# Patient Record
Sex: FEMALE | Race: WHITE | NOT HISPANIC OR LATINO | Employment: OTHER | URBAN - METROPOLITAN AREA
[De-identification: names, ages, dates, MRNs, and addresses within clinical notes are randomized per-mention and may not be internally consistent; named-entity substitution may affect disease eponyms.]

---

## 2017-01-27 ENCOUNTER — TRANSFERRED RECORDS (OUTPATIENT)
Dept: HEALTH INFORMATION MANAGEMENT | Facility: CLINIC | Age: 82
End: 2017-01-27

## 2017-01-31 DIAGNOSIS — I10 ESSENTIAL HYPERTENSION WITH GOAL BLOOD PRESSURE LESS THAN 140/90: Primary | ICD-10-CM

## 2017-01-31 DIAGNOSIS — E78.5 HYPERLIPIDEMIA LDL GOAL <100: ICD-10-CM

## 2017-01-31 DIAGNOSIS — M79.2 NEURALGIA: ICD-10-CM

## 2017-01-31 RX ORDER — ATENOLOL 25 MG/1
25 TABLET ORAL DAILY
Qty: 90 TABLET | Refills: 1 | Status: SHIPPED | OUTPATIENT
Start: 2017-01-31 | End: 2017-08-14

## 2017-01-31 RX ORDER — LISINOPRIL 10 MG/1
10 TABLET ORAL DAILY
Qty: 90 TABLET | Refills: 3 | Status: SHIPPED | OUTPATIENT
Start: 2017-01-31 | End: 2017-04-10

## 2017-01-31 RX ORDER — ATORVASTATIN CALCIUM 10 MG/1
10 TABLET, FILM COATED ORAL DAILY
Qty: 90 TABLET | Refills: 3 | Status: SHIPPED | OUTPATIENT
Start: 2017-01-31 | End: 2017-12-12

## 2017-01-31 RX ORDER — GABAPENTIN 300 MG/1
CAPSULE ORAL
Qty: 180 CAPSULE | Refills: 3 | Status: SHIPPED | OUTPATIENT
Start: 2017-01-31 | End: 2017-12-12

## 2017-01-31 NOTE — TELEPHONE ENCOUNTER
Recent Labs   Lab Test  11/10/16   0935  08/08/16   0933   05/15/15   0941  11/03/14   1203   CHOL  182  169   < >  170  172   HDL  62  62   < >  57  62   LDL  82  67   < >  74  69   TRIG  188*  201*   < >  195*  204*   CHOLHDLRATIO   --    --    --   3.0  2.8    < > = values in this interval not displayed.

## 2017-01-31 NOTE — TELEPHONE ENCOUNTER
Prescriptions were last filled with mail order pharmacy, pt now wants them filled at [facility], we are not able to transfer from the mail order      Neurontin 300mg      Last Written Prescription Date: 11/10/16  Last Fill Quantity: 180,  # refills: 1   Last Office Visit with [facility], [facility] or  Health prescribing provider: 11/10/16                                         Next 5 appointments (look out 90 days)     Apr 10, 2017  2:00 PM   Return Visit with Marty Mann MD   [facility] PHYSICIAN HEART AT [facility] ([facility])    [address] [place] [address]   [phone]                      Lisinopril 10mg    Last Written Prescription Date: 11/10/16  Last Fill Quantity: 90, # refills: 3  Last Office Visit with [facility], [facility] or  Health prescribing provider: 11/10/16   Next 5 appointments (look out 90 days)     Apr 10, 2017  2:00 PM   Return Visit with Marty Mann MD   [facility] PHYSICIAN HEART AT [facility] ([facility])    [address] [place] [address]   [phone]                   POTASSIUM   Date Value Ref Range Status   11/10/2016 4.2 3.4 - 5.3 mmol/L Final     CREATININE   Date Value Ref Range Status   11/10/2016 0.79 0.52 - 1.04 mg/dL Final     BP Readings from Last 3 Encounters:   11/10/16 120/82   08/12/16 116/71   06/20/16 119/68       Atenolol 25mg      Last Written Prescription Date: 11/10/16  Last Fill Quantity: 90, # refills: 1    Last Office Visit with [facility], [facility] or  Health prescribing provider:  11/10/16   Future Office Visit:    Next 5 appointments (look out 90 days)     Apr 10, 2017  2:00 PM   Return Visit with Marty Mann MD   [facility] PHYSICIAN HEART [facility] ([facility])    [address] [place] [address]   [phone]                    BP Readings from Last 3 Encounters:   11/10/16 120/82   08/12/16 116/71   06/20/16 119/68               Lipitor 10mg      Last Written Prescription Date: 11/10/16  Last Fill Quantity: 90, # refills: 3  Last Office Visit with [facility] or [facility] prescribing provider: 11/10/16   Next 5 appointments (look out 90 days)     Apr 10, 2017  2:00 PM   Return Visit with Marty Mann MD   [facility] PHYSICIAN HEART AT [facility] ([facility])    [address] [facility] [address]   [phone]                   CHOL      182   11/10/2016  HDL       62   11/10/2016  LDL       82   11/10/2016  TRIG      188   11/10/2016  CHOLHDLRATIO      3.0   5/15/2015            Thanks,  Ju Busch, Technician (float)  [facility]

## 2017-03-17 ENCOUNTER — OFFICE VISIT (OUTPATIENT)
Dept: FAMILY MEDICINE | Facility: CLINIC | Age: 82
End: 2017-03-17
Payer: COMMERCIAL

## 2017-03-17 VITALS
WEIGHT: 233.6 LBS | HEART RATE: 65 BPM | OXYGEN SATURATION: 99 % | RESPIRATION RATE: 18 BRPM | BODY MASS INDEX: 40.1 KG/M2 | TEMPERATURE: 97.4 F | SYSTOLIC BLOOD PRESSURE: 122 MMHG | DIASTOLIC BLOOD PRESSURE: 64 MMHG

## 2017-03-17 DIAGNOSIS — F41.9 ANXIETY: ICD-10-CM

## 2017-03-17 DIAGNOSIS — E11.9 TYPE 2 DIABETES MELLITUS WITHOUT COMPLICATION, WITHOUT LONG-TERM CURRENT USE OF INSULIN (H): Primary | ICD-10-CM

## 2017-03-17 DIAGNOSIS — I10 ESSENTIAL HYPERTENSION, BENIGN: ICD-10-CM

## 2017-03-17 PROCEDURE — 99215 OFFICE O/P EST HI 40 MIN: CPT | Performed by: NURSE PRACTITIONER

## 2017-03-17 RX ORDER — LISINOPRIL 5 MG/1
5 TABLET ORAL DAILY
Qty: 90 TABLET | Refills: 3 | Status: SHIPPED | OUTPATIENT
Start: 2017-03-17 | End: 2017-04-10

## 2017-03-17 RX ORDER — SERTRALINE HYDROCHLORIDE 25 MG/1
TABLET, FILM COATED ORAL
Qty: 30 TABLET | Refills: 3 | Status: SHIPPED | OUTPATIENT
Start: 2017-03-17 | End: 2017-04-10 | Stop reason: SINTOL

## 2017-03-17 NOTE — NURSING NOTE
"Chief Complaint   Patient presents with     Fatigue     Neck Pain     Generalized Weakness       Initial /64 (BP Location: Left arm, Patient Position: Chair, Cuff Size: Adult Large)  Pulse 65  Temp 97.4  F (36.3  C) (Tympanic)  Resp 18  Wt 233 lb 9.6 oz (106 kg)  SpO2 99%  Breastfeeding? No  BMI 40.1 kg/m2 Estimated body mass index is 40.1 kg/(m^2) as calculated from the following:    Height as of 6/20/16: 5' 4\" (1.626 m).    Weight as of this encounter: 233 lb 9.6 oz (106 kg).  Medication Reconciliation: complete  "

## 2017-03-17 NOTE — PROGRESS NOTES
SUBJECTIVE:                                                    Theodora Meng is a 81 year old female who presents to clinic today for the following health issues:      Fatigue/weakness and neck pain      Duration: 1 week    Description (location/character/radiation): bilateral and back of neck pain, fatigue, generalized weakness    Intensity:  moderate    Accompanying signs and symptoms: neck pain and weakness    History (similar episodes/previous evaluation): prior to symptoms had URI symptoms for 2 weeks took Chloricidin for uri, not taking now    Precipitating or alleviating factors: None    Therapies tried and outcome: None     Feels lightheaded. Dizzy with postural changes- has happened before in March. Due to lightheaded doesn't want to go out walking.  No chest pain or pressure.  No vision change.    Headache intermittent, doesn't take Tylenol.   More shortness of breath, gets winded more easily.    Above symptoms are similar to those in past when had ECHO, stress test and then saw cardiology- Dr. Mann 6 mos ago. At that time had shortness of breath then but no need to do anything or make changes. Due to see Dr. Mann in April.    Pain in neck. Stretching exercises for neck being done but not other exercises. Also has tens which is helpful.      Anxiety  Increased anxiety. Episodes happening more frequently.  Different events trigger anxiety.   On Tuesday, had flushing sensation from head to feet. Never happened before.  Felt like somehting bad would happen. Lasted only a minute. Took an ativan at that time which helped. Has not used Ativan for months.  Her daughter stayed with her a few nights.        Hypertension   Wonders if her ACEI dose could be reduced.   Not having SE with medication, but wants to be on lowest dose necessary.  BP Readings from Last 6 Encounters:   03/17/17 122/64   11/10/16 120/82   08/12/16 116/71   06/20/16 119/68   06/09/16 104/64   05/11/16 129/68       DM type 2  Needs  Rx for strips.  Lab Results   Component Value Date    A1C 7.1 11/10/2016    A1C 7.4 06/09/2016    A1C 7.0 01/29/2016    A1C 7.6 05/15/2015    A1C 7.2 11/03/2014     Problem list and histories reviewed & adjusted, as indicated.  Additional history: as documented    Reviewed and updated as needed this visit by clinical staff       Reviewed and updated as needed this visit by Provider         ROS:  Constitutional, HEENT, cardiovascular, pulmonary, GI, , musculoskeletal, neuro, skin, endocrine and psych systems are negative, except as otherwise noted.    OBJECTIVE:                                                    /64 (BP Location: Left arm, Patient Position: Chair, Cuff Size: Adult Large)  Pulse 65  Temp 97.4  F (36.3  C) (Tympanic)  Resp 18  Wt 233 lb 9.6 oz (106 kg)  SpO2 99%  Breastfeeding? No  BMI 40.1 kg/m2  Body mass index is 40.1 kg/(m^2).  GENERAL: healthy, alert, no distress and elderly  EYES: Eyes grossly normal to inspection and conjunctivae and sclerae normal  NECK: no adenopathy, no asymmetry, masses, or scars and thyroid normal to palpation  RESP: lungs clear to auscultation - no rales, rhonchi or wheezes  CV: regular rate and rhythm, normal S1 S2, no S3 or S4, no murmur, click or rub, no peripheral edema and peripheral pulses strong  MS: no gross musculoskeletal defects noted, no edema  PSYCH: mentation appears normal, anxious and fatigued    Diagnostic Test Results:       ASSESSMENT/PLAN:                                                      1. Type 2 diabetes mellitus without complication, without long-term current use of insulin (H)  - Hemoglobin A1c; Future  - blood glucose monitoring (NO BRAND SPECIFIED) test strip; Use to test blood sugars twice times daily or as directed  Dispense: 1 Box; Refill: 3    2. Essential hypertension, benign  BP in desired range. Will do a trial reduction of Lisinopril, monitor closely.  - lisinopril (PRINIVIL/ZESTRIL) 5 MG tablet; Take 1 tablet (5 mg) by  mouth daily  Dispense: 90 tablet; Refill: 3    3. Anxiety  Discussed adding med in hopes of better overall management. Patient agrees.  - sertraline (ZOLOFT) 25 MG tablet; Take 25 mg daily for a week then increase to 50 mg daily  Dispense: 30 tablet; Refill: 3    Patient Instructions   Reduce the Lisinopril to 5 mg daily. An order is in at the pharmacy when you need a refill.    Check your blood pressure twice a week for the next 2 weeks either here in the clinic or at the pharmacy. We'll call you if there is a need to change the medications.    Start the Zoloft at 25 mg daily for a week, then increase to 50 mg daily.    Return in 6 weeks to see Dr. Viera or myself      TT spent: 45 minutes of which 45 minutes were spent in direct face to face contact with patient/family. Patient teaching done regarding: anxiety treatment. Greater than 50% of time spent counseling and/or coordinating care.       Estefania Koehler, STEPHANE, APRN [facility]

## 2017-03-17 NOTE — PATIENT INSTRUCTIONS
Reduce the Lisinopril to 5 mg daily. An order is in at the pharmacy when you need a refill.    Check your blood pressure twice a week for the next 2 weeks either here in the clinic or at the pharmacy. We'll call you if there is a need to change the medications.    Start the Zoloft at 25 mg daily for a week, then increase to 50 mg daily.    Return in 6 weeks to see Dr. Viera or myself

## 2017-03-17 NOTE — MR AVS SNAPSHOT
After Visit Summary   3/17/2017    Theodora Meng    MRN: [id number]           Patient Information     Date Of Birth          1935        Visit Information        Provider Department      3/17/2017 1:20 PM Estefania Koehler APRN CNP [facility]        Today's Diagnoses     Type 2 diabetes mellitus without complication, without long-term current use of insulin (H)    -  1    Essential hypertension, benign        Anxiety          Care Instructions    Reduce the Lisinopril to 5 mg daily. An order is in at the pharmacy when you need a refill.    Check your blood pressure twice a week for the next 2 weeks either here in the clinic or at the pharmacy. We'll call you if there is a need to change the medications.    Start the Zoloft at 25 mg daily for a week, then increase to 50 mg daily.    Return in 6 weeks to see Dr. Viera or myself            Follow-ups after your visit        Your next 10 appointments already scheduled     Apr 04, 2017  9:45 AM CDT   Ech Complete with [address] ([facility])    [address] [facility] [address] [phone]           1.  Please bring or wear a comfortable two-piece outfit. 2.  You may eat, drink and take your normal medicines. 3.  For any questions that cannot be answered, please contact the ordering physician            Apr 06, 2017  9:00 AM CDT   LAB with  LAB   [facility] ([facility])    [address] [facility] [id number] [phone]           Patient must bring picture ID.  Patient should be prepared to give a urine specimen  Please do not eat 10-12 hours before your appointment if you are coming in fasting for labs on lipids, cholesterol, or glucose (sugar).  Pregnant women should follow their Care Team instructions. Water with medications is okay. Do not drink coffee or other fluids.   If you have concerns about taking  your medications,  "please ask at office or if scheduling via Enersave, send a message by clicking on Secure Messaging, Message Your Care Team.            Apr 10, 2017  2:00 PM CDT   Return Visit with Marty Mann MD   [facility] PHYSICIAN HEART AT [facility] ([facility])    [address] [facility] [address]   [phone]              Future tests that were ordered for you today     Open Future Orders        Priority Expected Expires Ordered    Hemoglobin A1c Routine  3/17/2018 3/17/2017            Who to contact     If you have questions or need follow up information about today's clinic visit or your schedule please contact [facility] directly at [phone].  Normal or non-critical lab and imaging results will be communicated to you by Integromicshart, letter or phone within 4 business days after the clinic has received the results. If you do not hear from us within 7 days, please contact the clinic through Integromicshart or phone. If you have a critical or abnormal lab result, we will notify you by phone as soon as possible.  Submit refill requests through Enersave or call your pharmacy and they will forward the refill request to us. Please allow 3 business days for your refill to be completed.          Additional Information About Your Visit        Enersave Information     Enersave lets you send messages to your doctor, view your test results, renew your prescriptions, schedule appointments and more. To sign up, go to [email or web address] . Click on \"Log in\" on the left side of the screen, which will take you to the Welcome page. Then click on \"Sign up Now\" on the right side of the page.     You will be asked to enter the access code listed below, as well as some personal information. Please follow the directions to create your username and password.     Your access code is: [email or web address]  Expires: 6/15/2017  2:59 PM     Your access code will  in 90 days. If you need help or a " new code, please call your [facility] or [phone].        Care EveryWhere ID     This is your Care EveryWhere ID. This could be used by other organizations to access your [facility] medical records  [id number]        Your Vitals Were     Pulse Temperature Respirations Pulse Oximetry Breastfeeding? BMI (Body Mass Index)    65 97.4  F (36.3  C) (Tympanic) 18 99% No 40.1 kg/m2       Blood Pressure from Last 3 Encounters:   03/17/17 122/64   11/10/16 120/82   08/12/16 116/71    Weight from Last 3 Encounters:   03/17/17 233 lb 9.6 oz (106 kg)   11/10/16 236 lb (107 kg)   08/12/16 238 lb 3.2 oz (108 kg)                 Today's Medication Changes          These changes are accurate as of: 3/17/17  2:59 PM.  If you have any questions, ask your nurse or doctor.               Start taking these medicines.        Dose/Directions    sertraline 25 MG tablet   Commonly known as:  ZOLOFT   Used for:  Anxiety   Started by:  Estefania Koehler APRN CNP        Take 25 mg daily for a week then increase to 50 mg daily   Quantity:  30 tablet   Refills:  3         These medicines have changed or have updated prescriptions.        Dose/Directions    * blood glucose monitoring test strip   Commonly known as:  ACCU-CHEK YULISSA   This may have changed:  Another medication with the same name was added. Make sure you understand how and when to take each.   Used for:  Type 2 diabetes mellitus without complication (H)   Changed by:  Yecenia Viera MD        Use to test blood sugars 1-2 times daily or as directed.   Quantity:  100 each   Refills:  prn       * blood glucose monitoring test strip   Commonly known as:  no brand specified   This may have changed:  You were already taking a medication with the same name, and this prescription was added. Make sure you understand how and when to take each.   Used for:  Type 2 diabetes mellitus without complication, without long-term current use of insulin (H)   Changed by:  Estefania Koehler  ALEX Hernandez CNP        Use to test blood sugars twice times daily or as directed   Quantity:  1 Box   Refills:  3       * lisinopril 10 MG tablet   Commonly known as:  PRINIVIL/ZESTRIL   This may have changed:  Another medication with the same name was added. Make sure you understand how and when to take each.   Used for:  Essential hypertension with goal blood pressure less than 140/90   Changed by:  Yecenia Viera MD        Dose:  10 mg   Take 1 tablet (10 mg) by mouth daily   Quantity:  90 tablet   Refills:  3       * lisinopril 5 MG tablet   Commonly known as:  PRINIVIL/ZESTRIL   This may have changed:  You were already taking a medication with the same name, and this prescription was added. Make sure you understand how and when to take each.   Used for:  Essential hypertension, benign   Changed by:  Estefania Koehler APRN CNP        Dose:  5 mg   Take 1 tablet (5 mg) by mouth daily   Quantity:  90 tablet   Refills:  3       * Notice:  This list has 4 medication(s) that are the same as other medications prescribed for you. Read the directions carefully, and ask your doctor or other care provider to review them with you.         Where to get your medicines      These medications were sent to [address] [address]     Phone:  [phone]     blood glucose monitoring test strip    lisinopril 5 MG tablet    sertraline 25 MG tablet                Primary Care Provider Office Phone # Fax #    Yecenia Viera [phone]       [facility] [address] [place] [address]        Thank you!     Thank you for choosing [facility]  for your care. Our goal is always to provide you with excellent care. Hearing back from our patients is one way we can continue to improve our services. Please take a few minutes to complete the written survey that you may receive in the mail after your visit  with us. Thank you!             Your Updated Medication List - Protect others around you: Learn how to safely use, store and throw away your medicines at www.disposemymeds.org.          This list is accurate as of: 3/17/17  2:59 PM.  Always use your most recent med list.                   Brand Name Dispense Instructions for use    acetaminophen 325 MG tablet    TYLENOL     Take 2 tablets (650 mg) by mouth every 4 hours as needed for other (mild pain)       atenolol 25 MG tablet    TENORMIN    90 tablet    Take 1 tablet (25 mg) by mouth daily       atorvastatin 10 MG tablet    LIPITOR    90 tablet    Take 1 tablet (10 mg) by mouth daily       blood glucose monitoring lancets     100 each    1 each 2 times daily.       * blood glucose monitoring test strip    ACCU-CHEK YULISSA    100 each    Use to test blood sugars 1-2 times daily or as directed.       * blood glucose monitoring test strip    no brand specified    1 Box    Use to test blood sugars twice times daily or as directed       esomeprazole 40 MG CR capsule    nexIUM    180 capsule    Take 1 capsule (40 mg) by mouth 2 times daily       gabapentin 300 MG capsule    NEURONTIN    180 capsule    Take 2 tabs in the evening.       * lisinopril 10 MG tablet    PRINIVIL/ZESTRIL    90 tablet    Take 1 tablet (10 mg) by mouth daily       * lisinopril 5 MG tablet    PRINIVIL/ZESTRIL    90 tablet    Take 1 tablet (5 mg) by mouth daily       LORazepam 0.5 MG tablet    ATIVAN    10 tablet    Take 1 tablet (0.5 mg) by mouth every 8 hours as needed for anxiety, use sparingly.       sertraline 25 MG tablet    ZOLOFT    30 tablet    Take 25 mg daily for a week then increase to 50 mg daily       * Notice:  This list has 4 medication(s) that are the same as other medications prescribed for you. Read the directions carefully, and ask your doctor or other care provider to review them with you.

## 2017-04-04 ENCOUNTER — HOSPITAL ENCOUNTER (OUTPATIENT)
Dept: CARDIOLOGY | Facility: CLINIC | Age: 82
Discharge: HOME | End: 2017-04-04
Attending: INTERNAL MEDICINE | Admitting: INTERNAL MEDICINE
Payer: COMMERCIAL

## 2017-04-04 DIAGNOSIS — E11.65 TYPE 2 DIABETES MELLITUS WITH HYPERGLYCEMIA (H): ICD-10-CM

## 2017-04-04 DIAGNOSIS — E78.5 HYPERLIPIDEMIA LDL GOAL <70: ICD-10-CM

## 2017-04-04 DIAGNOSIS — I25.10 CORONARY ARTERY DISEASE INVOLVING NATIVE CORONARY ARTERY OF NATIVE HEART WITHOUT ANGINA PECTORIS: ICD-10-CM

## 2017-04-04 DIAGNOSIS — R07.89 ATYPICAL CHEST PAIN: ICD-10-CM

## 2017-04-04 DIAGNOSIS — K21.9 GASTROESOPHAGEAL REFLUX DISEASE, ESOPHAGITIS PRESENCE NOT SPECIFIED: ICD-10-CM

## 2017-04-04 DIAGNOSIS — I10 BENIGN ESSENTIAL HYPERTENSION: ICD-10-CM

## 2017-04-04 PROCEDURE — 93306 TTE W/DOPPLER COMPLETE: CPT | Mod: 26 | Performed by: INTERNAL MEDICINE

## 2017-04-04 PROCEDURE — 93306 TTE W/DOPPLER COMPLETE: CPT

## 2017-04-06 ENCOUNTER — ALLIED HEALTH/NURSE VISIT (OUTPATIENT)
Dept: FAMILY MEDICINE | Facility: CLINIC | Age: 82
End: 2017-04-06
Payer: COMMERCIAL

## 2017-04-06 VITALS — DIASTOLIC BLOOD PRESSURE: 72 MMHG | SYSTOLIC BLOOD PRESSURE: 122 MMHG

## 2017-04-06 DIAGNOSIS — E11.65 TYPE 2 DIABETES MELLITUS WITH HYPERGLYCEMIA (H): ICD-10-CM

## 2017-04-06 DIAGNOSIS — E11.9 TYPE 2 DIABETES MELLITUS WITHOUT COMPLICATION, WITHOUT LONG-TERM CURRENT USE OF INSULIN (H): ICD-10-CM

## 2017-04-06 DIAGNOSIS — I10 ESSENTIAL HYPERTENSION: Primary | ICD-10-CM

## 2017-04-06 DIAGNOSIS — E78.5 HYPERLIPIDEMIA LDL GOAL <70: ICD-10-CM

## 2017-04-06 DIAGNOSIS — I10 BENIGN ESSENTIAL HYPERTENSION: ICD-10-CM

## 2017-04-06 DIAGNOSIS — R07.89 ATYPICAL CHEST PAIN: ICD-10-CM

## 2017-04-06 DIAGNOSIS — K21.9 GASTROESOPHAGEAL REFLUX DISEASE, ESOPHAGITIS PRESENCE NOT SPECIFIED: ICD-10-CM

## 2017-04-06 DIAGNOSIS — I25.10 CORONARY ARTERY DISEASE INVOLVING NATIVE CORONARY ARTERY OF NATIVE HEART WITHOUT ANGINA PECTORIS: ICD-10-CM

## 2017-04-06 LAB
ALT SERPL W P-5'-P-CCNC: 18 U/L (ref 0–50)
ANION GAP SERPL CALCULATED.3IONS-SCNC: 8 MMOL/L (ref 3–14)
BUN SERPL-MCNC: 17 MG/DL (ref 7–30)
CALCIUM SERPL-MCNC: 8.6 MG/DL (ref 8.5–10.1)
CHLORIDE SERPL-SCNC: 107 MMOL/L (ref 94–109)
CHOLEST SERPL-MCNC: 163 MG/DL
CO2 SERPL-SCNC: 26 MMOL/L (ref 20–32)
CREAT SERPL-MCNC: 0.7 MG/DL (ref 0.52–1.04)
GFR SERPL CREATININE-BSD FRML MDRD: 81 ML/MIN/1.7M2
GLUCOSE SERPL-MCNC: 139 MG/DL (ref 70–99)
HBA1C MFR BLD: 7.2 % (ref 4.3–6)
HDLC SERPL-MCNC: 59 MG/DL
LDLC SERPL CALC-MCNC: 71 MG/DL
NONHDLC SERPL-MCNC: 104 MG/DL
POTASSIUM SERPL-SCNC: 3.9 MMOL/L (ref 3.4–5.3)
SODIUM SERPL-SCNC: 141 MMOL/L (ref 133–144)
TRIGL SERPL-MCNC: 166 MG/DL

## 2017-04-06 PROCEDURE — 80061 LIPID PANEL: CPT | Performed by: NURSE PRACTITIONER

## 2017-04-06 PROCEDURE — 80048 BASIC METABOLIC PNL TOTAL CA: CPT | Performed by: NURSE PRACTITIONER

## 2017-04-06 PROCEDURE — 36415 COLL VENOUS BLD VENIPUNCTURE: CPT | Performed by: NURSE PRACTITIONER

## 2017-04-06 PROCEDURE — 83036 HEMOGLOBIN GLYCOSYLATED A1C: CPT | Performed by: NURSE PRACTITIONER

## 2017-04-06 PROCEDURE — 99207 ZZC NO CHARGE NURSE ONLY: CPT

## 2017-04-06 PROCEDURE — 84460 ALANINE AMINO (ALT) (SGPT): CPT | Performed by: NURSE PRACTITIONER

## 2017-04-10 ENCOUNTER — OFFICE VISIT (OUTPATIENT)
Dept: CARDIOLOGY | Facility: CLINIC | Age: 82
End: 2017-04-10
Attending: INTERNAL MEDICINE
Payer: COMMERCIAL

## 2017-04-10 VITALS
DIASTOLIC BLOOD PRESSURE: 80 MMHG | WEIGHT: 238.7 LBS | OXYGEN SATURATION: 99 % | SYSTOLIC BLOOD PRESSURE: 138 MMHG | HEART RATE: 67 BPM | BODY MASS INDEX: 40.97 KG/M2

## 2017-04-10 DIAGNOSIS — I10 BENIGN ESSENTIAL HYPERTENSION: ICD-10-CM

## 2017-04-10 DIAGNOSIS — I25.10 CORONARY ARTERY DISEASE INVOLVING NATIVE CORONARY ARTERY OF NATIVE HEART WITHOUT ANGINA PECTORIS: ICD-10-CM

## 2017-04-10 DIAGNOSIS — E78.5 HYPERLIPIDEMIA LDL GOAL <70: ICD-10-CM

## 2017-04-10 DIAGNOSIS — K21.9 GASTROESOPHAGEAL REFLUX DISEASE, ESOPHAGITIS PRESENCE NOT SPECIFIED: ICD-10-CM

## 2017-04-10 DIAGNOSIS — R07.89 ATYPICAL CHEST PAIN: ICD-10-CM

## 2017-04-10 DIAGNOSIS — E11.65 TYPE 2 DIABETES MELLITUS WITH HYPERGLYCEMIA, WITHOUT LONG-TERM CURRENT USE OF INSULIN (H): ICD-10-CM

## 2017-04-10 DIAGNOSIS — I10 ESSENTIAL HYPERTENSION WITH GOAL BLOOD PRESSURE LESS THAN 140/90: ICD-10-CM

## 2017-04-10 PROCEDURE — 99214 OFFICE O/P EST MOD 30 MIN: CPT | Performed by: INTERNAL MEDICINE

## 2017-04-10 RX ORDER — LISINOPRIL 5 MG/1
7.5 TABLET ORAL DAILY
Qty: 135 TABLET | Refills: 3 | Status: SHIPPED | OUTPATIENT
Start: 2017-04-10 | End: 2017-12-12

## 2017-04-10 NOTE — MR AVS SNAPSHOT
After Visit Summary   4/10/2017    Theodora Meng    MRN: [id number]           Patient Information     Date Of Birth          1935        Visit Information        Provider Department      4/10/2017 2:00 PM Marty Mann MD [facility] PHYSICIAN HEART AT [facility]        Today's Diagnoses     Atypical chest pain        Benign essential hypertension        Hyperlipidemia LDL goal <70        Type 2 diabetes mellitus with hyperglycemia, without long-term current use of insulin (H)        Coronary artery disease involving native coronary artery of native heart without angina pectoris        Gastroesophageal reflux disease, esophagitis presence not specified        Essential hypertension with goal blood pressure less than 140/90          Care Instructions    Thank you for your  Heart Care visit today. Your provider has recommended the following:  Medication Changes:  1. INCREASE your Lisinopril to 7.5 mg daily (Take 1 and 1/2 tablets of your 5 mg tablets daily to equal 7.5 mg)  Recommendations:  As discussed at your visit today with Dr. Mann.  Follow-up:  1. See Alexandra Soler PA-C for cardiology follow up in one month with a non-fasting labs to be done 1-2 days prior to this revisit.  2. See Dr. Mann for cardiology follow up in 4 months with fasting labs to be done 1-2 days prior to this revisit.  We kindly ask that you call cardiology scheduling at [phone] three months prior to requested revisit date to schedule future cardiology appointments.  Reminder:  1. Please bring in your current medication list or your medication, over the counter supplements and vitamin bottles as we will review these at each office visit.               [facility] HEART CARE  [facility]~[address]~[place], [place]~[address]  Questions about your visit today?  Call your Cardiology Clinic RN's-Payal Tavarez and/or Uzma Patterson at  "[phone].              Follow-ups after your visit        Additional Services     Follow-Up with Cardiac Advanced Practice Provider           Follow-Up with Cardiologist                 Future tests that were ordered for you today     Open Future Orders        Priority Expected Expires Ordered    Basic metabolic panel Routine 8/8/2017 4/10/2018 4/10/2017    Lipid Profile Routine 8/8/2017 4/10/2018 4/10/2017    ALT Routine 8/8/2017 4/10/2018 4/10/2017    Follow-Up with Cardiologist Routine 8/8/2017 4/10/2018 4/10/2017    Basic metabolic panel Routine 5/10/2017 4/10/2018 4/10/2017    Follow-Up with Cardiac Advanced Practice Provider Routine 5/10/2017 4/10/2018 4/10/2017            Who to contact     If you have questions or need follow up information about today's clinic visit or your schedule please contact [facility] PHYSICIAN HEART AT [facility] directly at [phone].  Normal or non-critical lab and imaging results will be communicated to you by [employer], letter or phone within 4 business days after the clinic has received the results. If you do not hear from us within 7 days, please contact the clinic through [employer] or phone. If you have a critical or abnormal lab result, we will notify you by phone as soon as possible.  Submit refill requests through [employer] or call your pharmacy and they will forward the refill request to us. Please allow 3 business days for your refill to be completed.          Additional Information About Your Visit        [employer] Information     [employer] lets you send messages to your doctor, view your test results, renew your prescriptions, schedule appointments and more. To sign up, go to [email or web address] . Click on \"Log in\" on the left side of the screen, which will take you to the Welcome page. Then click on \"Sign up Now\" on the right side of the page.     You will be asked to enter the access code listed below, as well as some personal information. Please " follow the directions to create your username and password.     Your access code is: [email or web address]  Expires: 6/15/2017  2:59 PM     Your access code will  in 90 days. If you need help or a new code, please call your [facility] clinic or [phone].        Care EveryWhere ID     This is your Care EveryWhere ID. This could be used by other organizations to access your [facility] medical records  [id number]        Your Vitals Were     Pulse Pulse Oximetry BMI (Body Mass Index)             67 99% 40.97 kg/m2          Blood Pressure from Last 3 Encounters:   04/10/17 138/80   17 122/72   17 122/64    Weight from Last 3 Encounters:   04/10/17 108.3 kg (238 lb 11.2 oz)   17 106 kg (233 lb 9.6 oz)   11/10/16 107 kg (236 lb)              We Performed the Following     Follow-Up with Cardiologist          Today's Medication Changes          These changes are accurate as of: 4/10/17  2:43 PM.  If you have any questions, ask your nurse or doctor.               These medicines have changed or have updated prescriptions.        Dose/Directions    * lisinopril 5 MG tablet   Commonly known as:  PRINIVIL/ZESTRIL   This may have changed:  Another medication with the same name was changed. Make sure you understand how and when to take each.   Used for:  Essential hypertension, benign   Changed by:  Estefania Koehler APRN CNP        Dose:  5 mg   Take 1 tablet (5 mg) by mouth daily   Quantity:  90 tablet   Refills:  3       * lisinopril 5 MG tablet   Commonly known as:  PRINIVIL/ZESTRIL   This may have changed:    - medication strength  - how much to take   Used for:  Essential hypertension with goal blood pressure less than 140/90   Changed by:  Marty Mann MD        Dose:  7.5 mg   Take 1.5 tablets (7.5 mg) by mouth daily   Quantity:  135 tablet   Refills:  3       * Notice:  This list has 2 medication(s) that are the same as other medications prescribed for you. Read the directions carefully, and  ask your doctor or other care provider to review them with you.      Stop taking these medicines if you haven't already. Please contact your care team if you have questions.     sertraline 25 MG tablet   Commonly known as:  ZOLOFT   Stopped by:  Marty Mann MD                Where to get your medicines      These medications were sent to [facility] [place], [place] - [address]  [address] , [facility] [address]     Phone:  [phone]     lisinopril 5 MG tablet                Primary Care Provider Office Phone # Fax #    PhoebePastora Viera [phone]       [facility] [address]  [facility] [address]        Thank you!     Thank you for choosing [facility] PHYSICIAN HEART AT [facility]  for your care. Our goal is always to provide you with excellent care. Hearing back from our patients is one way we can continue to improve our services. Please take a few minutes to complete the written survey that you may receive in the mail after your visit with us. Thank you!             Your Updated Medication List - Protect others around you: Learn how to safely use, store and throw away your medicines at www.disposemymeds.org.          This list is accurate as of: 4/10/17  2:43 PM.  Always use your most recent med list.                   Brand Name Dispense Instructions for use    acetaminophen 325 MG tablet    TYLENOL     Take 2 tablets (650 mg) by mouth every 4 hours as needed for other (mild pain)       atenolol 25 MG tablet    TENORMIN    90 tablet    Take 1 tablet (25 mg) by mouth daily       atorvastatin 10 MG tablet    LIPITOR    90 tablet    Take 1 tablet (10 mg) by mouth daily       blood glucose monitoring lancets     100 each    1 each 2 times daily.       * blood glucose monitoring test strip    ACCU-CHEK YULISSA    100 each    Use to test blood sugars 1-2 times daily or as directed.       * blood glucose monitoring test strip    no  brand specified    1 Box    Use to test blood sugars twice times daily or as directed       esomeprazole 40 MG CR capsule    nexIUM    180 capsule    Take 1 capsule (40 mg) by mouth 2 times daily       gabapentin 300 MG capsule    NEURONTIN    180 capsule    Take 2 tabs in the evening.       * lisinopril 5 MG tablet    PRINIVIL/ZESTRIL    90 tablet    Take 1 tablet (5 mg) by mouth daily       * lisinopril 5 MG tablet    PRINIVIL/ZESTRIL    135 tablet    Take 1.5 tablets (7.5 mg) by mouth daily       LORazepam 0.5 MG tablet    ATIVAN    10 tablet    Take 1 tablet (0.5 mg) by mouth every 8 hours as needed for anxiety, use sparingly.       * Notice:  This list has 4 medication(s) that are the same as other medications prescribed for you. Read the directions carefully, and ask your doctor or other care provider to review them with you.

## 2017-04-10 NOTE — PATIENT INSTRUCTIONS
Thank you for your [facility] visit today. Your provider has recommended the following:  Medication Changes:  1. INCREASE your Lisinopril to 7.5 mg daily (Take 1 and 1/2 tablets of your 5 mg tablets daily to equal 7.5 mg)  Recommendations:  As discussed at your visit today with Dr. Mann.  Follow-up:  1. See Alexandra Soler PA-C for cardiology follow up in one month with a non-fasting labs to be done 1-2 days prior to this revisit.  2. See Dr. Mann for cardiology follow up in 4 months with fasting labs to be done 1-2 days prior to this revisit.  We kindly ask that you call cardiology scheduling at [phone] three months prior to requested revisit date to schedule future cardiology appointments.  Reminder:  1. Please bring in your current medication list or your medication, over the counter supplements and vitamin bottles as we will review these at each office visit.               [facility] HEART CARE  [facility]~[address]~[place], [place]~[address]  Questions about your visit today?  Call your Cardiology Clinic RN's-Payal Tavarez and/or Uzma Patterson at [phone].

## 2017-04-10 NOTE — LETTER
"4/10/2017    Yecenia Viera MD  [facility]   [address] [facility] [address]    RE: Theodora Meng       Dear Colleague,    I had the pleasure of seeing Theodora Meng in the [facility].    The patient is an 82-year-old overweight white female who presents to Cardiology Clinic for evaluation of chest discomfort known as \"heartburn\" and a fluttering sensation thought to be palpitations.  She had been evaluated in 2008 for similar chest discomfort, and a Cardiolite stress test had revealed a fixed anterior defect thought to be breast attenuation artifact, although nontransmural infarct could not be excluded.  Left ventricular function was thought to be intact.  She had a CT scan of the abdomen showing calcification of the coronary arteries.  She apparently has done well with isolated episodes of gastroesophageal reflux discomfort over the years as well as isolated palpitations.  She did have 1 more persistent chest discomfort prior to office visit in the spring of 2016 that lasted 12 hours without response to antacids.  She also had palpitations that appeared to reflect the discomfort.  The discomfort did not radiate to throat, jaw, shoulders, arms or back, was not associated with shortness of breath, nausea, diaphoresis or syncope.  The patient's history for coronary disease risk factors is negative for cigarette smoking, positive for hypertension in the past 15 years, positive for hyperlipidemia in the past 2 years, diabetes type 2 that is noninsulin dependent, and positive for family history of premature coronary artery disease.  She does not take in much caffeine or alcohol.  She is postmenopausal.  She has not had significant symptoms of chest discomfort, shortness of breath, dizziness, nausea, vomiting, diaphoresis or syncope.  She has occasional palpitations at night and takes her beta blocker therapy at that time.  She denies PND, orthopnea, fevers, " chills or sweats.  She has slight easy fatigability, general malaise and mild dyspnea on exertion.  She had echocardiography performed before this visit that showed intact left ventricular function, with ejection fraction 55%-60% with trace to mild mitral insufficiency, normal right ventricular systolic function and evidence of diastolic dysfunction of the left ventricle, mild dilatation of left atrium, mild mitral annular calcification.  No significant change from the previous echocardiogram noted.  The patient's Holter monitor from last year demonstrated a heart rate varying from 56 to 105, average rate of 72, with isolated to occasional supraventricular and ventricular beats with one 27-beat run of PAT.        MEDICATIONS:   1.  Lisinopril 5 mg a day.   2.  Atenolol 25 mg a day taken at night.   3.  Atorvastatin 10 mg a day.   4.  Gabapentin 600 mg in the evening.   5.  Lorazepam 0.5 mg every 8 hours as needed.   6.  Acetaminophen 325-650 mg every 4 hours as needed.   7.  Esomeprazole 40 mg twice a day (that is Nexium 40 mg twice a day).      LABORATORY DATA:  Demonstrated cholesterol 163, HDL 59, LDL 71, triglycerides 166, sodium 141, potassium 3.9, BUN 17, creatinine 0.7.  Hemoglobin A1c is 7.2.  ALT is 18.      The patient presents to Cardiology Clinic for followup of ischemic heart disease, hypertension and hyperlipidemia.  The patient did notice some lightheadedness at a previous clinic visit with her primary care physician, who reduced her lisinopril from 10 to 5 mg a day.  This has not improved her symptoms of lightheadedness or lack of balance.      PHYSICAL EXAMINATION:   VITAL SIGNS:  Blood pressure 138/80 with a heart rate of 60-70 and regular with occasional premature beat.  Weight was 238 pounds, which was stable.   NECK:  Without jugular venous distention, carotid bruit or palpable thyroid.   CHEST:  Essentially clear to percussion and auscultation with slight decreased breath sounds at the bases.  "  CARDIAC:  Regular rhythm, soft S4 gallop, 1 to 2/6 systolic murmur at the left sternal border with slight radiation to the apex.  No diastolic murmur, rub or S3.   EXTREMITIES:  Without significant cyanosis.  There was trace to 1+ edema present.      CLINICAL IMPRESSION:   1.  Stable cardiac condition.   2.  Isolated episodes of palpitations but no severe chest discomfort.   3.  Isolated abnormalities of Cardiolite stress testing with an anterior defect thought to be most likely related to soft-tissue attenuation.   4.  Hypertension -- slight increase in systolic blood pressure.   5.  Hyperlipidemia -- at goal with slight elevation of triglycerides.   6.  Positive family history of premature coronary artery disease.   7.  Diabetes mellitus -- type 2 noninsulin dependent.   8.  Gastroesophageal reflux disease with question of dysphagia.   9.  Osteoarthritis, status post knee replacement.      DISCUSSION:  The patient has done well since her last clinic visit.  She has had no prolonged chest discomfort, shortness of breath, dizziness or syncope.  She does have isolated palpitations.  She has been noted to have coronary artery calcifications on CT scan, therefore, not a very good candidate for CT coronary angiography.  She will be continued on her medications including atenolol and lisinopril, and the lisinopril will be gently increased to 7.5 mg a day for better blood pressure control.  It is likely that her \"lightheadedness\" is more likely a lack of balance or unsteadiness and she should use a cane or walking stick.  She denies any symptoms of congestive heart failure.  She may be a candidate for followup Holter monitor later this year and perhaps a stress test if she has recurrent symptoms of chest discomfort using a Lexiscan Cardiolite.      RECOMMENDATIONS:   1.  Continue present medications except to increase lisinopril to 7.5 mg a day.   2.  Aggressive diabetic management.   3.  Close followup of serum lipids, " basic metabolic panel and blood pressure with followup with Alexandra Soler in 1 month.   4.  Consider Holter monitor later in 2017, early 2018 to evaluate rhythm.   5.  Also may consider Lexiscan Cardiolite stress test late in 2017 or early 2018 for ischemic evaluation or if recurrent symptoms of chest discomfort.   6.  Routine medical followup.   7.  Cardiology followup in 4 months.      Again, thank you for allowing me to participate in the care of your patient.      Sincerely,    Marty Mann MD     [facility]

## 2017-04-11 NOTE — PROGRESS NOTES
"HISTORY OF PRESENT ILLNESS:  The patient is an 82-year-old overweight white female who presents to Cardiology Clinic for evaluation of chest discomfort known as \"heartburn\" and a fluttering sensation thought to be palpitations.  She had been evaluated in 2008 for similar chest discomfort, and a Cardiolite stress test had revealed a fixed anterior defect thought to be breast attenuation artifact, although nontransmural infarct could not be excluded.  Left ventricular function was thought to be intact.  She had a CT scan of the abdomen showing calcification of the coronary arteries.  She apparently has done well with isolated episodes of gastroesophageal reflux discomfort over the years as well as isolated palpitations.  She did have 1 more persistent chest discomfort prior to office visit in the spring of 2016 that lasted 12 hours without response to antacids.  She also had palpitations that appeared to reflect the discomfort.  The discomfort did not radiate to throat, jaw, shoulders, arms or back, was not associated with shortness of breath, nausea, diaphoresis or syncope.  The patient's history for coronary disease risk factors is negative for cigarette smoking, positive for hypertension in the past 15 years, positive for hyperlipidemia in the past 2 years, diabetes type 2 that is noninsulin dependent, and positive for family history of premature coronary artery disease.  She does not take in much caffeine or alcohol.  She is postmenopausal.  She has not had significant symptoms of chest discomfort, shortness of breath, dizziness, nausea, vomiting, diaphoresis or syncope.  She has occasional palpitations at night and takes her beta blocker therapy at that time.  She denies PND, orthopnea, fevers, chills or sweats.  She has slight easy fatigability, general malaise and mild dyspnea on exertion.  She had echocardiography performed before this visit that showed intact left ventricular function, with ejection fraction " 55%-60% with trace to mild mitral insufficiency, normal right ventricular systolic function and evidence of diastolic dysfunction of the left ventricle, mild dilatation of left atrium, mild mitral annular calcification.  No significant change from the previous echocardiogram noted.  The patient's Holter monitor from last year demonstrated a heart rate varying from 56 to 105, average rate of 72, with isolated to occasional supraventricular and ventricular beats with one 27-beat run of PAT.      MEDICATIONS:   1.  Lisinopril 5 mg a day.   2.  Atenolol 25 mg a day taken at night.   3.  Atorvastatin 10 mg a day.   4.  Gabapentin 600 mg in the evening.   5.  Lorazepam 0.5 mg every 8 hours as needed.   6.  Acetaminophen 325-650 mg every 4 hours as needed.   7.  Esomeprazole 40 mg twice a day (that is Nexium 40 mg twice a day).      LABORATORY DATA:  Demonstrated cholesterol 163, HDL 59, LDL 71, triglycerides 166, sodium 141, potassium 3.9, BUN 17, creatinine 0.7.  Hemoglobin A1c is 7.2.  ALT is 18.      The patient presents to Cardiology Clinic for followup of ischemic heart disease, hypertension and hyperlipidemia.  The patient did notice some lightheadedness at a previous clinic visit with her primary care physician, who reduced her lisinopril from 10 to 5 mg a day.  This has not improved her symptoms of lightheadedness or lack of balance.      PHYSICAL EXAMINATION:   VITAL SIGNS:  Blood pressure 138/80 with a heart rate of 60-70 and regular with occasional premature beat.  Weight was 238 pounds, which was stable.   NECK:  Without jugular venous distention, carotid bruit or palpable thyroid.   CHEST:  Essentially clear to percussion and auscultation with slight decreased breath sounds at the bases.   CARDIAC:  Regular rhythm, soft S4 gallop, 1 to 2/6 systolic murmur at the left sternal border with slight radiation to the apex.  No diastolic murmur, rub or S3.   EXTREMITIES:  Without significant cyanosis.  There was  "trace to 1+ edema present.      CLINICAL IMPRESSION:   1.  Stable cardiac condition.   2.  Isolated episodes of palpitations but no severe chest discomfort.   3.  Isolated abnormalities of Cardiolite stress testing with an anterior defect thought to be most likely related to soft-tissue attenuation.   4.  Hypertension -- slight increase in systolic blood pressure.   5.  Hyperlipidemia -- at goal with slight elevation of triglycerides.   6.  Positive family history of premature coronary artery disease.   7.  Diabetes mellitus -- type 2 noninsulin dependent.   8.  Gastroesophageal reflux disease with question of dysphagia.   9.  Osteoarthritis, status post knee replacement.      DISCUSSION:  The patient has done well since her last clinic visit.  She has had no prolonged chest discomfort, shortness of breath, dizziness or syncope.  She does have isolated palpitations.  She has been noted to have coronary artery calcifications on CT scan, therefore, not a very good candidate for CT coronary angiography.  She will be continued on her medications including atenolol and lisinopril, and the lisinopril will be gently increased to 7.5 mg a day for better blood pressure control.  It is likely that her \"lightheadedness\" is more likely a lack of balance or unsteadiness and she should use a cane or walking stick.  She denies any symptoms of congestive heart failure.  She may be a candidate for followup Holter monitor later this year and perhaps a stress test if she has recurrent symptoms of chest discomfort using a Lexiscan Cardiolite.      RECOMMENDATIONS:   1.  Continue present medications except to increase lisinopril to 7.5 mg a day.   2.  Aggressive diabetic management.   3.  Close followup of serum lipids, basic metabolic panel and blood pressure with followup with Alexandra Soler in 1 month.   4.  Consider Holter monitor later in 2017, early 2018 to evaluate rhythm.   5.  Also may consider Lexiscan Cardiolite stress " test late in  or early  for ischemic evaluation or if recurrent symptoms of chest discomfort.   6.  Routine medical followup.   7.  Cardiology followup in 4 months.      cc:   Yecenia Viera MD    [address]  [address]         JEANE JENSEN MD, [facility]             D: 04/10/2017 14:48   T: 04/10/2017 23:31   MT: CORINNE      Name:     THOR DOMÍNGUEZ   MRN:      [id number]        Account:      [id number]   :      1935           Service Date: 04/10/2017      Document: [id number]

## 2017-04-24 ENCOUNTER — ALLIED HEALTH/NURSE VISIT (OUTPATIENT)
Dept: FAMILY MEDICINE | Facility: CLINIC | Age: 82
End: 2017-04-24
Payer: COMMERCIAL

## 2017-04-24 VITALS — DIASTOLIC BLOOD PRESSURE: 68 MMHG | SYSTOLIC BLOOD PRESSURE: 104 MMHG | HEART RATE: 68 BPM

## 2017-04-24 DIAGNOSIS — I10 ESSENTIAL HYPERTENSION: Primary | ICD-10-CM

## 2017-04-24 PROCEDURE — 99207 ZZC NO CHARGE NURSE ONLY: CPT

## 2017-04-24 NOTE — MR AVS SNAPSHOT
After Visit Summary   4/24/2017    Theodora Meng    MRN: [id number]           Patient Information     Date Of Birth          1935        Visit Information        Provider Department      4/24/2017 3:00 PM FL AWILDA RN [facility]        Today's Diagnoses     Essential hypertension    -  1       Follow-ups after your visit        Your next 10 appointments already scheduled     May 11, 2017 10:00 AM CDT   LAB with [facility]   [facility] ([facility])    [address] [facility] [id number] [phone]           Patient must bring picture ID.  Patient should be prepared to give a urine specimen  Please do not eat 10-12 hours before your appointment if you are coming in fasting for labs on lipids, cholesterol, or glucose (sugar).  Pregnant women should follow their Care Team instructions. Water with medications is okay. Do not drink coffee or other fluids.   If you have concerns about taking  your medications, please ask at office or if scheduling via [employer], send a message by clicking on Secure Messaging, Message Your Care Team.            May 15, 2017  1:40 PM CDT   Return Visit with Alexandra Soler PA-C   [facility] PHYS[facility] AT [facility] ([facility])    [address] [address] [phone]              Who to contact     If you have questions or need follow up information about today's clinic visit or your schedule please contact [facility] directly at [phone].  Normal or non-critical lab and imaging results will be communicated to you by MyChart, letter or phone within 4 business days after the clinic has received the results. If you do not hear from us within 7 days, please contact the clinic through MyChart or phone. If you have a critical or abnormal lab result, we will notify you by phone as soon as possible.  Submit refill requests through [employer] or call your  "pharmacy and they will forward the refill request to us. Please allow 3 business days for your refill to be completed.          Additional Information About Your Visit        MyChart Information     [employer] lets you send messages to your doctor, view your test results, renew your prescriptions, schedule appointments and more. To sign up, go to [email or web address] . Click on \"Log in\" on the left side of the screen, which will take you to the Welcome page. Then click on \"Sign up Now\" on the right side of the page.     You will be asked to enter the access code listed below, as well as some personal information. Please follow the directions to create your username and password.     Your access code is: [email or web address]  Expires: 6/15/2017  2:59 PM     Your access code will  in 90 days. If you need help or a new code, please call your [place] clinic or [phone].        Care EveryWhere ID     This is your [facility] EveryWhere ID. This could be used by other organizations to access your [place] medical records  [id number]        Your Vitals Were     Pulse                   68            Blood Pressure from Last 3 Encounters:   17 104/68   04/10/17 138/80   17 122/72    Weight from Last 3 Encounters:   04/10/17 238 lb 11.2 oz (108.3 kg)   17 233 lb 9.6 oz (106 kg)   11/10/16 236 lb (107 kg)              Today, you had the following     No orders found for display       Primary Care Provider Office Phone # Fax #    Yecenia Viera [phone]       [facility] [address] [place] [address]        Thank you!     Thank you for choosing [facility]  for your care. Our goal is always to provide you with excellent care. Hearing back from our patients is one way we can continue to improve our services. Please take a few minutes to complete the written survey that you may receive in the mail after your visit with us. Thank you!             Your Updated " Medication List - Protect others around you: Learn how to safely use, store and throw away your medicines at www.disposemymeds.org.          This list is accurate as of: 4/24/17  3:14 PM.  Always use your most recent med list.                   Brand Name Dispense Instructions for use    acetaminophen 325 MG tablet    TYLENOL     Take 2 tablets (650 mg) by mouth every 4 hours as needed for other (mild pain)       atenolol 25 MG tablet    TENORMIN    90 tablet    Take 1 tablet (25 mg) by mouth daily       atorvastatin 10 MG tablet    LIPITOR    90 tablet    Take 1 tablet (10 mg) by mouth daily       blood glucose monitoring lancets     100 each    1 each 2 times daily.       * blood glucose monitoring test strip    ACCU-CHEK YULISSA    100 each    Use to test blood sugars 1-2 times daily or as directed.       * blood glucose monitoring test strip    no brand specified    1 Box    Use to test blood sugars twice times daily or as directed       esomeprazole 40 MG CR capsule    nexIUM    180 capsule    Take 1 capsule (40 mg) by mouth 2 times daily       gabapentin 300 MG capsule    NEURONTIN    180 capsule    Take 2 tabs in the evening.       lisinopril 5 MG tablet    PRINIVIL/ZESTRIL    135 tablet    Take 1.5 tablets (7.5 mg) by mouth daily       LORazepam 0.5 MG tablet    ATIVAN    10 tablet    Take 1 tablet (0.5 mg) by mouth every 8 hours as needed for anxiety, use sparingly.       * Notice:  This list has 2 medication(s) that are the same as other medications prescribed for you. Read the directions carefully, and ask your doctor or other care provider to review them with you.

## 2017-04-24 NOTE — NURSING NOTE
Pt here for BP check. BP wnl limits pt states she is feeling well. No change to medications. Amaris Walker RN

## 2017-05-01 ENCOUNTER — OFFICE VISIT (OUTPATIENT)
Dept: FAMILY MEDICINE | Facility: CLINIC | Age: 82
End: 2017-05-01
Payer: COMMERCIAL

## 2017-05-01 VITALS
HEART RATE: 62 BPM | BODY MASS INDEX: 41.64 KG/M2 | WEIGHT: 235 LBS | SYSTOLIC BLOOD PRESSURE: 124 MMHG | HEIGHT: 63 IN | OXYGEN SATURATION: 98 % | DIASTOLIC BLOOD PRESSURE: 80 MMHG | TEMPERATURE: 97.6 F

## 2017-05-01 DIAGNOSIS — E04.1 THYROID NODULE: ICD-10-CM

## 2017-05-01 DIAGNOSIS — M54.2 CERVICALGIA: ICD-10-CM

## 2017-05-01 DIAGNOSIS — F41.9 ANXIETY: ICD-10-CM

## 2017-05-01 DIAGNOSIS — E78.5 HYPERLIPIDEMIA LDL GOAL <100: ICD-10-CM

## 2017-05-01 DIAGNOSIS — I10 ESSENTIAL HYPERTENSION WITH GOAL BLOOD PRESSURE LESS THAN 140/90: Primary | ICD-10-CM

## 2017-05-01 DIAGNOSIS — E11.9 TYPE 2 DIABETES MELLITUS WITHOUT COMPLICATION, WITHOUT LONG-TERM CURRENT USE OF INSULIN (H): ICD-10-CM

## 2017-05-01 LAB
T4 FREE SERPL-MCNC: 1.04 NG/DL (ref 0.76–1.46)
TSH SERPL DL<=0.005 MIU/L-ACNC: 0.33 MU/L (ref 0.4–4)

## 2017-05-01 PROCEDURE — 99214 OFFICE O/P EST MOD 30 MIN: CPT | Performed by: FAMILY MEDICINE

## 2017-05-01 PROCEDURE — 36415 COLL VENOUS BLD VENIPUNCTURE: CPT | Performed by: FAMILY MEDICINE

## 2017-05-01 PROCEDURE — 84443 ASSAY THYROID STIM HORMONE: CPT | Performed by: FAMILY MEDICINE

## 2017-05-01 PROCEDURE — 84439 ASSAY OF FREE THYROXINE: CPT | Performed by: FAMILY MEDICINE

## 2017-05-01 RX ORDER — LORAZEPAM 0.5 MG/1
0.5 TABLET ORAL EVERY 8 HOURS PRN
Qty: 20 TABLET | Refills: 1 | Status: ON HOLD | OUTPATIENT
Start: 2017-05-01 | End: 2019-04-06

## 2017-05-01 NOTE — NURSING NOTE
"Chief Complaint   Patient presents with     Hypertension     recheck       Initial /80 (BP Location: Left arm, Patient Position: Chair, Cuff Size: Adult Regular)  Pulse 62  Temp 97.6  F (36.4  C) (Tympanic)  Ht 5' 3\" (1.6 m)  Wt 235 lb (106.6 kg)  SpO2 98%  BMI 41.63 kg/m2 Estimated body mass index is 41.63 kg/(m^2) as calculated from the following:    Height as of this encounter: 5' 3\" (1.6 m).    Weight as of this encounter: 235 lb (106.6 kg).  Medication Reconciliation: complete    Health Maintenance that is potentially due pending provider review:  NONE    n/a    "

## 2017-05-01 NOTE — PATIENT INSTRUCTIONS
Decrease lisinopril back down to 5 mg. Monitor BPs at home.    Schedule physical therapy for neck pain- done in [place] or [place]    Schedule thyroid ultrasound     Check with me in a month or two

## 2017-05-01 NOTE — MR AVS SNAPSHOT
After Visit Summary   5/1/2017    Theodora Meng    MRN: [id number]           Patient Information     Date Of Birth          1935        Visit Information        Provider Department      5/1/2017 9:40 AM Yecenia Viera MD [facility]        Today's Diagnoses     Essential hypertension with goal blood pressure less than 140/90    -  1    Anxiety        Type 2 diabetes mellitus without complication, without long-term current use of insulin (H)        Hyperlipidemia LDL goal <100        Cervicalgia        Thyroid nodule          Care Instructions    Decrease lisinopril back down to 5 mg. Monitor BPs at home.    Schedule physical therapy for neck pain- done in [place] or [place]    Schedule thyroid ultrasound     Check with me in a month or two        Follow-ups after your visit        Additional Services     PHYSICAL THERAPY REFERRAL       *This therapy referral will be filtered to a centralized scheduling office at [facility] and the patient will receive a call to schedule an appointment at a [place] location most convenient for them. *     [facility] provides Physical Therapy evaluation and treatment and many specialty services across the [place] system.  If requesting a specialty program, please choose from the list below.    If you have not heard from the scheduling office within 2 business days, please call [phone] for all locations, with the exception of [place], please call [phone].  Treatment: Evaluation & Treatment  Special Instructions/Modalities:   Special Programs: None    Please be aware that coverage of these services is subject to the terms and limitations of your health insurance plan.  Call member services at your health plan with any benefit or coverage questions.      **Note to Provider:  If you are referring outside of [place] for the therapy appointment, please list the name of the location in  "the  special instructions  above, print the referral and give to the patient to schedule the appointment.                  Future tests that were ordered for you today     Open Future Orders        Priority Expected Expires Ordered    US Thyroid Routine  2018            Who to contact     If you have questions or need follow up information about today's clinic visit or your schedule please contact [facility] directly at [phone].  Normal or non-critical lab and imaging results will be communicated to you by MyChart, letter or phone within 4 business days after the clinic has received the results. If you do not hear from us within 7 days, please contact the clinic through [employer] or phone. If you have a critical or abnormal lab result, we will notify you by phone as soon as possible.  Submit refill requests through [employer] or call your pharmacy and they will forward the refill request to us. Please allow 3 business days for your refill to be completed.          Additional Information About Your Visit        [employer] Information     [employer] lets you send messages to your doctor, view your test results, renew your prescriptions, schedule appointments and more. To sign up, go to [email or web address] . Click on \"Log in\" on the left side of the screen, which will take you to the Welcome page. Then click on \"Sign up Now\" on the right side of the page.     You will be asked to enter the access code listed below, as well as some personal information. Please follow the directions to create your username and password.     Your access code is: [email or web address]  Expires: 6/15/2017  2:59 PM     Your access code will  in 90 days. If you need help or a new code, please call your [place] clinic or [phone].        Care EveryWhere ID     This is your Care EveryWhere ID. This could be used by other organizations to access your [place] medical records  [id number]        Your Vitals Were     " "Pulse Temperature Height Pulse Oximetry BMI (Body Mass Index)       62 97.6  F (36.4  C) (Tympanic) 5' 3\" (1.6 m) 98% 41.63 kg/m2        Blood Pressure from Last 3 Encounters:   05/01/17 124/80   04/24/17 104/68   04/10/17 138/80    Weight from Last 3 Encounters:   05/01/17 235 lb (106.6 kg)   04/10/17 238 lb 11.2 oz (108.3 kg)   03/17/17 233 lb 9.6 oz (106 kg)              We Performed the Following     PHYSICAL THERAPY REFERRAL     TSH with free T4 reflex          Where to get your medicines      Some of these will need a paper prescription and others can be bought over the counter.  Ask your nurse if you have questions.     Bring a paper prescription for each of these medications     LORazepam 0.5 MG tablet          Primary Care Provider Office Phone # Fax #    Yecenia Viera [phone]       [address] [address]        Thank you!     Thank you for choosing [facility]  for your care. Our goal is always to provide you with excellent care. Hearing back from our patients is one way we can continue to improve our services. Please take a few minutes to complete the written survey that you may receive in the mail after your visit with us. Thank you!             Your Updated Medication List - Protect others around you: Learn how to safely use, store and throw away your medicines at www.disposemymeds.org.          This list is accurate as of: 5/1/17 10:09 AM.  Always use your most recent med list.                   Brand Name Dispense Instructions for use    acetaminophen 325 MG tablet    TYLENOL     Take 2 tablets (650 mg) by mouth every 4 hours as needed for other (mild pain)       atenolol 25 MG tablet    TENORMIN    90 tablet    Take 1 tablet (25 mg) by mouth daily       atorvastatin 10 MG tablet    LIPITOR    90 tablet    Take 1 tablet (10 mg) by mouth daily       blood glucose monitoring lancets     100 each    1 each 2 times daily.       * " blood glucose monitoring test strip    ACCU-CHEK YULISSA    100 each    Use to test blood sugars 1-2 times daily or as directed.       * blood glucose monitoring test strip    no brand specified    1 Box    Use to test blood sugars twice times daily or as directed       esomeprazole 40 MG CR capsule    nexIUM    180 capsule    Take 1 capsule (40 mg) by mouth 2 times daily       gabapentin 300 MG capsule    NEURONTIN    180 capsule    Take 2 tabs in the evening.       lisinopril 5 MG tablet    PRINIVIL/ZESTRIL    135 tablet    Take 1.5 tablets (7.5 mg) by mouth daily       LORazepam 0.5 MG tablet    ATIVAN    20 tablet    Take 1 tablet (0.5 mg) by mouth every 8 hours as needed for anxiety, use sparingly.       * Notice:  This list has 2 medication(s) that are the same as other medications prescribed for you. Read the directions carefully, and ask your doctor or other care provider to review them with you.

## 2017-05-01 NOTE — PROGRESS NOTES
SUBJECTIVE:                                                    Theodora Meng is a 82 year old female who presents to clinic today for the following health issues:      Hypertension Follow-up      Outpatient blood pressures are not being checked.    Low Salt Diet: no   increased lisinopril to 7.5 mg, atenolol 25 mg  BP Readings from Last 6 Encounters:   17 124/80   17 104/68   04/10/17 138/80   17 122/72   17 122/64   11/10/16 120/82   She was experiencing lightheadedness and came in to see Columba Koehler, orthostatics showed low blood pressure when she stood, and she decreased her lisinopril. She then saw Dr. Mann and he increased the lisinopril back to 7.5 mg. She reports that she was feeling better when she was on the lower dose of lisinopril. The lightheadedness is not daily, but it comes and goes. When she experiences it, it is usually when she changes head positions.Sometimes it is when she stands up, but once she gets moving she is all right. She denies any room spinning or other vertigo symptoms.     Orthostatics from  3/17/17  Supine: 120/71  60 No Dizziness  Sittin/64  64 No Dizziness  Standin/61  72  Dizziness- feels weak and lightheaded    Neck pain-  She was having similar pain 1 year ago and had several tests, but nothing was found. She would consider PT to see if this offered any relief. She sleeps sitting up at night with a rather flat pillow. She is still driving at this time.         Amount of exercise or physical activity: 4-5 days/week for an average of 30-45 minutes    Problems taking medications regularly: No    Medication side effects: none    Diet: regular (no restrictions)    Hyperlipidemia-atorvastatin  Recent Labs   Lab Test  17   0859  11/10/16   0935   05/15/15   0941  14   1203   CHOL  163  182   < >  170  172   HDL  59  62   < >  57  62   LDL  71  82   < >  74  69   TRIG  166*  188*   < >  195*  204*   CHOLHDLRATIO   --    --    --    3.0  2.8    < > = values in this interval not displayed.       DM-diet controlled  Lab Results   Component Value Date    A1C 7.2 04/06/2017    A1C 7.1 11/10/2016    A1C 7.4 06/09/2016    A1C 7.0 01/29/2016    A1C 7.6 05/15/2015     Thyroid nodules-  She had an ultrasound a year ago and she had 2 nodules. She was to follow up in 1 year, which is now. She would like to schedule her follow-up ultrasound.    Anxiety-she did not tolerate the sertraline that Columba Koehler started. Had upset stomach and diarrhea. She would like to just stick to rare use of ativan, maybe uses one to two a month.    Problem list and histories reviewed & adjusted, as indicated.  Additional history: as documented    BP Readings from Last 3 Encounters:   05/01/17 124/80   04/24/17 104/68   04/10/17 138/80    Wt Readings from Last 3 Encounters:   05/01/17 235 lb (106.6 kg)   04/10/17 238 lb 11.2 oz (108.3 kg)   03/17/17 233 lb 9.6 oz (106 kg)         TSH   Date Value Ref Range Status   03/11/2016 0.61 0.40 - 4.00 mU/L Final   01/29/2016  0.40 - 4.00 mU/L Final    Canceled, Test credited   Test canceled - Lab  error     01/29/2016 0.24 (L) 0.40 - 4.00 mU/L Final   05/15/2015 0.44 0.40 - 4.00 mU/L Final   09/30/2013 0.53 0.4 - 5.0 mU/L Final     Reviewed and updated as needed this visit by clinical staff  Tobacco  Allergies  Problems  Med Hx  Surg Hx  Fam Hx  Soc Hx      Reviewed and updated as needed this visit by Provider    ROS:  CONSTITUTIONAL:NEGATIVE for fever, chills, change in weight  CV: NEGATIVE for chest pain, palpitations or peripheral edema  GI: NEGATIVE for nausea, abdominal pain, heartburn, or change in bowel habits  : negative for, dysuria, frequency, hematuria, hesitancy and incontinence  MUSCULOSKELETAL: POSITIVE  for neck pain    OBJECTIVE:                                                    /80 (BP Location: Left arm, Patient Position: Chair, Cuff Size: Adult Regular)  Pulse 62  Temp 97.6  F (36.4  C)  "(Tympanic)  Ht 5' 3\" (1.6 m)  Wt 235 lb (106.6 kg)  SpO2 98%  BMI 41.63 kg/m2  Body mass index is 41.63 kg/(m^2).  GENERAL: Overweight, alert and no distress  NECK: no adenopathy, no asymmetry, masses, or scars and thyroid normal to palpation- no nodules felt  RESP: lungs clear to auscultation - no rales, rhonchi or wheezes  CV: regular rate and rhythm, normal S1 S2, no S3 or S4, no murmur, click or rub  Abd: soft, nontender,  no mass or distention   MS: Limited range of motion in neck, very tight muscles in neck. Good strength and sensation       ASSESSMENT/PLAN:                                                    Theodora was seen today for hypertension.    Diagnoses and all orders for this visit:    Essential hypertension with goal blood pressure less than 140/90  Go back down to the 5 mg of lisinopril    Anxiety  -     LORazepam (ATIVAN) 0.5 MG tablet; Take 1 tablet (0.5 mg) by mouth every 8 hours as needed for anxiety, use sparingly.    Type 2 diabetes mellitus without complication, without long-term current use of insulin (H)  Continue to monitor    Hyperlipidemia LDL goal <100  Continue atorvastatin    Cervicalgia  -     PHYSICAL THERAPY REFERRAL    Thyroid nodule  -     US Thyroid; Future  -     TSH with free T4 reflex      Patient Instructions   Decrease lisinopril back down to 5 mg. Monitor BPs at home.    Schedule physical therapy for neck pain- done in [place] or [place]    Schedule thyroid ultrasound     Check with me in a month or two    This document serves as a record of the services and decisions personally performed and made by Yecenia Viera MD. It was created on her behalf by Renetta Blanton, a trained medical scribe. The creation of this document is based the provider's statements to the medical scribe.  Renetta Blanton 10:10 AM 5/1/2017    Provider:   The information in this document, created by the medical scribe for me, accurately reflects the services I personally performed and the " decisions made by me. I have reviewed and approved this document for accuracy prior to leaving the patient care area.  Yecenia Viera MD 10:10 AM 5/1/2017    Yecenia Viera MD  [facility]

## 2017-05-04 ENCOUNTER — HOSPITAL ENCOUNTER (OUTPATIENT)
Dept: ULTRASOUND IMAGING | Facility: CLINIC | Age: 82
Discharge: HOME | End: 2017-05-04
Attending: FAMILY MEDICINE | Admitting: FAMILY MEDICINE
Payer: COMMERCIAL

## 2017-05-04 DIAGNOSIS — E04.1 THYROID NODULE: ICD-10-CM

## 2017-05-04 PROCEDURE — 76536 US EXAM OF HEAD AND NECK: CPT

## 2017-05-08 ENCOUNTER — HOSPITAL ENCOUNTER (OUTPATIENT)
Dept: PHYSICAL THERAPY | Facility: CLINIC | Age: 82
Setting detail: THERAPIES SERIES
End: 2017-05-08
Attending: FAMILY MEDICINE
Payer: COMMERCIAL

## 2017-05-08 PROCEDURE — 97110 THERAPEUTIC EXERCISES: CPT | Mod: GP | Performed by: PHYSICAL THERAPIST

## 2017-05-08 PROCEDURE — 40000718 ZZHC STATISTIC PT DEPARTMENT ORTHO VISIT: Performed by: PHYSICAL THERAPIST

## 2017-05-08 PROCEDURE — 97140 MANUAL THERAPY 1/> REGIONS: CPT | Mod: GP | Performed by: PHYSICAL THERAPIST

## 2017-05-08 PROCEDURE — 97161 PT EVAL LOW COMPLEX 20 MIN: CPT | Mod: GP | Performed by: PHYSICAL THERAPIST

## 2017-05-08 PROCEDURE — G8981 BODY POS CURRENT STATUS: HCPCS | Mod: GP,CI | Performed by: PHYSICAL THERAPIST

## 2017-05-08 PROCEDURE — G8982 BODY POS GOAL STATUS: HCPCS | Mod: GP,CI | Performed by: PHYSICAL THERAPIST

## 2017-05-12 NOTE — PROGRESS NOTES
05/08/17 1400   General Information   Type of Visit Initial OP Ortho PT Evaluation   Start of Care Date 05/08/17   Referring Physician Yecenia Viera MD    Patient/Family Goals Statement move her neck    Orders Evaluate and Treat   Date of Order 05/01/17   Insurance Type Medicare   Medical Diagnosis Cervicalgia M54.2   Surgical/Medical history reviewed Yes   Precautions/Limitations no known precautions/limitations   Body Part(s)   Body Part(s) Cervical Spine   Presentation and Etiology   Pertinent history of current problem (include personal factors and/or comorbidities that impact the POC) Pt reports insidoes onset neck pain for years.  Pt relates stiffness in top of shoulders. Pt denies radicualr symptoms. Pt relates she does a lot of reading that will give her a HA's. PMH: diabetes, high BP, arthritis, kneee replancement, D&c, ovaray removal. _ pt also relates she has terrible balance. Pt relates she looses balance w head truns and going from sit to stand  Pt is currently doing UT shrugs ands head turns at home.  pt re quest BP to be takesn    Impairments A. Pain;D. Decreased ROM;E. Decreased flexibility;G. Impaired balance   Functional Limitations perform activities of daily living   Symptom Location Neck and shoulders   How/Where did it occur From insidious onset   Onset date of current episode/exacerbation 03/08/17   Chronicity Chronic   Pain rating (0-10 point scale) Best (/10);Worst (/10)   Best (/10) 1   Worst (/10) 5   Pain quality B. Dull;C. Aching   Frequency of pain/symptoms B. Intermittent   Pain/symptoms exacerbated by B. Walking;G. Certain positions   Pain/symptoms eased by F. Certain positions;G. Heat   Progression of symptoms since onset: Improved   Current / Previous Interventions   Diagnostic Tests: MRI   MRI Results Results   MRI results 1. Diffuse degenerative changes of the cervical spine as described   Current Level of Function   Current Community Support Family/friend caregiver    Patient role/employment history F. Retired   Living environment [place]   Fall Risk Screen   Fall screen completed by PT   Per patient - Fall 2 or more times in past year? No   Per patient - Fall with injury in past year? No   Is patient a fall risk? No   Functional Scales   Functional Scales Other   Other Scales  NDI:20%    Vital Signs   Vital Signs BP;Pulse   Pulse 67   /76   Cervical Spine   Observation Cranial Nerve2 - 12 : all WNL    Posture min fwd head posture   Cervical Flexion ROM 45 w chin to chest   Cervical Extension ROM 50 w pulling on sides of the neck    Cervical Right Rotation ROM 65   Cervical Left Rotation ROM 49   Shoulder Shrug (C2-C4) Strength 5/5   Shoulder Abd (C5) Strength 5/5   Shoulder Add (C7) Strength 5/5   Shoulder ER (C5, C6) Strength 5/5   Shoulder IR (C5, C6) Strength 5/5   Elbow Flexion (C5, C6) Strength 5/5   Elbow Extension (C7) Strength 5/5   Wrist Extension (C6) Strength 5/5   Wrist Flexion (C7) Strength 5/5   Vertebral Artery Test neg   Alar Ligament Test neg   Transverse Ligament Test neg   Spurling Test neg   Palpation mod tightness thorughout UT and cervical paraspinals    Planned Therapy Interventions   Planned Therapy Interventions joint mobilization;manual therapy;neuromuscular re-education;ROM;strengthening;stretching   Planned Modality Interventions   Planned Modality Interventions Cryotherapy;Electrical stimulation;Hot packs;TENS;Ultrasound;Traction   Clinical Impression   Criteria for Skilled Therapeutic Interventions Met yes, treatment indicated   PT Diagnosis neck pain associated w degenerative changes   Influenced by the following impairments weakness, pain, limited ROM   Functional limitations due to impairments driving, lifting,    Clinical Presentation Stable/Uncomplicated   Clinical Presentation Rationale +motivation, - age, chronic neck pain   Clinical Decision Making (Complexity) Low complexity   Therapy Frequency 1 time/week   Predicted  Duration of Therapy Intervention (days/wks) 6 weeks   Risk & Benefits of therapy have been explained Yes   Education Assessment   Preferred Learning Style Listening;Reading;Demonstration;Pictures/video   Barriers to Learning No barriers   ORTHO GOALS   PT Ortho Eval Goals 1;2;3;4   Ortho Goal 1   Goal Identifier posture   Goal Description Pt will demonstrate good sitting posture throughout session to demonstrate increased postural awareness and increased postural strength to minimize pain    Target Date 06/02/17   Ortho Goal 2   Goal Identifier ROM   Goal Description Pt will demonstrate 75 deg bi of cervical rot in order to be able to safely turn head to drive car safely.    Target Date 06/23/17   Ortho Goal 3   Goal Identifier NDI   Goal Description Pt will report <10% disability on NDI to demonstrate improved ability to complete daily activities and demonstrate significant clinical improvement.    Target Date 06/23/17   Total Evaluation Time   Total Evaluation Time 50 (25 eval, 1 TE, 1 MT)    Therapy Certification   Certification date from 05/08/17   Certification date to 06/23/17   Medical Diagnosis Cervicalgia M54.2     Sania Hilton  [place] [address]  [email or web address]   [email or web address]   Office: [phone] Fax: [phone]

## 2017-05-12 NOTE — PROGRESS NOTES
[facility]          OUTPATIENT PHYSICAL THERAPY ORTHOPEDIC EVALUATION  PLAN OF TREATMENT FOR OUTPATIENT REHABILITATION  (COMPLETE FOR INITIAL CLAIMS ONLY)  Patient's Last Name, First Name, M.I.  YOB: 1935  Theodora Meng    Provider s Name:  [facility]   Medical Record No.  [id number]   Start of Care Date:  05/08/17   Onset Date:  03/08/17   Type:     _X__PT   ___OT   ___SLP Medical Diagnosis:  Cervicalgia M54.2     PT Diagnosis:  neck pain associated w degenerative changes   Visits from SOC:  1      _________________________________________________________________________________  Plan of Treatment/Functional Goals:  joint mobilization, manual therapy, neuromuscular re-education, ROM, strengthening, stretching     Cryotherapy, Electrical stimulation, Hot packs, TENS, Ultrasound, Traction     Goals  Goal Identifier: posture  Goal Description: Pt will demonstrate good sitting posture throughout session to demonstrate increased postural awareness and increased postural strength to minimize pain   Target Date: 06/02/17    Goal Identifier: ROM  Goal Description: Pt will demonstrate 75 deg bi of cervical rot in order to be able to safely turn head to drive car safely.   Target Date: 06/23/17    Goal Identifier: NDI  Goal Description: Pt will report <10% disability on NDI to demonstrate improved ability to complete daily activities and demonstrate significant clinical improvement.   Target Date: 06/23/17                                                           Therapy Frequency:  1 time/week  Predicted Duration of Therapy Intervention:  6 weeks    Sania Hilton, PT                 I CERTIFY THE NEED FOR THESE SERVICES FURNISHED UNDER        THIS PLAN OF TREATMENT AND WHILE UNDER MY CARE     (Physician co-signature of this document indicates review and certification of the therapy plan).                       Certification Date From:  05/08/17    Certification Date To:  06/23/17    Referring Provider:  Yecenia Viera MD     Initial Assessment        See Epic Evaluation Start of Care Date: 05/08/17

## 2017-05-22 ENCOUNTER — HOSPITAL ENCOUNTER (OUTPATIENT)
Dept: PHYSICAL THERAPY | Facility: CLINIC | Age: 82
Setting detail: THERAPIES SERIES
End: 2017-05-22
Attending: FAMILY MEDICINE
Payer: COMMERCIAL

## 2017-05-22 PROCEDURE — 40000718 ZZHC STATISTIC PT DEPARTMENT ORTHO VISIT: Performed by: PHYSICAL THERAPIST

## 2017-05-22 PROCEDURE — 97140 MANUAL THERAPY 1/> REGIONS: CPT | Mod: GP | Performed by: PHYSICAL THERAPIST

## 2017-05-31 ENCOUNTER — HOSPITAL ENCOUNTER (OUTPATIENT)
Dept: PHYSICAL THERAPY | Facility: CLINIC | Age: 82
Setting detail: THERAPIES SERIES
End: 2017-05-31
Attending: FAMILY MEDICINE
Payer: COMMERCIAL

## 2017-05-31 PROCEDURE — 40000718 ZZHC STATISTIC PT DEPARTMENT ORTHO VISIT: Performed by: PHYSICAL THERAPIST

## 2017-05-31 PROCEDURE — 97140 MANUAL THERAPY 1/> REGIONS: CPT | Mod: GP | Performed by: PHYSICAL THERAPIST

## 2017-06-07 ENCOUNTER — HOSPITAL ENCOUNTER (OUTPATIENT)
Dept: PHYSICAL THERAPY | Facility: CLINIC | Age: 82
Setting detail: THERAPIES SERIES
End: 2017-06-07
Attending: FAMILY MEDICINE
Payer: COMMERCIAL

## 2017-06-07 PROCEDURE — 97140 MANUAL THERAPY 1/> REGIONS: CPT | Mod: GP | Performed by: PHYSICAL THERAPIST

## 2017-06-07 PROCEDURE — 97110 THERAPEUTIC EXERCISES: CPT | Mod: GP | Performed by: PHYSICAL THERAPIST

## 2017-06-07 PROCEDURE — 40000718 ZZHC STATISTIC PT DEPARTMENT ORTHO VISIT: Performed by: PHYSICAL THERAPIST

## 2017-06-15 ENCOUNTER — HOSPITAL ENCOUNTER (OUTPATIENT)
Dept: PHYSICAL THERAPY | Facility: CLINIC | Age: 82
Setting detail: THERAPIES SERIES
End: 2017-06-15
Attending: FAMILY MEDICINE
Payer: COMMERCIAL

## 2017-06-15 PROCEDURE — 97140 MANUAL THERAPY 1/> REGIONS: CPT | Mod: GP | Performed by: PHYSICAL THERAPIST

## 2017-06-15 PROCEDURE — 40000718 ZZHC STATISTIC PT DEPARTMENT ORTHO VISIT: Performed by: PHYSICAL THERAPIST

## 2017-06-21 ENCOUNTER — HOSPITAL ENCOUNTER (OUTPATIENT)
Dept: PHYSICAL THERAPY | Facility: CLINIC | Age: 82
Setting detail: THERAPIES SERIES
End: 2017-06-21
Attending: FAMILY MEDICINE
Payer: COMMERCIAL

## 2017-06-21 PROCEDURE — G8982 BODY POS GOAL STATUS: HCPCS | Mod: GP,CI | Performed by: PHYSICAL THERAPIST

## 2017-06-21 PROCEDURE — 97140 MANUAL THERAPY 1/> REGIONS: CPT | Mod: GP | Performed by: PHYSICAL THERAPIST

## 2017-06-21 PROCEDURE — 97535 SELF CARE MNGMENT TRAINING: CPT | Mod: GP | Performed by: PHYSICAL THERAPIST

## 2017-06-21 PROCEDURE — G8983 BODY POS D/C STATUS: HCPCS | Mod: GP,CI | Performed by: PHYSICAL THERAPIST

## 2017-06-21 PROCEDURE — 40000718 ZZHC STATISTIC PT DEPARTMENT ORTHO VISIT: Performed by: PHYSICAL THERAPIST

## 2017-06-22 NOTE — PROGRESS NOTES
Outpatient Physical Therapy Discharge Note     Patient: Theodora Meng  : 1935    Beginning/End Dates of Reporting Period:  17 to 2017    Referring Provider: Dr. Viera    Therapy Diagnosis:   neck pain associated w degenerative changes             Client Self Report: Overall pt is doing pretty well, but today she is sore and she is not sure why.  Pt also relates balacne is really bad. SHe thinks it is related to her neck.     Objective Measurements:  Objective Measure: ROM  Details: R rot: 60 L rot:70        Objective Measure: BP  Details: 132/81 mmHg       Outcome Measures (most recent score):  NDI: 16% impaired, eval: 20% impaired      Goals:  Goal Identifier posture   Goal Description Pt will demonstrate good sitting posture throughout session to demonstrate increased postural awareness and increased postural strength to minimize pain    Target Date 17   Date Met  17   Progress:goal met - more aware and conscious of posture     Goal Identifier ROM   Goal Description Pt will demonstrate 75 deg bi of cervical rot in order to be able to safely turn head to drive car safely.    Target Date 17   Date Met      Progress:improved ROM however not able to achieve 75 deg     Goal Identifier NDI   Goal Description Pt will report <10% disability on NDI to demonstrate improved ability to complete daily activities and demonstrate significant clinical improvement.    Target Date 17   Date Met      Progress:min improvement noted       Progress Toward Goals:   Progress this reporting period: Pt has been seen for 6 visits over this POC. In that time, she has became more aware of posture and cervical ROM has improved. Despite improvements, pt does continue to have and min change was seen in her NDI score. Pt also relates concern re balance due to neck pain however it is more likely that pt feels off balance due to B/P or vestibular system. Pt will f/u with [facility], she was given  the option to see vestibular therapist in clinic at this time however she declines. Pt is appropriate to d/c to HEP as she is I with it and pt may not make many more gains due to degenerative changes.           Plan:  Discharge from therapy.    Discharge:    Reason for Discharge: No further expectation of progress.  Patient chooses to discontinue therapy.    Equipment Issued: NA    Discharge Plan: Patient to continue home program.    Sania Hilton  Physical Therapist  [facility]  [address] [place] [id number]  [email or web address]   [email or web address]   Office: [phone] Fax: [phone]

## 2017-08-14 DIAGNOSIS — I10 ESSENTIAL HYPERTENSION WITH GOAL BLOOD PRESSURE LESS THAN 140/90: ICD-10-CM

## 2017-08-14 NOTE — TELEPHONE ENCOUNTER
atenolol (TENORMIN) 25 MG tablet      Last Written Prescription Date: 01/31/2017  Last Fill Quantity: 90 tablet, # refills: 1    Last Office Visit with [facility], [facility] or [facility] prescribing provider:  05/01/2017   Future Office Visit:        BP Readings from Last 3 Encounters:   05/01/17 124/80   04/24/17 104/68   04/10/17 138/80

## 2017-08-15 RX ORDER — ATENOLOL 25 MG/1
TABLET ORAL
Qty: 90 TABLET | Refills: 1 | Status: SHIPPED | OUTPATIENT
Start: 2017-08-15 | End: 2017-12-12

## 2017-08-21 DIAGNOSIS — K21.9 GASTROESOPHAGEAL REFLUX DISEASE WITHOUT ESOPHAGITIS: ICD-10-CM

## 2017-08-21 NOTE — TELEPHONE ENCOUNTER
Nexium       Last Written Prescription Date: 6/13/16  Last Fill Quantity: 180,  # refills: 3   Last Office Visit with [facility], [facility] or [facility] prescribing provider: 5/1/17 JUNAID Haley [facility]                                           Next 5 appointments (look out 90 days)     Sep 05, 2017  1:40 PM CDT   Office Visit with Yecenia Viera MD   [facility] ([facility])    [address] [place] [id number] [phone]

## 2017-08-22 RX ORDER — ESOMEPRAZOLE MAGNESIUM 40 MG/1
40 CAPSULE, DELAYED RELEASE ORAL 2 TIMES DAILY
Qty: 180 CAPSULE | Refills: 0 | Status: SHIPPED | OUTPATIENT
Start: 2017-08-22 | End: 2017-12-12

## 2017-08-25 ENCOUNTER — TELEPHONE (OUTPATIENT)
Dept: FAMILY MEDICINE | Facility: CLINIC | Age: 82
End: 2017-08-25

## 2017-08-25 NOTE — TELEPHONE ENCOUNTER
Prior Auth form faxed 8/25/17- [employer] - Nexium  Waiting for response.  Sudha [facility]

## 2017-12-12 ENCOUNTER — OFFICE VISIT (OUTPATIENT)
Dept: FAMILY MEDICINE | Facility: CLINIC | Age: 82
End: 2017-12-12
Payer: COMMERCIAL

## 2017-12-12 VITALS
WEIGHT: 240 LBS | RESPIRATION RATE: 16 BRPM | HEART RATE: 68 BPM | BODY MASS INDEX: 42.51 KG/M2 | TEMPERATURE: 97.8 F | DIASTOLIC BLOOD PRESSURE: 78 MMHG | OXYGEN SATURATION: 98 % | SYSTOLIC BLOOD PRESSURE: 120 MMHG

## 2017-12-12 DIAGNOSIS — I10 ESSENTIAL HYPERTENSION WITH GOAL BLOOD PRESSURE LESS THAN 140/90: ICD-10-CM

## 2017-12-12 DIAGNOSIS — E78.5 HYPERLIPIDEMIA LDL GOAL <100: ICD-10-CM

## 2017-12-12 DIAGNOSIS — M79.2 NEURALGIA: ICD-10-CM

## 2017-12-12 DIAGNOSIS — J20.9 ACUTE BRONCHITIS WITH SYMPTOMS > 10 DAYS: ICD-10-CM

## 2017-12-12 DIAGNOSIS — K21.9 GASTROESOPHAGEAL REFLUX DISEASE WITHOUT ESOPHAGITIS: ICD-10-CM

## 2017-12-12 DIAGNOSIS — R79.89 DECREASED THYROID STIMULATING HORMONE (TSH) LEVEL: ICD-10-CM

## 2017-12-12 DIAGNOSIS — I10 ESSENTIAL HYPERTENSION: ICD-10-CM

## 2017-12-12 DIAGNOSIS — E11.9 TYPE 2 DIABETES MELLITUS WITHOUT COMPLICATION, WITHOUT LONG-TERM CURRENT USE OF INSULIN (H): Primary | ICD-10-CM

## 2017-12-12 LAB
ALBUMIN SERPL-MCNC: 3.3 G/DL (ref 3.4–5)
ALP SERPL-CCNC: 87 U/L (ref 40–150)
ALT SERPL W P-5'-P-CCNC: 17 U/L (ref 0–50)
ANION GAP SERPL CALCULATED.3IONS-SCNC: 9 MMOL/L (ref 3–14)
AST SERPL W P-5'-P-CCNC: 15 U/L (ref 0–45)
BILIRUB SERPL-MCNC: 0.4 MG/DL (ref 0.2–1.3)
BUN SERPL-MCNC: 20 MG/DL (ref 7–30)
CALCIUM SERPL-MCNC: 8.3 MG/DL (ref 8.5–10.1)
CHLORIDE SERPL-SCNC: 106 MMOL/L (ref 94–109)
CHOLEST SERPL-MCNC: 169 MG/DL
CO2 SERPL-SCNC: 24 MMOL/L (ref 20–32)
CREAT SERPL-MCNC: 0.7 MG/DL (ref 0.52–1.04)
ERYTHROCYTE [DISTWIDTH] IN BLOOD BY AUTOMATED COUNT: 13.2 % (ref 10–15)
GFR SERPL CREATININE-BSD FRML MDRD: 81 ML/MIN/1.7M2
GLUCOSE SERPL-MCNC: 138 MG/DL (ref 70–99)
HBA1C MFR BLD: 7.3 % (ref 4.3–6)
HCT VFR BLD AUTO: 38.4 % (ref 35–47)
HDLC SERPL-MCNC: 66 MG/DL
HGB BLD-MCNC: 12.6 G/DL (ref 11.7–15.7)
LDLC SERPL CALC-MCNC: 65 MG/DL
MCH RBC QN AUTO: 30.6 PG (ref 26.5–33)
MCHC RBC AUTO-ENTMCNC: 32.8 G/DL (ref 31.5–36.5)
MCV RBC AUTO: 93 FL (ref 78–100)
NONHDLC SERPL-MCNC: 103 MG/DL
PLATELET # BLD AUTO: 275 10E9/L (ref 150–450)
POTASSIUM SERPL-SCNC: 3.8 MMOL/L (ref 3.4–5.3)
PROT SERPL-MCNC: 7.1 G/DL (ref 6.8–8.8)
RBC # BLD AUTO: 4.12 10E12/L (ref 3.8–5.2)
SODIUM SERPL-SCNC: 139 MMOL/L (ref 133–144)
T4 FREE SERPL-MCNC: 1.1 NG/DL (ref 0.76–1.46)
TRIGL SERPL-MCNC: 189 MG/DL
TSH SERPL DL<=0.005 MIU/L-ACNC: 0.22 MU/L (ref 0.4–4)
WBC # BLD AUTO: 4.7 10E9/L (ref 4–11)

## 2017-12-12 PROCEDURE — 85027 COMPLETE CBC AUTOMATED: CPT | Performed by: FAMILY MEDICINE

## 2017-12-12 PROCEDURE — 36415 COLL VENOUS BLD VENIPUNCTURE: CPT | Performed by: FAMILY MEDICINE

## 2017-12-12 PROCEDURE — 80061 LIPID PANEL: CPT | Performed by: FAMILY MEDICINE

## 2017-12-12 PROCEDURE — 83036 HEMOGLOBIN GLYCOSYLATED A1C: CPT | Performed by: FAMILY MEDICINE

## 2017-12-12 PROCEDURE — 99214 OFFICE O/P EST MOD 30 MIN: CPT | Performed by: FAMILY MEDICINE

## 2017-12-12 PROCEDURE — 80053 COMPREHEN METABOLIC PANEL: CPT | Performed by: FAMILY MEDICINE

## 2017-12-12 PROCEDURE — 84439 ASSAY OF FREE THYROXINE: CPT | Performed by: FAMILY MEDICINE

## 2017-12-12 PROCEDURE — 84443 ASSAY THYROID STIM HORMONE: CPT | Performed by: FAMILY MEDICINE

## 2017-12-12 RX ORDER — GABAPENTIN 300 MG/1
CAPSULE ORAL
Qty: 180 CAPSULE | Refills: 3 | Status: SHIPPED | OUTPATIENT
Start: 2017-12-12 | End: 2018-11-06

## 2017-12-12 RX ORDER — ATORVASTATIN CALCIUM 10 MG/1
10 TABLET, FILM COATED ORAL DAILY
Qty: 90 TABLET | Refills: 3 | Status: SHIPPED | OUTPATIENT
Start: 2017-12-12 | End: 2018-11-06

## 2017-12-12 RX ORDER — ESOMEPRAZOLE MAGNESIUM 40 MG/1
40 CAPSULE, DELAYED RELEASE ORAL
Qty: 90 CAPSULE | Refills: 4 | Status: SHIPPED | OUTPATIENT
Start: 2017-12-12 | End: 2018-11-06

## 2017-12-12 RX ORDER — ATENOLOL 25 MG/1
25 TABLET ORAL DAILY
Qty: 90 TABLET | Refills: 4 | Status: SHIPPED | OUTPATIENT
Start: 2017-12-12 | End: 2017-12-12

## 2017-12-12 RX ORDER — ATENOLOL 25 MG/1
25 TABLET ORAL DAILY
Qty: 90 TABLET | Refills: 4 | Status: SHIPPED | OUTPATIENT
Start: 2017-12-12 | End: 2018-11-06

## 2017-12-12 RX ORDER — AZITHROMYCIN 250 MG/1
TABLET, FILM COATED ORAL
Qty: 6 TABLET | Refills: 0 | Status: SHIPPED | OUTPATIENT
Start: 2017-12-12 | End: 2018-11-06

## 2017-12-12 RX ORDER — LISINOPRIL 5 MG/1
5 TABLET ORAL DAILY
Qty: 90 TABLET | Refills: 3 | Status: SHIPPED | OUTPATIENT
Start: 2017-12-12 | End: 2018-11-06

## 2017-12-12 NOTE — LETTER
December 22, 2017      Bereketmegan Meng  [address] [facility] [address]      Dear ,      We are writing to inform you of your test results.    Your glucose, or blood sugar, a test for diabetes, was 138.  Hemoglobin A1C measures the average of the blood sugar over several months. If you have diabetes, the goal is under 8%. Yours is 7.4%.   Hemoglobin measures the amount of red blood cells carrying oxygen to the body's tissues. Yours was normal.  Electrolytes, including sodium, potassium, chloride, bicarbonate and calcium are all normal salts in the bloodstream. Yours are all normal.  Urea nitrogen and creatinine are kidney function tests. Yours are normal.  ALT and AST are liver function tests. Yours are normal.  The TSH looks at thyroid function.  One of your was again a little off, so we'll keep watching.    Resulted Orders   Lipid panel reflex to direct LDL Fasting   Result Value Ref Range    Cholesterol 169 <200 mg/dL    Triglycerides 189 (H) <150 mg/dL      Comment:      Borderline high:  150-199 mg/dl  High:             200-499 mg/dl  Very high:       >499 mg/dl  Fasting specimen      HDL Cholesterol 66 >49 mg/dL    LDL Cholesterol Calculated 65 <100 mg/dL      Comment:      Desirable:       <100 mg/dl    Non HDL Cholesterol 103 <130 mg/dL   Hemoglobin A1c   Result Value Ref Range    Hemoglobin A1C 7.3 (H) 4.3 - 6.0 %   Comprehensive metabolic panel   Result Value Ref Range    Sodium 139 133 - 144 mmol/L    Potassium 3.8 3.4 - 5.3 mmol/L    Chloride 106 94 - 109 mmol/L    Carbon Dioxide 24 20 - 32 mmol/L    Anion Gap 9 3 - 14 mmol/L    Glucose 138 (H) 70 - 99 mg/dL      Comment:      Fasting specimen    Urea Nitrogen 20 7 - 30 mg/dL    Creatinine 0.70 0.52 - 1.04 mg/dL    GFR Estimate 81 >60 mL/min/1.7m2      Comment:      Non  GFR Calc    GFR Estimate If Black >90 >60 mL/min/1.7m2      Comment:       GFR Calc    Calcium 8.3 (L) 8.5 - 10.1 mg/dL    Bilirubin  Total 0.4 0.2 - 1.3 mg/dL    Albumin 3.3 (L) 3.4 - 5.0 g/dL    Protein Total 7.1 6.8 - 8.8 g/dL    Alkaline Phosphatase 87 40 - 150 U/L    ALT 17 0 - 50 U/L    AST 15 0 - 45 U/L   CBC with platelets   Result Value Ref Range    WBC 4.7 4.0 - 11.0 10e9/L    RBC Count 4.12 3.8 - 5.2 10e12/L    Hemoglobin 12.6 11.7 - 15.7 g/dL    Hematocrit 38.4 35.0 - 47.0 %    MCV 93 78 - 100 fl    MCH 30.6 26.5 - 33.0 pg    MCHC 32.8 31.5 - 36.5 g/dL    RDW 13.2 10.0 - 15.0 %    Platelet Count 275 150 - 450 10e9/L   TSH with free T4 reflex   Result Value Ref Range    TSH 0.22 (L) 0.40 - 4.00 mU/L   T4 free   Result Value Ref Range    T4 Free 1.10 0.76 - 1.46 ng/dL       If you have any questions or concerns, please call the clinic at the number listed above.       Sincerely,        Yecenia Viera MD

## 2017-12-12 NOTE — NURSING NOTE
"Chief Complaint   Patient presents with     URI     Diabetes     recheck       Initial /78 (BP Location: Right arm)  Pulse 68  Temp 97.8  F (36.6  C) (Tympanic)  Resp 16  Wt 240 lb (108.9 kg)  SpO2 98%  BMI 42.51 kg/m2 Estimated body mass index is 42.51 kg/(m^2) as calculated from the following:    Height as of 5/1/17: 5' 3\" (1.6 m).    Weight as of this encounter: 240 lb (108.9 kg).  Medication Reconciliation: complete    Health Maintenance that is potentially due pending provider review:  NONE    n/a    Is there anyone who you would like to be able to receive your results? No  If yes have patient fill out YOLI    "

## 2017-12-12 NOTE — PROGRESS NOTES
SUBJECTIVE:   Theodora Meng is a 82 year old female who presents to clinic today for the following health issues:      Diabetes Follow-up      Patient is checking blood sugars: not at all    Diabetic concerns: None     Symptoms of hypoglycemia (low blood sugar): none     Paresthesias (numbness or burning in feet) or sores: Nerve     Date of last diabetic eye exam: 2017    Hyperlipidemia Follow-Up      Rate your low fat/cholesterol diet?: fair    Taking statin?  Yes, no muscle aches from statin    Other lipid medications/supplements?:  none    Hypertension Follow-up      Outpatient blood pressures are being checked at home.  Results are good.    Low Salt Diet: not monitoring    BP Readings from Last 2 Encounters:   12/12/17 120/78   05/01/17 124/80     Hemoglobin A1C (%)   Date Value   04/06/2017 7.2 (H)   11/10/2016 7.1 (H)     LDL Cholesterol Calculated (mg/dL)   Date Value   04/06/2017 71   11/10/2016 82         Amount of exercise or physical activity: 2-3 days/week for an average of 30-45 minutes when it is nice    Problems taking medications regularly: No    Medication side effects: none    Diet: diabetic    RESPIRATORY SYMPTOMS      Duration: 2 weeks    Description  nasal congestion, rhinorrhea, cough, wheezing, headache and fatigue/malaise    Severity: moderate    Accompanying signs and symptoms: None    History (predisposing factors):  none    Precipitating or alleviating factors: None    Therapies tried and outcome:  none    Initially started with sore throat, headache, fatigue, cough, productive of white phlehm, no fever. Feels exhausted. Is sleeping a lot, with tylenol PM. Has shortness of breath.     Problem list and histories reviewed & adjusted, as indicated.  Additional history: as documented    Recent Labs   Lab Test  05/01/17   1013  04/06/17   0859  11/10/16   0935  08/08/16   0933  06/09/16   1345   03/11/16   0918   A1C   --   7.2*  7.1*   --   7.4*   --    --    LDL   --   71  82  67   --     --   69   HDL   --   59  62  62   --    --   63   TRIG   --   166*  188*  201*   --    --   175*   ALT   --   18  19  23   --    --   17   CR   --   0.70  0.79  0.74  0.67   --   0.75   GFRESTIMATED   --   81  70  76  84   < >  74   GFRESTBLACK   --   >90   GFR Calc    84  >90   GFR Calc    >90   GFR Calc     < >  90   POTASSIUM   --   3.9  4.2  3.8  4.1   --   4.0   TSH  0.33*   --    --    --    --    --   0.61    < > = values in this interval not displayed.      BP Readings from Last 3 Encounters:   12/12/17 120/78   05/01/17 124/80   04/24/17 104/68    Wt Readings from Last 3 Encounters:   12/12/17 240 lb (108.9 kg)   05/01/17 235 lb (106.6 kg)   04/10/17 238 lb 11.2 oz (108.3 kg)                        Reviewed and updated as needed this visit by clinical staffTobacco  Allergies       Reviewed and updated as needed this visit by Provider         ROS:  C: NEGATIVE for fever, chills, change in weight  E/M positive for scratchy throat  R: see above  CV: NEGATIVE for chest pain, palpitations or peripheral edema  GI: NEGATIVE for nausea, abdominal pain, heartburn, or change in bowel habits  : No dysuria, urinary frequency or urgency.     OBJECTIVE:                                                    /78 (BP Location: Right arm)  Pulse 68  Temp 97.8  F (36.6  C) (Tympanic)  Resp 16  Wt 240 lb (108.9 kg)  SpO2 98%  BMI 42.51 kg/m2  Body mass index is 42.51 kg/(m^2).  GENERAL: healthy, alert, well nourished, well hydrated, no distress  HENT: ear canals- normal; TMs- normal; Nose- normal; Mouth- no ulcers, no lesions  NECK: no tenderness, no adenopathy, no asymmetry, no masses, no stiffness; thyroid- normal to palpation  RESP: lungs clear to auscultation - no rales, no rhonchi, no wheezes  CV: regular rates and rhythm, normal S1 S2, no S3 or S4 and no murmur, no click or rub -  ABDOMEN: soft, no tenderness, no  hepatosplenomegaly, no masses, normal bowel  sounds  MS: extremities- no gross deformities noted, no edema       ASSESSMENT/PLAN:                                                      ASSESSMENT:  1. Type 2 diabetes mellitus without complication, without long-term current use of insulin (H)    2. Essential hypertension    3. Hyperlipidemia LDL goal <100    4. Gastroesophageal reflux disease without esophagitis    5. Decreased thyroid stimulating hormone (TSH) level    6. Acute bronchitis with symptoms > 10 days    7. Essential hypertension with goal blood pressure less than 140/90    8. Neuralgia        PLAN:  Orders Placed This Encounter     Lipid panel reflex to direct LDL Fasting     Hemoglobin A1c     Comprehensive metabolic panel     CBC with platelets     TSH with free T4 reflex     T4 free     blood glucose monitoring (ACCU-CHEK COMPACT CARE KIT) meter device kit     esomeprazole (NEXIUM) 40 MG CR capsule     DISCONTD: atenolol (TENORMIN) 25 MG tablet     atenolol (TENORMIN) 25 MG tablet     atorvastatin (LIPITOR) 10 MG tablet     gabapentin (NEURONTIN) 300 MG capsule     lisinopril (PRINIVIL/ZESTRIL) 5 MG tablet     azithromycin (ZITHROMAX) 250 MG tablet     At this point, due to length of illness and age, will treat with antibiotic.   Return to clinic if not better, would do chest x-ray then    Patient Instructions   Antibiotic   Return to clinic if symptoms persist or worsen    See you back in 6 months     Yecenia Viera MD  [facility]

## 2017-12-12 NOTE — MR AVS SNAPSHOT
"              After Visit Summary   12/12/2017    Theodora Meng    MRN: [id number]           Patient Information     Date Of Birth          1935        Visit Information        Provider Department      12/12/2017 8:20 AM Yecenia Viera MD [facility]        Today's Diagnoses     Type 2 diabetes mellitus without complication, without long-term current use of insulin (H)    -  1    Essential hypertension        Hyperlipidemia LDL goal <100        Gastroesophageal reflux disease without esophagitis        Decreased thyroid stimulating hormone (TSH) level        Acute bronchitis with symptoms > 10 days        Essential hypertension with goal blood pressure less than 140/90        Neuralgia          Care Instructions    Antibiotic   Return to clinic if symptoms persist or worsen    See you back in 6 months          Follow-ups after your visit        Who to contact     If you have questions or need follow up information about today's clinic visit or your schedule please contact [facility] [place] directly at [phone].  Normal or non-critical lab and imaging results will be communicated to you by MyChart, letter or phone within 4 business days after the clinic has received the results. If you do not hear from us within 7 days, please contact the clinic through MyChart or phone. If you have a critical or abnormal lab result, we will notify you by phone as soon as possible.  Submit refill requests through [employer]le or call your pharmacy and they will forward the refill request to us. Please allow 3 business days for your refill to be completed.          Additional Information About Your Visit        MyChart Information     [employer]le lets you send messages to your doctor, view your test results, renew your prescriptions, schedule appointments and more. To sign up, go to [email or web address] . Click on \"Log in\" on the left side of the screen, which will take you to the Welcome page. " "Then click on \"Sign up Now\" on the right side of the page.     You will be asked to enter the access code listed below, as well as some personal information. Please follow the directions to create your username and password.     Your access code is: [email or web address]  Expires: 3/12/2018  9:08 AM     Your access code will  in 90 days. If you need help or a new code, please call your [facility] clinic or [phone].        Care EveryWhere ID     This is your Care EveryWhere ID. This could be used by other organizations to access your [facility] medical records  [id number]        Your Vitals Were     Pulse Temperature Respirations Pulse Oximetry BMI (Body Mass Index)       68 97.8  F (36.6  C) (Tympanic) 16 98% 42.51 kg/m2        Blood Pressure from Last 3 Encounters:   17 120/78   17 124/80   17 104/68    Weight from Last 3 Encounters:   17 240 lb (108.9 kg)   17 235 lb (106.6 kg)   04/10/17 238 lb 11.2 oz (108.3 kg)              We Performed the Following     CBC with platelets     Comprehensive metabolic panel     Hemoglobin A1c     Lipid panel reflex to direct LDL Fasting     TSH with free T4 reflex          Today's Medication Changes          These changes are accurate as of: 17  9:10 AM.  If you have any questions, ask your nurse or doctor.               Start taking these medicines.        Dose/Directions    atenolol 25 MG tablet   Commonly known as:  TENORMIN   Used for:  Essential hypertension with goal blood pressure less than 140/90   Started by:  Yecenia Viera MD        Dose:  25 mg   Take 1 tablet (25 mg) by mouth daily   Quantity:  90 tablet   Refills:  4       blood glucose monitoring meter device kit   Used for:  Type 2 diabetes mellitus without complication, without long-term current use of insulin (H)   Started by:  Yecenia Viera MD        Use to test blood sugars 1 times daily or as directed. May sub formulary meter   Quantity:  1 kit   Refills:  0    "      These medicines have changed or have updated prescriptions.        Dose/Directions    esomeprazole 40 MG CR capsule   Commonly known as:  nexIUM   This may have changed:  when to take this   Used for:  Gastroesophageal reflux disease without esophagitis   Changed by:  Yecenia Viera MD        Dose:  40 mg   Take 1 capsule (40 mg) by mouth every morning (before breakfast)   Quantity:  90 capsule   Refills:  4       lisinopril 5 MG tablet   Commonly known as:  PRINIVIL/ZESTRIL   This may have changed:  how much to take   Used for:  Essential hypertension with goal blood pressure less than 140/90   Changed by:  Yecenia Viera MD        Dose:  5 mg   Take 1 tablet (5 mg) by mouth daily   Quantity:  90 tablet   Refills:  3            Where to get your medicines      These medications were sent to [facility] [address] AT [address] [place] [address] [facility] [address]     Phone:  [phone]     esomeprazole 40 MG CR capsule         These medications were sent to [address] [address]     Phone:  [phone]     atenolol 25 MG tablet    atorvastatin 10 MG tablet    blood glucose monitoring meter device kit    gabapentin 300 MG capsule    lisinopril 5 MG tablet                Primary Care Provider Office Phone # Fax #    Yecenia Viera [phone]       [address] [place] [address]        Equal Access to Services     [facility] : Hadii cain ku hadasho Sosuyapaali, waaxda luqadaha, qaybta kaalmada adeegyada, clau redding. So [facility] [phone].    ATENCIÓN: Si habla español, tiene a wallis disposición servicios gratuitos de asistencia lingüística. Llame al [phone].    We comply with applicable federal civil rights laws and [place] laws. We do not discriminate on the basis of race, color, national origin,  age, disability, sex, sexual orientation, or gender identity.            Thank you!     Thank you for choosing [facility]  for your care. Our goal is always to provide you with excellent care. Hearing back from our patients is one way we can continue to improve our services. Please take a few minutes to complete the written survey that you may receive in the mail after your visit with us. Thank you!             Your Updated Medication List - Protect others around you: Learn how to safely use, store and throw away your medicines at www.disposemymeds.org.          This list is accurate as of: 12/12/17  9:10 AM.  Always use your most recent med list.                   Brand Name Dispense Instructions for use Diagnosis    acetaminophen 325 MG tablet    TYLENOL     Take 2 tablets (650 mg) by mouth every 4 hours as needed for other (mild pain)    Endometrial polyp, Submucous uterine fibroid       atenolol 25 MG tablet    TENORMIN    90 tablet    Take 1 tablet (25 mg) by mouth daily    Essential hypertension with goal blood pressure less than 140/90       atorvastatin 10 MG tablet    LIPITOR    90 tablet    Take 1 tablet (10 mg) by mouth daily    Hyperlipidemia LDL goal <100       blood glucose monitoring lancets     100 each    1 each 2 times daily.    Type II or unspecified type diabetes mellitus without mention of complication, uncontrolled       blood glucose monitoring meter device kit     1 kit    Use to test blood sugars 1 times daily or as directed. May sub formulary meter    Type 2 diabetes mellitus without complication, without long-term current use of insulin (H)       * blood glucose monitoring test strip    ACCU-CHEK YULISSA    100 each    Use to test blood sugars 1-2 times daily or as directed.    Type 2 diabetes mellitus without complication (H)       * blood glucose monitoring test strip    no brand specified    1 Box    Use to test blood sugars twice times daily or as directed    Type 2  diabetes mellitus without complication, without long-term current use of insulin (H)       esomeprazole 40 MG CR capsule    nexIUM    90 capsule    Take 1 capsule (40 mg) by mouth every morning (before breakfast)    Gastroesophageal reflux disease without esophagitis       gabapentin 300 MG capsule    NEURONTIN    180 capsule    Take 2 tabs in the evening.    Neuralgia       lisinopril 5 MG tablet    PRINIVIL/ZESTRIL    90 tablet    Take 1 tablet (5 mg) by mouth daily    Essential hypertension with goal blood pressure less than 140/90       LORazepam 0.5 MG tablet    ATIVAN    20 tablet    Take 1 tablet (0.5 mg) by mouth every 8 hours as needed for anxiety, use sparingly.    Anxiety       * Notice:  This list has 2 medication(s) that are the same as other medications prescribed for you. Read the directions carefully, and ask your doctor or other care provider to review them with you.

## 2018-03-08 ENCOUNTER — TRANSFERRED RECORDS (OUTPATIENT)
Dept: HEALTH INFORMATION MANAGEMENT | Facility: CLINIC | Age: 83
End: 2018-03-08

## 2018-04-24 ENCOUNTER — TRANSFERRED RECORDS (OUTPATIENT)
Dept: HEALTH INFORMATION MANAGEMENT | Facility: CLINIC | Age: 83
End: 2018-04-24

## 2018-07-30 ENCOUNTER — TRANSFERRED RECORDS (OUTPATIENT)
Dept: HEALTH INFORMATION MANAGEMENT | Facility: CLINIC | Age: 83
End: 2018-07-30

## 2018-10-15 ENCOUNTER — APPOINTMENT (OUTPATIENT)
Dept: FAMILY MEDICINE | Facility: CLINIC | Age: 83
End: 2018-10-15
Payer: COMMERCIAL

## 2018-11-06 ENCOUNTER — MEDICAL CORRESPONDENCE (OUTPATIENT)
Dept: HEALTH INFORMATION MANAGEMENT | Facility: CLINIC | Age: 83
End: 2018-11-06

## 2018-11-06 ENCOUNTER — OFFICE VISIT (OUTPATIENT)
Dept: FAMILY MEDICINE | Facility: CLINIC | Age: 83
End: 2018-11-06
Payer: COMMERCIAL

## 2018-11-06 VITALS
RESPIRATION RATE: 16 BRPM | BODY MASS INDEX: 39.95 KG/M2 | SYSTOLIC BLOOD PRESSURE: 120 MMHG | WEIGHT: 234 LBS | HEIGHT: 64 IN | DIASTOLIC BLOOD PRESSURE: 80 MMHG | TEMPERATURE: 97.7 F | HEART RATE: 65 BPM | OXYGEN SATURATION: 100 %

## 2018-11-06 DIAGNOSIS — E78.5 HYPERLIPIDEMIA LDL GOAL <100: ICD-10-CM

## 2018-11-06 DIAGNOSIS — N39.46 MIXED INCONTINENCE URGE AND STRESS (MALE)(FEMALE): ICD-10-CM

## 2018-11-06 DIAGNOSIS — E04.1 THYROID NODULE: ICD-10-CM

## 2018-11-06 DIAGNOSIS — E11.9 TYPE 2 DIABETES MELLITUS WITHOUT COMPLICATION, WITHOUT LONG-TERM CURRENT USE OF INSULIN (H): Primary | ICD-10-CM

## 2018-11-06 DIAGNOSIS — I10 ESSENTIAL HYPERTENSION WITH GOAL BLOOD PRESSURE LESS THAN 140/90: ICD-10-CM

## 2018-11-06 DIAGNOSIS — K21.9 GASTROESOPHAGEAL REFLUX DISEASE WITHOUT ESOPHAGITIS: ICD-10-CM

## 2018-11-06 DIAGNOSIS — E05.90 SUBCLINICAL HYPERTHYROIDISM: ICD-10-CM

## 2018-11-06 DIAGNOSIS — M79.2 NEURALGIA: ICD-10-CM

## 2018-11-06 LAB
ALBUMIN SERPL-MCNC: 3.1 G/DL (ref 3.4–5)
ALP SERPL-CCNC: 82 U/L (ref 40–150)
ALT SERPL W P-5'-P-CCNC: 23 U/L (ref 0–50)
ANION GAP SERPL CALCULATED.3IONS-SCNC: 10 MMOL/L (ref 3–14)
AST SERPL W P-5'-P-CCNC: 22 U/L (ref 0–45)
BILIRUB SERPL-MCNC: 0.6 MG/DL (ref 0.2–1.3)
BUN SERPL-MCNC: 18 MG/DL (ref 7–30)
CALCIUM SERPL-MCNC: 8.8 MG/DL (ref 8.5–10.1)
CHLORIDE SERPL-SCNC: 106 MMOL/L (ref 94–109)
CHOLEST SERPL-MCNC: 169 MG/DL
CO2 SERPL-SCNC: 20 MMOL/L (ref 20–32)
CREAT SERPL-MCNC: 0.74 MG/DL (ref 0.52–1.04)
GFR SERPL CREATININE-BSD FRML MDRD: 75 ML/MIN/1.7M2
GLUCOSE SERPL-MCNC: 119 MG/DL (ref 70–99)
HBA1C MFR BLD: 7.1 % (ref 0–5.6)
HDLC SERPL-MCNC: 60 MG/DL
LDLC SERPL CALC-MCNC: 79 MG/DL
NONHDLC SERPL-MCNC: 109 MG/DL
POTASSIUM SERPL-SCNC: 4.1 MMOL/L (ref 3.4–5.3)
PROT SERPL-MCNC: 6.9 G/DL (ref 6.8–8.8)
SODIUM SERPL-SCNC: 136 MMOL/L (ref 133–144)
T4 FREE SERPL-MCNC: 1.11 NG/DL (ref 0.76–1.46)
TRIGL SERPL-MCNC: 149 MG/DL
TSH SERPL DL<=0.005 MIU/L-ACNC: 0.16 MU/L (ref 0.4–4)

## 2018-11-06 PROCEDURE — 99214 OFFICE O/P EST MOD 30 MIN: CPT | Performed by: FAMILY MEDICINE

## 2018-11-06 PROCEDURE — 80061 LIPID PANEL: CPT | Performed by: FAMILY MEDICINE

## 2018-11-06 PROCEDURE — 84443 ASSAY THYROID STIM HORMONE: CPT | Performed by: FAMILY MEDICINE

## 2018-11-06 PROCEDURE — 36415 COLL VENOUS BLD VENIPUNCTURE: CPT | Performed by: FAMILY MEDICINE

## 2018-11-06 PROCEDURE — 80053 COMPREHEN METABOLIC PANEL: CPT | Performed by: FAMILY MEDICINE

## 2018-11-06 PROCEDURE — 83036 HEMOGLOBIN GLYCOSYLATED A1C: CPT | Performed by: FAMILY MEDICINE

## 2018-11-06 PROCEDURE — 84439 ASSAY OF FREE THYROXINE: CPT | Performed by: FAMILY MEDICINE

## 2018-11-06 RX ORDER — ATORVASTATIN CALCIUM 10 MG/1
10 TABLET, FILM COATED ORAL DAILY
Qty: 90 TABLET | Refills: 3 | Status: SHIPPED | OUTPATIENT
Start: 2018-11-06 | End: 2019-11-14

## 2018-11-06 RX ORDER — LISINOPRIL 5 MG/1
5 TABLET ORAL DAILY
Qty: 90 TABLET | Refills: 3 | Status: SHIPPED | OUTPATIENT
Start: 2018-11-06 | End: 2019-11-18

## 2018-11-06 RX ORDER — ESOMEPRAZOLE MAGNESIUM 40 MG/1
40 CAPSULE, DELAYED RELEASE ORAL
Qty: 90 CAPSULE | Refills: 4 | Status: SHIPPED | OUTPATIENT
Start: 2018-11-06 | End: 2018-11-06

## 2018-11-06 RX ORDER — ESOMEPRAZOLE MAGNESIUM 40 MG/1
40 CAPSULE, DELAYED RELEASE ORAL
Qty: 90 CAPSULE | Refills: 4 | Status: SHIPPED | OUTPATIENT
Start: 2018-11-06 | End: 2019-11-12

## 2018-11-06 RX ORDER — GABAPENTIN 300 MG/1
CAPSULE ORAL
Qty: 180 CAPSULE | Refills: 3 | Status: SHIPPED | OUTPATIENT
Start: 2018-11-06 | End: 2019-02-25

## 2018-11-06 RX ORDER — ATENOLOL 25 MG/1
25 TABLET ORAL DAILY
Qty: 90 TABLET | Refills: 4 | Status: SHIPPED | OUTPATIENT
Start: 2018-11-06 | End: 2019-11-14

## 2018-11-06 NOTE — PROGRESS NOTES
SUBJECTIVE:   Theodora Meng is a 83 year old female who presents to clinic today for the following health issues:      Diabetes Follow-up      Patient is checking blood sugars: not at all    Diabetic concerns: None     Symptoms of hypoglycemia (low blood sugar): none     Paresthesias (numbness or burning in feet) or sores: No     Date of last diabetic eye exam: 2018  Lab Results   Component Value Date    A1C 7.1 11/06/2018    A1C 7.3 12/12/2017    A1C 7.2 04/06/2017    A1C 7.1 11/10/2016    A1C 7.4 06/09/2016        Diabetes Management Resources    Hyperlipidemia Follow-Up      Rate your low fat/cholesterol diet?: good    Taking statin?  Yes, no muscle aches from statin    Other lipid medications/supplements?:  none  Recent Labs   Lab Test  12/12/17   0912  04/06/17   0859   05/15/15   0941  11/03/14   1203   CHOL  169  163   < >  170  172   HDL  66  59   < >  57  62   LDL  65  71   < >  74  69   TRIG  189*  166*   < >  195*  204*   CHOLHDLRATIO   --    --    --   3.0  2.8    < > = values in this interval not displayed.        Hypertension Follow-up      Outpatient blood pressures are not being checked.    Low Salt Diet: not monitoring salt    BP Readings from Last 6 Encounters:   11/06/18 120/80   12/12/17 120/78   05/01/17 124/80   04/24/17 104/68   04/10/17 138/80   04/06/17 122/72      4 months of urinary incontinence, has to wear a pad. Fist thing in am is worse. Is urge and also stress, can lose with coughing.     Has thyroid nodules, ultrasound two years ago    Results for orders placed or performed during the hospital encounter of 05/04/17   US Thyroid    Narrative    US THYROID 5/4/2017 2:53 PM     HISTORY: Follow-up of thyroid nodule.    COMPARISON: 3/15/2016.    FINDINGS: The right lobe of the thyroid gland measures 6.0 x 3.0 x 2.8  cm. The left lobe of the thyroid gland measures 3.7 x 1.7 x 1.2 cm.  The isthmus measures 0.5 cm.    There are several focal findings in the right lobe. There is a  solid  hypoechoic nodule in the upper pole that measures 0.6 x 0.5 x 0.6 cm.  There is a large predominantly solid heterogeneous nodule in the mid  to lower pole that measures 3.9 x 2.7 x 3.2 cm. On correlation with  the prior study it is suspected that this was not completely imaged in  retrospect on the prior study. An MRI of the neck is found and dated  8/24/2009. In retrospect this nodule is seen on the edge is some of  these MR angiography images and does not show any interval significant  change. There is a cyst in the mid to upper pole that measures 1.1 x  0.8 x 0.9 cm. There is a hypoechoic solid nodule in the midpole region  that measures 0.5 x 0.7 x 0.5 cm. This was not discretely seen before.    There is a single small predominantly solid nodule in the left lobe of  the measures 0.5 x 0.3 x 0.5 cm. This was not discretely seen before.      Impression    IMPRESSION: There are two small subcentimeter thyroid nodules which  were not discretely seen before, possibly due to their small size and  the patient's challenging neck habitus. An additional follow-up  ultrasound in 6-12 months is suggested.    CHRIS BIGGS MD         BP Readings from Last 2 Encounters:   11/06/18 120/80   12/12/17 120/78     Hemoglobin A1C (%)   Date Value   12/12/2017 7.3 (H)   04/06/2017 7.2 (H)     LDL Cholesterol Calculated (mg/dL)   Date Value   12/12/2017 65   04/06/2017 71       Amount of exercise or physical activity: None    Problems taking medications regularly: No    Medication side effects: none    Diet: diabetic    Problem list and histories reviewed & adjusted, as indicated.  Additional history: as documented    BP Readings from Last 3 Encounters:   11/06/18 120/80   12/12/17 120/78   05/01/17 124/80    Wt Readings from Last 3 Encounters:   11/06/18 234 lb (106.1 kg)   12/12/17 240 lb (108.9 kg)   05/01/17 235 lb (106.6 kg)            Reviewed and updated as needed this visit by clinical staff  Tobacco  Allergies  Meds  "      Reviewed and updated as needed this visit by Provider         ROS:  CONSTITUTIONAL: NEGATIVE for fever, chills, change in weight  ENT/MOUTH: NEGATIVE for ear, mouth and throat problems  RESP: NEGATIVE for significant cough or SOB  CV: NEGATIVE for chest pain, palpitations or peripheral edema  GI: NEGATIVE for nausea, abdominal pain, heartburn, or change in bowel habits  : No dysuria, urinary frequency or urgency.     OBJECTIVE:                                                    /80 (BP Location: Right arm)  Pulse 65  Temp 97.7  F (36.5  C) (Tympanic)  Resp 16  Ht 5' 4\" (1.626 m)  Wt 234 lb (106.1 kg)  SpO2 100%  BMI 40.17 kg/m2  Body mass index is 40.17 kg/(m^2).  GENERAL: healthy, alert, well nourished, well hydrated, no distress  HENT: ear canals- normal; TMs- normal; Nose- normal; Mouth- no ulcers, no lesions  NECK: no tenderness, no adenopathy, no asymmetry, no masses, no stiffness; thyroid- normal to palpation  RESP: lungs clear to auscultation - no rales, no rhonchi, no wheezes  CV: regular rates and rhythm, normal S1 S2, no S3 or S4 and no murmur, no click or rub -  ABDOMEN: soft, no tenderness, no  hepatosplenomegaly, no masses, normal bowel sounds  MS: extremities- no gross deformities noted, no edema  Diabetic foot exam: normal DP and PT pulses, no trophic changes or ulcerative lesions, normal sensory exam and normal monofilament exam    Diagnostic test results:  Results for orders placed or performed in visit on 11/06/18   Hemoglobin A1c   Result Value Ref [place]    Hemoglobin A1C 7.1 (H) 0 - 5.6 %         ASSESSMENT/PLAN:                                                          PLAN:  Orders Placed This Encounter     (E11.9) Type 2 diabetes mellitus without complication, without long-term current use of insulin (H)  (primary encounter diagnosis)  Comment: good control  Plan: Comprehensive metabolic panel (BMP + Alb, Alk         Phos, ALT, AST, Total. Bili, TP), TSH with free        " T4 reflex, Albumin Random Urine Quantitative         with Creat Ratio, T4 free, T4 free            (I10) Essential hypertension with goal blood pressure less than 140/90  Comment: well controlled  Plan: lisinopril (PRINIVIL/ZESTRIL) 5 MG tablet,         atenolol (TENORMIN) 25 MG tablet, Comprehensive        metabolic panel (BMP + Alb, Alk Phos, ALT, AST,        Total. Bili, TP)            (M79.2) Neuralgia  Comment: controlled  Plan: gabapentin (NEURONTIN) 300 MG capsule            (K21.9) Gastroesophageal reflux disease without esophagitis  Comment: well controlled  Plan: esomeprazole (NEXIUM) 40 MG CR capsule,         DISCONTINUED: esomeprazole (NEXIUM) 40 MG CR         capsule            (E78.5) Hyperlipidemia LDL goal <100  Comment: controlled  Plan: atorvastatin (LIPITOR) 10 MG tablet, Lipid         panel reflex to direct LDL Fasting            (E04.1) Thyroid nodule  Comment: multiple  Plan: TSH with free T4 reflex, US Thyroid           (N39.46) Mixed incontinence urge and stress (male)(female)  Comment:   Plan: PHYSICAL THERAPY REFERRAL, *UA reflex to         Microscopic and Culture ([place] and [place]         [facility]inics (except [place] and [place]),         CANCELED: *UA reflex to Microscopic and Culture        ([place] and [place] [facility] (except [place]        and [place])            Patient Instructions   Get the thyroid ultrasound done   [phone]    Physical therapy for the bladder, we'll check urine first     Yecenia Viera MD  [facility]

## 2018-11-06 NOTE — PATIENT INSTRUCTIONS
Get the thyroid ultrasound done   [phone]    Physical therapy for the bladder, we'll check urine first

## 2018-11-06 NOTE — MR AVS SNAPSHOT
After Visit Summary   11/6/2018    Theodora Meng    MRN: [id number]           Patient Information     Date Of Birth          1935        Visit Information        Provider Department      11/6/2018 8:40 AM Yecenia Viera MD [facility]        Today's Diagnoses     Type 2 diabetes mellitus without complication, without long-term current use of insulin (H)    -  1    Essential hypertension with goal blood pressure less than 140/90        Neuralgia        Gastroesophageal reflux disease without esophagitis        Hyperlipidemia LDL goal <100        Thyroid nodule        Mixed incontinence urge and stress (male)(female)          Care Instructions    Get the thyroid ultrasound done   [phone]    Physical therapy for the bladder, we'll check urine first          Follow-ups after your visit        Additional Services     PHYSICAL THERAPY REFERRAL       If you have not heard from the scheduling office within 2 business days, please call [phone] for all locations, with the exception of [place], please call [phone] and [place], please call [phone].    Please be aware that coverage of these services is subject to the terms and limitations of your health insurance plan.  Call member services at your health plan with any benefit or coverage questions.                  Follow-up notes from your care team     Return in about 6 months (around 5/6/2019) for diabetes.      Future tests that were ordered for you today     Open Future Orders        Priority Expected Expires Ordered    US Thyroid Routine  11/6/2019 11/6/2018    PHYSICAL THERAPY REFERRAL Routine  11/6/2019 11/6/2018            Who to contact     If you have questions or need follow up information about today's clinic visit or your schedule please contact [facility] directly at [phone].  Normal or non-critical lab and imaging results will be communicated to you by Hong, letter  "or phone within 4 business days after the clinic has received the results. If you do not hear from us within 7 days, please contact the clinic through [employer] or phone. If you have a critical or abnormal lab result, we will notify you by phone as soon as possible.  Submit refill requests through Circa or call your pharmacy and they will forward the refill request to us. Please allow 3 business days for your refill to be completed.          Additional Information About Your Visit        Care EveryWhere ID     This is your Care EveryWhere ID. This could be used by other organizations to access your [place] medical records  [id number]        Your Vitals Were     Pulse Temperature Respirations Height Pulse Oximetry BMI (Body Mass Index)    65 97.7  F (36.5  C) (Tympanic) 16 5' 4\" (1.626 m) 100% 40.17 kg/m2       Blood Pressure from Last 3 Encounters:   11/06/18 120/80   12/12/17 120/78   05/01/17 124/80    Weight from Last 3 Encounters:   11/06/18 234 lb (106.1 kg)   12/12/17 240 lb (108.9 kg)   05/01/17 235 lb (106.6 kg)              We Performed the Following     *UA reflex to Microscopic and Culture ([place] [facility] (except [place] and [place])     Comprehensive metabolic panel (BMP + Alb, Alk Phos, ALT, AST, Total. Bili, TP)     Hemoglobin A1c     Lipid panel reflex to direct LDL Fasting     TSH with free T4 reflex          Today's Medication Changes          These changes are accurate as of 11/6/18  9:28 AM.  If you have any questions, ask your nurse or doctor.               These medicines have changed or have updated prescriptions.        Dose/Directions    blood glucose monitoring test strip   Commonly known as:  no brand specified   This may have changed:  Another medication with the same name was removed. Continue taking this medication, and follow the directions you see here.   Used for:  Type 2 diabetes mellitus without complication, without long-term current use of insulin (H)   Changed " by:  Yecenia Viera MD        Use to test blood sugars twice times daily or as directed   Quantity:  1 Box   Refills:  3         Stop taking these medicines if you haven't already. Please contact your care team if you have questions.     azithromycin 250 MG tablet   Commonly known as:  ZITHROMAX   Stopped by:  Yecenia Viera MD                Where to get your medicines      These medications were sent to [facility] [place] - [address] [address]     Phone:  [phone]     atenolol 25 MG tablet    atorvastatin 10 MG tablet    esomeprazole 40 MG CR capsule    gabapentin 300 MG capsule    lisinopril 5 MG tablet                Primary Care Provider Office Phone # Fax #    Yecenia Viera [phone]       [address] [place] [address]        Equal Access to Services     [facility] HospitalELIZAEBTH : Hadii cain wright hadasho Soomaali, waaxda luqadaha, qaybta kaalmada adeegyada, clau palomares . So [facility] [phone].    ATENCIÓN: Si habla español, tiene a wallis disposición servicios gratuitos de asistencia lingüística. [facility] [phone].    We comply with applicable federal civil rights laws and [place] laws. We do not discriminate on the basis of race, color, national origin, age, disability, sex, sexual orientation, or gender identity.            Thank you!     Thank you for choosing [facility]  for your care. Our goal is always to provide you with excellent care. Hearing back from our patients is one way we can continue to improve our services. Please take a few minutes to complete the written survey that you may receive in the mail after your visit with us. Thank you!             Your Updated Medication List - Protect others around you: Learn how to safely use, store and throw away your medicines at www.disposemymeds.org.          This list is accurate as of 11/6/18  9:28 AM.  Always use your most  recent med list.                   Brand Name Dispense Instructions for use Diagnosis    acetaminophen 325 MG tablet    TYLENOL     Take 2 tablets (650 mg) by mouth every 4 hours as needed for other (mild pain)    Endometrial polyp, Submucous uterine fibroid       atenolol 25 MG tablet    TENORMIN    90 tablet    Take 1 tablet (25 mg) by mouth daily    Essential hypertension with goal blood pressure less than 140/90       atorvastatin 10 MG tablet    LIPITOR    90 tablet    Take 1 tablet (10 mg) by mouth daily    Hyperlipidemia LDL goal <100       blood glucose monitoring lancets     100 each    1 each 2 times daily.    Type II or unspecified type diabetes mellitus without mention of complication, uncontrolled       blood glucose monitoring meter device kit     1 kit    Use to test blood sugars 1 times daily or as directed. May sub formulary meter    Type 2 diabetes mellitus without complication, without long-term current use of insulin (H)       blood glucose monitoring test strip    no brand specified    1 Box    Use to test blood sugars twice times daily or as directed    Type 2 diabetes mellitus without complication, without long-term current use of insulin (H)       esomeprazole 40 MG CR capsule    nexIUM    90 capsule    Take 1 capsule (40 mg) by mouth every morning (before breakfast)    Gastroesophageal reflux disease without esophagitis       gabapentin 300 MG capsule    NEURONTIN    180 capsule    Take 2 tabs in the evening.    Neuralgia       lisinopril 5 MG tablet    PRINIVIL/ZESTRIL    90 tablet    Take 1 tablet (5 mg) by mouth daily    Essential hypertension with goal blood pressure less than 140/90       LORazepam 0.5 MG tablet    ATIVAN    20 tablet    Take 1 tablet (0.5 mg) by mouth every 8 hours as needed for anxiety, use sparingly.    Anxiety

## 2018-11-06 NOTE — LETTER
November 12, 2018      Bereketmegan Meng  [address] [facility] [address]      Dear ,    We are writing to inform you of your test results.    Your glucose, or blood sugar, a test for diabetes, was 119. Normal is 60-99.  Hemoglobin A1C measures the average of the blood sugar over several months. If you have diabetes, the goal is under 8%. Yours is 7.1 as we discussed, so good!  Electrolytes, including sodium, potassium, chloride, bicarbonate and calcium are all normal salts in the bloodstream. Yours are all normal.  Urea nitrogen and creatinine are kidney function tests. Yours are normal.  ALT and AST are liver function tests. Yours are normal.  The TSH looks at thyroid function.  One of the numbers was up, the other was normal, this is called subclinical hyperthyroidism. We can continue to monitor.      Resulted Orders   Hemoglobin A1c   Result Value Ref Range    Hemoglobin A1C 7.1 (H) 0 - 5.6 %      Comment:      Normal <5.7% Prediabetes 5.7-6.4%  Diabetes 6.5% or higher - adopted from ADA   consensus guidelines.     Lipid panel reflex to direct LDL Fasting   Result Value Ref Range    Cholesterol 169 <200 mg/dL    Triglycerides 149 <150 mg/dL    HDL Cholesterol 60 >49 mg/dL    LDL Cholesterol Calculated 79 <100 mg/dL      Comment:      Desirable:       <100 mg/dl    Non HDL Cholesterol 109 <130 mg/dL   Comprehensive metabolic panel (BMP + Alb, Alk Phos, ALT, AST, Total. Bili, TP)   Result Value Ref Range    Sodium 136 133 - 144 mmol/L    Potassium 4.1 3.4 - 5.3 mmol/L      Comment:      Specimen slightly hemolyzed, potassium may be falsely elevated    Chloride 106 94 - 109 mmol/L    Carbon Dioxide 20 20 - 32 mmol/L    Anion Gap 10 3 - 14 mmol/L    Glucose 119 (H) 70 - 99 mg/dL    Urea Nitrogen 18 7 - 30 mg/dL    Creatinine 0.74 0.52 - 1.04 mg/dL    GFR Estimate 75 >60 mL/min/1.7m2      Comment:      Non  GFR Calc    GFR Estimate If Black >90 >60 mL/min/1.7m2      Comment:        GFR Calc    Calcium 8.8 8.5 - 10.1 mg/dL    Bilirubin Total 0.6 0.2 - 1.3 mg/dL    Albumin 3.1 (L) 3.4 - 5.0 g/dL    Protein Total 6.9 6.8 - 8.8 g/dL    Alkaline Phosphatase 82 40 - 150 U/L    ALT 23 0 - 50 U/L    AST 22 0 - 45 U/L      Comment:      Specimen is hemolyzed which can falsely elevate AST. Analysis of a   non-hemolyzed specimen may result in a lower value.     TSH with free T4 reflex   Result Value Ref Range    TSH 0.16 (L) 0.40 - 4.00 mU/L   T4 free   Result Value Ref Range    T4 Free 1.11 0.76 - 1.46 ng/dL       If you have any questions or concerns, please call the clinic at the number listed above.       Sincerely,        Yecenia Viera MD

## 2018-11-06 NOTE — NURSING NOTE
"Chief Complaint   Patient presents with     Diabetes     recheck       Initial /80 (BP Location: Right arm)  Pulse 65  Temp 97.7  F (36.5  C) (Tympanic)  Resp 16  Ht 5' 4\" (1.626 m)  Wt 234 lb (106.1 kg)  SpO2 100%  BMI 40.17 kg/m2 Estimated body mass index is 40.17 kg/(m^2) as calculated from the following:    Height as of this encounter: 5' 4\" (1.626 m).    Weight as of this encounter: 234 lb (106.1 kg).    Patient presents to the clinic using No DME    Health Maintenance that is potentially due pending provider review:  NONE    n/a    Is there anyone who you would like to be able to receive your results? No  If yes have patient fill out YOLI    "

## 2018-11-09 ENCOUNTER — TELEPHONE (OUTPATIENT)
Dept: FAMILY MEDICINE | Facility: CLINIC | Age: 83
End: 2018-11-09

## 2018-11-09 NOTE — TELEPHONE ENCOUNTER
[employer] Form completed for Prior Auth - Nexium    Faxed Back & Sent to be scanned to this encounter  Sudha Orn Station Sec

## 2018-11-12 ENCOUNTER — TELEPHONE (OUTPATIENT)
Dept: FAMILY MEDICINE | Facility: CLINIC | Age: 83
End: 2018-11-12

## 2018-11-12 ENCOUNTER — ALLIED HEALTH/NURSE VISIT (OUTPATIENT)
Dept: FAMILY MEDICINE | Facility: CLINIC | Age: 83
End: 2018-11-12
Payer: COMMERCIAL

## 2018-11-12 DIAGNOSIS — R32 URINARY INCONTINENCE: Primary | ICD-10-CM

## 2018-11-12 PROBLEM — E05.90 SUBCLINICAL HYPERTHYROIDISM: Status: ACTIVE | Noted: 2018-11-12

## 2018-11-12 LAB
BILIRUB UR QL: NORMAL
CLARITY: CLEAR
COLOR UR: YELLOW
GLUCOSE URINE: NORMAL MG/DL
HGB UR QL: NORMAL
KETONES UR QL: NORMAL MG/DL
NITRITE UR QL STRIP: NORMAL
PH UR STRIP: 7 PH (ref 5–7)
PROT UR QL: NORMAL MG/DL
SP GR UR STRIP: 1.01 (ref 1–1.03)
SPECIMEN VOL UR: NORMAL ML
UROBILINOGEN UR QL STRIP: 0.2 EU/DL (ref 0.2–1)
WBC #/AREA URNS HPF: NORMAL /[HPF]

## 2018-11-12 PROCEDURE — 87086 URINE CULTURE/COLONY COUNT: CPT | Performed by: FAMILY MEDICINE

## 2018-11-12 PROCEDURE — 81003 URINALYSIS AUTO W/O SCOPE: CPT

## 2018-11-12 PROCEDURE — 99207 ZZC NO CHARGE NURSE ONLY: CPT

## 2018-11-12 NOTE — PROGRESS NOTES
Patient was informed by Dr. Cardona that she could come in today for a ua test - Dip was performed and culture was batched to send out - Dip results were reported. Yuli Pineda MA

## 2018-11-12 NOTE — MR AVS SNAPSHOT
After Visit Summary   11/12/2018    Theodora Meng    MRN: [id number]           Patient Information     Date Of Birth          1935        Visit Information        Provider Department      11/12/2018 2:30 PM FL AWILDA GARCIA/LPN [facility]        Today's Diagnoses     Urinary incontinence    -  1       Follow-ups after your visit        Who to contact     If you have questions or need follow up information about today's clinic visit or your schedule please contact [facility] directly at [phone].  Normal or non-critical lab and imaging results will be communicated to you by MyChart, letter or phone within 4 business days after the clinic has received the results. If you do not hear from us within 7 days, please contact the clinic through MyChart or phone. If you have a critical or abnormal lab result, we will notify you by phone as soon as possible.  Submit refill requests through rubberit or call your pharmacy and they will forward the refill request to us. Please allow 3 business days for your refill to be completed.          Additional Information About Your Visit        Care EveryWhere ID     This is your Care EveryWhere ID. This could be used by other organizations to access your [place] medical records  [id number]         Blood Pressure from Last 3 Encounters:   11/06/18 120/80   12/12/17 120/78   05/01/17 124/80    Weight from Last 3 Encounters:   11/06/18 106.1 kg (234 lb)   12/12/17 108.9 kg (240 lb)   05/01/17 106.6 kg (235 lb)              We Performed the Following     UA without Microscopic     Urine Culture Aerobic Bacterial        Primary Care Provider Office Phone # Fax #    Yecenia Viera [phone]       [address] [place] [address]        Equal Access to Services     PANDA HDEZ : bill Barreto, clau love. So [facility]  [phone].    ATENCIÓN: Si opal arriola, tiene a walils disposición servicios gratuitos de asistencia lingüística. Jose Juan flores [phone].    We comply with applicable federal civil rights laws and [place] laws. We do not discriminate on the basis of race, color, national origin, age, disability, sex, sexual orientation, or gender identity.            Thank you!     Thank you for choosing [facility]  for your care. Our goal is always to provide you with excellent care. Hearing back from our patients is one way we can continue to improve our services. Please take a few minutes to complete the written survey that you may receive in the mail after your visit with us. Thank you!             Your Updated Medication List - Protect others around you: Learn how to safely use, store and throw away your medicines at www.disposemymeds.org.          This list is accurate as of 11/12/18  3:00 PM.  Always use your most recent med list.                   Brand Name Dispense Instructions for use Diagnosis    acetaminophen 325 MG tablet    TYLENOL     Take 2 tablets (650 mg) by mouth every 4 hours as needed for other (mild pain)    Endometrial polyp, Submucous uterine fibroid       atenolol 25 MG tablet    TENORMIN    90 tablet    Take 1 tablet (25 mg) by mouth daily    Essential hypertension with goal blood pressure less than 140/90       atorvastatin 10 MG tablet    LIPITOR    90 tablet    Take 1 tablet (10 mg) by mouth daily    Hyperlipidemia LDL goal <100       blood glucose monitoring lancets     100 each    1 each 2 times daily.    Type II or unspecified type diabetes mellitus without mention of complication, uncontrolled       blood glucose monitoring meter device kit     1 kit    Use to test blood sugars 1 times daily or as directed. May sub formulary meter    Type 2 diabetes mellitus without complication, without long-term current use of insulin (H)       blood glucose monitoring test strip    no brand  specified    1 Box    Use to test blood sugars twice times daily or as directed    Type 2 diabetes mellitus without complication, without long-term current use of insulin (H)       esomeprazole 40 MG CR capsule    nexIUM    90 capsule    Take 1 capsule (40 mg) by mouth every morning (before breakfast)    Gastroesophageal reflux disease without esophagitis       gabapentin 300 MG capsule    NEURONTIN    180 capsule    Take 2 tabs in the evening.    Neuralgia       lisinopril 5 MG tablet    PRINIVIL/ZESTRIL    90 tablet    Take 1 tablet (5 mg) by mouth daily    Essential hypertension with goal blood pressure less than 140/90       LORazepam 0.5 MG tablet    ATIVAN    20 tablet    Take 1 tablet (0.5 mg) by mouth every 8 hours as needed for anxiety, use sparingly.    Anxiety

## 2018-11-12 NOTE — TELEPHONE ENCOUNTER
"PRIOR AUTH REQUIRED ON Esomeprazole  INSURANCE [employer]/ [employer]  INSURANCE PHONE # [phone]  PATIENT ID# [id number]    Please apply for a Plan Limitation Prior Auth. \"Maximum 365 days Qty of #90\"    PLEASE LET US KNOW WHEN PA IS GRANTED/DENIED THANK YOU  CHELA GARCIA [facility]    "

## 2018-11-14 LAB
BACTERIA SPEC CULT: NO GROWTH
Lab: NORMAL
SPECIMEN SOURCE: NORMAL

## 2018-11-14 NOTE — TELEPHONE ENCOUNTER
This was approved 10/13/18 - 11/12/19  [facility] notified  Approval sent to be scanned.  Sudha Orn Station Sec

## 2018-12-03 ENCOUNTER — HOSPITAL ENCOUNTER (OUTPATIENT)
Dept: ULTRASOUND IMAGING | Facility: CLINIC | Age: 83
Discharge: HOME | End: 2018-12-03
Attending: FAMILY MEDICINE | Admitting: FAMILY MEDICINE
Payer: COMMERCIAL

## 2018-12-03 DIAGNOSIS — E04.1 THYROID NODULE: ICD-10-CM

## 2018-12-03 PROCEDURE — 76536 US EXAM OF HEAD AND NECK: CPT

## 2019-01-21 ENCOUNTER — TRANSFERRED RECORDS (OUTPATIENT)
Dept: HEALTH INFORMATION MANAGEMENT | Facility: CLINIC | Age: 84
End: 2019-01-21

## 2019-02-05 ENCOUNTER — OFFICE VISIT (OUTPATIENT)
Dept: FAMILY MEDICINE | Facility: CLINIC | Age: 84
End: 2019-02-05
Payer: COMMERCIAL

## 2019-02-05 VITALS
SYSTOLIC BLOOD PRESSURE: 132 MMHG | WEIGHT: 235 LBS | HEART RATE: 70 BPM | TEMPERATURE: 96.5 F | RESPIRATION RATE: 14 BRPM | HEIGHT: 64 IN | DIASTOLIC BLOOD PRESSURE: 62 MMHG | BODY MASS INDEX: 40.12 KG/M2 | OXYGEN SATURATION: 98 %

## 2019-02-05 DIAGNOSIS — B02.9 HERPES ZOSTER WITHOUT COMPLICATION: Primary | ICD-10-CM

## 2019-02-05 PROCEDURE — 99214 OFFICE O/P EST MOD 30 MIN: CPT | Performed by: PHYSICIAN ASSISTANT

## 2019-02-05 RX ORDER — GABAPENTIN 300 MG/1
CAPSULE ORAL
Qty: 40 CAPSULE | Refills: 1 | Status: SHIPPED | OUTPATIENT
Start: 2019-02-05 | End: 2019-03-20

## 2019-02-05 RX ORDER — ACYCLOVIR 800 MG/1
800 TABLET ORAL
Qty: 35 TABLET | Refills: 0 | Status: SHIPPED | OUTPATIENT
Start: 2019-02-05 | End: 2019-02-13

## 2019-02-05 ASSESSMENT — ENCOUNTER SYMPTOMS
CHILLS: 0
NAUSEA: 0
ABDOMINAL PAIN: 0
SHORTNESS OF BREATH: 0
BLURRED VISION: 0
FEVER: 0
DIARRHEA: 0
SORE THROAT: 0
WHEEZING: 0
HEADACHES: 0
VOMITING: 0
EYE REDNESS: 0
EYE DISCHARGE: 0
COUGH: 0
PALPITATIONS: 0
MYALGIAS: 0

## 2019-02-05 ASSESSMENT — MIFFLIN-ST. JEOR: SCORE: 1510.95

## 2019-02-05 NOTE — NURSING NOTE
"No chief complaint on file.      Initial /62   Pulse 70   Temp 96.5  F (35.8  C) (Tympanic)   Resp 14   Ht 1.626 m (5' 4\")   Wt 106.6 kg (235 lb)   SpO2 98%   BMI 40.34 kg/m   Estimated body mass index is 40.34 kg/m  as calculated from the following:    Height as of this encounter: 1.626 m (5' 4\").    Weight as of this encounter: 106.6 kg (235 lb).    Patient presents to the clinic using No DME    Health Maintenance that is potentially due pending provider review:  Eye exam and fall risk assessment     Patient states she goes to a retina clinic and gets injections. She gets an eye exam every time she goes there. Completed fall risk assessment today.     Is there anyone who you would like to be able to receive your results? No  If yes have patient fill out YOLI        "

## 2019-02-05 NOTE — PROGRESS NOTES
SUBJECTIVE:   Theodora Meng is a 83 year old female who presents to clinic today for the following health issues:      Facial pain       Duration: last Thursday     Description (location/character/radiation): Facial pain     Intensity:  moderate    Accompanying signs and symptoms: cheekbone and side of face that radiates above the eye; this is all on the left side. Sometimes has some tingling. It is mostly a dull ache but occasionally gets twinges. Has some congestion- states she has this all the time and that is nothing new. States she does not have a cold or anything.     History (similar episodes/previous evaluation): None    Precipitating or alleviating factors: None    Therapies tried and outcome: Heat, ibuprofen, gabapentin- has been doubling gabapentin      Patient reports she takes gabapentin 2 capsules at bedtime for ongoing nerve pain in her neck. She tried taking 2 capsules in the morning since the facial pain started and it was helpful.     Problem list and histories reviewed & adjusted, as indicated.  Additional history: as documented    Patient Active Problem List   Diagnosis     Esophageal reflux     Essential hypertension     Benign neoplasm of stomach     Vitamin D deficiency     Neuralgia     Type 2 diabetes, HbA1C goal < 8% (H)     Advanced directives, counseling/discussion     Anxiety     Knee pain     Insomnia     Hyperlipidemia LDL goal <100     OA (osteoarthritis) of knee     S/P total knee arthroplasty     Right knee DJD     Edema     Nausea     Fever     Status post total knee replacement     Type 2 diabetes mellitus without complication (H)     Morbid obesity (H)     DDD (degenerative disc disease), cervical     Thyroid nodule     Thickened endometrium     Endometrial polyp     Type 2 diabetes mellitus without complication, without long-term current use of insulin (H)     Essential hypertension with goal blood pressure less than 140/90     Gastroesophageal reflux disease without  esophagitis     Subclinical hyperthyroidism     Past Surgical History:   Procedure Laterality Date     APPENDECTOMY       ARTHROPLASTY KNEE  10/30/2013    Procedure: ARTHROPLASTY KNEE;  Right Total Knee Arthroplasty;  Surgeon: Ousmane Mcgowan MD;  Location: [facility]     CHOLECYSTECTOMY, LAPOROSCOPIC      Cholecystectomy, Laparoscopic     DILATION AND CURETTAGE, OPERATIVE HYSTEROSCOPY WITH MORCELLATOR, COMBINED N/A 6/20/2016    Procedure: COMBINED DILATION AND CURETTAGE, OPERATIVE HYSTEROSCOPY WITH MORCELLATOR;  Surgeon: Bony Arredondo MD;  Location: [facility] OR     ESOPHAGOSCOPY, GASTROSCOPY, DUODENOSCOPY (EGD), COMBINED N/A 2/17/2016    Procedure: COMBINED ESOPHAGOSCOPY, GASTROSCOPY, DUODENOSCOPY (EGD), BIOPSY SINGLE OR MULTIPLE;  Surgeon: Mil Guevara MD;  Location: [facility]     SALPINGO OOPHORECTOMY,R/L/OZZY      Salpingo Oophorectomy, RT/LT/OZZY     TONSILLECTOMY         Social History     Tobacco Use     Smoking status: Never Smoker     Smokeless tobacco: Never Used   Substance Use Topics     Alcohol use: No     Family History   Problem Relation Age of Onset     Heart Disease Mother      Heart Disease Father      Heart Disease Brother      Heart Disease Brother      Heart Disease Brother          Current Outpatient Medications   Medication Sig Dispense Refill     ACCU-CHEK MULTICLIX LANCETS MISC 1 each 2 times daily. 100 each 3     acetaminophen (TYLENOL) 325 MG tablet Take 2 tablets (650 mg) by mouth every 4 hours as needed for other (mild pain)  0     acyclovir (ZOVIRAX) 800 MG tablet Take 1 tablet (800 mg) by mouth 5 times daily for 7 days 35 tablet 0     atenolol (TENORMIN) 25 MG tablet Take 1 tablet (25 mg) by mouth daily 90 tablet 4     atorvastatin (LIPITOR) 10 MG tablet Take 1 tablet (10 mg) by mouth daily 90 tablet 3     blood glucose monitoring (ACCU-CHEK COMPACT CARE KIT) meter device kit Use to test blood sugars 1 times daily or as directed. May sub formulary meter 1 kit 0     blood glucose  "monitoring (NO BRAND SPECIFIED) test strip Use to test blood sugars twice times daily or as directed 1 Box 3     esomeprazole (NEXIUM) 40 MG CR capsule Take 1 capsule (40 mg) by mouth every morning (before breakfast) 90 capsule 4     gabapentin (NEURONTIN) 300 MG capsule Take 2 capsules in the morning and 2 capsules at bedtime 40 capsule 1     gabapentin (NEURONTIN) 300 MG capsule Take 2 tabs in the evening. 180 capsule 3     lisinopril (PRINIVIL/ZESTRIL) 5 MG tablet Take 1 tablet (5 mg) by mouth daily 90 tablet 3     LORazepam (ATIVAN) 0.5 MG tablet Take 1 tablet (0.5 mg) by mouth every 8 hours as needed for anxiety, use sparingly. 20 tablet 1     Allergies   Allergen Reactions     Zoloft [Sertraline] Palpitations, Diarrhea and Cramps     Labs reviewed in EPIC    Reviewed and updated as needed this visit by clinical staff  Tobacco  Allergies  Meds  Problems  Med Hx  Surg Hx  Fam Hx  Soc Hx        Reviewed and updated as needed this visit by Provider  Tobacco  Allergies  Meds  Problems  Med Hx  Surg Hx  Fam Hx         ROS:  Review of Systems   Constitutional: Negative for chills, fever and malaise/fatigue.   HENT: Negative for congestion, ear pain and sore throat.         Left facial pain   Eyes: Negative for blurred vision, discharge and redness.   Respiratory: Negative for cough, shortness of breath and wheezing.    Cardiovascular: Negative for chest pain and palpitations.   Gastrointestinal: Negative for abdominal pain, diarrhea, nausea and vomiting.   Musculoskeletal: Negative for joint pain and myalgias.   Skin: Negative for rash.   Neurological: Negative for headaches.         OBJECTIVE:     /62   Pulse 70   Temp 96.5  F (35.8  C) (Tympanic)   Resp 14   Ht 1.626 m (5' 4\")   Wt 106.6 kg (235 lb)   SpO2 98%   BMI 40.34 kg/m    Body mass index is 40.34 kg/m .    Physical Exam   Constitutional: She appears well-developed and well-nourished.   HENT:   Head: Normocephalic.       Right Ear: " Tympanic membrane and ear canal normal.   Left Ear: Tympanic membrane and ear canal normal.   Mouth/Throat: Oropharynx is clear and moist.   Left side of face has 3 small patches of redness and the area closest to the eye has 2 small papules.   Eyes: Conjunctivae are normal. Pupils are equal, round, and reactive to light.   Cardiovascular: Normal rate and normal heart sounds.   Pulmonary/Chest: Effort normal and breath sounds normal.   Skin: Skin is warm and dry. No rash noted.   Psychiatric: She has a normal mood and affect. Her behavior is normal.       Diagnostic Test Results:  none     ASSESSMENT/PLAN:       1. Herpes zoster without complication  Likely shingles. Will start acyclovir x 7 days. Patient also requesting a short term prescription to increase gabapentin since it has been helpful. Prescribed 2 capsules two times daily x 10 days. Avoid scratching and watch for signs of infection. consider follow up with eye doctor given location. Return to clinic if symptoms worsen or do not improve; otherwise follow up as needed      - acyclovir (ZOVIRAX) 800 MG tablet; Take 1 tablet (800 mg) by mouth 5 times daily for 7 days  Dispense: 35 tablet; Refill: 0  - gabapentin (NEURONTIN) 300 MG capsule; Take 2 capsules in the morning and 2 capsules at bedtime  Dispense: 40 capsule; Refill: 1     Kaylee Whittington PA-C  [facility]

## 2019-02-13 ENCOUNTER — OFFICE VISIT (OUTPATIENT)
Dept: CARDIOLOGY | Facility: CLINIC | Age: 84
End: 2019-02-13
Payer: COMMERCIAL

## 2019-02-13 VITALS
SYSTOLIC BLOOD PRESSURE: 135 MMHG | WEIGHT: 237 LBS | OXYGEN SATURATION: 99 % | BODY MASS INDEX: 40.68 KG/M2 | HEART RATE: 61 BPM | DIASTOLIC BLOOD PRESSURE: 72 MMHG

## 2019-02-13 DIAGNOSIS — R94.39 ABNORMAL CARDIOVASCULAR STRESS TEST: Primary | ICD-10-CM

## 2019-02-13 PROCEDURE — 99213 OFFICE O/P EST LOW 20 MIN: CPT | Performed by: INTERNAL MEDICINE

## 2019-02-13 NOTE — LETTER
"2/13/2019    Yecenia Viera MD  [address] [facility] [address]    RE: Theodora Flynnberg       Dear Colleague,    I had the pleasure of seeing Theodora Meng in the [facility].    HPI and Plan:     Ms. Kamini Meng is 83 years old and is seen in follow-up at [facility] in [place].  She had been followed by Dr. Mann but is new to myself.    She had been evaluated in 2008 for chest discomfort, and a nuclear stress test  revealed a fixed anterior defect thought to be breast attenuation artifact, although nontransmural infarct could not be excluded.  Left ventricular function was normal.  A CT scan of the abdomen indicated calcification of the coronary arteries.  She has done well with isolated episodes of gastroesophageal reflux discomfort over the years with only isolated palpitations.  A stress nuclear study in 2016 demonstrated a medium-sized perfusion defect of moderate severity involving the entire anterior wall from apex to base which was partially reversible. It was noted \"although breast attenuation probably contributes to this perfusion abnormality, mild ischemia in the distribution of left anterior descending artery cannot be ruled out\".    Dr. Mann described persistent chest discomfort prior to office visit in the spring of 2016 of 12 hours without response to antacids.  She also had palpitations that appeared to reflect the discomfort.   The patient's history for coronary disease risk factors is negative for cigarette smoking, positive for hypertension in the past 15 years, positive for hyperlipidemia in the past 2 years, diabetes type 2  and positive for family history of premature coronary artery disease. In the absence of recurrent symptoms, Dr. Mann recommended continued medical therapy.    She now denies chest discomfort, shortness of breath, dizziness,  or syncope.  She has occasional palpitations at night and takes her beta blocker at " that time.  She denies heart failure symptoms.      Echocardiography in 2017 showed normal left ventricular function with trace to mild mitral insufficiency, normal right ventricular systolic function and evidence of diastolic dysfunction of the left ventricle, mild dilatation of left atrium, mild mitral annular calcification without change from the previous echocardiogram.  The patient's Holter monitor from 2016 demonstrated a heart rate varying from 56 to 105, average rate of 72, with isolated to occasional supraventricular and ventricular beats with one 27-beat run of PAT.      Exam:  Blood pressure 135/72, pulse 61, weight 107.5 kg (237 lb), SpO2 99 %, not currently breastfeeding.    Chest is clear to auscultation.  Cardiac with regular rhythm, + S4, 1- 2/6 systolic murmur at the left sternal border.   There was trace to 1+ ankle edema.     Assessment/Plan:  Ms. Meng has had no chest discomfort, shortness of breath, dizziness or syncope.  She only has unchanged isolated palpitations without associated other symptoms.  She has been noted to have coronary artery calcifications on CT scan (and has a stress study which suggested possible mild anterior ischemia in 2016) so she should be continued on her medications including atorvastatin, aspirin, atenolol and lisinopril. Her risk factors are well-controlled. Lipids in November of 2018 indicated an LDL of 69, HDL 60 mg/dl.  Her blood pressure is also controlled.    In the absence of symptoms, cardiology follow-up at 6-12 months unless she has earlier symptoms. A stress nuclear study should be repeated for comparison before next year's visit.    Medications Discontinued During This Encounter   Medication Reason     acyclovir (ZOVIRAX) 800 MG tablet          No diagnosis found.    CURRENT MEDICATIONS:  Current Outpatient Medications   Medication Sig Dispense Refill     ACCU-CHEK MULTICLIX LANCETS MISC 1 each 2 times daily. 100 each 3     acetaminophen (TYLENOL)  325 MG tablet Take 2 tablets (650 mg) by mouth every 4 hours as needed for other (mild pain)  0     atenolol (TENORMIN) 25 MG tablet Take 1 tablet (25 mg) by mouth daily 90 tablet 4     atorvastatin (LIPITOR) 10 MG tablet Take 1 tablet (10 mg) by mouth daily 90 tablet 3     blood glucose monitoring (ACCU-CHEK COMPACT CARE KIT) meter device kit Use to test blood sugars 1 times daily or as directed. May sub formulary meter 1 kit 0     blood glucose monitoring (NO BRAND SPECIFIED) test strip Use to test blood sugars twice times daily or as directed 1 Box 3     esomeprazole (NEXIUM) 40 MG CR capsule Take 1 capsule (40 mg) by mouth every morning (before breakfast) 90 capsule 4     gabapentin (NEURONTIN) 300 MG capsule Take 2 capsules in the morning and 2 capsules at bedtime 40 capsule 1     gabapentin (NEURONTIN) 300 MG capsule Take 2 tabs in the evening. 180 capsule 3     lisinopril (PRINIVIL/ZESTRIL) 5 MG tablet Take 1 tablet (5 mg) by mouth daily 90 tablet 3     LORazepam (ATIVAN) 0.5 MG tablet Take 1 tablet (0.5 mg) by mouth every 8 hours as needed for anxiety, use sparingly. 20 tablet 1       ALLERGIES     Allergies   Allergen Reactions     Zoloft [Sertraline] Palpitations, Diarrhea and Cramps       PAST MEDICAL HISTORY:  Past Medical History:   Diagnosis Date     Diabetes (H)      Esophageal reflux      Other abnormal glucose      Other premature beats      Unspecified essential hypertension        PAST SURGICAL HISTORY:  Past Surgical History:   Procedure Laterality Date     APPENDECTOMY       ARTHROPLASTY KNEE  10/30/2013    Procedure: ARTHROPLASTY KNEE;  Right Total Knee Arthroplasty;  Surgeon: Ousmane Mcgowan MD;  Location: [facility]     CHOLECYSTECTOMY, LAPOROSCOPIC      Cholecystectomy, Laparoscopic     DILATION AND CURETTAGE, OPERATIVE HYSTEROSCOPY WITH MORCELLATOR, COMBINED N/A 6/20/2016    Procedure: COMBINED DILATION AND CURETTAGE, OPERATIVE HYSTEROSCOPY WITH MORCELLATOR;  Surgeon: Bony Arredondo,  MD;  Location: [facility]     ESOPHAGOSCOPY, GASTROSCOPY, DUODENOSCOPY (EGD), COMBINED N/A 2/17/2016    Procedure: COMBINED ESOPHAGOSCOPY, GASTROSCOPY, DUODENOSCOPY (EGD), BIOPSY SINGLE OR MULTIPLE;  Surgeon: Mil Guevara MD;  Location: [facility]     SALPINGO OOPHORECTOMY,R/L/OZZY      Salpingo Oophorectomy, RT/LT/OZZY     TONSILLECTOMY         FAMILY HISTORY:  Family History   Problem Relation Age of Onset     Heart Disease Mother      Heart Disease Father      Heart Disease Brother      Heart Disease Brother      Heart Disease Brother        SOCIAL HISTORY:  Social History     Socioeconomic History     Marital status:      Spouse name: None     Number of children: None     Years of education: None     Highest education level: None   Social Needs     Financial resource strain: None     Food insecurity - worry: None     Food insecurity - inability: None     Transportation needs - medical: None     Transportation needs - non-medical: None   Occupational History     None   Tobacco Use     Smoking status: Never Smoker     Smokeless tobacco: Never Used   Substance and Sexual Activity     Alcohol use: No     Drug use: No     Sexual activity: No   Other Topics Concern     Parent/sibling w/ CABG, MI or angioplasty before 65F 55M? Not Asked   Social History Narrative     None       Review of Systems:  Skin:  Positive for bruising     Eyes:  Positive for glasses    ENT:  Negative      Respiratory:  Negative       Cardiovascular:    Positive for;fatigue;palpitations;lightheadedness    Gastroenterology: Negative      Genitourinary:  Negative      Musculoskeletal:  Negative      Neurologic:  Positive for numbness or tingling of feet    Psychiatric:  Positive for anxiety    Heme/Lymph/Imm:  Negative      Endocrine:  Positive for diabetes      Physical Exam:  Vitals: /72   Pulse 61   Wt 107.5 kg (237 lb)   SpO2 99%   BMI 40.68 kg/m       Constitutional:  cooperative, alert and oriented, well developed, well  nourished, in no acute distress        Skin:  warm and dry to the touch          Head:  normocephalic        Eyes:  sclera white        Lymph:      ENT:           Neck:           Respiratory:  normal breath sounds, clear to auscultation, normal A-P diameter, normal symmetry, normal respiratory excursion, no use of accessory muscles         Cardiac: regular rhythm, normal S1/S2, no S3 or S4, apical impulse not displaced, no murmurs, gallops or rubs                                                         GI:           Extremities and Muscular Skeletal:  no deformities, clubbing, cyanosis, erythema observed;no edema              Neurological:  no gross motor deficits;affect appropriate        Psych:  Alert and Oriented x 3;affect appropriate, oriented to time, person and place          CC  No referring provider defined for this encounter.                    Thank you for allowing me to participate in the care of your patient.      Sincerely,     Gay Person MD     [facility]    cc:   No referring provider defined for this encounter.

## 2019-02-13 NOTE — PATIENT INSTRUCTIONS
"[facility]  [facility]~[address]~[place], [place]~[address]  Thank you for your [facility] visit today. If you have questions regarding your visit, please contact your cardiology RN's, Payal Tavarez or Uzma Patterson, at [phone]. Your provider has recommended the following:  Medication Changes:  None.  Recommendations:  Return at one year.  Call us if you need \"cardiac clearance\" for knee surgery.  Follow-up:  See Raza for cardiology follow up in one year.    To schedule a future appointment, we kindly ask that you call cardiology scheduling at [phone] three months prior to requested revisit date.  [facility] cardiology clinic is staffed with \"Advance Practice Providers\". These are our cardiology Physician Assistants and Nurse Practitioners. Please call cardiology scheduling if you feel you need clinical evaluation with them at any time for any cardiac reason.                 Reminder:    For your safety, we ask that you bring in your current medication(s) or an updated list of your medications with you to EACH office visit. Include the medication name, dose of pill on bottle and how you are taking it. Include over-the-counter medications or supplements. Your provider will review this at each visit and plan your care based on your current information.     "

## 2019-02-13 NOTE — PROGRESS NOTES
"HPI and Plan:     Ms. Kamini Meng is 83 years old and is seen in follow-up at [facility] in [place].  She had been followed by Dr. Mann but is new to myself.    She had been evaluated in 2008 for chest discomfort, and a nuclear stress test  revealed a fixed anterior defect thought to be breast attenuation artifact, although nontransmural infarct could not be excluded.  Left ventricular function was normal.  A CT scan of the abdomen indicated calcification of the coronary arteries.  She has done well with isolated episodes of gastroesophageal reflux discomfort over the years with only isolated palpitations.  A stress nuclear study in 2016 demonstrated a medium-sized perfusion defect of moderate severity involving the entire anterior wall from apex to base which was partially reversible. It was noted \"although breast attenuation probably contributes to this perfusion abnormality, mild ischemia in the distribution of left anterior descending artery cannot be ruled out\".    Dr. Mann described persistent chest discomfort prior to office visit in the spring of 2016 of 12 hours without response to antacids.  She also had palpitations that appeared to reflect the discomfort.   The patient's history for coronary disease risk factors is negative for cigarette smoking, positive for hypertension in the past 15 years, positive for hyperlipidemia in the past 2 years, diabetes type 2  and positive for family history of premature coronary artery disease. In the absence of recurrent symptoms, Dr. Mann recommended continued medical therapy.    She now denies chest discomfort, shortness of breath, dizziness,  or syncope.  She has occasional palpitations at night and takes her beta blocker at that time.  She denies heart failure symptoms.      Echocardiography in 2017 showed normal left ventricular function with trace to mild mitral insufficiency, normal right ventricular systolic function and evidence of diastolic " dysfunction of the left ventricle, mild dilatation of left atrium, mild mitral annular calcification without change from the previous echocardiogram.  The patient's Holter monitor from 2016 demonstrated a heart rate varying from 56 to 105, average rate of 72, with isolated to occasional supraventricular and ventricular beats with one 27-beat run of PAT.      Exam:  Blood pressure 135/72, pulse 61, weight 107.5 kg (237 lb), SpO2 99 %, not currently breastfeeding.    Chest is clear to auscultation.  Cardiac with regular rhythm, + S4, 1- 2/6 systolic murmur at the left sternal border.   There was trace to 1+ ankle edema.     Assessment/Plan:  Ms. Meng has had no chest discomfort, shortness of breath, dizziness or syncope.  She only has unchanged isolated palpitations without associated other symptoms.  She has been noted to have coronary artery calcifications on CT scan (and has a stress study which suggested possible mild anterior ischemia in 2016) so she should be continued on her medications including atorvastatin, aspirin, atenolol and lisinopril. Her risk factors are well-controlled. Lipids in November of 2018 indicated an LDL of 69, HDL 60 mg/dl.  Her blood pressure is also controlled.    In the absence of symptoms, cardiology follow-up at 6-12 months unless she has earlier symptoms. A stress nuclear study should be repeated for comparison before next year's visit.    Medications Discontinued During This Encounter   Medication Reason     acyclovir (ZOVIRAX) 800 MG tablet          No diagnosis found.    CURRENT MEDICATIONS:  Current Outpatient Medications   Medication Sig Dispense Refill     ACCU-CHEK MULTICLIX LANCETS MISC 1 each 2 times daily. 100 each 3     acetaminophen (TYLENOL) 325 MG tablet Take 2 tablets (650 mg) by mouth every 4 hours as needed for other (mild pain)  0     atenolol (TENORMIN) 25 MG tablet Take 1 tablet (25 mg) by mouth daily 90 tablet 4     atorvastatin (LIPITOR) 10 MG tablet Take 1  tablet (10 mg) by mouth daily 90 tablet 3     blood glucose monitoring (ACCU-CHEK COMPACT CARE KIT) meter device kit Use to test blood sugars 1 times daily or as directed. May sub formulary meter 1 kit 0     blood glucose monitoring (NO BRAND SPECIFIED) test strip Use to test blood sugars twice times daily or as directed 1 Box 3     esomeprazole (NEXIUM) 40 MG CR capsule Take 1 capsule (40 mg) by mouth every morning (before breakfast) 90 capsule 4     gabapentin (NEURONTIN) 300 MG capsule Take 2 capsules in the morning and 2 capsules at bedtime 40 capsule 1     gabapentin (NEURONTIN) 300 MG capsule Take 2 tabs in the evening. 180 capsule 3     lisinopril (PRINIVIL/ZESTRIL) 5 MG tablet Take 1 tablet (5 mg) by mouth daily 90 tablet 3     LORazepam (ATIVAN) 0.5 MG tablet Take 1 tablet (0.5 mg) by mouth every 8 hours as needed for anxiety, use sparingly. 20 tablet 1       ALLERGIES     Allergies   Allergen Reactions     Zoloft [Sertraline] Palpitations, Diarrhea and Cramps       PAST MEDICAL HISTORY:  Past Medical History:   Diagnosis Date     Diabetes (H)      Esophageal reflux      Other abnormal glucose      Other premature beats      Unspecified essential hypertension        PAST SURGICAL HISTORY:  Past Surgical History:   Procedure Laterality Date     APPENDECTOMY       ARTHROPLASTY KNEE  10/30/2013    Procedure: ARTHROPLASTY KNEE;  Right Total Knee Arthroplasty;  Surgeon: Ousmane Mcgowan MD;  Location: [facility]     CHOLECYSTECTOMY, LAPOROSCOPIC      Cholecystectomy, Laparoscopic     DILATION AND CURETTAGE, OPERATIVE HYSTEROSCOPY WITH MORCELLATOR, COMBINED N/A 6/20/2016    Procedure: COMBINED DILATION AND CURETTAGE, OPERATIVE HYSTEROSCOPY WITH MORCELLATOR;  Surgeon: Bony Arredondo MD;  Location: [facility]     ESOPHAGOSCOPY, GASTROSCOPY, DUODENOSCOPY (EGD), COMBINED N/A 2/17/2016    Procedure: COMBINED ESOPHAGOSCOPY, GASTROSCOPY, DUODENOSCOPY (EGD), BIOPSY SINGLE OR MULTIPLE;  Surgeon: Mil Guevara MD;   Location: [facility]     SALPINGO OOPHORECTOMY,R/L/OZZY      Salpingo Oophorectomy, RT/LT/OZZY     TONSILLECTOMY         FAMILY HISTORY:  Family History   Problem Relation Age of Onset     Heart Disease Mother      Heart Disease Father      Heart Disease Brother      Heart Disease Brother      Heart Disease Brother        SOCIAL HISTORY:  Social History     Socioeconomic History     Marital status:      Spouse name: None     Number of children: None     Years of education: None     Highest education level: None   Social Needs     Financial resource strain: None     Food insecurity - worry: None     Food insecurity - inability: None     Transportation needs - medical: None     Transportation needs - non-medical: None   Occupational History     None   Tobacco Use     Smoking status: Never Smoker     Smokeless tobacco: Never Used   Substance and Sexual Activity     Alcohol use: No     Drug use: No     Sexual activity: No   Other Topics Concern     Parent/sibling w/ CABG, MI or angioplasty before 65F 55M? Not Asked   Social History Narrative     None       Review of Systems:  Skin:  Positive for bruising     Eyes:  Positive for glasses    ENT:  Negative      Respiratory:  Negative       Cardiovascular:    Positive for;fatigue;palpitations;lightheadedness    Gastroenterology: Negative      Genitourinary:  Negative      Musculoskeletal:  Negative      Neurologic:  Positive for numbness or tingling of feet    Psychiatric:  Positive for anxiety    Heme/Lymph/Imm:  Negative      Endocrine:  Positive for diabetes      Physical Exam:  Vitals: /72   Pulse 61   Wt 107.5 kg (237 lb)   SpO2 99%   BMI 40.68 kg/m      Constitutional:  cooperative, alert and oriented, well developed, well nourished, in no acute distress        Skin:  warm and dry to the touch          Head:  normocephalic        Eyes:  sclera white        Lymph:      ENT:           Neck:           Respiratory:  normal breath sounds, clear to auscultation,  normal A-P diameter, normal symmetry, normal respiratory excursion, no use of accessory muscles         Cardiac: regular rhythm, normal S1/S2, no S3 or S4, apical impulse not displaced, no murmurs, gallops or rubs                                                         GI:           Extremities and Muscular Skeletal:  no deformities, clubbing, cyanosis, erythema observed;no edema              Neurological:  no gross motor deficits;affect appropriate        Psych:  Alert and Oriented x 3;affect appropriate, oriented to time, person and place          CC  No referring provider defined for this encounter.

## 2019-02-13 NOTE — LETTER
"2/13/2019    Yecenia Viera MD  [address] [facility] [address]    RE: Theodora Flynnberg       Dear Colleague,    I had the pleasure of seeing Theodora Meng in the [facility].    HPI and Plan:     Ms. Kamini Meng is 83 years old and is seen in follow-up at [facility] in [place].  She had been followed by Dr. Mann but is new to myself.    She had been evaluated in 2008 for chest discomfort, and a nuclear stress test  revealed a fixed anterior defect thought to be breast attenuation artifact, although nontransmural infarct could not be excluded.  Left ventricular function was normal.  A CT scan of the abdomen indicated calcification of the coronary arteries.  She has done well with isolated episodes of gastroesophageal reflux discomfort over the years with only isolated palpitations.  A stress nuclear study in 2016 demonstrated a medium-sized perfusion defect of moderate severity involving the entire anterior wall from apex to base which was partially reversible. It was noted \"although breast attenuation probably contributes to this perfusion abnormality, mild ischemia in the distribution of left anterior descending artery cannot be ruled out\".    Dr. Mann described persistent chest discomfort prior to office visit in the spring of 2016 of 12 hours without response to antacids.  She also had palpitations that appeared to reflect the discomfort.   The patient's history for coronary disease risk factors is negative for cigarette smoking, positive for hypertension in the past 15 years, positive for hyperlipidemia in the past 2 years, diabetes type 2  and positive for family history of premature coronary artery disease. In the absence of recurrent symptoms, Dr. Mann recommended continued medical therapy.    She now denies chest discomfort, shortness of breath, dizziness,  or syncope.  She has occasional palpitations at night and takes her beta blocker at " that time.  She denies heart failure symptoms.      Echocardiography in 2017 showed normal left ventricular function with trace to mild mitral insufficiency, normal right ventricular systolic function and evidence of diastolic dysfunction of the left ventricle, mild dilatation of left atrium, mild mitral annular calcification without change from the previous echocardiogram.  The patient's Holter monitor from 2016 demonstrated a heart rate varying from 56 to 105, average rate of 72, with isolated to occasional supraventricular and ventricular beats with one 27-beat run of PAT.      Exam:  Blood pressure 135/72, pulse 61, weight 107.5 kg (237 lb), SpO2 99 %, not currently breastfeeding.    Chest is clear to auscultation.  Cardiac with regular rhythm, + S4, 1- 2/6 systolic murmur at the left sternal border.   There was trace to 1+ ankle edema.     Assessment/Plan:  Ms. Meng has had no chest discomfort, shortness of breath, dizziness or syncope.  She only has unchanged isolated palpitations without associated other symptoms.  She has been noted to have coronary artery calcifications on CT scan (and has a stress study which suggested possible mild anterior ischemia in 2016) so she should be continued on her medications including atorvastatin, aspirin, atenolol and lisinopril. Her risk factors are well-controlled. Lipids in November of 2018 indicated an LDL of 69, HDL 60 mg/dl.  Her blood pressure is also controlled.    In the absence of symptoms, cardiology follow-up at 6-12 months unless she has earlier symptoms. A stress nuclear study should be repeated for comparison before next year's visit.    Medications Discontinued During This Encounter   Medication Reason     acyclovir (ZOVIRAX) 800 MG tablet          No diagnosis found.    CURRENT MEDICATIONS:  Current Outpatient Medications   Medication Sig Dispense Refill     ACCU-CHEK MULTICLIX LANCETS MISC 1 each 2 times daily. 100 each 3     acetaminophen (TYLENOL)  325 MG tablet Take 2 tablets (650 mg) by mouth every 4 hours as needed for other (mild pain)  0     atenolol (TENORMIN) 25 MG tablet Take 1 tablet (25 mg) by mouth daily 90 tablet 4     atorvastatin (LIPITOR) 10 MG tablet Take 1 tablet (10 mg) by mouth daily 90 tablet 3     blood glucose monitoring (ACCU-CHEK COMPACT CARE KIT) meter device kit Use to test blood sugars 1 times daily or as directed. May sub formulary meter 1 kit 0     blood glucose monitoring (NO BRAND SPECIFIED) test strip Use to test blood sugars twice times daily or as directed 1 Box 3     esomeprazole (NEXIUM) 40 MG CR capsule Take 1 capsule (40 mg) by mouth every morning (before breakfast) 90 capsule 4     gabapentin (NEURONTIN) 300 MG capsule Take 2 capsules in the morning and 2 capsules at bedtime 40 capsule 1     gabapentin (NEURONTIN) 300 MG capsule Take 2 tabs in the evening. 180 capsule 3     lisinopril (PRINIVIL/ZESTRIL) 5 MG tablet Take 1 tablet (5 mg) by mouth daily 90 tablet 3     LORazepam (ATIVAN) 0.5 MG tablet Take 1 tablet (0.5 mg) by mouth every 8 hours as needed for anxiety, use sparingly. 20 tablet 1       ALLERGIES     Allergies   Allergen Reactions     Zoloft [Sertraline] Palpitations, Diarrhea and Cramps       PAST MEDICAL HISTORY:  Past Medical History:   Diagnosis Date     Diabetes (H)      Esophageal reflux      Other abnormal glucose      Other premature beats      Unspecified essential hypertension        PAST SURGICAL HISTORY:  Past Surgical History:   Procedure Laterality Date     APPENDECTOMY       ARTHROPLASTY KNEE  10/30/2013    Procedure: ARTHROPLASTY KNEE;  Right Total Knee Arthroplasty;  Surgeon: Ousmane Mcgowan MD;  Location: [facility]     CHOLECYSTECTOMY, LAPOROSCOPIC      Cholecystectomy, Laparoscopic     DILATION AND CURETTAGE, OPERATIVE HYSTEROSCOPY WITH MORCELLATOR, COMBINED N/A 6/20/2016    Procedure: COMBINED DILATION AND CURETTAGE, OPERATIVE HYSTEROSCOPY WITH MORCELLATOR;  Surgeon: Bony Arredondo,  MD;  Location: [facility] OR     ESOPHAGOSCOPY, GASTROSCOPY, DUODENOSCOPY (EGD), COMBINED N/A 2/17/2016    Procedure: COMBINED ESOPHAGOSCOPY, GASTROSCOPY, DUODENOSCOPY (EGD), BIOPSY SINGLE OR MULTIPLE;  Surgeon: Mil Guevara MD;  Location: [facility]     SALPINGO OOPHORECTOMY,R/L/OZZY      Salpingo Oophorectomy, RT/LT/OZZY     TONSILLECTOMY         FAMILY HISTORY:  Family History   Problem Relation Age of Onset     Heart Disease Mother      Heart Disease Father      Heart Disease Brother      Heart Disease Brother      Heart Disease Brother        SOCIAL HISTORY:  Social History     Socioeconomic History     Marital status:      Spouse name: None     Number of children: None     Years of education: None     Highest education level: None   Social Needs     Financial resource strain: None     Food insecurity - worry: None     Food insecurity - inability: None     Transportation needs - medical: None     Transportation needs - non-medical: None   Occupational History     None   Tobacco Use     Smoking status: Never Smoker     Smokeless tobacco: Never Used   Substance and Sexual Activity     Alcohol use: No     Drug use: No     Sexual activity: No   Other Topics Concern     Parent/sibling w/ CABG, MI or angioplasty before 65F 55M? Not Asked   Social History Narrative     None       Review of Systems:  Skin:  Positive for bruising     Eyes:  Positive for glasses    ENT:  Negative      Respiratory:  Negative       Cardiovascular:    Positive for;fatigue;palpitations;lightheadedness    Gastroenterology: Negative      Genitourinary:  Negative      Musculoskeletal:  Negative      Neurologic:  Positive for numbness or tingling of feet    Psychiatric:  Positive for anxiety    Heme/Lymph/Imm:  Negative      Endocrine:  Positive for diabetes      Physical Exam:  Vitals: /72   Pulse 61   Wt 107.5 kg (237 lb)   SpO2 99%   BMI 40.68 kg/m       Constitutional:  cooperative, alert and oriented, well developed, well  nourished, in no acute distress        Skin:  warm and dry to the touch          Head:  normocephalic        Eyes:  sclera white        Lymph:      ENT:           Neck:           Respiratory:  normal breath sounds, clear to auscultation, normal A-P diameter, normal symmetry, normal respiratory excursion, no use of accessory muscles         Cardiac: regular rhythm, normal S1/S2, no S3 or S4, apical impulse not displaced, no murmurs, gallops or rubs                                                         GI:           Extremities and Muscular Skeletal:  no deformities, clubbing, cyanosis, erythema observed;no edema              Neurological:  no gross motor deficits;affect appropriate        Psych:  Alert and Oriented x 3;affect appropriate, oriented to time, person and place        Thank you for allowing me to participate in the care of your patient.    Sincerely,     Gay Person MD     [facility]

## 2019-02-25 DIAGNOSIS — M79.2 NEURALGIA: ICD-10-CM

## 2019-02-25 RX ORDER — GABAPENTIN 300 MG/1
CAPSULE ORAL
Qty: 180 CAPSULE | Refills: 3 | Status: SHIPPED | OUTPATIENT
Start: 2019-02-25 | End: 2019-11-18

## 2019-03-06 ENCOUNTER — TRANSFERRED RECORDS (OUTPATIENT)
Dept: HEALTH INFORMATION MANAGEMENT | Facility: CLINIC | Age: 84
End: 2019-03-06

## 2019-03-20 ENCOUNTER — OFFICE VISIT (OUTPATIENT)
Dept: FAMILY MEDICINE | Facility: CLINIC | Age: 84
End: 2019-03-20
Payer: COMMERCIAL

## 2019-03-20 VITALS
BODY MASS INDEX: 38.76 KG/M2 | WEIGHT: 227 LBS | DIASTOLIC BLOOD PRESSURE: 72 MMHG | HEIGHT: 64 IN | OXYGEN SATURATION: 99 % | RESPIRATION RATE: 14 BRPM | TEMPERATURE: 98 F | HEART RATE: 60 BPM | SYSTOLIC BLOOD PRESSURE: 130 MMHG

## 2019-03-20 DIAGNOSIS — Z13.5 SCREENING FOR DIABETIC RETINOPATHY: ICD-10-CM

## 2019-03-20 DIAGNOSIS — M17.12 PRIMARY OSTEOARTHRITIS OF LEFT KNEE: ICD-10-CM

## 2019-03-20 DIAGNOSIS — E11.39 TYPE 2 DIABETES MELLITUS WITH OTHER OPHTHALMIC COMPLICATION, WITHOUT LONG-TERM CURRENT USE OF INSULIN (H): ICD-10-CM

## 2019-03-20 DIAGNOSIS — Z78.0 ASYMPTOMATIC POSTMENOPAUSAL STATUS: ICD-10-CM

## 2019-03-20 DIAGNOSIS — Z01.818 PREOP GENERAL PHYSICAL EXAM: Primary | ICD-10-CM

## 2019-03-20 LAB
ANION GAP SERPL CALCULATED.3IONS-SCNC: 5 MMOL/L (ref 3–14)
BASOPHILS # BLD AUTO: 0 10E9/L (ref 0–0.2)
BASOPHILS NFR BLD AUTO: 0.7 %
BUN SERPL-MCNC: 20 MG/DL (ref 7–30)
CALCIUM SERPL-MCNC: 9 MG/DL (ref 8.5–10.1)
CHLORIDE SERPL-SCNC: 103 MMOL/L (ref 94–109)
CO2 SERPL-SCNC: 29 MMOL/L (ref 20–32)
CREAT SERPL-MCNC: 0.79 MG/DL (ref 0.52–1.04)
DIFFERENTIAL METHOD BLD: NORMAL
EOSINOPHIL # BLD AUTO: 0.1 10E9/L (ref 0–0.7)
EOSINOPHIL NFR BLD AUTO: 3.2 %
ERYTHROCYTE [DISTWIDTH] IN BLOOD BY AUTOMATED COUNT: 13.4 % (ref 10–15)
GFR SERPL CREATININE-BSD FRML MDRD: 69 ML/MIN/{1.73_M2}
GLUCOSE SERPL-MCNC: 121 MG/DL (ref 70–99)
HBA1C MFR BLD: 7.1 % (ref 0–5.6)
HCT VFR BLD AUTO: 38.3 % (ref 35–47)
HGB BLD-MCNC: 12.7 G/DL (ref 11.7–15.7)
LYMPHOCYTES # BLD AUTO: 1.5 10E9/L (ref 0.8–5.3)
LYMPHOCYTES NFR BLD AUTO: 36 %
MCH RBC QN AUTO: 30.2 PG (ref 26.5–33)
MCHC RBC AUTO-ENTMCNC: 33.2 G/DL (ref 31.5–36.5)
MCV RBC AUTO: 91 FL (ref 78–100)
MONOCYTES # BLD AUTO: 0.2 10E9/L (ref 0–1.3)
MONOCYTES NFR BLD AUTO: 5.2 %
NEUTROPHILS # BLD AUTO: 2.2 10E9/L (ref 1.6–8.3)
NEUTROPHILS NFR BLD AUTO: 54.9 %
PLATELET # BLD AUTO: 279 10E9/L (ref 150–450)
POTASSIUM SERPL-SCNC: 4 MMOL/L (ref 3.4–5.3)
RBC # BLD AUTO: 4.21 10E12/L (ref 3.8–5.2)
SODIUM SERPL-SCNC: 137 MMOL/L (ref 133–144)
WBC # BLD AUTO: 4 10E9/L (ref 4–11)

## 2019-03-20 PROCEDURE — 99214 OFFICE O/P EST MOD 30 MIN: CPT | Performed by: NURSE PRACTITIONER

## 2019-03-20 PROCEDURE — 93000 ELECTROCARDIOGRAM COMPLETE: CPT | Performed by: NURSE PRACTITIONER

## 2019-03-20 PROCEDURE — 36415 COLL VENOUS BLD VENIPUNCTURE: CPT | Performed by: NURSE PRACTITIONER

## 2019-03-20 PROCEDURE — 83036 HEMOGLOBIN GLYCOSYLATED A1C: CPT | Performed by: NURSE PRACTITIONER

## 2019-03-20 PROCEDURE — 85025 COMPLETE CBC W/AUTO DIFF WBC: CPT | Performed by: NURSE PRACTITIONER

## 2019-03-20 PROCEDURE — 80048 BASIC METABOLIC PNL TOTAL CA: CPT | Performed by: NURSE PRACTITIONER

## 2019-03-20 ASSESSMENT — PAIN SCALES - GENERAL: PAINLEVEL: NO PAIN (0)

## 2019-03-20 ASSESSMENT — MIFFLIN-ST. JEOR: SCORE: 1474.67

## 2019-03-20 NOTE — LETTER
March 21, 2019      Theodora Meng  [address] [facility] [address]        Dear ,    We are writing to inform you of your test results.    Normal electrolytes.   Non fasting blood sugar normal.   Normal kidney function  Ok for surgery.     Resulted Orders   Hemoglobin A1c   Result Value Ref Range    Hemoglobin A1C 7.1 (H) 0 - 5.6 %      Comment:      Normal <5.7% Prediabetes 5.7-6.4%  Diabetes 6.5% or higher - adopted from ADA   consensus guidelines.     CBC with platelets and differential   Result Value Ref Range    WBC 4.0 4.0 - 11.0 10e9/L    RBC Count 4.21 3.8 - 5.2 10e12/L    Hemoglobin 12.7 11.7 - 15.7 g/dL    Hematocrit 38.3 35.0 - 47.0 %    MCV 91 78 - 100 fl    MCH 30.2 26.5 - 33.0 pg    MCHC 33.2 31.5 - 36.5 g/dL    RDW 13.4 10.0 - 15.0 %    Platelet Count 279 150 - 450 10e9/L    % Neutrophils 54.9 %    % Lymphocytes 36.0 %    % Monocytes 5.2 %    % Eosinophils 3.2 %    % Basophils 0.7 %    Absolute Neutrophil 2.2 1.6 - 8.3 10e9/L    Absolute Lymphocytes 1.5 0.8 - 5.3 10e9/L    Absolute Monocytes 0.2 0.0 - 1.3 10e9/L    Absolute Eosinophils 0.1 0.0 - 0.7 10e9/L    Absolute Basophils 0.0 0.0 - 0.2 10e9/L    Diff Method Automated Method    Basic metabolic panel  (Ca, Cl, CO2, Creat, Gluc, K, Na, BUN)   Result Value Ref Range    Sodium 137 133 - 144 mmol/L    Potassium 4.0 3.4 - 5.3 mmol/L    Chloride 103 94 - 109 mmol/L    Carbon Dioxide 29 20 - 32 mmol/L    Anion Gap 5 3 - 14 mmol/L    Glucose 121 (H) 70 - 99 mg/dL    Urea Nitrogen 20 7 - 30 mg/dL    Creatinine 0.79 0.52 - 1.04 mg/dL    GFR Estimate 69 >60 mL/min/[1.73_m2]      Comment:      Non  GFR Calc  Starting 12/18/2018, serum creatinine based estimated GFR (eGFR) will be   calculated using the Chronic Kidney Disease Epidemiology Collaboration   (CKD-EPI) equation.      GFR Estimate If Black 80 >60 mL/min/[1.73_m2]      Comment:       GFR Calc  Starting 12/18/2018, serum creatinine based estimated  GFR (eGFR) will be   calculated using the Chronic Kidney Disease Epidemiology Collaboration   (CKD-EPI) equation.      Calcium 9.0 8.5 - 10.1 mg/dL       If you have any questions or concerns, please call the clinic at the number listed above.       Sincerely,        ALEX Buenrostro CNP/dw

## 2019-03-20 NOTE — PROGRESS NOTES
[facility]  [address] [place] [id number]  [phone]  Dept: [phone]    PRE-OP EVALUATION:  Today's date: 3/20/2019    Theodora Meng (: 1935) presents for pre-operative evaluation assessment as requested by Dr. Diez.  She requires evaluation and anesthesia risk assessment prior to undergoing surgery/procedure for treatment of KNee replacement  .    Fax number for surgical facility:   Primary Physician: Yecenia Viera  Type of Anesthesia Anticipated: General    Patient has a Health Care Directive or Living Will:  NO    Preop Questions 3/20/2019   Who is doing your surgery? rene velazquez   What are you having done? lt tka   Date of Surgery/Procedure: [id number]   Facility or Hospital where procedure/surgery will be performed: [facility]   1.  Do you have a history of Heart attack, stroke, stent, coronary bypass surgery, or other heart surgery? No   2.  Do you ever have any pain or discomfort in your chest? No    3.  Do you have a history of  Heart Failure? No   4.   Are you troubled by shortness of breath when:  walking on a level surface, or up a slight hill, or at night? YES - shortness of breath with exertion for years.    5.  Do you currently have a cold, bronchitis or other respiratory infection? No   6.  Do you have a cough, shortness of breath, or wheezing? No   7.  Do you sometimes get pains in the calves of your legs when you walk? No   8. Do you or anyone in your family have previous history of blood clots? No   9.  Do you or does anyone in your family have a serious bleeding problem such as prolonged bleeding following surgeries or cuts? No   10. Have you ever had problems with anemia or been told to take iron pills? No   11. Have you had any abnormal blood loss such as black, tarry or bloody stools, or abnormal vaginal bleeding? No   12. Have you ever had a blood transfusion? No   13. Have you or any of your relatives ever had problems  with anesthesia? No   14. Do you have sleep apnea, excessive snoring or daytime drowsiness? No   15. Do you have any prosthetic heart valves? No   16. Do you have prosthetic joints? YES - Right KNEE   17. Is there any chance that you may be pregnant? No         HPI:     HPI related to upcoming procedure: left knee pain worse in the past year. Using a cane for mobility x 6 months. No injury to the knee in the past.   TKA right 4 yrs ago.       Eye exam macular degeneration doing injections every 2 months.   Blood sugars A1c < 7 %  No glucose monitoring at home.   No foot pain.     Shingles on her face 6 weeks ago. Took valtrex and resolved.   Pain has resolved now.  Lesions have resolved      HYPERTENSION - Patient has longstanding history of HTN , currently denies any symptoms referable to elevated blood pressure. Specifically denies chest pain, palpitations, dyspnea, orthopnea, PND or peripheral edema. Blood pressure readings have been in normal range. Current medication regimen is as listed below. Patient denies any side effects of medication.                                                                                                                                                                                          .    MEDICAL HISTORY:     Patient Active Problem List    Diagnosis Date Noted     Subclinical hyperthyroidism 11/12/2018     Priority: Medium     Type 2 diabetes mellitus without complication, without long-term current use of insulin (H) 11/10/2016     Priority: Medium     Essential hypertension with goal blood pressure less than 140/90 11/10/2016     Priority: Medium     Gastroesophageal reflux disease without esophagitis 11/10/2016     Priority: Medium     Endometrial polyp 06/18/2016     Priority: Medium     Thickened endometrium 04/21/2016     Priority: Medium     Thyroid nodule 03/24/2016     Priority: Medium     DDD (degenerative disc disease), cervical 02/13/2016     Priority: Medium      Type 2 diabetes mellitus without complication (H) 10/21/2015     Priority: Medium     Morbid obesity (H) 10/21/2015     Priority: Medium     Status post total knee replacement 11/29/2013     Priority: Medium     Fever 11/11/2013     Priority: Medium     Edema 11/07/2013     Priority: Medium     Nausea 11/07/2013     Priority: Medium     S/P total knee arthroplasty 10/30/2013     Priority: Medium     Right knee DJD 10/30/2013     Priority: Medium     OA (osteoarthritis) of knee 12/11/2012     Priority: Medium     Hyperlipidemia LDL goal <100 12/04/2012     Priority: Medium     Knee pain 11/07/2012     Priority: Medium     Insomnia 11/07/2012     Priority: Medium     Anxiety 11/15/2011     Priority: Medium     Type 2 diabetes, HbA1C goal < 8% (H) 12/21/2010     Priority: Medium     Neuralgia 06/05/2009     Priority: Medium     Vitamin D deficiency 03/30/2009     Priority: Medium     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 Provider to review       Advanced directives, counseling/discussion 11/15/2011     Priority: Low     Advance Directive Problem List Overview:   Name Relationship Phone    Primary Health Care Agent            Alternative Health Care Agent          Discussed advance care planning with patient; information given to patient to review. 11/15/2011           Past Medical History:   Diagnosis Date     Diabetes (H)      Esophageal reflux      Other abnormal glucose      Other premature beats      Unspecified essential hypertension      Past Surgical History:   Procedure Laterality Date     APPENDECTOMY       ARTHROPLASTY KNEE  10/30/2013    Procedure: ARTHROPLASTY KNEE;  Right Total Knee Arthroplasty;  Surgeon: Ousmane Mcgowan MD;  Location: [facility]     CHOLECYSTECTOMY, LAPOROSCOPIC      Cholecystectomy, Laparoscopic      DILATION AND CURETTAGE, OPERATIVE HYSTEROSCOPY WITH MORCELLATOR, COMBINED N/A 6/20/2016    Procedure: COMBINED DILATION AND CURETTAGE, OPERATIVE HYSTEROSCOPY WITH MORCELLATOR;  Surgeon: Bony Arredondo MD;  Location: [facility]     ESOPHAGOSCOPY, GASTROSCOPY, DUODENOSCOPY (EGD), COMBINED N/A 2/17/2016    Procedure: COMBINED ESOPHAGOSCOPY, GASTROSCOPY, DUODENOSCOPY (EGD), BIOPSY SINGLE OR MULTIPLE;  Surgeon: Mil Guevara MD;  Location: [facility]     SALPINGO OOPHORECTOMY,R/L/OZZY      Salpingo Oophorectomy, RT/LT/OZZY     TONSILLECTOMY       Current Outpatient Medications   Medication Sig Dispense Refill     acetaminophen (TYLENOL) 325 MG tablet Take 2 tablets (650 mg) by mouth every 4 hours as needed for other (mild pain)  0     atenolol (TENORMIN) 25 MG tablet Take 1 tablet (25 mg) by mouth daily 90 tablet 4     atorvastatin (LIPITOR) 10 MG tablet Take 1 tablet (10 mg) by mouth daily 90 tablet 3     esomeprazole (NEXIUM) 40 MG CR capsule Take 1 capsule (40 mg) by mouth every morning (before breakfast) 90 capsule 4     gabapentin (NEURONTIN) 300 MG capsule Take 2 tabs in the evening. 180 capsule 3     lisinopril (PRINIVIL/ZESTRIL) 5 MG tablet Take 1 tablet (5 mg) by mouth daily 90 tablet 3     LORazepam (ATIVAN) 0.5 MG tablet Take 1 tablet (0.5 mg) by mouth every 8 hours as needed for anxiety, use sparingly. 20 tablet 1     blood glucose monitoring (ACCU-CHEK COMPACT CARE KIT) meter device kit Use to test blood sugars 1 times daily or as directed. May sub formulary meter 1 kit 0     blood glucose monitoring (NO BRAND SPECIFIED) test strip Use to test blood sugars twice times daily or as directed 1 Box 3     OTC products: Tylenol     Allergies   Allergen Reactions     Zoloft [Sertraline] Palpitations, Diarrhea and Cramps      Latex Allergy: NO    Social History     Tobacco Use     Smoking status: Never Smoker     Smokeless tobacco: Never Used   Substance Use Topics     Alcohol use: No     History   Drug Use No  "      REVIEW OF SYSTEMS:    ROS: 10 point ROS neg other than the symptoms noted above in the HPI.      EXAM:   /72   Pulse 60   Temp 98  F (36.7  C) (Tympanic)   Resp 14   Ht 1.626 m (5' 4\")   Wt 103 kg (227 lb)   SpO2 99%   BMI 38.96 kg/m      GENERAL APPEARANCE: healthy, alert and no distress     EYES: EOMI, PERRL     HENT: ear canals and TM's normal and nose and mouth without ulcers or lesions     NECK: no adenopathy, no asymmetry, masses, or scars and thyroid normal to palpation     RESP: lungs clear to auscultation - no rales, rhonchi or wheezes     CV: regular rates and rhythm, normal S1 S2, no S3 or S4 and no murmur, click or rub     ABDOMEN:  soft, nontender, no HSM or masses and bowel sounds normal     MS: peripheral pulses normal and decreased range of motion left knee     SKIN: no suspicious lesions or rashes     NEURO: Normal strength and tone, sensory exam grossly normal, mentation intact and speech normal     PSYCH: mentation appears normal. and affect normal/bright     LYMPHATICS: No cervical adenopathy    DIAGNOSTICS:     EKG: sinus bradycardia, normal axis, normal intervals, no acute ST/T changes c/w ischemia, no LVH by voltage criteria, unchanged from previous tracings  Labs Drawn and in Process:   Results for orders placed or performed in visit on 03/20/19   Hemoglobin A1c   Result Value Ref Range    Hemoglobin A1C 7.1 (H) 0 - 5.6 %   CBC with platelets and differential   Result Value Ref Range    WBC 4.0 4.0 - 11.0 10e9/L    RBC Count 4.21 3.8 - 5.2 10e12/L    Hemoglobin 12.7 11.7 - 15.7 g/dL    Hematocrit 38.3 35.0 - 47.0 %    MCV 91 78 - 100 fl    MCH 30.2 26.5 - 33.0 pg    MCHC 33.2 31.5 - 36.5 g/dL    RDW 13.4 10.0 - 15.0 %    Platelet Count 279 150 - 450 10e9/L    % Neutrophils 54.9 %    % Lymphocytes 36.0 %    % Monocytes 5.2 %    % Eosinophils 3.2 %    % Basophils 0.7 %    Absolute Neutrophil 2.2 1.6 - 8.3 10e9/L    Absolute Lymphocytes 1.5 0.8 - 5.3 10e9/L    Absolute " Monocytes 0.2 0.0 - 1.3 10e9/L    Absolute Eosinophils 0.1 0.0 - 0.7 10e9/L    Absolute Basophils 0.0 0.0 - 0.2 10e9/L    Diff Method Automated Method    Basic metabolic panel  (Ca, Cl, CO2, Creat, Gluc, K, Na, BUN)   Result Value Ref Range    Sodium 137 133 - 144 mmol/L    Potassium 4.0 3.4 - 5.3 mmol/L    Chloride 103 94 - 109 mmol/L    Carbon Dioxide 29 20 - 32 mmol/L    Anion Gap 5 3 - 14 mmol/L    Glucose 121 (H) 70 - 99 mg/dL    Urea Nitrogen 20 7 - 30 mg/dL    Creatinine 0.79 0.52 - 1.04 mg/dL    GFR Estimate 69 >60 mL/min/[1.73_m2]    GFR Estimate If Black 80 >60 mL/min/[1.73_m2]    Calcium 9.0 8.5 - 10.1 mg/dL     Patient unable to leave a urine today for microalbumin and urinalysis    Recent Labs   Lab Test 11/06/18  0900 12/12/17  0912  06/09/16  1345   HGB  --  12.6  --  12.5   PLT  --  275  --  304    139   < > 137   POTASSIUM 4.1 3.8   < > 4.1   CR 0.74 0.70   < > 0.67   A1C 7.1* 7.3*   < > 7.4*    < > = values in this interval not displayed.        IMPRESSION:   Reason for surgery/procedure: Left knee pain  Diagnosis/reason for consult: Preop evaluation    The proposed surgical procedure is considered INTERMEDIATE risk.    REVISED CARDIAC RISK INDEX  The patient has the following serious cardiovascular risks for perioperative complications such as (MI, PE, VFib and 3  AV Block):  No serious cardiac risks      The patient has the following additional risks for perioperative complications:  Morbid obesity      ICD-10-CM    1. Preop general physical exam Z01.818 EKG 12-lead complete w/read - Clinics   2. Asymptomatic postmenopausal status Z78.0 DEXA HIP/PELVIS/SPINE - Future   3. Screening for diabetic retinopathy Z13.5 EYE EXAM (SIMPLE-NONBILLABLE)         RECOMMENDATIONS:     --Consult hospital rounder / IM to assist post-op medical management    --Patient is to take all scheduled medications on the day of surgery EXCEPT for modifications listed below.    ACE Inhibitor or Angiotensin Receptor  Blocker (ARB) Use  Ace inhibitor or Angiotensin Receptor Blocker (ARB) and should HOLD this medication for the 24 hours prior to surgery.      APPROVAL GIVEN to proceed with proposed procedure, without further diagnostic evaluation       Signed Electronically by: ALEX Buenrostro CNP    Copy of this evaluation report is provided to requesting physician.    [facility] Preop Guidelines    Revised Cardiac Risk Index

## 2019-03-25 ENCOUNTER — TRANSFERRED RECORDS (OUTPATIENT)
Dept: HEALTH INFORMATION MANAGEMENT | Facility: CLINIC | Age: 84
End: 2019-03-25

## 2019-03-25 ENCOUNTER — TELEPHONE (OUTPATIENT)
Dept: FAMILY MEDICINE | Facility: CLINIC | Age: 84
End: 2019-03-25

## 2019-03-27 DIAGNOSIS — Z13.5 SCREENING FOR DIABETIC RETINOPATHY: ICD-10-CM

## 2019-03-27 DIAGNOSIS — Z78.0 ASYMPTOMATIC POSTMENOPAUSAL STATUS: ICD-10-CM

## 2019-03-27 DIAGNOSIS — E11.39 TYPE 2 DIABETES MELLITUS WITH OTHER OPHTHALMIC COMPLICATION, WITHOUT LONG-TERM CURRENT USE OF INSULIN (H): ICD-10-CM

## 2019-03-27 DIAGNOSIS — Z01.818 PREOP GENERAL PHYSICAL EXAM: ICD-10-CM

## 2019-03-27 LAB
ALBUMIN UR-MCNC: NEGATIVE MG/DL
APPEARANCE UR: CLEAR
BACTERIA #/AREA URNS HPF: ABNORMAL /HPF
BILIRUB UR QL STRIP: NEGATIVE
COLOR UR AUTO: YELLOW
GLUCOSE UR STRIP-MCNC: NEGATIVE MG/DL
HGB UR QL STRIP: NEGATIVE
KETONES UR STRIP-MCNC: NEGATIVE MG/DL
LEUKOCYTE ESTERASE UR QL STRIP: ABNORMAL
MUCOUS THREADS #/AREA URNS LPF: PRESENT /LPF
NITRATE UR QL: NEGATIVE
NON-SQ EPI CELLS #/AREA URNS LPF: ABNORMAL /LPF
PH UR STRIP: 6 PH (ref 5–7)
RBC #/AREA URNS AUTO: ABNORMAL /HPF
SOURCE: ABNORMAL
SP GR UR STRIP: 1.02 (ref 1–1.03)
UROBILINOGEN UR STRIP-ACNC: 0.2 EU/DL (ref 0.2–1)
WBC #/AREA URNS AUTO: ABNORMAL /HPF

## 2019-03-27 PROCEDURE — 81001 URINALYSIS AUTO W/SCOPE: CPT | Performed by: NURSE PRACTITIONER

## 2019-03-27 PROCEDURE — 82043 UR ALBUMIN QUANTITATIVE: CPT | Performed by: NURSE PRACTITIONER

## 2019-03-28 LAB
CREAT UR-MCNC: 151 MG/DL
MICROALBUMIN UR-MCNC: 14 MG/L
MICROALBUMIN/CREAT UR: 9.14 MG/G CR (ref 0–25)

## 2019-04-01 ENCOUNTER — ANESTHESIA EVENT (OUTPATIENT)
Dept: SURGERY | Facility: CLINIC | Age: 84
DRG: 470 | End: 2019-04-01
Payer: MEDICARE

## 2019-04-03 ENCOUNTER — ANESTHESIA (OUTPATIENT)
Dept: SURGERY | Facility: CLINIC | Age: 84
DRG: 470 | End: 2019-04-03
Payer: MEDICARE

## 2019-04-03 ENCOUNTER — HOSPITAL ENCOUNTER (INPATIENT)
Facility: CLINIC | Age: 84
LOS: 3 days | Discharge: NURSING, REHAB OR LONG-TERM CARE | DRG: 470 | End: 2019-04-06
Attending: ORTHOPAEDIC SURGERY | Admitting: ORTHOPAEDIC SURGERY
Payer: MEDICARE

## 2019-04-03 ENCOUNTER — APPOINTMENT (OUTPATIENT)
Dept: GENERAL RADIOLOGY | Facility: CLINIC | Age: 84
DRG: 470 | End: 2019-04-03
Attending: ORTHOPAEDIC SURGERY
Payer: MEDICARE

## 2019-04-03 DIAGNOSIS — F41.9 ANXIETY: Primary | ICD-10-CM

## 2019-04-03 DIAGNOSIS — M17.12 PRIMARY OSTEOARTHRITIS OF LEFT KNEE: ICD-10-CM

## 2019-04-03 LAB — GLUCOSE BLDC GLUCOMTR-MCNC: 142 MG/DL (ref 70–99)

## 2019-04-03 PROCEDURE — 37000008 ZZH ANESTHESIA TECHNICAL FEE, 1ST 30 MIN: Performed by: ORTHOPAEDIC SURGERY

## 2019-04-03 PROCEDURE — 25800030 ZZH RX IP 258 OP 636: Performed by: NURSE ANESTHETIST, CERTIFIED REGISTERED

## 2019-04-03 PROCEDURE — 25000125 ZZHC RX 250: Performed by: NURSE ANESTHETIST, CERTIFIED REGISTERED

## 2019-04-03 PROCEDURE — 25000128 H RX IP 250 OP 636: Performed by: PHYSICIAN ASSISTANT

## 2019-04-03 PROCEDURE — 36000063 ZZH SURGERY LEVEL 4 EA 15 ADDTL MIN: Performed by: ORTHOPAEDIC SURGERY

## 2019-04-03 PROCEDURE — 71000012 ZZH RECOVERY PHASE 1 LEVEL 1 FIRST HR: Performed by: ORTHOPAEDIC SURGERY

## 2019-04-03 PROCEDURE — 36000093 ZZH SURGERY LEVEL 4 1ST 30 MIN: Performed by: ORTHOPAEDIC SURGERY

## 2019-04-03 PROCEDURE — 25800030 ZZH RX IP 258 OP 636: Performed by: ORTHOPAEDIC SURGERY

## 2019-04-03 PROCEDURE — 25000128 H RX IP 250 OP 636: Performed by: ORTHOPAEDIC SURGERY

## 2019-04-03 PROCEDURE — 27110028 ZZH OR GENERAL SUPPLY NON-STERILE: Performed by: ORTHOPAEDIC SURGERY

## 2019-04-03 PROCEDURE — 25000128 H RX IP 250 OP 636: Performed by: NURSE ANESTHETIST, CERTIFIED REGISTERED

## 2019-04-03 PROCEDURE — 27810169 ZZH OR IMPLANT GENERAL: Performed by: ORTHOPAEDIC SURGERY

## 2019-04-03 PROCEDURE — 40000986 XR KNEE PORT LT 1/2 VW: Mod: LT

## 2019-04-03 PROCEDURE — 25000132 ZZH RX MED GY IP 250 OP 250 PS 637: Mod: GY | Performed by: ORTHOPAEDIC SURGERY

## 2019-04-03 PROCEDURE — 12000000 ZZH R&B MED SURG/OB

## 2019-04-03 PROCEDURE — A9270 NON-COVERED ITEM OR SERVICE: HCPCS | Mod: GY | Performed by: ORTHOPAEDIC SURGERY

## 2019-04-03 PROCEDURE — 40000305 ZZH STATISTIC PRE PROC ASSESS I: Performed by: ORTHOPAEDIC SURGERY

## 2019-04-03 PROCEDURE — 71000013 ZZH RECOVERY PHASE 1 LEVEL 1 EA ADDTL HR: Performed by: ORTHOPAEDIC SURGERY

## 2019-04-03 PROCEDURE — A9270 NON-COVERED ITEM OR SERVICE: HCPCS | Mod: GY | Performed by: PHYSICIAN ASSISTANT

## 2019-04-03 PROCEDURE — 27210794 ZZH OR GENERAL SUPPLY STERILE: Performed by: ORTHOPAEDIC SURGERY

## 2019-04-03 PROCEDURE — C1776 JOINT DEVICE (IMPLANTABLE): HCPCS | Performed by: ORTHOPAEDIC SURGERY

## 2019-04-03 PROCEDURE — 37000009 ZZH ANESTHESIA TECHNICAL FEE, EACH ADDTL 15 MIN: Performed by: ORTHOPAEDIC SURGERY

## 2019-04-03 PROCEDURE — 0SRD0J9 REPLACEMENT OF LEFT KNEE JOINT WITH SYNTHETIC SUBSTITUTE, CEMENTED, OPEN APPROACH: ICD-10-PCS | Performed by: ORTHOPAEDIC SURGERY

## 2019-04-03 PROCEDURE — 25000125 ZZHC RX 250: Performed by: PHYSICIAN ASSISTANT

## 2019-04-03 PROCEDURE — 00000146 ZZHCL STATISTIC GLUCOSE BY METER IP

## 2019-04-03 PROCEDURE — 25000132 ZZH RX MED GY IP 250 OP 250 PS 637: Mod: GY | Performed by: PHYSICIAN ASSISTANT

## 2019-04-03 DEVICE — IMP INSERT TIBIAL HOWM TRI 3X11MM 5530-G-311: Type: IMPLANTABLE DEVICE | Site: KNEE | Status: FUNCTIONAL

## 2019-04-03 DEVICE — IMP COMP PATELLA HOWM TRI 35 10MM 5551-L-350: Type: IMPLANTABLE DEVICE | Site: KNEE | Status: FUNCTIONAL

## 2019-04-03 DEVICE — IMP BASEPLATE TIBIAL HOWM TRI 3 5520-B-300: Type: IMPLANTABLE DEVICE | Site: KNEE | Status: FUNCTIONAL

## 2019-04-03 DEVICE — IMP COMP FEM STRK TRIATHLN CR LT 4 5510-F-401: Type: IMPLANTABLE DEVICE | Site: KNEE | Status: FUNCTIONAL

## 2019-04-03 DEVICE — BONE CEMENT PALACOS 00-1112-140-01: Type: IMPLANTABLE DEVICE | Site: KNEE | Status: FUNCTIONAL

## 2019-04-03 RX ORDER — EPINEPHRINE 1 MG/ML
INJECTION, SOLUTION, CONCENTRATE INTRAVENOUS PRN
Status: DISCONTINUED | OUTPATIENT
Start: 2019-04-03 | End: 2019-04-03

## 2019-04-03 RX ORDER — LIDOCAINE 40 MG/G
CREAM TOPICAL
Status: DISCONTINUED | OUTPATIENT
Start: 2019-04-03 | End: 2019-04-06 | Stop reason: HOSPADM

## 2019-04-03 RX ORDER — NALOXONE HYDROCHLORIDE 0.4 MG/ML
.1-.4 INJECTION, SOLUTION INTRAMUSCULAR; INTRAVENOUS; SUBCUTANEOUS
Status: ACTIVE | OUTPATIENT
Start: 2019-04-03 | End: 2019-04-04

## 2019-04-03 RX ORDER — BUPIVACAINE HYDROCHLORIDE 7.5 MG/ML
INJECTION, SOLUTION INTRASPINAL PRN
Status: DISCONTINUED | OUTPATIENT
Start: 2019-04-03 | End: 2019-04-03

## 2019-04-03 RX ORDER — ACETAMINOPHEN 325 MG/1
975 TABLET ORAL EVERY 8 HOURS
Status: COMPLETED | OUTPATIENT
Start: 2019-04-03 | End: 2019-04-06

## 2019-04-03 RX ORDER — FENTANYL CITRATE 50 UG/ML
25-50 INJECTION, SOLUTION INTRAMUSCULAR; INTRAVENOUS
Status: DISCONTINUED | OUTPATIENT
Start: 2019-04-03 | End: 2019-04-03 | Stop reason: HOSPADM

## 2019-04-03 RX ORDER — PANTOPRAZOLE SODIUM 40 MG/1
40 TABLET, DELAYED RELEASE ORAL
Status: DISCONTINUED | OUTPATIENT
Start: 2019-04-04 | End: 2019-04-06 | Stop reason: HOSPADM

## 2019-04-03 RX ORDER — NALOXONE HYDROCHLORIDE 0.4 MG/ML
.1-.4 INJECTION, SOLUTION INTRAMUSCULAR; INTRAVENOUS; SUBCUTANEOUS
Status: DISCONTINUED | OUTPATIENT
Start: 2019-04-03 | End: 2019-04-03

## 2019-04-03 RX ORDER — PROPOFOL 10 MG/ML
INJECTION, EMULSION INTRAVENOUS PRN
Status: DISCONTINUED | OUTPATIENT
Start: 2019-04-03 | End: 2019-04-03

## 2019-04-03 RX ORDER — ONDANSETRON 2 MG/ML
INJECTION INTRAMUSCULAR; INTRAVENOUS PRN
Status: DISCONTINUED | OUTPATIENT
Start: 2019-04-03 | End: 2019-04-03

## 2019-04-03 RX ORDER — ALBUTEROL SULFATE 0.83 MG/ML
2.5 SOLUTION RESPIRATORY (INHALATION) EVERY 4 HOURS PRN
Status: DISCONTINUED | OUTPATIENT
Start: 2019-04-03 | End: 2019-04-03 | Stop reason: HOSPADM

## 2019-04-03 RX ORDER — DIPHENHYDRAMINE HYDROCHLORIDE 50 MG/ML
12.5 INJECTION INTRAMUSCULAR; INTRAVENOUS EVERY 6 HOURS PRN
Status: DISCONTINUED | OUTPATIENT
Start: 2019-04-03 | End: 2019-04-06 | Stop reason: HOSPADM

## 2019-04-03 RX ORDER — HYDROMORPHONE HYDROCHLORIDE 1 MG/ML
.3-.5 INJECTION, SOLUTION INTRAMUSCULAR; INTRAVENOUS; SUBCUTANEOUS EVERY 5 MIN PRN
Status: DISCONTINUED | OUTPATIENT
Start: 2019-04-03 | End: 2019-04-03 | Stop reason: HOSPADM

## 2019-04-03 RX ORDER — ONDANSETRON 2 MG/ML
4 INJECTION INTRAMUSCULAR; INTRAVENOUS EVERY 30 MIN PRN
Status: DISCONTINUED | OUTPATIENT
Start: 2019-04-03 | End: 2019-04-03 | Stop reason: HOSPADM

## 2019-04-03 RX ORDER — GABAPENTIN 300 MG/1
300 CAPSULE ORAL 2 TIMES DAILY
Status: DISCONTINUED | OUTPATIENT
Start: 2019-04-03 | End: 2019-04-06 | Stop reason: HOSPADM

## 2019-04-03 RX ORDER — TRAMADOL HYDROCHLORIDE 50 MG/1
50 TABLET ORAL EVERY 6 HOURS PRN
Status: DISCONTINUED | OUTPATIENT
Start: 2019-04-03 | End: 2019-04-04

## 2019-04-03 RX ORDER — MEPERIDINE HYDROCHLORIDE 25 MG/ML
12.5 INJECTION INTRAMUSCULAR; INTRAVENOUS; SUBCUTANEOUS EVERY 5 MIN PRN
Status: DISCONTINUED | OUTPATIENT
Start: 2019-04-03 | End: 2019-04-03 | Stop reason: HOSPADM

## 2019-04-03 RX ORDER — CEFAZOLIN SODIUM 2 G/100ML
2 INJECTION, SOLUTION INTRAVENOUS
Status: COMPLETED | OUTPATIENT
Start: 2019-04-03 | End: 2019-04-03

## 2019-04-03 RX ORDER — DEXAMETHASONE SODIUM PHOSPHATE 4 MG/ML
4 INJECTION, SOLUTION INTRA-ARTICULAR; INTRALESIONAL; INTRAMUSCULAR; INTRAVENOUS; SOFT TISSUE
Status: DISCONTINUED | OUTPATIENT
Start: 2019-04-03 | End: 2019-04-03 | Stop reason: HOSPADM

## 2019-04-03 RX ORDER — ZOLPIDEM TARTRATE 5 MG/1
5 TABLET ORAL
Status: DISCONTINUED | OUTPATIENT
Start: 2019-04-04 | End: 2019-04-06 | Stop reason: HOSPADM

## 2019-04-03 RX ORDER — METOCLOPRAMIDE 5 MG/1
5 TABLET ORAL EVERY 6 HOURS PRN
Status: DISCONTINUED | OUTPATIENT
Start: 2019-04-03 | End: 2019-04-06 | Stop reason: HOSPADM

## 2019-04-03 RX ORDER — CEFAZOLIN SODIUM 2 G/100ML
2 INJECTION, SOLUTION INTRAVENOUS EVERY 8 HOURS
Status: COMPLETED | OUTPATIENT
Start: 2019-04-03 | End: 2019-04-04

## 2019-04-03 RX ORDER — FENTANYL CITRATE 50 UG/ML
INJECTION, SOLUTION INTRAMUSCULAR; INTRAVENOUS PRN
Status: DISCONTINUED | OUTPATIENT
Start: 2019-04-03 | End: 2019-04-03

## 2019-04-03 RX ORDER — AMOXICILLIN 250 MG
2 CAPSULE ORAL 2 TIMES DAILY
Status: DISCONTINUED | OUTPATIENT
Start: 2019-04-03 | End: 2019-04-06 | Stop reason: HOSPADM

## 2019-04-03 RX ORDER — SODIUM CHLORIDE 9 MG/ML
INJECTION, SOLUTION INTRAVENOUS CONTINUOUS
Status: DISCONTINUED | OUTPATIENT
Start: 2019-04-03 | End: 2019-04-05

## 2019-04-03 RX ORDER — NICOTINE POLACRILEX 4 MG
15-30 LOZENGE BUCCAL
Status: DISCONTINUED | OUTPATIENT
Start: 2019-04-03 | End: 2019-04-06 | Stop reason: HOSPADM

## 2019-04-03 RX ORDER — DEXTROSE MONOHYDRATE 25 G/50ML
25-50 INJECTION, SOLUTION INTRAVENOUS
Status: DISCONTINUED | OUTPATIENT
Start: 2019-04-03 | End: 2019-04-06 | Stop reason: HOSPADM

## 2019-04-03 RX ORDER — GABAPENTIN 100 MG/1
100 CAPSULE ORAL
Status: COMPLETED | OUTPATIENT
Start: 2019-04-03 | End: 2019-04-03

## 2019-04-03 RX ORDER — ESOMEPRAZOLE MAGNESIUM 40 MG/1
40 CAPSULE, DELAYED RELEASE ORAL
Status: DISCONTINUED | OUTPATIENT
Start: 2019-04-04 | End: 2019-04-03 | Stop reason: CLARIF

## 2019-04-03 RX ORDER — HYDROXYZINE HYDROCHLORIDE 25 MG/1
25 TABLET, FILM COATED ORAL EVERY 6 HOURS PRN
Status: DISCONTINUED | OUTPATIENT
Start: 2019-04-03 | End: 2019-04-03 | Stop reason: HOSPADM

## 2019-04-03 RX ORDER — DIMENHYDRINATE 50 MG/ML
25 INJECTION, SOLUTION INTRAMUSCULAR; INTRAVENOUS
Status: DISCONTINUED | OUTPATIENT
Start: 2019-04-03 | End: 2019-04-03 | Stop reason: HOSPADM

## 2019-04-03 RX ORDER — HYDROXYZINE HYDROCHLORIDE 50 MG/1
50 TABLET, FILM COATED ORAL EVERY 6 HOURS PRN
Status: DISCONTINUED | OUTPATIENT
Start: 2019-04-03 | End: 2019-04-03 | Stop reason: HOSPADM

## 2019-04-03 RX ORDER — DEXAMETHASONE SODIUM PHOSPHATE 4 MG/ML
INJECTION, SOLUTION INTRA-ARTICULAR; INTRALESIONAL; INTRAMUSCULAR; INTRAVENOUS; SOFT TISSUE PRN
Status: DISCONTINUED | OUTPATIENT
Start: 2019-04-03 | End: 2019-04-03

## 2019-04-03 RX ORDER — LORAZEPAM 0.5 MG/1
0.5 TABLET ORAL EVERY 8 HOURS PRN
Status: DISCONTINUED | OUTPATIENT
Start: 2019-04-03 | End: 2019-04-06 | Stop reason: HOSPADM

## 2019-04-03 RX ORDER — DIPHENHYDRAMINE HCL 12.5MG/5ML
12.5 LIQUID (ML) ORAL EVERY 6 HOURS PRN
Status: DISCONTINUED | OUTPATIENT
Start: 2019-04-03 | End: 2019-04-06 | Stop reason: HOSPADM

## 2019-04-03 RX ORDER — METHOCARBAMOL 500 MG/1
500 TABLET, FILM COATED ORAL 4 TIMES DAILY PRN
Status: DISCONTINUED | OUTPATIENT
Start: 2019-04-03 | End: 2019-04-06 | Stop reason: HOSPADM

## 2019-04-03 RX ORDER — LIDOCAINE 40 MG/G
CREAM TOPICAL
Status: DISCONTINUED | OUTPATIENT
Start: 2019-04-03 | End: 2019-04-03 | Stop reason: HOSPADM

## 2019-04-03 RX ORDER — SODIUM CHLORIDE, SODIUM LACTATE, POTASSIUM CHLORIDE, CALCIUM CHLORIDE 600; 310; 30; 20 MG/100ML; MG/100ML; MG/100ML; MG/100ML
INJECTION, SOLUTION INTRAVENOUS CONTINUOUS
Status: DISCONTINUED | OUTPATIENT
Start: 2019-04-03 | End: 2019-04-03 | Stop reason: HOSPADM

## 2019-04-03 RX ORDER — ONDANSETRON 2 MG/ML
4 INJECTION INTRAMUSCULAR; INTRAVENOUS EVERY 6 HOURS PRN
Status: DISCONTINUED | OUTPATIENT
Start: 2019-04-03 | End: 2019-04-06 | Stop reason: HOSPADM

## 2019-04-03 RX ORDER — ATORVASTATIN CALCIUM 10 MG/1
10 TABLET, FILM COATED ORAL DAILY
Status: DISCONTINUED | OUTPATIENT
Start: 2019-04-03 | End: 2019-04-06 | Stop reason: HOSPADM

## 2019-04-03 RX ORDER — PROPOFOL 10 MG/ML
INJECTION, EMULSION INTRAVENOUS CONTINUOUS PRN
Status: DISCONTINUED | OUTPATIENT
Start: 2019-04-03 | End: 2019-04-03

## 2019-04-03 RX ORDER — ONDANSETRON 4 MG/1
4 TABLET, ORALLY DISINTEGRATING ORAL EVERY 6 HOURS PRN
Status: DISCONTINUED | OUTPATIENT
Start: 2019-04-03 | End: 2019-04-06 | Stop reason: HOSPADM

## 2019-04-03 RX ORDER — AMOXICILLIN 250 MG
1 CAPSULE ORAL 2 TIMES DAILY
Status: DISCONTINUED | OUTPATIENT
Start: 2019-04-03 | End: 2019-04-06 | Stop reason: HOSPADM

## 2019-04-03 RX ORDER — METOCLOPRAMIDE HYDROCHLORIDE 5 MG/ML
5 INJECTION INTRAMUSCULAR; INTRAVENOUS EVERY 6 HOURS PRN
Status: DISCONTINUED | OUTPATIENT
Start: 2019-04-03 | End: 2019-04-06 | Stop reason: HOSPADM

## 2019-04-03 RX ORDER — DIPHENHYDRAMINE HYDROCHLORIDE 50 MG/ML
INJECTION INTRAMUSCULAR; INTRAVENOUS PRN
Status: DISCONTINUED | OUTPATIENT
Start: 2019-04-03 | End: 2019-04-03

## 2019-04-03 RX ORDER — ACETAMINOPHEN 325 MG/1
650 TABLET ORAL EVERY 4 HOURS PRN
Status: DISCONTINUED | OUTPATIENT
Start: 2019-04-06 | End: 2019-04-06 | Stop reason: HOSPADM

## 2019-04-03 RX ORDER — GLYCOPYRROLATE 0.2 MG/ML
INJECTION, SOLUTION INTRAMUSCULAR; INTRAVENOUS PRN
Status: DISCONTINUED | OUTPATIENT
Start: 2019-04-03 | End: 2019-04-03

## 2019-04-03 RX ORDER — ONDANSETRON 4 MG/1
4 TABLET, ORALLY DISINTEGRATING ORAL EVERY 30 MIN PRN
Status: DISCONTINUED | OUTPATIENT
Start: 2019-04-03 | End: 2019-04-03 | Stop reason: HOSPADM

## 2019-04-03 RX ORDER — ACETAMINOPHEN 500 MG
1000 TABLET ORAL ONCE
Status: COMPLETED | OUTPATIENT
Start: 2019-04-03 | End: 2019-04-03

## 2019-04-03 RX ORDER — LISINOPRIL 5 MG/1
5 TABLET ORAL DAILY
Status: DISCONTINUED | OUTPATIENT
Start: 2019-04-04 | End: 2019-04-06 | Stop reason: HOSPADM

## 2019-04-03 RX ORDER — CEFAZOLIN SODIUM 1 G/50ML
1 INJECTION, SOLUTION INTRAVENOUS SEE ADMIN INSTRUCTIONS
Status: DISCONTINUED | OUTPATIENT
Start: 2019-04-03 | End: 2019-04-03 | Stop reason: HOSPADM

## 2019-04-03 RX ORDER — ATENOLOL 25 MG/1
25 TABLET ORAL DAILY
Status: DISCONTINUED | OUTPATIENT
Start: 2019-04-03 | End: 2019-04-06 | Stop reason: HOSPADM

## 2019-04-03 RX ORDER — LIDOCAINE HYDROCHLORIDE 10 MG/ML
INJECTION, SOLUTION INFILTRATION; PERINEURAL PRN
Status: DISCONTINUED | OUTPATIENT
Start: 2019-04-03 | End: 2019-04-03

## 2019-04-03 RX ADMIN — DIPHENHYDRAMINE HYDROCHLORIDE 25 MG: 50 INJECTION, SOLUTION INTRAMUSCULAR; INTRAVENOUS at 12:37

## 2019-04-03 RX ADMIN — LIDOCAINE HYDROCHLORIDE 20 MG: 10 INJECTION, SOLUTION INFILTRATION; PERINEURAL at 12:22

## 2019-04-03 RX ADMIN — Medication 1 G: at 12:29

## 2019-04-03 RX ADMIN — BUPIVACAINE HYDROCHLORIDE IN DEXTROSE 1.6 ML: 7.5 INJECTION, SOLUTION SUBARACHNOID at 12:24

## 2019-04-03 RX ADMIN — SENNOSIDES AND DOCUSATE SODIUM 1 TABLET: 8.6; 5 TABLET ORAL at 17:00

## 2019-04-03 RX ADMIN — MIDAZOLAM 1 MG: 1 INJECTION INTRAMUSCULAR; INTRAVENOUS at 12:12

## 2019-04-03 RX ADMIN — SODIUM CHLORIDE, POTASSIUM CHLORIDE, SODIUM LACTATE AND CALCIUM CHLORIDE: 600; 310; 30; 20 INJECTION, SOLUTION INTRAVENOUS at 11:29

## 2019-04-03 RX ADMIN — PROPOFOL 30 MG: 10 INJECTION, EMULSION INTRAVENOUS at 12:43

## 2019-04-03 RX ADMIN — LIDOCAINE HYDROCHLORIDE 10 MG: 10 INJECTION, SOLUTION INFILTRATION; PERINEURAL at 12:21

## 2019-04-03 RX ADMIN — ACETAMINOPHEN 1000 MG: 500 TABLET, FILM COATED ORAL at 11:22

## 2019-04-03 RX ADMIN — GLYCOPYRROLATE 0.1 MG: 0.2 INJECTION, SOLUTION INTRAMUSCULAR; INTRAVENOUS at 12:15

## 2019-04-03 RX ADMIN — GABAPENTIN 100 MG: 100 CAPSULE ORAL at 11:22

## 2019-04-03 RX ADMIN — ONDANSETRON 4 MG: 2 INJECTION INTRAMUSCULAR; INTRAVENOUS at 12:48

## 2019-04-03 RX ADMIN — SODIUM CHLORIDE, POTASSIUM CHLORIDE, SODIUM LACTATE AND CALCIUM CHLORIDE: 600; 310; 30; 20 INJECTION, SOLUTION INTRAVENOUS at 12:47

## 2019-04-03 RX ADMIN — LIDOCAINE HYDROCHLORIDE 100 MG: 10 INJECTION, SOLUTION INFILTRATION; PERINEURAL at 12:27

## 2019-04-03 RX ADMIN — HYDROMORPHONE HYDROCHLORIDE 0.5 MG: 1 INJECTION, SOLUTION INTRAMUSCULAR; INTRAVENOUS; SUBCUTANEOUS at 15:07

## 2019-04-03 RX ADMIN — RANITIDINE 150 MG: 150 TABLET ORAL at 17:01

## 2019-04-03 RX ADMIN — FENTANYL CITRATE 50 MCG: 50 INJECTION, SOLUTION INTRAMUSCULAR; INTRAVENOUS at 12:15

## 2019-04-03 RX ADMIN — MIDAZOLAM 1 MG: 1 INJECTION INTRAMUSCULAR; INTRAVENOUS at 12:15

## 2019-04-03 RX ADMIN — HYDROMORPHONE HYDROCHLORIDE 0.5 MG: 1 INJECTION, SOLUTION INTRAMUSCULAR; INTRAVENOUS; SUBCUTANEOUS at 14:51

## 2019-04-03 RX ADMIN — METHOCARBAMOL 500 MG: 500 TABLET ORAL at 18:10

## 2019-04-03 RX ADMIN — FENTANYL CITRATE 50 MCG: 50 INJECTION, SOLUTION INTRAMUSCULAR; INTRAVENOUS at 12:17

## 2019-04-03 RX ADMIN — ATENOLOL 25 MG: 25 TABLET ORAL at 22:11

## 2019-04-03 RX ADMIN — ACETAMINOPHEN 975 MG: 325 TABLET, FILM COATED ORAL at 16:59

## 2019-04-03 RX ADMIN — HYDROMORPHONE HYDROCHLORIDE 0.5 MG: 1 INJECTION, SOLUTION INTRAMUSCULAR; INTRAVENOUS; SUBCUTANEOUS at 14:38

## 2019-04-03 RX ADMIN — TRAMADOL HYDROCHLORIDE 50 MG: 50 TABLET, FILM COATED ORAL at 18:10

## 2019-04-03 RX ADMIN — SODIUM CHLORIDE: 9 INJECTION, SOLUTION INTRAVENOUS at 16:46

## 2019-04-03 RX ADMIN — PROPOFOL 50 MCG/KG/MIN: 10 INJECTION, EMULSION INTRAVENOUS at 12:27

## 2019-04-03 RX ADMIN — CEFAZOLIN SODIUM 2 G: 2 INJECTION, SOLUTION INTRAVENOUS at 12:12

## 2019-04-03 RX ADMIN — LIDOCAINE HYDROCHLORIDE 20 MG: 10 INJECTION, SOLUTION INFILTRATION; PERINEURAL at 12:19

## 2019-04-03 RX ADMIN — EPINEPHRINE 0.2 MG: 1 INJECTION, SOLUTION INTRAMUSCULAR; SUBCUTANEOUS at 12:24

## 2019-04-03 RX ADMIN — GABAPENTIN 300 MG: 300 CAPSULE ORAL at 22:11

## 2019-04-03 RX ADMIN — ATORVASTATIN CALCIUM 10 MG: 10 TABLET, FILM COATED ORAL at 22:11

## 2019-04-03 RX ADMIN — GLYCOPYRROLATE 0.1 MG: 0.2 INJECTION, SOLUTION INTRAMUSCULAR; INTRAVENOUS at 12:12

## 2019-04-03 RX ADMIN — DEXAMETHASONE SODIUM PHOSPHATE 4 MG: 4 INJECTION, SOLUTION INTRA-ARTICULAR; INTRALESIONAL; INTRAMUSCULAR; INTRAVENOUS; SOFT TISSUE at 12:25

## 2019-04-03 RX ADMIN — CEFAZOLIN SODIUM 2 G: 2 INJECTION, SOLUTION INTRAVENOUS at 20:16

## 2019-04-03 RX ADMIN — LIDOCAINE HYDROCHLORIDE 1 ML: 10 INJECTION, SOLUTION EPIDURAL; INFILTRATION; INTRACAUDAL; PERINEURAL at 11:29

## 2019-04-03 RX ADMIN — LORAZEPAM 0.5 MG: 0.5 TABLET ORAL at 22:15

## 2019-04-03 ASSESSMENT — MIFFLIN-ST. JEOR: SCORE: 1469.92

## 2019-04-03 ASSESSMENT — ACTIVITIES OF DAILY LIVING (ADL): ADLS_ACUITY_SCORE: 13

## 2019-04-03 NOTE — ANESTHESIA PREPROCEDURE EVALUATION
Anesthesia Pre-Procedure Evaluation    Patient: Theodora Meng   MRN: [id number] : 1935          Preoperative Diagnosis: left knee degenerative joint disease    Procedure(s):  Left Total Knee Arthroplasty    Past Medical History:   Diagnosis Date     Diabetes (H)      Esophageal reflux      Other abnormal glucose      Other premature beats      Unspecified essential hypertension      Past Surgical History:   Procedure Laterality Date     APPENDECTOMY       ARTHROPLASTY KNEE  10/30/2013    Procedure: ARTHROPLASTY KNEE;  Right Total Knee Arthroplasty;  Surgeon: Ousmane Mcgowan MD;  Location: [facility]     CHOLECYSTECTOMY, LAPOROSCOPIC      Cholecystectomy, Laparoscopic     DILATION AND CURETTAGE, OPERATIVE HYSTEROSCOPY WITH MORCELLATOR, COMBINED N/A 2016    Procedure: COMBINED DILATION AND CURETTAGE, OPERATIVE HYSTEROSCOPY WITH MORCELLATOR;  Surgeon: Bony Arredondo MD;  Location: [facility]     ESOPHAGOSCOPY, GASTROSCOPY, DUODENOSCOPY (EGD), COMBINED N/A 2016    Procedure: COMBINED ESOPHAGOSCOPY, GASTROSCOPY, DUODENOSCOPY (EGD), BIOPSY SINGLE OR MULTIPLE;  Surgeon: Mil Guevara MD;  Location: [facility]     SALPINGO OOPHORECTOMY,R/L/OZZY      Salpingo Oophorectomy, RT/LT/OZZY     TONSILLECTOMY         Anesthesia Evaluation     . Pt has had prior anesthetic. Type: General, Regional and MAC    No history of anesthetic complications          ROS/MED HX    ENT/Pulmonary:     (+)RADHA risk factors hypertension, obese, , . .    Neurologic:     (+)other neuro neuralgia    Cardiovascular: Comment: edema    (+) hypertension----. : . . MOE, . :. Irregular Heartbeat/Palpitations, . Previous cardiac testing Echodate:results:Interpretation Summary     Left ventricular systolic function is normal.The visual ejection fraction is  estimated at 55-60%.  The right ventricular systolic function is normal.  There is trace to mild mitral regurgitation.  Right ventricular systolic pressure could not be  approximated due to  inadequate tricuspid regurgitation.date: results:ECG reviewed date:3-2019 results:Sinus Bradycardia   Low voltage in precordial leads.    -Poor R-wave progression -may be secondary to pulmonary disease consider old anterior infarct.     ABNORMAL    date: results:          METS/Exercise Tolerance:  4 - Raking leaves, gardening   Hematologic:  - neg hematologic  ROS       Musculoskeletal:   (+) arthritis, , , other musculoskeletal- cervical DDD      GI/Hepatic:     (+) GERD Asymptomatic on medication,       Renal/Genitourinary:  - ROS Renal section negative       Endo:     (+) type II DM Last HgA1c: 7.1 date: 3-2019 Not using insulin - not using insulin pump thyroid problem  Thyroid disease - Other nodule, Obesity (nodule), Other Endocrine Disorder morbid obestiy.      Psychiatric:     (+) psychiatric history anxiety      Infectious Disease:  - neg infectious disease ROS       Malignancy:      - no malignancy   Other:    - neg other ROS                      Physical Exam  Normal systems: cardiovascular and pulmonary    Airway   Mallampati: II  TM distance: >3 FB  Neck ROM: limited    Dental   (+) lower dentures and upper dentures    Cardiovascular       Pulmonary             Lab Results   Component Value Date    WBC 4.0 03/20/2019    HGB 12.7 03/20/2019    HCT 38.3 03/20/2019     03/20/2019    CRP 86.5 (H) 11/13/2013     03/20/2019    POTASSIUM 4.0 03/20/2019    CHLORIDE 103 03/20/2019    CO2 29 03/20/2019    BUN 20 03/20/2019    CR 0.79 03/20/2019     (H) 03/20/2019    FEDERICO 9.0 03/20/2019    MAG 1.9 11/06/2012    ALBUMIN 3.1 (L) 11/06/2018    PROTTOTAL 6.9 11/06/2018    ALT 23 11/06/2018    AST 22 11/06/2018    ALKPHOS 82 11/06/2018    BILITOTAL 0.6 11/06/2018    LIPASE 82 03/04/2008    TSH 0.16 (L) 11/06/2018    T4 1.11 11/06/2018       Preop Vitals  BP Readings from Last 3 Encounters:   03/20/19 130/72   02/13/19 135/72   02/05/19 132/62    Pulse Readings from Last 3  "Encounters:   03/20/19 60   02/13/19 61   02/05/19 70      Resp Readings from Last 3 Encounters:   03/20/19 14   02/05/19 14   11/06/18 16    SpO2 Readings from Last 3 Encounters:   03/20/19 99%   02/13/19 99%   02/05/19 98%      Temp Readings from Last 1 Encounters:   03/20/19 36.7  C (98  F) (Tympanic)    Ht Readings from Last 1 Encounters:   03/20/19 1.626 m (5' 4\")      Wt Readings from Last 1 Encounters:   03/20/19 103 kg (227 lb)    Estimated body mass index is 38.96 kg/m  as calculated from the following:    Height as of 3/20/19: 1.626 m (5' 4\").    Weight as of 3/20/19: 103 kg (227 lb).       Anesthesia Plan      History & Physical Review  History and physical reviewed and following examination; no interval change.    ASA Status:  3 .    NPO Status:  > 8 hours    Plan for Spinal, General and ETT Maintenance will be Balanced.    PONV prophylaxis:  Ondansetron (or other 5HT-3) and Dexamethasone or Solumedrol       Postoperative Care  Postoperative pain management:  IV analgesics and Oral pain medications.      Consents  Anesthetic plan, risks, benefits and alternatives discussed with:  Patient and Spouse..                 ALEX Tse CRNA  "

## 2019-04-03 NOTE — OP NOTE
Total Knee Arthroplasty Operative Note        PLAN:  Weight bearing status: Weight bearing as tolerated   Activity: Activity as tolerated  Patient may move about with assist as indicated or with supervision   Anticoagulation plan:                 Lovenox inpatient and then  mg daily at discharge  for 42 days  Follow up plan                           Follow up in 2 week(s)        Name: Theodora Meng    PCP: Yecenia Viera    Procedure Date: 4/3/2019    Pre-operative diagnosis: left knee degenerative joint disease   Post-operative diagnosis: Same   Procedure: Total knee arthoplasty (Left)   Surgeon: Ousmane Mcgowan MD     Assistant(s): Sergey Escalante PA-C   Anesthesia: Spinal Anesthesia   Estimated blood loss: Less than 50 ml   Drains: Hemovac   Specimens: None       Findings: See full dictated operative note for details   Complications: None       Comments: See dictated operative report for full details       Indications:    The patient has experienced progressive left knee pain despite use of  Analgesics, NSAID's, Injection therapy and physical therapy. Because of the failure of these therapies to control symptoms and limited walking, night pain and altered activities the patient has decided to move ahead with joint replacement surgery.    Procedure and Findings:    After being informed of risks, benefits, alternatives to the procedure, patient desired to proceed, brought to the operating suite where they were placed under spinal anesthetic. Patient received 2 grams of intravenous Ancef.  Sergey Escalante PA-C was present for the entire length of the case for the purposes of proper patient positioning, surgical exposure, and patient safety. A time-out verification step was completed.    Examination of the joint surfaces demonstrated full thickness cartilage loss involving themedial compartments of the left knee.    Attention was directed towards the patella. A midline incision, vastus splitting  minimally invasive approach was utilized. The patella was everted. It was measured at 35 mm. It was resected, remeasured and a 35 mm asymmetric patella was positioned. A protector plate was placed on the patella. Attention was directed toward the distal femur. IM guider merna was placed. An 8 mm 5 degree distal femoral cut was completed. The IM guide merna was then placed in the tibia. External guide merna and tower were utilized and 9 mm button stylus was referenced off the lateral tibial plateau and cuts were completed. The tibia was sized to a 3. Attention was directed towards the distal femur. It was sized to a 4. The 4-in-1 cutting block was placed along the epicondylar axis. It was secured with 2 pins, anterior, posterior and chamfer cuts were completed. Soft tissue balancing was then carried out. Medial release was completed. Ultimately a 11 mm insert gave excellent range of motion and stability throughout the range of motion. Patella was noted to track symmetrically throughout the range to motion. The tibial tray rotation was marked, size was verified, it was secured with 2 pins and punched. Trial components were then removed. The cancellous surfaces were pulsatile lavaged. One batch of Palacos cement was mixed under vacuum. The tibia, femur and patella were sequentially cemented in place. The knee was held in extension with axial compression until the cement had hardened. The 11 mm insert was ultimately selected and secured Care was taken to remove any excess cement. Joint capsular injection with anesthetic solution was performed. Excellent range of motion and stability was demonstrated. A Hemovac drain was placed. The joint was copiously irrigated. Joint capsules were closed with interrupted number 1 Stratafix. Subcutaneous tissues were closed with 2-0 Vicryl and skin was closed with Erick for wound closure and Aquacell. A sterile dressing was applied. Patient tolerated the procedure well without complication  and returned to the PAR in stable condition.      Ousmane JASWINDERYunier Mcgowan    Date: 4/3/2019 Time: 2:22 PM  ?    CONFIDENTIALITY NOTICE This message and any included attachments are from [facility] and are intended only for the addressee. The information contained in this message is confidential and may constitute inside or non-public information under international, federal, or state securities laws. Unauthorized forwarding, printing, copying, distribution, or use of such information is strictly prohibited and may be unlawful. If you are not the addressee, please promptly delete this message and notify the sender of the delivery error by e-mail.

## 2019-04-03 NOTE — ANESTHESIA POSTPROCEDURE EVALUATION
Patient: Theodora Meng    Procedure(s):  Left Total Knee Arthroplasty    Diagnosis:left knee degenerative joint disease  Diagnosis Additional Information: No value filed.    Anesthesia Type:  Spinal, General, ETT    Note:  Anesthesia Post Evaluation    Patient participation: Able to fully participate in evaluation  Level of consciousness: awake  Pain management: adequate  Airway patency: patent  Cardiovascular status: acceptable  Respiratory status: acceptable  Hydration status: stable  PONV: none     Anesthetic complications: None          Last vitals:  Vitals:    04/03/19 1525 04/03/19 1619 04/03/19 1714   BP: 145/73 159/74 160/78   Pulse:  95 85   Resp: 16 18 18   Temp: 36.3  C (97.4  F) 36.4  C (97.6  F)    SpO2: 97% 95% 97%         Electronically Signed By: ALEX Ashford CRNA  April 3, 2019  6:02 PM

## 2019-04-03 NOTE — ANESTHESIA CARE TRANSFER NOTE
Patient: Theodora Meng    Procedure(s):  Left Total Knee Arthroplasty    Diagnosis: left knee degenerative joint disease  Diagnosis Additional Information: No value filed.    Anesthesia Type:   Spinal, General, ETT     Note:  Airway :Face Mask  Patient transferred to:PACU  Handoff Report: Identifed the Patient, Identified the Reponsible Provider, Reviewed the pertinent medical history, Discussed the surgical course, Reviewed Intra-OP anesthesia mangement and issues during anesthesia, Set expectations for post-procedure period and Allowed opportunity for questions and acknowledgement of understanding      Vitals: (Last set prior to Anesthesia Care Transfer)    CRNA VITALS  4/3/2019 1350 - 4/3/2019 1421      4/3/2019             NIBP:  170/91  (Abnormal)     Pulse:  114    NIBP Mean:  120    SpO2:  98 %                Electronically Signed By: ALEX Gonzalez CRNA  April 3, 2019  2:21 PM

## 2019-04-03 NOTE — ANESTHESIA PROCEDURE NOTES
Peripheral nerve/Neuraxial procedure note : intrathecal  Pre-Procedure  Performed by  Maddie Clark APRN CRNA   Location: OR    Procedure Times:4/3/2019 12:17 PM and 4/3/2019 12:25 PM  Pre-Anesthestic Checklist: patient identified, IV checked, site marked, risks and benefits discussed, informed consent, monitors and equipment checked, pre-op evaluation and at physician/surgeon's request    Timeout  Correct Patient: Yes   Correct Procedure: Yes   Correct Site: Yes   Correct Laterality: N/A   Correct Position: Yes   Site Marked: N/A   .   Procedure Documentation  ASA 3  Diagnosis:L knee DJD.    Procedure:    Intrathecal.  Insertion Site:L3-4  (midline approach)      Patient Prep;mask, sterile gloves, povidone-iodine 7.5% surgical scrub, patient draped.  .  Needle: Vincent tip Spinal Needle (gauge): 25  Spinal/LP Needle Length (inches): 3.5 # of attempts: 3 and  # of redirects:  3 Introducer used .       Assessment/Narrative  Paresthesias: No.  .  .  clear CSF fluid removed while sitting   . Time Injected: 12:24

## 2019-04-04 ENCOUNTER — APPOINTMENT (OUTPATIENT)
Dept: PHYSICAL THERAPY | Facility: CLINIC | Age: 84
DRG: 470 | End: 2019-04-04
Attending: ORTHOPAEDIC SURGERY
Payer: MEDICARE

## 2019-04-04 ENCOUNTER — APPOINTMENT (OUTPATIENT)
Dept: OCCUPATIONAL THERAPY | Facility: CLINIC | Age: 84
DRG: 470 | End: 2019-04-04
Attending: ORTHOPAEDIC SURGERY
Payer: MEDICARE

## 2019-04-04 LAB
CREAT SERPL-MCNC: 0.75 MG/DL (ref 0.52–1.04)
GFR SERPL CREATININE-BSD FRML MDRD: 74 ML/MIN/{1.73_M2}
GLUCOSE BLDC GLUCOMTR-MCNC: 163 MG/DL (ref 70–99)
HGB BLD-MCNC: 9.7 G/DL (ref 11.7–15.7)
PLATELET # BLD AUTO: 221 10E9/L (ref 150–450)

## 2019-04-04 PROCEDURE — 36415 COLL VENOUS BLD VENIPUNCTURE: CPT | Performed by: ORTHOPAEDIC SURGERY

## 2019-04-04 PROCEDURE — 25000132 ZZH RX MED GY IP 250 OP 250 PS 637: Mod: GY | Performed by: PHYSICIAN ASSISTANT

## 2019-04-04 PROCEDURE — 85049 AUTOMATED PLATELET COUNT: CPT | Performed by: ORTHOPAEDIC SURGERY

## 2019-04-04 PROCEDURE — 25000128 H RX IP 250 OP 636: Performed by: ORTHOPAEDIC SURGERY

## 2019-04-04 PROCEDURE — 25800030 ZZH RX IP 258 OP 636: Performed by: ORTHOPAEDIC SURGERY

## 2019-04-04 PROCEDURE — A9270 NON-COVERED ITEM OR SERVICE: HCPCS | Mod: GY | Performed by: PHYSICIAN ASSISTANT

## 2019-04-04 PROCEDURE — 99232 SBSQ HOSP IP/OBS MODERATE 35: CPT | Performed by: PHYSICIAN ASSISTANT

## 2019-04-04 PROCEDURE — 97116 GAIT TRAINING THERAPY: CPT | Mod: GP | Performed by: PHYSICAL THERAPIST

## 2019-04-04 PROCEDURE — 97165 OT EVAL LOW COMPLEX 30 MIN: CPT | Mod: GO

## 2019-04-04 PROCEDURE — 40000193 ZZH STATISTIC PT WARD VISIT: Performed by: PHYSICAL THERAPIST

## 2019-04-04 PROCEDURE — 82565 ASSAY OF CREATININE: CPT | Performed by: ORTHOPAEDIC SURGERY

## 2019-04-04 PROCEDURE — 25000132 ZZH RX MED GY IP 250 OP 250 PS 637: Mod: GY | Performed by: ORTHOPAEDIC SURGERY

## 2019-04-04 PROCEDURE — 85018 HEMOGLOBIN: CPT | Performed by: ORTHOPAEDIC SURGERY

## 2019-04-04 PROCEDURE — 00000146 ZZHCL STATISTIC GLUCOSE BY METER IP

## 2019-04-04 PROCEDURE — 97530 THERAPEUTIC ACTIVITIES: CPT | Mod: GP | Performed by: PHYSICAL THERAPIST

## 2019-04-04 PROCEDURE — 97110 THERAPEUTIC EXERCISES: CPT | Mod: GP | Performed by: PHYSICAL THERAPIST

## 2019-04-04 PROCEDURE — 97161 PT EVAL LOW COMPLEX 20 MIN: CPT | Mod: GP | Performed by: PHYSICAL THERAPIST

## 2019-04-04 PROCEDURE — 12000000 ZZH R&B MED SURG/OB

## 2019-04-04 PROCEDURE — A9270 NON-COVERED ITEM OR SERVICE: HCPCS | Mod: GY | Performed by: ORTHOPAEDIC SURGERY

## 2019-04-04 PROCEDURE — 97535 SELF CARE MNGMENT TRAINING: CPT | Mod: GO

## 2019-04-04 RX ORDER — TRAMADOL HYDROCHLORIDE 50 MG/1
50 TABLET ORAL EVERY 4 HOURS PRN
Status: DISCONTINUED | OUTPATIENT
Start: 2019-04-04 | End: 2019-04-06 | Stop reason: HOSPADM

## 2019-04-04 RX ADMIN — ATENOLOL 25 MG: 25 TABLET ORAL at 21:27

## 2019-04-04 RX ADMIN — ACETAMINOPHEN 975 MG: 325 TABLET, FILM COATED ORAL at 08:07

## 2019-04-04 RX ADMIN — TRAMADOL HYDROCHLORIDE 50 MG: 50 TABLET, FILM COATED ORAL at 08:07

## 2019-04-04 RX ADMIN — GABAPENTIN 300 MG: 300 CAPSULE ORAL at 08:07

## 2019-04-04 RX ADMIN — ACETAMINOPHEN 975 MG: 325 TABLET, FILM COATED ORAL at 00:26

## 2019-04-04 RX ADMIN — CEFAZOLIN SODIUM 2 G: 2 INJECTION, SOLUTION INTRAVENOUS at 03:50

## 2019-04-04 RX ADMIN — GABAPENTIN 300 MG: 300 CAPSULE ORAL at 21:27

## 2019-04-04 RX ADMIN — TRAMADOL HYDROCHLORIDE 50 MG: 50 TABLET, FILM COATED ORAL at 02:27

## 2019-04-04 RX ADMIN — RANITIDINE 150 MG: 150 TABLET ORAL at 15:58

## 2019-04-04 RX ADMIN — SENNOSIDES AND DOCUSATE SODIUM 1 TABLET: 8.6; 5 TABLET ORAL at 21:27

## 2019-04-04 RX ADMIN — TRAMADOL HYDROCHLORIDE 50 MG: 50 TABLET, FILM COATED ORAL at 21:27

## 2019-04-04 RX ADMIN — ATORVASTATIN CALCIUM 10 MG: 10 TABLET, FILM COATED ORAL at 21:27

## 2019-04-04 RX ADMIN — SENNOSIDES AND DOCUSATE SODIUM 2 TABLET: 8.6; 5 TABLET ORAL at 08:06

## 2019-04-04 RX ADMIN — RANITIDINE 150 MG: 150 TABLET ORAL at 03:53

## 2019-04-04 RX ADMIN — LISINOPRIL 5 MG: 5 TABLET ORAL at 08:07

## 2019-04-04 RX ADMIN — TRAMADOL HYDROCHLORIDE 50 MG: 50 TABLET, FILM COATED ORAL at 17:17

## 2019-04-04 RX ADMIN — METHOCARBAMOL 500 MG: 500 TABLET ORAL at 02:27

## 2019-04-04 RX ADMIN — PANTOPRAZOLE SODIUM 40 MG: 40 TABLET, DELAYED RELEASE ORAL at 06:16

## 2019-04-04 RX ADMIN — ENOXAPARIN SODIUM 40 MG: 40 INJECTION SUBCUTANEOUS at 13:13

## 2019-04-04 RX ADMIN — ACETAMINOPHEN 975 MG: 325 TABLET, FILM COATED ORAL at 16:53

## 2019-04-04 RX ADMIN — SODIUM CHLORIDE: 9 INJECTION, SOLUTION INTRAVENOUS at 02:26

## 2019-04-04 RX ADMIN — TRAMADOL HYDROCHLORIDE 50 MG: 50 TABLET, FILM COATED ORAL at 13:13

## 2019-04-04 ASSESSMENT — ACTIVITIES OF DAILY LIVING (ADL)
ADLS_ACUITY_SCORE: 13
PREVIOUS_RESPONSIBILITIES: MEAL PREP;HOUSEKEEPING;LAUNDRY;SHOPPING
ADLS_ACUITY_SCORE: 15
ADLS_ACUITY_SCORE: 13
ADLS_ACUITY_SCORE: 15
ADLS_ACUITY_SCORE: 13
ADLS_ACUITY_SCORE: 15

## 2019-04-04 NOTE — PROGRESS NOTES
Care Transitions Progress note    Reason for Follow up: This writer updated [facility] (Phone: [phone] Admissions: [phone] Fax: [phone]) with patients DC plans for saturday 4/6/2019. TCU will be able to admit her at any time that day.    Anticipated DC Needs: Daughter Emmy to Provide transportation    Next Steps: PCP    Stacie Griffin RN Care Coordinator  [facility] [phone]  [facility] [phone]

## 2019-04-04 NOTE — CONSULTS
Care Transition Initial Assessment - RN      Met with: Patient.    DATA   Active Problems:    Esophageal reflux    Essential hypertension    Neuralgia    Type 2 diabetes, HbA1C goal < 8% (H)    Anxiety    Hyperlipidemia LDL goal <100    Left knee DJD       Primary Care Clinic Name: [facility]  Primary Care MD Name: Yecenia Viera MD  Contact information and PCP information verified: Yes    ASSESSMENT  Cognitive Status: awake, alert and oriented.       Resources List: Skilled Nursing Facility, Transitional Care              Description of Support System: Supportive, Involved   Who is your support system?: Children       Insurance Concerns: No Insurance issues identified    This writer met with pt, introduced self and role. Care Transitions is consulted for discharge planning. This writer discussed discharge planning and Medicare guidelines in regards to TCU. Pt lives alone in the community. Pt has no services at home and she does have family support, patient in agreement with this writer discussing patients discharge options with karuna/ Emmy ([phone]) .  Patient is in agreement and has a goal of going to a TCU  upon discharge.  Patient was provided with Medicare certified nursing home list. Pts choices are as follows [facility] (Phone: [phone] Admissions: [phone] Fax: [phone]).   TCU referrals pending.  Patient accepted for 4/6/2019 at [facility] (Phone: [phone] Admissions: [phone] Fax: [phone]).  Planning ahead resources provided.   This writer discussed with the patient the private pay costs for transportation, patient aware. Transportation provided by her daughter Emmy.      PLAN  [facility] (Phone: [phone] Admissions: [phone] Fax: [phone]) TCU for saturday 4/6/2019      Stacie Griffin RN Care Coordinator  [facility] [phone]  [facility] [phone]

## 2019-04-04 NOTE — PROGRESS NOTES
PAS-RR    Per DHS regulation, CTS team completed and submitted PAS-RR to [place] [employer] Direct Connect via the [employer]. CTS team advised SNF and they are aware a PAS-RR has been submitted.     CTS team reviewed with pt or health care agent that they may be contacted for a follow up appointment within 10 days of hospital discharge if SNF stay is <30 days. Contact information for [employer] was also provided.     Pt or health care agent verbalized understanding.     PAS-RR # Your information has been submitted on April 04th, 2019 at 12:59:43 PM CDT. The confirmation number is [id number]    Stacie Griffin RN Care Coordinator  [facility] [phone]  [facility] [phone]

## 2019-04-04 NOTE — PROGRESS NOTES
[facility] Medicine Progress Note  Date of Service: 04/04/2019    Assessment & Plan   Theodora Meng is a 84 year old female who presented on 4/3/2019 for scheduled Procedure(s):  Left Total Knee Arthroplasty by Ousmane Mcgowan MD and is being followed by the hospital medicine service for co-management of acute and/or chronic perioperative medical problems.    Left knee DJD  S/p Procedure(s):  Left Total Knee Arthroplasty on 03-Apr-19  1 Day Post-Op    - pain control, wound cares, physical therapy, occupational therapy and DVT prophylaxis per orthopedic surgery service    Essential hypertension  Pressures reviewed, stable. Managed prior to admission with atenolol 25 mg daily, and lisinopril 5 mg daily.   - Continue prior to admission atenolol and lisinopril with holding parameters    Hyperlipidemia LDL goal <100  Managed prior to admission with Lipitor 10 mg daily, continue.    Type 2 diabetes, HbA1C goal < 8%  Patient does not routinely check her glucose at home, is refusing most glucose checks in the hospital. One check was slightly high (142). Not taking any diabetes meds prior to admission.  - Has medium sliding scale insulin available, but is refusing  - Glucose checks per protocol as patient allows - discussed with patient, is willing to do checks this afternoon  - Hyper/hypoglycemia protocol    Neuralgia  Chronic and stable. Managed prior to admission with gabapentin 300 mg bid, continue.    Esophageal reflux  Managed prior to admission with Nexium 40 mg daily.  - Changed to Protonix 40 mg daily per formulary    Anxiety  Chronic and stable. Managed prior to admission with prn Ativan, continue.      DVT Prophylaxis: as per orthopedic surgery service - PCD, Enoxaparin (Lovenox) subcutaneous with transition to aspirin upon discharge     Code Status: Full Code    Lines: Peripheral   Cid catheter: Still in after surgery with plans to remove later this  morning    Discussion: Medically, the patient appears stable and well.    Disposition: Anticipate discharge likely tomorrow. No barriers to discharge from internal medicine standpoint.    Attestation:  I have reviewed today's vital signs, notes, medications, labs and imaging.    Juana Gillis PA-C  [facility]  Pager: [phone]       Interval History   Patient feeling well after surgery. Pain rated 7/10. Denies any new numbness or tingling, but does have some baseline intermittent tingling in her foot that is unchanged.    Tolerating oral intake, denies abdominal pain, nausea, vomiting. No bowel movement since surgery, not passing much flatus yet. Still has Cid in since surgery, no complications.    Denies chest pain, palpitations, cough, wheeze, shortness of breath, headache, vision changes, dizziness, or other complaints.    Physical Exam   Temp:  [96.8  F (36  C)-97.7  F (36.5  C)] 97.4  F (36.3  C)  Pulse:  [58-95] 58  Heart Rate:  [] 85  Resp:  [16-18] 16  BP: (125-174)/(56-80) 138/62  SpO2:  [92 %-100 %] 92 %    Weights:   Vitals:    04/03/19 1044   Weight: 103 kg (227 lb 0 oz)    Body mass index is 38.95 kg/m .    General appearance: Awake, alert, and in no apparent distress. Pleasant and conversational, speaking in full sentences.  CV: Regular rhythm & rate, no murmurs. Bilateral non-pitting edema. Peripheral pulses intact.  Respiratory: Moving air well bilaterally, no wheezing or rhonchi. Faint crackles at lung bases bilaterally.  GI: Non-distended, soft, nontender to palpation. No rebound or guarding. Normoactive bowel sounds.  Skin: Warm, dry, no rashes or ecchymoses. No mottling of skin. Left lower extremity in ACE bandage, clean and dry.  Musculoskeletal / extremities: Moves all extremities equally, no obvious abnormalities. Distal CMS intact.  Neurologic: No focal deficits.      Data   Recent Labs   Lab 04/04/19  0533   HGB 9.7*      CR 0.75       Recent  Labs   Lab 04/03/19  1501   *        Unresulted Labs Ordered in the Past 30 Days of this Admission     No orders found for last 61 day(s).           Imaging  Recent Results (from the past 24 hour(s))   XR Knee Port Left 1/2 Views    Narrative    XR KNEE PORT LT 1/2 VW 4/3/2019 2:45 PM    HISTORY: Component position.    COMPARISON: 9/24/2007    FINDINGS: Arthroplasty hardware components appear well-seated without  evidence of complication.      Impression    IMPRESSION: Left knee arthroplasty.    DELICIA FERRIS MD        I reviewed all new labs and imaging results over the last 24 hours. I personally reviewed no images or EKG's today.    Medications     sodium chloride 100 mL/hr at 04/04/19 0600       acetaminophen  975 mg Oral Q8H     atenolol  25 mg Oral Daily     atorvastatin  10 mg Oral Daily     enoxaparin  40 mg Subcutaneous Q24H     ranitidine  150 mg Oral Q12H    Or     famotidine  20 mg Intravenous Q12H     gabapentin  300 mg Oral BID     insulin aspart  1-7 Units Subcutaneous TID AC     insulin aspart  1-5 Units Subcutaneous At Bedtime     lisinopril  5 mg Oral Daily     pantoprazole  40 mg Oral QAM AC     senna-docusate  1 tablet Oral BID    Or     senna-docusate  2 tablet Oral BID     sodium chloride (PF)  3 mL Intracatheter Q8H       Juana Gillis PA-C  [facility] Hospitalist Service  Pager: [phone]

## 2019-04-04 NOTE — PROGRESS NOTES
Physical Therapy Evaluation      04/04/19 0800   Quick Adds   Type of Visit Initial PT Evaluation       Present no   Living Environment   Lives With alone   Living Arrangements house   Home Accessibility stairs to enter home  (and no stairs on the inside of the house)   Number of Stairs, Main Entrance 5   Stair Railings, Main Entrance railing on left side (ascending)   Transportation Anticipated car, drives self   Living Environment Comment pt reports being fully independent at baseline, only occasionally using SEC and doesn't receive any kind of outside help/services; does have a large family (4 kids, grandkids and great-grandkids) that are mostly near pt   Self-Care   Usual Activity Tolerance moderate   Current Activity Tolerance fair   Regular Exercise No   Equipment Currently Used at Home cane, straight  (also owns a FWW at home that pt had from R-TKA 5 yr ago)   Functional Level Prior   Ambulation 1-->assistive equipment  (occasional use of SEC only)   Transferring 0-->independent   Toileting 0-->independent   Bathing 0-->independent   Communication 0-->understands/communicates without difficulty   Swallowing 0-->swallows foods/liquids without difficulty   Cognition 0 - no cognition issues reported   Fall history within last six months no   Which of the above functional risks had a recent onset or change? ambulation;transferring   Prior Functional Level Comment pt reports being fully independent at baseline, only occasionally using SEC; does own a FWW that she got 5 years ago from her R-TKA   General Information   Onset of Illness/Injury or Date of Surgery - Date 04/03/19   Referring Physician Dr. Roslyn MD   Patient/Family Goals Statement to go to rehab   Pertinent History of Current Problem (include personal factors and/or comorbidities that impact the POC) 84 year old s/p L-TKA on 4/3/19; history of R-TKA 5 years ago   Precautions/Limitations no known precautions/limitations    Weight-Bearing Status - LLE weight-bearing as tolerated   General Observations Pt planning to go to rehab which she did after R-TKA also 5 years ago. Pt pleasant and appears to be slightly hesitant initially with getting OOB and moving.   Cognitive Status Examination   Orientation orientation to person, place and time   Level of Consciousness alert   Follows Commands and Answers Questions 100% of the time   Personal Safety and Judgment intact   Memory intact   Pain Assessment   Patient Currently in Pain Yes, see Vital Sign flowsheet  (L-knee 5/10)   Posture    Posture Forward head position   Range of Motion (ROM)   ROM Comment LLE currently with ACE on from ankle to upper thigh so difficult to accurately assess L-knee ROM, will formally measure once ACE removed; currently LLE functional throughout   Strength   Strength Comments L-hip flex 3/5, knee flex/ext 3-/5 and L-ankle DF/PF 4/5   Bed Mobility   Bed Mobility Comments with HOB fully elevated and use of bed rail, pt able to perform supine to sit at SBA   Transfer Skills   Transfer Comments STS up to PUW at SBA needing cues for proper hand placement   Gait   Gait Comments pt ambulated 5 feet in room using PUW at CGA, slow pace, decreased step length and slight decreased stance time on LLE vs RLE and increased reliance onto FWW with BUEs    Balance   Balance Comments good static standing balance with BUEs onto PUW at SBA for safety; able to perform R/L weight-shifts without difficulty; denies dizziness when up   Sensory Examination   Sensory Perception no deficits were identified   Sensory Perception Comments denies numbness/tingling in LLE   Coordination   Coordination no deficits were identified   Muscle Tone   Muscle Tone no deficits were identified   General Therapy Interventions   Planned Therapy Interventions bed mobility training;gait training;ROM;strengthening;transfer training;progressive activity/exercise   Clinical Impression   Criteria for Skilled  "Therapeutic Intervention yes, treatment indicated   PT Diagnosis decreased independence with all mobility s/p L-TKA   Influenced by the following impairments pain, decreased L-knee ROM and decreased stength   Functional limitations due to impairments bed mobility, transfers, ambulation and stairs   Clinical Presentation Stable/Uncomplicated   Clinical Presentation Rationale clinical judgement; doing fairly well POD #1   Clinical Decision Making (Complexity) Low complexity   Therapy Frequency` 2 times/day   Predicted Duration of Therapy Intervention (days/wks) 3 days   Anticipated Equipment Needs at Discharge (TBD at next level of care (TCU))   Anticipated Discharge Disposition Transitional Care Facility   Risk & Benefits of therapy have been explained Yes   Patient, Family & other staff in agreement with plan of care Yes   [facility] VOSS-PAC TM \"6 Clicks\"   2016, Trustees of [facility], under license to [employer].  All rights reserved.   6 Clicks Short Forms Basic Mobility Inpatient Short Form   [facility] AM-PAC  \"6 Clicks\" V.2 Basic Mobility Inpatient Short Form   1. Turning from your back to your side while in a flat bed without using bedrails? 4 - None   2. Moving from lying on your back to sitting on the side of a flat bed without using bedrails? 3 - A Little   3. Moving to and from a bed to a chair (including a wheelchair)? 3 - A Little   4. Standing up from a chair using your arms (e.g., wheelchair, or bedside chair)? 3 - A Little   5. To walk in hospital room? 3 - A Little   6. Climbing 3-5 steps with a railing? 3 - A Little   Basic Mobility Raw Score (Score out of 24.Lower scores equate to lower levels of function) 19   Total Evaluation Time   Total Evaluation Time (Minutes) 10     "

## 2019-04-04 NOTE — PLAN OF CARE
Discharge Planner PT   Patient plan for discharge: TCU  Current functional status: SBA for bed mobility and transfers using PUW; ambulated 25 feet at SBA using PUW, slow but steady pace; initiated L-TKA exercises this AM as well   Barriers to return to prior living situation: lives alone, pain, stairs  Recommendations for discharge: TCU  Rationale for recommendations: pt well below functional mobility baseline and lives alone in a home with stairs; pt will benefit from skilled PT at rehab to increase L-knee ROM and strength and to continue to address gait and transfers to maximize safety and functional independence prior to returning home       Entered by: Sania Camp 04/04/2019 9:44 AM

## 2019-04-04 NOTE — PROGRESS NOTES
"[facility] Orthopaedics Progress Note      Post-operative Day: 1 Day Post-Op    Procedure(s):  Left Total Knee Arthroplasty      Subjective:    Pain: moderate  aching to L knee. Denies shooting pain, parasthesias, numbness and weakness.   denies Chest pain, SOB, O2 required: None  denies nausea/ emesis  denies lightheadedness, dizziness and weakness  BM -, passing gas +. Urinating well, Cid in place: no.       Objective:  Blood pressure 118/61, pulse 64, temperature 98.2  F (36.8  C), temperature source Oral, resp. rate 16, height 1.626 m (5' 4.02\"), weight 103 kg (227 lb 0 oz), SpO2 94 %, not currently breastfeeding.    Patient Vitals for the past 24 hrs:   BP Temp Temp src Pulse Heart Rate Resp SpO2   04/04/19 0747 118/61 98.2  F (36.8  C) Oral 64 -- 16 94 %   04/04/19 0409 138/62 97.4  F (36.3  C) Oral 58 -- 16 92 %   04/03/19 2243 147/68 96.8  F (36  C) Oral 75 -- 16 94 %   04/03/19 2100 137/66 -- -- 59 -- -- 97 %   04/03/19 2045 125/63 -- -- 76 -- -- 95 %   04/03/19 2032 128/56 -- -- 61 -- -- 96 %   04/03/19 2025 133/63 -- -- 63 -- -- 92 %   04/03/19 2007 133/65 -- -- 66 -- -- 95 %   04/03/19 1950 133/66 -- -- 71 -- 16 94 %   04/03/19 1856 174/70 97.7  F (36.5  C) Oral 65 -- 18 95 %   04/03/19 1714 160/78 -- -- 85 -- 18 97 %   04/03/19 1619 159/74 97.6  F (36.4  C) Oral 95 -- 18 95 %   04/03/19 1525 145/73 97.4  F (36.3  C) Oral -- 85 16 97 %   04/03/19 1515 153/80 -- -- -- 93 16 96 %   04/03/19 1500 157/77 -- -- -- 94 16 97 %   04/03/19 1453 -- -- -- -- -- -- 98 %   04/03/19 1445 153/77 -- -- -- 97 16 100 %   04/03/19 1443 -- -- -- -- -- -- 100 %   04/03/19 1430 141/74 -- -- -- 105 16 99 %   04/03/19 1415 155/72 97.7  F (36.5  C) Oral -- 107 16 --       Wt Readings from Last 4 Encounters:   04/03/19 103 kg (227 lb 0 oz)   03/20/19 103 kg (227 lb)   02/13/19 107.5 kg (237 lb)   02/05/19 106.6 kg (235 lb)     General: Alert and orientated. No apparent distress. Non-labored breathing. Appropriate affect. "   MSK: L Knee: dressing Clean, dry, and intact. Skin intact, no ecchymosis, no erythema. nontender to palpation to incision site. ROM appropriate for post op. Distal neurovascularly intact. Compartments soft and non-tender. No calf pain. Homans negative. PF/DF and EHL intact.       Pertinent Labs   Lab Results: personally reviewed.     Recent Labs   Lab Test 04/04/19  0533 03/20/19  1135 11/06/18  0900 12/12/17  0912  06/09/16  1345  11/13/13  0615 11/12/13  1145   HGB 9.7* 12.7  --  12.6  --  12.5   < > 9.9* 10.5*   HCT  --  38.3  --  38.4  --  38.2   < > 31.2* 33.3*   MCV  --  91  --  93  --  90   < > 92 91    279  --  275  --  304   < > 411 440   NA  --  137 136 139   < > 137   < > 134 133   CRP  --   --   --   --   --   --   --  86.5* 168.6*    < > = values in this interval not displayed.         Procedure(s):  Left Total Knee Arthroplasty  Plan: Anticoagulation protocol: Lovenox inpatient and then  mg daily at discharge  x 42  days            Pain medications:  tramadol and tylenol            Weight bearing status:  WBAT            Dressing Change:  Aquacel dressing to be left intact until follow up.            Disposition:  TCU in 1-2 days             Follow up: 2 week with TANIA            Continue cares and rehabilitation     Report completed by:  Trina Oscar PA-C  Date: 4/4/2019  Time: 12:38 PM

## 2019-04-04 NOTE — PLAN OF CARE
+ CMS Left LE  Patient instructed on IS with proper return demo  Encouraged patient to do 10x/hr  Ice to left knee area  Cid patent,decreased U/O, encouraged oral fluids  IV patent @ 100/hr  Hemovac drainage bright red, bloody  Patient refused Blood sugar checks & insulin sliding scale    Patient up to side of bed & dangled  Patient stood with walker & STAND BY ASSIST   Patient took a few steps forward & backwards  Presently pain/discomfort controlled with Ultram

## 2019-04-04 NOTE — PLAN OF CARE
Discharge Planner OT   Patient plan for discharge: TCU    Current status: Eval complete and treatment initiated. Pt up with SBA and FWW. Able to complete toilet transfer using grab bars and lower body dressing with SBA. Educated pt on safe car transfer techniques, verbalizes understanding.     Barriers to return to prior living situation: Lives alone, would have difficulty with IADLs at this time.     Recommendations for discharge: TCU     Rationale for recommendations: To increase independence/safety with ADLs/IADLs.          Entered by: Nilda Hilton 04/04/2019 12:03 PM

## 2019-04-04 NOTE — PLAN OF CARE
[place] [place]    Patient admitted to room 2303 at approximately 1620 via cart from surgery. Patient was accompanied by transport tech and daughter.     Verbal SBAR report received from Greg LAROSE prior to patient arrival.     Patient trasferred to bed via air audra. Patient alert and oriented X 3. The patient is not having any pain.  . Admission vital signs: Blood pressure 147/68, pulse 75, temperature 96.8  F (36  C), temperature source Oral, resp. rate 16, height 1.626 m (5' 4.02\"), weight 103 kg (227 lb 0 oz), SpO2 94 %, not currently breastfeeding. Patient was oriented to plan of care, call light, bed controls, tv, telephone, bathroom and visiting hours.     Risk Assessment    The following safety risks were identified during admission: fall. Yellow risk band applied: YES.     Skin Initial Assessment    This writer admitted this patient and completed a full skin assessment and Christian score in the Adult PCS flowsheet. Appropriate interventions initiated as needed.     Secondary skin check not completed; patient refused.    Skin unremarkable, no open areas.     [facility]    "

## 2019-04-04 NOTE — PROGRESS NOTES
" 04/04/19 1000   Quick Adds   Type of Visit Initial Occupational Therapy Evaluation   Living Environment   Lives With alone   Living Arrangements house   Home Accessibility stairs to enter home   Number of Stairs, Main Entrance 5   Living Environment Comment Pt has tub/shower combo with grab bars and extended tub bench. Has RTS and grab bars   Self-Care   Usual Activity Tolerance moderate   Current Activity Tolerance fair   Regular Exercise No   Equipment Currently Used at Home cane, straight;raised toilet;tub bench;grab bar, toilet;grab bar, tub/shower   Functional Level   Ambulation 1-->assistive equipment  (occasionally uses a SEC)   Transferring 0-->independent   Toileting 1-->assistive equipment   Bathing 1-->assistive equipment   Dressing 0-->independent   Eating 0-->independent   Communication 0-->understands/communicates without difficulty   Swallowing 0-->swallows foods/liquids without difficulty   Cognition 0 - no cognition issues reported   Fall history within last six months no   Which of the above functional risks had a recent onset or change? ambulation;transferring;bathing;dressing   General Information   Onset of Illness/Injury or Date of Surgery - Date 04/03/19   Referring Physician Ousmane Mcgowan MD   Patient/Family Goals Statement To go to TCU prior to return home.    Additional Occupational Profile Info/Pertinent History of Current Problem s/p L TKA   Weight-Bearing Status - LLE weight-bearing as tolerated   Cognitive Status Examination   Orientation orientation to person, place and time   Level of Consciousness alert   Pain Assessment   Patient Currently in Pain Yes, see Vital Sign flowsheet  (\"4/10\")   Transfer Skill: Sit to Stand   Level of [place]: Sit/Stand contact guard   Physical Assist/Nonphysical Assist: Sit/Stand 1 person assist   Transfer Skill: Sit to Stand weight-bearing as tolerated   Assistive Device for Transfer: Sit/Stand standard walker   Transfer Skill: Toilet Transfer " "  Level of [place]: Toilet stand-by assist   Physical Assist/Nonphysical Assist: Toilet 1 person assist   Weight-Bearing Restrictions: Toilet weight-bearing as tolerated   Assistive Device rolling walker;grab bars   Upper Body Dressing   Level of [place]: Dress Upper Body independent   Lower Body Dressing   Level of [place]: Dress Lower Body stand-by assist   Physical Assist/Nonphysical Assist: Dress Lower Body 1 person assist   Instrumental Activities of Daily Living (IADL)   Previous Responsibilities meal prep;housekeeping;laundry;shopping   IADL Comments States will have some help upon return home from daughters. daughters will assist with laundry as wash/drying is in the basement.    Activities of Daily Living Analysis   Impairments Contributing to Impaired Activities of Daily Living post surgical precautions   General Therapy Interventions   Planned Therapy Interventions ADL retraining   Clinical Impression   Criteria for Skilled Therapeutic Interventions Met yes, treatment indicated   OT Diagnosis decreased independence with ADLs   Influenced by the following impairments decreased ROM in knee, pain   Assessment of Occupational Performance 3-5 Performance Deficits   Identified Performance Deficits dressing, bathing, home management tasks, functional mobility    Clinical Decision Making (Complexity) Low complexity   Therapy Frequency daily   Predicted Duration of Therapy Intervention (days/wks) 2-3 days   Anticipated Discharge Disposition Transitional Care Facility   Risks and Benefits of Treatment have been explained. Yes   Patient, Family & other staff in agreement with plan of care Yes   [facility] AM-PAC  \"6 Clicks\" Daily Activity Inpatient Short Form   1. Putting on and taking off regular lower body clothing? 3 - A Little   2. Bathing (including washing, rinsing, drying)? 3 - A Little   3. Toileting, which includes using toilet, bedpan or urinal? 3 - A Little   4. Putting on and taking " off regular upper body clothing? 4 - None   5. Taking care of personal grooming such as brushing teeth? 4 - None   6. Eating meals? 4 - None   Daily Activity Raw Score (Score out of 24.Lower scores equate to lower levels of function) 21   Total Evaluation Time   Total Evaluation Time (Minutes) 6

## 2019-04-05 ENCOUNTER — APPOINTMENT (OUTPATIENT)
Dept: PHYSICAL THERAPY | Facility: CLINIC | Age: 84
DRG: 470 | End: 2019-04-05
Attending: ORTHOPAEDIC SURGERY
Payer: MEDICARE

## 2019-04-05 ENCOUNTER — APPOINTMENT (OUTPATIENT)
Dept: OCCUPATIONAL THERAPY | Facility: CLINIC | Age: 84
DRG: 470 | End: 2019-04-05
Attending: ORTHOPAEDIC SURGERY
Payer: MEDICARE

## 2019-04-05 LAB
GLUCOSE BLDC GLUCOMTR-MCNC: 134 MG/DL (ref 70–99)
HGB BLD-MCNC: 9.7 G/DL (ref 11.7–15.7)

## 2019-04-05 PROCEDURE — 25000128 H RX IP 250 OP 636: Performed by: ORTHOPAEDIC SURGERY

## 2019-04-05 PROCEDURE — 12000000 ZZH R&B MED SURG/OB

## 2019-04-05 PROCEDURE — 25000132 ZZH RX MED GY IP 250 OP 250 PS 637: Mod: GY | Performed by: PHYSICIAN ASSISTANT

## 2019-04-05 PROCEDURE — 00000146 ZZHCL STATISTIC GLUCOSE BY METER IP

## 2019-04-05 PROCEDURE — 36415 COLL VENOUS BLD VENIPUNCTURE: CPT | Performed by: ORTHOPAEDIC SURGERY

## 2019-04-05 PROCEDURE — 97110 THERAPEUTIC EXERCISES: CPT | Mod: GP

## 2019-04-05 PROCEDURE — 97535 SELF CARE MNGMENT TRAINING: CPT | Mod: GO

## 2019-04-05 PROCEDURE — 25000131 ZZH RX MED GY IP 250 OP 636 PS 637: Mod: GY | Performed by: ORTHOPAEDIC SURGERY

## 2019-04-05 PROCEDURE — 25000132 ZZH RX MED GY IP 250 OP 250 PS 637: Mod: GY | Performed by: ORTHOPAEDIC SURGERY

## 2019-04-05 PROCEDURE — A9270 NON-COVERED ITEM OR SERVICE: HCPCS | Mod: GY | Performed by: PHYSICIAN ASSISTANT

## 2019-04-05 PROCEDURE — 99232 SBSQ HOSP IP/OBS MODERATE 35: CPT | Performed by: PHYSICIAN ASSISTANT

## 2019-04-05 PROCEDURE — A9270 NON-COVERED ITEM OR SERVICE: HCPCS | Mod: GY | Performed by: ORTHOPAEDIC SURGERY

## 2019-04-05 PROCEDURE — 97116 GAIT TRAINING THERAPY: CPT | Mod: GP

## 2019-04-05 PROCEDURE — 85018 HEMOGLOBIN: CPT | Performed by: ORTHOPAEDIC SURGERY

## 2019-04-05 RX ADMIN — ACETAMINOPHEN 975 MG: 325 TABLET, FILM COATED ORAL at 16:31

## 2019-04-05 RX ADMIN — ATORVASTATIN CALCIUM 10 MG: 10 TABLET, FILM COATED ORAL at 20:16

## 2019-04-05 RX ADMIN — PANTOPRAZOLE SODIUM 40 MG: 40 TABLET, DELAYED RELEASE ORAL at 06:31

## 2019-04-05 RX ADMIN — ENOXAPARIN SODIUM 40 MG: 40 INJECTION SUBCUTANEOUS at 11:57

## 2019-04-05 RX ADMIN — SENNOSIDES AND DOCUSATE SODIUM 2 TABLET: 8.6; 5 TABLET ORAL at 20:16

## 2019-04-05 RX ADMIN — RANITIDINE 150 MG: 150 TABLET ORAL at 05:17

## 2019-04-05 RX ADMIN — TRAMADOL HYDROCHLORIDE 50 MG: 50 TABLET, FILM COATED ORAL at 05:17

## 2019-04-05 RX ADMIN — ACETAMINOPHEN 975 MG: 325 TABLET, FILM COATED ORAL at 23:59

## 2019-04-05 RX ADMIN — TRAMADOL HYDROCHLORIDE 50 MG: 50 TABLET, FILM COATED ORAL at 15:40

## 2019-04-05 RX ADMIN — TRAMADOL HYDROCHLORIDE 50 MG: 50 TABLET, FILM COATED ORAL at 10:22

## 2019-04-05 RX ADMIN — RANITIDINE 150 MG: 150 TABLET ORAL at 16:31

## 2019-04-05 RX ADMIN — ATENOLOL 25 MG: 25 TABLET ORAL at 20:16

## 2019-04-05 RX ADMIN — TRAMADOL HYDROCHLORIDE 50 MG: 50 TABLET, FILM COATED ORAL at 01:24

## 2019-04-05 RX ADMIN — ONDANSETRON 4 MG: 4 TABLET, ORALLY DISINTEGRATING ORAL at 21:43

## 2019-04-05 RX ADMIN — ACETAMINOPHEN 975 MG: 325 TABLET, FILM COATED ORAL at 08:41

## 2019-04-05 RX ADMIN — SENNOSIDES AND DOCUSATE SODIUM 1 TABLET: 8.6; 5 TABLET ORAL at 08:41

## 2019-04-05 RX ADMIN — GABAPENTIN 300 MG: 300 CAPSULE ORAL at 20:16

## 2019-04-05 RX ADMIN — GABAPENTIN 300 MG: 300 CAPSULE ORAL at 08:41

## 2019-04-05 RX ADMIN — LISINOPRIL 5 MG: 5 TABLET ORAL at 08:41

## 2019-04-05 RX ADMIN — ONDANSETRON 4 MG: 4 TABLET, ORALLY DISINTEGRATING ORAL at 11:57

## 2019-04-05 RX ADMIN — TRAMADOL HYDROCHLORIDE 50 MG: 50 TABLET, FILM COATED ORAL at 22:43

## 2019-04-05 RX ADMIN — ACETAMINOPHEN 975 MG: 325 TABLET, FILM COATED ORAL at 00:19

## 2019-04-05 ASSESSMENT — ACTIVITIES OF DAILY LIVING (ADL)
ADLS_ACUITY_SCORE: 15

## 2019-04-05 NOTE — PLAN OF CARE
Patient A & O. Up with assist of one and walker. Aquacel dressing C/D/I. Tramadol for pain Q4. Ice pack applied. CMS intact.

## 2019-04-05 NOTE — PLAN OF CARE
"Pt alert, oriented, SBA with walker. Dressing to L knee is CDI. Ice to site. Prn tramadol given for pain. CMS +. LS clear. IV saline locked. On RA. /68 (BP Location: Left arm)   Pulse 70   Temp 98.4  F (36.9  C) (Oral)   Resp 16   Ht 1.626 m (5' 4.02\")   Wt 103 kg (227 lb 0 oz)   SpO2 96%   BMI 38.95 kg/m      "

## 2019-04-05 NOTE — PLAN OF CARE
"Discharge Planner OT   Patient plan for discharge: TCU    Current status: SBA for supine to sit and to ambulate to/from bathroom using FWW. Stands at sink x3 min to wash hands- pt declines further standing at this time d/t pain and fatigue. Increased pain to \"8/10\" during activity.     Barriers to return to prior living situation: assist level, lives alone    Recommendations for discharge: TCU    Rationale for recommendations: to increase independence and maximize safety with ADLs/IADLs.     Occupational Therapy Discharge Summary    Reason for therapy discharge:    All goals and outcomes met, no further needs identified.    Progress towards therapy goal(s). See goals on Care Plan in [facility] electronic health record for goal details.  Goals met. ADL goals met. Appropriate to initiate/progress independence with shower and IADLs within TCU setting.     Therapy recommendation(s):    TCU to increase independence/safety with ADLs/IADLs and related mobility             Entered by: Nilda Hilton 04/05/2019 11:58 AM       "

## 2019-04-05 NOTE — PROGRESS NOTES
[facility] Medicine Progress Note  Date of Service: 04/05/2019    Assessment & Plan   Theodora Meng is a 84 year old female who presented on 4/3/2019 for scheduled Procedure(s):  Left Total Knee Arthroplasty by Ousmane Mcgowan MD and is being followed by the hospital medicine service for co-management of acute and/or chronic perioperative medical problems.    Left knee DJD  S/p Procedure(s):  Left Total Knee Arthroplasty on 03-Apr-19  2 Days Post-Op    - pain control, wound cares, physical therapy, occupational therapy and DVT prophylaxis per orthopedic surgery service    Post-op nausea  New nausea reported today after taking pain meds. No vomiting. Likely related to medications.  - Prn antiemetics available    Essential hypertension  Pressures reviewed, stable. Managed prior to admission with atenolol 25 mg daily, and lisinopril 5 mg daily.   - Continue prior to admission atenolol and lisinopril with holding parameters    Hyperlipidemia LDL goal <100  Managed prior to admission with Lipitor 10 mg daily, continue.    Type 2 diabetes, HbA1C goal < 8%  Patient does not routinely check her glucose at home, is refusing most glucose checks in the hospital. Few checks patient allows have been between 140-163. Not taking any diabetes meds prior to admission.  - Has medium sliding scale insulin available, but patient is refusing  - Glucose checks per protocol as patient allows  - Hyper/hypoglycemia protocol    Neuralgia  Chronic and stable. Managed prior to admission with gabapentin 300 mg bid, continue.    Esophageal reflux  Managed prior to admission with Nexium 40 mg daily.  - Changed to Protonix 40 mg daily per formulary    Anxiety  Chronic and stable. Managed prior to admission with prn Ativan, continue.      DVT Prophylaxis: as per orthopedic surgery service - PCD, Enoxaparin (Lovenox) subcutaneous with transition to aspirin upon discharge     Code Status: Full  Code    Lines: Peripheral   Cid catheter: Still in after surgery with plans to remove later this morning    Discussion: Medically, the patient appears well other than nausea.    Disposition: Anticipate discharge tomorrow to TCU. No barrier to discharge from internal medicine standpoint.    Attestation:  I have reviewed today's vital signs, notes, medications, labs and imaging.    Juana Gillis PA-C  [place] [facility]  Pager: [phone]       Interval History   Patient feeling worse today compared to yesterday. Pain rated 8/10 after therapies, but tolerable when sitting. Denies numbness or tingling.     Has new nausea today after taking pain meds. Requests crackers and 7-Up. However, is still tolerating oral intake, denies abdominal pain, vomiting. No bowel movement since surgery, but passing flatus. Urinating without problem.    Denies chest pain, palpitations, cough, wheeze, shortness of breath, headache, vision changes, dizziness, or other complaints.    Physical Exam   Temp:  [97.3  F (36.3  C)-98.4  F (36.9  C)] 97.3  F (36.3  C)  Pulse:  [70-75] 71  Heart Rate:  [74] 74  Resp:  [16] 16  BP: (129-139)/(56-68) 136/61  SpO2:  [94 %-96 %] 95 %    Weights:   Vitals:    04/03/19 1044   Weight: 103 kg (227 lb 0 oz)    Body mass index is 38.95 kg/m .    General appearance: Awake, alert, and in no apparent distress. Pleasant and conversational, speaking in full sentences.  CV: Regular rhythm & rate, no murmurs. Bilateral non-pitting edema. Peripheral pulses intact.  Respiratory: Moving air well bilaterally, no wheezing, crackles, or rhonchi.  GI: Non-distended, soft, nontender to palpation. No rebound or guarding. Normoactive bowel sounds.  Skin: Warm, dry, no rashes or ecchymoses. No mottling of skin. Left knee with dressing intact, clean and dry.  Musculoskeletal / extremities: Moves all extremities equally, no obvious abnormalities.  Distal CMS intact  Neurologic: No focal deficits.    Data    Recent Labs   Lab 04/05/19  0533 04/04/19  0533   HGB 9.7* 9.7*   PLT  --  221   CR  --  0.75       Recent Labs   Lab 04/04/19  1710 04/03/19  1501   * 142*        Unresulted Labs Ordered in the Past 30 Days of this Admission     No orders found from 2/2/2019 to 4/4/2019.           Imaging  No results found for this or any previous visit (from the past 24 hour(s)).     I reviewed all new labs and imaging results over the last 24 hours. I personally reviewed no images or EKG's today.    Medications     sodium chloride 100 mL/hr at 04/04/19 0600       acetaminophen  975 mg Oral Q8H     atenolol  25 mg Oral Daily     atorvastatin  10 mg Oral Daily     enoxaparin  40 mg Subcutaneous Q24H     gabapentin  300 mg Oral BID     insulin aspart  1-7 Units Subcutaneous TID AC     insulin aspart  1-5 Units Subcutaneous At Bedtime     lisinopril  5 mg Oral Daily     pantoprazole  40 mg Oral QAM AC     ranitidine  150 mg Oral Q12H     senna-docusate  1 tablet Oral BID    Or     senna-docusate  2 tablet Oral BID     sodium chloride (PF)  3 mL Intracatheter Q8H       Juana Gillis PA-C  [facility] Hospitalist Service  Pager: [phone]

## 2019-04-05 NOTE — PROGRESS NOTES
"[facility] Orthopaedics Progress Note      Post-operative Day: 2 Days Post-Op    Procedure(s):  Left Total Knee Arthroplasty      Subjective:    Pain: moderate  Aching to L knee. Denies shooting pain, parasthesias, numbness and weakness.   denies Chest pain, SOB, O2 required: None  denies nausea/ emesis  denies lightheadedness, dizziness and weakness  BM -, passing gas +. Urinating well, Cid in place: no.   Patient reporting nausea today. Will medicate with Zofran and if able, reduce tramadol to q5h PRN      Objective:  Blood pressure 137/63, pulse 75, temperature 98.1  F (36.7  C), temperature source Oral, resp. rate 16, height 1.626 m (5' 4.02\"), weight 103 kg (227 lb 0 oz), SpO2 94 %, not currently breastfeeding.    Patient Vitals for the past 24 hrs:   BP Temp Temp src Pulse Heart Rate Resp SpO2   04/05/19 0722 137/63 98.1  F (36.7  C) Oral 75 -- 16 94 %   04/05/19 0025 139/68 98.4  F (36.9  C) Oral -- 74 16 96 %   04/04/19 1630 129/56 97.5  F (36.4  C) Oral 70 -- 16 96 %       Wt Readings from Last 4 Encounters:   04/03/19 103 kg (227 lb 0 oz)   03/20/19 103 kg (227 lb)   02/13/19 107.5 kg (237 lb)   02/05/19 106.6 kg (235 lb)     General: Alert and orientated. No apparent distress. Non-labored breathing. Appropriate affect.   MSK: L knee: dressing Clean, dry, and intact. Skin intact, no ecchymosis, no erythema. nontender to palpation to incision site. ROM appropriate for post op. Distal neurovascularly intact. Compartments soft and non-tender. No calf pain. Homans negative. PF/DF and EHL intact.       Pertinent Labs   Lab Results: personally reviewed.     Recent Labs   Lab Test 04/05/19  0533 04/04/19  0533 03/20/19  1135 11/06/18  0900 12/12/17  0912  06/09/16  1345  11/13/13  0615 11/12/13  1145   HGB 9.7* 9.7* 12.7  --  12.6  --  12.5   < > 9.9* 10.5*   HCT  --   --  38.3  --  38.4  --  38.2   < > 31.2* 33.3*   MCV  --   --  91  --  93  --  90   < > 92 91   PLT  --  221 279  --  275  --  304   < > 411 " 440   NA  --   --  137 136 139   < > 137   < > 134 133   CRP  --   --   --   --   --   --   --   --  86.5* 168.6*    < > = values in this interval not displayed.         Procedure(s):  Left Total Knee Arthroplasty  Plan: Anticoagulation protocol: Lovenox inpatient and then  mg daily at discharge  x 42  days            Pain medications:  tramadol and tylenol            Weight bearing status:  WBAT            Dressing Change:  Aquacel dressing to be left intact until follow up.            Disposition:  TCU tomorrow             Follow up: 2 week with TANIA            Continue cares and rehabilitation     Report completed by:  Trina Oscar PA-C  Date: 4/5/2019  Time: 12:00 PM

## 2019-04-05 NOTE — PROGRESS NOTES
Care Transitions Discharge:    Name: Theodora Meng    MRN: [id number]    Reason for Hospitalization: left knee degenerative joint disease  Left knee DJD    Cognitive/Behavioral Status: awake, alert and oriented    Follow-up Appointments:   Future Appointments   Date Time Provider Department Center   4/5/2019  1:00 PM Ju Pineda PT [facility]       Discharge Date:  4/6/19    Patient/Care Partner in agreement and understands the discharge plan:  Yes    Discharge Disposition:  [facility] TCU (Phone: [phone] Admissions: [phone] Fax: [phone]). Family will provide transportation 4/6/19 at 1400.    Discharge Planner   Discharge Plans in progress: TCU  Barriers to discharge plan: None  Follow up plan: Follow up with orthopedics.       Entered by: Stacey Robison 04/05/2019 11:36 AM         Stacey Robison RN Care Coordinator  [phone]

## 2019-04-06 ENCOUNTER — APPOINTMENT (OUTPATIENT)
Dept: PHYSICAL THERAPY | Facility: CLINIC | Age: 84
DRG: 470 | End: 2019-04-06
Attending: ORTHOPAEDIC SURGERY
Payer: MEDICARE

## 2019-04-06 VITALS
DIASTOLIC BLOOD PRESSURE: 75 MMHG | OXYGEN SATURATION: 93 % | BODY MASS INDEX: 38.76 KG/M2 | HEART RATE: 72 BPM | TEMPERATURE: 98.4 F | WEIGHT: 227 LBS | SYSTOLIC BLOOD PRESSURE: 140 MMHG | HEIGHT: 64 IN | RESPIRATION RATE: 18 BRPM

## 2019-04-06 PROCEDURE — 25000132 ZZH RX MED GY IP 250 OP 250 PS 637: Mod: GY | Performed by: ORTHOPAEDIC SURGERY

## 2019-04-06 PROCEDURE — A9270 NON-COVERED ITEM OR SERVICE: HCPCS | Mod: GY | Performed by: ORTHOPAEDIC SURGERY

## 2019-04-06 PROCEDURE — A9270 NON-COVERED ITEM OR SERVICE: HCPCS | Mod: GY | Performed by: PHYSICIAN ASSISTANT

## 2019-04-06 PROCEDURE — 25000128 H RX IP 250 OP 636: Performed by: ORTHOPAEDIC SURGERY

## 2019-04-06 PROCEDURE — 97116 GAIT TRAINING THERAPY: CPT | Mod: GP

## 2019-04-06 PROCEDURE — 25000132 ZZH RX MED GY IP 250 OP 250 PS 637: Mod: GY | Performed by: PHYSICIAN ASSISTANT

## 2019-04-06 PROCEDURE — 25000131 ZZH RX MED GY IP 250 OP 636 PS 637: Mod: GY | Performed by: ORTHOPAEDIC SURGERY

## 2019-04-06 PROCEDURE — 97110 THERAPEUTIC EXERCISES: CPT | Mod: GP

## 2019-04-06 RX ORDER — ONDANSETRON 4 MG/1
4 TABLET, FILM COATED ORAL EVERY 8 HOURS PRN
Qty: 20 TABLET | Refills: 0
Start: 2019-04-06 | End: 2019-06-20

## 2019-04-06 RX ORDER — SENNOSIDES 8.6 MG
1-2 TABLET ORAL 2 TIMES DAILY PRN
Qty: 30 TABLET | Refills: 1
Start: 2019-04-06 | End: 2019-06-20

## 2019-04-06 RX ORDER — LORAZEPAM 0.5 MG/1
0.5 TABLET ORAL EVERY 8 HOURS PRN
Qty: 10 TABLET | Refills: 0 | Status: SHIPPED | OUTPATIENT
Start: 2019-04-06 | End: 2019-04-08

## 2019-04-06 RX ORDER — TRAMADOL HYDROCHLORIDE 50 MG/1
25-50 TABLET ORAL EVERY 4 HOURS PRN
Qty: 30 TABLET | Refills: 0 | Status: SHIPPED | OUTPATIENT
Start: 2019-04-06 | End: 2019-04-15

## 2019-04-06 RX ORDER — UREA 10 %
500 LOTION (ML) TOPICAL DAILY
Status: DISCONTINUED | OUTPATIENT
Start: 2019-04-06 | End: 2019-04-06 | Stop reason: HOSPADM

## 2019-04-06 RX ORDER — LORAZEPAM 0.5 MG/1
0.5 TABLET ORAL EVERY 8 HOURS PRN
Status: SHIPPED | DISCHARGE
Start: 2019-04-06 | End: 2019-04-06

## 2019-04-06 RX ORDER — ASPIRIN 325 MG
325 TABLET ORAL DAILY
Qty: 42 TABLET | Refills: 0
Start: 2019-04-06 | End: 2019-06-20

## 2019-04-06 RX ADMIN — PANTOPRAZOLE SODIUM 40 MG: 40 TABLET, DELAYED RELEASE ORAL at 05:45

## 2019-04-06 RX ADMIN — LISINOPRIL 5 MG: 5 TABLET ORAL at 09:08

## 2019-04-06 RX ADMIN — GABAPENTIN 300 MG: 300 CAPSULE ORAL at 09:08

## 2019-04-06 RX ADMIN — ONDANSETRON 4 MG: 4 TABLET, ORALLY DISINTEGRATING ORAL at 08:41

## 2019-04-06 RX ADMIN — ENOXAPARIN SODIUM 40 MG: 40 INJECTION SUBCUTANEOUS at 11:30

## 2019-04-06 RX ADMIN — ACETAMINOPHEN 975 MG: 325 TABLET, FILM COATED ORAL at 09:08

## 2019-04-06 RX ADMIN — TRAMADOL HYDROCHLORIDE 50 MG: 50 TABLET, FILM COATED ORAL at 11:27

## 2019-04-06 RX ADMIN — RANITIDINE 150 MG: 150 TABLET ORAL at 05:45

## 2019-04-06 RX ADMIN — SENNOSIDES AND DOCUSATE SODIUM 2 TABLET: 8.6; 5 TABLET ORAL at 09:08

## 2019-04-06 RX ADMIN — TRAMADOL HYDROCHLORIDE 50 MG: 50 TABLET, FILM COATED ORAL at 05:43

## 2019-04-06 ASSESSMENT — ACTIVITIES OF DAILY LIVING (ADL)
ADLS_ACUITY_SCORE: 16
ADLS_ACUITY_SCORE: 15

## 2019-04-06 NOTE — PLAN OF CARE
[facility] DISCHARGE NOTE    Patient discharged to transitional care unit at 1:50 PM via wheel chair. Accompanied by daughter and staff. Discharge instructions reviewed with patient, opportunity offered to ask questions. Prescriptions sent to patients preferred pharmacy. All belongings sent with patient.    [facility].  No answer and unable to leave voice mail.    Valeria Sharma

## 2019-04-06 NOTE — PLAN OF CARE
Physical Therapy Discharge Summary    Reason for therapy discharge:    Discharged to transitional care facility.    Progress towards therapy goal(s). See goals on Care Plan in [facility] electronic health record for goal details.  Goals partially met.  Barriers to achieving goals:   limited tolerance for therapy.    Therapy recommendation(s):    Continued therapy is recommended.  Rationale/Recommendations:  L knee ROM and strengthening, transfer and gait training.    Ju Pineda PT

## 2019-04-06 NOTE — DISCHARGE SUMMARY
[facility] Orthopedics Discharge Summary                                  [facility]     THOR DOMÍNGUEZ [id number]   Age: 84 year old  PCP: Yecenia Viera, [phone] 1935     Date of Admission:  4/3/2019  Date of Discharge::  4/6/2019  Discharge Physician:  Toro Boothe    Code status:  Full Code    Admission Information:  Admission Diagnosis:  left knee degenerative joint disease  Left knee DJD    Post-Operative Day: 3 Days Post-Op     Reason for admission:  The patient was admitted for the following:Procedure(s) (LRB):  Left Total Knee Arthroplasty (Left)    Active Problems:    Esophageal reflux    Essential hypertension    Neuralgia    Type 2 diabetes, HbA1C goal < 8% (H)    Anxiety    Hyperlipidemia LDL goal <100    Left knee DJD      Allergies:  Zoloft [sertraline]    Following the procedure noted above the patient was transferred to the post-op floor and started on:    Therapy:  physical therapy and occupational therapy  Anticoagulation Plan: Lovenox inpatient and then  mg daily at discharge  for 42 days  Pain Management: tramadol and tylenol  Weight bearing status: Weight bearing as tolerated     The patient was followed and co-managed by the hospitalist service during the inpatient treatment course  Complications:  None  Consultations:  Hospitalist service for medical co-management     Pertinent Labs   Lab Results: personally reviewed.     Recent Labs   Lab Test 04/05/19  0533 04/04/19  0533 03/20/19  1135 11/06/18  0900 12/12/17  0912  06/09/16  1345  11/13/13  0615 11/12/13  1145   HGB 9.7* 9.7* 12.7  --  12.6  --  12.5   < > 9.9* 10.5*   HCT  --   --  38.3  --  38.4  --  38.2   < > 31.2* 33.3*   MCV  --   --  91  --  93  --  90   < > 92 91   PLT  --  221 279  --  275  --  304   < > 411 440   NA  --   --  137 136 139   < > 137   < > 134 133   CRP  --   --   --   --   --   --   --   --  86.5* 168.6*    < > = values in this interval not displayed.          Discharge  Information:  Condition at discharge: Stable  Discharge destination:  Discharged to TCU at [facility]     Medications at discharge:  Current Discharge Medication List      START taking these medications    Details   aspirin (ASA) 325 MG tablet Take 1 tablet (325 mg) by mouth daily  Qty: 42 tablet, Refills: 0    Associated Diagnoses: Primary osteoarthritis of left knee      ondansetron (ZOFRAN) 4 MG tablet Take 1 tablet (4 mg) by mouth every 8 hours as needed for nausea or vomiting  Qty: 20 tablet, Refills: 0    Associated Diagnoses: Primary osteoarthritis of left knee      sennosides (SENOKOT) 8.6 MG tablet Take 1-2 tablets by mouth 2 times daily as needed for constipation  Qty: 30 tablet, Refills: 1    Associated Diagnoses: Primary osteoarthritis of left knee      traMADol (ULTRAM) 50 MG tablet Take 0.5-1 tablets (25-50 mg) by mouth every 4 hours as needed for severe pain  Qty: 30 tablet, Refills: 0    Associated Diagnoses: Primary osteoarthritis of left knee         CONTINUE these medications which have NOT CHANGED    Details   acetaminophen (TYLENOL) 325 MG tablet Take 2 tablets (650 mg) by mouth every 4 hours as needed for other (mild pain)  Refills: 0    Associated Diagnoses: Endometrial polyp; Submucous uterine fibroid      atenolol (TENORMIN) 25 MG tablet Take 1 tablet (25 mg) by mouth daily  Qty: 90 tablet, Refills: 4    Associated Diagnoses: Essential hypertension with goal blood pressure less than 140/90      atorvastatin (LIPITOR) 10 MG tablet Take 1 tablet (10 mg) by mouth daily  Qty: 90 tablet, Refills: 3    Associated Diagnoses: Hyperlipidemia LDL goal <100      esomeprazole (NEXIUM) 40 MG CR capsule Take 1 capsule (40 mg) by mouth every morning (before breakfast)  Qty: 90 capsule, Refills: 4    Associated Diagnoses: Gastroesophageal reflux disease without esophagitis      gabapentin (NEURONTIN) 300 MG capsule Take 2 tabs in the evening.  Qty: 180 capsule, Refills: 3    Associated Diagnoses:  Neuralgia      lisinopril (PRINIVIL/ZESTRIL) 5 MG tablet Take 1 tablet (5 mg) by mouth daily  Qty: 90 tablet, Refills: 3    Associated Diagnoses: Essential hypertension with goal blood pressure less than 140/90      LORazepam (ATIVAN) 0.5 MG tablet Take 1 tablet (0.5 mg) by mouth every 8 hours as needed for anxiety, use sparingly.  Qty: 20 tablet, Refills: 1    Associated Diagnoses: Anxiety      blood glucose monitoring (ACCU-CHEK COMPACT CARE KIT) meter device kit Use to test blood sugars 1 times daily or as directed. May sub formulary meter  Qty: 1 kit, Refills: 0    Associated Diagnoses: Type 2 diabetes mellitus without complication, without long-term current use of insulin (H)      blood glucose monitoring (NO BRAND SPECIFIED) test strip Use to test blood sugars twice times daily or as directed  Qty: 1 Box, Refills: 3    Associated Diagnoses: Type 2 diabetes mellitus without complication, without long-term current use of insulin (H)                        Follow-Up Care:  Patient should be seen in the office in 10-14 days by the Orthopedic Surgeon/Physician Assistant.  Call [phone] for appointment or questions.    Toro Boothe

## 2019-04-06 NOTE — PROGRESS NOTES
"[facility] Orthopaedics Progress Note      Post-operative Day: 3 Days Post-Op    Procedure(s):  Left Total Knee Arthroplasty      Subjective:    Pain: Minimal at rest. Controlled with Tramadol  Chest pain, SOB:  No    Tolerating food/fluids. Had nausea yesterday which was treated well with Zofran. Passing flatus, no BM.    Objective:  Blood pressure 140/75, pulse 72, temperature 98.4  F (36.9  C), temperature source Oral, resp. rate 18, height 1.626 m (5' 4.02\"), weight 103 kg (227 lb 0 oz), SpO2 93 %, not currently breastfeeding.    Patient Vitals for the past 24 hrs:   BP Temp Temp src Pulse Resp SpO2   04/06/19 0800 140/75 98.4  F (36.9  C) Oral 72 18 93 %   04/06/19 0003 129/61 99.1  F (37.3  C) Oral 69 18 92 %   04/05/19 2012 125/59 99.2  F (37.3  C) Oral 72 18 93 %   04/05/19 1625 -- -- -- -- 16 --   04/05/19 1530 136/61 97.3  F (36.3  C) Oral 71 16 95 %       Wt Readings from Last 4 Encounters:   04/03/19 103 kg (227 lb 0 oz)   03/20/19 103 kg (227 lb)   02/13/19 107.5 kg (237 lb)   02/05/19 106.6 kg (235 lb)         Motor function, sensation, and circulation intact   Yes  Wound status: Aqcuacel C/D/I  Calf tenderness: Bilateral  No    Pertinent Labs   Lab Results: personally reviewed.     Recent Labs   Lab Test 04/05/19  0533 04/04/19  0533 03/20/19  1135 11/06/18  0900 12/12/17  0912  06/09/16  1345  11/13/13  0615 11/12/13  1145   HGB 9.7* 9.7* 12.7  --  12.6  --  12.5   < > 9.9* 10.5*   HCT  --   --  38.3  --  38.4  --  38.2   < > 31.2* 33.3*   MCV  --   --  91  --  93  --  90   < > 92 91   PLT  --  221 279  --  275  --  304   < > 411 440   NA  --   --  137 136 139   < > 137   < > 134 133   CRP  --   --   --   --   --   --   --   --  86.5* 168.6*    < > = values in this interval not displayed.       Plan: Anticoagulation protocol: Lovenox inpatient and then  mg daily at discharge  x 42  days            Pain medications:  tramadol and tylenol            Weight bearing status:  WBAT            " Disposition:  [facility]             Continue cares and rehabilitation     Report completed by:  Toro Boothe MD  Date: 4/6/2019  Time: 8:55 AM

## 2019-04-06 NOTE — PLAN OF CARE
Taking ultram for pain with good relief during night.  Up to bathroom with 1 assist and walker.  Aquacel without significant drainage, ice to knee during shift.  One bout of nausea overnight, relieved with oral zofran.  Denies nausea at this time.  Patient anticipating discharge to TCU today.

## 2019-04-06 NOTE — PROGRESS NOTES
Verbal permission from patient to give information to daughter, Alexandra, in regards to her stay here at the hospital.  Misti Fisher RN BSN

## 2019-04-08 ENCOUNTER — NURSING HOME VISIT (OUTPATIENT)
Dept: GERIATRICS | Facility: CLINIC | Age: 84
End: 2019-04-08
Payer: COMMERCIAL

## 2019-04-08 VITALS
WEIGHT: 233 LBS | DIASTOLIC BLOOD PRESSURE: 76 MMHG | HEART RATE: 72 BPM | TEMPERATURE: 98.3 F | OXYGEN SATURATION: 88 % | BODY MASS INDEX: 39.97 KG/M2 | SYSTOLIC BLOOD PRESSURE: 146 MMHG | RESPIRATION RATE: 16 BRPM

## 2019-04-08 VITALS
DIASTOLIC BLOOD PRESSURE: 63 MMHG | WEIGHT: 230.6 LBS | SYSTOLIC BLOOD PRESSURE: 143 MMHG | HEART RATE: 80 BPM | RESPIRATION RATE: 12 BRPM | TEMPERATURE: 99.4 F | BODY MASS INDEX: 39.56 KG/M2 | OXYGEN SATURATION: 92 %

## 2019-04-08 DIAGNOSIS — M17.12 PRIMARY OSTEOARTHRITIS OF LEFT KNEE: Primary | ICD-10-CM

## 2019-04-08 DIAGNOSIS — M79.2 NEURALGIA: ICD-10-CM

## 2019-04-08 DIAGNOSIS — I10 ESSENTIAL HYPERTENSION WITH GOAL BLOOD PRESSURE LESS THAN 140/90: ICD-10-CM

## 2019-04-08 DIAGNOSIS — Z96.652 STATUS POST TOTAL LEFT KNEE REPLACEMENT: ICD-10-CM

## 2019-04-08 DIAGNOSIS — F41.9 ANXIETY: ICD-10-CM

## 2019-04-08 DIAGNOSIS — E11.9 TYPE 2 DIABETES MELLITUS WITHOUT COMPLICATION, WITHOUT LONG-TERM CURRENT USE OF INSULIN (H): ICD-10-CM

## 2019-04-08 DIAGNOSIS — K21.9 GASTROESOPHAGEAL REFLUX DISEASE WITHOUT ESOPHAGITIS: ICD-10-CM

## 2019-04-08 PROCEDURE — 99310 SBSQ NF CARE HIGH MDM 45: CPT | Performed by: NURSE PRACTITIONER

## 2019-04-08 RX ORDER — ACETAMINOPHEN 500 MG
1000 TABLET ORAL EVERY 6 HOURS PRN
Status: ON HOLD | COMMUNITY
End: 2023-03-03

## 2019-04-08 RX ORDER — LORAZEPAM 0.5 MG/1
0.5 TABLET ORAL EVERY 8 HOURS PRN
COMMUNITY
Start: 2019-04-08 | End: 2019-06-20

## 2019-04-08 ASSESSMENT — PAIN SCALES - GENERAL: PAINLEVEL: EXTREME PAIN (8)

## 2019-04-08 NOTE — LETTER
4/8/2019        RE: Theodora Meng  [address] [facility] [address]        [facility]  PRIMARY CARE PROVIDER AND CLINIC:  Yecenia Viera MD, [address]  / [facility] [id number]  Chief Complaint   Patient presents with     Hospital F/U     [place] [facility] Record Number:  [id number]  Place of Service where encounter took place:  [facility] ([facility]) [[id number]]    Theodora Meng  is a 84 year old  (1935), admitted to the above facility from  [facility]. Hospital stay April 3, 2019 through April 6, 2019..  Admitted to this facility for  rehab, medical management and nursing care.    HPI:    HPI information obtained from: facility chart records, facility staff, patient report and [facility] chart review.   Brief Summary of Hospital Course: Patient with history of GERD, hypertension, neuralgia, type 2 diabetes, anxiety, hyperlipidemia, and degenerative joint disease of left knee underwent left TKA without complications.  Discharged on postop day 3.  Updates on Status Since Skilled nursing Admission: Since TCU admission, patient has been doing well.  Primary complaint is pain in her knee.  Although it is not out of expected level.  Appetite good sleeping okay other than nursing interruptions.  Denies any calf pain, chest pain, shortness of breath.  Appetite good, eating well.  No concerns with the urine or bowel output.  States she has not been getting her aspirin.  And would like to have her atenolol scheduled at at bedtime which was her norm at home.    CODE STATUS/ADVANCE DIRECTIVES DISCUSSION:   CPR/Full code   Patient's living condition: lives alone  ALLERGIES: Zoloft [sertraline]  PAST MEDICAL HISTORY:  has a past medical history of Depressive disorder, Diabetes (H), Esophageal reflux, Other abnormal glucose, Other premature beats, and Unspecified essential hypertension. She also has no past medical history of Cerebral infarction (H), Malignant  hyperthermia, or PONV (postoperative nausea and vomiting).  PAST SURGICAL HISTORY:   has a past surgical history that includes appendectomy; cholecystectomy, laporoscopic; tonsillectomy; salpingo oophorectomy,r/l/viviane; Arthroplasty knee (10/30/2013); Esophagoscopy, gastroscopy, duodenoscopy (EGD), combined (N/A, 2/17/2016); Dilation and curettage, operative hysteroscopy with morcellator, combined (N/A, 6/20/2016); and Arthroplasty knee (Left, 4/3/2019).  FAMILY HISTORY: family history includes Heart Disease in her brother, brother, brother, father, and mother.  SOCIAL HISTORY:   reports that she has never smoked. She has never used smokeless tobacco. She reports that she does not drink alcohol or use drugs.    Post Discharge Medication Reconciliation Status: discharge medications reconciled and changed, per note/orders (see AVS)    Current Outpatient Medications   Medication Sig Dispense Refill     acetaminophen (TYLENOL) 325 MG tablet Take 2 tablets (650 mg) by mouth every 4 hours as needed for other (mild pain)  0     acetaminophen (TYLENOL) 500 MG tablet Take 1,000 mg by mouth 3 times daily       aspirin (ASA) 325 MG tablet Take 1 tablet (325 mg) by mouth daily 42 tablet 0     atenolol (TENORMIN) 25 MG tablet Take 1 tablet (25 mg) by mouth daily 90 tablet 4     atorvastatin (LIPITOR) 10 MG tablet Take 1 tablet (10 mg) by mouth daily 90 tablet 3     blood glucose monitoring (ACCU-CHEK COMPACT CARE KIT) meter device kit Use to test blood sugars 1 times daily or as directed. May sub formulary meter 1 kit 0     blood glucose monitoring (NO BRAND SPECIFIED) test strip Use to test blood sugars twice times daily or as directed 1 Box 3     esomeprazole (NEXIUM) 40 MG CR capsule Take 1 capsule (40 mg) by mouth every morning (before breakfast) 90 capsule 4     gabapentin (NEURONTIN) 300 MG capsule Take 2 tabs in the evening. 180 capsule 3     lisinopril (PRINIVIL/ZESTRIL) 5 MG tablet Take 1 tablet (5 mg) by mouth daily 90  tablet 3     LORazepam (ATIVAN) 0.5 MG tablet Take 0.5 mg by mouth every 8 hours as needed for anxiety Notify provider of effectiveness at days 10-14       LORazepam (ATIVAN) 0.5 MG tablet Take 1 tablet (0.5 mg) by mouth every 8 hours as needed for anxiety for anxiety, use sparingly. 10 tablet 0     ondansetron (ZOFRAN) 4 MG tablet Take 1 tablet (4 mg) by mouth every 8 hours as needed for nausea or vomiting 20 tablet 0     sennosides (SENOKOT) 8.6 MG tablet Take 1-2 tablets by mouth 2 times daily as needed for constipation 30 tablet 1     traMADol (ULTRAM) 50 MG tablet Take 0.5-1 tablets (25-50 mg) by mouth every 4 hours as needed for severe pain 30 tablet 0       ROS:  10 point ROS of systems including Constitutional, Eyes, Respiratory, Cardiovascular, Gastroenterology, Genitourinary, Integumentary, Musculoskeletal, Psychiatric were all negative except for pertinent positives noted in my HPI.    Vitals:  /76   Pulse 72   Temp 98.3  F (36.8  C)   Resp 16   Wt 105.7 kg (233 lb)   SpO2 (!) 88%   BMI 39.97 kg/m     Exam:  GENERAL APPEARANCE:  Alert, in no distress, Lying in bed resting after therapy  RESP:  lungs clear to auscultation , no respiratory distress  CV:  Palpation and auscultation of heart done , regular rate and rhythm, no murmur, rub, or gallop, Edematous lower extremities.  1+ pitting  ABDOMEN:  no guarding or rebound, bowel sounds normal  M/S:   Generalized weakness, limited range of motion and use of left knee due to pain postop  SKIN:  Postop incision healing well.  Surgical dressing remains intact.  Outline created around blood marking.  Less than 40% of dressing.  Knee mildly swollen mildly warm.  Minimal bruising  NEURO:   NFD  PSYCH:  normal insight, judgement and memory, affect and mood normal    Lab/Diagnostic data:  Recent labs in [facility] reviewed by me today.     ASSESSMENT/PLAN:    Primary osteoarthritis of left knee  Status post total left knee replacement  Recovering well.   Participating well in therapies.  Weightbearing as tolerating.  Had a previous TKA 4 years ago so is aware of the program.  Is to be on aspirin 325 mg daily times 42 days, however had not received it yet.  Discussed with nurse as the only aspirin available at this time was enteric-coated, gave okay to use enteric-coated.  Pain managed with as needed Tylenol and as needed tramadol.  Using tramadol 3-4 times per day.  Discussed establishing baseline of Tylenol patient in agreement.  Also encouraged frequent use of ice.  Patient verbalized understanding.    Essential hypertension with goal blood pressure less than 140/90  Blood pressures have been slightly elevated.  Query related to pain.  We will continue to monitor and make adjustments as indicated.  Currently taking atenolol 25 mg daily, lisinopril 5 mg daily as well as Lipitor 10 mg daily.  Blood pressures:  04/08/2019 10:34 143/63 mmHg  04/07/2019 08:50 146/76 mmHg  04/06/2019 17:53 169/82 mmHg    Type 2 diabetes mellitus without complication, without long-term current use of insulin (H)  Diabetes is diet controlled.  Is on no medications.  Blood sugars have generally been okay, within acceptable range.  Most recent A1c 7.1.  Staff has been checking blood sugars twice daily.  Will decrease to once daily    04/08/2019 07:56 136 mg/dL  04/07/2019 18:44 167 mg/dL  04/07/2019 08:11 135 mg/dL  04/06/2019 18:00 162 mg/dL    Neuralgia  History of neuralgia primarily in head face and jaw.  Managed well with gabapentin 600 mg at at bedtime.  We will continue prior to admission medications.  Has been stable.    Anxiety  Historical.  Happens rarely, but due to surgery, potential there.  Managed well with Lorazepam 0.5 mg every 8 hours as needed.  Noted initial PRN orders for non antipsychotic psychotropic medications   are limited to 14 days. Start new psychotropic medication lorazepam x 14  days for sporadic anxiety. Nsg to update [facility] provider of effect in 10-14 days.        Gastroesophageal reflux disease without esophagitis  Stable, compensated. The current medical regimen is effective;  continue present plan and medications.  Nexium extended release 40 mg daily       transcribed by : Ivett Nails  Orders:  1. Schedule Tylenol 1000 mg TID  2. Give Atenolol with PM/bedtime meds  3. Lorazepam 0.5 mg every 8 hrs PRN x 14 days. Notify PP of effectiveness at days 10-14  4.  OK to reduce frequency of blood sugar checks to once daily    Total time spent with patient visit at the skilled nursing facility was 35 min including patient visit and review of past records. Greater than 50% of total time spent with counseling and coordinating care due to admission  Electronically signed by:  ALEX Vogel CNP                       Sincerely,        ALEX Vogel CNP

## 2019-04-08 NOTE — PROGRESS NOTES
[place] [facility]  PRIMARY CARE PROVIDER AND CLINIC:  Yecenia Viera MD, [address] [facility] / [facility] [address]  Chief Complaint   Patient presents with     Hospital F/U     [place] Medical Record Number:  [id number]  Place of Service where encounter took place:  [facility] ([facility] [[id number]]    Theodora Meng  is a 84 year old  (1935), admitted to the above facility from  [facility]. Hospital stay April 3, 2019 through April 6, 2019..  Admitted to this facility for  rehab, medical management and nursing care.    HPI:    HPI information obtained from: facility chart records, facility staff, patient report and [facility] chart review.   Brief Summary of Hospital Course: Patient with history of GERD, hypertension, neuralgia, type 2 diabetes, anxiety, hyperlipidemia, and degenerative joint disease of left knee underwent left TKA without complications.  Discharged on postop day 3.  Updates on Status Since Skilled nursing Admission: Since TCU admission, patient has been doing well.  Primary complaint is pain in her knee.  Although it is not out of expected level.  Appetite good sleeping okay other than nursing interruptions.  Denies any calf pain, chest pain, shortness of breath.  Appetite good, eating well.  No concerns with the urine or bowel output.  States she has not been getting her aspirin.  And would like to have her atenolol scheduled at at bedtime which was her norm at home.    CODE STATUS/ADVANCE DIRECTIVES DISCUSSION:   CPR/Full code   Patient's living condition: lives alone  ALLERGIES: Zoloft [sertraline]  PAST MEDICAL HISTORY:  has a past medical history of Depressive disorder, Diabetes (H), Esophageal reflux, Other abnormal glucose, Other premature beats, and Unspecified essential hypertension. She also has no past medical history of Cerebral infarction (H), Malignant hyperthermia, or PONV (postoperative nausea and vomiting).  PAST SURGICAL HISTORY:   has a  past surgical history that includes appendectomy; cholecystectomy, laporoscopic; tonsillectomy; salpingo oophorectomy,r/l/viviane; Arthroplasty knee (10/30/2013); Esophagoscopy, gastroscopy, duodenoscopy (EGD), combined (N/A, 2/17/2016); Dilation and curettage, operative hysteroscopy with morcellator, combined (N/A, 6/20/2016); and Arthroplasty knee (Left, 4/3/2019).  FAMILY HISTORY: family history includes Heart Disease in her brother, brother, brother, father, and mother.  SOCIAL HISTORY:   reports that she has never smoked. She has never used smokeless tobacco. She reports that she does not drink alcohol or use drugs.    Post Discharge Medication Reconciliation Status: discharge medications reconciled and changed, per note/orders (see AVS)    Current Outpatient Medications   Medication Sig Dispense Refill     acetaminophen (TYLENOL) 325 MG tablet Take 2 tablets (650 mg) by mouth every 4 hours as needed for other (mild pain)  0     acetaminophen (TYLENOL) 500 MG tablet Take 1,000 mg by mouth 3 times daily       aspirin (ASA) 325 MG tablet Take 1 tablet (325 mg) by mouth daily 42 tablet 0     atenolol (TENORMIN) 25 MG tablet Take 1 tablet (25 mg) by mouth daily 90 tablet 4     atorvastatin (LIPITOR) 10 MG tablet Take 1 tablet (10 mg) by mouth daily 90 tablet 3     blood glucose monitoring (ACCU-CHEK COMPACT CARE KIT) meter device kit Use to test blood sugars 1 times daily or as directed. May sub formulary meter 1 kit 0     blood glucose monitoring (NO BRAND SPECIFIED) test strip Use to test blood sugars twice times daily or as directed 1 Box 3     esomeprazole (NEXIUM) 40 MG CR capsule Take 1 capsule (40 mg) by mouth every morning (before breakfast) 90 capsule 4     gabapentin (NEURONTIN) 300 MG capsule Take 2 tabs in the evening. 180 capsule 3     lisinopril (PRINIVIL/ZESTRIL) 5 MG tablet Take 1 tablet (5 mg) by mouth daily 90 tablet 3     LORazepam (ATIVAN) 0.5 MG tablet Take 0.5 mg by mouth every 8 hours as needed  for anxiety Notify provider of effectiveness at days 10-14       LORazepam (ATIVAN) 0.5 MG tablet Take 1 tablet (0.5 mg) by mouth every 8 hours as needed for anxiety for anxiety, use sparingly. 10 tablet 0     ondansetron (ZOFRAN) 4 MG tablet Take 1 tablet (4 mg) by mouth every 8 hours as needed for nausea or vomiting 20 tablet 0     sennosides (SENOKOT) 8.6 MG tablet Take 1-2 tablets by mouth 2 times daily as needed for constipation 30 tablet 1     traMADol (ULTRAM) 50 MG tablet Take 0.5-1 tablets (25-50 mg) by mouth every 4 hours as needed for severe pain 30 tablet 0       ROS:  10 point ROS of systems including Constitutional, Eyes, Respiratory, Cardiovascular, Gastroenterology, Genitourinary, Integumentary, Musculoskeletal, Psychiatric were all negative except for pertinent positives noted in my HPI.    Vitals:  /76   Pulse 72   Temp 98.3  F (36.8  C)   Resp 16   Wt 105.7 kg (233 lb)   SpO2 (!) 88%   BMI 39.97 kg/m    Exam:  GENERAL APPEARANCE:  Alert, in no distress, Lying in bed resting after therapy  RESP:  lungs clear to auscultation , no respiratory distress  CV:  Palpation and auscultation of heart done , regular rate and rhythm, no murmur, rub, or gallop, Edematous lower extremities.  1+ pitting  ABDOMEN:  no guarding or rebound, bowel sounds normal  M/S:   Generalized weakness, limited range of motion and use of left knee due to pain postop  SKIN:  Postop incision healing well.  Surgical dressing remains intact.  Outline created around blood marking.  Less than 40% of dressing.  Knee mildly swollen mildly warm.  Minimal bruising  NEURO:   NFD  PSYCH:  normal insight, judgement and memory, affect and mood normal    Lab/Diagnostic data:  Recent labs in [facility] reviewed by me today.     ASSESSMENT/PLAN:    Primary osteoarthritis of left knee  Status post total left knee replacement  Recovering well.  Participating well in therapies.  Weightbearing as tolerating.  Had a previous TKA 4 years ago so  is aware of the program.  Is to be on aspirin 325 mg daily times 42 days, however had not received it yet.  Discussed with nurse as the only aspirin available at this time was enteric-coated, gave okay to use enteric-coated.  Pain managed with as needed Tylenol and as needed tramadol.  Using tramadol 3-4 times per day.  Discussed establishing baseline of Tylenol patient in agreement.  Also encouraged frequent use of ice.  Patient verbalized understanding.    Essential hypertension with goal blood pressure less than 140/90  Blood pressures have been slightly elevated.  Query related to pain.  We will continue to monitor and make adjustments as indicated.  Currently taking atenolol 25 mg daily, lisinopril 5 mg daily as well as Lipitor 10 mg daily.  Blood pressures:  04/08/2019 10:34 143/63 mmHg  04/07/2019 08:50 146/76 mmHg  04/06/2019 17:53 169/82 mmHg    Type 2 diabetes mellitus without complication, without long-term current use of insulin (H)  Diabetes is diet controlled.  Is on no medications.  Blood sugars have generally been okay, within acceptable range.  Most recent A1c 7.1.  Staff has been checking blood sugars twice daily.  Will decrease to once daily    04/08/2019 07:56 136 mg/dL  04/07/2019 18:44 167 mg/dL  04/07/2019 08:11 135 mg/dL  04/06/2019 18:00 162 mg/dL    Neuralgia  History of neuralgia primarily in head face and jaw.  Managed well with gabapentin 600 mg at at bedtime.  We will continue prior to admission medications.  Has been stable.    Anxiety  Historical.  Happens rarely, but due to surgery, potential there.  Managed well with Lorazepam 0.5 mg every 8 hours as needed.  Noted initial PRN orders for non antipsychotic psychotropic medications   are limited to 14 days. Start new psychotropic medication lorazepam x 14  days for sporadic anxiety. Nsg to update [facility] provider of effect in 10-14 days.       Gastroesophageal reflux disease without esophagitis  Stable, compensated. The current medical  "regimen is effective;  continue present plan and medications.  Nexium extended release 40 mg daily       transcribed by : Ivett Nails  Orders:  1. Schedule Tylenol 1000 mg TID  2. Give Atenolol with PM/bedtime meds  3. Lorazepam 0.5 mg every 8 hrs PRN x 14 days. Notify PP of effectiveness at days 10-14  4.  OK to reduce frequency of blood sugar checks to once daily        Face to Face and Medical Necessity Statement for DME Provider visit    Demographic Information on Theodora Meng:  Gender: female  : 1935  [address] [facility] [address]  [phone] (home) NONE (work)    Medical Record: [id number]  Social Security Number: [id number]  Primary Care Provider: Yecenia Viera  Insurance: Payor: [facility] / Plan: [employer] [employer] / Product Type: PPO /     HPI:   Theodora Meng is a 84 year old  (1935), who is being seen today for a face to face provider visit at [facility]; medical necessity statement for DME included. This patient requires the following:  DME Ordered and Medical Necessity Statement   Walker:  neither 2 wheeled standard nor with 5\" wheels  The patient has a mobility limitation of ambulation that significantly impairs her ability to participate in one or more mobility-related activities of daily living in the home. The patient is able to safely use the walker and the functional mobility deficit can be sufficiently resolved with the use of a walker    Pt needing above DME with expected length of need of 99 months   due to medical necessity associated with following diagnosis:     Primary osteoarthritis of left knee  Status post total left knee replacement  Essential hypertension with goal blood pressure less than 140/90  Type 2 diabetes mellitus without complication, without long-term current use of insulin (H)  Neuralgia  Anxiety  Gastroesophageal reflux disease without esophagitis      PM   has a past medical history of Depressive disorder, " Diabetes (H), Esophageal reflux, Other abnormal glucose, Other premature beats, and Unspecified essential hypertension. She also has no past medical history of Cerebral infarction (H), Malignant hyperthermia, or PONV (postoperative nausea and vomiting).    ROS:10 point ROS of systems including Constitutional, Eyes, Respiratory, Cardiovascular, Gastroenterology, Genitourinary, Integumentary, Musculoskeletal, Psychiatric were all negative except for pertinent positives noted in my HPI.    EXAM  Vitals: /76   Pulse 72   Temp 98.3  F (36.8  C)   Resp 16   Wt 105.7 kg (233 lb)   SpO2 (!) 88%   BMI 39.97 kg/m  ;BMI= Body mass index is 39.97 kg/m .  See above    ASSESSMENT/PLAN:  1. Primary osteoarthritis of left knee    2. Status post total left knee replacement    3. Essential hypertension with goal blood pressure less than 140/90    4. Type 2 diabetes mellitus without complication, without long-term current use of insulin (H)    5. Neuralgia    6. Anxiety    7. Gastroesophageal reflux disease without esophagitis        Orders:  1. Facility staff/TC to contact DME company to get their order form for provider to fill out    ELECTRONICALLY SIGNED BY BARON CERTIFIED PROVIDER:  ALEX Vogel CNP   NPI: [id number]  [facility] [facility]  [facility] [address], [address]  [place] [address]      Total time spent with patient visit at the skilled nursing facility was 35 min including patient visit and review of past records. Greater than 50% of total time spent with counseling and coordinating care due to admission  Electronically signed by:  ALEX Vogel CNP

## 2019-04-08 NOTE — PROGRESS NOTES
[place] [facility]  PRIMARY CARE PROVIDER AND CLINIC:  Yecenia Viera MD, [address]  / [facility] [address]  Chief Complaint   Patient presents with     Hospital F/U     [facility] Medical Record Number:  [id number]  Place of Service where encounter took place:  [facility] ([facility]) [[id number]]    Theodora Meng  is a 84 year old  (1935), admitted to the above facility from  [facility]. Hospital stay 4/3/2019 through 4/6/2019..  Admitted to this facility for  rehab, medical management and nursing care.    HPI:    HPI information obtained from: facility staff, patient report and [facility] chart review.   Brief Summary of Hospital Course:   - Pt with PMH notable for left knee joint OA, underwent elective TKA. Hospitalization was uneventful.     Updates on Status Since Skilled nursing Admission:   - Started on OT/PT, reports making a progress  - Reports pain is  Pressure in nature over left knee, 8/10 when severe, aggravated with rehab therapy better with meds,ice and rest.   - reports appetite and sleep are fine. No BM in 2 days.   =========================================================================  CODE STATUS/ADVANCE DIRECTIVES DISCUSSION:   CPR/Full code   Patient's living condition: lives alone  ALLERGIES: Zoloft [sertraline]  PAST MEDICAL HISTORY:  has a past medical history of Depressive disorder, Diabetes (H), Esophageal reflux, Other abnormal glucose, Other premature beats, and Unspecified essential hypertension. She also has no past medical history of Cerebral infarction (H), Malignant hyperthermia, or PONV (postoperative nausea and vomiting).  PAST SURGICAL HISTORY:   has a past surgical history that includes appendectomy; cholecystectomy, laporoscopic; tonsillectomy; salpingo oophorectomy,r/l/viviane; Arthroplasty knee (10/30/2013); Esophagoscopy, gastroscopy, duodenoscopy (EGD), combined (N/A, 2/17/2016); Dilation and curettage, operative hysteroscopy with  morcellator, combined (N/A, 6/20/2016); and Arthroplasty knee (Left, 4/3/2019).  FAMILY HISTORY: family history includes Heart Disease in her brother, brother, brother, father, and mother.  SOCIAL HISTORY:   reports that she has never smoked. She has never used smokeless tobacco. She reports that she does not drink alcohol or use drugs.    Post Discharge Medication Reconciliation Status: discharge medications reconciled and changed, per note/orders (see AVS)  Current Outpatient Medications   Medication Sig Dispense Refill     acetaminophen (TYLENOL) 500 MG tablet Take 1,000 mg by mouth 3 times daily       aspirin (ASA) 325 MG tablet Take 1 tablet (325 mg) by mouth daily 42 tablet 0     atenolol (TENORMIN) 25 MG tablet Take 1 tablet (25 mg) by mouth daily 90 tablet 4     atorvastatin (LIPITOR) 10 MG tablet Take 1 tablet (10 mg) by mouth daily 90 tablet 3     blood glucose monitoring (ACCU-CHEK COMPACT CARE KIT) meter device kit Use to test blood sugars 1 times daily or as directed. May sub formulary meter 1 kit 0     blood glucose monitoring (NO BRAND SPECIFIED) test strip Use to test blood sugars twice times daily or as directed 1 Box 3     esomeprazole (NEXIUM) 40 MG CR capsule Take 1 capsule (40 mg) by mouth every morning (before breakfast) 90 capsule 4     gabapentin (NEURONTIN) 300 MG capsule Take 2 tabs in the evening. 180 capsule 3     lisinopril (PRINIVIL/ZESTRIL) 5 MG tablet Take 1 tablet (5 mg) by mouth daily 90 tablet 3     LORazepam (ATIVAN) 0.5 MG tablet Take 0.5 mg by mouth every 8 hours as needed for anxiety Notify provider of effectiveness at days 10-14       ondansetron (ZOFRAN) 4 MG tablet Take 1 tablet (4 mg) by mouth every 8 hours as needed for nausea or vomiting 20 tablet 0     sennosides (SENOKOT) 8.6 MG tablet Take 1-2 tablets by mouth 2 times daily as needed for constipation 30 tablet 1     traMADol (ULTRAM) 50 MG tablet Take 0.5-1 tablets (25-50 mg) by mouth every 4 hours as needed for severe  pain 30 tablet 0     ROS:  10 point ROS of systems including Constitutional, Eyes, Respiratory, Cardiovascular, Gastroenterology, Genitourinary, Integumentary, Musculoskeletal, Psychiatric were all negative except for pertinent positives noted in my HPI.    Vitals:  /63   Pulse 80   Temp 99.4  F (37.4  C)   Resp 12   Wt 104.6 kg (230 lb 9.6 oz)   SpO2 92%   BMI 39.56 kg/m    Exam:  GENERAL APPEARANCE:  in no distress, cooperative, obese  ENT:  Mouth and posterior oropharynx normal, moist mucous membranes, oral mucosa moist, no lesion noted.   EYES:  EOMI, Pupil rounded and equal.  RESP:  lungs clear to auscultation   CV:  S1S2 audible, regular HR, no murmur appreciated.   ABDOMEN:  soft, NT/ND, BS audible. no mass appreciated on palpation.   M/S:   Can flex left knee.   SKIN:  Aquacel over left knee intact, soaked over the midline.   NEURO:   No NFD appreciated on observation. Hand  5/5 b/l  PSYCH:  normal insight, judgement and memory, affect and mood normal    Lab/Diagnostic data:  Recent labs in [facility] reviewed by me today.     ASSESSMENT/PLAN:  --------------------------------------------------   Primary osteoarthritis of left knee  Status post total left knee replacement  Physical deconditioning  - - Physical function improving with OT/PT, continue.  - Analgesia optimal  - Continue DVT Prophylaxis according to Orthopedist's recommendations  - Follow on the surgeon's recommendations      Morbid obesity (H)  - Body mass index is 39.56 kg/m .  From excessive calories intake and less physical activities.   - counseling providing regarding reducing carbs intake,and increase physical activities in a safe environment.       Type 2 diabetes mellitus without complication, without long-term current use of insulin (H)    A1C 7.1 03/20/2019   - diet controlled.    Essential hypertension with goal blood pressure less than 140/90  BP Readings from Last 3 Encounters:   04/08/19 143/63   04/08/19 146/76    04/06/19 140/75   - controlled for this age group.     Neuralgia  - chronic, at baseline, controleld with Neurontin.     Anxiety:  - avoid benzo. Consider serotonin specific reuptake inhibitor after benzo GDR. May defer to PCP.        Orders written by provider at facility  - See above, otherwise, continue the rest of the current POC.     Electronically signed by:  Nick Garrison MD

## 2019-04-09 ENCOUNTER — DOCUMENTATION ONLY (OUTPATIENT)
Dept: OTHER | Facility: CLINIC | Age: 84
End: 2019-04-09

## 2019-04-09 ENCOUNTER — NURSING HOME VISIT (OUTPATIENT)
Dept: GERIATRICS | Facility: CLINIC | Age: 84
End: 2019-04-09
Payer: COMMERCIAL

## 2019-04-09 DIAGNOSIS — F41.9 ANXIETY: ICD-10-CM

## 2019-04-09 DIAGNOSIS — Z96.652 STATUS POST TOTAL LEFT KNEE REPLACEMENT: Primary | ICD-10-CM

## 2019-04-09 DIAGNOSIS — M79.2 NEURALGIA: ICD-10-CM

## 2019-04-09 DIAGNOSIS — E11.9 TYPE 2 DIABETES MELLITUS WITHOUT COMPLICATION, WITHOUT LONG-TERM CURRENT USE OF INSULIN (H): ICD-10-CM

## 2019-04-09 DIAGNOSIS — I10 ESSENTIAL HYPERTENSION WITH GOAL BLOOD PRESSURE LESS THAN 140/90: ICD-10-CM

## 2019-04-09 DIAGNOSIS — K21.9 GASTROESOPHAGEAL REFLUX DISEASE WITHOUT ESOPHAGITIS: ICD-10-CM

## 2019-04-09 DIAGNOSIS — R53.81 PHYSICAL DECONDITIONING: ICD-10-CM

## 2019-04-09 DIAGNOSIS — M17.12 PRIMARY OSTEOARTHRITIS OF LEFT KNEE: ICD-10-CM

## 2019-04-09 DIAGNOSIS — E66.01 MORBID OBESITY (H): ICD-10-CM

## 2019-04-09 PROCEDURE — 99305 1ST NF CARE MODERATE MDM 35: CPT | Performed by: FAMILY MEDICINE

## 2019-04-09 NOTE — LETTER
4/9/2019        RE: Theodora Meng  [address] [facility] [address]        [place] [facility]  PRIMARY CARE PROVIDER AND CLINIC:  Yecenia Viera MD, [address]  / [facility] [address]  Chief Complaint   Patient presents with     Hospital F/U     [place] Medical Record Number:  [id number]  Place of Service where encounter took place:  [facility] ([facility]) [[id number]]    Theodora Meng  is a 84 year old  (1935), admitted to the above facility from  [facility]. Hospital stay 4/3/2019 through 4/6/2019..  Admitted to this facility for  rehab, medical management and nursing care.    HPI:    HPI information obtained from: facility staff, patient report and [facility] chart review.   Brief Summary of Hospital Course:   - Pt with PMH notable for left knee joint OA, underwent elective TKA. Hospitalization was uneventful.     Updates on Status Since Skilled nursing Admission:   - Started on OT/PT, reports making a progress  - Reports pain is  Pressure in nature over left knee, 8/10 when severe, aggravated with rehab therapy better with meds,ice and rest.   - reports appetite and sleep are fine. No BM in 2 days.   =========================================================================  CODE STATUS/ADVANCE DIRECTIVES DISCUSSION:   CPR/Full code   Patient's living condition: lives alone  ALLERGIES: Zoloft [sertraline]  PAST MEDICAL HISTORY:  has a past medical history of Depressive disorder, Diabetes (H), Esophageal reflux, Other abnormal glucose, Other premature beats, and Unspecified essential hypertension. She also has no past medical history of Cerebral infarction (H), Malignant hyperthermia, or PONV (postoperative nausea and vomiting).  PAST SURGICAL HISTORY:   has a past surgical history that includes appendectomy; cholecystectomy, laporoscopic; tonsillectomy; salpingo oophorectomy,r/l/viviane; Arthroplasty knee (10/30/2013); Esophagoscopy, gastroscopy, duodenoscopy  (EGD), combined (N/A, 2/17/2016); Dilation and curettage, operative hysteroscopy with morcellator, combined (N/A, 6/20/2016); and Arthroplasty knee (Left, 4/3/2019).  FAMILY HISTORY: family history includes Heart Disease in her brother, brother, brother, father, and mother.  SOCIAL HISTORY:   reports that she has never smoked. She has never used smokeless tobacco. She reports that she does not drink alcohol or use drugs.    Post Discharge Medication Reconciliation Status: discharge medications reconciled and changed, per note/orders (see AVS)  Current Outpatient Medications   Medication Sig Dispense Refill     acetaminophen (TYLENOL) 500 MG tablet Take 1,000 mg by mouth 3 times daily       aspirin (ASA) 325 MG tablet Take 1 tablet (325 mg) by mouth daily 42 tablet 0     atenolol (TENORMIN) 25 MG tablet Take 1 tablet (25 mg) by mouth daily 90 tablet 4     atorvastatin (LIPITOR) 10 MG tablet Take 1 tablet (10 mg) by mouth daily 90 tablet 3     blood glucose monitoring (ACCU-CHEK COMPACT CARE KIT) meter device kit Use to test blood sugars 1 times daily or as directed. May sub formulary meter 1 kit 0     blood glucose monitoring (NO BRAND SPECIFIED) test strip Use to test blood sugars twice times daily or as directed 1 Box 3     esomeprazole (NEXIUM) 40 MG CR capsule Take 1 capsule (40 mg) by mouth every morning (before breakfast) 90 capsule 4     gabapentin (NEURONTIN) 300 MG capsule Take 2 tabs in the evening. 180 capsule 3     lisinopril (PRINIVIL/ZESTRIL) 5 MG tablet Take 1 tablet (5 mg) by mouth daily 90 tablet 3     LORazepam (ATIVAN) 0.5 MG tablet Take 0.5 mg by mouth every 8 hours as needed for anxiety Notify provider of effectiveness at days 10-14       ondansetron (ZOFRAN) 4 MG tablet Take 1 tablet (4 mg) by mouth every 8 hours as needed for nausea or vomiting 20 tablet 0     sennosides (SENOKOT) 8.6 MG tablet Take 1-2 tablets by mouth 2 times daily as needed for constipation 30 tablet 1     traMADol (ULTRAM)  50 MG tablet Take 0.5-1 tablets (25-50 mg) by mouth every 4 hours as needed for severe pain 30 tablet 0     ROS:  10 point ROS of systems including Constitutional, Eyes, Respiratory, Cardiovascular, Gastroenterology, Genitourinary, Integumentary, Musculoskeletal, Psychiatric were all negative except for pertinent positives noted in my HPI.    Vitals:  /63   Pulse 80   Temp 99.4  F (37.4  C)   Resp 12   Wt 104.6 kg (230 lb 9.6 oz)   SpO2 92%   BMI 39.56 kg/m     Exam:  GENERAL APPEARANCE:  in no distress, cooperative, obese  ENT:  Mouth and posterior oropharynx normal, moist mucous membranes, oral mucosa moist, no lesion noted.   EYES:  EOMI, Pupil rounded and equal.  RESP:  lungs clear to auscultation   CV:  S1S2 audible, regular HR, no murmur appreciated.   ABDOMEN:  soft, NT/ND, BS audible. no mass appreciated on palpation.   M/S:   Can flex left knee.   SKIN:  Aquacel over left knee intact, soaked over the midline.   NEURO:   No NFD appreciated on observation. Hand  5/5 b/l  PSYCH:  normal insight, judgement and memory, affect and mood normal    Lab/Diagnostic data:  Recent labs in [facility] reviewed by me today.     ASSESSMENT/PLAN:  --------------------------------------------------   Primary osteoarthritis of left knee  Status post total left knee replacement  Physical deconditioning  - - Physical function improving with OT/PT, continue.  - Analgesia optimal  - Continue DVT Prophylaxis according to Orthopedist's recommendations  - Follow on the surgeon's recommendations      Morbid obesity (H)  - Body mass index is 39.56 kg/m .  From excessive calories intake and less physical activities.   - counseling providing regarding reducing carbs intake,and increase physical activities in a safe environment.       Type 2 diabetes mellitus without complication, without long-term current use of insulin (H)    A1C 7.1 03/20/2019   - diet controlled.    Essential hypertension with goal blood pressure less than  140/90  BP Readings from Last 3 Encounters:   04/08/19 143/63   04/08/19 146/76   04/06/19 140/75   - controlled for this age group.     Neuralgia  - chronic, at baseline, controleld with Neurontin.     Anxiety:  - avoid benzo. Consider serotonin specific reuptake inhibitor after benzo GDR. May defer to PCP.        Orders written by provider at facility  - See above, otherwise, continue the rest of the current POC.     Electronically signed by:  Nick Garrison MD                       Sincerely,        Nick Garrison MD

## 2019-04-11 NOTE — PROGRESS NOTES
[place] [facility] DISCHARGE SUMMARY  PATIENT'S NAME: Theodora Meng  YOB: 1935  MEDICAL RECORD NUMBER:  [id number]  [facility] [[id number]]    PRIMARY CARE PROVIDER AND CLINIC RESPONSIBLE AFTER TRANSFER:   Yecenia Viera MD, [address] [facility] [address]    [facility]     Transferring providers: Dr. Meli Durán MD  Recent Hospitalization/ED:  [facility] stay 4/03/2019 to 4/06/2019.  Date of SNF Admission: April / 08 / 2019  Date of SNF (anticipated) Discharge: April / 16 / 2019  Discharged to: with family home  Cognitive Scores: SLUMS: 24/30  Physical Function: ambulatory with 2WW  DME: Walker    CODE STATUS/ADVANCE DIRECTIVES DISCUSSION:  Full Code   ALLERGIES: Zoloft [sertraline]    DISCHARGE DIAGNOSIS/NURSING FACILITY COURSE:       Primary osteoarthritis of left knee  Status post total left knee replacement  Recovering well. Pain and function improving with therapy.  Poor initiation, requires cues.  Pain managed with scheduledTylenol and as needed tramadol.  Using tramadol average of 3 times per day. Continue with physical therapy at home     Essential hypertension with goal blood pressure less than 140/90  Blood pressures have been slightly elevated.  Query related to pain.   Continues taking atenolol 25 mg daily, lisinopril 5 mg daily as well as Lipitor 10 mg daily. PCP to follow  Blood pressures:  04/15/2019 09:43 146/78 mmHg  04/11/2019 10:52 140/86 mmHg (Sitting r/arm)  04/10/2019 02:49 157/73 mmHg  04/09/2019 22:21 141/67 mmHg  04/08/2019 10:34 143/63 mmHg     Type 2 diabetes mellitus without complication, without long-term current use of insulin (H)  Diabetes is diet controlled.  Is on no medications.  Blood sugars have generally been okay, within acceptable range.  Most recent A1c 7.1.  Blood sugars:   04/15/2019 09:01 132 mg/dL  04/14/2019 08:08 153 mg/dL  04/13/2019  08:45 149 mg/dL  04/12/2019 07:48 178 mg/dL  04/11/2019 07:34 143 mg/dL  04/10/2019 08:10 142 mg/dL     Neuralgia  History of neuralgia primarily in head face and jaw.  Managed well with gabapentin 600 mg at at bedtime.     Anxiety  Historical.  Happens rarely, but due to surgery, potential there.  Managed well with Lorazepam 0.5 mg every 8 hours as needed.Used only 2 doses.      Obesity  BMI 39.99. Can interfere with rehab/healing.  Nutritional counseling provided    Gastroesophageal reflux disease without esophagitis  Stable, compensated. The current medical regimen is effective;  continue present plan and medications.  Nexium extended release 40 mg daily    Past Medical History:  has a past medical history of Depressive disorder, Diabetes (H), Esophageal reflux, Other abnormal glucose, Other premature beats, and Unspecified essential hypertension. She also has no past medical history of Cerebral infarction (H), Malignant hyperthermia, or PONV (postoperative nausea and vomiting).    Discharge Medications:  Current Outpatient Medications   Medication Sig Dispense Refill     acetaminophen (TYLENOL) 500 MG tablet Take 1,000 mg by mouth 3 times daily  mg by mouth every 4 hours as needed       aspirin (ASA) 325 MG tablet Take 1 tablet (325 mg) by mouth daily 42 tablet 0     atenolol (TENORMIN) 25 MG tablet Take 1 tablet (25 mg) by mouth daily 90 tablet 4     atorvastatin (LIPITOR) 10 MG tablet Take 1 tablet (10 mg) by mouth daily 90 tablet 3     blood glucose monitoring (ACCU-CHEK COMPACT CARE KIT) meter device kit Use to test blood sugars 1 times daily or as directed. May sub formulary meter 1 kit 0     blood glucose monitoring (NO BRAND SPECIFIED) test strip Use to test blood sugars twice times daily or as directed 1 Box 3     esomeprazole (NEXIUM) 40 MG CR capsule Take 1 capsule (40 mg) by mouth every morning (before breakfast) 90 capsule 4     gabapentin (NEURONTIN) 300 MG capsule Take 2 tabs in the evening.  "180 capsule 3     lisinopril (PRINIVIL/ZESTRIL) 5 MG tablet Take 1 tablet (5 mg) by mouth daily 90 tablet 3     LORazepam (ATIVAN) 0.5 MG tablet Take 0.5 mg by mouth every 8 hours as needed for anxiety Notify provider of effectiveness at days 10-14       ondansetron (ZOFRAN) 4 MG tablet Take 1 tablet (4 mg) by mouth every 8 hours as needed for nausea or vomiting 20 tablet 0     sennosides (SENOKOT) 8.6 MG tablet Take 1-2 tablets by mouth 2 times daily as needed for constipation 30 tablet 1     traMADol (ULTRAM) 50 MG tablet Give 0.5-1 tablet by mouth every 4 hours as needed for pain         Medication Changes/Rationale:     Scheduled Tylenol    Controlled medications sent with patient:   Medication: Tramadol , 17 tabs given to patient at the time of discharge to take home  Medication: Ativan , 13 tabs given to patient at the time of discharge to take home   To follow with Ortho on 4/17.  If needing more scripts, will obtain there     ROS:   4 point ROS including Respiratory, CV, GI and , other than that noted in the HPI,  is negative    Physical Exam:   Vitals: /86   Pulse 94   Temp 98.8  F (37.1  C)   Resp 17   Ht 1.626 m (5' 4\")   Wt 105.7 kg (233 lb)   SpO2 94%   BMI 39.99 kg/m    BMI= Body mass index is 39.99 kg/m .  GENERAL APPEARANCE:  Alert, in no distress, morbidly obese  ENT:  Mouth and posterior oropharynx normal, moist mucous membranes  RESP:  lungs clear to auscultation , no respiratory distress  CV:  Palpation and auscultation of heart done , regular rate and rhythm, no murmur, rub, or gallop, peripheral edema trace+ in BLE  ABDOMEN:  no guarding or rebound, bowel sounds normal  M/S:   Gait and station abnormal limited ROM left knee  SKIN:  wound healing well, no signs of infection surgical dressing intact. no further bleeding beyond outline  NEURO:   NFD  PSYCH:  normal insight, judgement and memory, affect and mood normal     SNF labs: Recent labs in [facility] reviewed by me today. "       DISCHARGE PLAN:    Medical Follow Up:      Follow up with primary care provider in 1 weeks, [place] as scheduled    MTM referral needed and placed by this provider: No    Current [place] scheduled appointments:       Discharge Services: Home Care:  Physical Therapy    Orders:  1. Ok to discharge home with home PT and meds    TOTAL DISCHARGE TIME:   Greater than 30 minutes  Electronically signed by:  ALEX Vogel CNP       Documentation of Face to Face and Certification for Home Health Services    I certify that patient, Theodora Meng is under my care and that I had a face-to-face encounter that meets the physician face-to-face encounter requirements with this patient on: 4/15/2019.    This encounter with the patient was in whole, or in part, for the following medical condition, which is the primary reason for Home Health Care:(M17.12) Primary osteoarthritis of left knee  (primary encounter diagnosis)    (Z96.652) Status post total left knee replacement    (R53.81) Physical deconditioning    (I10) Essential hypertension with goal blood pressure less than 140/90    (E11.9) Type 2 diabetes mellitus without complication, without long-term current use of insulin (H)    (M79.2) Neuralgia    (F41.9) Anxiety    (K21.9) Gastroesophageal reflux disease without esophagitis    (E66.01) Morbid obesity (H)    I certify that, based on my findings, the following services are medically necessary Home Health Services: Physical Therapy    My clinical findings support the need for the above services because: Physical Therapy Services are needed to assess and treat the following functional impairments: Ambulation, ROM, endurance.    Further, I certify that my clinical findings support that this patient is homebound (i.e. absences from home require considerable and taxing effort and are for medical reasons or [employer] services or infrequently or of short duration when for other reasons) because: Requires assistance  of another person or specialized equipment to access medical services because patient:  Poor endurance, post op, needing SBA.    Based on the above findings, I certify that this patient is confined to the home and needs intermittent skilled nursing care, physical therapy and/or speech therapy.  The patient is under my care, and I have initiated the establishment of the plan of care.  This patient will be followed by a physician who will periodically review the plan of care.    Physician/Provider to provide follow up care: Yecenia Viera certified Physician at time of discharge: Chelsea Rocha, CNP

## 2019-04-12 VITALS
TEMPERATURE: 98.8 F | RESPIRATION RATE: 17 BRPM | BODY MASS INDEX: 39.78 KG/M2 | DIASTOLIC BLOOD PRESSURE: 86 MMHG | WEIGHT: 233 LBS | SYSTOLIC BLOOD PRESSURE: 140 MMHG | HEART RATE: 94 BPM | OXYGEN SATURATION: 94 % | HEIGHT: 64 IN

## 2019-04-12 RX ORDER — TRAMADOL HYDROCHLORIDE 50 MG/1
TABLET ORAL
COMMUNITY
End: 2019-06-20

## 2019-04-12 ASSESSMENT — MIFFLIN-ST. JEOR: SCORE: 1496.88

## 2019-04-15 ENCOUNTER — DISCHARGE SUMMARY NURSING HOME (OUTPATIENT)
Dept: GERIATRICS | Facility: CLINIC | Age: 84
End: 2019-04-15
Payer: COMMERCIAL

## 2019-04-15 DIAGNOSIS — I10 ESSENTIAL HYPERTENSION WITH GOAL BLOOD PRESSURE LESS THAN 140/90: ICD-10-CM

## 2019-04-15 DIAGNOSIS — M17.12 PRIMARY OSTEOARTHRITIS OF LEFT KNEE: Primary | ICD-10-CM

## 2019-04-15 DIAGNOSIS — K21.9 GASTROESOPHAGEAL REFLUX DISEASE WITHOUT ESOPHAGITIS: ICD-10-CM

## 2019-04-15 DIAGNOSIS — R53.81 PHYSICAL DECONDITIONING: ICD-10-CM

## 2019-04-15 DIAGNOSIS — E66.01 MORBID OBESITY (H): ICD-10-CM

## 2019-04-15 DIAGNOSIS — E11.9 TYPE 2 DIABETES MELLITUS WITHOUT COMPLICATION, WITHOUT LONG-TERM CURRENT USE OF INSULIN (H): ICD-10-CM

## 2019-04-15 DIAGNOSIS — F41.9 ANXIETY: ICD-10-CM

## 2019-04-15 DIAGNOSIS — M79.2 NEURALGIA: ICD-10-CM

## 2019-04-15 DIAGNOSIS — Z96.652 STATUS POST TOTAL LEFT KNEE REPLACEMENT: ICD-10-CM

## 2019-04-15 PROCEDURE — 99316 NF DSCHRG MGMT 30 MIN+: CPT | Performed by: NURSE PRACTITIONER

## 2019-04-15 NOTE — LETTER
4/15/2019        RE: Theodora Meng  [address] [facility] [place] [address]        [place] [facility] DISCHARGE SUMMARY  PATIENT'S NAME: Theodora Meng  YOB: 1935  MEDICAL RECORD NUMBER:  [id number]  Place of Service where encounter took place:  [facility] ([facility]) [[id number]]    PRIMARY CARE PROVIDER AND CLINIC RESPONSIBLE AFTER TRANSFER:   Yecenia Viera MD, [address] / [place] [address]    [facility]     Transferring providers: Dr. Meli Durán MD  Recent Hospitalization/ED:  [facility] stay 4/03/2019 to 4/06/2019.  Date of SNF Admission: April / 08 / 2019  Date of SNF (anticipated) Discharge: April / 16 / 2019  Discharged to: with family home  Cognitive Scores: SLUMS: 24/30  Physical Function: ambulatory with 2WW  DME: Walker    CODE STATUS/ADVANCE DIRECTIVES DISCUSSION:  Full Code   ALLERGIES: Zoloft [sertraline]    DISCHARGE DIAGNOSIS/NURSING FACILITY COURSE:       Primary osteoarthritis of left knee  Status post total left knee replacement  Recovering well. Pain and function improving with therapy.  Poor initiation, requires cues.  Pain managed with scheduledTylenol and as needed tramadol.  Using tramadol average of 3 times per day. Continue with physical therapy at home     Essential hypertension with goal blood pressure less than 140/90  Blood pressures have been slightly elevated.  Query related to pain.    Continues taking atenolol 25 mg daily, lisinopril 5 mg daily as well as Lipitor 10 mg daily. PCP to follow  Blood pressures:  04/15/2019 09:43 146/78 mmHg  04/11/2019 10:52 140/86 mmHg (Sitting r/arm)  04/10/2019 02:49 157/73 mmHg  04/09/2019 22:21 141/67 mmHg  04/08/2019 10:34 143/63 mmHg     Type 2 diabetes mellitus without complication, without long-term current use of insulin (H)  Diabetes is diet controlled.  Is on no medications.  Blood sugars have generally been okay, within acceptable range.  Most recent A1c  7.1.  Blood sugars:   04/15/2019 09:01 132 mg/dL  04/14/2019 08:08 153 mg/dL  04/13/2019 08:45 149 mg/dL  04/12/2019 07:48 178 mg/dL  04/11/2019 07:34 143 mg/dL  04/10/2019 08:10 142 mg/dL     Neuralgia  History of neuralgia primarily in head face and jaw.  Managed well with gabapentin 600 mg at at bedtime.     Anxiety  Historical.  Happens rarely, but due to surgery, potential there.  Managed well with Lorazepam 0.5 mg every 8 hours as needed.Used only 2 doses.      Obesity  BMI 39.99. Can interfere with rehab/healing.  Nutritional counseling provided    Gastroesophageal reflux disease without esophagitis  Stable, compensated. The current medical regimen is effective;  continue present plan and medications.  Nexium extended release 40 mg daily    Past Medical History:  has a past medical history of Depressive disorder, Diabetes (H), Esophageal reflux, Other abnormal glucose, Other premature beats, and Unspecified essential hypertension. She also has no past medical history of Cerebral infarction (H), Malignant hyperthermia, or PONV (postoperative nausea and vomiting).    Discharge Medications:  Current Outpatient Medications   Medication Sig Dispense Refill     acetaminophen (TYLENOL) 500 MG tablet Take 1,000 mg by mouth 3 times daily  mg by mouth every 4 hours as needed       aspirin (ASA) 325 MG tablet Take 1 tablet (325 mg) by mouth daily 42 tablet 0     atenolol (TENORMIN) 25 MG tablet Take 1 tablet (25 mg) by mouth daily 90 tablet 4     atorvastatin (LIPITOR) 10 MG tablet Take 1 tablet (10 mg) by mouth daily 90 tablet 3     blood glucose monitoring (ACCU-CHEK COMPACT CARE KIT) meter device kit Use to test blood sugars 1 times daily or as directed. May sub formulary meter 1 kit 0     blood glucose monitoring (NO BRAND SPECIFIED) test strip Use to test blood sugars twice times daily or as directed 1 Box 3     esomeprazole (NEXIUM) 40 MG CR capsule Take 1 capsule (40 mg) by mouth every morning (before  "breakfast) 90 capsule 4     gabapentin (NEURONTIN) 300 MG capsule Take 2 tabs in the evening. 180 capsule 3     lisinopril (PRINIVIL/ZESTRIL) 5 MG tablet Take 1 tablet (5 mg) by mouth daily 90 tablet 3     LORazepam (ATIVAN) 0.5 MG tablet Take 0.5 mg by mouth every 8 hours as needed for anxiety Notify provider of effectiveness at days 10-14       ondansetron (ZOFRAN) 4 MG tablet Take 1 tablet (4 mg) by mouth every 8 hours as needed for nausea or vomiting 20 tablet 0     sennosides (SENOKOT) 8.6 MG tablet Take 1-2 tablets by mouth 2 times daily as needed for constipation 30 tablet 1     traMADol (ULTRAM) 50 MG tablet Give 0.5-1 tablet by mouth every 4 hours as needed for pain         Medication Changes/Rationale:     Scheduled Tylenol    Controlled medications sent with patient:   Medication: Tramadol , 17 tabs given to patient at the time of discharge to take home  Medication: Ativan , 13 tabs given to patient at the time of discharge to take home   To follow with Ortho on 4/17.  If needing more scripts, will obtain there     ROS:   4 point ROS including Respiratory, CV, GI and , other than that noted in the HPI,  is negative    Physical Exam:   Vitals: /86   Pulse 94   Temp 98.8  F (37.1  C)   Resp 17   Ht 1.626 m (5' 4\")   Wt 105.7 kg (233 lb)   SpO2 94%   BMI 39.99 kg/m     BMI= Body mass index is 39.99 kg/m .  GENERAL APPEARANCE:  Alert, in no distress, morbidly obese  ENT:  Mouth and posterior oropharynx normal, moist mucous membranes  RESP:  lungs clear to auscultation , no respiratory distress  CV:  Palpation and auscultation of heart done , regular rate and rhythm, no murmur, rub, or gallop, peripheral edema trace+ in BLE  ABDOMEN:  no guarding or rebound, bowel sounds normal  M/S:   Gait and station abnormal limited ROM left knee  SKIN:  wound healing well, no signs of infection surgical dressing intact. no further bleeding beyond outline  NEURO:   NFD  PSYCH:  normal insight, judgement and " memory, affect and mood normal     SNF labs: Recent labs in [facility] reviewed by me today.       DISCHARGE PLAN:    Medical Follow Up:      Follow up with primary care provider in 1 weeks, [place] as scheduled    MTM referral needed and placed by this provider: No    Current [place] scheduled appointments:       Discharge Services: Home Care:  Physical Therapy    Orders:  1. Ok to discharge home with home PT and meds    TOTAL DISCHARGE TIME:   Greater than 30 minutes  Electronically signed by:  ALEX Vogel CNP       Documentation of Face to Face and Certification for Home Health Services    I certify that patient, Theodora Meng is under my care and that I had a face-to-face encounter that meets the physician face-to-face encounter requirements with this patient on: 4/15/2019.    This encounter with the patient was in whole, or in part, for the following medical condition, which is the primary reason for Home Health Care:(M17.12) Primary osteoarthritis of left knee  (primary encounter diagnosis)    (Z96.652) Status post total left knee replacement    (R53.81) Physical deconditioning    (I10) Essential hypertension with goal blood pressure less than 140/90    (E11.9) Type 2 diabetes mellitus without complication, without long-term current use of insulin (H)    (M79.2) Neuralgia    (F41.9) Anxiety    (K21.9) Gastroesophageal reflux disease without esophagitis    (E66.01) Morbid obesity (H)    I certify that, based on my findings, the following services are medically necessary Home Health Services: Physical Therapy    My clinical findings support the need for the above services because: Physical Therapy Services are needed to assess and treat the following functional impairments: Ambulation, ROM, endurance.    Further, I certify that my clinical findings support that this patient is homebound (i.e. absences from home require considerable and taxing effort and are for medical reasons or [employer] services or  infrequently or of short duration when for other reasons) because: Requires assistance of another person or specialized equipment to access medical services because patient:  Poor endurance, post op, needing SBA.    Based on the above findings, I certify that this patient is confined to the home and needs intermittent skilled nursing care, physical therapy and/or speech therapy.  The patient is under my care, and I have initiated the establishment of the plan of care.  This patient will be followed by a physician who will periodically review the plan of care.    Physician/Provider to provide follow up care: Yecenia Viera certified Physician at time of discharge: Chelsea Rocha CNP                Sincerely,        ALEX Vogel CNP

## 2019-04-17 ENCOUNTER — TELEPHONE (OUTPATIENT)
Dept: FAMILY MEDICINE | Facility: CLINIC | Age: 84
End: 2019-04-17

## 2019-04-17 ENCOUNTER — PATIENT OUTREACH (OUTPATIENT)
Dept: CARE COORDINATION | Facility: CLINIC | Age: 84
End: 2019-04-17

## 2019-04-17 ENCOUNTER — TRANSFERRED RECORDS (OUTPATIENT)
Dept: HEALTH INFORMATION MANAGEMENT | Facility: CLINIC | Age: 84
End: 2019-04-17

## 2019-04-17 ASSESSMENT — ACTIVITIES OF DAILY LIVING (ADL): DEPENDENT_IADLS:: TRANSPORTATION;LAUNDRY

## 2019-04-17 NOTE — PROGRESS NOTES
Clinic Care Coordination Contact    Clinic Care Coordination Contact  OUTREACH    Referral Information:  Referral Source: SNF/TCU    Primary Diagnosis: Orthopedic    Chief Complaint   Patient presents with     Clinic Care Coordination - Post Hospital     Nurse: d/c from [facility] 4[facility]16/[facility]        [place] Utilization: No concerns at this time.  Clinic Utilization  Difficulty keeping appointments:: No  Compliance Concerns: No  No-Show Concerns: No  No PCP office visit in Past Year: No  Utilization    Last refreshed: 4/17/2019  9:39 AM:  Hospital Admissions 1           Last refreshed: 4/17/2019  9:39 AM:  ED Visits 0           Last refreshed: 4/17/2019  9:39 AM:  No Show Count (past year) 0              Current as of: 4/17/2019  9:39 AM              Clinical Concerns:  Care Coordinator RN spoke with patient, she states she is doing fine since she discharged from the nursing home. She denies having any questions or concerns regarding her discharge instructions. She states she is on her way down to [place] to see the orthopedic surgeon for follow up. She states home care will call to schedule PT coming out either tomorrow or Friday. She had her right knee replaced 5 years ago.     Current Medical Concerns:    Patient Active Problem List   Diagnosis     Esophageal reflux     Essential hypertension     Benign neoplasm of stomach     Vitamin D deficiency     Neuralgia     Type 2 diabetes, HbA1C goal < 8% (H)     Anxiety     Knee pain     Insomnia     Hyperlipidemia LDL goal <100     OA (osteoarthritis) of knee     S/P total knee arthroplasty     Right knee DJD     Edema     Nausea     Fever     Status post total knee replacement     Type 2 diabetes mellitus without complication (H)     Morbid obesity (H)     DDD (degenerative disc disease), cervical     Thyroid nodule     Thickened endometrium     Endometrial polyp     Type 2 diabetes mellitus without complication, without long-term current use of insulin (H)      Essential hypertension with goal blood pressure less than 140/90     Gastroesophageal reflux disease without esophagitis     Subclinical hyperthyroidism     Left knee DJD         Current Behavioral Concerns: Denies having any concerns at this time.       Education Provided to patient: RN CC educated about Care Coordination Services, discharge instructions, medications reviewed and follow up.         Health Maintenance Reviewed: Due/Overdue   Health Maintenance Due   Topic Date Due     ZOSTER IMMUNIZATION (1 of 2) 03/31/1986     DEXA SCAN SCREENING (SYSTEM ASSIGNED)  03/31/2001     MEDICARE ANNUAL WELLNESS VISIT  12/21/2011     PHQ-2  01/01/2019       Clinical Pathway: None    Medication Management:  Patient independent in medication management and verbalizes adherence and understanding of medication regimen.        Functional Status:  Dependent ADLs:: Ambulation-walker  Dependent IADLs:: Transportation, Laundry  Bed or wheelchair confined:: No  Mobility Status: Independent w/Device  Fallen 2 or more times in the past year?: No  Any fall with injury in the past year?: No    Living Situation:  Current living arrangement:: I live alone, I live in a private home  Type of residence:: Private home - no stairs(Only stairs for basement)    Diet/Exercise/Sleep:  Diet:: Regular  Inadequate nutrition (GOAL):: No  Food Insecurity: No  Tube Feeding: No  Exercise:: Currently not exercising  Inadequate activity/exercise (GOAL):: No  Significant changes in sleep pattern (GOAL): No    Transportation:  Transportation concerns (GOAL):: No  Transportation means:: Family, Regular car     Psychosocial:  Temple or spiritual beliefs that impact treatment:: No  Mental health DX:: No  Mental health management concern (GOAL):: No  Informal Support system:: Children, Family     Financial/Insurance:   Financial/Insurance concerns (GOAL):: No  Patient denies having any concerns at this time.      Resources and Interventions:  Current  Resources: RN CC mailed out to patient intro letter, access care plan and care coordination services brochure.    List of home care services:: Physicial Therapy;   Community Resources: Home Care  Supplies used at home:: Diabetic Supplies  Equipment Currently Used at Home: cane, straight, raised toilet, tub bench, grab bar, toilet, grab bar, tub/shower, glucometer    Advance Care Plan/Directive  Advanced Care Plans/Directives on file:: Yes  Type Advanced Care Plans/Directives: POLST    Referrals Placed: None    Outreach Frequency: 2 weeks      Plan:   Patient will continue to follow treatment plan as directed and follow up with PCP with concerns ongoing.   Patient to attend her Ortho appointment today.  Care Coordinator RN to outreach to patient in two weeks.    Arlen TORRES, RN, PHN, [facility]  Primary Care Clinic RN Care Coordinator  [facility]   [email or web address]  Office:  [phone]

## 2019-04-17 NOTE — TELEPHONE ENCOUNTER
See Care Coordination encounter 4/17/2019        Arlen TORRES, RN, PHN, [facility]  Primary Care Clinic RN Care Coordinator  [facility]   [email or web address]  Office:  [phone]

## 2019-04-17 NOTE — LETTER
Health Care Home - Access Care Plan    About Me:    Patient Name:  Theodora Meng    YOB: 1935  Age:                      84 year old   [place] MRN:     [id number] Telephone Information:   Home Phone [phone]   Mobile NONE       Address:  [address] [facility] [address] Email address:  No e-mail address on record      Emergency Contact(s)   Name Relationship Lgl Grd Work Phone Home Phone Mobile Phone   1. AYDIN MENDEZ Daughter No  [phone]   2. CATRACHITA SCO* Son No  [phone]              Health Maintenance: Routine Health maintenance Reviewed: Due/Overdue   Health Maintenance Due   Topic Date Due     ZOSTER IMMUNIZATION (1 of 2) 03/31/1986     DEXA SCAN SCREENING (SYSTEM ASSIGNED)  03/31/2001     MEDICARE ANNUAL WELLNESS VISIT  12/21/2011     PHQ-2  01/01/2019     Pt to talk with provider about what is needed or can continue wait.        My Access Plan  Medical Emergency 917   Questions or concerns during clinic hours Primary Clinic Line, I will call the clinic directly: [facility] - [phone]   24 Hour Appointment Line [phone] or  [phone]) (toll free)   24 Hour Nurse Line [phone] (toll free)   Questions or concerns outside clinic hours 24 Hour Appointment Line, I will call the after-hours on-call line:   [facility] [phone] or [phone] ([phone]) (toll-free)   Preferred Urgent Care [facility] [phone]   Preferred Hospital [place], [place]  [phone]   Preferred Pharmacy [facility] [facility] - [address]      Behavioral Health Crisis Line The National Suicide Prevention Lifeline at 1-383.578.4690 or 911                     My Care Team Members  Patient Care Team       Relationship Specialty Notifications Start End    Yecenia Viera MD PCP - General   5/9/05     Phone: [phone] Fax: [phone]          [address] [place] [address]    Yecenia Viera MD Assigned PCP   11/11/18     Phone: [phone] Fax: [phone] [address] [place] [address]    Arlen Corey, RN Clinic Care Coordinator Primary Care - CC Admissions 4/17/19     Phone: [phone] Fax: [phone]        [facility]    4/17/19     Phone: [phone]                My Medical and Care Information  Problem List   Patient Active Problem List   Diagnosis     Esophageal reflux     Essential hypertension     Benign neoplasm of stomach     Vitamin D deficiency     Neuralgia     Type 2 diabetes, HbA1C goal < 8% (H)     Anxiety     Knee pain     Insomnia     Hyperlipidemia LDL goal <100     OA (osteoarthritis) of knee     S/P total knee arthroplasty     Right knee DJD     Edema     Nausea     Fever     Status post total knee replacement     Type 2 diabetes mellitus without complication (H)     Morbid obesity (H)     DDD (degenerative disc disease), cervical     Thyroid nodule     Thickened endometrium     Endometrial polyp     Type 2 diabetes mellitus without complication, without long-term current use of insulin (H)     Essential hypertension with goal blood pressure less than 140/90     Gastroesophageal reflux disease without esophagitis     Subclinical hyperthyroidism     Left knee DJD      Current Medications and Allergies:  See printed Medication Report

## 2019-04-17 NOTE — LETTER
[place] CARE COORDINATION  [address] [address]  [phone]    April 17, 2019    Theodora Meng  [address] [place] [id number]      Dear Theodora,    I am a clinic care coordinator who works with Yecenia Viera MD at [facility]. I wanted to thank you for spending the time talking with me.  I wanted to introduce myself and provide you with my contact information so that you can call me with questions or concerns about your health care. Below is a description of clinic care coordination and how I can further assist you.     The clinic care coordinator is a registered nurse and/or  who understand the health care system. The goal of clinic care coordination is to help you manage your health and improve access to the [facility] in the most efficient manner. The registered nurse can assist you in meeting your health care goals by providing education, coordinating services, and strengthening the communication among your providers. The  can assist you with financial, behavioral, psychosocial, chemical dependency, counseling, and/or psychiatric resources.    Please feel free to contact me at [phone], with any questions or concerns. We at [facility] are focused on providing you with the highest-quality healthcare experience possible and that all starts with you.     Sincerely,     Arlen TORRES RN, PHN, [facility]  Primary Care Clinic RN Care Coordinator  [facility]   [email or web address]  Office:  [phone]            Enclosed: I have enclosed a copy of a 24 Hour Access Plan. This has helpful phone numbers for you to call when needed. Please keep this in an easy to access place to use as needed.   I have sent a Consent to Communicate form for you to complete (as you wish) to allow us to discuss/share your personal health information with whomever you choose on your  "behalf.   I have highlighted the areas for you to review/address.  Please complete all that apply.  Please check the box for medical information if you allow the clinic to share personal health information with whomever you choose.  This form must be signed and dated to be valid.  If you check all boxes, please be aware that checking \"nothing\" despite checking other boxes will allow no information to be shared.      If you wish not to have information regarding your mental health, chemical health, AIDS/HIV or sickle cell anemia, then please initial the second bullet point at the end of the form.  If you allow this information to be shared with those noted above on the form, then leave this area blank and do not initial.      This form does not , you can make a change to this document at any time.    "

## 2019-04-18 ENCOUNTER — TELEPHONE (OUTPATIENT)
Dept: FAMILY MEDICINE | Facility: CLINIC | Age: 84
End: 2019-04-18

## 2019-04-18 NOTE — TELEPHONE ENCOUNTER
[facility] now requests orders and shares plan of care/discharge summaries for some patients through [employer].  Please REPLY TO THIS MESSAGE OR ROUTE BACK TO THE AUTHOR in order to give authorization for orders when needed.  This is considered a verbal order, you will still receive a faxed copy of orders for signature.  Thank you for your assistance in improving collaboration for our patients.    ORDER    MD SUMMARY/PLAN OF CARE    PT for ambulation, transfers, exercises and pain management 3w1, 2w1.  OT eval and treat for ADLs and shower assessment.    Huyen Shaver MPT

## 2019-04-22 ENCOUNTER — TELEPHONE (OUTPATIENT)
Dept: FAMILY MEDICINE | Facility: CLINIC | Age: 84
End: 2019-04-22

## 2019-04-22 NOTE — TELEPHONE ENCOUNTER
Kamini had knee replacement surgery. She has seen Ortho for post op but says she was suppose to see Dr Viera also to follow up after her surgery. There are no appointments available until May 9th. She wants to see if it's OK to wait or what Dr Viera advises. She says she did not need to see her for anything else.[phone]

## 2019-04-23 ENCOUNTER — OFFICE VISIT (OUTPATIENT)
Dept: FAMILY MEDICINE | Facility: CLINIC | Age: 84
End: 2019-04-23
Payer: COMMERCIAL

## 2019-04-23 VITALS
TEMPERATURE: 98.6 F | SYSTOLIC BLOOD PRESSURE: 106 MMHG | BODY MASS INDEX: 37.93 KG/M2 | RESPIRATION RATE: 16 BRPM | HEART RATE: 79 BPM | OXYGEN SATURATION: 98 % | DIASTOLIC BLOOD PRESSURE: 68 MMHG | WEIGHT: 221 LBS

## 2019-04-23 DIAGNOSIS — I10 ESSENTIAL HYPERTENSION WITH GOAL BLOOD PRESSURE LESS THAN 140/90: ICD-10-CM

## 2019-04-23 DIAGNOSIS — K21.9 GASTROESOPHAGEAL REFLUX DISEASE WITHOUT ESOPHAGITIS: ICD-10-CM

## 2019-04-23 DIAGNOSIS — Z96.652 STATUS POST TOTAL LEFT KNEE REPLACEMENT: Primary | ICD-10-CM

## 2019-04-23 DIAGNOSIS — M79.2 NEURALGIA: ICD-10-CM

## 2019-04-23 DIAGNOSIS — E11.9 TYPE 2 DIABETES MELLITUS WITHOUT COMPLICATION, WITHOUT LONG-TERM CURRENT USE OF INSULIN (H): ICD-10-CM

## 2019-04-23 DIAGNOSIS — L85.3 DRY SKIN: ICD-10-CM

## 2019-04-23 PROCEDURE — 99495 TRANSJ CARE MGMT MOD F2F 14D: CPT | Performed by: FAMILY MEDICINE

## 2019-04-23 NOTE — PATIENT INSTRUCTIONS
Take the Nexium twice a day until you stop the Tramadol    Stop that as soon as you can    For constipation:   Miralax-draws water into the stool  Senna-natural stimulant-one twice a day  Fiber-use the chews, tabs  (long term)  Milk of magnesia short term  Dulcolax-as needed, 2 at bedtime     Use the Desitin a couple more days and can stop

## 2019-04-23 NOTE — PROGRESS NOTES
SUBJECTIVE:   Theodora Meng is a 84 year old female who presents to clinic today for the following   health issues:          Hospital Follow-up Visit:    Hospital/Nursing Home/ Rehab Facility: [facility]   Date of Admission: 4/03/2019  Date of Discharge: 4/16/2019  Reason(s) for Admission: Left knee replacement            Problems taking medications regularly:  None       Medication changes since discharge: None       Problems adhering to non-medication therapy:  None    Summary of hospitalization:  [facility] discharge summary reviewed  Diagnostic Tests/Treatments reviewed.  Follow up needed: none  Other Healthcare Providers Involved in Patient s Care:         None  Update since discharge: improved.     Post Discharge Medication Reconciliation: discharge medications reconciled, continue medications without change.  Plan of care communicated with patient     Coding guidelines for this visit:  Type of Medical   Decision Making Face-to-Face Visit       within 7 Days of discharge Face-to-Face Visit        within 14 days of discharge   Moderate Complexity [id number]   High Complexity 21217 69938          Had her left knee replaced.  Everything went well.  She is now home from [facility]. Her pain is getting better. She is down to one tramadol a day. She is on aspirin. She has physical therapy at home.     Type 2 diabetes- diabetes diet controlled.  Blood sugars were fine in the nursing home.  130s and 140s for the most part.    Lab Results   Component Value Date    A1C 7.1 03/20/2019    A1C 7.1 11/06/2018    A1C 7.3 12/12/2017    A1C 7.2 04/06/2017    A1C 7.1 11/10/2016     Hypertension- blood pressures were in the 140s over 70s range.  Is on atenolol  25 mg daily and lisinopril 5 mg.     Neuralgia takes gabapentin 600 mg at bedtime    Reflux- had been stable.  On Nexium 40 mg daily. But the tramadol seems to make her nauseated.     Sore-she has a sore on her  buttock, and some erythema. Noticed at [facility], but no one looked at it. Has had it a week.     Additional history: as documented    Reviewed  and updated as needed this visit by clinical staff  Tobacco  Allergies  Meds  Problems  Med Hx  Surg Hx  Fam Hx  Soc Hx          Reviewed and updated as needed this visit by Provider  Tobacco  Allergies  Meds  Problems  Med Hx  Surg Hx  Fam Hx         BP Readings from Last 3 Encounters:   04/23/19 106/68   04/12/19 140/86   04/08/19 143/63    Wt Readings from Last 3 Encounters:   04/23/19 100.2 kg (221 lb)   04/12/19 105.7 kg (233 lb)   04/08/19 104.6 kg (230 lb 9.6 oz)              ROS: 5 point ROS negative except as noted above in HPI, including Gen., Resp., CV, GI &  system review except has been constipated       OBJECTIVE: /68 (BP Location: Right arm)   Pulse 79   Temp 98.6  F (37  C) (Tympanic)   Resp 16   Wt 100.2 kg (221 lb)   SpO2 98%   BMI 37.93 kg/m       General: appears well, no distress  Neck: supple, no adenopathy  Heart: regular rate and rhythm, normal S1S2, no murmur  Lungs: clear to ascultation   Knee has midline incision, clean, dry and intact, 3 steristrips removed, under one there was one retained staple at the bottom, this was removed  Skin: area between buttocks is slightly pink, on right buttock is a dry area with some scale, no ulceration     ASSESSMENT:  1. Status post total left knee replacement    2. Type 2 diabetes mellitus without complication, without long-term current use of insulin (H)    3. Gastroesophageal reflux disease without esophagitis    4. Essential hypertension with goal blood pressure less than 140/90    5. Dry skin    6. Neuralgia        PLAN:  Increase nexium  Wean off Tramadol  Reassured her that buttock looks ok, no ulceration there      Patient Instructions   Take the Nexium twice a day until you stop the Tramadol    Stop that as soon as you can    For constipation:   Miralax-draws water into the  stool  Senna-natural stimulant-one twice a day  Fiber-use the chews, tabs  (long term)  Milk of magnesia short term  Dulcolax-as needed, 2 at bedtime     Use the Desitin a couple more days and can stop         Yecenia Cardona MD

## 2019-04-23 NOTE — NURSING NOTE
"Chief Complaint   Patient presents with     Hospital F/U     knee replacement       Initial /68 (BP Location: Right arm)   Pulse 79   Temp 98.6  F (37  C) (Tympanic)   Resp 16   Wt 100.2 kg (221 lb)   SpO2 98%   BMI 37.93 kg/m   Estimated body mass index is 37.93 kg/m  as calculated from the following:    Height as of 4/12/19: 1.626 m (5' 4\").    Weight as of this encounter: 100.2 kg (221 lb).    Patient presents to the clinic using [facility] that is potentially due pending provider review:  NONE    n/a    Is there anyone who you would like to be able to receive your results? No  If yes have patient fill out YOLI    "

## 2019-05-14 ENCOUNTER — PATIENT OUTREACH (OUTPATIENT)
Dept: CARE COORDINATION | Facility: CLINIC | Age: 84
End: 2019-05-14

## 2019-05-14 ASSESSMENT — ACTIVITIES OF DAILY LIVING (ADL): DEPENDENT_IADLS:: TRANSPORTATION;LAUNDRY

## 2019-05-14 NOTE — PROGRESS NOTES
Clinic Care Coordination Contact    Clinic Care Coordination Contact  OUTREACH    Referral Information:  Referral Source: SNF/TCU    Primary Diagnosis: Orthopedic    Chief Complaint   Patient presents with     Clinic Care Coordination - Follow-up     Nurse: f/u        Universal Utilization: No concerns at this time.   Clinic Utilization  Difficulty keeping appointments:: No  Compliance Concerns: No  No-Show Concerns: No  No PCP office visit in Past Year: No  Utilization    Last refreshed: 5/3/2019  8:04 AM:  Hospital Admissions 1           Last refreshed: 5/3/2019  8:04 AM:  ED Visits 0           Last refreshed: 5/3/2019  8:04 AM:  No Show Count (past year) 0              Current as of: 5/3/2019  8:04 AM              Clinical Concerns:   Care Coordinator RN spoke with patient, she states she has f/u with Dr. Mcgowan tomorrow for 6 week follow up. She no longer has home care services, she was discharged from them 5/2/19. She states she is walking two blocks daily. She denies having any questions or concerns at this time. Patient did see PCP 4/23/19.     Current Medical Concerns:    Patient Active Problem List   Diagnosis     Esophageal reflux     Essential hypertension     Benign neoplasm of stomach     Vitamin D deficiency     Neuralgia     Type 2 diabetes, HbA1C goal < 8% (H)     Anxiety     Knee pain     Insomnia     Hyperlipidemia LDL goal <100     OA (osteoarthritis) of knee     S/P total knee arthroplasty     Right knee DJD     Edema     Nausea     Fever     Status post total knee replacement     Type 2 diabetes mellitus without complication (H)     Morbid obesity (H)     DDD (degenerative disc disease), cervical     Thyroid nodule     Thickened endometrium     Endometrial polyp     Type 2 diabetes mellitus without complication, without long-term current use of insulin (H)     Essential hypertension with goal blood pressure less than 140/90     Gastroesophageal reflux disease without esophagitis     Subclinical  hyperthyroidism     Left knee DJD         Current Behavioral Concerns: Denies having any concerns at this time.     Education Provided to patient: Care Coordinator RN educated patient to please feel free to call the clinic or care coordination if she needs any assistance regarding her health.      Health Maintenance Reviewed: Due/Overdue   Health Maintenance Due   Topic Date Due     ZOSTER IMMUNIZATION (1 of 2) 03/31/1986     DEXA SCAN SCREENING (SYSTEM ASSIGNED)  03/31/2001     MEDICARE ANNUAL WELLNESS VISIT  12/21/2011       Clinical Pathway: None    Medication Management:  Patient independent in medication management and verbalizes adherence and understanding of medication regimen.        Functional Status:  Dependent ADLs:: Ambulation-walker  Dependent IADLs:: Transportation, Laundry  Bed or wheelchair confined:: No  Mobility Status: Independent w/Device  Fallen 2 or more times in the past year?: No  Any fall with injury in the past year?: No    Living Situation:  Current living arrangement:: I live alone, I live in a private home  Type of residence:: Private home - no stairs(Only stairs for basement)    Diet/Exercise/Sleep:  Diet:: Regular  Inadequate nutrition (GOAL):: No  Food Insecurity: No  Tube Feeding: No  Exercise:: Currently not exercising  Inadequate activity/exercise (GOAL):: No  Significant changes in sleep pattern (GOAL): No    Transportation:  Transportation concerns (GOAL):: No  Transportation means:: Family, Regular car     Psychosocial:  Restorationist or spiritual beliefs that impact treatment:: No  Mental health DX:: No  Mental health management concern (GOAL):: No  Informal Support system:: Children, Family     Financial/Insurance:   Financial/Insurance concerns (GOAL):: No       Resources and Interventions:  Current Resources:  Patient states she does have Care Coordinator RN contact information that was sent from previous outreach.   Community Resources: None  Supplies used at home:: Diabetic  Supplies  Equipment Currently Used at Home: cane, straight, raised toilet, tub bench, grab bar, toilet, grab bar, tub/shower, glucometer    Advance Care Plan/Directive  Advanced Care Plans/Directives on file:: Yes  Type Advanced Care Plans/Directives: POLST    Referrals Placed: None      Outreach Frequency: 2 weeks      Plan:   No further Care Coordination needs identified at this time. Patient may be referred to Care Coordination in the future if additional needs arise.  Pt encouraged to contact Care Coordinator through the clinic if situation changes and assistance is needed. No follow-up planned.    Arlen TORRES, RN, PHN, [facility]  Primary Care Clinic RN [facility] Coord[facility]   [email or web address]  Office:  [phone]

## 2019-05-15 ENCOUNTER — TRANSFERRED RECORDS (OUTPATIENT)
Dept: HEALTH INFORMATION MANAGEMENT | Facility: CLINIC | Age: 84
End: 2019-05-15

## 2019-05-20 ENCOUNTER — TRANSFERRED RECORDS (OUTPATIENT)
Dept: HEALTH INFORMATION MANAGEMENT | Facility: CLINIC | Age: 84
End: 2019-05-20

## 2019-06-20 ENCOUNTER — OFFICE VISIT (OUTPATIENT)
Dept: FAMILY MEDICINE | Facility: CLINIC | Age: 84
End: 2019-06-20
Payer: COMMERCIAL

## 2019-06-20 VITALS
TEMPERATURE: 97.9 F | RESPIRATION RATE: 16 BRPM | BODY MASS INDEX: 37.56 KG/M2 | HEART RATE: 60 BPM | HEIGHT: 64 IN | WEIGHT: 220 LBS | DIASTOLIC BLOOD PRESSURE: 74 MMHG | OXYGEN SATURATION: 99 % | SYSTOLIC BLOOD PRESSURE: 114 MMHG

## 2019-06-20 DIAGNOSIS — E11.9 TYPE 2 DIABETES MELLITUS WITHOUT COMPLICATION, WITHOUT LONG-TERM CURRENT USE OF INSULIN (H): ICD-10-CM

## 2019-06-20 DIAGNOSIS — H53.8 CLOUDY VISION: ICD-10-CM

## 2019-06-20 DIAGNOSIS — Z01.818 PREOP GENERAL PHYSICAL EXAM: Primary | ICD-10-CM

## 2019-06-20 DIAGNOSIS — I10 ESSENTIAL HYPERTENSION WITH GOAL BLOOD PRESSURE LESS THAN 140/90: ICD-10-CM

## 2019-06-20 LAB
ALBUMIN SERPL-MCNC: 3.4 G/DL (ref 3.4–5)
ALP SERPL-CCNC: 87 U/L (ref 40–150)
ALT SERPL W P-5'-P-CCNC: 14 U/L (ref 0–50)
ANION GAP SERPL CALCULATED.3IONS-SCNC: 6 MMOL/L (ref 3–14)
AST SERPL W P-5'-P-CCNC: 13 U/L (ref 0–45)
BILIRUB SERPL-MCNC: 0.4 MG/DL (ref 0.2–1.3)
BUN SERPL-MCNC: 17 MG/DL (ref 7–30)
CALCIUM SERPL-MCNC: 9.3 MG/DL (ref 8.5–10.1)
CHLORIDE SERPL-SCNC: 103 MMOL/L (ref 94–109)
CO2 SERPL-SCNC: 27 MMOL/L (ref 20–32)
CREAT SERPL-MCNC: 0.74 MG/DL (ref 0.52–1.04)
ERYTHROCYTE [DISTWIDTH] IN BLOOD BY AUTOMATED COUNT: 13.7 % (ref 10–15)
GFR SERPL CREATININE-BSD FRML MDRD: 74 ML/MIN/{1.73_M2}
GLUCOSE SERPL-MCNC: 125 MG/DL (ref 70–99)
HBA1C MFR BLD: 6.6 % (ref 0–5.6)
HCT VFR BLD AUTO: 37.4 % (ref 35–47)
HGB BLD-MCNC: 11.8 G/DL (ref 11.7–15.7)
MCH RBC QN AUTO: 29.5 PG (ref 26.5–33)
MCHC RBC AUTO-ENTMCNC: 31.6 G/DL (ref 31.5–36.5)
MCV RBC AUTO: 94 FL (ref 78–100)
PLATELET # BLD AUTO: 280 10E9/L (ref 150–450)
POTASSIUM SERPL-SCNC: 3.9 MMOL/L (ref 3.4–5.3)
PROT SERPL-MCNC: 6.8 G/DL (ref 6.8–8.8)
RBC # BLD AUTO: 4 10E12/L (ref 3.8–5.2)
SODIUM SERPL-SCNC: 136 MMOL/L (ref 133–144)
WBC # BLD AUTO: 4 10E9/L (ref 4–11)

## 2019-06-20 PROCEDURE — 99214 OFFICE O/P EST MOD 30 MIN: CPT | Performed by: FAMILY MEDICINE

## 2019-06-20 PROCEDURE — 80053 COMPREHEN METABOLIC PANEL: CPT | Performed by: FAMILY MEDICINE

## 2019-06-20 PROCEDURE — 85027 COMPLETE CBC AUTOMATED: CPT | Performed by: FAMILY MEDICINE

## 2019-06-20 PROCEDURE — 36415 COLL VENOUS BLD VENIPUNCTURE: CPT | Performed by: FAMILY MEDICINE

## 2019-06-20 PROCEDURE — 83036 HEMOGLOBIN GLYCOSYLATED A1C: CPT | Performed by: FAMILY MEDICINE

## 2019-06-20 ASSESSMENT — PAIN SCALES - GENERAL: PAINLEVEL: NO PAIN (0)

## 2019-06-20 ASSESSMENT — MIFFLIN-ST. JEOR: SCORE: 1437.91

## 2019-06-20 NOTE — LETTER
June 27, 2019      Theodora LÁZARO Meng  [address] [facility] [address]        Dear ,    We are writing to inform you of your test results.    Your glucose, or blood sugar, a test for diabetes, was 125. Normal is 60-99.  Hemoglobin A1C measures the average of the blood sugar over several months. If you have diabetes, the goal is under 8%. Yours is 6.6.  Hemoglobin measures the amount of red blood cells carrying oxygen to the body's tissues. Yours was normal.  Electrolytes, including sodium, potassium, chloride, bicarbonate and calcium are all normal salts in the bloodstream. Yours are all normal.  Urea nitrogen and creatinine are kidney function tests. Yours are normal.  ALT and AST are liver function tests. Yours are normal.    Resulted Orders   Hemoglobin A1c   Result Value Ref Range    Hemoglobin A1C 6.6 (H) 0 - 5.6 %      Comment:      Normal <5.7% Prediabetes 5.7-6.4%  Diabetes 6.5% or higher - adopted from ADA   consensus guidelines.     Comprehensive metabolic panel   Result Value Ref Range    Sodium 136 133 - 144 mmol/L    Potassium 3.9 3.4 - 5.3 mmol/L    Chloride 103 94 - 109 mmol/L    Carbon Dioxide 27 20 - 32 mmol/L    Anion Gap 6 3 - 14 mmol/L    Glucose 125 (H) 70 - 99 mg/dL    Urea Nitrogen 17 7 - 30 mg/dL    Creatinine 0.74 0.52 - 1.04 mg/dL    GFR Estimate 74 >60 mL/min/[1.73_m2]      Comment:      Non  GFR Calc  Starting 12/18/2018, serum creatinine based estimated GFR (eGFR) will be   calculated using the Chronic Kidney Disease Epidemiology Collaboration   (CKD-EPI) equation.      GFR Estimate If Black 86 >60 mL/min/[1.73_m2]      Comment:       GFR Calc  Starting 12/18/2018, serum creatinine based estimated GFR (eGFR) will be   calculated using the Chronic Kidney Disease Epidemiology Collaboration   (CKD-EPI) equation.      Calcium 9.3 8.5 - 10.1 mg/dL    Bilirubin Total 0.4 0.2 - 1.3 mg/dL    Albumin 3.4 3.4 - 5.0 g/dL    Protein Total 6.8 6.8 -  8.8 g/dL    Alkaline Phosphatase 87 40 - 150 U/L    ALT 14 0 - 50 U/L    AST 13 0 - 45 U/L   CBC with platelets   Result Value Ref Range    WBC 4.0 4.0 - 11.0 10e9/L    RBC Count 4.00 3.8 - 5.2 10e12/L    Hemoglobin 11.8 11.7 - 15.7 g/dL    Hematocrit 37.4 35.0 - 47.0 %    MCV 94 78 - 100 fl    MCH 29.5 26.5 - 33.0 pg    MCHC 31.6 31.5 - 36.5 g/dL    RDW 13.7 10.0 - 15.0 %    Platelet Count 280 150 - 450 10e9/L       If you have any questions or concerns, please call the clinic at the number listed above.       Sincerely,        Yecenia Viera MD

## 2019-06-20 NOTE — PROGRESS NOTES
[facility]  [address] [place] [address]  [phone]  Dept: [phone]    PRE-OP EVALUATION:  Today's date: 2019    Theodora Meng (: 1935) presents for pre-operative evaluation assessment as requested by Dr. Salazar.  She requires evaluation and anesthesia risk assessment prior to undergoing surgery/procedure for treatment of YAG Capsulotomy .    Proposed Surgery/ Procedure: YAG Capsulotomy  Date of Surgery/ Procedure: 19  Time of Surgery/ Procedure: early [facility]  Hospital/Surgical Facility: [facility]  Fax number for surgical facility: [phone]  Primary Physician: Yecenia Viera  Type of Anesthesia Anticipated: General    Patient has a Health Care Directive or Living Will:  NO    1. NO - Do you have a history of heart attack, stroke, stent, bypass or surgery on an artery in the head, neck, heart or legs?  2. NO - Do you ever have any pain or discomfort in your chest?  3. NO - Do you have a history of  Heart Failure?  4. NO - Are you troubled by shortness of breath when: walking on the level, up a slight hill or at night?  5. NO - Do you currently have a cold, bronchitis or other respiratory infection?  6. NO - Do you have a cough, shortness of breath or wheezing?  7. NO - Do you sometimes get pains in the calves of your legs when you walk?  8. NO - Do you or anyone in your family have previous history of blood clots?  9. NO - Do you or does anyone in your family have a serious bleeding problem such as prolonged bleeding following surgeries or cuts?  10. NO - Have you ever had problems with anemia or been told to take iron pills?  11. NO - Have you had any abnormal blood loss such as black, tarry or bloody stools, or abnormal vaginal bleeding?  12. NO - Have you ever had a blood transfusion?  13. NO - Have you or any of your relatives ever had problems with anesthesia?  14. NO - Do you have sleep apnea, excessive snoring or daytime  drowsiness?  15. NO - Do you have any prosthetic heart valves?  16. YES - Do you have prosthetic joints? knees  17. NO - Is there any chance that you may be pregnant?      HPI:     HPI related to upcoming procedure: had cataract surgery many years ago, 10, and developed cloudiness in that left eye 2 months ago.  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       DIABETES - Patient has a longstanding history of DiabetesType Type II . Patient is being treated with diet after weight loss. Could not tolerate metformin.  Control has been good. Complicating factors include but are not limited to: hypertension   Lab Results   Component Value Date    A1C 7.1 03/20/2019    A1C 7.1 11/06/2018    A1C 7.3 12/12/2017    A1C 7.2 04/06/2017    A1C 7.1 11/10/2016          HYPERTENSION - Patient has longstanding history of HTN , currently denies any symptoms referable to elevated blood pressure. Specifically denies chest pain, palpitations, dyspnea, orthopnea, PND or peripheral edema. Blood pressure readings have been in normal range. Current medication regimen is as listed below. Patient denies any side effects of medication.     BP Readings from Last 6 Encounters:   06/20/19 114/74   04/23/19 106/68   04/12/19 140/86   04/08/19 143/63   04/08/19 146/76   04/06/19 140/75      Pulse Readings from Last 3 Encounters:   06/20/19 60   04/23/19 79   04/12/19 94      Wt Readings from Last 5 Encounters:   06/20/19 99.8 kg (220 lb)   04/23/19 100.2 kg (221 lb)   04/12/19 105.7 kg (233 lb)   04/08/19 104.6 kg (230 lb 9.6 oz)   04/08/19 105.7 kg (233 lb)      MEDICAL HISTORY:     Patient Active Problem List    Diagnosis Date Noted     Left knee DJD 04/03/2019     Priority: Medium     Subclinical hyperthyroidism 11/12/2018     Priority: Medium     Type 2 diabetes mellitus without complication, without long-term current use of insulin (H) 11/10/2016      Priority: Medium     Essential hypertension with goal blood pressure less than 140/90 11/10/2016     Priority: Medium     Gastroesophageal reflux disease without esophagitis 11/10/2016     Priority: Medium     Endometrial polyp 06/18/2016     Priority: Medium     Thickened endometrium 04/21/2016     Priority: Medium     Thyroid nodule 03/24/2016     Priority: Medium     DDD (degenerative disc disease), cervical 02/13/2016     Priority: Medium     Type 2 diabetes mellitus without complication (H) 10/21/2015     Priority: Medium     Morbid obesity (H) 10/21/2015     Priority: Medium     Status post total knee replacement 11/29/2013     Priority: Medium     Fever 11/11/2013     Priority: Medium     Edema 11/07/2013     Priority: Medium     Nausea 11/07/2013     Priority: Medium     S/P total knee arthroplasty 10/30/2013     Priority: Medium     Right knee DJD 10/30/2013     Priority: Medium     OA (osteoarthritis) of knee 12/11/2012     Priority: Medium     Hyperlipidemia LDL goal <100 12/04/2012     Priority: Medium     Knee pain 11/07/2012     Priority: Medium     Insomnia 11/07/2012     Priority: Medium     Anxiety 11/15/2011     Priority: Medium     Type 2 diabetes, HbA1C goal < 8% (H) 12/21/2010     Priority: Medium     Neuralgia 06/05/2009     Priority: Medium     Vitamin D deficiency 03/30/2009     Priority: Medium     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 Provider to review        Past Medical History:   Diagnosis Date     Depressive disorder      Diabetes (H)      Esophageal reflux      Other abnormal glucose      Other premature beats      Unspecified essential hypertension      Past Surgical History:   Procedure Laterality Date     APPENDECTOMY       ARTHROPLASTY KNEE  10/30/2013    Procedure: ARTHROPLASTY KNEE;  Right Total  Knee Arthroplasty;  Surgeon: Ousmane Mcgowan MD;  Location: [facility]     ARTHROPLASTY KNEE Left 4/3/2019    Procedure: Left Total Knee Arthroplasty;  Surgeon: Ousmane Mcgowan MD;  Location: [facility]     CHOLECYSTECTOMY, LAPOROSCOPIC      Cholecystectomy, Laparoscopic     DILATION AND CURETTAGE, OPERATIVE HYSTEROSCOPY WITH MORCELLATOR, COMBINED N/A 6/20/2016    Procedure: COMBINED DILATION AND CURETTAGE, OPERATIVE HYSTEROSCOPY WITH MORCELLATOR;  Surgeon: Bony Arredondo MD;  Location: [facility]     ESOPHAGOSCOPY, GASTROSCOPY, DUODENOSCOPY (EGD), COMBINED N/A 2/17/2016    Procedure: COMBINED ESOPHAGOSCOPY, GASTROSCOPY, DUODENOSCOPY (EGD), BIOPSY SINGLE OR MULTIPLE;  Surgeon: Mil Guevara MD;  Location: [facility]     SALPINGO OOPHORECTOMY,R/L/OZZY      Salpingo Oophorectomy, RT/LT/OZZY     TONSILLECTOMY       Current Outpatient Medications   Medication Sig Dispense Refill     acetaminophen (TYLENOL) 500 MG tablet Take 1,000 mg by mouth 3 times daily  mg by mouth every 4 hours as needed       atenolol (TENORMIN) 25 MG tablet Take 1 tablet (25 mg) by mouth daily 90 tablet 4     atorvastatin (LIPITOR) 10 MG tablet Take 1 tablet (10 mg) by mouth daily 90 tablet 3     blood glucose monitoring (ACCU-CHEK COMPACT CARE KIT) meter device kit Use to test blood sugars 1 times daily or as directed. May sub formulary meter 1 kit 0     blood glucose monitoring (NO BRAND SPECIFIED) test strip Use to test blood sugars twice times daily or as directed 1 Box 3     esomeprazole (NEXIUM) 40 MG CR capsule Take 1 capsule (40 mg) by mouth every morning (before breakfast) 90 capsule 4     gabapentin (NEURONTIN) 300 MG capsule Take 2 tabs in the evening. 180 capsule 3     lisinopril (PRINIVIL/ZESTRIL) 5 MG tablet Take 1 tablet (5 mg) by mouth daily 90 tablet 3     OTC products: None, except as noted above    Allergies   Allergen Reactions     Zoloft [Sertraline] Palpitations, Diarrhea and Cramps      Latex Allergy: NO    Social  "History     Tobacco Use     Smoking status: Never Smoker     Smokeless tobacco: Never Used   Substance Use Topics     Alcohol use: No     History   Drug Use No       REVIEW OF SYSTEMS:   CONSTITUTIONAL: NEGATIVE for fever, chills, change in weight  ENT/MOUTH: NEGATIVE for ear, mouth and throat problems  RESP: NEGATIVE for significant cough or SOB  CV: NEGATIVE for chest pain, palpitations or peripheral edema  GI: NEGATIVE for nausea, abdominal pain, heartburn, or change in bowel habits  : No dysuria, urinary frequency or urgency.     EXAM:   /74 (BP Location: Right arm, Patient Position: Sitting, Cuff Size: Adult Large)   Pulse 60   Temp 97.9  F (36.6  C) (Tympanic)   Resp 16   Ht 1.626 m (5' 4\")   Wt 99.8 kg (220 lb)   SpO2 99%   Breastfeeding? No   BMI 37.76 kg/m    GENERAL APPEARANCE: healthy, alert and no distress  HENT: ear canals and TM's normal and nose and mouth without ulcers or lesions  Neck: no adenopathy, normal thyroid  RESP: lungs clear to auscultation - no rales, rhonchi or wheezes  CV: regular rate and rhythm, normal S1 S2, no S3 or S4 and no murmur, click or rub   ABDOMEN: soft, obese, nontender, no HSM or masses and bowel sounds normal  MS: left knee with well healed midline scar, clean, dry and intact, good flexion  NEURO: Normal strength and tone, sensory exam grossly normal, mentation intact and speech normal    DIAGNOSTICS:     EKG: done 3/2019, sinus bradycardia, normal axis, normal intervals, no acute ST/T changes c/w ischemia, no LVH by voltage criteria, low voltage in precordial leads, poor R wave progression  Labs Drawn and in Process:   Results for orders placed or performed in visit on 06/20/19   Hemoglobin A1c   Result Value Ref Range    Hemoglobin A1C 6.6 (H) 0 - 5.6 %   CBC with platelets   Result Value Ref Range    WBC 4.0 4.0 - 11.0 10e9/L    RBC Count 4.00 3.8 - 5.2 10e12/L    Hemoglobin 11.8 11.7 - 15.7 g/dL    Hematocrit 37.4 35.0 - 47.0 %    MCV 94 78 - 100 fl    " MCH 29.5 26.5 - 33.0 pg    MCHC 31.6 31.5 - 36.5 g/dL    RDW 13.7 10.0 - 15.0 %    Platelet Count 280 150 - 450 10e9/L      Unresulted Labs Ordered in the Past 30 Days of this Admission     Date and Time Order Name Status Description    6/20/2019 0912 COMPREHENSIVE METABOLIC PANEL In process           Recent Labs   Lab Test 04/05/19  0533 04/04/19  0533 03/20/19  1135 11/06/18  0900   HGB 9.7* 9.7* 12.7  --    PLT  --  221 279  --    NA  --   --  137 136   POTASSIUM  --   --  4.0 4.1   CR  --  0.75 0.79 0.74   A1C  --   --  7.1* 7.1*        IMPRESSION:   Reason for surgery/procedure: cloudy vision    The proposed surgical procedure is considered LOW risk.    REVISED CARDIAC RISK INDEX  The patient has the following serious cardiovascular risks for perioperative complications such as (MI, PE, VFib and 3  AV Block):  No serious cardiac risks  INTERPRETATION: 0 risks: Class I (very low risk - 0.4% complication rate)    The patient has the following additional risks for perioperative complications:  No identified additional risks      ICD-10-CM    1. Preop general physical exam Z01.818    2. Cloudy vision H53.8    3. Type 2 diabetes mellitus without complication, without long-term current use of insulin (H) E11.9 Hemoglobin A1c   4. Essential hypertension with goal blood pressure less than 140/90 I10 Comprehensive metabolic panel     CBC with platelets       RECOMMENDATIONS:         --Patient is to take all scheduled medications on the day of surgery EXCEPT for modifications listed below.    Take all am meds with sip of water    APPROVAL GIVEN to proceed with proposed procedure, without further diagnostic evaluation       Signed Electronically by: Yecenia Viera MD    Copy of this evaluation report is provided to requesting physician.    [place] Preop Guidelines    Revised Cardiac Risk Index

## 2019-07-22 ENCOUNTER — TRANSFERRED RECORDS (OUTPATIENT)
Dept: HEALTH INFORMATION MANAGEMENT | Facility: CLINIC | Age: 84
End: 2019-07-22

## 2019-08-22 ENCOUNTER — TRANSFERRED RECORDS (OUTPATIENT)
Dept: HEALTH INFORMATION MANAGEMENT | Facility: CLINIC | Age: 84
End: 2019-08-22

## 2019-11-12 DIAGNOSIS — K21.9 GASTROESOPHAGEAL REFLUX DISEASE WITHOUT ESOPHAGITIS: ICD-10-CM

## 2019-11-12 RX ORDER — ESOMEPRAZOLE MAGNESIUM 40 MG/1
40 CAPSULE, DELAYED RELEASE ORAL
Qty: 90 CAPSULE | Refills: 4 | Status: SHIPPED | OUTPATIENT
Start: 2019-11-12 | End: 2020-09-29

## 2019-11-12 NOTE — TELEPHONE ENCOUNTER
"Requested Prescriptions   Pending Prescriptions Disp Refills     esomeprazole (NEXIUM) 40 MG DR capsule 90 capsule 4     Sig: Take 1 capsule (40 mg) by mouth every morning (before breakfast)       PPI Protocol Passed - 11/12/2019 11:40 AM        Passed - Not on Clopidogrel (unless Pantoprazole ordered)        Passed - No diagnosis of osteoporosis on record        Passed - Recent (12 mo) or future (30 days) visit within the authorizing provider's specialty     Patient has had an office visit with the authorizing provider or a provider within the authorizing providers department within the previous 12 mos or has a future within next 30 days. See \"Patient Info\" tab in inbasket, or \"Choose Columns\" in Meds & Orders section of the refill encounter.              Passed - Medication is active on med list        Passed - Patient is age 18 or older        Passed - No active pregnacy on record        Passed - No positive pregnancy test in past 12 months        Last Written Prescription Date:  11/16/18  Last Fill Quantity: 90,  # refills: 4   Last office visit: 6/20/2019 with prescribing provider:     Future Office Visit:   Next 5 appointments (look out 90 days)    Nov 18, 2019  9:40 AM CST  [facility] Visit with [facility]ecenia Armstrong [facility]  [facility] [address] [place] [address] [phone]           "

## 2019-11-13 ENCOUNTER — TRANSFERRED RECORDS (OUTPATIENT)
Dept: HEALTH INFORMATION MANAGEMENT | Facility: CLINIC | Age: 84
End: 2019-11-13

## 2019-11-13 LAB — RETINOPATHY: NEGATIVE

## 2019-11-14 DIAGNOSIS — I10 ESSENTIAL HYPERTENSION WITH GOAL BLOOD PRESSURE LESS THAN 140/90: ICD-10-CM

## 2019-11-14 DIAGNOSIS — E78.5 HYPERLIPIDEMIA LDL GOAL <100: ICD-10-CM

## 2019-11-14 RX ORDER — ATENOLOL 25 MG/1
TABLET ORAL
Qty: 90 TABLET | Refills: 1 | Status: SHIPPED | OUTPATIENT
Start: 2019-11-14 | End: 2020-05-11

## 2019-11-14 NOTE — TELEPHONE ENCOUNTER
Prescription approved per [facility] Refill Protocol.    Zofia ART RN

## 2019-11-14 NOTE — TELEPHONE ENCOUNTER
"Requested Prescriptions   Pending Prescriptions Disp Refills     atorvastatin (LIPITOR) 10 MG tablet [Pharmacy Med Name: ATORVASTATIN CALCIUM 10MG TABS] 90 tablet 3     Sig: TAKE ONE TABLET BY MOUTH ONCE DAILY       Statins Protocol Failed - 11/14/2019  3:31 PM        Failed - LDL on file in past 12 months     Recent Labs   Lab Test 11/06/18  0900   LDL 79             Passed - No abnormal creatine kinase in past 12 months     No lab results found.             Passed - Recent (12 mo) or future (30 days) visit within the authorizing provider's specialty     Patient has had an office visit with the authorizing provider or a provider within the authorizing providers department within the previous 12 mos or has a future within next 30 days. See \"Patient Info\" tab in inbasket, or \"Choose Columns\" in Meds & Orders section of the refill encounter.              Passed - Medication is active on med list        Passed - Patient is age 18 or older        Passed - No active pregnancy on record        Passed - No positive pregnancy test in past 12 months      atorvastatin (LIPITOR) 10 MG tablet  Last Written Prescription Date:  11/06/2018  Last Fill Quantity: 90 tablet,  # refills: 3   Last office visit: 6/20/2019 with prescribing provider:  JUNAID Armstrong   Future Office Visit:   Next 5 appointments (look out 90 days)    Nov 18, 2019  9:40 AM CST  [facility] Visit [facility]  [facility] [address] [place] [id number] [phone]         Daniela Zeng RT (R) (M)      "

## 2019-11-14 NOTE — TELEPHONE ENCOUNTER
"Requested Prescriptions   Pending Prescriptions Disp Refills     atenolol (TENORMIN) 25 MG tablet [Pharmacy Med Name: ATENOLOL 25MG TABS] 90 tablet 4     Sig: TAKE ONE TABLET BY MOUTH ONCE DAILY       Beta-Blockers Protocol Passed - 11/14/2019  3:35 PM        Passed - Blood pressure under 140/90 in past 12 months     BP Readings from Last 3 Encounters:   06/20/19 114/74   04/23/19 106/68   04/12/19 140/86                 Passed - Patient is age 6 or older        Passed - Recent (12 mo) or future (30 days) visit within the authorizing provider's specialty     Patient has had an office visit with the authorizing provider or a provider within the authorizing providers department within the previous 12 mos or has a future within next 30 days. See \"Patient Info\" tab in inbasket, or \"Choose Columns\" in Meds & Orders section of the refill encounter.              Passed - Medication is active on med list      atenolol (TENORMIN) 25 MG tablet  Last Written Prescription Date:  11/06/2018  Last Fill Quantity: 90 tablet,  # refills: 4   Last office visit: 6/20/2019 with prescribing provider:  JUNAID Armstrong   Future Office Visit:   Next 5 appointments (look out 90 days)    Nov 18, 2019  9:40 AM CST  Office Visit with Yecenia Armstrong MD  [facility] [address] [place] [id number] [phone]         Daniela Zeng RT (R) (M)      "

## 2019-11-15 RX ORDER — ATORVASTATIN CALCIUM 10 MG/1
TABLET, FILM COATED ORAL
Qty: 30 TABLET | Refills: 0 | Status: SHIPPED | OUTPATIENT
Start: 2019-11-15 | End: 2019-11-18

## 2019-11-18 ENCOUNTER — OFFICE VISIT (OUTPATIENT)
Dept: FAMILY MEDICINE | Facility: CLINIC | Age: 84
End: 2019-11-18
Payer: COMMERCIAL

## 2019-11-18 VITALS
HEART RATE: 57 BPM | TEMPERATURE: 97.7 F | BODY MASS INDEX: 38.21 KG/M2 | RESPIRATION RATE: 16 BRPM | OXYGEN SATURATION: 98 % | WEIGHT: 222.6 LBS | SYSTOLIC BLOOD PRESSURE: 126 MMHG | DIASTOLIC BLOOD PRESSURE: 70 MMHG

## 2019-11-18 DIAGNOSIS — Z23 NEED FOR PROPHYLACTIC VACCINATION AND INOCULATION AGAINST INFLUENZA: ICD-10-CM

## 2019-11-18 DIAGNOSIS — F41.9 ANXIETY: ICD-10-CM

## 2019-11-18 DIAGNOSIS — M79.2 NEURALGIA: ICD-10-CM

## 2019-11-18 DIAGNOSIS — H61.23 BILATERAL IMPACTED CERUMEN: ICD-10-CM

## 2019-11-18 DIAGNOSIS — E11.9 TYPE 2 DIABETES MELLITUS WITHOUT COMPLICATION, WITHOUT LONG-TERM CURRENT USE OF INSULIN (H): Primary | ICD-10-CM

## 2019-11-18 DIAGNOSIS — E04.1 THYROID NODULE: ICD-10-CM

## 2019-11-18 DIAGNOSIS — I10 ESSENTIAL HYPERTENSION WITH GOAL BLOOD PRESSURE LESS THAN 140/90: ICD-10-CM

## 2019-11-18 DIAGNOSIS — E78.5 HYPERLIPIDEMIA LDL GOAL <100: ICD-10-CM

## 2019-11-18 LAB
ALBUMIN SERPL-MCNC: 3.5 G/DL (ref 3.4–5)
ALP SERPL-CCNC: 81 U/L (ref 40–150)
ALT SERPL W P-5'-P-CCNC: 15 U/L (ref 0–50)
ANION GAP SERPL CALCULATED.3IONS-SCNC: 3 MMOL/L (ref 3–14)
AST SERPL W P-5'-P-CCNC: 16 U/L (ref 0–45)
BILIRUB SERPL-MCNC: 0.6 MG/DL (ref 0.2–1.3)
BUN SERPL-MCNC: 18 MG/DL (ref 7–30)
CALCIUM SERPL-MCNC: 8.7 MG/DL (ref 8.5–10.1)
CHLORIDE SERPL-SCNC: 104 MMOL/L (ref 94–109)
CHOLEST SERPL-MCNC: 190 MG/DL
CO2 SERPL-SCNC: 28 MMOL/L (ref 20–32)
CREAT SERPL-MCNC: 0.85 MG/DL (ref 0.52–1.04)
GFR SERPL CREATININE-BSD FRML MDRD: 63 ML/MIN/{1.73_M2}
GLUCOSE SERPL-MCNC: 121 MG/DL (ref 70–99)
HBA1C MFR BLD: 6.7 % (ref 0–5.6)
HDLC SERPL-MCNC: 69 MG/DL
LDLC SERPL CALC-MCNC: 82 MG/DL
NONHDLC SERPL-MCNC: 121 MG/DL
POTASSIUM SERPL-SCNC: 3.9 MMOL/L (ref 3.4–5.3)
PROT SERPL-MCNC: 7 G/DL (ref 6.8–8.8)
SODIUM SERPL-SCNC: 135 MMOL/L (ref 133–144)
TRIGL SERPL-MCNC: 194 MG/DL
TSH SERPL DL<=0.005 MIU/L-ACNC: 1.51 MU/L (ref 0.4–4)

## 2019-11-18 PROCEDURE — 80053 COMPREHEN METABOLIC PANEL: CPT | Performed by: FAMILY MEDICINE

## 2019-11-18 PROCEDURE — 90662 IIV NO PRSV INCREASED AG IM: CPT | Performed by: FAMILY MEDICINE

## 2019-11-18 PROCEDURE — 36415 COLL VENOUS BLD VENIPUNCTURE: CPT | Performed by: FAMILY MEDICINE

## 2019-11-18 PROCEDURE — 84443 ASSAY THYROID STIM HORMONE: CPT | Performed by: FAMILY MEDICINE

## 2019-11-18 PROCEDURE — 80061 LIPID PANEL: CPT | Performed by: FAMILY MEDICINE

## 2019-11-18 PROCEDURE — 83036 HEMOGLOBIN GLYCOSYLATED A1C: CPT | Performed by: FAMILY MEDICINE

## 2019-11-18 PROCEDURE — 90471 IMMUNIZATION ADMIN: CPT | Performed by: FAMILY MEDICINE

## 2019-11-18 PROCEDURE — 99214 OFFICE O/P EST MOD 30 MIN: CPT | Mod: 25 | Performed by: FAMILY MEDICINE

## 2019-11-18 PROCEDURE — 69209 REMOVE IMPACTED EAR WAX UNI: CPT | Mod: 50 | Performed by: FAMILY MEDICINE

## 2019-11-18 RX ORDER — LISINOPRIL 5 MG/1
5 TABLET ORAL DAILY
Qty: 90 TABLET | Refills: 3 | Status: CANCELLED | OUTPATIENT
Start: 2019-11-18

## 2019-11-18 RX ORDER — LORAZEPAM 0.5 MG/1
TABLET ORAL
Qty: 10 TABLET | Refills: 0 | Status: SHIPPED | OUTPATIENT
Start: 2019-11-18 | End: 2020-11-03

## 2019-11-18 RX ORDER — GABAPENTIN 300 MG/1
CAPSULE ORAL
Qty: 180 CAPSULE | Refills: 3 | Status: SHIPPED | OUTPATIENT
Start: 2019-11-18 | End: 2020-11-03

## 2019-11-18 RX ORDER — LORAZEPAM 0.5 MG/1
TABLET ORAL
Refills: 0 | COMMUNITY
Start: 2019-04-06 | End: 2019-11-18

## 2019-11-18 RX ORDER — LISINOPRIL 5 MG/1
5 TABLET ORAL DAILY
Qty: 90 TABLET | Refills: 3 | Status: SHIPPED | OUTPATIENT
Start: 2019-11-18 | End: 2020-11-03

## 2019-11-18 RX ORDER — ATORVASTATIN CALCIUM 10 MG/1
10 TABLET, FILM COATED ORAL DAILY
Qty: 90 TABLET | Refills: 3 | Status: SHIPPED | OUTPATIENT
Start: 2019-11-18 | End: 2020-11-03

## 2019-11-18 NOTE — LETTER
November 21, 2019      Bereketmegan Meng  [address] [facility] [address]      Dear ,    We are writing to inform you of your test results.    Your glucose, or blood sugar, a test for diabetes, was 121. Normal is 60-99.  Hemoglobin A1C measures the average of the blood sugar over several months. If you have diabetes, the goal is under 8%. Yours is 6.7 as we discussed. Great!  Electrolytes, including sodium, potassium, chloride, bicarbonate and calcium are all normal salts in the bloodstream. Yours are all normal.  Urea nitrogen and creatinine are kidney function tests. Yours are normal.  ALT and AST are liver function tests. Yours are normal.  The TSH looks at thyroid function.  Yours was normal.   Your cholesterol is good     Resulted Orders   Hemoglobin A1c   Result Value Ref Range    Hemoglobin A1C 6.7 (H) 0 - 5.6 %      Comment:      Normal <5.7% Prediabetes 5.7-6.4%  Diabetes 6.5% or higher - adopted from ADA   consensus guidelines.     Lipid Profile (Chol, Trig, HDL, LDL calc)   Result Value Ref Range    Cholesterol 190 <200 mg/dL    Triglycerides 194 (H) <150 mg/dL      Comment:      Borderline high:  150-199 mg/dl  High:             200-499 mg/dl  Very high:       >499 mg/dl  Fasting specimen      HDL Cholesterol 69 >49 mg/dL    LDL Cholesterol Calculated 82 <100 mg/dL      Comment:      Desirable:       <100 mg/dl    Non HDL Cholesterol 121 <130 mg/dL   TSH with free T4 reflex   Result Value Ref Range    TSH 1.51 0.40 - 4.00 mU/L   Comprehensive metabolic panel (BMP + Alb, Alk Phos, ALT, AST, Total. Bili, TP)   Result Value Ref Range    Sodium 135 133 - 144 mmol/L    Potassium 3.9 3.4 - 5.3 mmol/L    Chloride 104 94 - 109 mmol/L    Carbon Dioxide 28 20 - 32 mmol/L    Anion Gap 3 3 - 14 mmol/L    Glucose 121 (H) 70 - 99 mg/dL      Comment:      Fasting specimen    Urea Nitrogen 18 7 - 30 mg/dL    Creatinine 0.85 0.52 - 1.04 mg/dL    GFR Estimate 63 >60 mL/min/[1.73_m2]      Comment:       Non  GFR Calc  Starting 12/18/2018, serum creatinine based estimated GFR (eGFR) will be   calculated using the Chronic Kidney Disease Epidemiology Collaboration   (CKD-EPI) equation.      GFR Estimate If Black 73 >60 mL/min/[1.73_m2]      Comment:       GFR Calc  Starting 12/18/2018, serum creatinine based estimated GFR (eGFR) will be   calculated using the Chronic Kidney Disease Epidemiology Collaboration   (CKD-EPI) equation.      Calcium 8.7 8.5 - 10.1 mg/dL    Bilirubin Total 0.6 0.2 - 1.3 mg/dL    Albumin 3.5 3.4 - 5.0 g/dL    Protein Total 7.0 6.8 - 8.8 g/dL    Alkaline Phosphatase 81 40 - 150 U/L    ALT 15 0 - 50 U/L    AST 16 0 - 45 U/L       If you have any questions or concerns, please call the clinic at the number listed above.       Sincerely,        Yecenia Viera MD

## 2019-11-18 NOTE — LETTER
November 25, 2019      Bereketmegan Meng  [address] [facility] [address]        Dear ,    We are writing to inform you of your test results.    Your glucose, or blood sugar, a test for diabetes, was 121. Normal is 60-99.  Hemoglobin A1C measures the average of the blood sugar over several months. If you have diabetes, the goal is under 8%. Yours is 6.7 as we discussed (great!).  Electrolytes, including sodium, potassium, chloride, bicarbonate and calcium are all normal salts in the bloodstream. Yours are all normal.  Urea nitrogen and creatinine are kidney function tests. Yours are normal.  ALT and AST are liver function tests. Yours are normal.  The TSH looks at thyroid function.  Yours was normal.  Your cholesterol was fine.      Resulted Orders   Hemoglobin A1c   Result Value Ref Range    Hemoglobin A1C 6.7 (H) 0 - 5.6 %      Comment:      Normal <5.7% Prediabetes 5.7-6.4%  Diabetes 6.5% or higher - adopted from ADA   consensus guidelines.     Lipid Profile (Chol, Trig, HDL, LDL calc)   Result Value Ref Range    Cholesterol 190 <200 mg/dL    Triglycerides 194 (H) <150 mg/dL      Comment:      Borderline high:  150-199 mg/dl  High:             200-499 mg/dl  Very high:       >499 mg/dl  Fasting specimen      HDL Cholesterol 69 >49 mg/dL    LDL Cholesterol Calculated 82 <100 mg/dL      Comment:      Desirable:       <100 mg/dl    Non HDL Cholesterol 121 <130 mg/dL   TSH with free T4 reflex   Result Value Ref Range    TSH 1.51 0.40 - 4.00 mU/L   Comprehensive metabolic panel (BMP + Alb, Alk Phos, ALT, AST, Total. Bili, TP)   Result Value Ref Range    Sodium 135 133 - 144 mmol/L    Potassium 3.9 3.4 - 5.3 mmol/L    Chloride 104 94 - 109 mmol/L    Carbon Dioxide 28 20 - 32 mmol/L    Anion Gap 3 3 - 14 mmol/L    Glucose 121 (H) 70 - 99 mg/dL      Comment:      Fasting specimen    Urea Nitrogen 18 7 - 30 mg/dL    Creatinine 0.85 0.52 - 1.04 mg/dL    GFR Estimate 63 >60 mL/min/[1.73_m2]      Comment:       Non  GFR Calc  Starting 12/18/2018, serum creatinine based estimated GFR (eGFR) will be   calculated using the Chronic Kidney Disease Epidemiology Collaboration   (CKD-EPI) equation.      GFR Estimate If Black 73 >60 mL/min/[1.73_m2]      Comment:       GFR Calc  Starting 12/18/2018, serum creatinine based estimated GFR (eGFR) will be   calculated using the Chronic Kidney Disease Epidemiology Collaboration   (CKD-EPI) equation.      Calcium 8.7 8.5 - 10.1 mg/dL    Bilirubin Total 0.6 0.2 - 1.3 mg/dL    Albumin 3.5 3.4 - 5.0 g/dL    Protein Total 7.0 6.8 - 8.8 g/dL    Alkaline Phosphatase 81 40 - 150 U/L    ALT 15 0 - 50 U/L    AST 16 0 - 45 U/L       If you have any questions or concerns, please call the clinic at the number listed above.       Sincerely,        Yecenia Viera MD

## 2019-11-18 NOTE — PROGRESS NOTES
Subjective     Theodora Meng is a 84 year old female who presents to clinic today for the following health issues:    HPI   Diabetes Follow-up      How often are you checking your blood sugar? Not at all    What concerns do you have today about your diabetes? None     Do you have any of these symptoms? (Select all that apply)  No numbness or tingling in feet.  No redness, sores or blisters on feet.  No complaints of excessive thirst.  No reports of blurry vision.  No significant changes to weight.     Have you had a diabetic eye exam in the last 12 months? Yes- Date of last eye exam: 11/13/2019    Diabetes Management Resources    Lab Results   Component Value Date    A1C 6.7 11/18/2019    A1C 6.6 06/20/2019    A1C 7.1 03/20/2019    A1C 7.1 11/06/2018    A1C 7.3 12/12/2017     diet controlled    Hyperlipidemia Follow-Up      Are you having any of the following symptoms? (Select all that apply)  No complaints of shortness of breath, chest pain or pressure.  No increased sweating or nausea with activity.  No left-sided neck or arm pain.  No complaints of pain in calves when walking 1-2 blocks.    Are you regularly taking any medication or supplement to lower your cholesterol?   Yes- Lipitor    Are you having muscle aches or other side effects that you think could be caused by your cholesterol lowering medication?  No  Recent Labs   Lab Test 11/06/18  0900 12/12/17  0912  05/15/15  0941 11/03/14  1203   CHOL 169 169   < > 170 172   HDL 60 66   < > 57 62   LDL 79 65   < > 74 69   TRIG 149 189*   < > 195* 204*   CHOLHDLRATIO  --   --   --  3.0 2.8    < > = values in this interval not displayed.      On atorvastatin    Hypertension Follow-up      Do you check your blood pressure regularly outside of the clinic? No     Are you following a low salt diet? Yes- little bit    Are your blood pressures ever more than 140 on the top number (systolic) OR more   than 90 on the bottom number (diastolic), for example 140/90?  No  On atenolol, lisinopril    BP Readings from Last 2 Encounters:   11/18/19 126/70   06/20/19 114/74     Hemoglobin A1C (%)   Date Value   11/18/2019 6.7 (H)   06/20/2019 6.6 (H)     LDL Cholesterol Calculated (mg/dL)   Date Value   11/06/2018 79   12/12/2017 65         How many servings of fruits and vegetables do you eat daily?  2-3    On average, how many sweetened beverages do you drink each day (soda, juice, sweet tea, etc)?   0    How many days per week do you miss taking your medication? 0            Reviewed and updated as needed this visit by Provider  Tobacco  Allergies  Meds  Problems  Med Hx  Surg Hx  Fam Hx         Review of Systems   ROS: 5 point ROS negative except as noted above in HPI, including Gen., Resp., CV, GI &  system review.       Objective    /70   Pulse 57   Temp 97.7  F (36.5  C) (Tympanic)   Resp 16   Wt 101 kg (222 lb 9.6 oz)   SpO2 98%   BMI 38.21 kg/m    Body mass index is 38.21 kg/m .  Physical Exam   GENERAL: healthy, alert and no distress  EYES: Eyes grossly normal to inspection, PERRL and conjunctivae and sclerae normal  HENT: ear canals and TM's normal, nose and mouth without ulcers or lesions  NECK: no adenopathy, no asymmetry, masses, or scars and thyroid normal to palpation  RESP: lungs clear to auscultation - no rales, rhonchi or wheezes  CV: regular rate and rhythm, normal S1 S2, no S3 or S4, no murmur, click or rub, no peripheral edema and peripheral pulses strong  ABDOMEN: soft, nontender, no hepatosplenomegaly, no masses and bowel sounds normal  MS: no gross musculoskeletal defects noted, no edema    Lab Results   Component Value Date    A1C 6.7 11/18/2019           ASSESSMENT:  1. Type 2 diabetes mellitus without complication, without long-term current use of insulin (H)    2. Hyperlipidemia LDL goal <100    3. Essential hypertension with goal blood pressure less than 140/90    4. Thyroid nodule    5. Need for prophylactic vaccination and  inoculation against influenza    6. Anxiety    7. Neuralgia    8. Bilateral impacted cerumen        PLAN:  Orders Placed This Encounter     INFLUENZA (HIGH DOSE) 3 VALENT VACCINE [86657]     Vaccine Administration, Initial [91634]     Hemoglobin A1c     Lipid Profile (Chol, Trig, HDL, LDL calc)     TSH with free T4 reflex     Comprehensive metabolic panel (BMP + Alb, Alk Phos, ALT, AST, Total. Bili, TP)     DISCONTD: LORazepam (ATIVAN) 0.5 MG tablet     atorvastatin (LIPITOR) 10 MG tablet     LORazepam (ATIVAN) 0.5 MG tablet     lisinopril (PRINIVIL/ZESTRIL) 5 MG tablet     gabapentin (NEURONTIN) 300 MG capsule     Ears lavaged  No changes today    Patient Instructions   Monitor blood pressures here and there    See you back in 6 months       Yecenia Cardona MD

## 2020-04-16 DIAGNOSIS — R07.9 CHEST PAIN: ICD-10-CM

## 2020-04-16 DIAGNOSIS — I25.10 CAD (CORONARY ARTERY DISEASE): ICD-10-CM

## 2020-04-16 DIAGNOSIS — R94.39 ABNORMAL CARDIOVASCULAR STRESS TEST: Primary | ICD-10-CM

## 2020-04-23 ENCOUNTER — VIRTUAL VISIT (OUTPATIENT)
Dept: CARDIOLOGY | Facility: CLINIC | Age: 85
End: 2020-04-23
Payer: COMMERCIAL

## 2020-04-23 VITALS — BODY MASS INDEX: 37.76 KG/M2 | WEIGHT: 220 LBS

## 2020-04-23 DIAGNOSIS — I25.10 CORONARY ARTERY DISEASE INVOLVING NATIVE CORONARY ARTERY OF NATIVE HEART WITHOUT ANGINA PECTORIS: Primary | ICD-10-CM

## 2020-04-23 PROCEDURE — 99213 OFFICE O/P EST LOW 20 MIN: CPT | Mod: 95 | Performed by: INTERNAL MEDICINE

## 2020-04-23 NOTE — LETTER
"4/23/2020      RE: Theodora Meng  [address] [facility] [id number]       Dear Colleague,    Thank you for the opportunity to participate in the care of your patient, Theodora Meng, at the [facility]. Please see a copy of my visit note below.    Ms. Kamini Meng is 85 years old and had been followed by Dr. Mann and after his [facility], by myself.  She is followed for coronary calcifications and prior chest discomfort without evidence of coronary ischemia.  She has also had palpitations.    During this time of COVID quarantine, she is walking outside every day.  [place] and does not see anyone when she is out walking.  She had knee replacement surgery a year ago (April 2019) and now she is able to walk quite well.  She previously had her other knee replaced and notes that her recovery this time was much more rapid.  She denies any chest, neck, arm or back discomfort.  She has not experienced any exertional limitation or new dyspnea.  Once a month, she may experience palpitations which she describes as her heart \"racing\".  It is a sensation that lasts for seconds to minutes and typically occurs when she first lies down in bed at night or when she first awakens in the morning.  She has no associated symptoms.  The palpitations have not changed over the years and were previously evaluated by Holter monitor and demonstrated to be consistent with paroxysmal atrial tachycardia.  She is on statin therapy and in November 2019, her LDL fraction was 82 mg/dL.  2019, she actually lost weight, first before her surgery and then following her surgery.  Her blood pressures have been very normal.    She had been evaluated in 2008 for chest discomfort, and a nuclear stress test  revealed a fixed anterior defect thought to be breast attenuation artifact, although nontransmural infarct could " "not be excluded.  Left ventricular function was normal.  A CT scan of the abdomen indicated calcification of the coronary arteries.  She has done well with isolated episodes of gastroesophageal reflux discomfort over the years and only isolated palpitations.  A stress nuclear study in 2016 demonstrated a medium-sized perfusion defect of moderate severity involving the entire anterior wall from apex to base which was partially reversible. It was noted \"although breast attenuation probably contributes to this perfusion abnormality, mild ischemia in the distribution of left anterior descending artery cannot be ruled out\".    Dr. Mann described persistent chest discomfort lasting 12 hours prior to an office visit in the spring of 2016.   She also had palpitations that appeared to reflect the discomfort.   The patient's history for coronary disease risk factors is negative for cigarette smoking but she has a history of hyperlipidemia and diabetes mellitus as well as a family history of premature coronary artery disease. In the absence of recurrent symptoms, medical therapy.    Echocardiography in 2017 showed normal left ventricular function with trace to mild mitral insufficiency, normal right ventricular systolic function and mild left atrial enlargement - without change from the previous echocardiogram.  Ms. Meng's Holter monitor from 2016 demonstrated a heart rate varying from 56 to 105 bpm with and average rate of 72 bpm.  There were isolated to occasional supraventricular and ventricular beats and one 27-beat run of PAT.      Assessment/Plan:  Ms. Meng has had no anginal symptoms.  Her exertional tolerance is good based on her description.  She only has unchanged isolated palpitations without associated other symptoms.  She has been noted to have coronary artery calcifications on a non-coronary CT scan (and has a stress study which suggested possible mild anterior ischemia in 2016) so medical therapy " will continue including atorvastatin, low-dose aspirin, atenolol and lisinopril. Her risk factors occluding hypertension and dyslipidemia are well-controlled.     In the absence of symptoms, cardiology follow-up at 6 months is recommended.  A stress nuclear study had been planned prior to this visit but has been deferred due to the COVID restrictions on non-emergent procedures.  That can be rescheduled to evaluate possible asymptomatic multivessel disease after her next visit.  In the interim, I have encouraged her to contact our clinic if she has any questions or change in symptoms.    Phone call duration: 18 minutes          Please do not hesitate to contact me if you have any questions/concerns.     Sincerely,     Gay Person MD

## 2020-04-23 NOTE — PROGRESS NOTES
"Theodora Meng is a 85 year old female who is being evaluated via a billable telephone visit.      The patient has been notified of following:     \"This telephone visit will be conducted via a call between you and your physician/provider. We have found that certain health care needs can be provided without the need for a physical exam.  This service lets us provide the care you need with a short phone conversation.  If a prescription is necessary we can send it directly to your pharmacy.  If lab work is needed we can place an order for that and you can then stop by our lab to have the test done at a later time.    Telephone visits are billed at different rates depending on your insurance coverage. During this emergency period, for some insurers they may be billed the same as an in-person visit.  Please reach out to your insurance provider with any questions.    If during the course of the call the physician/provider feels a telephone visit is not appropriate, you will not be charged for this service.\"    Patient has given verbal consent for Telephone visit?  Yes    How would you like to obtain your AVS? Mail a copy  9:21 AM 04/23/20 CAPRI Blackman CMA    Ms. Kamini Meng is 85 years old and had been followed by Dr. Mann and after his [facility], by myself.  She is followed for coronary calcifications and prior chest discomfort without evidence of coronary ischemia.  She has also had palpitations.    During this time of COVID quarantine, she is walking outside every day.  She notes that [place] and does not see anyone when she is out walking.  She had knee replacement surgery a year ago (April 2019) and now she is able to walk quite well.  She previously had her other knee replaced and notes that her recovery this time was much more rapid.  She denies any chest, neck, arm or back discomfort.  She has not experienced any exertional limitation or new dyspnea.  Once a month, she may " "experience palpitations which she describes as her heart \"racing\".  It is a sensation that lasts for seconds to minutes and typically occurs when she first lies down in bed at night or when she first awakens in the morning.  She has no associated symptoms.  The palpitations have not changed over the years and were previously evaluated by Holter monitor and demonstrated to be consistent with paroxysmal atrial tachycardia.  She is on statin therapy and in November 2019, her LDL fraction was 82 mg/dL.  2019, she actually lost weight, first before her surgery and then following her surgery.  Her blood pressures have been very normal.    She had been evaluated in 2008 for chest discomfort, and a nuclear stress test  revealed a fixed anterior defect thought to be breast attenuation artifact, although nontransmural infarct could not be excluded.  Left ventricular function was normal.  A CT scan of the abdomen indicated calcification of the coronary arteries.  She has done well with isolated episodes of gastroesophageal reflux discomfort over the years and only isolated palpitations.  A stress nuclear study in 2016 demonstrated a medium-sized perfusion defect of moderate severity involving the entire anterior wall from apex to base which was partially reversible. It was noted \"although breast attenuation probably contributes to this perfusion abnormality, mild ischemia in the distribution of left anterior descending artery cannot be ruled out\".    Dr. Mann described persistent chest discomfort lasting 12 hours prior to an office visit in the spring of 2016.   She also had palpitations that appeared to reflect the discomfort.   The patient's history for coronary disease risk factors is negative for cigarette smoking but she has a history of hyperlipidemia and diabetes mellitus as well as a family history of premature coronary artery disease. In the absence of recurrent symptoms, medical therapy.    Echocardiography in 2017 " showed normal left ventricular function with trace to mild mitral insufficiency, normal right ventricular systolic function and mild left atrial enlargement - without change from the previous echocardiogram.  Ms. Meng's Holter monitor from 2016 demonstrated a heart rate varying from 56 to 105 bpm with and average rate of 72 bpm.  There were isolated to occasional supraventricular and ventricular beats and one 27-beat run of PAT.      Assessment/Plan:  Ms. Meng has had no anginal symptoms.  Her exertional tolerance is good based on her description.  She only has unchanged isolated palpitations without associated other symptoms.  She has been noted to have coronary artery calcifications on a non-coronary CT scan (and has a stress study which suggested possible mild anterior ischemia in 2016) so medical therapy will continue including atorvastatin, low-dose aspirin, atenolol and lisinopril. Her risk factors occluding hypertension and dyslipidemia are well-controlled.     In the absence of symptoms, cardiology follow-up at 6 months is recommended.  A stress nuclear study had been planned prior to this visit but has been deferred due to the COVID restrictions on non-emergent procedures.  That can be rescheduled to evaluate possible asymptomatic multivessel disease after her next visit.  In the interim, I have encouraged her to contact our clinic if she has any questions or change in symptoms.    Phone call duration: 18 minutes    Gay Person MD

## 2020-09-29 ENCOUNTER — TELEPHONE (OUTPATIENT)
Dept: FAMILY MEDICINE | Facility: CLINIC | Age: 85
End: 2020-09-29

## 2020-09-29 DIAGNOSIS — K21.9 GASTROESOPHAGEAL REFLUX DISEASE WITHOUT ESOPHAGITIS: ICD-10-CM

## 2020-09-29 RX ORDER — ESOMEPRAZOLE MAGNESIUM 40 MG/1
40 CAPSULE, DELAYED RELEASE ORAL 2 TIMES DAILY
Qty: 180 CAPSULE | Refills: 3 | Status: SHIPPED | OUTPATIENT
Start: 2020-09-29 | End: 2020-12-31

## 2020-09-29 NOTE — TELEPHONE ENCOUNTER
Reason for Call:  Other     Detailed comments: Patient takes one in the morning and one at dinner time - please change RX    Phone Number Patient can be reached at: Home number on file [phone] (home)    Best Time:     Can we leave a detailed message on this number? YES    Call taken on 9/29/2020 at 3:14 PM by Yecenia Gauthier

## 2020-11-03 ENCOUNTER — OFFICE VISIT (OUTPATIENT)
Dept: FAMILY MEDICINE | Facility: CLINIC | Age: 85
End: 2020-11-03
Payer: COMMERCIAL

## 2020-11-03 DIAGNOSIS — E66.01 MORBID OBESITY (H): ICD-10-CM

## 2020-11-03 DIAGNOSIS — I10 ESSENTIAL HYPERTENSION WITH GOAL BLOOD PRESSURE LESS THAN 140/90: ICD-10-CM

## 2020-11-03 DIAGNOSIS — M79.2 NEURALGIA: ICD-10-CM

## 2020-11-03 DIAGNOSIS — E78.5 HYPERLIPIDEMIA LDL GOAL <100: ICD-10-CM

## 2020-11-03 DIAGNOSIS — F41.9 ANXIETY: ICD-10-CM

## 2020-11-03 DIAGNOSIS — N39.41 URGE INCONTINENCE OF URINE: ICD-10-CM

## 2020-11-03 DIAGNOSIS — E11.39 TYPE 2 DIABETES MELLITUS WITH OTHER OPHTHALMIC COMPLICATION, WITHOUT LONG-TERM CURRENT USE OF INSULIN (H): ICD-10-CM

## 2020-11-03 DIAGNOSIS — Z00.00 ENCOUNTER FOR MEDICARE ANNUAL WELLNESS EXAM: Primary | ICD-10-CM

## 2020-11-03 LAB
ALBUMIN SERPL-MCNC: 3.7 G/DL (ref 3.4–5)
ALP SERPL-CCNC: 82 U/L (ref 40–150)
ALT SERPL W P-5'-P-CCNC: 16 U/L (ref 0–50)
ANION GAP SERPL CALCULATED.3IONS-SCNC: 5 MMOL/L (ref 3–14)
AST SERPL W P-5'-P-CCNC: 10 U/L (ref 0–45)
BASOPHILS # BLD AUTO: 0 10E9/L (ref 0–0.2)
BASOPHILS NFR BLD AUTO: 0.7 %
BILIRUB SERPL-MCNC: 0.5 MG/DL (ref 0.2–1.3)
BUN SERPL-MCNC: 21 MG/DL (ref 7–30)
CALCIUM SERPL-MCNC: 9.1 MG/DL (ref 8.5–10.1)
CHLORIDE SERPL-SCNC: 107 MMOL/L (ref 94–109)
CHOLEST SERPL-MCNC: 175 MG/DL
CO2 SERPL-SCNC: 29 MMOL/L (ref 20–32)
CREAT SERPL-MCNC: 0.89 MG/DL (ref 0.52–1.04)
DIFFERENTIAL METHOD BLD: NORMAL
EOSINOPHIL # BLD AUTO: 0.1 10E9/L (ref 0–0.7)
EOSINOPHIL NFR BLD AUTO: 3.3 %
ERYTHROCYTE [DISTWIDTH] IN BLOOD BY AUTOMATED COUNT: 13.6 % (ref 10–15)
GFR SERPL CREATININE-BSD FRML MDRD: 60 ML/MIN/{1.73_M2}
GLUCOSE SERPL-MCNC: 127 MG/DL (ref 70–99)
HBA1C MFR BLD: 6.9 % (ref 0–5.6)
HCT VFR BLD AUTO: 38.4 % (ref 35–47)
HDLC SERPL-MCNC: 71 MG/DL
HGB BLD-MCNC: 12.5 G/DL (ref 11.7–15.7)
LDLC SERPL CALC-MCNC: 66 MG/DL
LYMPHOCYTES # BLD AUTO: 1.5 10E9/L (ref 0.8–5.3)
LYMPHOCYTES NFR BLD AUTO: 36.2 %
MCH RBC QN AUTO: 29.6 PG (ref 26.5–33)
MCHC RBC AUTO-ENTMCNC: 32.6 G/DL (ref 31.5–36.5)
MCV RBC AUTO: 91 FL (ref 78–100)
MONOCYTES # BLD AUTO: 0.3 10E9/L (ref 0–1.3)
MONOCYTES NFR BLD AUTO: 6.1 %
NEUTROPHILS # BLD AUTO: 2.3 10E9/L (ref 1.6–8.3)
NEUTROPHILS NFR BLD AUTO: 53.7 %
NONHDLC SERPL-MCNC: 104 MG/DL
PLATELET # BLD AUTO: 251 10E9/L (ref 150–450)
POTASSIUM SERPL-SCNC: 4.1 MMOL/L (ref 3.4–5.3)
PROT SERPL-MCNC: 7 G/DL (ref 6.8–8.8)
RBC # BLD AUTO: 4.23 10E12/L (ref 3.8–5.2)
SODIUM SERPL-SCNC: 141 MMOL/L (ref 133–144)
TRIGL SERPL-MCNC: 188 MG/DL
TSH SERPL DL<=0.005 MIU/L-ACNC: 0.41 MU/L (ref 0.4–4)
WBC # BLD AUTO: 4.2 10E9/L (ref 4–11)

## 2020-11-03 PROCEDURE — 90662 IIV NO PRSV INCREASED AG IM: CPT | Performed by: FAMILY MEDICINE

## 2020-11-03 PROCEDURE — 80050 GENERAL HEALTH PANEL: CPT | Performed by: FAMILY MEDICINE

## 2020-11-03 PROCEDURE — 90471 IMMUNIZATION ADMIN: CPT | Performed by: FAMILY MEDICINE

## 2020-11-03 PROCEDURE — 80061 LIPID PANEL: CPT | Performed by: FAMILY MEDICINE

## 2020-11-03 PROCEDURE — 99213 OFFICE O/P EST LOW 20 MIN: CPT | Mod: 25 | Performed by: FAMILY MEDICINE

## 2020-11-03 PROCEDURE — 83036 HEMOGLOBIN GLYCOSYLATED A1C: CPT | Performed by: FAMILY MEDICINE

## 2020-11-03 PROCEDURE — 36415 COLL VENOUS BLD VENIPUNCTURE: CPT | Performed by: FAMILY MEDICINE

## 2020-11-03 PROCEDURE — 99397 PER PM REEVAL EST PAT 65+ YR: CPT | Mod: 25 | Performed by: FAMILY MEDICINE

## 2020-11-03 RX ORDER — LORAZEPAM 0.5 MG/1
TABLET ORAL
Qty: 10 TABLET | Refills: 0 | Status: SHIPPED | OUTPATIENT
Start: 2020-11-03 | End: 2021-10-22

## 2020-11-03 RX ORDER — GABAPENTIN 300 MG/1
CAPSULE ORAL
Qty: 180 CAPSULE | Refills: 3 | Status: SHIPPED | OUTPATIENT
Start: 2020-11-03 | End: 2021-10-22

## 2020-11-03 RX ORDER — ATENOLOL 25 MG/1
25 TABLET ORAL DAILY
Qty: 90 TABLET | Refills: 3 | Status: SHIPPED | OUTPATIENT
Start: 2020-11-03 | End: 2021-10-22

## 2020-11-03 RX ORDER — LISINOPRIL 5 MG/1
5 TABLET ORAL DAILY
Qty: 90 TABLET | Refills: 3 | Status: SHIPPED | OUTPATIENT
Start: 2020-11-03 | End: 2021-10-22

## 2020-11-03 RX ORDER — ATORVASTATIN CALCIUM 10 MG/1
10 TABLET, FILM COATED ORAL DAILY
Qty: 90 TABLET | Refills: 3 | Status: SHIPPED | OUTPATIENT
Start: 2020-11-03 | End: 2021-10-22

## 2020-11-03 ASSESSMENT — MIFFLIN-ST. JEOR: SCORE: 1455.59

## 2020-11-03 NOTE — PROGRESS NOTES
"  SUBJECTIVE:   Theodora Meng is a 85 year old female who presents for Preventive Visit.      Patient has been advised of split billing requirements and indicates understanding: Yes  Are you in the first 12 months of your Medicare Part B coverage?  No    Physical Health:    In general, how would you rate your overall physical health? good    Outside of work, how many days during the week do you exercise? 4-5 days/week    Outside of work, approximately how many minutes a day do you exercise?30-45 minutes    If you drink alcohol do you typically have >3 drinks per day or >7 drinks per week? No    Do you usually eat at least 4 servings of fruit and vegetables a day, include whole grains & fiber and avoid regularly eating high fat or \"junk\" foods? NO    Do you have any problems taking medications regularly?  No    Do you have any side effects from medications? none    Needs assistance for the following daily activities: no assistance needed    Which of the following safety concerns are present in your home?  none identified     Hearing impairment: Yes,     In the past 6 months, have you been bothered by leaking of urine? yes    Mental Health:    In general, how would you rate your overall mental or emotional health? good  PHQ-2 Score:      Do you feel safe in your environment? Yes    Have you ever done Advance Care Planning? (For example, a Health Directive, POLST, or a discussion with a medical provider or your loved ones about your wishes): No, advance care planning information given to patient to review.  Advanced care planning was discussed at today's visit.    Additional concerns to address?      Fall risk:  Fallen 2 or more times in the past year?: Yes  Any fall with injury in the past year?: No    Cognitive Screenin) Repeat 3 items (Leader, Season, Table)    2) Clock draw: NORMAL  3) 3 item recall: Recalls 3 objects  Results: 3 items recalled: COGNITIVE IMPAIRMENT LESS LIKELY    Mini-CogTM Copyright S " Dick. Licensed by the author for use in [facility]; reprinted with permission (trena@[facility]). All rights reserved.      Do you have sleep apnea, excessive snoring or daytime drowsiness?: no        Diabetes Follow-up      How often are you checking your blood sugar? Not at all    What concerns do you have today about your diabetes? None     Do you have any of these symptoms? (Select all that apply)  No numbness or tingling in feet.  No redness, sores or blisters on feet.  No complaints of excessive thirst.  No reports of blurry vision.  No significant changes to weight.    Have you had a diabetic eye exam in the last 12 months? Get injections every 3 months    Lab Results   Component Value Date    A1C 6.7 11/18/2019    A1C 6.6 06/20/2019    A1C 7.1 03/20/2019    A1C 7.1 11/06/2018    A1C 7.3 12/12/2017                  Hyperlipidemia Follow-Up      Are you regularly taking any medication or supplement to lower your cholesterol?   Yes- lipitor    Are you having muscle aches or other side effects that you think could be caused by your cholesterol lowering medication?  No  Recent Labs   Lab Test 11/18/19  0937 11/06/18  0900 05/15/15  0941 05/15/15  0941 11/03/14  1203   CHOL 190 169   < > 170 172   HDL 69 60   < > 57 62   LDL 82 79   < > 74 69   TRIG 194* 149   < > 195* 204*   CHOLHDLRATIO  --   --   --  3.0 2.8    < > = values in this interval not displayed.      Hypertension Follow-up      Do you check your blood pressure regularly outside of the clinic? No     Are you following a low salt diet? No    Are your blood pressures ever more than 140 on the top number (systolic) OR more   than 90 on the bottom number (diastolic), for example 140/90? No    Atenolol, lisinopril     Gets anxiety occasionally, would like a refill of ativan. Uses it very rarely    Urinary incontinence  Especially at night, can't hold it when gets the urge. Can't make it to the bathroom in time    BP Readings from Last 2  Encounters:   11/03/20 (!) 140/70   11/18/19 126/70     Hemoglobin A1C (%)   Date Value   11/18/2019 6.7 (H)   06/20/2019 6.6 (H)     LDL Cholesterol Calculated (mg/dL)   Date Value   11/18/2019 82   11/06/2018 79     Reflux  She went up on the Nexium to bid dosing is much better    Reviewed and updated as needed this visit by clinical staff  Tobacco  Allergies  Meds              Reviewed and updated as needed this visit by Provider                Social History     Tobacco Use     Smoking status: Never Smoker     Smokeless tobacco: Never Used   Substance Use Topics     Alcohol use: No                           Current providers sharing in care for this patient include:   Patient Care Team:  Yecenia Armstrong MD as PCP - General  Yecenia Armstrong MD as Assigned PCP  Gay Person MD as Assigned Heart and Vascular Provider    The following health maintenance items are reviewed in Epic and correct as of today:  Health Maintenance   Topic Date Due     DEXA  1935     HEPATITIS B IMMUNIZATION (1 of 3 - Risk 3-dose series) 03/31/1955     ZOSTER IMMUNIZATION (1 of 2) 03/31/1986     DIABETIC FOOT EXAM  11/06/2019     PHQ-2  01/01/2020     MICROALBUMIN  03/27/2020     FALL RISK ASSESSMENT  05/14/2020     A1C  05/18/2020     INFLUENZA VACCINE (1) 09/01/2020     EYE EXAM  11/13/2020     BMP  11/18/2020     LIPID  11/18/2020     MEDICARE ANNUAL WELLNESS VISIT  11/03/2021     DTAP/TDAP/TD IMMUNIZATION (3 - Td) 05/15/2025     ADVANCE CARE PLANNING  11/03/2025     Pneumococcal Vaccine: 65+ Years  Completed     Pneumococcal Vaccine: Pediatrics (0 to 5 Years) and At-Risk Patients (6 to 64 Years)  Aged Out     IPV IMMUNIZATION  Aged Out     MENINGITIS IMMUNIZATION  Aged Out     BP Readings from Last 3 Encounters:   11/03/20 (!) 140/70   11/18/19 126/70   06/20/19 114/74    Wt Readings from Last 3 Encounters:   11/03/20 102.1 kg (225 lb)   04/23/20 99.8 kg (220 lb)   11/18/19 101 kg (222 lb 9.6 oz)            Wt  "Readings from Last 5 Encounters:   11/03/20 102.1 kg (225 lb)   04/23/20 99.8 kg (220 lb)   11/18/19 101 kg (222 lb 9.6 oz)   06/20/19 99.8 kg (220 lb)   04/23/19 100.2 kg (221 lb)            Pneumonia Vaccine:up to date     ROS:  Constitutional, HEENT, cardiovascular, pulmonary, gi and gu systems are negative, except as otherwise noted.    OBJECTIVE:   BP (!) 140/70   Pulse 57   Temp 96.7  F (35.9  C) (Tympanic)   Resp 16   Ht 1.626 m (5' 4\")   Wt 102.1 kg (225 lb)   SpO2 99%   BMI 38.62 kg/m   Estimated body mass index is 38.62 kg/m  as calculated from the following:    Height as of this encounter: 1.626 m (5' 4\").    Weight as of this encounter: 102.1 kg (225 lb).  EXAM:   GENERAL: healthy, alert and no distress  NECK: no adenopathy, no asymmetry, masses, or scars and thyroid normal to palpation  RESP: lungs clear to auscultation - no rales, rhonchi or wheezes  CV: regular rate and rhythm, normal S1 S2, no S3 or S4, no murmur, click or rub  ABDOMEN: soft, obese, nontender, no hepatosplenomegaly, no masses and bowel sounds normal  MS: no gross musculoskeletal defects noted, mild edema      ASSESSMENT / PLAN:   Theodora was seen today for wellness visit.    Diagnoses and all orders for this visit:    Encounter for Medicare annual wellness exam    Hyperlipidemia LDL goal <100  -     atorvastatin (LIPITOR) 10 MG tablet; Take 1 tablet (10 mg) by mouth daily  -     Lipid panel reflex to direct LDL Fasting    Essential hypertension with goal blood pressure less than 140/90  -     atenolol (TENORMIN) 25 MG tablet; Take 1 tablet (25 mg) by mouth daily  -     lisinopril (ZESTRIL) 5 MG tablet; Take 1 tablet (5 mg) by mouth daily  -     Comprehensive metabolic panel  -     CBC with platelets and differential    Anxiety  -     LORazepam (ATIVAN) 0.5 MG tablet; TAKE 1/2 to 1 TAB BY MOUTH EVERY 8 HOURS AS NEEDED FOR ANXIETY    Neuralgia  -     gabapentin (NEURONTIN) 300 MG capsule; Take 2 tabs in the evening.    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    Weight as of this encounter: 102.1 kg (225 lb).    Weight management plan: Discussed healthy diet and exercise guidelines    She reports that she has never smoked. She has never used smokeless tobacco.    Appropriate preventive services were discussed with this patient, including applicable screening as appropriate for cardiovascular disease, diabetes, osteopenia/osteoporosis, and glaucoma.  As appropriate for age/gender, discussed screening for colorectal cancer, prostate cancer, breast cancer, and cervical cancer. Checklist reviewing preventive services available has been given to the patient.    Reviewed patients plan of care and provided an AVS. The Basic Care Plan (routine screening as documented in Health Maintenance) for Dorathy meets the Care Plan requirement. This Care Plan has been established and reviewed with the Patient.    Counseling Resources:  ATP IV Guidelines  Pooled Cohorts Equation Calculator  Breast Cancer Risk Calculator  BRCA-Related Cancer Risk Assessment: FHS-7 Tool  FRAX Risk Assessment  ICSI Preventive Guidelines  Dietary Guidelines for Americans, 2010  USDA's MyPlate  ASA Prophylaxis  Lung CA Screening    Yecenia Cardona MD  [facility]  "

## 2020-11-03 NOTE — PATIENT INSTRUCTIONS
Try Flonase nasal spray one in each nostril once a day    Pelvic floor training with physical therapy       Patient Education   Personalized Prevention Plan  You are due for the preventive services outlined below.  Your care team is available to assist you in scheduling these services.  If you have already completed any of these items, please share that information with your care team to update in your medical record.  Health Maintenance Due   Topic Date Due     Osteoporosis Screening  1935     Hepatitis B Vaccine (1 of 3 - Risk 3-dose series) 03/31/1955     Zoster (Shingles) Vaccine (1 of 2) 03/31/1986     Annual Wellness Visit  12/21/2011     Diabetic Foot Exam  11/06/2019     PHQ-2  01/01/2020     Kidney Microalbumin Urine Test  03/27/2020     FALL RISK ASSESSMENT  05/14/2020     A1C Lab  05/18/2020     Flu Vaccine (1) 09/01/2020     Eye Exam  11/13/2020     Basic Metabolic Panel  11/18/2020     Cholesterol Lab  11/18/2020

## 2020-11-03 NOTE — LETTER
November 5, 2020      Theodora Meng  [address] [facility] [address]        Dear ,    We are writing to inform you of your test results.    look good, including thyroid, liver function, blood count. Cholesterol fine. Kidney function borderline this time, we'll continue to monitor. A1C was great.     Resulted Orders   Comprehensive metabolic panel   Result Value Ref Range    Sodium 141 133 - 144 mmol/L    Potassium 4.1 3.4 - 5.3 mmol/L    Chloride 107 94 - 109 mmol/L    Carbon Dioxide 29 20 - 32 mmol/L    Anion Gap 5 3 - 14 mmol/L    Glucose 127 (H) 70 - 99 mg/dL      Comment:      Fasting specimen    Urea Nitrogen 21 7 - 30 mg/dL    Creatinine 0.89 0.52 - 1.04 mg/dL    GFR Estimate 60 (L) >60 mL/min/[1.73_m2]      Comment:      Non  GFR Calc  Starting 12/18/2018, serum creatinine based estimated GFR (eGFR) will be   calculated using the Chronic Kidney Disease Epidemiology Collaboration   (CKD-EPI) equation.      GFR Estimate If Black 69 >60 mL/min/[1.73_m2]      Comment:       GFR Calc  Starting 12/18/2018, serum creatinine based estimated GFR (eGFR) will be   calculated using the Chronic Kidney Disease Epidemiology Collaboration   (CKD-EPI) equation.      Calcium 9.1 8.5 - 10.1 mg/dL    Bilirubin Total 0.5 0.2 - 1.3 mg/dL    Albumin 3.7 3.4 - 5.0 g/dL    Protein Total 7.0 6.8 - 8.8 g/dL    Alkaline Phosphatase 82 40 - 150 U/L    ALT 16 0 - 50 U/L    AST 10 0 - 45 U/L   Hemoglobin A1c   Result Value Ref Range    Hemoglobin A1C 6.9 (H) 0 - 5.6 %      Comment:      Normal <5.7% Prediabetes 5.7-6.4%  Diabetes 6.5% or higher - adopted from ADA   consensus guidelines.     CBC with platelets and differential   Result Value Ref Range    WBC 4.2 4.0 - 11.0 10e9/L    RBC Count 4.23 3.8 - 5.2 10e12/L    Hemoglobin 12.5 11.7 - 15.7 g/dL    Hematocrit 38.4 35.0 - 47.0 %    MCV 91 78 - 100 fl    MCH 29.6 26.5 - 33.0 pg    MCHC 32.6 31.5 - 36.5 g/dL    RDW 13.6 10.0 - 15.0 %     Platelet Count 251 150 - 450 10e9/L    % Neutrophils 53.7 %    % Lymphocytes 36.2 %    % Monocytes 6.1 %    % Eosinophils 3.3 %    % Basophils 0.7 %    Absolute Neutrophil 2.3 1.6 - 8.3 10e9/L    Absolute Lymphocytes 1.5 0.8 - 5.3 10e9/L    Absolute Monocytes 0.3 0.0 - 1.3 10e9/L    Absolute Eosinophils 0.1 0.0 - 0.7 10e9/L    Absolute Basophils 0.0 0.0 - 0.2 10e9/L    Diff Method Automated Method    Lipid panel reflex to direct LDL Fasting   Result Value Ref Range    Cholesterol 175 <200 mg/dL    Triglycerides 188 (H) <150 mg/dL      Comment:      Borderline high:  150-199 mg/dl  High:             200-499 mg/dl  Very high:       >499 mg/dl  Fasting specimen      HDL Cholesterol 71 >49 mg/dL    LDL Cholesterol Calculated 66 <100 mg/dL      Comment:      Desirable:       <100 mg/dl    Non HDL Cholesterol 104 <130 mg/dL   TSH with free T4 reflex   Result Value Ref Range    TSH 0.41 0.40 - 4.00 mU/L       If you have any questions or concerns, please call the clinic at the number listed above.       Sincerely,        Yecenia Cardona MD

## 2020-11-18 ENCOUNTER — APPOINTMENT (OUTPATIENT)
Dept: CT IMAGING | Facility: CLINIC | Age: 85
End: 2020-11-18
Attending: EMERGENCY MEDICINE
Payer: COMMERCIAL

## 2020-11-18 ENCOUNTER — HOSPITAL ENCOUNTER (EMERGENCY)
Facility: CLINIC | Age: 85
Discharge: HOME | End: 2020-11-18
Attending: EMERGENCY MEDICINE | Admitting: EMERGENCY MEDICINE
Payer: COMMERCIAL

## 2020-11-18 VITALS
BODY MASS INDEX: 37.9 KG/M2 | SYSTOLIC BLOOD PRESSURE: 162 MMHG | HEART RATE: 63 BPM | HEIGHT: 64 IN | TEMPERATURE: 97.1 F | DIASTOLIC BLOOD PRESSURE: 85 MMHG | OXYGEN SATURATION: 99 % | WEIGHT: 222 LBS | RESPIRATION RATE: 20 BRPM

## 2020-11-18 DIAGNOSIS — K44.9 HIATAL HERNIA: ICD-10-CM

## 2020-11-18 DIAGNOSIS — R10.12 ABDOMINAL PAIN, LEFT UPPER QUADRANT: ICD-10-CM

## 2020-11-18 LAB
ALBUMIN SERPL-MCNC: 3.7 G/DL (ref 3.4–5)
ALP SERPL-CCNC: 87 U/L (ref 40–150)
ALT SERPL W P-5'-P-CCNC: 15 U/L (ref 0–50)
ANION GAP SERPL CALCULATED.3IONS-SCNC: 4 MMOL/L (ref 3–14)
AST SERPL W P-5'-P-CCNC: 12 U/L (ref 0–45)
BASOPHILS # BLD AUTO: 0 10E9/L (ref 0–0.2)
BASOPHILS NFR BLD AUTO: 0.8 %
BILIRUB SERPL-MCNC: 0.4 MG/DL (ref 0.2–1.3)
BUN SERPL-MCNC: 16 MG/DL (ref 7–30)
CALCIUM SERPL-MCNC: 8.9 MG/DL (ref 8.5–10.1)
CHLORIDE SERPL-SCNC: 109 MMOL/L (ref 94–109)
CO2 SERPL-SCNC: 28 MMOL/L (ref 20–32)
CREAT SERPL-MCNC: 0.9 MG/DL (ref 0.52–1.04)
DIFFERENTIAL METHOD BLD: NORMAL
EOSINOPHIL # BLD AUTO: 0.2 10E9/L (ref 0–0.7)
EOSINOPHIL NFR BLD AUTO: 3.2 %
ERYTHROCYTE [DISTWIDTH] IN BLOOD BY AUTOMATED COUNT: 13.2 % (ref 10–15)
GFR SERPL CREATININE-BSD FRML MDRD: 58 ML/MIN/{1.73_M2}
GLUCOSE SERPL-MCNC: 111 MG/DL (ref 70–99)
HCT VFR BLD AUTO: 41.8 % (ref 35–47)
HGB BLD-MCNC: 13.4 G/DL (ref 11.7–15.7)
IMM GRANULOCYTES # BLD: 0 10E9/L (ref 0–0.4)
IMM GRANULOCYTES NFR BLD: 0.4 %
LIPASE SERPL-CCNC: 74 U/L (ref 73–393)
LYMPHOCYTES # BLD AUTO: 1.7 10E9/L (ref 0.8–5.3)
LYMPHOCYTES NFR BLD AUTO: 33.6 %
MCH RBC QN AUTO: 29.6 PG (ref 26.5–33)
MCHC RBC AUTO-ENTMCNC: 32.1 G/DL (ref 31.5–36.5)
MCV RBC AUTO: 93 FL (ref 78–100)
MONOCYTES # BLD AUTO: 0.3 10E9/L (ref 0–1.3)
MONOCYTES NFR BLD AUTO: 5.1 %
NEUTROPHILS # BLD AUTO: 2.9 10E9/L (ref 1.6–8.3)
NEUTROPHILS NFR BLD AUTO: 56.9 %
NRBC # BLD AUTO: 0 10*3/UL
NRBC BLD AUTO-RTO: 0 /100
PLATELET # BLD AUTO: 257 10E9/L (ref 150–450)
POTASSIUM SERPL-SCNC: 3.6 MMOL/L (ref 3.4–5.3)
PROT SERPL-MCNC: 7.2 G/DL (ref 6.8–8.8)
RBC # BLD AUTO: 4.52 10E12/L (ref 3.8–5.2)
SODIUM SERPL-SCNC: 141 MMOL/L (ref 133–144)
WBC # BLD AUTO: 5.1 10E9/L (ref 4–11)

## 2020-11-18 PROCEDURE — 250N000011 HC RX IP 250 OP 636: Performed by: EMERGENCY MEDICINE

## 2020-11-18 PROCEDURE — 99285 EMERGENCY DEPT VISIT HI MDM: CPT | Mod: 25 | Performed by: EMERGENCY MEDICINE

## 2020-11-18 PROCEDURE — 83690 ASSAY OF LIPASE: CPT | Performed by: EMERGENCY MEDICINE

## 2020-11-18 PROCEDURE — 250N000009 HC RX 250: Performed by: EMERGENCY MEDICINE

## 2020-11-18 PROCEDURE — 99284 EMERGENCY DEPT VISIT MOD MDM: CPT | Performed by: EMERGENCY MEDICINE

## 2020-11-18 PROCEDURE — 74177 CT ABD & PELVIS W/CONTRAST: CPT

## 2020-11-18 PROCEDURE — 85025 COMPLETE CBC W/AUTO DIFF WBC: CPT | Performed by: EMERGENCY MEDICINE

## 2020-11-18 PROCEDURE — 80053 COMPREHEN METABOLIC PANEL: CPT | Performed by: EMERGENCY MEDICINE

## 2020-11-18 RX ORDER — IOPAMIDOL 755 MG/ML
100 INJECTION, SOLUTION INTRAVASCULAR ONCE
Status: COMPLETED | OUTPATIENT
Start: 2020-11-18 | End: 2020-11-18

## 2020-11-18 RX ADMIN — IOPAMIDOL 100 ML: 755 INJECTION, SOLUTION INTRAVENOUS at 13:23

## 2020-11-18 RX ADMIN — SODIUM CHLORIDE 67 ML: 9 INJECTION, SOLUTION INTRAVENOUS at 13:23

## 2020-11-18 ASSESSMENT — MIFFLIN-ST. JEOR: SCORE: 1441.99

## 2020-11-18 NOTE — ED PROVIDER NOTES
"  History     Chief Complaint   Patient presents with     Abdominal Pain     \"bloating\" up into the left upper quadrant. x 3-4 days. denies nausea, diarrhea, or shortness of breath     HPI  Theodora Meng is a 85 year old female who presents from home secondary to left-sided abdominal pain waxing and waning for the past week, associated abdominal bloating, denies nausea vomiting or diarrhea, last bowel movement today brown formed no black or bloody component.  Denies associated urinary symptoms.  No fever.  She is not had such pain in the past.  Denies inciting trauma or strain.  Believes it onset while she was taking a walk last week.  No shortness of air or cough.  No exposure to COVID-19 disease.    Allergies:  Allergies   Allergen Reactions     Zoloft [Sertraline] Palpitations, Diarrhea and Cramps       Problem List:    Patient Active Problem List    Diagnosis Date Noted     Left knee DJD 04/03/2019     Priority: Medium     Subclinical hyperthyroidism 11/12/2018     Priority: Medium     Type 2 diabetes mellitus without complication, without long-term current use of insulin (H) 11/10/2016     Priority: Medium     Essential hypertension with goal blood pressure less than 140/90 11/10/2016     Priority: Medium     Gastroesophageal reflux disease without esophagitis 11/10/2016     Priority: Medium     Endometrial polyp 06/18/2016     Priority: Medium     Thickened endometrium 04/21/2016     Priority: Medium     Thyroid nodule 03/24/2016     Priority: Medium     DDD (degenerative disc disease), cervical 02/13/2016     Priority: Medium     Type 2 diabetes mellitus without complication (H) 10/21/2015     Priority: Medium     Morbid obesity (H) 10/21/2015     Priority: Medium     Status post total knee replacement 11/29/2013     Priority: Medium     Fever 11/11/2013     Priority: Medium     Edema 11/07/2013     Priority: Medium     Nausea 11/07/2013     Priority: Medium     S/P total knee arthroplasty 10/30/2013    "  Priority: Medium     Right knee DJD 10/30/2013     Priority: Medium     OA (osteoarthritis) of knee 12/11/2012     Priority: Medium     Hyperlipidemia LDL goal <100 12/04/2012     Priority: Medium     Knee pain 11/07/2012     Priority: Medium     Insomnia 11/07/2012     Priority: Medium     Anxiety 11/15/2011     Priority: Medium     Type 2 diabetes mellitus with other ophthalmic complication, without long-term current use of insulin (H) 12/21/2010     Priority: Medium     Neuralgia 06/05/2009     Priority: Medium     Vitamin D deficiency 03/30/2009     Priority: Medium     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 Provider to review          Past Medical History:    Past Medical History:   Diagnosis Date     Depressive disorder      Diabetes (H)      Esophageal reflux      Other abnormal glucose      Other premature beats      Unspecified essential hypertension        Past Surgical History:    Past Surgical History:   Procedure Laterality Date     APPENDECTOMY       ARTHROPLASTY KNEE  10/30/2013    Procedure: ARTHROPLASTY KNEE;  Right Total Knee Arthroplasty;  Surgeon: Ousmane Mcgowan MD;  Location: [facility]     ARTHROPLASTY KNEE Left 4/3/2019    Procedure: Left Total Knee Arthroplasty;  Surgeon: Ousmane Mcgowan MD;  Location: [facility]     CHOLECYSTECTOMY, LAPOROSCOPIC      Cholecystectomy, Laparoscopic     DILATION AND CURETTAGE, OPERATIVE HYSTEROSCOPY WITH MORCELLATOR, COMBINED N/A 6/20/2016    Procedure: COMBINED DILATION AND CURETTAGE, OPERATIVE HYSTEROSCOPY WITH MORCELLATOR;  Surgeon: Bony Arredondo MD;  Location: [facility]     ESOPHAGOSCOPY, GASTROSCOPY, DUODENOSCOPY (EGD), COMBINED N/A 2/17/2016    Procedure: COMBINED ESOPHAGOSCOPY, GASTROSCOPY, DUODENOSCOPY (EGD), BIOPSY SINGLE OR MULTIPLE;  Surgeon: Mil Guevara,  "MD;  Location: [facility]     SALPINGO OOPHORECTOMY,R/L/OZZY      Salpingo Oophorectomy, RT/LT/OZZY     TONSILLECTOMY         Family History:    Family History   Problem Relation Age of Onset     Heart Disease Mother      Heart Disease Father      Heart Disease Brother      Heart Disease Brother      Heart Disease Brother        Social History:  Marital Status:   [5]  Social History     Tobacco Use     Smoking status: Never Smoker     Smokeless tobacco: Never Used   Substance Use Topics     Alcohol use: No     Drug use: No        Medications:         acetaminophen (TYLENOL) 500 MG tablet       atenolol (TENORMIN) 25 MG tablet       atorvastatin (LIPITOR) 10 MG tablet       blood glucose monitoring (ACCU-CHEK COMPACT CARE KIT) meter device kit       blood glucose monitoring (NO BRAND SPECIFIED) test strip       esomeprazole (NEXIUM) 40 MG DR capsule       gabapentin (NEURONTIN) 300 MG capsule       lisinopril (ZESTRIL) 5 MG tablet       LORazepam (ATIVAN) 0.5 MG tablet          Review of Systems  All other systems reviewed and are negative.    Physical Exam   BP: (!) 174/83  Pulse: 63  Temp: 97.1  F (36.2  C)  Resp: 20  Height: 162.6 cm (5' 4\")  Weight: 100.7 kg (222 lb)  SpO2: 99 %      Physical Exam  Nontoxic appearing no respiratory distress alert and oriented ×3  Head atraumatic normocephalic   Neck supple full active painless range of motion  Lungs clear to auscultation  Heart regular no murmur  Mild tenderness left anterior costal margin reproduces pain complaint, no associated rash redness or induration  Abdomen soft left upper and mid quadrant without guarding or rebound bowel sounds positive   Strength and sensation grossly intact throughout the extremities, gait and station normal  Speech is fluent, good eye contact, thought processes are rational  Lower extremities without swelling, redness or tenderness  Pedal pulses symmetrical and strong    ED Course        Procedures               Critical Care time:  " None                 Results for orders placed or performed during the hospital encounter of 11/18/20 (from the past 24 hour(s))   CBC with platelets differential   Result Value Ref Range    WBC 5.1 4.0 - 11.0 10e9/L    RBC Count 4.52 3.8 - 5.2 10e12/L    Hemoglobin 13.4 11.7 - 15.7 g/dL    Hematocrit 41.8 35.0 - 47.0 %    MCV 93 78 - 100 fl    MCH 29.6 26.5 - 33.0 pg    MCHC 32.1 31.5 - 36.5 g/dL    RDW 13.2 10.0 - 15.0 %    Platelet Count 257 150 - 450 10e9/L    Diff Method Automated Method     % Neutrophils 56.9 %    % Lymphocytes 33.6 %    % Monocytes 5.1 %    % Eosinophils 3.2 %    % Basophils 0.8 %    % Immature Granulocytes 0.4 %    Nucleated RBCs 0 0 /100    Absolute Neutrophil 2.9 1.6 - 8.3 10e9/L    Absolute Lymphocytes 1.7 0.8 - 5.3 10e9/L    Absolute Monocytes 0.3 0.0 - 1.3 10e9/L    Absolute Eosinophils 0.2 0.0 - 0.7 10e9/L    Absolute Basophils 0.0 0.0 - 0.2 10e9/L    Abs Immature Granulocytes 0.0 0 - 0.4 10e9/L    Absolute Nucleated RBC 0.0    Comprehensive metabolic panel   Result Value Ref Range    Sodium 141 133 - 144 mmol/L    Potassium 3.6 3.4 - 5.3 mmol/L    Chloride 109 94 - 109 mmol/L    Carbon Dioxide 28 20 - 32 mmol/L    Anion Gap 4 3 - 14 mmol/L    Glucose 111 (H) 70 - 99 mg/dL    Urea Nitrogen 16 7 - 30 mg/dL    Creatinine 0.90 0.52 - 1.04 mg/dL    GFR Estimate 58 (L) >60 mL/min/[1.73_m2]    GFR Estimate If Black 68 >60 mL/min/[1.73_m2]    Calcium 8.9 8.5 - 10.1 mg/dL    Bilirubin Total 0.4 0.2 - 1.3 mg/dL    Albumin 3.7 3.4 - 5.0 g/dL    Protein Total 7.2 6.8 - 8.8 g/dL    Alkaline Phosphatase 87 40 - 150 U/L    ALT 15 0 - 50 U/L    AST 12 0 - 45 U/L   Lipase   Result Value Ref Range    Lipase 74 73 - 393 U/L   CT Abdomen Pelvis w Contrast    Narrative    CT ABDOMEN PELVIS W CONTRAST 11/18/2020 1:34 PM    CLINICAL HISTORY: Left side abdominal pain and tenderness    TECHNIQUE: CT scan of the abdomen and pelvis was performed following  injection of IV contrast. Multiplanar reformats were  obtained. Dose  reduction techniques were used.  CONTRAST: 100mL Isovue-370    COMPARISON: CT of the abdomen and pelvis 4/13/2016 and pelvic  ultrasound 4/22/2016    FINDINGS:   LOWER CHEST: Right basilar dependent and linear atelectasis. Mild left  basilar atelectasis. Severe coronary artery calcifications. Moderate  hiatal hernia.    HEPATOBILIARY: Cholecystectomy. Normal liver. No biliary ductal  dilatation.    PANCREAS: No significant mass, duct dilatation, or inflammatory  change.    SPLEEN: Few small hypodense lesions in the inferior spleen are new and  indeterminate. For example a 1.1 cm lesion in the tip of the spleen  (series 5, image 90) and two 5 mm lesions in the peripheral anterior  spleen (series 5, image 80) are new. No splenomegaly, the spleen  measuring 12.0 cm.     ADRENAL GLANDS: Normal.    KIDNEYS/BLADDER: No significant mass, stones, or hydronephrosis.    BOWEL: Moderate sized hiatal hernia. Large duodenal diverticulum is  unchanged. Normal caliber loops of small bowel and colon. The appendix  is not visualized but there are no inflammatory changes in the right  lower quadrant. Colonic diverticulosis without surrounding  inflammatory changes.    PELVIC ORGANS: Myomatous uterus. Again seen is irregular thickening of  the endometrium measuring about 1.1 cm, similar in appearance to 2016.  No pelvic masses.    ADDITIONAL FINDINGS: No free fluid, fluid collections or extraluminal  air. No lymphadenopathy. Major intra-abdominal vasculature is patent.    MUSCULOSKELETAL: Degenerative changes in the spine. Benign vertebral  body hematoma is in the lumbar spine. No suspicious lesions in the  bones.      Impression    IMPRESSION:   1.  No acute findings in the abdomen and pelvis.  2.   Few small lesions in the spleen are new measuring 5-11 mm. No  splenomegaly. These are nonspecific and may be small cysts or  hemangiomas. A follow-up CT of the abdomen could be considered in 6  months.  3.  Myomatous  uterus with stable irregular endometrial thickening not  significantly changed in appearance compared to 2016. Consider  follow-up pelvic ultrasound and gynecological evaluation based on  clinical assessment.    MAINOR LANDRY MD       Medications   iopamidol (ISOVUE-370) solution 100 mL (100 mLs Intravenous Given 11/18/20 1323)   sodium chloride 0.9 % bag 500mL for CT scan flush use (67 mLs Intravenous Given 11/18/20 1323)       Assessments & Plan (with Medical Decision Making)  85-year-old female left upper quadrant pain and tenderness, left anterior costal margin tenderness.  Differential including but not limited to costochondral pain, diverticulitis, bowel obstruction, perforated viscus, ulcer disease, reflux, esophagitis, hiatal hernia, acute coronary syndrome pancreatitis versus other.  Work-up is unrevealing including CT scan abdomen pelvis, no acute findings appreciated.  There are a few small splenic lesions nonspecific.  Patient taking Nexium twice daily avoiding NSAIDs taking Tylenol for discomfort.  Most likely etiology secondary to hiatal hernia.  No indication for further evaluation treatment at this time.  Follow-up primary care, return criteria     I have reviewed the nursing notes.    I have reviewed the findings, diagnosis, plan and need for follow up with the patient.       New Prescriptions    No medications on file       Final diagnoses:   Abdominal pain, left upper quadrant   Hiatal hernia       11/18/2020   [facility] EMERGENCY DEPT     Kit Pineda MD  11/18/20 5207

## 2020-11-18 NOTE — DISCHARGE INSTRUCTIONS
Pain may be due to hiatal hernia, may have something to do with the costochondral joints at the left costal margin.  There is no evidence for significant intra-abdominal pathology, lab work-up is unrevealing as well.  Recommend urine test through the clinic    Tylenol for discomfort, avoid NSAIDs i.e. ibuprofen and aspirin, continue Nexium    Return for worsening pain, fever, vomiting or other concern

## 2020-11-18 NOTE — ED NOTES
Pt here with left rib pain and lower abdominal pain for the past week. Pain is intermittent, also has intermittent nausea. Denies vomiting or diarrhea, no shortness of breath.

## 2020-11-18 NOTE — ED AVS SNAPSHOT
[facility] Emergency Dept  [address] [address]  Phone: [phone]  Fax: [phone]                                    Theodora Meng   MRN: [id number]    Department: [facility] Emergency Dept   Date of Visit: 11/18/2020           After Visit Summary Signature Page    I have received my discharge instructions, and my questions have been answered. I have discussed any challenges I see with this plan with the nurse or doctor.    ..........................................................................................................................................  Patient/Patient Representative Signature      ..........................................................................................................................................  Patient Representative Print Name and Relationship to Patient    ..................................................               ................................................  Date                                   Time    ..........................................................................................................................................  Reviewed by Signature/Title    ...................................................              ..............................................  Date                                               Time          22EPIC Rev 08/18

## 2020-12-09 ENCOUNTER — HOSPITAL ENCOUNTER (OUTPATIENT)
Dept: PHYSICAL THERAPY | Facility: CLINIC | Age: 85
Setting detail: THERAPIES SERIES
End: 2020-12-09
Attending: FAMILY MEDICINE
Payer: COMMERCIAL

## 2020-12-09 DIAGNOSIS — N39.41 URGE INCONTINENCE OF URINE: ICD-10-CM

## 2020-12-09 PROCEDURE — 97110 THERAPEUTIC EXERCISES: CPT | Mod: GP | Performed by: PHYSICAL THERAPIST

## 2020-12-09 PROCEDURE — 97535 SELF CARE MNGMENT TRAINING: CPT | Mod: GP | Performed by: PHYSICAL THERAPIST

## 2020-12-09 PROCEDURE — 97161 PT EVAL LOW COMPLEX 20 MIN: CPT | Mod: GP | Performed by: PHYSICAL THERAPIST

## 2020-12-09 NOTE — PROGRESS NOTES
12/09/20 1400   General Information   Type of Visit Initial OP Ortho PT Evaluation   Start of Care Date 12/09/20   Referring Physician Yecenia Armstrong MD    Patient/Family Goals Statement not have to wear a pad at night   Orders Evaluate and Treat   Orders Comment Pelvic Floor/Incontinence   Date of Order 11/03/20   Certification Required? No   Medical Diagnosis Urge incontinence of urine N39.41    Surgical/Medical history reviewed Yes   Precautions/Limitations no known precautions/limitations   General Information Comments PMH: diabetes, high BP, OA, TKA 2014, 2019, Dand Cs, ovary removeal, appendectomy, gallbladder removal, GERD   Body Part(s)   Body Part(s) Pelvic Floor Dysfunction   Presentation and Etiology   Pertinent history of current problem (include personal factors and/or comorbidities that impact the POC)  Pt relates she doesn't wake up at night to go to bathroom however then in the am she cant get to the bathroom in time. Will leak after a few steps. Pt relates urgency is okay during day, going approx 1x/every 4- 6 hours. Pt relates occasional leak w cough/sneeze. Pt relates once to bathroom, she will stop and start again when going again. Pt has not had pelvic exam in years. Pt has been having abdominal pain with possible hernia. Pt relates they couldn't find anything though and she is to come back it worse. Side pain worse with walking. Pt is getting approx. 6.5 hours per night.    Impairments P. Bowel or bladder problems   Functional Limitations perform activities of daily living;perform required work activities;perform desired leisure / sports activities   Symptom Location incontinence   How/Where did it occur From insidious onset   Onset date of current episode/exacerbation 12/09/17   Chronicity Chronic   Pain rating (0-10 point scale) Denies pain   Progression of symptoms since onset: Unchanged   Current Level of Function   Current Community Support Family/friend caregiver   Patient  "role/employment history F. Retired   [facility] environment [place]   Fall Risk Screen   Fall screen completed by PT   Have you fallen 2 or more times in the past year? No   Have you fallen and had an injury in the past year? Yes   Is patient a fall risk? No   Fall screen comments tripped on step, will assess tug at next visit as ran out of time   Abuse Screen (yes response referral indicated)   Feels Unsafe at Home or Work/School no   Feels Threatened by Someone no   Does Anyone Try to Keep You From Having Contact with Others or Doing Things Outside Your Home? no   Physical Signs of Abuse Present no   Functional Scales   Functional Scales Other   Other Scales  JASON-6: 5, AUA: 6   Specific Questions   Specific Questions Pelvic Floor Dysfunction;Pregnancy;Women's Health   Pelvic Floor Dysfunction Questions   Regular exercise Yes  (3 blocks - every day)   Fluid intake-glasses/day (one glass/cup = 8oz 64   Caffeinated beverages-glasses/day 0   Alcoholic beverages - glasses/day 0   Recent diet change? No   How long can you delay the need to urinate?  morning: cannot hold, afternoon: no issues   How many times do you wake to urinate at night?   0   How often do you urinate during the day?   4   Can you stop the flow of urine when on the toilet?  Yes   Is the volume of urine passed usually  Other  (am: large, pm: med)   Do you have the sensation that you need to go to the toilet?  Yes   Do you empty your bladder frequently, before you experience the urge to pass urine?  Yes   Do you have \"triggers\" that make you feel you can't wait to go to the toilet?  Yes  (waking up, )   Number of bladder infections last year?  0  (has kidney issues, not infection)   Frequency of bowel movements:  1x/day   Consistency of stool?  Soft   Do you ignore the urge to defecate?  Yes   Women's Health Questions   Number of pregnancies  4   Number of vaginal deliveries  4   Pelvic Floor Dysfunction Objective Findings   Observation TTP L side  "   Type of Storage Problem urgency   Type of Emptying Problem incomplete emptying   Protection needed Pad  (1 at night)   Planned Therapy Interventions   Planned Therapy Interventions balance training;gait training;joint mobilization;manual therapy;neuromuscular re-education;strengthening;stretching;ROM   Planned Modality Interventions   Planned Modality Interventions Biofeedback;Cryotherapy;Hot packs;Electrical stimulation;TENS   Clinical Impression   Criteria for Skilled Therapeutic Interventions Met yes, treatment indicated   PT Diagnosis PFM weakness/urgency   Influenced by the following impairments weakness,    Functional limitations due to impairments urgency in am, mixed incontinence   Clinical Presentation Stable/Uncomplicated   Clinical Presentation Rationale Pt is pleasant 85 yr old w urgency incontincae in am and is having diffiuclaty fully emptying Pt relates no issues during day and is motivated to get better   Clinical Decision Making (Complexity) Low complexity   Therapy Frequency 1 time/week   Predicted Duration of Therapy Intervention (days/wks) 8 weeks   Risk & Benefits of therapy have been explained Yes   Patient, Family & other staff in agreement with plan of care Yes   Education Assessment   Preferred Learning Style Listening;Demonstration;Reading;Pictures/video   Barriers to Learning No barriers   ORTHO GOALS   PT Ortho Eval Goals 1;2;3;4   Ortho Goal 1   Goal Identifier Leaking in morning   Goal Description Pt will relate leaking in am 3 or less days per week to have reduced urgency   Target Date 01/06/21   Ortho Goal 2   Goal Identifier fully empty   Goal Description Pt will be able to fully empty bladder and will relate fully emptying 75% of the time   Target Date 02/03/21   Ortho Goal 3   Goal Identifier HEP   Goal Description Pt will be ind in HEP to prevent return of symptoms   Target Date 02/03/21   Total Evaluation Time   PT Eval, Low Complexity Minutes ([id number]) 20

## 2020-12-16 ENCOUNTER — HOSPITAL ENCOUNTER (OUTPATIENT)
Dept: PHYSICAL THERAPY | Facility: CLINIC | Age: 85
Setting detail: THERAPIES SERIES
End: 2020-12-16
Attending: FAMILY MEDICINE
Payer: COMMERCIAL

## 2020-12-16 PROCEDURE — 90912 BFB TRAINING 1ST 15 MIN: CPT | Mod: GP | Performed by: PHYSICAL THERAPIST

## 2020-12-16 PROCEDURE — 97535 SELF CARE MNGMENT TRAINING: CPT | Mod: GP | Performed by: PHYSICAL THERAPIST

## 2020-12-16 PROCEDURE — 97110 THERAPEUTIC EXERCISES: CPT | Mod: GP | Performed by: PHYSICAL THERAPIST

## 2020-12-31 DIAGNOSIS — K21.9 GASTROESOPHAGEAL REFLUX DISEASE WITHOUT ESOPHAGITIS: ICD-10-CM

## 2020-12-31 RX ORDER — ESOMEPRAZOLE MAGNESIUM 40 MG/1
CAPSULE, DELAYED RELEASE ORAL
Qty: 90 CAPSULE | Refills: 3 | Status: SHIPPED | OUTPATIENT
Start: 2020-12-31 | End: 2021-10-22

## 2021-01-05 ENCOUNTER — TELEPHONE (OUTPATIENT)
Dept: FAMILY MEDICINE | Facility: CLINIC | Age: 86
End: 2021-01-05

## 2021-01-05 NOTE — TELEPHONE ENCOUNTER
Prior Authorization Retail Medication Request    Medication/Dose: esomeprazole  ICD code (if different than what is on RX):  Gastroesophageal reflux disease without esophagitis [K21.9]   Previously Tried and Failed:  Omeprazole, zantac prevacid does not work as well  Rationale:  Pt stable on this rx    Insurance Name:  [facility]  Insurance ID:  Ending in [id number]--per pharmacy      Pharmacy Information (if different than what is on RX)  Name:     Phone:

## 2021-01-05 NOTE — TELEPHONE ENCOUNTER
Central Prior Authorization Team  Phone: [phone]    PA Initiation    Medication: esomeprazole  Insurance Company: [employer] - Phone [phone] Fax [phone]  Pharmacy Filling the Rx: [facility] - [place], [place] - [address] AT PORTAL TO REGISTERED [facility] SITES  Filling Pharmacy Phone: [phone]  Filling Pharmacy Fax:    Start Date: 1/5/2021

## 2021-01-11 ENCOUNTER — TRANSFERRED RECORDS (OUTPATIENT)
Dept: HEALTH INFORMATION MANAGEMENT | Facility: CLINIC | Age: 86
End: 2021-01-11

## 2021-02-10 VITALS
SYSTOLIC BLOOD PRESSURE: 139 MMHG | HEIGHT: 64 IN | TEMPERATURE: 96.7 F | OXYGEN SATURATION: 99 % | DIASTOLIC BLOOD PRESSURE: 70 MMHG | WEIGHT: 225 LBS | HEART RATE: 57 BPM | BODY MASS INDEX: 38.41 KG/M2 | RESPIRATION RATE: 16 BRPM

## 2021-02-18 ENCOUNTER — IMMUNIZATION (OUTPATIENT)
Dept: FAMILY MEDICINE | Facility: CLINIC | Age: 86
End: 2021-02-18
Payer: COMMERCIAL

## 2021-02-18 PROCEDURE — 0001A PR COVID VAC PFIZER DIL RECON 30 MCG/0.3 ML IM: CPT

## 2021-02-18 PROCEDURE — 91300 PR COVID VAC PFIZER DIL RECON 30 MCG/0.3 ML IM: CPT

## 2021-03-11 ENCOUNTER — IMMUNIZATION (OUTPATIENT)
Dept: FAMILY MEDICINE | Facility: CLINIC | Age: 86
End: 2021-03-11
Attending: FAMILY MEDICINE
Payer: COMMERCIAL

## 2021-03-11 PROCEDURE — 91300 PR COVID VAC PFIZER DIL RECON 30 MCG/0.3 ML IM: CPT

## 2021-03-11 PROCEDURE — 0002A PR COVID VAC PFIZER DIL RECON 30 MCG/0.3 ML IM: CPT

## 2021-05-15 ENCOUNTER — HEALTH MAINTENANCE LETTER (OUTPATIENT)
Age: 86
End: 2021-05-15

## 2021-06-24 ENCOUNTER — RECORDS - HEALTHEAST (OUTPATIENT)
Dept: ADMINISTRATIVE | Facility: CLINIC | Age: 86
End: 2021-06-24

## 2021-07-19 NOTE — PROGRESS NOTES
Outpatient Physical Therapy Discharge Note     Patient: Theodora Meng  : 1935    Beginning/End Dates of Reporting Period:  21 to 20     Referring Provider: Patti Orellana Diagnosis: PFM weakness     Client Self Report: Pt relates urgeency is the same in the morning. Pt relates if she sits on side of bed and does kegels and sometimes will stay try.     Objective Measurements:  Objective Measure: PERF  Details: P: 2/5, E: 2 secs, R: 4 reps    Objective Measure: TUG:  Details: 14.06 secs - indicates risk of falls - pt relates will think about if she wants physcial therapy, she is aware balance is not great.     Objective Measure: Pelvic Exam   Details: Observation: kegel - WNL no prolapse observed or palpated w kegel, denies TTP to extenal or internal PFM, no resting tone noted           Goals:  Goal Identifier Leaking in morning   Goal Description Pt will relate leaking in am 3 or less days per week to have reduced urgency   Target Date 21   Date Met      Progress (detail required for progress note):continues to leak     Goal Identifier fully empty   Goal Description Pt will be able to fully empty bladder and will relate fully emptying 75% of the time   Target Date 21   Date Met      Progress (detail required for progress note):not met     Goal Identifier HEP   Goal Description Pt will be ind in HEP to prevent return of symptoms   Target Date 21   Date Met      Progress (detail required for progress note):goal met       Progress this reporting period: Pt was seen for 2 visits in physical therapy over this POC. Objective measures are all taken from last visit. At time of last visit Performed pelvic exam and no prolapse noted and pt denies TTP however pt demonstrates mod weakness w PFM quicks and holds. Reviewed stps to control urge and pt is to cotinue at home. Plan to f/u in 2 weeks do continue with strengthenign. Pt would like min visits due to concerns with COVID-19. Pt  has failed to schedule f/u visits within 30 days from last visit as instructed thus is being d/c from therapy at this time. Current status is unknown at this time        Plan:  Discharge from therapy.    Discharge:    Reason for Discharge: Patient chooses to discontinue therapy.    Equipment Issued: NA    Discharge Plan: Patient to continue home program.

## 2021-09-04 ENCOUNTER — HEALTH MAINTENANCE LETTER (OUTPATIENT)
Age: 86
End: 2021-09-04

## 2021-10-08 ENCOUNTER — TRANSFERRED RECORDS (OUTPATIENT)
Dept: MULTI SPECIALTY CLINIC | Facility: CLINIC | Age: 86
End: 2021-10-08

## 2021-10-08 LAB — RETINOPATHY: NORMAL

## 2021-10-15 ENCOUNTER — TRANSFERRED RECORDS (OUTPATIENT)
Dept: HEALTH INFORMATION MANAGEMENT | Facility: CLINIC | Age: 86
End: 2021-10-15
Payer: COMMERCIAL

## 2021-10-22 ENCOUNTER — OFFICE VISIT (OUTPATIENT)
Dept: FAMILY MEDICINE | Facility: CLINIC | Age: 86
End: 2021-10-22
Payer: COMMERCIAL

## 2021-10-22 VITALS
RESPIRATION RATE: 20 BRPM | DIASTOLIC BLOOD PRESSURE: 64 MMHG | OXYGEN SATURATION: 96 % | TEMPERATURE: 97.7 F | HEIGHT: 63 IN | HEART RATE: 62 BPM | BODY MASS INDEX: 40.26 KG/M2 | SYSTOLIC BLOOD PRESSURE: 136 MMHG | WEIGHT: 227.2 LBS

## 2021-10-22 DIAGNOSIS — F41.9 ANXIETY: ICD-10-CM

## 2021-10-22 DIAGNOSIS — E11.39 TYPE 2 DIABETES MELLITUS WITH OTHER OPHTHALMIC COMPLICATION, WITHOUT LONG-TERM CURRENT USE OF INSULIN (H): ICD-10-CM

## 2021-10-22 DIAGNOSIS — D73.89 LESION OF SPLEEN: ICD-10-CM

## 2021-10-22 DIAGNOSIS — M79.2 NEURALGIA: ICD-10-CM

## 2021-10-22 DIAGNOSIS — Z00.00 ENCOUNTER FOR MEDICARE ANNUAL WELLNESS EXAM: Primary | ICD-10-CM

## 2021-10-22 DIAGNOSIS — I10 ESSENTIAL HYPERTENSION WITH GOAL BLOOD PRESSURE LESS THAN 140/90: ICD-10-CM

## 2021-10-22 DIAGNOSIS — K21.9 GASTROESOPHAGEAL REFLUX DISEASE WITHOUT ESOPHAGITIS: ICD-10-CM

## 2021-10-22 DIAGNOSIS — E78.5 HYPERLIPIDEMIA LDL GOAL <100: ICD-10-CM

## 2021-10-22 DIAGNOSIS — N18.31 STAGE 3A CHRONIC KIDNEY DISEASE (H): ICD-10-CM

## 2021-10-22 LAB
ALBUMIN SERPL-MCNC: 3.4 G/DL (ref 3.4–5)
ALP SERPL-CCNC: 78 U/L (ref 40–150)
ALT SERPL W P-5'-P-CCNC: 16 U/L (ref 0–50)
ANION GAP SERPL CALCULATED.3IONS-SCNC: 4 MMOL/L (ref 3–14)
AST SERPL W P-5'-P-CCNC: 12 U/L (ref 0–45)
BASOPHILS # BLD AUTO: 0 10E3/UL (ref 0–0.2)
BASOPHILS NFR BLD AUTO: 1 %
BILIRUB SERPL-MCNC: 0.6 MG/DL (ref 0.2–1.3)
BUN SERPL-MCNC: 24 MG/DL (ref 7–30)
CALCIUM SERPL-MCNC: 9 MG/DL (ref 8.5–10.1)
CHLORIDE BLD-SCNC: 105 MMOL/L (ref 94–109)
CHOLEST SERPL-MCNC: 177 MG/DL
CO2 SERPL-SCNC: 27 MMOL/L (ref 20–32)
CREAT SERPL-MCNC: 1.05 MG/DL (ref 0.52–1.04)
EOSINOPHIL # BLD AUTO: 0.2 10E3/UL (ref 0–0.7)
EOSINOPHIL NFR BLD AUTO: 4 %
ERYTHROCYTE [DISTWIDTH] IN BLOOD BY AUTOMATED COUNT: 13.6 % (ref 10–15)
FASTING STATUS PATIENT QL REPORTED: YES
GFR SERPL CREATININE-BSD FRML MDRD: 49 ML/MIN/1.73M2
GLUCOSE BLD-MCNC: 138 MG/DL (ref 70–99)
HBA1C MFR BLD: 7 % (ref 0–5.6)
HCT VFR BLD AUTO: 38.3 % (ref 35–47)
HDLC SERPL-MCNC: 69 MG/DL
HGB BLD-MCNC: 12.7 G/DL (ref 11.7–15.7)
LDLC SERPL CALC-MCNC: 72 MG/DL
LYMPHOCYTES # BLD AUTO: 1.6 10E3/UL (ref 0.8–5.3)
LYMPHOCYTES NFR BLD AUTO: 37 %
MCH RBC QN AUTO: 30.4 PG (ref 26.5–33)
MCHC RBC AUTO-ENTMCNC: 33.2 G/DL (ref 31.5–36.5)
MCV RBC AUTO: 92 FL (ref 78–100)
MONOCYTES # BLD AUTO: 0.3 10E3/UL (ref 0–1.3)
MONOCYTES NFR BLD AUTO: 7 %
NEUTROPHILS # BLD AUTO: 2.3 10E3/UL (ref 1.6–8.3)
NEUTROPHILS NFR BLD AUTO: 51 %
NONHDLC SERPL-MCNC: 108 MG/DL
PLATELET # BLD AUTO: 248 10E3/UL (ref 150–450)
POTASSIUM BLD-SCNC: 4.4 MMOL/L (ref 3.4–5.3)
PROT SERPL-MCNC: 7.1 G/DL (ref 6.8–8.8)
RBC # BLD AUTO: 4.18 10E6/UL (ref 3.8–5.2)
SODIUM SERPL-SCNC: 136 MMOL/L (ref 133–144)
TRIGL SERPL-MCNC: 181 MG/DL
TSH SERPL DL<=0.005 MIU/L-ACNC: 1.1 MU/L (ref 0.4–4)
WBC # BLD AUTO: 4.4 10E3/UL (ref 4–11)

## 2021-10-22 PROCEDURE — 99213 OFFICE O/P EST LOW 20 MIN: CPT | Mod: 25 | Performed by: FAMILY MEDICINE

## 2021-10-22 PROCEDURE — 80050 GENERAL HEALTH PANEL: CPT | Performed by: FAMILY MEDICINE

## 2021-10-22 PROCEDURE — 99397 PER PM REEVAL EST PAT 65+ YR: CPT | Mod: 25 | Performed by: FAMILY MEDICINE

## 2021-10-22 PROCEDURE — 36415 COLL VENOUS BLD VENIPUNCTURE: CPT | Performed by: FAMILY MEDICINE

## 2021-10-22 PROCEDURE — 80061 LIPID PANEL: CPT | Performed by: FAMILY MEDICINE

## 2021-10-22 PROCEDURE — 90471 IMMUNIZATION ADMIN: CPT | Performed by: FAMILY MEDICINE

## 2021-10-22 PROCEDURE — 83036 HEMOGLOBIN GLYCOSYLATED A1C: CPT | Performed by: FAMILY MEDICINE

## 2021-10-22 PROCEDURE — 90662 IIV NO PRSV INCREASED AG IM: CPT | Performed by: FAMILY MEDICINE

## 2021-10-22 RX ORDER — ESOMEPRAZOLE MAGNESIUM 40 MG/1
CAPSULE, DELAYED RELEASE ORAL
Qty: 90 CAPSULE | Refills: 3 | Status: SHIPPED | OUTPATIENT
Start: 2021-10-22 | End: 2022-10-13

## 2021-10-22 RX ORDER — LORAZEPAM 0.5 MG/1
TABLET ORAL
Qty: 10 TABLET | Refills: 0 | Status: SHIPPED | OUTPATIENT
Start: 2021-10-22 | End: 2023-02-10

## 2021-10-22 RX ORDER — ATORVASTATIN CALCIUM 10 MG/1
10 TABLET, FILM COATED ORAL DAILY
Qty: 90 TABLET | Refills: 3 | Status: SHIPPED | OUTPATIENT
Start: 2021-10-22 | End: 2022-10-18

## 2021-10-22 RX ORDER — ATENOLOL 25 MG/1
25 TABLET ORAL DAILY
Qty: 90 TABLET | Refills: 3 | Status: SHIPPED | OUTPATIENT
Start: 2021-10-22 | End: 2022-10-12 | Stop reason: ALTCHOICE

## 2021-10-22 RX ORDER — GABAPENTIN 300 MG/1
CAPSULE ORAL
Qty: 180 CAPSULE | Refills: 3 | Status: SHIPPED | OUTPATIENT
Start: 2021-10-22 | End: 2021-11-15

## 2021-10-22 RX ORDER — LISINOPRIL 5 MG/1
5 TABLET ORAL DAILY
Qty: 90 TABLET | Refills: 3 | Status: SHIPPED | OUTPATIENT
Start: 2021-10-22 | End: 2022-01-20

## 2021-10-22 ASSESSMENT — ACTIVITIES OF DAILY LIVING (ADL)
CURRENT_FUNCTION: TRANSPORTATION REQUIRES ASSISTANCE
CURRENT_FUNCTION: HOUSEWORK REQUIRES ASSISTANCE

## 2021-10-22 ASSESSMENT — MIFFLIN-ST. JEOR: SCORE: 1450.82

## 2021-10-22 ASSESSMENT — PAIN SCALES - GENERAL: PAINLEVEL: NO PAIN (0)

## 2021-10-22 NOTE — PATIENT INSTRUCTIONS
Patient Education   Personalized Prevention Plan  You are due for the preventive services outlined below.  Your care team is available to assist you in scheduling these services.  If you have already completed any of these items, please share that information with your care team to update in your medical record.  Health Maintenance Due   Topic Date Due     ANNUAL REVIEW OF HM ORDERS  Never done     Zoster (Shingles) Vaccine (1 of 2) Never done     Diabetic Foot Exam  11/06/2019     Kidney Microalbumin Urine Test  03/27/2020     Eye Exam  11/13/2020     PHQ-2  01/01/2021     A1C Lab  05/03/2021     Flu Vaccine (1) 09/01/2021     Annual Wellness Visit  11/03/2021     Cholesterol Lab  11/03/2021     FALL RISK ASSESSMENT  11/03/2021     Basic Metabolic Panel  11/18/2021

## 2021-10-22 NOTE — PROGRESS NOTES
"SUBJECTIVE:   Theodora Meng is a 86 year old female who presents for Preventive Visit.    Patient has been advised of split billing requirements and indicates understanding: Yes   Are you in the first 12 months of your Medicare coverage?  No    Healthy Habits:    In general, how would you rate your overall health?  Good    Frequency of exercise:  4-5 days/week    Duration of exercise:  15-30 minutes    Do you usually eat at least 4 servings of fruit and vegetables a day, include whole grains    & fiber and avoid regularly eating high fat or \"junk\" foods?  No    Taking medications regularly:  Yes    Barriers to taking medications:  None    Medication side effects:  Lightheadedness    Ability to successfully perform activities of daily living:  Transportation requires assistance and housework requires assistance    Home Safety:  Throw rugs in the hallway    Hearing Impairment:  Difficulty following a conversation in a noisy restaurant or crowded room, feel that people are mumbling or not speaking clearly, need to ask people to speak up or repeat themselves, find that men's voices are easier to understand than woman's and difficulty understanding soft or whispered speech    In the past 6 months, have you been bothered by leaking of urine? Yes    In general, how would you rate your overall mental or emotional health?  Good      PHQ-2 Total Score:    Additional concerns today:  No    Do you feel safe in your environment? Yes    Have you ever done Advance Care Planning? (For example, a Health Directive, POLST, or a discussion with a medical provider or your loved ones about your wishes): Yes, advance care planning is on file.       Fall risk  Fallen 2 or more times in the past year?: No  Any fall with injury in the past year?: No    Cognitive Screening   1) Repeat 3 items (Leader, Season, Table)    2) Clock draw: NORMAL  3) 3 item recall: Recalls 1 object   Results: NORMAL clock, 1-2 items recalled: COGNITIVE " IMPAIRMENT LESS LIKELY    Mini-CogTM Copyright IRA Jennings. Licensed by the author for use in [facility]; reprinted with permission (trena@.[facility]). All rights reserved.      Do you have sleep apnea, excessive snoring or daytime drowsiness?: no    Reviewed and updated as needed this visit by clinical staff  Tobacco  Allergies  Meds  Problems  Med Hx  Surg Hx  Fam Hx          Reviewed and updated as needed this visit by Provider  Tobacco  Allergies  Meds  Problems  Med Hx  Surg Hx  Fam Hx         Social History     Tobacco Use     Smoking status: Never Smoker     Smokeless tobacco: Never Used   Substance Use Topics     Alcohol use: No     If you drink alcohol do you typically have >3 drinks per day or >7 drinks per week? No    No flowsheet data found.        Diabetes Follow-up      How often are you checking your blood sugar? Not at all    What concerns do you have today about your diabetes? None    Have you had a diabetic eye exam in the last 12 months? Yes-  Location: 10/08/2021    Lab Results   Component Value Date    A1C 7.0 10/22/2021    A1C 6.9 11/03/2020    A1C 6.7 11/18/2019    A1C 6.6 06/20/2019    A1C 7.1 03/20/2019    A1C 7.1 11/06/2018     Diet controlled    Hyperlipidemia Follow-Up      Are you regularly taking any medication or supplement to lower your cholesterol?   Yes- atorvastatin    Are you having muscle aches or other side effects that you think could be caused by your cholesterol lowering medication?  No  Recent Labs   Lab Test 10/22/21  0851 11/03/20  1003 01/29/16  0852 05/15/15  0941 11/03/14  1203 11/03/14  1203   CHOL 177 175   < > 170   < > 172   HDL 69 71   < > 57   < > 62   LDL 72 66   < > 74   < > 69   TRIG 181* 188*   < > 195*   < > 204*   CHOLHDLRATIO  --   --   --  3.0  --  2.8    < > = values in this interval not displayed.      Hypertension Follow-up    On atenolol and lisinopril  BP Readings from Last 6 Encounters:   10/22/21 136/64   11/18/20 (!) 162/85    11/03/20 139/70   11/18/19 126/70   06/20/19 114/74   04/23/19 106/68      gastroesophageal reflux disease   On esomeprazole, doing great. If misses, has a lot of heartburn    Neuropathy/back pain  Doing well on gabapentin at bedtime    Anxiety  Occasionally uses ativan, last script for 10 was last year    BP Readings from Last 2 Encounters:   10/22/21 136/64   11/18/20 (!) 162/85     Hemoglobin A1C (%)   Date Value   10/22/2021 7.0 (H)   11/03/2020 6.9 (H)   11/18/2019 6.7 (H)     LDL Cholesterol Calculated (mg/dL)   Date Value   10/22/2021 72   11/03/2020 66   11/18/2019 82       .  TSH   Date Value Ref Range Status   10/22/2021 1.10 0.40 - 4.00 mU/L Final   11/03/2020 0.41 0.40 - 4.00 mU/L Final      Current providers sharing in care for this patient include:   Patient Care Team:  Yecenia Armstrong MD as PCP - General  Yecenia Armstrong MD as Assigned PCP  Gay Person MD as Assigned Heart and Vascular Provider    The following health maintenance items are reviewed in Epic and correct as of today:  Health Maintenance Due   Topic Date Due     ANNUAL REVIEW OF HM ORDERS  Never done     ZOSTER IMMUNIZATION (1 of 2) Never done     DIABETIC FOOT EXAM  11/06/2019     MICROALBUMIN  03/27/2020     EYE EXAM  11/13/2020     BP Readings from Last 3 Encounters:   10/22/21 136/64   11/18/20 (!) 162/85   11/03/20 139/70    Wt Readings from Last 3 Encounters:   10/22/21 103.1 kg (227 lb 3.2 oz)   11/18/20 100.7 kg (222 lb)   11/03/20 102.1 kg (225 lb)               Immunizations up to date     Mammogram Screening - Patient over age 75, has elected to discontinue screenings.  Pertinent mammograms are reviewed under the imaging tab.    Review of Systems  Constitutional, HEENT, cardiovascular, pulmonary, gi and gu systems are negative, except as otherwise noted.    OBJECTIVE:   /64 (BP Location: Left arm, Patient Position: Sitting, Cuff Size: Adult Large)   Pulse 62   Temp 97.7  F (36.5  C) (Tympanic)   Resp 20    "Ht 1.61 m (5' 3.39\")   Wt 103.1 kg (227 lb 3.2 oz)   SpO2 96%   BMI 39.76 kg/m   Estimated body mass index is 39.76 kg/m  as calculated from the following:    Height as of this encounter: 1.61 m (5' 3.39\").    Weight as of this encounter: 103.1 kg (227 lb 3.2 oz).  Physical Exam  GENERAL: healthy, alert and no distress  HENT: mask in place  NECK: no adenopathy, no asymmetry, masses, or scars and thyroid normal to palpation  RESP: lungs clear to auscultation - no rales, rhonchi or wheezes  CV: regular rate and rhythm, normal S1 S2, no S3 or S4, no murmur, click or rub, no peripheral edema and peripheral pulses strong  ABDOMEN: soft, nontender, no hepatosplenomegaly, no masses and bowel sounds normal  MS: no gross musculoskeletal defects noted, no edema  Diabetic foot exam: normal DP and PT pulses, no trophic changes or ulcerative lesions, normal sensory exam and normal monofilament exam     ASSESSMENT / PLAN:   Theodora was seen today for medicare visit.    Diagnoses and all orders for this visit:    Encounter for Medicare annual wellness exam    Type 2 diabetes mellitus with other ophthalmic complication, without long-term current use of insulin (H)  Diet controlled  -     Hemoglobin A1c; Future  -     Comprehensive metabolic panel (BMP + Alb, Alk Phos, ALT, AST, Total. Bili, TP); Future  -     CBC with platelets and differential; Future  -     TSH with free T4 reflex; Future  -     TSH with free T4 reflex  -     CBC with platelets and differential  -     Comprehensive metabolic panel (BMP + Alb, Alk Phos, ALT, AST, Total. Bili, TP)  -     Hemoglobin A1c    Anxiety  Uses rarely  -     LORazepam (ATIVAN) 0.5 MG tablet; TAKE 1/2 to 1 TAB BY MOUTH EVERY 8 HOURS AS NEEDED FOR ANXIETY    Hyperlipidemia LDL goal <100  On statin  -     Lipid panel reflex to direct LDL Fasting; Future  -     atorvastatin (LIPITOR) 10 MG tablet; Take 1 tablet (10 mg) by mouth daily  -     Lipid panel reflex to direct LDL " "Fasting    Essential hypertension with goal blood pressure less than 140/90  Well controlled  -     lisinopril (ZESTRIL) 5 MG tablet; Take 1 tablet (5 mg) by mouth daily  -     atenolol (TENORMIN) 25 MG tablet; Take 1 tablet (25 mg) by mouth daily  -     Comprehensive metabolic panel (BMP + Alb, Alk Phos, ALT, AST, Total. Bili, TP); Future  -     CBC with platelets and differential; Future  -     CBC with platelets and differential  -     Comprehensive metabolic panel (BMP + Alb, Alk Phos, ALT, AST, Total. Bili, TP)    Neuralgia  Well controlled  -     gabapentin (NEURONTIN) 300 MG capsule; Take 2 tabs in the evening.    Gastroesophageal reflux disease without esophagitis  Well controlled  -     esomeprazole (NEXIUM) 40 MG DR capsule; TAKE 1 CAPSULE EVERY MORNING (BEFORE BREAKFAST)    Other orders  -     INFLUENZA, QUAD, HIGH DOSE, PF, 65YR + (FLUZONE HD)        Patient has been advised of split billing requirements and indicates understanding: Yes  COUNSELING:  Reviewed preventive health counseling, as reflected in patient instructions    Estimated body mass index is 39.76 kg/m  as calculated from the following:    Height as of this encounter: 1.61 m (5' 3.39\").    Weight as of this encounter: 103.1 kg (227 lb 3.2 oz).    Weight management plan: Discussed healthy diet and exercise guidelines    She reports that she has never smoked. She has never used smokeless tobacco.      Appropriate preventive services were discussed with this patient, including applicable screening as appropriate for cardiovascular disease, diabetes, osteopenia/osteoporosis, and glaucoma.  As appropriate for age/gender, discussed screening for colorectal cancer, prostate cancer, breast cancer, and cervical cancer. Checklist reviewing preventive services available has been given to the patient.    Reviewed patients plan of care and provided an AVS. The Basic Care Plan (routine screening as documented in Health Maintenance) for Dorathy meets the " Care Plan requirement. This Care Plan has been established and reviewed with the Patient.    Counseling Resources:  ATP IV Guidelines  Pooled Cohorts Equation Calculator  Breast Cancer Risk Calculator  Breast Cancer: Medication to Reduce Risk  FRAX Risk Assessment  ICSI Preventive Guidelines  Dietary Guidelines for Americans, 2010  USDA's MyPlate  ASA Prophylaxis  Lung CA Screening    Yecenia Cardona MD  [facility]    Identified Health Risks:

## 2021-10-25 PROBLEM — N18.30 CHRONIC KIDNEY DISEASE, STAGE 3 (H): Status: ACTIVE | Noted: 2021-10-25

## 2021-11-04 ENCOUNTER — HOSPITAL ENCOUNTER (OUTPATIENT)
Dept: CT IMAGING | Facility: CLINIC | Age: 86
Discharge: HOME | End: 2021-11-04
Attending: FAMILY MEDICINE | Admitting: FAMILY MEDICINE
Payer: COMMERCIAL

## 2021-11-04 DIAGNOSIS — D73.89 LESION OF SPLEEN: ICD-10-CM

## 2021-11-04 LAB
CREAT BLD-MCNC: 0.9 MG/DL (ref 0.5–1)
GFR SERPL CREATININE-BSD FRML MDRD: 58 ML/MIN/1.73M2

## 2021-11-04 PROCEDURE — 82565 ASSAY OF CREATININE: CPT

## 2021-11-04 PROCEDURE — 74177 CT ABD & PELVIS W/CONTRAST: CPT

## 2021-11-04 PROCEDURE — 250N000009 HC RX 250: Performed by: FAMILY MEDICINE

## 2021-11-04 PROCEDURE — 250N000011 HC RX IP 250 OP 636: Performed by: FAMILY MEDICINE

## 2021-11-04 RX ORDER — IOPAMIDOL 755 MG/ML
100 INJECTION, SOLUTION INTRAVASCULAR ONCE
Status: COMPLETED | OUTPATIENT
Start: 2021-11-04 | End: 2021-11-04

## 2021-11-04 RX ADMIN — IOPAMIDOL 100 ML: 755 INJECTION, SOLUTION INTRAVENOUS at 13:08

## 2021-11-04 RX ADMIN — SODIUM CHLORIDE 68 ML: 9 INJECTION, SOLUTION INTRAVENOUS at 13:08

## 2021-11-11 ENCOUNTER — MYC MEDICAL ADVICE (OUTPATIENT)
Dept: FAMILY MEDICINE | Facility: CLINIC | Age: 86
End: 2021-11-11
Payer: COMMERCIAL

## 2021-11-11 NOTE — LETTER
[facility]  [address] [place] [id number]  [phone]          November 12, 2021    Theodora Meng                                                                                                                     [address] [place] [id number]            Dear Theodora,    Your creatinine was 58.  Dr Armstrong said your kidney function is better and kidney looks good on the CT scan    Sincerely,         Yecenia Armstrong MD/Monika Camacho RN

## 2021-11-14 DIAGNOSIS — M79.2 NEURALGIA: ICD-10-CM

## 2021-11-15 RX ORDER — GABAPENTIN 300 MG/1
CAPSULE ORAL
Qty: 180 CAPSULE | Refills: 3 | Status: SHIPPED | OUTPATIENT
Start: 2021-11-15 | End: 2022-10-27

## 2021-11-15 NOTE — TELEPHONE ENCOUNTER
Routing refill request to provider for review/approval because:  Drug not on the [facility] refill protocol   Last Written Prescription Date:  10/22/21  Last Fill Quantity: 180,  # refills: 3   I talked with pharmacy staff and they do not have refills  Monika Camacho RN

## 2022-01-19 DIAGNOSIS — I10 ESSENTIAL HYPERTENSION WITH GOAL BLOOD PRESSURE LESS THAN 140/90: ICD-10-CM

## 2022-01-20 RX ORDER — LISINOPRIL 5 MG/1
TABLET ORAL
Qty: 90 TABLET | Refills: 2 | Status: SHIPPED | OUTPATIENT
Start: 2022-01-20 | End: 2023-01-16

## 2022-01-31 ENCOUNTER — TRANSFERRED RECORDS (OUTPATIENT)
Dept: HEALTH INFORMATION MANAGEMENT | Facility: CLINIC | Age: 87
End: 2022-01-31
Payer: COMMERCIAL

## 2022-02-08 ENCOUNTER — TRANSFERRED RECORDS (OUTPATIENT)
Dept: HEALTH INFORMATION MANAGEMENT | Facility: CLINIC | Age: 87
End: 2022-02-08
Payer: COMMERCIAL

## 2022-02-08 LAB — RETINOPATHY: NEGATIVE

## 2022-02-15 ENCOUNTER — TELEPHONE (OUTPATIENT)
Dept: FAMILY MEDICINE | Facility: CLINIC | Age: 87
End: 2022-02-15
Payer: COMMERCIAL

## 2022-02-15 NOTE — TELEPHONE ENCOUNTER
Prior Authorization Retail Medication Request   Please renew  prior auth.  Medication/Dose: esomeprazole  ICD code (if different than what is on RX):  Gastroesophageal reflux disease without esophagitis [K21.9]   Previously Tried and Failed:  Omeprazole, zantac prevacid does not work as well  Rationale:  Pt stable on this rx     Insurance Name:  [facility]  Insurance ID:  Ending in [id number]--per pharmacy        Pharmacy Information (if different than what is on RX)  Name:     Phone:

## 2022-02-21 NOTE — TELEPHONE ENCOUNTER
Central Prior Authorization Team   Phone: [phone]    PA Initiation    Medication: esomeprazole  Insurance Company: [employer] - Phone [phone] Fax [phone]  Pharmacy Filling the Rx: [facility] - [place], [place] - [address] AT PORTAL TO [facility] SITES  Filling Pharmacy Phone: [phone]  Filling Pharmacy Fax:    Start Date: 2/21/2022

## 2022-02-22 NOTE — TELEPHONE ENCOUNTER
Prior Authorization Approval    Authorization Effective Date: 1/22/2022  Authorization Expiration Date: 2/21/2023  Medication: esomeprazole  Approved Dose/Quantity:    Reference #:     Insurance Company: [employer] - Phone [phone] Fax [phone]  Expected CoPay:       CoPay Card Available:      Foundation Assistance Needed:    Which Pharmacy is filling the prescription (Not needed for infusion/clinic administered): [facility] - [place], [place] - [address] AT PORTAL TO [facility]  Pharmacy Notified: Yes  Patient Notified: Yes  **Instructed pharmacy to notify patient when script is ready to /ship.**

## 2022-03-23 ENCOUNTER — OFFICE VISIT (OUTPATIENT)
Dept: FAMILY MEDICINE | Facility: CLINIC | Age: 87
End: 2022-03-23
Payer: COMMERCIAL

## 2022-03-23 VITALS
SYSTOLIC BLOOD PRESSURE: 134 MMHG | TEMPERATURE: 97.8 F | BODY MASS INDEX: 40.93 KG/M2 | HEIGHT: 63 IN | OXYGEN SATURATION: 99 % | HEART RATE: 58 BPM | RESPIRATION RATE: 20 BRPM | DIASTOLIC BLOOD PRESSURE: 76 MMHG | WEIGHT: 231 LBS

## 2022-03-23 DIAGNOSIS — H93.8X2 PRESSURE SENSATION IN LEFT EAR: ICD-10-CM

## 2022-03-23 DIAGNOSIS — E11.39 TYPE 2 DIABETES MELLITUS WITH OTHER OPHTHALMIC COMPLICATION, WITHOUT LONG-TERM CURRENT USE OF INSULIN (H): ICD-10-CM

## 2022-03-23 DIAGNOSIS — R26.89 IMBALANCE: Primary | ICD-10-CM

## 2022-03-23 DIAGNOSIS — H93.8X1 ABNORMAL SENSATION IN RIGHT EAR: ICD-10-CM

## 2022-03-23 LAB
ANION GAP SERPL CALCULATED.3IONS-SCNC: 5 MMOL/L (ref 3–14)
BUN SERPL-MCNC: 20 MG/DL (ref 7–30)
CALCIUM SERPL-MCNC: 9.1 MG/DL (ref 8.5–10.1)
CHLORIDE BLD-SCNC: 108 MMOL/L (ref 94–109)
CO2 SERPL-SCNC: 26 MMOL/L (ref 20–32)
CREAT SERPL-MCNC: 0.88 MG/DL (ref 0.52–1.04)
ERYTHROCYTE [DISTWIDTH] IN BLOOD BY AUTOMATED COUNT: 13.4 % (ref 10–15)
GFR SERPL CREATININE-BSD FRML MDRD: 64 ML/MIN/1.73M2
GLUCOSE BLD-MCNC: 128 MG/DL (ref 70–99)
HBA1C MFR BLD: 6.8 % (ref 0–5.6)
HCT VFR BLD AUTO: 39.6 % (ref 35–47)
HGB BLD-MCNC: 12.5 G/DL (ref 11.7–15.7)
MCH RBC QN AUTO: 29.9 PG (ref 26.5–33)
MCHC RBC AUTO-ENTMCNC: 31.6 G/DL (ref 31.5–36.5)
MCV RBC AUTO: 95 FL (ref 78–100)
PLATELET # BLD AUTO: 233 10E3/UL (ref 150–450)
POTASSIUM BLD-SCNC: 4.3 MMOL/L (ref 3.4–5.3)
RBC # BLD AUTO: 4.18 10E6/UL (ref 3.8–5.2)
SODIUM SERPL-SCNC: 139 MMOL/L (ref 133–144)
WBC # BLD AUTO: 3.9 10E3/UL (ref 4–11)

## 2022-03-23 PROCEDURE — 99214 OFFICE O/P EST MOD 30 MIN: CPT | Performed by: FAMILY MEDICINE

## 2022-03-23 PROCEDURE — 83036 HEMOGLOBIN GLYCOSYLATED A1C: CPT | Performed by: FAMILY MEDICINE

## 2022-03-23 PROCEDURE — 85027 COMPLETE CBC AUTOMATED: CPT | Performed by: FAMILY MEDICINE

## 2022-03-23 PROCEDURE — 36415 COLL VENOUS BLD VENIPUNCTURE: CPT | Performed by: FAMILY MEDICINE

## 2022-03-23 PROCEDURE — 80048 BASIC METABOLIC PNL TOTAL CA: CPT | Performed by: FAMILY MEDICINE

## 2022-03-23 NOTE — PROGRESS NOTES
Assessment & Plan     (R26.89) Imbalance  (primary encounter diagnosis)  Comment: Patient states that she feels imbalanced when changing position from sitting to standing or laying to sitting up, and it takes about 30 seconds for this sensation to resolve. This started about 6 weeks ago. Patient denies dizziness during these episodes. No orthostatic hypotension in clinic today. Physical exam unremarkable except for some impacted cerumen in right ear canal, which was removed. CBC, BMP ordered today. ENT referral placed.   Plan: CBC with platelets, Basic metabolic panel  (Ca,        Cl, CO2, Creat, Gluc, K, Na, BUN),         Otolaryngology Referral    (E11.39) Type 2 diabetes mellitus with other ophthalmic complication, without long-term current use of insulin (H)  Comment: Patient has history of A1C consistent with diabetes mellitus, for which she is not on medication. A1C checked today and suggested follow up in 2 weeks. Encouraged healthy diet and activity as tolerated in the meantime.   Plan: Hemoglobin A1c    (H93.8X2) Pressure sensation in left ear  Comment: Patient has pressure sensation in left ear and can hear her heartbeat in her middle ear. Not painful but occasionally causes light headed sensation.  Physical exam unremarkable with ear canal clear and normal TM. ENT referral placed.   Plan: Otolaryngology Referral    (H93.8X1) Abnormal sensation in right ear  Comment: Patient says it often feels like there is discharge in her right ear but when she feels it, there is no wetness. Physical exam remarkable for impacted cerumen, which was removed in clinic. Physical examination unremarkable with ear canal clear and normal TM.   Plan: Otolaryngology Referral    LEEANNA Perry      Return in about 2 weeks (around 4/6/2022).      I have reviewed today's vital signs, notes, medications, labs and imaging. I have also seen and examined the patient and agree with the findings and plan as outlined  "above.    Nasir Tao MD  [facility]    Chay Yip is a 86 year old who presents for the following health issues     HPI     Dizziness  Onset/Duration: 6 weeks  Description:   Do you feel faint: no  Does it feel like the surroundings (bed, room) are moving: no  Unsteady/off balance: YES  Have you passed out or fallen: no  Intensity: mild  Progression of Symptoms: same  Accompanying Signs & Symptoms:  Heart palpitations or chest pain: no  Nausea, vomiting: no  Weakness or lack of coordination in arms or legs: YES- weakness  Vision or speech changes: no  Numbness or tingling: no  Ringing in ears (Tinnitus): no  Hearing Loss: YES- possibly  History:   Head trauma/concussion history: no  Previous similar symptoms: no  Recent bleeding history: no  Any new medications (BP?): no  Precipitating factors:   Worse with activity: no  Worse with head movement: YES  Alleviating factors:   Does staying in a fixed position give relief: YES  Therapies tried and outcome: None      Review of Systems   Constitutional, HEENT, cardiovascular, pulmonary, GI, , musculoskeletal, neuro, skin, endocrine and psych systems are negative, except as otherwise noted.        Objective    /76 (BP Location: Right arm, Patient Position: Sitting, Cuff Size: Adult Regular)   Pulse 58   Temp 97.8  F (36.6  C) (Tympanic)   Resp 20   Ht 1.6 m (5' 3\")   Wt 104.8 kg (231 lb)   LMP  (LMP Unknown)   SpO2 99%   Breastfeeding No   BMI 40.92 kg/m    Body mass index is 40.92 kg/m .  Physical Exam   GENERAL: alert, no distress, obese and elderly  EYES: Eyes grossly normal to inspection  HENT: normal cephalic/atraumatic, ear canals and TM's normal, nose and mouth without ulcers or lesions, oropharynx clear and oral mucous membranes moist. Left ear canal had impacted cerumen present which was removed in clinic.   NECK: no adenopathy, no asymmetry, masses, or scars   RESP: lungs clear to auscultation - no " rales, rhonchi or wheezes  CV: regular rate and rhythm, normal S1 S2,, click or rub, no peripheral edema and peripheral pulses strong  ABDOMEN: soft, nontender, no masses   MS: no gross musculoskeletal defects noted, no edema  SKIN: no suspicious lesions or rashes  NEURO: Normal strength and tone, mentation intact and speech normal  PSYCH: mentation appears normal, affect normal/bright

## 2022-03-23 NOTE — LETTER
March 23, 2022      Bereketmegan Meng  [address] [place] [id number]        Dear ,    We are writing to inform you of your test results.    Hemoglobin A1c 6.8, consistent with stable diabetes.  Other lab results came back unremarkable.  Will recommend to continue well hydration, healthy diet and follow-up with Dr. Nunn and ENT as discussed earlier.       Resulted Orders   Hemoglobin A1c   Result Value Ref Range    Hemoglobin A1C 6.8 (H) 0.0 - 5.6 %      Comment:      Normal <5.7%   Prediabetes 5.7-6.4%    Diabetes 6.5% or higher     Note: Adopted from ADA consensus guidelines.   Basic metabolic panel  (Ca, Cl, CO2, Creat, Gluc, K, Na, BUN)   Result Value Ref Range    Sodium 139 133 - 144 mmol/L    Potassium 4.3 3.4 - 5.3 mmol/L    Chloride 108 94 - 109 mmol/L    Carbon Dioxide (CO2) 26 20 - 32 mmol/L    Anion Gap 5 3 - 14 mmol/L    Urea Nitrogen 20 7 - 30 mg/dL    Creatinine 0.88 0.52 - 1.04 mg/dL    Calcium 9.1 8.5 - 10.1 mg/dL    Glucose 128 (H) 70 - 99 mg/dL    GFR Estimate 64 >60 mL/min/1.73m2      Comment:      Effective December 21, 2021 eGFRcr in adults is calculated using the 2021 CKD-EPI creatinine equation which includes age and gender (Adelaida et al., [facility], DOI: 10.1056/QYSFef7829670)   CBC with platelets   Result Value Ref Range    WBC Count 3.9 (L) 4.0 - 11.0 10e3/uL    RBC Count 4.18 3.80 - 5.20 10e6/uL    Hemoglobin 12.5 11.7 - 15.7 g/dL    Hematocrit 39.6 35.0 - 47.0 %    MCV 95 78 - 100 fL    MCH 29.9 26.5 - 33.0 pg    MCHC 31.6 31.5 - 36.5 g/dL    RDW 13.4 10.0 - 15.0 %    Platelet Count 233 150 - 450 10e3/uL       If you have any questions or concerns, please call the clinic at the number listed above.       Sincerely,      Nasir Tao MD

## 2022-04-19 ENCOUNTER — OFFICE VISIT (OUTPATIENT)
Dept: FAMILY MEDICINE | Facility: CLINIC | Age: 87
End: 2022-04-19
Payer: COMMERCIAL

## 2022-04-19 VITALS
WEIGHT: 231 LBS | BODY MASS INDEX: 40.93 KG/M2 | DIASTOLIC BLOOD PRESSURE: 82 MMHG | SYSTOLIC BLOOD PRESSURE: 124 MMHG | OXYGEN SATURATION: 95 % | HEIGHT: 63 IN | TEMPERATURE: 97.9 F | RESPIRATION RATE: 20 BRPM | HEART RATE: 78 BPM

## 2022-04-19 DIAGNOSIS — R26.89 IMBALANCE: ICD-10-CM

## 2022-04-19 DIAGNOSIS — I10 ESSENTIAL HYPERTENSION WITH GOAL BLOOD PRESSURE LESS THAN 140/90: ICD-10-CM

## 2022-04-19 DIAGNOSIS — E11.39 TYPE 2 DIABETES MELLITUS WITH OTHER OPHTHALMIC COMPLICATION, WITHOUT LONG-TERM CURRENT USE OF INSULIN (H): Primary | ICD-10-CM

## 2022-04-19 DIAGNOSIS — N18.31 STAGE 3A CHRONIC KIDNEY DISEASE (H): ICD-10-CM

## 2022-04-19 DIAGNOSIS — K21.9 GASTROESOPHAGEAL REFLUX DISEASE WITHOUT ESOPHAGITIS: ICD-10-CM

## 2022-04-19 PROCEDURE — 99215 OFFICE O/P EST HI 40 MIN: CPT | Performed by: FAMILY MEDICINE

## 2022-04-19 ASSESSMENT — PAIN SCALES - GENERAL: PAINLEVEL: NO PAIN (0)

## 2022-04-19 NOTE — PROGRESS NOTES
"  Assessment & Plan     Type 2 diabetes mellitus with other ophthalmic complication, without long-term current use of insulin (H)  Well controlled on diet alone  She is not interested in metformin and at her age with an A1C of 6.8, I agree not needed.   - Albumin Random Urine Quantitative with Creat Ratio    Imbalance  Uncertain etiology  Recommend MRI/MVA  - MR Brain w/o & w Contrast; Future  - MRA Brain ([place] [facility] [place]) wo & w Contrast; Future    Essential hypertension withl goal blood pressure less than 140/90  Controlled  Continue atenolol, lisinopril    Stage 3a chronic kidney disease (H)  Improved  Recheck 3 months    Gastroesophageal reflux disease without esophagitis  Well controlled on Nexium    Patient Instructions   Get an MRI/MRA in [place]    See ENT as planned     I spent a total of 40 minutes on the day of the visit.   Time spent doing chart review, history and exam, documentation and further activities per the note          BMI:   Estimated body mass index is 40.92 kg/m  as calculated from the following:    Height as of this encounter: 1.6 m (5' 3\").    Weight as of this encounter: 104.8 kg (231 lb).   Weight management plan: Discussed healthy diet and exercise guidelines        Return in about 6 months (around 10/19/2022) for diabetes.    Yecenia Cardona MD  [facility]    Chay Yip is a 87 year old who presents for the following health issues     HPI     Diabetes Follow-up      How often are you checking your blood sugar? Not at all    What concerns do you have today about your diabetes? None     Do you have any of these symptoms? (Select all that apply)  No numbness or tingling in feet.  No redness, sores or blisters on feet.  No complaints of excessive thirst.  No reports of blurry vision.  No significant changes to weight.  Lab Results   Component Value Date    A1C 6.8 03/23/2022    A1C 7.0 10/22/2021    A1C 6.9 11/03/2020    A1C 6.7 11/18/2019    A1C 6.6 " "06/20/2019    A1C 7.1 03/20/2019    A1C 7.1 11/06/2018      diet controlled  Does not want to go on medication      Hyperlipidemia Follow-Up      Are you regularly taking any medication or supplement to lower your cholesterol?   Yes- Lipitor    Are you having muscle aches or other side effects that you think could be caused by your cholesterol lowering medication?  No  On statin    Hypertension Follow-up      Do you check your blood pressure regularly outside of the clinic? No     Are you following a low salt diet? Yes  On atenolol, lisinopril  blood pressure     gastroesophageal reflux disease   On esomeprazole, doing well    Imbalance issues  No vertigo. Just feels off balance, feels nauseated. Started 3 months ago. Feels it more when moves her head, sometimes when she stands up. Worse in the am and at night.   Saw Dr Tao last month, he referred her to ENT.   Has throbbing in left ear, went away with a hearing aid, is going to get a hearing aid in the one ear. Has a feeling of wetness in the right ear.    Neuropathy  On gabapentin in evening, better with gabapentin    BP Readings from Last 2 Encounters:   04/19/22 124/82   03/23/22 134/76     Hemoglobin A1C POCT (%)   Date Value   11/03/2020 6.9 (H)   11/18/2019 6.7 (H)     Hemoglobin A1C (%)   Date Value   03/23/2022 6.8 (H)   10/22/2021 7.0 (H)     LDL Cholesterol Calculated (mg/dL)   Date Value   10/22/2021 72   11/03/2020 66   11/18/2019 82         Review of Systems   ROS: 5 point ROS negative except as noted above in HPI, including Gen., Resp., CV, GI &  system review.       Objective    /82 (BP Location: Right arm)   Pulse 78   Temp 97.9  F (36.6  C) (Tympanic)   Resp 20   Ht 1.6 m (5' 3\")   Wt 104.8 kg (231 lb)   LMP  (LMP Unknown)   SpO2 95%   BMI 40.92 kg/m    Body mass index is 40.92 kg/m .  Physical Exam   GENERAL: healthy, alert and no distress  NECK: no adenopathy, no asymmetry, masses, or scars and thyroid normal to " palpation  RESP: lungs clear to auscultation - no rales, rhonchi or wheezes  CV: regular rate and rhythm, normal S1 S2, no S3 or S4, no murmur, click or rub, no peripheral edema and peripheral pulses strong  ABDOMEN: soft, nontender, no hepatosplenomegaly, no masses and bowel sounds normal  MS: no gross musculoskeletal defects noted, no edema  Neurologic exam reveals: normal without focal findings, mental status, speech normal, alert and oriented x iii, MANDO, cranial nerves 2-12 intact, muscle tone and strength normal and symmetric, reflexes normal and symmetric, Babinski response negative, sensation grossly normal, gait and station normal. EOM and fields intact.   Diabetic foot exam: normal DP and PT pulses, no trophic changes or ulcerative lesions, some callous, normal sensory exam and normal monofilament exam

## 2022-04-27 ENCOUNTER — HOSPITAL ENCOUNTER (OUTPATIENT)
Dept: MRI IMAGING | Facility: CLINIC | Age: 87
Discharge: HOME | End: 2022-04-27
Attending: FAMILY MEDICINE
Payer: COMMERCIAL

## 2022-04-27 DIAGNOSIS — R26.89 IMBALANCE: ICD-10-CM

## 2022-04-27 PROCEDURE — 70544 MR ANGIOGRAPHY HEAD W/O DYE: CPT

## 2022-04-27 PROCEDURE — 255N000002 HC RX 255 OP 636: Performed by: FAMILY MEDICINE

## 2022-04-27 PROCEDURE — A9585 GADOBUTROL INJECTION: HCPCS | Performed by: FAMILY MEDICINE

## 2022-04-27 PROCEDURE — 70553 MRI BRAIN STEM W/O & W/DYE: CPT

## 2022-04-27 RX ORDER — GADOBUTROL 604.72 MG/ML
10 INJECTION INTRAVENOUS ONCE
Status: COMPLETED | OUTPATIENT
Start: 2022-04-27 | End: 2022-04-27

## 2022-04-27 RX ADMIN — GADOBUTROL 10 ML: 604.72 INJECTION INTRAVENOUS at 16:32

## 2022-05-10 ENCOUNTER — OFFICE VISIT (OUTPATIENT)
Dept: OTOLARYNGOLOGY | Facility: CLINIC | Age: 87
End: 2022-05-10
Payer: COMMERCIAL

## 2022-05-10 ENCOUNTER — OFFICE VISIT (OUTPATIENT)
Dept: AUDIOLOGY | Facility: CLINIC | Age: 87
End: 2022-05-10
Payer: COMMERCIAL

## 2022-05-10 VITALS
HEIGHT: 63 IN | WEIGHT: 231 LBS | HEART RATE: 59 BPM | SYSTOLIC BLOOD PRESSURE: 164 MMHG | TEMPERATURE: 96.8 F | DIASTOLIC BLOOD PRESSURE: 82 MMHG | BODY MASS INDEX: 40.93 KG/M2

## 2022-05-10 DIAGNOSIS — R42 DIZZINESSES: ICD-10-CM

## 2022-05-10 DIAGNOSIS — E66.01 MORBID OBESITY (H): ICD-10-CM

## 2022-05-10 DIAGNOSIS — H93.A2 PULSATILE TINNITUS OF LEFT EAR: Primary | ICD-10-CM

## 2022-05-10 DIAGNOSIS — H90.3 SENSORINEURAL HEARING LOSS, BILATERAL: Primary | ICD-10-CM

## 2022-05-10 DIAGNOSIS — H93.A2 PULSATILE TINNITUS OF LEFT EAR: ICD-10-CM

## 2022-05-10 PROCEDURE — 99204 OFFICE O/P NEW MOD 45 MIN: CPT | Performed by: OTOLARYNGOLOGY

## 2022-05-10 PROCEDURE — 99207 PR NO CHARGE LOS: CPT | Performed by: AUDIOLOGIST

## 2022-05-10 PROCEDURE — 92557 COMPREHENSIVE HEARING TEST: CPT | Performed by: AUDIOLOGIST

## 2022-05-10 PROCEDURE — 92550 TYMPANOMETRY & REFLEX THRESH: CPT | Performed by: AUDIOLOGIST

## 2022-05-10 ASSESSMENT — PAIN SCALES - GENERAL: PAINLEVEL: NO PAIN (0)

## 2022-05-10 NOTE — PROGRESS NOTES
"CHIEF COMPLAINT:  Ear/Hearing Concerns      HISTORY OF PRESENT ILLNESS    Theodora was seen at the behest of Yecenia Armstrong MD for hearing problems.     Referral note:    (H93.8X2) Pressure sensation in left ear  Comment: Patient has pressure sensation in left ear and can hear her heartbeat in her middle ear. Not painful but occasionally causes light headed sensation.  Physical exam unremarkable with ear canal clear and normal TM. ENT referral placed.   Plan: Otolaryngology Referral     (H93.8X1) Abnormal sensation in right ear  Comment: Patient says it often feels like there is discharge in her right ear but when she feels it, there is no wetness. Physical exam remarkable for impacted cerumen, which was removed in clinic. Physical examination unremarkable with ear canal clear and normal TM.   Plan: Otolaryngology Referral     REVIEW OF SYSTEMS    Review of Systems as per HPI and PMHx, otherwise 10 system review system are negative.     Zoloft [sertraline]     BP (!) 164/82 (BP Location: Left arm, Patient Position: Sitting, Cuff Size: Adult Large)   Pulse 59   Temp 96.8  F (36  C) (Tympanic)   Ht 1.6 m (5' 3\")   Wt 104.8 kg (231 lb)   LMP  (LMP Unknown)   BMI 40.92 kg/m      HEAD: Normal appearance and symmetry:  No cutaneous lesions.      NECK:  supple     EARS: normal TM, EACs    EYES:  EOMI    CN VII/XII:  intact     NOSE:     Dorsum:   straight  Septum:  midline  Mucosa:  moist        ORAL CAVITY/OROPHARYNX:     Lips:  Normal.  Tongue: normal, midline  Mucosa:   no lesions     NECK:  Trachea:  midline.              Thyroid:  normal              Adenopathy:  none        NEURO:   Alert and Oriented     GAIT AND STATION:  normal     RESPIRATORY:   Symmetry and Respiratory effort     PSYCH:  Normal mood and affect     SKIN:   warm and dry     AUDIOGRAM:  Downsloping SNHL with normal tympanograms    IMPRESSION:    Encounter Diagnoses   Name Primary?     Pulsatile tinnitus of left ear Yes     Morbid obesity (H) "      Dizzinesses      Patient with intermittent left-sided pulsatile tinnitus.  She has had a work-up including MRI and MRA.  She also has some nonspecific dizziness which is exacerbated by head movement.  She denies true vertigo.  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  Examination today shows no obvious cause of the pulsatile tinnitus.  Patient is instructed to gently apply pressure to the left side of the neck when experiencing the tinnitus to see if it resolves.  This would indicate that it is venous in origin.  In the meantime we will rule out superior canal dehiscence with a CT scan of the temporal bones and also obtain ultrasound of the carotid arteries.  Vestibular therapy referral placed for gaze exercises and further evaluation and management.     RECOMMENDATIONS:      Orders Placed This Encounter   Procedures     CT Temporal Bones wo Contrast     US Carotid Bilateral     Physical Therapy Referral      Discussed etiologies of pulsatile tinnitus.  Discussed causes of dizziness, which may include cervicogenic vs gaze instability.     MRI/MRA did not show vascular lesions.   Superior canal dehiscence is a possibility so will obtain CT temporal bones.

## 2022-05-10 NOTE — LETTER
"    5/10/2022         RE: Theodora Meng  [address] [facility] [address]        Dear Colleague,    Thank you for referring your patient, Theodora Meng, to the [facility]. Please see a copy of my visit note below.    CHIEF COMPLAINT:  Ear/Hearing Concerns      HISTORY OF PRESENT ILLNESS    Theodora was seen at the behest of Yecenia Armstrong MD for hearing problems.     Referral note:    (H93.8X2) Pressure sensation in left ear  Comment: Patient has pressure sensation in left ear and can hear her heartbeat in her middle ear. Not painful but occasionally causes light headed sensation.  Physical exam unremarkable with ear canal clear and normal TM. ENT referral placed.   Plan: Otolaryngology Referral     (H93.8X1) Abnormal sensation in right ear  Comment: Patient says it often feels like there is discharge in her right ear but when she feels it, there is no wetness. Physical exam remarkable for impacted cerumen, which was removed in clinic. Physical examination unremarkable with ear canal clear and normal TM.   Plan: Otolaryngology Referral     REVIEW OF SYSTEMS    Review of Systems as per HPI and PMHx, otherwise 10 system review system are negative.     Zoloft [sertraline]     BP (!) 164/82 (BP Location: Left arm, Patient Position: Sitting, Cuff Size: Adult Large)   Pulse 59   Temp 96.8  F (36  C) (Tympanic)   Ht 1.6 m (5' 3\")   Wt 104.8 kg (231 lb)   LMP  (LMP Unknown)   BMI 40.92 kg/m      HEAD: Normal appearance and symmetry:  No cutaneous lesions.      NECK:  supple     EARS: normal TM, EACs    EYES:  EOMI    CN VII/XII:  intact     NOSE:     Dorsum:   straight  Septum:  midline  Mucosa:  moist        ORAL CAVITY/OROPHARYNX:     Lips:  Normal.  Tongue: normal, midline  Mucosa:   no lesions     NECK:  Trachea:  midline.              Thyroid:  normal              Adenopathy:  none        NEURO:   Alert and Oriented     GAIT AND STATION:  normal     RESPIRATORY:   Symmetry and " Respiratory effort     PSYCH:  Normal mood and affect     SKIN:   warm and dry     AUDIOGRAM:  Downsloping SNHL with normal tympanograms    IMPRESSION:    Encounter Diagnoses   Name Primary?     Pulsatile tinnitus of left ear Yes     Morbid obesity (H)      Dizzinesses      Patient with intermittent left-sided pulsatile tinnitus.  She has had a work-up including MRI and MRA.  She also has some nonspecific dizziness which is exacerbated by head movement.  She denies true vertigo.  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  Examination today shows no obvious cause of the pulsatile tinnitus.  Patient is instructed to gently apply pressure to the left side of the neck when experiencing the tinnitus to see if it resolves.  This would indicate that it is venous in origin.  In the meantime we will rule out superior canal dehiscence with a CT scan of the temporal bones and also obtain ultrasound of the carotid arteries.  Vestibular therapy referral placed for gaze exercises and further evaluation and management.     RECOMMENDATIONS:      Orders Placed This Encounter   Procedures     CT Temporal Bones wo Contrast     US Carotid Bilateral     Physical Therapy Referral      Discussed etiologies of pulsatile tinnitus.  Discussed causes of dizziness, which may include cervicogenic vs gaze instability.     MRI/MRA did not show vascular lesions.   Superior canal dehiscence is a possibility so will obtain CT temporal bones.         Again, thank you for allowing me to participate in the care of your patient.        Sincerely,        Oniel Meadows MD

## 2022-05-10 NOTE — NURSING NOTE
"Initial BP (!) 164/82 (BP Location: Left arm, Patient Position: Sitting, Cuff Size: Adult Large)   Pulse 59   Temp 96.8  F (36  C) (Tympanic)   Ht 1.6 m (5' 3\")   Wt 104.8 kg (231 lb)   LMP  (LMP Unknown)   BMI 40.92 kg/m   Estimated body mass index is 40.92 kg/m  as calculated from the following:    Height as of this encounter: 1.6 m (5' 3\").    Weight as of this encounter: 104.8 kg (231 lb). .    Zofia Bolton LPN on 5/10/2022 at 10:56 AM    "

## 2022-05-10 NOTE — PROGRESS NOTES
AUDIOLOGY REPORT    SUBJECTIVE:  Theodora Meng is a 87 year old female who was seen in the Audiology Clinic at the [facility] for audiologic evaluation, referred by self. No previous audiograms are available at today's appointment.  The patient reports a a 3 month history of feeling off balanced and dizzy. She also reports left ear pulsatile tinnitus. She states she was fit with hearing aids 2 weeks ago at another facility. The patient denies  bilateral otalgia, bilateral drainage and family history of hearing loss.  The patient notes difficulty with communication in a variety of listening situations.  They were accompanied today by their daughter.    OBJECTIVE:    Otoscopic exam indicates ears are clear of cerumen bilaterally     Pure Tone Thresholds assessed using conventional audiometry with good  reliability from 250-8000 Hz bilaterally using circumaural headphones     RIGHT:  mild sloping to moderate and severe sensorineural hearing loss    LEFT:    mild sloping to moderate and severe sensorineural hearing loss    Tympanogram:    RIGHT: normal eardrum mobility     LEFT:   normal eardrum mobility    Reflexes (reported by stimulus ear):  RIGHT: Ipsilateral is absent at frequencies tested  RIGHT: Contralateral is absent at frequencies tested  LEFT:   Ipsilateral is absent at frequencies tested  LEFT:   Contralateral is absent at frequencies tested      Speech Reception Threshold:    RIGHT: 30 dB HL    LEFT:   30 dB HL  Word Recognition Score:     RIGHT: 68% at 70 dB HL using NU-6 recorded word list.    LEFT:   64% at 70 dB HL using NU-6 recorded word list.      ASSESSMENT:     ICD-10-CM    1. Sensorineural hearing loss, bilateral  H90.3    2. Pulsatile tinnitus of left ear  H93.A2        Today s results were discussed with the patient and her daughter in detail.     PLAN:  Patient was counseled regarding hearing loss and impact on communication.  It is recommended that the patient be  seen by Dr. Meadows for medical evaluation of their ears and hearing evaluation. Please call this clinic with questions regarding these results or recommendations.        Stacey Hatfield M.A. -[facility], #[id number]

## 2022-05-25 ENCOUNTER — HOSPITAL ENCOUNTER (OUTPATIENT)
Dept: ULTRASOUND IMAGING | Facility: CLINIC | Age: 87
Discharge: HOME | End: 2022-05-25
Attending: OTOLARYNGOLOGY
Payer: COMMERCIAL

## 2022-05-25 ENCOUNTER — HOSPITAL ENCOUNTER (OUTPATIENT)
Dept: CT IMAGING | Facility: CLINIC | Age: 87
Discharge: HOME | End: 2022-05-25
Attending: OTOLARYNGOLOGY
Payer: COMMERCIAL

## 2022-05-25 DIAGNOSIS — H93.A2 PULSATILE TINNITUS OF LEFT EAR: ICD-10-CM

## 2022-05-25 PROCEDURE — 93880 EXTRACRANIAL BILAT STUDY: CPT

## 2022-05-25 PROCEDURE — 70480 CT ORBIT/EAR/FOSSA W/O DYE: CPT

## 2022-06-06 ENCOUNTER — TELEPHONE (OUTPATIENT)
Dept: OTOLARYNGOLOGY | Facility: CLINIC | Age: 87
End: 2022-06-06
Payer: COMMERCIAL

## 2022-06-06 NOTE — TELEPHONE ENCOUNTER
Calling for CT results. I see they are in chart, but unclear if anything further needs to be done.     Maeve Amaro RN

## 2022-06-08 NOTE — TELEPHONE ENCOUNTER
Oniel Meadows MD  You 1 hour ago (8:37 AM)     YESSI Zamora,     Your CT of the ear bones was normal.     Message text

## 2022-06-08 NOTE — TELEPHONE ENCOUNTER
Notified patient of CT results and she would like to know if the US was ok. Please review and advise. Thank you  Danielle LANCASTER RN BSN PHN  Special[facility] Clin[facility]

## 2022-10-05 ENCOUNTER — APPOINTMENT (OUTPATIENT)
Dept: CT IMAGING | Facility: CLINIC | Age: 87
End: 2022-10-05
Attending: NURSE PRACTITIONER
Payer: COMMERCIAL

## 2022-10-05 ENCOUNTER — HOSPITAL ENCOUNTER (EMERGENCY)
Facility: CLINIC | Age: 87
Discharge: HOME | End: 2022-10-05
Attending: NURSE PRACTITIONER | Admitting: NURSE PRACTITIONER
Payer: COMMERCIAL

## 2022-10-05 VITALS
WEIGHT: 230 LBS | BODY MASS INDEX: 39.27 KG/M2 | DIASTOLIC BLOOD PRESSURE: 94 MMHG | TEMPERATURE: 97.5 F | OXYGEN SATURATION: 99 % | SYSTOLIC BLOOD PRESSURE: 112 MMHG | HEIGHT: 64 IN | HEART RATE: 117 BPM | RESPIRATION RATE: 9 BRPM

## 2022-10-05 DIAGNOSIS — K21.9 GASTROESOPHAGEAL REFLUX DISEASE WITHOUT ESOPHAGITIS: ICD-10-CM

## 2022-10-05 DIAGNOSIS — I48.91 NEW ONSET ATRIAL FIBRILLATION (H): ICD-10-CM

## 2022-10-05 DIAGNOSIS — R10.13 EPIGASTRIC PAIN: ICD-10-CM

## 2022-10-05 DIAGNOSIS — R00.2 HEART PALPITATIONS: ICD-10-CM

## 2022-10-05 LAB
ALBUMIN SERPL BCG-MCNC: 4.1 G/DL (ref 3.5–5.2)
ALP SERPL-CCNC: 89 U/L (ref 35–104)
ALT SERPL W P-5'-P-CCNC: 17 U/L (ref 10–35)
ANION GAP SERPL CALCULATED.3IONS-SCNC: 11 MMOL/L (ref 7–15)
AST SERPL W P-5'-P-CCNC: 20 U/L (ref 10–35)
BASOPHILS # BLD AUTO: 0 10E3/UL (ref 0–0.2)
BASOPHILS NFR BLD AUTO: 1 %
BILIRUB SERPL-MCNC: 0.4 MG/DL
BUN SERPL-MCNC: 21.2 MG/DL (ref 8–23)
CALCIUM SERPL-MCNC: 9 MG/DL (ref 8.8–10.2)
CHLORIDE SERPL-SCNC: 103 MMOL/L (ref 98–107)
CREAT SERPL-MCNC: 1.05 MG/DL (ref 0.51–0.95)
DEPRECATED HCO3 PLAS-SCNC: 23 MMOL/L (ref 22–29)
EOSINOPHIL # BLD AUTO: 0.1 10E3/UL (ref 0–0.7)
EOSINOPHIL NFR BLD AUTO: 2 %
ERYTHROCYTE [DISTWIDTH] IN BLOOD BY AUTOMATED COUNT: 13.3 % (ref 10–15)
GFR SERPL CREATININE-BSD FRML MDRD: 51 ML/MIN/1.73M2
GLUCOSE SERPL-MCNC: 148 MG/DL (ref 70–99)
HCT VFR BLD AUTO: 37.2 % (ref 35–47)
HGB BLD-MCNC: 11.9 G/DL (ref 11.7–15.7)
HOLD SPECIMEN: NORMAL
IMM GRANULOCYTES # BLD: 0 10E3/UL
IMM GRANULOCYTES NFR BLD: 0 %
LIPASE SERPL-CCNC: 28 U/L (ref 13–60)
LYMPHOCYTES # BLD AUTO: 1.2 10E3/UL (ref 0.8–5.3)
LYMPHOCYTES NFR BLD AUTO: 25 %
MCH RBC QN AUTO: 29.3 PG (ref 26.5–33)
MCHC RBC AUTO-ENTMCNC: 32 G/DL (ref 31.5–36.5)
MCV RBC AUTO: 92 FL (ref 78–100)
MONOCYTES # BLD AUTO: 0.3 10E3/UL (ref 0–1.3)
MONOCYTES NFR BLD AUTO: 6 %
NEUTROPHILS # BLD AUTO: 3.2 10E3/UL (ref 1.6–8.3)
NEUTROPHILS NFR BLD AUTO: 66 %
NRBC # BLD AUTO: 0 10E3/UL
NRBC BLD AUTO-RTO: 0 /100
PLATELET # BLD AUTO: 277 10E3/UL (ref 150–450)
POTASSIUM SERPL-SCNC: 4.7 MMOL/L (ref 3.4–5.3)
PROT SERPL-MCNC: 6.9 G/DL (ref 6.4–8.3)
RBC # BLD AUTO: 4.06 10E6/UL (ref 3.8–5.2)
SODIUM SERPL-SCNC: 137 MMOL/L (ref 136–145)
TROPONIN T SERPL HS-MCNC: 16 NG/L
TROPONIN T SERPL HS-MCNC: 19 NG/L
WBC # BLD AUTO: 4.8 10E3/UL (ref 4–11)

## 2022-10-05 PROCEDURE — 96361 HYDRATE IV INFUSION ADD-ON: CPT | Performed by: NURSE PRACTITIONER

## 2022-10-05 PROCEDURE — 99285 EMERGENCY DEPT VISIT HI MDM: CPT | Mod: 25 | Performed by: NURSE PRACTITIONER

## 2022-10-05 PROCEDURE — G1010 CDSM STANSON: HCPCS

## 2022-10-05 PROCEDURE — 250N000011 HC RX IP 250 OP 636: Performed by: NURSE PRACTITIONER

## 2022-10-05 PROCEDURE — 93010 ELECTROCARDIOGRAM REPORT: CPT | Performed by: NURSE PRACTITIONER

## 2022-10-05 PROCEDURE — 258N000003 HC RX IP 258 OP 636: Performed by: NURSE PRACTITIONER

## 2022-10-05 PROCEDURE — 36415 COLL VENOUS BLD VENIPUNCTURE: CPT | Performed by: NURSE PRACTITIONER

## 2022-10-05 PROCEDURE — 84484 ASSAY OF TROPONIN QUANT: CPT | Performed by: NURSE PRACTITIONER

## 2022-10-05 PROCEDURE — 250N000009 HC RX 250: Performed by: NURSE PRACTITIONER

## 2022-10-05 PROCEDURE — 83690 ASSAY OF LIPASE: CPT | Performed by: NURSE PRACTITIONER

## 2022-10-05 PROCEDURE — 250N000013 HC RX MED GY IP 250 OP 250 PS 637: Performed by: NURSE PRACTITIONER

## 2022-10-05 PROCEDURE — 82310 ASSAY OF CALCIUM: CPT | Performed by: NURSE PRACTITIONER

## 2022-10-05 PROCEDURE — 85014 HEMATOCRIT: CPT | Performed by: NURSE PRACTITIONER

## 2022-10-05 PROCEDURE — 96374 THER/PROPH/DIAG INJ IV PUSH: CPT | Mod: 59 | Performed by: NURSE PRACTITIONER

## 2022-10-05 PROCEDURE — 93005 ELECTROCARDIOGRAM TRACING: CPT | Performed by: NURSE PRACTITIONER

## 2022-10-05 RX ORDER — METOPROLOL TARTRATE 50 MG
50 TABLET ORAL ONCE
Status: COMPLETED | OUTPATIENT
Start: 2022-10-05 | End: 2022-10-05

## 2022-10-05 RX ORDER — DILTIAZEM HYDROCHLORIDE 5 MG/ML
10 INJECTION INTRAVENOUS ONCE
Status: COMPLETED | OUTPATIENT
Start: 2022-10-05 | End: 2022-10-05

## 2022-10-05 RX ORDER — IOPAMIDOL 755 MG/ML
90 INJECTION, SOLUTION INTRAVASCULAR ONCE
Status: COMPLETED | OUTPATIENT
Start: 2022-10-05 | End: 2022-10-05

## 2022-10-05 RX ORDER — METOPROLOL TARTRATE 25 MG/1
25 TABLET, FILM COATED ORAL 2 TIMES DAILY
Qty: 60 TABLET | Refills: 0 | Status: SHIPPED | OUTPATIENT
Start: 2022-10-05 | End: 2022-10-05

## 2022-10-05 RX ORDER — METOPROLOL TARTRATE 50 MG
50 TABLET ORAL 2 TIMES DAILY
Qty: 60 TABLET | Refills: 0 | Status: SHIPPED | OUTPATIENT
Start: 2022-10-05 | End: 2022-10-12 | Stop reason: ALTCHOICE

## 2022-10-05 RX ADMIN — IOPAMIDOL 90 ML: 755 INJECTION, SOLUTION INTRAVENOUS at 14:43

## 2022-10-05 RX ADMIN — SODIUM CHLORIDE 68 ML: 9 INJECTION, SOLUTION INTRAVENOUS at 14:43

## 2022-10-05 RX ADMIN — DILTIAZEM HYDROCHLORIDE 10 MG: 5 INJECTION, SOLUTION INTRAVENOUS at 14:01

## 2022-10-05 RX ADMIN — METOPROLOL TARTRATE 50 MG: 50 TABLET, FILM COATED ORAL at 16:10

## 2022-10-05 RX ADMIN — SODIUM CHLORIDE, POTASSIUM CHLORIDE, SODIUM LACTATE AND CALCIUM CHLORIDE 1000 ML: 600; 310; 30; 20 INJECTION, SOLUTION INTRAVENOUS at 13:54

## 2022-10-05 ASSESSMENT — ACTIVITIES OF DAILY LIVING (ADL)
ADLS_ACUITY_SCORE: 37
ADLS_ACUITY_SCORE: 37

## 2022-10-05 NOTE — ED TRIAGE NOTES
Pt reports intermittent abdominal pain x6 weeks with heartburn. Heartburn causing PT to have pain in esophagus and shortness of breath. PT not having an episode today.   PT reports having bowel movements. No fevers PT reports doubling up on her omeprazole due to the abdominal pain.      Writer and provider spoke with pt about treatment and diagnostic options in ED vs. UC. Pt agreed to be evaluated in the ED.

## 2022-10-05 NOTE — DISCHARGE INSTRUCTIONS
Stop the atenolol  Instead start metoprolol 50 mg by mouth twice daily  Continue taking your other medications as prescribed.  Follow up tomorrow with echocardiogram [phone]-please call this phone number at 7 AM tomorrow morning to know what time you need to be here tomorrow for the echocardiogram.  Tell them that your mother was seen in the emergency room on October 5 and the cardiologist advised that you needed a echocardiogram on October 6  Follow up with cardiologist within 4-6 weeks  Start the Xarelto started Xarelto today and take as prescribed.  This prescription was sent to the [facility] pharmacy due to 0 co-pay from our pharmacy.  The metoprolol prescription was sent to the [facility] pharmacy and you may pick that up tonight.  Return to the emergency room if patient develops chest pain, shortness of breath, difficulty breathing, or heart palpitations with shortness of breath.

## 2022-10-05 NOTE — CONSULTS
Informal Recommendations Note    I was called by Marisabel Fall NP on 10/05/22 at 3:58 PM to provide input for Theodora Meng. The nature of this request for input does not permit comprehensive review of health records or patient interview.  I was not requested or am not able to personally examine the patient at this time.    Theodora is a 87 year old female who presented with 6-week history of epigastric pain. ECG showed A.fib with RVR, apparently new. Labs notable for minimal elevation in hs-cTnT at 19. Patient is reportedly chest pain-free. [id number] (HTN+ age++ DM+ CAD+ female+.)     Based on the information provided, my recommendations are as follows:   -reasonable to trend troponins as planned, if not rising with significant delta and stably chest pain-free, would be reasonable to defer workup to outpatient setting  -TTE today if possible  -stop atenolol, transition to metoprolol tartrate for rate control  -in the absence of bleeding contraindications, would start anticoagulation, preferably with a DOAC  -if discharge planned, would recommend timely outpatient cardiology follow up      These recommendations are not intended to take the place of the care team's clinical judgement, which should always be utilized to provide the most appropriate care to meet the unique needs of each patient.     Alexis Dobbins MD

## 2022-10-05 NOTE — ED NOTES
"Pt comes to ER with 2 specific concerns:    Abdominal pain x6 weeks: aching pain that spans across lower abdomen. Pt denies nausea or vomiting. Bowel movements regular. Pt states that abdominal pain is intermittent but once it comes, it lasts for the entire day.     Arm injury: Pt fell 10 days ago when emptying the garbage can, which landed on her, causing skin tear to L forearm. Pt states that she landed on buttock and denies hitting head. Pink skin surrounding tear, which yellow slough in bed of wound.     Pt denies chest pain but reports palpitations \"off and on\" in esophagus.   "
NP at bedside.   
NP at bedside.     
Pt to ct  
Pt's HR has begun to be more irregular again, frequently fluctuating from high 90's up to 130's. Spoke with NP, who stated that order for metoprolol will be place.     Pt has remained asymptomatic, and staff will continue to monitor.   
Pt's daughter present to drive pt home.   Discharge instructions provided for new onset a-fib. Writer provided education regarding new prescription for metoprolol and anticoagulant. Daughter notified and agreed to schedule appointment for echocardiogram, and pt encouraged to follow up with cardiologist within 4-6weeks. Pt and daughter in agreement.   
[FreeTextEntry1] : The patient is a 25-year-old female who presents with acute on chronic left ankle pain.  The pain is mostly on the medial side of her ankle.  She started a weight loss challenge and has been performing [employer] since July.  She lost a total of 45+ pounds but she is now experiencing severe medial sided ankle pain which she believes is from exercising.  She does have swelling on the medial side of her ankle/foot.  All of her pain is in that area.  She is walking with crutches.  She did go to the hospital where they took x-rays and gave her crutches.  They were concerned about a fracture in her ankle and foot.  Her pain scale today is a 7 out of 10.  She has not exercised since the injury for over 1 week.  She is concerned at this time.  She has no other complaints.

## 2022-10-05 NOTE — ED TRIAGE NOTES
Pt arrived in urgent care to have the skin tear on her left arm looked at. It happened 10 days ago. As long as she was here she wanted to ask about her abdominal pain too, she has had that for 6 weeks. She feels like it is heartburn and has been taking more omeprazole and it helps a little but doesn't take it away. Pt is tachycardic in triage, feels somewhat irregular, does not have a hx of afib     Triage Assessment     Row Name 10/05/22 1256       Triage Assessment (Adult)    Airway WDL WDL       Respiratory WDL    Respiratory WDL WDL       Skin Circulation/Temperature WDL    Skin Circulation/Temperature WDL X  skin tear on left upper forearm       Cardiac WDL    Cardiac WDL WDL       Peripheral/Neurovascular WDL    Peripheral Neurovascular WDL WDL       Cognitive/Neuro/Behavioral WDL    Cognitive/Neuro/Behavioral WDL WDL

## 2022-10-05 NOTE — ED PROVIDER NOTES
History     Chief Complaint   Patient presents with     Abdominal Pain     Started 6 weeks ago     Arm Injury     HPI  Theodora Meng is a 87 year old female with past medical history of hypertension, GERD, type 2 diabetes, morbid obesity, hyperlipidemia, anxiety who presents to the emergency department with multiple concerns including:  10 day history of left arm injury s/p skin tear to left forearm concern for infection has been using medicated band-aid and bacitracin and keeping the wound covered.  Pt denies increasing pain, redness, swelling.  Patient also denies fevers, aches, chills, sweats.    Pt also reports new onset of heart palpitations noted at bedtime over the past 6 weeks; denies shortness of breath, lower extremity swelling.    Pt also reports epigastric and generalized abdominal pain for the past 6 weeks.  Pain level at worst 06/10, best 1/10, currently 4/10  Described as an ache epigastric/midsternal and generalized abdomen discomfort  Denies melena, hematochezia, bright red rectal bleeding, nausea, vomiting, dysuria, bloating    Patient denies history of inflammatory bowel disease, recent diet or medication changes.,    Allergies:  Allergies   Allergen Reactions     Zoloft [Sertraline] Palpitations, Diarrhea and Cramps       Problem List:    Patient Active Problem List    Diagnosis Date Noted     Chronic kidney disease, stage 3 10/25/2021     Priority: Medium     Left knee DJD 04/03/2019     Priority: Medium     Subclinical hyperthyroidism 11/12/2018     Priority: Medium     Type 2 diabetes mellitus without complication, without long-term current use of insulin (H) 11/10/2016     Priority: Medium     Essential hypertension with goal blood pressure less than 140/90 11/10/2016     Priority: Medium     Gastroesophageal reflux disease without esophagitis 11/10/2016     Priority: Medium     Endometrial polyp 06/18/2016     Priority: Medium     Thickened endometrium 04/21/2016     Priority: Medium      Thyroid nodule 03/24/2016     Priority: Medium     DDD (degenerative disc disease), cervical 02/13/2016     Priority: Medium     Type 2 diabetes mellitus without complication (H) 10/21/2015     Priority: Medium     Morbid obesity (H) 10/21/2015     Priority: Medium     Status post total knee replacement 11/29/2013     Priority: Medium     Fever 11/11/2013     Priority: Medium     Edema 11/07/2013     Priority: Medium     Nausea 11/07/2013     Priority: Medium     S/P total knee arthroplasty 10/30/2013     Priority: Medium     Right knee DJD 10/30/2013     Priority: Medium     OA (osteoarthritis) of knee 12/11/2012     Priority: Medium     Hyperlipidemia LDL goal <100 12/04/2012     Priority: Medium     Knee pain 11/07/2012     Priority: Medium     Insomnia 11/07/2012     Priority: Medium     Anxiety 11/15/2011     Priority: Medium     Type 2 diabetes mellitus with other ophthalmic complication, without long-term current use of insulin (H) 12/21/2010     Priority: Medium     Neuralgia 06/05/2009     Priority: Medium     Vitamin D deficiency 03/30/2009     Priority: Medium     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 Provider to review          Past Medical History:    Past Medical History:   Diagnosis Date     Depressive disorder      Diabetes (H)      Esophageal reflux      Other abnormal glucose      Other premature beats      Unspecified essential hypertension        Past Surgical History:    Past Surgical History:   Procedure Laterality Date     APPENDECTOMY       ARTHROPLASTY KNEE  10/30/2013    Procedure: ARTHROPLASTY KNEE;  Right Total Knee Arthroplasty;  Surgeon: Ousmane Mcgowan MD;  Location: [facility]     ARTHROPLASTY KNEE Left 4/3/2019    Procedure: Left Total Knee Arthroplasty;  Surgeon: Ousmane Mcgowan MD;  Location:  [facility]     CHOLECYSTECTOMY, LAPOROSCOPIC      Cholecystectomy, Laparoscopic     DILATION AND CURETTAGE, OPERATIVE HYSTEROSCOPY WITH MORCELLATOR, COMBINED N/A 6/20/2016    Procedure: COMBINED DILATION AND CURETTAGE, OPERATIVE HYSTEROSCOPY WITH MORCELLATOR;  Surgeon: Bony Arredondo MD;  Location: [facility]     ESOPHAGOSCOPY, GASTROSCOPY, DUODENOSCOPY (EGD), COMBINED N/A 2/17/2016    Procedure: COMBINED ESOPHAGOSCOPY, GASTROSCOPY, DUODENOSCOPY (EGD), BIOPSY SINGLE OR MULTIPLE;  Surgeon: Mil Guevara MD;  Location: [facility]     SALPINGO OOPHORECTOMY,R/L/OZZY      Salpingo Oophorectomy, RT/LT/OZZY     TONSILLECTOMY         Family History:    Family History   Problem Relation Age of Onset     Heart Disease Mother      Heart Disease Father      Heart Disease Brother      Heart Disease Brother      Heart Disease Brother        Social History:  Marital Status:   [5]  Social History     Tobacco Use     Smoking status: Never Smoker     Smokeless tobacco: Never Used   Vaping Use     Vaping Use: Never used   Substance Use Topics     Alcohol use: No     Drug use: No        Medications:    metoprolol tartrate (LOPRESSOR) 50 MG tablet  Rivaroxaban ANTICOAGULANT 15 & 20 MG TBPK Starter Therapy Pack  Rivaroxaban ANTICOAGULANT 15 & 20 MG TBPK Starter Therapy Pack  Rivaroxaban ANTICOAGULANT 15 & 20 MG TBPK Starter Therapy Pack  acetaminophen (TYLENOL) 500 MG tablet  atenolol (TENORMIN) 25 MG tablet  atorvastatin (LIPITOR) 10 MG tablet  blood glucose monitoring (ACCU-CHEK COMPACT CARE KIT) meter device kit  blood glucose monitoring (NO BRAND SPECIFIED) test strip  esomeprazole (NEXIUM) 40 MG DR capsule  gabapentin (NEURONTIN) 300 MG capsule  lisinopril (ZESTRIL) 5 MG tablet  LORazepam (ATIVAN) 0.5 MG tablet      Review of Systems  As mentioned above in the history present illness. All other systems were reviewed and are negative.    Physical Exam   BP: (!) 135/100  Pulse: (!) 125  Temp: 97.5  F (36.4  C)  Resp:  "18  Height: 162.6 cm (5' 4\")  Weight: 104.3 kg (230 lb) (stated)  SpO2: 97 %      Physical Exam  Vitals and nursing note reviewed.   Constitutional:       General: She is not in acute distress.     Appearance: She is well-developed. She is not ill-appearing, toxic-appearing or diaphoretic.   HENT:      Head: Normocephalic and atraumatic.      Right Ear: Hearing, tympanic membrane, ear canal and external ear normal.      Left Ear: Hearing, tympanic membrane, ear canal and external ear normal.      Nose: Nose normal.      Mouth/Throat:      Mouth: Mucous membranes are moist.      Pharynx: Uvula midline. No pharyngeal swelling or oropharyngeal exudate.      Tonsils: No tonsillar exudate.   Eyes:      General: No scleral icterus.        Right eye: No discharge.         Left eye: No discharge.      Conjunctiva/sclera: Conjunctivae normal.   Cardiovascular:      Rate and Rhythm: Tachycardia present. Rhythm irregular.      Heart sounds: No murmur heard.    No friction rub.   Pulmonary:      Effort: Pulmonary effort is normal. No respiratory distress.      Breath sounds: Normal breath sounds. No stridor. No wheezing, rhonchi or rales.   Chest:      Chest wall: No tenderness.   Abdominal:      General: Abdomen is protuberant. Bowel sounds are normal. There is no distension.      Palpations: Abdomen is soft. There is no mass.      Tenderness: There is abdominal tenderness in the left upper quadrant. There is no guarding or rebound.   Musculoskeletal:      Cervical back: Normal range of motion and neck supple.   Skin:     General: Skin is warm and dry.      Coloration: Skin is not pale.      Findings: Laceration (pt has skin tear /laceration noted on left forearm that was moist on initial evaluation and Band-Aid was removed and at the end of the emergency room visit skin was dry and appears to be healing well.  Skin tear is noted to be 4.5 cm with some faint surrou) present. No erythema or rash.   Neurological:      Mental " Status: She is alert and oriented to person, place, and time.      Deep Tendon Reflexes: Reflexes normal.         ED Course       ED Course as of 10/05/22 1752   Wed Oct 05, 2022   1437 Troponin is noted to be 19 and uncertain if this is related to troponin leak, will repeat troponin in 3 hours.  Comprehensive metabolic panel reveals creatinine of 1.05Consistent with creatinine 11 months ago consistent with creatinine 11 months ago with interval improvement.  CBC is unremarkable and reveals no acute blood loss.  CT chest abdomen pelvis reveals persistent endometrial thickness, large hiatal hernia, trace pericardial fluid and small left pleural effusion.  Patient has been given 10 mg of Cardizem and heart rate is now controlled at less than 100.  Patient reports that her epigastric and generalized abdominal pain is completely gone.  Patient denies any heart palpitations currently.  Advised we will call cardiology and seek any further recommendations from them.   1600 Phone consultation completed with cardiologist Dr. Dobbins, discussed CT findings of small left pleural effusion and trace pericardial fluid, new onset A. fib, treatment of 10 mg of Cardizem and rate now controlled and resolution of epigastric and abdominal pain.  He reports if troponin is flat or decreasing man would be safe to discharge to home and he recommends echocardiogram within 24 hours and follow-up with cardiology and initiation on metoprolol.     Procedures       EKG Interpretation:      EKG Number: 1  Interpreted by ALEX Broussard CNP  Symptoms at time of EKG: epigastric pressure   Rhythm: Normal sinus  and Atrial fibrillation - rapid  Rate: 130-140  Ectopy: None  ST Segments/ T Waves: No acute ischemic changes  Comparison to prior: new onset atrial fibrillation    Clinical Impression: atrial fibrillation (new onset)    JEANNE-VASC score=5    Results for orders placed or performed during the hospital encounter of 10/05/22 (from the  past 24 hour(s))   [place] Draw    Narrative    The following orders were created for panel order [place] Draw.  Procedure                               Abnormality         Status                     ---------                               -----------         ------                     Extra Blue Top Tube[[id number]]                              Final result               Extra Red Top Tube[[id number]]                               Final result               Extra Green Top (Lithium...[[id number]]                      Final result               Extra Purple Top Tube[[id number]]                            Final result                 Please view results for these tests on the individual orders.   CBC with platelets differential    Narrative    The following orders were created for panel order CBC with platelets differential.  Procedure                               Abnormality         Status                     ---------                               -----------         ------                     CBC with platelets and d...[[id number]]                      Final result                 Please view results for these tests on the individual orders.   Comprehensive metabolic panel   Result Value Ref Range    Sodium 137 136 - 145 mmol/L    Potassium 4.7 3.4 - 5.3 mmol/L    Chloride 103 98 - 107 mmol/L    Carbon Dioxide (CO2) 23 22 - 29 mmol/L    Anion Gap 11 7 - 15 mmol/L    Urea Nitrogen 21.2 8.0 - 23.0 mg/dL    Creatinine 1.05 (H) 0.51 - 0.95 mg/dL    Calcium 9.0 8.8 - 10.2 mg/dL    Glucose 148 (H) 70 - 99 mg/dL    Alkaline Phosphatase 89 35 - 104 U/L    AST 20 10 - 35 U/L    ALT 17 10 - 35 U/L    Protein Total 6.9 6.4 - 8.3 g/dL    Albumin 4.1 3.5 - 5.2 g/dL    Bilirubin Total 0.4 <=1.2 mg/dL    GFR Estimate 51 (L) >60 mL/min/1.73m2   Extra Blue Top Tube   Result Value Ref Range    Hold Specimen JIC    Extra Red Top Tube   Result Value Ref Range    Hold Specimen JIC    Extra Green Top (Lithium Heparin) Tube   Result Value  Ref Range    Hold Specimen JIC    Extra Purple Top Tube   Result Value Ref Range    Hold Specimen JIC    CBC with platelets and differential   Result Value Ref Range    WBC Count 4.8 4.0 - 11.0 10e3/uL    RBC Count 4.06 3.80 - 5.20 10e6/uL    Hemoglobin 11.9 11.7 - 15.7 g/dL    Hematocrit 37.2 35.0 - 47.0 %    MCV 92 78 - 100 fL    MCH 29.3 26.5 - 33.0 pg    MCHC 32.0 31.5 - 36.5 g/dL    RDW 13.3 10.0 - 15.0 %    Platelet Count 277 150 - 450 10e3/uL    % Neutrophils 66 %    % Lymphocytes 25 %    % Monocytes 6 %    % Eosinophils 2 %    % Basophils 1 %    % Immature Granulocytes 0 %    NRBCs per 100 WBC 0 <1 /100    Absolute Neutrophils 3.2 1.6 - 8.3 10e3/uL    Absolute Lymphocytes 1.2 0.8 - 5.3 10e3/uL    Absolute Monocytes 0.3 0.0 - 1.3 10e3/uL    Absolute Eosinophils 0.1 0.0 - 0.7 10e3/uL    Absolute Basophils 0.0 0.0 - 0.2 10e3/uL    Absolute Immature Granulocytes 0.0 <=0.4 10e3/uL    Absolute NRBCs 0.0 10e3/uL   Troponin T, High Sensitivity   Result Value Ref Range    Troponin T, High Sensitivity 19 (H) <=14 ng/L   Lipase   Result Value Ref Range    Lipase 28 13 - 60 U/L   CT Chest/Abdomen/Pelvis w Contrast    Narrative    CT CHEST/ABDOMEN/PELVIS W CONTRAST 10/5/2022 2:55 PM    CLINICAL HISTORY: abdominal pain, epigastric pain for 6 weeks    TECHNIQUE: CT scan of the chest, abdomen, and pelvis was performed  following injection of IV contrast. Multiplanar reformats were  obtained. Dose reduction techniques were used.   CONTRAST: 90 mL Isovue 370    COMPARISON: CT abdomen/pelvis 11/4/2021, thyroid ultrasound 12/3/2018,  pelvic ultrasound 4/22/2016    FINDINGS:   LUNGS AND PLEURA: Minimal bibasilar hypoventilatory changes without  focal airspace consolidation. Small left pleural effusion    MEDIASTINUM/AXILLAE: Biatrial enlargement, similar to prior exam.  Large sliding hiatal hernia. Trace pericardial fluid. Heterogeneous,  enlarged right thyroid measuring up to 3.5 cm (series 2 image 21), not  significantly  changed from prior thyroid ultrasound, no specific  follow-up recommended.    CORONARY ARTERY CALCIFICATION: Severe.    HEPATOBILIARY: Cholecystectomy.    PANCREAS: Normal.    SPLEEN: Normal.    ADRENAL GLANDS: Normal.    KIDNEYS/BLADDER: No hydronephrosis. Urinary bladder is unremarkable.    BOWEL: Diverticulosis in the colon. No acute inflammatory change. No  obstruction.     PELVIC ORGANS: Multiple enhancing uterine fibroids, similar to prior  exam. Thickened endometrium given postmenopausal state measuring up to  1.3 cm.    ADDITIONAL FINDINGS: Scattered calcified atherosclerosis.    MUSCULOSKELETAL: No acute bony abnormality.      Impression    IMPRESSION:  1.  Small left pleural effusion. No additional acute abnormality in  the chest, abdomen or pelvis.  2.  Persistent large sliding hiatal hernia.  3.  Multiple uterine fibroids with persistent thickened endometrium  given patient's age, unchanged from prior CT and ultrasound.    JASPER KENNY MD         SYSTEM ID:  [id number]   Troponin T, High Sensitivity   Result Value Ref Range    Troponin T, High Sensitivity 16 (H) <=14 ng/L       Medications   lactated ringers BOLUS 1,000 mL (0 mLs Intravenous Stopped 10/5/22 1441)   iopamidol (ISOVUE-370) solution 90 mL (90 mLs Intravenous Given 10/5/22 1443)   sodium chloride 0.9 % bag 500mL for CT scan flush use (68 mLs Intravenous Given 10/5/22 1443)   diltiazem (CARDIZEM) injection 10 mg (10 mg Intravenous Given 10/5/22 1401)   metoprolol tartrate (LOPRESSOR) tablet 50 mg (50 mg Oral Given 10/5/22 1610)       Assessments & Plan (with Medical Decision Making)     I have reviewed the nursing notes.    I have reviewed the findings, diagnosis, plan and need for follow up with the patient.  87-year-old female presents to the emergency department with a 6-week history of epigastric pain, midsternal pressure, intermittent heart palpitations noted predominantly in the evening without fever, chills, sweats, dysuria,  hematuria, diarrhea, bright red rectal bleeding, constipation, shortness of breath, lower extremity swelling.  Patient does not have a history of atrial fibrillation nor ACS, NSTEMI, STEMI, or stroke.  On exam patient is neurologically intact and GCS maintained at 15 throughout stay.  There was no left or right-sided facial droop.  Heart rate was irregular, work-up to include ACS, aneurysm, NSTEMI, STEMI, pneumothorax, pericarditis, acute abdomen including bowel obstruction.  EKG reveals new onset A. fib with rapid ventricular response, initial troponin is noted to be elevated at 19 and subsequent troponin is noted to be 16, patient treated with IV 10 mg diltiazem and followed up with 50 mg of oral metoprolol.  Patient's epigastric pain and abdominal discomfort resolved completely post Cardizem.  Patient reports symptoms never returned.  CT reveals trace pericardial fluid, small left pleural effusion, and other remaining CT findings are unchanged from previous.  Consultation completed with Dr. Dobbins, cardiologist.  He agrees patient be safe to discharge as long as she remains asymptomatic and rate is under good control.  He agreed that metoprolol was a good rhythm control medication.  He recommended echocardiogram within 24 hours.  Echocardiogram was ordered, cardiology consult ordered.  Patient started on Xarelto.  Patient was advised to stop atenolol.  Daughter presents here and was advised of recommendations as well.  She is in agreement with plan of care as well.  Patient discharged in stable condition.  Also patient was advised to schedule follow-up visit with primary care within a few days for recheck.      New Prescriptions    METOPROLOL TARTRATE (LOPRESSOR) 50 MG TABLET    Take 1 tablet (50 mg) by mouth 2 times daily for 30 days    RIVAROXABAN ANTICOAGULANT 15 & 20 MG TBPK STARTER THERAPY PACK    Take 15 mg by mouth 2 times daily (with meals) for 21 days, THEN 20 mg daily with food for 9 days.     RIVAROXABAN ANTICOAGULANT 15 & 20 MG TBPK STARTER THERAPY PACK    Take 15 mg by mouth 2 times daily (with meals) for 21 days, THEN 20 mg daily with food for 9 days.    RIVAROXABAN ANTICOAGULANT 15 & 20 MG TBPK STARTER THERAPY PACK    Take 15 mg by mouth 2 times daily (with meals) for 21 days, THEN 20 mg daily with food for 9 days.       Final diagnoses:   Heart palpitations   New onset atrial fibrillation (H) - RVR   Epigastric pain   Gastroesophageal reflux disease without esophagitis       10/5/2022   [facility] EMERGENCY DEPT     Marisabel Fall, ALEX CNP  10/05/22 [id number]       Marisabel Fall APRN CNP  10/05/22 [id number]

## 2022-10-07 ENCOUNTER — HOSPITAL ENCOUNTER (OUTPATIENT)
Dept: CARDIOLOGY | Facility: CLINIC | Age: 87
Discharge: HOME | End: 2022-10-07
Attending: NURSE PRACTITIONER | Admitting: NURSE PRACTITIONER
Payer: COMMERCIAL

## 2022-10-07 DIAGNOSIS — R10.13 EPIGASTRIC PAIN: ICD-10-CM

## 2022-10-07 DIAGNOSIS — R00.2 HEART PALPITATIONS: ICD-10-CM

## 2022-10-07 DIAGNOSIS — I48.91 NEW ONSET ATRIAL FIBRILLATION (H): ICD-10-CM

## 2022-10-07 DIAGNOSIS — K21.9 GASTROESOPHAGEAL REFLUX DISEASE WITHOUT ESOPHAGITIS: ICD-10-CM

## 2022-10-07 LAB — LVEF ECHO: NORMAL

## 2022-10-07 PROCEDURE — 93306 TTE W/DOPPLER COMPLETE: CPT | Mod: 26 | Performed by: INTERNAL MEDICINE

## 2022-10-07 PROCEDURE — 93306 TTE W/DOPPLER COMPLETE: CPT

## 2022-10-12 ENCOUNTER — OFFICE VISIT (OUTPATIENT)
Dept: FAMILY MEDICINE | Facility: CLINIC | Age: 87
End: 2022-10-12
Payer: COMMERCIAL

## 2022-10-12 VITALS
TEMPERATURE: 97.6 F | BODY MASS INDEX: 40.41 KG/M2 | HEART RATE: 102 BPM | SYSTOLIC BLOOD PRESSURE: 137 MMHG | OXYGEN SATURATION: 99 % | RESPIRATION RATE: 24 BRPM | DIASTOLIC BLOOD PRESSURE: 88 MMHG | WEIGHT: 235.4 LBS

## 2022-10-12 DIAGNOSIS — I48.91 ATRIAL FIBRILLATION, UNSPECIFIED TYPE (H): Primary | ICD-10-CM

## 2022-10-12 DIAGNOSIS — K21.9 GASTROESOPHAGEAL REFLUX DISEASE WITHOUT ESOPHAGITIS: ICD-10-CM

## 2022-10-12 DIAGNOSIS — R10.13 EPIGASTRIC PAIN: ICD-10-CM

## 2022-10-12 DIAGNOSIS — Z79.01 ANTICOAGULATED: ICD-10-CM

## 2022-10-12 DIAGNOSIS — I48.91 ATRIAL FIBRILLATION, UNSPECIFIED TYPE (H): ICD-10-CM

## 2022-10-12 LAB
ERYTHROCYTE [DISTWIDTH] IN BLOOD BY AUTOMATED COUNT: 13.1 % (ref 10–15)
HCT VFR BLD AUTO: 36 % (ref 35–47)
HGB BLD-MCNC: 11.7 G/DL (ref 11.7–15.7)
MCH RBC QN AUTO: 29.8 PG (ref 26.5–33)
MCHC RBC AUTO-ENTMCNC: 32.5 G/DL (ref 31.5–36.5)
MCV RBC AUTO: 92 FL (ref 78–100)
PLATELET # BLD AUTO: 203 10E3/UL (ref 150–450)
RBC # BLD AUTO: 3.92 10E6/UL (ref 3.8–5.2)
WBC # BLD AUTO: 4 10E3/UL (ref 4–11)

## 2022-10-12 PROCEDURE — 93000 ELECTROCARDIOGRAM COMPLETE: CPT | Performed by: NURSE PRACTITIONER

## 2022-10-12 PROCEDURE — 85027 COMPLETE CBC AUTOMATED: CPT | Performed by: NURSE PRACTITIONER

## 2022-10-12 PROCEDURE — 36415 COLL VENOUS BLD VENIPUNCTURE: CPT | Performed by: NURSE PRACTITIONER

## 2022-10-12 PROCEDURE — 99214 OFFICE O/P EST MOD 30 MIN: CPT | Performed by: NURSE PRACTITIONER

## 2022-10-12 RX ORDER — METOPROLOL SUCCINATE 50 MG/1
50 TABLET, EXTENDED RELEASE ORAL 2 TIMES DAILY
Qty: 60 TABLET | Refills: 1 | Status: SHIPPED | OUTPATIENT
Start: 2022-10-12 | End: 2022-10-12

## 2022-10-12 RX ORDER — SUCRALFATE 1 G/1
1 TABLET ORAL 4 TIMES DAILY
Qty: 120 TABLET | Refills: 0 | Status: SHIPPED | OUTPATIENT
Start: 2022-10-12 | End: 2022-10-12

## 2022-10-12 RX ORDER — SUCRALFATE 1 G/1
TABLET ORAL
Qty: 360 TABLET | Refills: 3 | Status: ON HOLD | OUTPATIENT
Start: 2022-10-12 | End: 2022-12-29

## 2022-10-12 RX ORDER — METOPROLOL SUCCINATE 50 MG/1
TABLET, EXTENDED RELEASE ORAL
Qty: 180 TABLET | Refills: 3 | Status: SHIPPED | OUTPATIENT
Start: 2022-10-12 | End: 2022-10-27

## 2022-10-12 ASSESSMENT — PAIN SCALES - GENERAL: PAINLEVEL: SEVERE PAIN (6)

## 2022-10-12 NOTE — PATIENT INSTRUCTIONS
Atrial fibrillation, unspecified type (H)  New onset on 10/5/2022 ER visit when patient presented with palpitations and epigastric pain. Patient was started on Xarelto and Metoprolol, advised to stop her Atenolol. Since being home patient is still experiencing palpitations and abdominal discomfort has worsened as below. EKG obtained in office shows A-fib with rate of 117. Patient has Cardiology follow up on 11/9/2022. Given patient's continued symptoms and continued A-fib discussed with Cardiology on call Dr. Moreira who recommended switching to Metoprolol XL and a heart monitor and believes the Xarelto is less likely causing gastrointestinal symptoms. Also recommended a stress test, however not until A-fib is controlled. Will let Cardiology review and order at office visit next month. Recommendations discussed with patient via phone at 12:45 PM   - EKG 12-lead complete w/read - Clinics  - metoprolol succinate ER (TOPROL XL) 50 MG 24 hr tablet; Take 1 tablet (50 mg) by mouth 2 times daily  - Adult Leadless EKG Monitor 3 to 7 Days; Future    Gastroesophageal reflux disease without esophagitis  Chronic, has been on Nexium with good management. Has had generalized epigastric pain for approximately 7 weeks now. Denies any blood in stool. Normal bowel movement's. Would recommend an upper endoscopy, however would wait until A-fib under control. At this time would recommend starting Carafate as below. Patient has follow up appointment with primary care provider on the 27th, can follow up on this at that time.      Epigastric pain  Several week history of increased epigastric pain as above. Abdominal CT in ER was normal. Would recommend upper endoscopy as above, however would prefer to wait until A-fib controlled. CBC completed in office today was normal. Follow-up as above.   - CBC with platelets; Future  - CBC with platelets  - sucralfate (CARAFATE) 1 GM tablet; Take 1 tablet (1 g) by mouth 4 times  daily    Anticoagulated  Currently on Xarelto for new onset A-fib.   - CBC with platelets; Future  - CBC with platelets

## 2022-10-12 NOTE — PROGRESS NOTES
Assessment & Plan     Atrial fibrillation, unspecified type (H)  New onset on 10/5/2022 ER visit when patient presented with palpitations and epigastric pain. Patient was started on Xarelto and Metoprolol, advised to stop her Atenolol. Since being home patient is still experiencing palpitations and abdominal discomfort has worsened as below. EKG obtained in office shows A-fib with rate of 117. Patient has Cardiology follow up on 11/9/2022. Given patient's continued symptoms and continued A-fib discussed with Cardiology on call Dr. Moreira who recommended switching to Metoprolol XL and a heart monitor and believes the Xarelto is less likely causing gastrointestinal symptoms. Also recommended a stress test, however not until A-fib is controlled. Will let Cardiology review and order at office visit next month. Recommendations discussed with patient via phone at 12:45 PM   - EKG 12-lead complete w/read - Clinics  - metoprolol succinate ER (TOPROL XL) 50 MG 24 hr tablet; Take 1 tablet (50 mg) by mouth 2 times daily  - Adult Leadless EKG Monitor 3 to 7 Days; Future    Gastroesophageal reflux disease without esophagitis  Chronic, has been on Nexium with good management. Has had generalized epigastric pain for approximately 7 weeks now. Denies any blood in stool. Normal bowel movement's. Would recommend an upper endoscopy, however would wait until A-fib under control. At this time would recommend starting Carafate as below. Patient has follow up appointment with primary care provider on the 27th, can follow up on this at that time.      Epigastric pain  Several week history of increased epigastric pain as above. Abdominal CT in ER was normal. Would recommend upper endoscopy as above, however would prefer to wait until A-fib controlled. CBC completed in office today was normal. Follow-up as above.   - CBC with platelets; Future  - CBC with platelets  - sucralfate (CARAFATE) 1 GM tablet; Take 1 tablet (1 g) by mouth 4 times  "daily    Anticoagulated  Currently on Xarelto for new onset A-fib.   - CBC with platelets; Future  - CBC with platelets             BMI:   Estimated body mass index is 40.41 kg/m  as calculated from the following:    Height as of 10/5/22: 1.626 m (5' 4\").    Weight as of this encounter: 106.8 kg (235 lb 6.4 oz).   Weight management plan: Patient was referred to their PCP to discuss a diet and exercise plan.    See Patient Instructions    Return in about 2 weeks (around 10/26/2022) for Recheck as scheduled.. After discussion with patient, patient verbalizes and agreeable to receive AVS instructions via My Chart, not printed today     Ambika Marc, DNP, APRN-CNP   [facility]    Subjective     Theodora Meng is a 87 year old female who presents today for the following   health issues:    HPI    ED/UC Followup:    Facility:  [facility] Emergency [facility]    Date of visit: 10/05/2022   Reason for visit: Heart palpitations  New onset atrial fibrillation (H)   Epigastric pain  Gastroesophageal reflux disease without esophagitis    Current Status: continued heart palpitations and worsening epigastric pain and weakness.     Started Xarelto and Metoprolol - worsening abdominal discomfort   Taking medications with food   Having more palpitations since discharge from ER  Usually at night when lays down, during the day will sometimes have very mild, but at night is the worse   Not having any associated symptoms with   Does have head of bed elevated    Taking Nexium as prescribed   No nausea       Review of Systems    Constitutional, HEENT, cardiovascular, pulmonary, gi and gu systems are negative, except as otherwise noted.    Objective   /88   Pulse 102   Temp 97.6  F (36.4  C)   Resp 24   Wt 106.8 kg (235 lb 6.4 oz)   LMP  (LMP Unknown)   SpO2 99%   BMI 40.41 kg/m    Body mass index is 40.41 kg/m .    Physical Exam  GENERAL APPEARANCE: healthy, alert and no " distress  NECK: no adenopathy, no asymmetry, masses, or scars, thyroid normal to palpation and no bruits  RESP: lungs clear to auscultation - no rales, rhonchi or wheezes  CV: Irregular, no murmur, click or rub  ABDOMEN: soft, nontender, without hepatosplenomegaly or masses, bowel sounds normal and obese  MS: extremities normal- no gross deformities noted, peripheral pulses normal and no swelling  SKIN: no suspicious lesions or rashes  NEURO: Normal strength and tone, mentation intact and speech normal  PSYCH: mentation appears normal and affect normal/bright    Diagnostic Test Results:  Results for orders placed or performed in visit on 10/12/22 (from the past 24 hour(s))   CBC with platelets   Result Value Ref Range    WBC Count 4.0 4.0 - 11.0 10e3/uL    RBC Count 3.92 3.80 - 5.20 10e6/uL    Hemoglobin 11.7 11.7 - 15.7 g/dL    Hematocrit 36.0 35.0 - 47.0 %    MCV 92 78 - 100 fL    MCH 29.8 26.5 - 33.0 pg    MCHC 32.5 31.5 - 36.5 g/dL    RDW 13.1 10.0 - 15.0 %    Platelet Count 203 150 - 450 10e3/uL     EKG - atrial fibrillation, rate 117, normal axis, normal intervals, no acute ST/T changes c/w ischemia, unchanged from previous tracings     Chart documentation with Dragon Voice recognition Software. Although reviewed after completion, some words and grammatical errors may remain.

## 2022-10-12 NOTE — LETTER
October 17, 2022      Theodora Meng  [address] [place] [id number]        Dear MsDominga,    We are writing to inform you of your test results.    Normal complete blood count.       Resulted Orders   CBC with platelets   Result Value Ref Range    WBC Count 4.0 4.0 - 11.0 10e3/uL    RBC Count 3.92 3.80 - 5.20 10e6/uL    Hemoglobin 11.7 11.7 - 15.7 g/dL    Hematocrit 36.0 35.0 - 47.0 %    MCV 92 78 - 100 fL    MCH 29.8 26.5 - 33.0 pg    MCHC 32.5 31.5 - 36.5 g/dL    RDW 13.1 10.0 - 15.0 %    Platelet Count 203 150 - 450 10e3/uL       If you have any questions or concerns, please call the clinic at the number listed above.       Sincerely,      ALEX Rincon CNP

## 2022-10-13 RX ORDER — ESOMEPRAZOLE MAGNESIUM 40 MG/1
CAPSULE, DELAYED RELEASE ORAL
Qty: 90 CAPSULE | Refills: 3 | Status: SHIPPED | OUTPATIENT
Start: 2022-10-13 | End: 2023-02-06 | Stop reason: ALTCHOICE

## 2022-10-18 DIAGNOSIS — E78.5 HYPERLIPIDEMIA LDL GOAL <100: ICD-10-CM

## 2022-10-18 RX ORDER — ATORVASTATIN CALCIUM 10 MG/1
TABLET, FILM COATED ORAL
Qty: 90 TABLET | Refills: 0 | Status: SHIPPED | OUTPATIENT
Start: 2022-10-18 | End: 2023-01-16

## 2022-10-27 ENCOUNTER — OFFICE VISIT (OUTPATIENT)
Dept: FAMILY MEDICINE | Facility: CLINIC | Age: 87
End: 2022-10-27
Payer: COMMERCIAL

## 2022-10-27 VITALS
BODY MASS INDEX: 40.68 KG/M2 | HEART RATE: 80 BPM | TEMPERATURE: 99.2 F | SYSTOLIC BLOOD PRESSURE: 116 MMHG | WEIGHT: 237 LBS | OXYGEN SATURATION: 100 % | DIASTOLIC BLOOD PRESSURE: 80 MMHG

## 2022-10-27 DIAGNOSIS — Z13.220 SCREENING FOR HYPERLIPIDEMIA: ICD-10-CM

## 2022-10-27 DIAGNOSIS — I48.91 ATRIAL FIBRILLATION, UNSPECIFIED TYPE (H): ICD-10-CM

## 2022-10-27 DIAGNOSIS — K21.9 GASTROESOPHAGEAL REFLUX DISEASE WITHOUT ESOPHAGITIS: ICD-10-CM

## 2022-10-27 DIAGNOSIS — E11.39 TYPE 2 DIABETES MELLITUS WITH OTHER OPHTHALMIC COMPLICATION, WITHOUT LONG-TERM CURRENT USE OF INSULIN (H): Primary | ICD-10-CM

## 2022-10-27 DIAGNOSIS — Z23 NEED FOR PROPHYLACTIC VACCINATION AND INOCULATION AGAINST INFLUENZA: ICD-10-CM

## 2022-10-27 DIAGNOSIS — M79.2 NEURALGIA: ICD-10-CM

## 2022-10-27 LAB
CHOLEST SERPL-MCNC: 122 MG/DL
HBA1C MFR BLD: 7.2 % (ref 0–5.6)
HDLC SERPL-MCNC: 48 MG/DL
HOLD SPECIMEN: NORMAL
LDLC SERPL CALC-MCNC: 49 MG/DL
NONHDLC SERPL-MCNC: 74 MG/DL
TRIGL SERPL-MCNC: 123 MG/DL

## 2022-10-27 PROCEDURE — 90662 IIV NO PRSV INCREASED AG IM: CPT | Performed by: FAMILY MEDICINE

## 2022-10-27 PROCEDURE — 80061 LIPID PANEL: CPT | Performed by: FAMILY MEDICINE

## 2022-10-27 PROCEDURE — 90471 IMMUNIZATION ADMIN: CPT | Performed by: FAMILY MEDICINE

## 2022-10-27 PROCEDURE — 99215 OFFICE O/P EST HI 40 MIN: CPT | Mod: 25 | Performed by: FAMILY MEDICINE

## 2022-10-27 PROCEDURE — 83036 HEMOGLOBIN GLYCOSYLATED A1C: CPT | Performed by: FAMILY MEDICINE

## 2022-10-27 PROCEDURE — 36415 COLL VENOUS BLD VENIPUNCTURE: CPT | Performed by: FAMILY MEDICINE

## 2022-10-27 RX ORDER — FAMOTIDINE 20 MG/1
20 TABLET, FILM COATED ORAL AT BEDTIME
Qty: 60 TABLET | Refills: 1 | Status: ON HOLD | OUTPATIENT
Start: 2022-10-27 | End: 2023-03-03

## 2022-10-27 RX ORDER — METOPROLOL SUCCINATE 50 MG/1
50 TABLET, EXTENDED RELEASE ORAL DAILY
Qty: 180 TABLET | Refills: 3
Start: 2022-10-27 | End: 2022-10-27

## 2022-10-27 RX ORDER — METOPROLOL SUCCINATE 50 MG/1
50 TABLET, EXTENDED RELEASE ORAL 2 TIMES DAILY
Qty: 180 TABLET | Refills: 3 | Status: SHIPPED | OUTPATIENT
Start: 2022-10-27 | End: 2022-11-09

## 2022-10-27 RX ORDER — GABAPENTIN 300 MG/1
CAPSULE ORAL
Qty: 180 CAPSULE | Refills: 3 | Status: SHIPPED | OUTPATIENT
Start: 2022-10-27 | End: 2023-01-16

## 2022-10-27 ASSESSMENT — PAIN SCALES - GENERAL: PAINLEVEL: MODERATE PAIN (5)

## 2022-10-27 NOTE — PROGRESS NOTES
Assessment & Plan     Type 2 diabetes mellitus with other ophthalmic complication, without long-term current use of insulin (H)  Diet controlled  - Hemoglobin A1c; Future  - Hemoglobin A1c    Atrial fibrillation, unspecified type (H)  New onset  Will see cardiology as planned  - rivaroxaban ANTICOAGULANT (XARELTO) 20 MG TABS tablet; Take 1 tablet (20 mg) by mouth daily (with dinner)  - metoprolol succinate ER (TOPROL XL) 50 MG 24 hr tablet; Take 1 tablet (50 mg) by mouth 2 times daily    Screening for hyperlipidemia  - Lipid panel reflex to direct LDL Non-fasting; Future  - Lipid panel reflex to direct LDL Non-fasting    Gastroesophageal reflux disease without esophagitis  Poor control  Continue PPI bid  Add famotidine  - famotidine (PEPCID) 20 MG tablet; Take 1 tablet (20 mg) by mouth At Bedtime    Neuralgia  Doing better  Continue gabapentin  - gabapentin (NEURONTIN) 300 MG capsule; TAKE 2 CAPSULES IN THE EVENING      I spent a total of 59 minutes on the day of the visit.   Time spent doing chart review, history and exam, documentation and further activities per the note       Patient Instructions   Keep metoprolol to 50 mg twice a day  Go get something to eat and take it now    See cardiology as planned, let him know about the chest pressure    Start Pepcid (famotidine) once at bedtime    Zio patch monitor  [phone]               Return in about 3 months (around 1/27/2023) for diabetes.    Yecenia Cardona MD  [facility]    Chay Yip is a 87 year old, presenting for the following health issues:  Diabetes, Heart Problem (Discuss new medication/), and Imm/Inj (Flu Shot)      Heart Problem         Diabetes Follow-up      How often are you checking your blood sugar? Not at all    What concerns do you have today about your diabetes? None     Do you have any of these symptoms? (Select all that apply)  Numbness in feet and Burning in feet    Lab Results   Component Value Date     A1C 7.2 10/27/2022    A1C 6.8 03/23/2022    A1C 7.0 10/22/2021    A1C 6.9 11/03/2020    A1C 6.7 11/18/2019    A1C 6.6 06/20/2019    A1C 7.1 03/20/2019    A1C 7.1 11/06/2018      she didn't tolerate metformin in past      Hyperlipidemia Follow-Up      Are you regularly taking any medication or supplement to lower your cholesterol?   Yes- Lipitor    Are you having muscle aches or other side effects that you think could be caused by your cholesterol lowering medication?  No  Recent Labs   Lab Test 10/22/21  0851 11/03/20  1003 01/29/16  0852 05/15/15  0941 11/03/14  1203   CHOL 177 175   < > 170 172   HDL 69 71   < > 57 62   LDL 72 66   < > 74 69   TRIG 181* 188*   < > 195* 204*   CHOLHDLRATIO  --   --   --  3.0 2.8    < > = values in this interval not displayed.      Hypertension Follow-up      Do you check your blood pressure regularly outside of the clinic? No     Are you following a low salt diet? Yes    On lisinopril and metoprolol  BP Readings from Last 2 Encounters:   10/27/22 116/80   10/12/22 137/88     Hemoglobin A1C (%)   Date Value   10/27/2022 7.2 (H)   03/23/2022 6.8 (H)   11/03/2020 6.9 (H)   11/18/2019 6.7 (H)     LDL Cholesterol Calculated (mg/dL)   Date Value   10/27/2022 49   10/22/2021 72   11/03/2020 66   11/18/2019 82     afib  Was seen in the emergency department for new onset afib.   Had an echocardiogram. Zio patch was ordered.     Started Metoprolol and Rivaroxaban - has weakness in legs, tingling in elbows, stomach pain, discomfort in chest when walking since starting these.  Has an apt with cardiology in 2 weeks  She notes when she is in afib, keeps her awake, happened last night.   She feels like heart is pounding, irregular, stops awhile, then comes back.   Didn't take her metoprolol this am    Her gastroesophageal reflux disease is worse, will burn in her esophagus and stomach  Tried carafate, did not help  It taking Nexium twice a day    Seems like she is more weak in the legs  Has  gained weight  Wt Readings from Last 5 Encounters:   10/27/22 107.5 kg (237 lb)   10/12/22 106.8 kg (235 lb 6.4 oz)   10/05/22 104.3 kg (230 lb)   05/10/22 104.8 kg (231 lb)   04/19/22 104.8 kg (231 lb)      BP Readings from Last 6 Encounters:   10/27/22 116/80   10/12/22 137/88   10/05/22 (!) 112/94   05/10/22 (!) 164/82   04/19/22 124/82   03/23/22 134/76          Review of Systems   multiple as above      Objective    /80 (BP Location: Right arm)   Pulse 80   Temp 99.2  F (37.3  C) (Tympanic)   Wt 107.5 kg (237 lb)   LMP  (LMP Unknown)   SpO2 100%   BMI 40.68 kg/m    Body mass index is 40.68 kg/m .  Physical Exam   GENERAL: healthy, alert and no distress  NECK: no adenopathy, no asymmetry, masses, or scars and thyroid normal to palpation  RESP: lungs clear to auscultation - no rales, rhonchi or wheezes  CV: irregularly irregular , tachycardic  ABDOMEN: soft, nontender, no hepatosplenomegaly, no masses and bowel sounds normal  MS: no gross musculoskeletal defects noted, no edema

## 2022-10-27 NOTE — PATIENT INSTRUCTIONS
Keep metoprolol to 50 mg twice a day  Go get something to eat and take it now    See cardiology as planned, let him know about the chest pressure    Start Pepcid (famotidine) once at bedtime    Zio patch monitor  [phone]

## 2022-10-27 NOTE — LETTER
October 31, 2022      Theodora LÁZARO Taqueria  [address] [place] [id number]        Dear ,    We are writing to inform you of your test results.    Your cholesterol is great.   A1C is fine as we discussed.       Resulted Orders   Hemoglobin A1c   Result Value Ref Range    Hemoglobin A1C 7.2 (H) 0.0 - 5.6 %      Comment:      Normal <5.7%   Prediabetes 5.7-6.4%    Diabetes 6.5% or higher     Note: Adopted from ADA consensus guidelines.   Lipid panel reflex to direct LDL Non-fasting   Result Value Ref Range    Cholesterol 122 <200 mg/dL    Triglycerides 123 <150 mg/dL    Direct Measure HDL 48 (L) >=50 mg/dL    LDL Cholesterol Calculated 49 <=100 mg/dL    Non HDL Cholesterol 74 <130 mg/dL    Narrative    Cholesterol  Desirable:  <200 mg/dL    Triglycerides  Normal:  Less than 150 mg/dL  Borderline High:  150-199 mg/dL  High:  200-499 mg/dL  Very High:  Greater than or equal to 500 mg/dL    Direct Measure HDL  Female:  Greater than or equal to 50 mg/dL   Male:  Greater than or equal to 40 mg/dL    LDL Cholesterol  Desirable:  <100mg/dL  Above Desirable:  100-129 mg/dL   Borderline High:  130-159 mg/dL   High:  160-189 mg/dL   Very High:  >= 190 mg/dL    Non HDL Cholesterol  Desirable:  130 mg/dL  Above Desirable:  130-159 mg/dL  Borderline High:  160-189 mg/dL  High:  190-219 mg/dL  Very High:  Greater than or equal to 220 mg/dL       If you have any questions or concerns, please call the clinic at the number listed above.       Sincerely,      Yecenia Armstrong MD

## 2022-10-28 ENCOUNTER — TELEPHONE (OUTPATIENT)
Dept: FAMILY MEDICINE | Facility: CLINIC | Age: 87
End: 2022-10-28

## 2022-10-28 NOTE — TELEPHONE ENCOUNTER
Pt called and wanted to schedule a EKG monitor. Writer let her know that we do not schedule those here. She was given the number to cardiology to have this set up.     Odalys Whitaker RN

## 2022-10-31 ENCOUNTER — HOSPITAL ENCOUNTER (OUTPATIENT)
Dept: CARDIOLOGY | Facility: CLINIC | Age: 87
Discharge: HOME | End: 2022-10-31
Attending: NURSE PRACTITIONER | Admitting: NURSE PRACTITIONER
Payer: COMMERCIAL

## 2022-10-31 DIAGNOSIS — I48.91 ATRIAL FIBRILLATION, UNSPECIFIED TYPE (H): ICD-10-CM

## 2022-10-31 PROCEDURE — 93244 EXT ECG>48HR<7D REV&INTERPJ: CPT | Performed by: INTERNAL MEDICINE

## 2022-10-31 PROCEDURE — 93242 EXT ECG>48HR<7D RECORDING: CPT

## 2022-11-09 ENCOUNTER — OFFICE VISIT (OUTPATIENT)
Dept: CARDIOLOGY | Facility: CLINIC | Age: 87
End: 2022-11-09
Attending: NURSE PRACTITIONER
Payer: COMMERCIAL

## 2022-11-09 VITALS
SYSTOLIC BLOOD PRESSURE: 130 MMHG | DIASTOLIC BLOOD PRESSURE: 88 MMHG | OXYGEN SATURATION: 100 % | WEIGHT: 242.2 LBS | HEART RATE: 100 BPM | BODY MASS INDEX: 41.57 KG/M2

## 2022-11-09 DIAGNOSIS — I48.91 NEW ONSET ATRIAL FIBRILLATION (H): ICD-10-CM

## 2022-11-09 PROCEDURE — 99203 OFFICE O/P NEW LOW 30 MIN: CPT | Performed by: INTERNAL MEDICINE

## 2022-11-09 RX ORDER — TORSEMIDE 10 MG/1
10 TABLET ORAL DAILY
Qty: 30 TABLET | Refills: 1 | Status: SHIPPED | OUTPATIENT
Start: 2022-11-09 | End: 2023-01-18

## 2022-11-09 RX ORDER — DILTIAZEM HYDROCHLORIDE 180 MG/1
180 CAPSULE, EXTENDED RELEASE ORAL DAILY
Qty: 90 CAPSULE | Refills: 3 | Status: ON HOLD | OUTPATIENT
Start: 2022-11-09 | End: 2022-12-29

## 2022-11-09 NOTE — LETTER
11/9/2022    Yecenia Cardona MD  [address] [place] [id number]    RE: Theodora SESAY Taqueria       Dear Colleague,     I had the pleasure of seeing Theodora Meng in the [facility].  HPI and Plan:   Today I had the pleasure of seeing Theodora Meng at [facility]. She is a pleasant 87 year old patient with a past medical history of hypertension, hyperlipidemia and type 2 diabetes who presents to the clinic for a follow-up visit.  She has previously been seen by one of my partners who has retired.    Patient recently presented to the emergency department for complaints of abdominal pain.  She has a long history of GERD and this is what she thought she was having.  However in the emergency room she was noted to have atrial fibrillation with heart rate being in 140s-150s.  She was switched from atenolol to metoprolol and Xarelto was also added for stroke prevention.    Today the patient presents with her daughter and tells me that since the emergency room visit she has developed a lot of symptoms.  She reports tremendous amount of fatigue which is limiting her day-to-day activities.  She also has gained a lot of weight and believes her legs are swollen.  She has exertional dyspnea also feels depressed.  She also reports pressure in the center of the chest which can occur both at rest and exertion.  These have never occurred prior to the emergency room visit.    Assessment and plan  1.  Atrial fibrillation- on anticoagulation  2.  Possible coronary disease based on stress test  3.  Essential hypertension  4.  Hyperlipidemia  5.  Type 2 diabetes    The patient recently got diagnosed with atrial fibrillation and was started on metoprolol and anticoagulation.  She reports symptoms which are clearly worrisome for side effects of metoprolol.  Hence I will discontinue metoprolol and initiate her on diltiazem.  As far as anticoagulation is concerned I think if she needs  that.  She will be on it due to very high [id number] score.  I told the patient and her daughter that because of the side effects of metoprolol I think it is reasonable to first stop the application and then see how she does.  If even after stopping metoprolol her symptoms do not resolve then it is possible that her symptoms actually from atrial fibrillation and in that case we may consider performing cardioversion.  Hence I recommended coming back to see us in clinic in a month.  At that time she would have completed many weeks of anticoagulation and would not require a TARAN prior to cardioversion.  If the decision is made to cardiovert her I would also initiate her on amiodarone during the clinic visit.  I hear mild basal crackles and believe it is related to high torsemide to see benefits from this.  She will take it on a daily basis for next few days and then switch to taking it on a as needed basis.     Thank you for allowing me to participate in the care of Theodora Meng    This note was completed in part using Dragon voice recognition software. Although reviewed after completion, some word and grammatical errors may occur.    George Starr MD  Cardiology    Orders Placed This Encounter   Procedures     Follow-Up with Cardiology       Orders Placed This Encounter   Medications     diltiazem ER (DILT-XR) 180 MG 24 hr capsule     Sig: Take 1 capsule (180 mg) by mouth daily     Dispense:  90 capsule     Refill:  3     torsemide (DEMADEX) 10 MG tablet     Sig: Take 1 tablet (10 mg) by mouth daily     Dispense:  30 tablet     Refill:  1       Medications Discontinued During This Encounter   Medication Reason     metoprolol succinate ER (TOPROL XL) 50 MG 24 hr tablet        Encounter Diagnosis   Name Primary?     New onset atrial fibrillation (H)        CURRENT MEDICATIONS:  Current Outpatient Medications   Medication Sig Dispense Refill     acetaminophen (TYLENOL) 500 MG tablet Take 1,000 mg by mouth 3  times daily  mg by mouth every 4 hours as needed       atorvastatin (LIPITOR) 10 MG tablet TAKE 1 TABLET(10 MG) BY MOUTH DAILY 90 tablet 0     diltiazem ER (DILT-XR) 180 MG 24 hr capsule Take 1 capsule (180 mg) by mouth daily 90 capsule 3     esomeprazole (NEXIUM) 40 MG DR capsule TAKE 1 CAPSULE EVERY       MORNING BEFORE BREAKFAST 90 capsule 3     gabapentin (NEURONTIN) 300 MG capsule TAKE 2 CAPSULES IN THE EVENING 180 capsule 3     lisinopril (ZESTRIL) 5 MG tablet TAKE 1 TABLET BY MOUTH EVERY DAY 90 tablet 2     LORazepam (ATIVAN) 0.5 MG tablet TAKE 1/2 to 1 TAB BY MOUTH EVERY 8 HOURS AS NEEDED FOR ANXIETY 10 tablet 0     rivaroxaban ANTICOAGULANT (XARELTO) 20 MG TABS tablet Take 1 tablet (20 mg) by mouth daily (with dinner) 90 tablet 1     torsemide (DEMADEX) 10 MG tablet Take 1 tablet (10 mg) by mouth daily 30 tablet 1     blood glucose monitoring (ACCU-CHEK COMPACT CARE KIT) meter device kit Use to test blood sugars 1 times daily or as directed. May sub formulary meter 1 kit 0     blood glucose monitoring (NO BRAND SPECIFIED) test strip Use to test blood sugars twice times daily or as directed 1 Box 3     famotidine (PEPCID) 20 MG tablet Take 1 tablet (20 mg) by mouth At Bedtime (Patient not taking: Reported on 11/9/2022) 60 tablet 1     sucralfate (CARAFATE) 1 GM tablet TAKE 1 TABLET(1 GRAM) BY MOUTH FOUR TIMES DAILY (Patient not taking: Reported on 11/9/2022) 360 tablet 3       ALLERGIES     Allergies   Allergen Reactions     Zoloft [Sertraline] Palpitations, Diarrhea and Cramps       PAST MEDICAL HISTORY:  Past Medical History:   Diagnosis Date     Depressive disorder      Diabetes (H)      Esophageal reflux      Other abnormal glucose      Other premature beats      Unspecified essential hypertension        PAST SURGICAL HISTORY:  Past Surgical History:   Procedure Laterality Date     APPENDECTOMY       ARTHROPLASTY KNEE  10/30/2013    Procedure: ARTHROPLASTY KNEE;  Right Total Knee Arthroplasty;   Surgeon: Ousmane Mcgowan MD;  Location: [facility]     ARTHROPLASTY KNEE Left 4/3/2019    Procedure: Left Total Knee Arthroplasty;  Surgeon: Ousmane Mcgowan MD;  Location: [facility]     CHOLECYSTECTOMY, LAPOROSCOPIC      Cholecystectomy, Laparoscopic     DILATION AND CURETTAGE, OPERATIVE HYSTEROSCOPY WITH MORCELLATOR, COMBINED N/A 6/20/2016    Procedure: COMBINED DILATION AND CURETTAGE, OPERATIVE HYSTEROSCOPY WITH MORCELLATOR;  Surgeon: Bony Arredondo MD;  Location: [facility]     ESOPHAGOSCOPY, GASTROSCOPY, DUODENOSCOPY (EGD), COMBINED N/A 2/17/2016    Procedure: COMBINED ESOPHAGOSCOPY, GASTROSCOPY, DUODENOSCOPY (EGD), BIOPSY SINGLE OR MULTIPLE;  Surgeon: Mil Guevara MD;  Location: [facility]     SALPINGO OOPHORECTOMY,R/L/OZZY      Salpingo Oophorectomy, RT/LT/OZZY     TONSILLECTOMY         FAMILY HISTORY:  Family History   Problem Relation Age of Onset     Heart Disease Mother      Heart Disease Father      Heart Disease Brother      Heart Disease Brother      Heart Disease Brother        SOCIAL HISTORY:  Social History     Socioeconomic History     Marital status:      Spouse name: None     Number of children: None     Years of education: None     Highest education level: None   Tobacco Use     Smoking status: Never     Smokeless tobacco: Never   Vaping Use     Vaping Use: Never used   Substance and Sexual Activity     Alcohol use: No     Drug use: No     Sexual activity: Never       Review of Systems:  Skin:          Eyes:         ENT:         Respiratory:  Positive for shortness of breath;dyspnea on exertion     Cardiovascular:    Positive for;palpitations;chest pain;lower extremity symptoms;edema;fatigue    Gastroenterology:        Genitourinary:         Musculoskeletal:         Neurologic:         Psychiatric:         Heme/Lymph/Imm:         Endocrine:           Physical Exam:  Vitals: /88   Pulse 100   Wt 109.9 kg (242 lb 3.2 oz)   LMP  (LMP Unknown)   SpO2 100%   BMI 41.57 kg/m     Eyes: No icterus.  Pulmonary: Chest symmetric, l mild basal crackles, wheezes or rales.  Cardiovascular: Irregular rhythm, normal S1 and S2, no murmur, JVP normal.  Musculoskeletal: Edema of the lower extremities: Trace bilateral  Neurologic: Oriented and appropriate without obvious focal deficits.   Psychiatric: Normal affect.     Recent Lab Results:  LIPID RESULTS:  Lab Results   Component Value Date    CHOL 122 10/27/2022    CHOL 175 11/03/2020    HDL 48 (L) 10/27/2022    HDL 71 11/03/2020    LDL 49 10/27/2022    LDL 66 11/03/2020    TRIG 123 10/27/2022    TRIG 188 (H) 11/03/2020    CHOLHDLRATIO 3.0 05/15/2015       LIVER ENZYME RESULTS:  Lab Results   Component Value Date    AST 20 10/05/2022    AST 12 11/18/2020    ALT 17 10/05/2022    ALT 15 11/18/2020       CBC RESULTS:  Lab Results   Component Value Date    WBC 4.0 10/12/2022    WBC 5.1 11/18/2020    RBC 3.92 10/12/2022    RBC 4.52 11/18/2020    HGB 11.7 10/12/2022    HGB 13.4 11/18/2020    HCT 36.0 10/12/2022    HCT 41.8 11/18/2020    MCV 92 10/12/2022    MCV 93 11/18/2020    MCH 29.8 10/12/2022    MCH 29.6 11/18/2020    MCHC 32.5 10/12/2022    MCHC 32.1 11/18/2020    RDW 13.1 10/12/2022    RDW 13.2 11/18/2020     10/12/2022     11/18/2020       BMP RESULTS:  Lab Results   Component Value Date     10/05/2022     11/18/2020    POTASSIUM 4.7 10/05/2022    POTASSIUM 4.3 03/23/2022    POTASSIUM 3.6 11/18/2020    CHLORIDE 103 10/05/2022    CHLORIDE 108 03/23/2022    CHLORIDE 109 11/18/2020    CO2 23 10/05/2022    CO2 26 03/23/2022    CO2 28 11/18/2020    ANIONGAP 11 10/05/2022    ANIONGAP 5 03/23/2022    ANIONGAP 4 11/18/2020     (H) 10/05/2022     (H) 03/23/2022     (H) 11/18/2020    BUN 21.2 10/05/2022    BUN 20 03/23/2022    BUN 16 11/18/2020    CR 1.05 (H) 10/05/2022    CR 0.90 11/18/2020    GFRESTIMATED 51 (L) 10/05/2022    GFRESTIMATED 58 (L) 11/04/2021    GFRESTIMATED 58 (L) 11/18/2020    GFRESTBLACK 68  11/18/2020    FEDERICO 9.0 10/05/2022    FEDERICO 8.9 11/18/2020        A1C RESULTS:  Lab Results   Component Value Date    A1C 7.2 (H) 10/27/2022    A1C 6.9 (H) 11/03/2020       INR RESULTS:  No results found for: INR    CC  ALEX Castano CNP  [address] [place] [address]    All medical records were reviewed in detail and discussed with the patient. Greater than 30 mins were spent with the patient, 50% of this time was spent on counseling and coordination of care.  After visit summary was printed and given to the patient.    Thank you for allowing me to participate in the care of your patient.      Sincerely,     George Starr MD     [facility]

## 2022-11-10 NOTE — PROGRESS NOTES
HPI and Plan:   Today I had the pleasure of seeing Theodora Meng at [facility]. She is a pleasant 87 year old patient with a past medical history of hypertension, hyperlipidemia and type 2 diabetes who presents to the clinic for a follow-up visit.  She has previously been seen by one of my partners who has retired.    Patient recently presented to the emergency department for complaints of abdominal pain.  She has a long history of GERD and this is what she thought she was having.  However in the emergency room she was noted to have atrial fibrillation with heart rate being in 140s-150s.  She was switched from atenolol to metoprolol and Xarelto was also added for stroke prevention.    Today the patient presents with her daughter and tells me that since the emergency room visit she has developed a lot of symptoms.  She reports tremendous amount of fatigue which is limiting her day-to-day activities.  She also has gained a lot of weight and believes her legs are swollen.  She has exertional dyspnea also feels depressed.  She also reports pressure in the center of the chest which can occur both at rest and exertion.  These have never occurred prior to the emergency room visit.    Assessment and plan  1.  Atrial fibrillation- on anticoagulation  2.  Possible coronary disease based on stress test  3.  Essential hypertension  4.  Hyperlipidemia  5.  Type 2 diabetes    The patient recently got diagnosed with atrial fibrillation and was started on metoprolol and anticoagulation.  She reports symptoms which are clearly worrisome for side effects of metoprolol.  Hence I will discontinue metoprolol and initiate her on diltiazem.  As far as anticoagulation is concerned I think if she needs that.  She will be on it due to very high [id number] score.  I told the patient and her daughter that because of the side effects of metoprolol I think it is reasonable to first stop the application and then  see how she does.  If even after stopping metoprolol her symptoms do not resolve then it is possible that her symptoms actually from atrial fibrillation and in that case we may consider performing cardioversion.  Hence I recommended coming back to see us in clinic in a month.  At that time she would have completed many weeks of anticoagulation and would not require a TARAN prior to cardioversion.  If the decision is made to cardiovert her I would also initiate her on amiodarone during the clinic visit.  I hear mild basal crackles and believe it is related to high torsemide to see benefits from this.  She will take it on a daily basis for next few days and then switch to taking it on a as needed basis.     Thank you for allowing me to participate in the care of Theodora Meng    This note was completed in part using Dragon voice recognition software. Although reviewed after completion, some word and grammatical errors may occur.    George Starr MD  Cardiology    Orders Placed This Encounter   Procedures     Follow-Up with Cardiology       Orders Placed This Encounter   Medications     diltiazem ER (DILT-XR) 180 MG 24 hr capsule     Sig: Take 1 capsule (180 mg) by mouth daily     Dispense:  90 capsule     Refill:  3     torsemide (DEMADEX) 10 MG tablet     Sig: Take 1 tablet (10 mg) by mouth daily     Dispense:  30 tablet     Refill:  1       Medications Discontinued During This Encounter   Medication Reason     metoprolol succinate ER (TOPROL XL) 50 MG 24 hr tablet        Encounter Diagnosis   Name Primary?     New onset atrial fibrillation (H)        CURRENT MEDICATIONS:  Current Outpatient Medications   Medication Sig Dispense Refill     acetaminophen (TYLENOL) 500 MG tablet Take 1,000 mg by mouth 3 times daily  mg by mouth every 4 hours as needed       atorvastatin (LIPITOR) 10 MG tablet TAKE 1 TABLET(10 MG) BY MOUTH DAILY 90 tablet 0     diltiazem ER (DILT-XR) 180 MG 24 hr capsule Take 1 capsule (180  mg) by mouth daily 90 capsule 3     esomeprazole (NEXIUM) 40 MG DR capsule TAKE 1 CAPSULE EVERY       MORNING BEFORE BREAKFAST 90 capsule 3     gabapentin (NEURONTIN) 300 MG capsule TAKE 2 CAPSULES IN THE EVENING 180 capsule 3     lisinopril (ZESTRIL) 5 MG tablet TAKE 1 TABLET BY MOUTH EVERY DAY 90 tablet 2     LORazepam (ATIVAN) 0.5 MG tablet TAKE 1/2 to 1 TAB BY MOUTH EVERY 8 HOURS AS NEEDED FOR ANXIETY 10 tablet 0     rivaroxaban ANTICOAGULANT (XARELTO) 20 MG TABS tablet Take 1 tablet (20 mg) by mouth daily (with dinner) 90 tablet 1     torsemide (DEMADEX) 10 MG tablet Take 1 tablet (10 mg) by mouth daily 30 tablet 1     blood glucose monitoring (ACCU-CHEK COMPACT CARE KIT) meter device kit Use to test blood sugars 1 times daily or as directed. May sub formulary meter 1 kit 0     blood glucose monitoring (NO BRAND SPECIFIED) test strip Use to test blood sugars twice times daily or as directed 1 Box 3     famotidine (PEPCID) 20 MG tablet Take 1 tablet (20 mg) by mouth At Bedtime (Patient not taking: Reported on 11/9/2022) 60 tablet 1     sucralfate (CARAFATE) 1 GM tablet TAKE 1 TABLET(1 GRAM) BY MOUTH FOUR TIMES DAILY (Patient not taking: Reported on 11/9/2022) 360 tablet 3       ALLERGIES     Allergies   Allergen Reactions     Zoloft [Sertraline] Palpitations, Diarrhea and Cramps       PAST MEDICAL HISTORY:  Past Medical History:   Diagnosis Date     Depressive disorder      Diabetes (H)      Esophageal reflux      Other abnormal glucose      Other premature beats      Unspecified essential hypertension        PAST SURGICAL HISTORY:  Past Surgical History:   Procedure Laterality Date     APPENDECTOMY       ARTHROPLASTY KNEE  10/30/2013    Procedure: ARTHROPLASTY KNEE;  Right Total Knee Arthroplasty;  Surgeon: Ousmane Mcgowan MD;  Location: [facility]     ARTHROPLASTY KNEE Left 4/3/2019    Procedure: Left Total Knee Arthroplasty;  Surgeon: Ousmane Mcgowan MD;  Location: [facility]     CHOLECYSTECTOMY, LAPOROSCOPIC       Cholecystectomy, Laparoscopic     DILATION AND CURETTAGE, OPERATIVE HYSTEROSCOPY WITH MORCELLATOR, COMBINED N/A 6/20/2016    Procedure: COMBINED DILATION AND CURETTAGE, OPERATIVE HYSTEROSCOPY WITH MORCELLATOR;  Surgeon: Bony Arredondo MD;  Location: [facility]     ESOPHAGOSCOPY, GASTROSCOPY, DUODENOSCOPY (EGD), COMBINED N/A 2/17/2016    Procedure: COMBINED ESOPHAGOSCOPY, GASTROSCOPY, DUODENOSCOPY (EGD), BIOPSY SINGLE OR MULTIPLE;  Surgeon: Mil Guevara MD;  Location: [facility]     SALPINGO OOPHORECTOMY,R/L/OZZY      Salpingo Oophorectomy, RT/LT/OZZY     TONSILLECTOMY         FAMILY HISTORY:  Family History   Problem Relation Age of Onset     Heart Disease Mother      Heart Disease Father      Heart Disease Brother      Heart Disease Brother      Heart Disease Brother        SOCIAL HISTORY:  Social History     Socioeconomic History     Marital status:      Spouse name: None     Number of children: None     Years of education: None     Highest education level: None   Tobacco Use     Smoking status: Never     Smokeless tobacco: Never   Vaping Use     Vaping Use: Never used   Substance and Sexual Activity     Alcohol use: No     Drug use: No     Sexual activity: Never       Review of Systems:  Skin:          Eyes:         ENT:         Respiratory:  Positive for shortness of breath;dyspnea on exertion     Cardiovascular:    Positive for;palpitations;chest pain;lower extremity symptoms;edema;fatigue    Gastroenterology:        Genitourinary:         Musculoskeletal:         Neurologic:         Psychiatric:         Heme/Lymph/Imm:         Endocrine:           Physical Exam:  Vitals: /88   Pulse 100   Wt 109.9 kg (242 lb 3.2 oz)   LMP  (LMP Unknown)   SpO2 100%   BMI 41.57 kg/m    Eyes: No icterus.  Pulmonary: Chest symmetric, l mild basal crackles, wheezes or rales.  Cardiovascular: Irregular rhythm, normal S1 and S2, no murmur, JVP normal.  Musculoskeletal: Edema of the lower extremities: Trace  bilateral  Neurologic: Oriented and appropriate without obvious focal deficits.   Psychiatric: Normal affect.     Recent Lab Results:  LIPID RESULTS:  Lab Results   Component Value Date    CHOL 122 10/27/2022    CHOL 175 11/03/2020    HDL 48 (L) 10/27/2022    HDL 71 11/03/2020    LDL 49 10/27/2022    LDL 66 11/03/2020    TRIG 123 10/27/2022    TRIG 188 (H) 11/03/2020    CHOLHDLRATIO 3.0 05/15/2015       LIVER ENZYME RESULTS:  Lab Results   Component Value Date    AST 20 10/05/2022    AST 12 11/18/2020    ALT 17 10/05/2022    ALT 15 11/18/2020       CBC RESULTS:  Lab Results   Component Value Date    WBC 4.0 10/12/2022    WBC 5.1 11/18/2020    RBC 3.92 10/12/2022    RBC 4.52 11/18/2020    HGB 11.7 10/12/2022    HGB 13.4 11/18/2020    HCT 36.0 10/12/2022    HCT 41.8 11/18/2020    MCV 92 10/12/2022    MCV 93 11/18/2020    MCH 29.8 10/12/2022    MCH 29.6 11/18/2020    MCHC 32.5 10/12/2022    MCHC 32.1 11/18/2020    RDW 13.1 10/12/2022    RDW 13.2 11/18/2020     10/12/2022     11/18/2020       BMP RESULTS:  Lab Results   Component Value Date     10/05/2022     11/18/2020    POTASSIUM 4.7 10/05/2022    POTASSIUM 4.3 03/23/2022    POTASSIUM 3.6 11/18/2020    CHLORIDE 103 10/05/2022    CHLORIDE 108 03/23/2022    CHLORIDE 109 11/18/2020    CO2 23 10/05/2022    CO2 26 03/23/2022    CO2 28 11/18/2020    ANIONGAP 11 10/05/2022    ANIONGAP 5 03/23/2022    ANIONGAP 4 11/18/2020     (H) 10/05/2022     (H) 03/23/2022     (H) 11/18/2020    BUN 21.2 10/05/2022    BUN 20 03/23/2022    BUN 16 11/18/2020    CR 1.05 (H) 10/05/2022    CR 0.90 11/18/2020    GFRESTIMATED 51 (L) 10/05/2022    GFRESTIMATED 58 (L) 11/04/2021    GFRESTIMATED 58 (L) 11/18/2020    GFRESTBLACK 68 11/18/2020    FEDERICO 9.0 10/05/2022    FEDERICO 8.9 11/18/2020        A1C RESULTS:  Lab Results   Component Value Date    A1C 7.2 (H) 10/27/2022    A1C 6.9 (H) 11/03/2020       INR RESULTS:  No results found for: INR    CC  Marisabel  ALEX Vera CNP  [address] [place] [address]    All medical records were reviewed in detail and discussed with the patient. Greater than 30 mins were spent with the patient, 50% of this time was spent on counseling and coordination of care.  After visit summary was printed and given to the patient.

## 2022-12-12 ENCOUNTER — OFFICE VISIT (OUTPATIENT)
Dept: CARDIOLOGY | Facility: CLINIC | Age: 87
End: 2022-12-12
Attending: INTERNAL MEDICINE
Payer: COMMERCIAL

## 2022-12-12 VITALS
BODY MASS INDEX: 38.07 KG/M2 | OXYGEN SATURATION: 99 % | HEART RATE: 66 BPM | SYSTOLIC BLOOD PRESSURE: 126 MMHG | DIASTOLIC BLOOD PRESSURE: 78 MMHG | WEIGHT: 221.8 LBS

## 2022-12-12 DIAGNOSIS — I48.91 NEW ONSET ATRIAL FIBRILLATION (H): ICD-10-CM

## 2022-12-12 PROCEDURE — 99215 OFFICE O/P EST HI 40 MIN: CPT | Performed by: INTERNAL MEDICINE

## 2022-12-12 RX ORDER — AMIODARONE HYDROCHLORIDE 200 MG/1
200 TABLET ORAL DAILY
Qty: 90 TABLET | Refills: 3 | Status: SHIPPED | OUTPATIENT
Start: 2022-12-12 | End: 2023-01-18

## 2022-12-12 NOTE — NURSING NOTE
Chief Complaint   Patient presents with     Atrial Fib     1 month follow up Afib,AT lov 11/29/2022 stopped metoprolol and recommended  to take Torsemide PRN here to discus       Vitals:    12/12/22 1300   BP: (!) 143/96   BP Location: Right arm   Patient Position: Sitting   Cuff Size: Adult Regular   Pulse: 66   SpO2: 99%   Weight: 100.6 kg (221 lb 12.8 oz)     Wt Readings from Last 1 Encounters:   12/12/22 100.6 kg (221 lb 12.8 oz)       Arianne Rosales MA

## 2022-12-12 NOTE — LETTER
12/12/2022    Yecenia Cardona MD  [address] [place] [id number]    RE: [employer]       Dear Colleague,     I had the pleasure of seeing Theodora Meng in the [facility].  HPI and Plan:   Today I had the pleasure of seeing Theodora Meng at [facility]. She is a pleasant 87 year old patient with a past medical history of hypertension, hyperlipidemia and type 2 diabetes who presents to the clinic for a 1 month follow-up.  I previously seen her on 11/9/2022 for complaints of shortness of breath, generalized fatigue and weight gain.  This was after she was started on metoprolol and Xarelto after going to the emergency department for atrial fibrillation.    Since she was feeling extremely fatigued I discontinued metoprolol and started her on diltiazem.  I heard inspiratory crackles and thought she was in pulmonary edema and hence recommended starting torsemide.      Today, the patient presents with her daughter and tells me that since starting diltiazem she has been losing weight very rapidly.  She has lost over 20 pounds in a matter of 4 weeks.  This is 8 pounds more than the weight she had gained initially.  Since she started losing weight after starting diltiazem she did not think it was necessary to take torsemide.  She felt well for 2 weeks after making the switch but over the last 2 weeks she has felt back to feeling fatigued and tired.  She is also unable to walk as much as she was able to write after switching from metoprolol.  She had rhythm monitor placed at the time of ER visit and it shows 100% burden of atrial fibrillation.  Transthoracic echocardiogram on 10/2022 had shown normal ejection fraction with mild to moderate tricuspid regurgitation and dilated IVC. Also reports suffering from severe GERD which makes her extremely sick. She had a Gi workup which was negative as per patients report.     Assessment and plan:  1.  Symptomatic  Atrial fibrillation- on anticoagulation  2.  Severe symptomatic GERD  3.  Essential hypertension  4.  Hyperlipidemia  5.  Type 2 diabetes  6.  Severe coronary calcification on CT chest    During last clinic visit I have switched Ms. Meng to her diltiazem from metoprolol.  This immediately had a very favorable response and she felt more energetic and also started to lose a lot of weight.  However, unfortunately, 2 weeks down the road she started feeling weak, fatigued and short of breath on exertion again.  There are 2 possibilities.  First, that all his symptoms were secondary to metoprolol and discontinuation of metoprolol made her feel better.  If that were the case I wonder why she started feeling bad 2 weeks down the road.  Second, she converted to sinus rhythm after switching to diltiazem, stayed in sinus rhythm for 2 weeks and then went back into atrial fibrillation.  It is very difficult to say what actually happened.  My suggestion at this time would be to perform cardioversion to exclude A. fib as a cause of her symptoms.  She has been on anticoagulation and has not missed any doses for the last 2 months.  Hence, TARAN is not needed.  I did discuss the risks and benefits of of cardioversion including small chance of stroke and obtained their verbal consent.  Since I heard basal crackles on physical examination today I have recommended that she starts taking torsemide that I prescribed at last visit.  I have told her to not worry too much about weight loss after starting torsemide and this is normal.  I will repeat BMP in 2 weeks after starting torsemide and see her back in a month from today for close monitoring.  I will also start her on amiodarone today. I also discussed the fact that GERD can sometimes mimic CAD and hence if all the above interventions fail to provide relief we may have to consider doing stress MRI to r/o obstructive CAD.     Thank you for allowing me to participate in the care of  Theodora Meng    This note was completed in part using Dragon voice recognition software. Although reviewed after completion, some word and grammatical errors may occur.    George Starr MD  Cardiology    Orders Placed This Encounter   Procedures     Basic metabolic panel     Follow-Up with Cardiology     Cardioversion External       Orders Placed This Encounter   Medications     amiodarone (PACERONE) 200 MG tablet     Sig: Take 1 tablet (200 mg) by mouth daily     Dispense:  90 tablet     Refill:  3     Take 200 mg bid for 1 week and then 200 mg daily.       There are no discontinued medications.    Encounter Diagnosis   Name Primary?     New onset atrial fibrillation (H)        CURRENT MEDICATIONS:  Current Outpatient Medications   Medication Sig Dispense Refill     acetaminophen (TYLENOL) 500 MG tablet Take 1,000 mg by mouth 3 times daily  mg by mouth every 4 hours as needed       amiodarone (PACERONE) 200 MG tablet Take 1 tablet (200 mg) by mouth daily 90 tablet 3     atorvastatin (LIPITOR) 10 MG tablet TAKE 1 TABLET(10 MG) BY MOUTH DAILY 90 tablet 0     diltiazem ER (DILT-XR) 180 MG 24 hr capsule Take 1 capsule (180 mg) by mouth daily 90 capsule 3     esomeprazole (NEXIUM) 40 MG DR capsule TAKE 1 CAPSULE EVERY       MORNING BEFORE BREAKFAST 90 capsule 3     gabapentin (NEURONTIN) 300 MG capsule TAKE 2 CAPSULES IN THE EVENING 180 capsule 3     lisinopril (ZESTRIL) 5 MG tablet TAKE 1 TABLET BY MOUTH EVERY DAY 90 tablet 2     LORazepam (ATIVAN) 0.5 MG tablet TAKE 1/2 to 1 TAB BY MOUTH EVERY 8 HOURS AS NEEDED FOR ANXIETY 10 tablet 0     rivaroxaban ANTICOAGULANT (XARELTO) 20 MG TABS tablet Take 1 tablet (20 mg) by mouth daily (with dinner) 90 tablet 1     torsemide (DEMADEX) 10 MG tablet Take 1 tablet (10 mg) by mouth daily 30 tablet 1     blood glucose monitoring (ACCU-CHEK COMPACT CARE KIT) meter device kit Use to test blood sugars 1 times daily or as directed. May sub formulary meter (Patient not  taking: Reported on 12/12/2022) 1 kit 0     blood glucose monitoring (NO BRAND SPECIFIED) test strip Use to test blood sugars twice times daily or as directed (Patient not taking: Reported on 12/12/2022) 1 Box 3     famotidine (PEPCID) 20 MG tablet Take 1 tablet (20 mg) by mouth At Bedtime (Patient not taking: Reported on 12/12/2022) 60 tablet 1     sucralfate (CARAFATE) 1 GM tablet TAKE 1 TABLET(1 GRAM) BY MOUTH FOUR TIMES DAILY (Patient not taking: Reported on 11/9/2022) 360 tablet 3       ALLERGIES     Allergies   Allergen Reactions     Zoloft [Sertraline] Palpitations, Diarrhea and Cramps       PAST MEDICAL HISTORY:  Past Medical History:   Diagnosis Date     Depressive disorder      Diabetes (H)      Esophageal reflux      Other abnormal glucose      Other premature beats      Unspecified essential hypertension        PAST SURGICAL HISTORY:  Past Surgical History:   Procedure Laterality Date     APPENDECTOMY       ARTHROPLASTY KNEE  10/30/2013    Procedure: ARTHROPLASTY KNEE;  Right Total Knee Arthroplasty;  Surgeon: Ousmane Mcgowan MD;  Location: [facility]     ARTHROPLASTY KNEE Left 4/3/2019    Procedure: Left Total Knee Arthroplasty;  Surgeon: Ousmane Mcgowan MD;  Location: [facility]     CHOLECYSTECTOMY, LAPOROSCOPIC      Cholecystectomy, Laparoscopic     DILATION AND CURETTAGE, OPERATIVE HYSTEROSCOPY WITH MORCELLATOR, COMBINED N/A 6/20/2016    Procedure: COMBINED DILATION AND CURETTAGE, OPERATIVE HYSTEROSCOPY WITH MORCELLATOR;  Surgeon: Bony Arredondo MD;  Location: [facility]     ESOPHAGOSCOPY, GASTROSCOPY, DUODENOSCOPY (EGD), COMBINED N/A 2/17/2016    Procedure: COMBINED ESOPHAGOSCOPY, GASTROSCOPY, DUODENOSCOPY (EGD), BIOPSY SINGLE OR MULTIPLE;  Surgeon: Mil Guevara MD;  Location[facility]     SALPINGO OOPHORECTOMY,R/L/OZZY      Salpingo Oophorectomy, RT/LT/OZZY     TONSILLECTOMY         FAMILY HISTORY:  Family History   Problem Relation Age of Onset     Heart Disease Mother      Heart Disease  Father      Heart Disease Brother      Heart Disease Brother      Heart Disease Brother        SOCIAL HISTORY:  Social History     Socioeconomic History     Marital status:      Spouse name: None     Number of children: None     Years of education: None     Highest education level: None   Tobacco Use     Smoking status: Never     Smokeless tobacco: Never   Vaping Use     Vaping Use: Never used   Substance and Sexual Activity     Alcohol use: No     Drug use: No     Sexual activity: Never       Review of Systems:  Skin:          Eyes:         ENT:         Respiratory:  Negative dyspnea on exertion;dyspnea at rest;wheezing;sleep apnea;CPAP Occ coughing:Shortness   Cardiovascular:  syncope or near-syncope;Negative Positive for;palpitations;chest pain;edema;dizziness;lightheadedness;fatigue    Gastroenterology:        Genitourinary:         Musculoskeletal:         Neurologic:         Psychiatric:         Heme/Lymph/Imm:         Endocrine:           Physical Exam:  Vitals: /78   Pulse 66   Wt 100.6 kg (221 lb 12.8 oz)   LMP  (LMP Unknown)   SpO2 99%   BMI 38.07 kg/m    Eyes: No icterus.  Pulmonary: Chest symmetric, mild basal crackles, wheezes or rales.  Cardiovascular: Irregular rhythm, normal S1 and S2, systolic murmur, JVP normal.  Musculoskeletal: Edema of the lower extremities: Trace bilateral  Neurologic: Oriented and appropriate without obvious focal deficits.   Psychiatric: Normal affect.     Recent Lab Results:  LIPID RESULTS:  Lab Results   Component Value Date    CHOL 122 10/27/2022    CHOL 175 11/03/2020    HDL 48 (L) 10/27/2022    HDL 71 11/03/2020    LDL 49 10/27/2022    LDL 66 11/03/2020    TRIG 123 10/27/2022    TRIG 188 (H) 11/03/2020    CHOLHDLRATIO 3.0 05/15/2015       LIVER ENZYME RESULTS:  Lab Results   Component Value Date    AST 20 10/05/2022    AST 12 11/18/2020    ALT 17 10/05/2022    ALT 15 11/18/2020       CBC RESULTS:  Lab Results   Component Value Date    WBC 4.0 10/12/2022     WBC 5.1 11/18/2020    RBC 3.92 10/12/2022    RBC 4.52 11/18/2020    HGB 11.7 10/12/2022    HGB 13.4 11/18/2020    HCT 36.0 10/12/2022    HCT 41.8 11/18/2020    MCV 92 10/12/2022    MCV 93 11/18/2020    MCH 29.8 10/12/2022    MCH 29.6 11/18/2020    MCHC 32.5 10/12/2022    MCHC 32.1 11/18/2020    RDW 13.1 10/12/2022    RDW 13.2 11/18/2020     10/12/2022     11/18/2020       BMP RESULTS:  Lab Results   Component Value Date     10/05/2022     11/18/2020    POTASSIUM 4.7 10/05/2022    POTASSIUM 4.3 03/23/2022    POTASSIUM 3.6 11/18/2020    CHLORIDE 103 10/05/2022    CHLORIDE 108 03/23/2022    CHLORIDE 109 11/18/2020    CO2 23 10/05/2022    CO2 26 03/23/2022    CO2 28 11/18/2020    ANIONGAP 11 10/05/2022    ANIONGAP 5 03/23/2022    ANIONGAP 4 11/18/2020     (H) 10/05/2022     (H) 03/23/2022     (H) 11/18/2020    BUN 21.2 10/05/2022    BUN 20 03/23/2022    BUN 16 11/18/2020    CR 1.05 (H) 10/05/2022    CR 0.90 11/18/2020    GFRESTIMATED 51 (L) 10/05/2022    GFRESTIMATED 58 (L) 11/04/2021    GFRESTIMATED 58 (L) 11/18/2020    GFRESTBLACK 68 11/18/2020    FEDERICO 9.0 10/05/2022    FEDERICO 8.9 11/18/2020        A1C RESULTS:  Lab Results   Component Value Date    A1C 7.2 (H) 10/27/2022    A1C 6.9 (H) 11/03/2020       INR RESULTS:  No results found for: INR    CC  ALEX Castano [facility]  [address] [place] [address]    All medical records were reviewed in detail and discussed with the patient. Greater than 30 mins were spent with the patient, 50% of this time was spent on counseling and coordination of care.  After visit summary was printed and given to the patient.    Thank you for allowing me to participate in the care of your patient.      Sincerely,     George Starr MD     [facility]  cc:   George Starr MD  [address]  [place]  [place] [address]

## 2022-12-12 NOTE — PATIENT INSTRUCTIONS
"1.    COVID swab to be done at home 1-2 days prior to procedure, only if experiencing symptoms  2.    Take your temperature the morning of the procedure. If it is >100 call the [facility] at [phone]  3.    Cardioversion to be done at [facility] on 12/29/22 . Please arrive at 8:30am. If you need to contact [facility] for any reason, please call [phone] option #2.  4.    Follow up for an EKG 1 week after cardioversion    Please call the cardiology nurse line at [phone] for any questions or concerns  Uzma RN; Janeen RN; Pita RN      ELECTIVE CARDIOVERSION  [facility]  Your procedure will be done at [facility]. Please park in the\"Skyway Ramp\", walk skyway over to hospital, go down one floor and register at the \"[employer] Desk\"   Or \" Parking\" is located by [employer] [facility] entrance.    Please follow the instructions below:  1. In preparation for your cardioversion you will need to be anticoagulated.    If you are taking a \"Novel Anticoagulant\" (Xarelto), please continue to take this medication daily as directed. You will continue to take this medication after your procedure.  2. The morning of your cardioversion we require that you do the following:  NOTHING to eat or drink after midnight the night before your procedure.  Take your medications immediately upon arising with a small sip of water.  Hold your diuretic medication (torsemide) until you return home from your procedure.  3. You will be unable to drive or make important legal decisions for 24 hours after your procedure. You will need a  home and a responsible adult to stay with you for 24 hours post procedure. Your appointment may be cancelled if you do not have a  or responsible adult to stay with you.  4. If you have any questions please contact your cardiology RN at [phone], [facility] Diagnostics Department, or [facility] at " [phone], Option #2.

## 2022-12-12 NOTE — PROGRESS NOTES
HPI and Plan:   Today I had the pleasure of seeing Theodora Meng at [facility]. She is a pleasant 87 year old patient with a past medical history of hypertension, hyperlipidemia and type 2 diabetes who presents to the clinic for a 1 month follow-up.  I previously seen her on 11/9/2022 for complaints of shortness of breath, generalized fatigue and weight gain.  This was after she was started on metoprolol and Xarelto after going to the emergency department for atrial fibrillation.    Since she was feeling extremely fatigued I discontinued metoprolol and started her on diltiazem.  I heard inspiratory crackles and thought she was in pulmonary edema and hence recommended starting torsemide.      Today, the patient presents with her daughter and tells me that since starting diltiazem she has been losing weight very rapidly.  She has lost over 20 pounds in a matter of 4 weeks.  This is 8 pounds more than the weight she had gained initially.  Since she started losing weight after starting diltiazem she did not think it was necessary to take torsemide.  She felt well for 2 weeks after making the switch but over the last 2 weeks she has felt back to feeling fatigued and tired.  She is also unable to walk as much as she was able to write after switching from metoprolol.  She had rhythm monitor placed at the time of ER visit and it shows 100% burden of atrial fibrillation.  Transthoracic echocardiogram on 10/2022 had shown normal ejection fraction with mild to moderate tricuspid regurgitation and dilated IVC. Also reports suffering from severe GERD which makes her extremely sick. She had a Gi workup which was negative as per patients report.     Assessment and plan:  1.  Symptomatic Atrial fibrillation- on anticoagulation  2.  Severe symptomatic GERD  3.  Essential hypertension  4.  Hyperlipidemia  5.  Type 2 diabetes  6.  Severe coronary calcification on CT chest    During last clinic visit I  have switched Ms. Meng to her diltiazem from metoprolol.  This immediately had a very favorable response and she felt more energetic and also started to lose a lot of weight.  However, unfortunately, 2 weeks down the road she started feeling weak, fatigued and short of breath on exertion again.  There are 2 possibilities.  First, that all his symptoms were secondary to metoprolol and discontinuation of metoprolol made her feel better.  If that were the case I wonder why she started feeling bad 2 weeks down the road.  Second, she converted to sinus rhythm after switching to diltiazem, stayed in sinus rhythm for 2 weeks and then went back into atrial fibrillation.  It is very difficult to say what actually happened.  My suggestion at this time would be to perform cardioversion to exclude A. fib as a cause of her symptoms.  She has been on anticoagulation and has not missed any doses for the last 2 months.  Hence, TARAN is not needed.  I did discuss the risks and benefits of of cardioversion including small chance of stroke and obtained their verbal consent.  Since I heard basal crackles on physical examination today I have recommended that she starts taking torsemide that I prescribed at last visit.  I have told her to not worry too much about weight loss after starting torsemide and this is normal.  I will repeat BMP in 2 weeks after starting torsemide and see her back in a month from today for close monitoring.  I will also start her on amiodarone today. I also discussed the fact that GERD can sometimes mimic CAD and hence if all the above interventions fail to provide relief we may have to consider doing stress MRI to r/o obstructive CAD.     Thank you for allowing me to participate in the care of Theodora Meng    This note was completed in part using Dragon voice recognition software. Although reviewed after completion, some word and grammatical errors may occur.    George Starr MD  Cardiology    Orders  Placed This Encounter   Procedures     Basic metabolic panel     Follow-Up with Cardiology     Cardioversion External       Orders Placed This Encounter   Medications     amiodarone (PACERONE) 200 MG tablet     Sig: Take 1 tablet (200 mg) by mouth daily     Dispense:  90 tablet     Refill:  3     Take 200 mg bid for 1 week and then 200 mg daily.       There are no discontinued medications.    Encounter Diagnosis   Name Primary?     New onset atrial fibrillation (H)        CURRENT MEDICATIONS:  Current Outpatient Medications   Medication Sig Dispense Refill     acetaminophen (TYLENOL) 500 MG tablet Take 1,000 mg by mouth 3 times daily  mg by mouth every 4 hours as needed       amiodarone (PACERONE) 200 MG tablet Take 1 tablet (200 mg) by mouth daily 90 tablet 3     atorvastatin (LIPITOR) 10 MG tablet TAKE 1 TABLET(10 MG) BY MOUTH DAILY 90 tablet 0     diltiazem ER (DILT-XR) 180 MG 24 hr capsule Take 1 capsule (180 mg) by mouth daily 90 capsule 3     esomeprazole (NEXIUM) 40 MG DR capsule TAKE 1 CAPSULE EVERY       MORNING BEFORE BREAKFAST 90 capsule 3     gabapentin (NEURONTIN) 300 MG capsule TAKE 2 CAPSULES IN THE EVENING 180 capsule 3     lisinopril (ZESTRIL) 5 MG tablet TAKE 1 TABLET BY MOUTH EVERY DAY 90 tablet 2     LORazepam (ATIVAN) 0.5 MG tablet TAKE 1/2 to 1 TAB BY MOUTH EVERY 8 HOURS AS NEEDED FOR ANXIETY 10 tablet 0     rivaroxaban ANTICOAGULANT (XARELTO) 20 MG TABS tablet Take 1 tablet (20 mg) by mouth daily (with dinner) 90 tablet 1     torsemide (DEMADEX) 10 MG tablet Take 1 tablet (10 mg) by mouth daily 30 tablet 1     blood glucose monitoring (ACCU-CHEK COMPACT CARE KIT) meter device kit Use to test blood sugars 1 times daily or as directed. May sub formulary meter (Patient not taking: Reported on 12/12/2022) 1 kit 0     blood glucose monitoring (NO BRAND SPECIFIED) test strip Use to test blood sugars twice times daily or as directed (Patient not taking: Reported on 12/12/2022) 1 Box 3      famotidine (PEPCID) 20 MG tablet Take 1 tablet (20 mg) by mouth At Bedtime (Patient not taking: Reported on 12/12/2022) 60 tablet 1     sucralfate (CARAFATE) 1 GM tablet TAKE 1 TABLET(1 GRAM) BY MOUTH FOUR TIMES DAILY (Patient not taking: Reported on 11/9/2022) 360 tablet 3       ALLERGIES     Allergies   Allergen Reactions     Zoloft [Sertraline] Palpitations, Diarrhea and Cramps       PAST MEDICAL HISTORY:  Past Medical History:   Diagnosis Date     Depressive disorder      Diabetes (H)      Esophageal reflux      Other abnormal glucose      Other premature beats      Unspecified essential hypertension        PAST SURGICAL HISTORY:  Past Surgical History:   Procedure Laterality Date     APPENDECTOMY       ARTHROPLASTY KNEE  10/30/2013    Procedure: ARTHROPLASTY KNEE;  Right Total Knee Arthroplasty;  Surgeon: Ousmane Mcgowan MD;  Location: [facility]     ARTHROPLASTY KNEE Left 4/3/2019    Procedure: Left Total Knee Arthroplasty;  Surgeon: Ousmane Mcgowan MD;  Location: [facility]     CHOLECYSTECTOMY, LAPOROSCOPIC      Cholecystectomy, Laparoscopic     DILATION AND CURETTAGE, OPERATIVE HYSTEROSCOPY WITH MORCELLATOR, COMBINED N/A 6/20/2016    Procedure: COMBINED DILATION AND CURETTAGE, OPERATIVE HYSTEROSCOPY WITH MORCELLATOR;  Surgeon: Bony Arredondo MD;  Location: [facility]     ESOPHAGOSCOPY, GASTROSCOPY, DUODENOSCOPY (EGD), COMBINED N/A 2/17/2016    Procedure: COMBINED ESOPHAGOSCOPY, GASTROSCOPY, DUODENOSCOPY (EGD), BIOPSY SINGLE OR MULTIPLE;  Surgeon: Mil Guevara MD;  Location: [facility] GI     SALPINGO OOPHORECTOMY,R/L/OZZY      Salpingo Oophorectomy, RT/LT/OZZY     TONSILLECTOMY         FAMILY HISTORY:  Family History   Problem Relation Age of Onset     Heart Disease Mother      Heart Disease Father      Heart Disease Brother      Heart Disease Brother      Heart Disease Brother        SOCIAL HISTORY:  Social History     Socioeconomic History     Marital status:      Spouse name: None     Number of  children: None     Years of education: None     Highest education level: None   Tobacco Use     Smoking status: Never     Smokeless tobacco: Never   Vaping Use     Vaping Use: Never used   Substance and Sexual Activity     Alcohol use: No     Drug use: No     Sexual activity: Never       Review of Systems:  Skin:          Eyes:         ENT:         Respiratory:  Negative dyspnea on exertion;dyspnea at rest;wheezing;sleep apnea;CPAP Occ coughing:Shortness   Cardiovascular:  syncope or near-syncope;Negative Positive for;palpitations;chest pain;edema;dizziness;lightheadedness;fatigue    Gastroenterology:        Genitourinary:         Musculoskeletal:         Neurologic:         Psychiatric:         Heme/Lymph/Imm:         Endocrine:           Physical Exam:  Vitals: /78   Pulse 66   Wt 100.6 kg (221 lb 12.8 oz)   LMP  (LMP Unknown)   SpO2 99%   BMI 38.07 kg/m    Eyes: No icterus.  Pulmonary: Chest symmetric, mild basal crackles, wheezes or rales.  Cardiovascular: Irregular rhythm, normal S1 and S2, systolic murmur, JVP normal.  Musculoskeletal: Edema of the lower extremities: Trace bilateral  Neurologic: Oriented and appropriate without obvious focal deficits.   Psychiatric: Normal affect.     Recent Lab Results:  LIPID RESULTS:  Lab Results   Component Value Date    CHOL 122 10/27/2022    CHOL 175 11/03/2020    HDL 48 (L) 10/27/2022    HDL 71 11/03/2020    LDL 49 10/27/2022    LDL 66 11/03/2020    TRIG 123 10/27/2022    TRIG 188 (H) 11/03/2020    CHOLHDLRATIO 3.0 05/15/2015       LIVER ENZYME RESULTS:  Lab Results   Component Value Date    AST 20 10/05/2022    AST 12 11/18/2020    ALT 17 10/05/2022    ALT 15 11/18/2020       CBC RESULTS:  Lab Results   Component Value Date    WBC 4.0 10/12/2022    WBC 5.1 11/18/2020    RBC 3.92 10/12/2022    RBC 4.52 11/18/2020    HGB 11.7 10/12/2022    HGB 13.4 11/18/2020    HCT 36.0 10/12/2022    HCT 41.8 11/18/2020    MCV 92 10/12/2022    MCV 93 11/18/2020    MCH 29.8  10/12/2022    MCH 29.6 11/18/2020    MCHC 32.5 10/12/2022    MCHC 32.1 11/18/2020    RDW 13.1 10/12/2022    RDW 13.2 11/18/2020     10/12/2022     11/18/2020       BMP RESULTS:  Lab Results   Component Value Date     10/05/2022     11/18/2020    POTASSIUM 4.7 10/05/2022    POTASSIUM 4.3 03/23/2022    POTASSIUM 3.6 11/18/2020    CHLORIDE 103 10/05/2022    CHLORIDE 108 03/23/2022    CHLORIDE 109 11/18/2020    CO2 23 10/05/2022    CO2 26 03/23/2022    CO2 28 11/18/2020    ANIONGAP 11 10/05/2022    ANIONGAP 5 03/23/2022    ANIONGAP 4 11/18/2020     (H) 10/05/2022     (H) 03/23/2022     (H) 11/18/2020    BUN 21.2 10/05/2022    BUN 20 03/23/2022    BUN 16 11/18/2020    CR 1.05 (H) 10/05/2022    CR 0.90 11/18/2020    GFRESTIMATED 51 (L) 10/05/2022    GFRESTIMATED 58 (L) 11/04/2021    GFRESTIMATED 58 (L) 11/18/2020    GFRESTBLACK 68 11/18/2020    FEDERICO 9.0 10/05/2022    FEDERICO 8.9 11/18/2020        A1C RESULTS:  Lab Results   Component Value Date    A1C 7.2 (H) 10/27/2022    A1C 6.9 (H) 11/03/2020       INR RESULTS:  No results found for: INR    CC  ALEX Castano CNP  [address] [place] [address]    All medical records were reviewed in detail and discussed with the patient. Greater than 30 mins were spent with the patient, 50% of this time was spent on counseling and coordination of care.  After visit summary was printed and given to the patient.

## 2022-12-13 ENCOUNTER — TELEPHONE (OUTPATIENT)
Dept: CARDIOLOGY | Facility: CLINIC | Age: 87
End: 2022-12-13

## 2022-12-13 NOTE — CONFIDENTIAL NOTE
"Routing to Dr. Starr,     Patients daughter called to discuss the concerns related to the label warnings that came on the bottle of Amiodarone. A 20 minute conversation was given to help remedy those fears and talked through the concerns and why you prescribed it due to your last OV yesterday 12/12.     These concerns mainly stemmed from the warning label that stated to \"not take this medication unless it was started while inpatient\" as well as \"only take this medication for severe rhythm issues\". As she does not classify this as severe.     Communicated to patient that you looked through her chart and it was your specialty and that it was safe from your standpoint to take the medication otherwise you wouldn't have prescribed it.     2 main concerns were stated to communicate to you.     1) She states that she wants the specific directions from you rather than RN staff that it is ok to take both Diltazem and Amiodarone together though it was ok to take on the office visit note.     2) She was wondering that as long as her mother feels fine, if they could not take the Amiodarone and wait until the cardioversion at the end of the month since she thinks patients symptoms are due to a GERD issue rather than rhythm issue.     Please advise.    Jorge Ambrosio RN    "

## 2022-12-13 NOTE — TELEPHONE ENCOUNTER
[facility] Call Center    Phone Message    May a detailed message be left on voicemail: yes     Reason for Call: Other:     Emmy is requesting a call back to discuss new medication amiodarone (PACERONE) 200 MG tablet.  Theodora and her daughter have several concerns with the side effects of this medication and would like to discuss them prior to starting.    Action Taken: Other: cardio    Travel Screening: Not Applicable     Thank you!  [facility]

## 2022-12-14 NOTE — CONFIDENTIAL NOTE
Dr. Starr response,    I had this discussion and explained the rationale behind prescribing Amio for over 15 mins. I am worried that in absence of Amio she may go back to A fib soon after. But if she doesn't want to take it that's fine. Lets not try to convince her one way or the other. Just give her the facts and let them decide. Also, if they believe all her symptoms are due to GERD they should see a GI. Endoscopy in 2016 showed normal esophagus. She has severe calcification on CT and if none of the above helps I would also like to rule out CAD as a cause of her symptoms. I discussed this as well.      Communicated this via phone call with daughter as well. She will call the GI clinic to make an appointment. They are currently trying to decide whether to take the amiodarone or not but will update the cardiology clinic once she has decided.    Jorge Ambrosio RN

## 2022-12-22 DIAGNOSIS — I48.91 ATRIAL FIBRILLATION, UNSPECIFIED TYPE (H): Primary | ICD-10-CM

## 2022-12-25 ENCOUNTER — APPOINTMENT (OUTPATIENT)
Dept: CT IMAGING | Facility: CLINIC | Age: 87
End: 2022-12-25
Attending: FAMILY MEDICINE
Payer: COMMERCIAL

## 2022-12-25 ENCOUNTER — HOSPITAL ENCOUNTER (EMERGENCY)
Facility: CLINIC | Age: 87
Discharge: HOME | End: 2022-12-25
Attending: FAMILY MEDICINE | Admitting: FAMILY MEDICINE
Payer: COMMERCIAL

## 2022-12-25 ENCOUNTER — APPOINTMENT (OUTPATIENT)
Dept: GENERAL RADIOLOGY | Facility: CLINIC | Age: 87
End: 2022-12-25
Attending: EMERGENCY MEDICINE
Payer: COMMERCIAL

## 2022-12-25 VITALS
WEIGHT: 217 LBS | DIASTOLIC BLOOD PRESSURE: 72 MMHG | BODY MASS INDEX: 37.05 KG/M2 | SYSTOLIC BLOOD PRESSURE: 119 MMHG | HEIGHT: 64 IN | RESPIRATION RATE: 18 BRPM | OXYGEN SATURATION: 94 % | TEMPERATURE: 98.6 F | HEART RATE: 121 BPM

## 2022-12-25 DIAGNOSIS — K21.9 GASTROESOPHAGEAL REFLUX DISEASE, UNSPECIFIED WHETHER ESOPHAGITIS PRESENT: ICD-10-CM

## 2022-12-25 DIAGNOSIS — I48.91 ATRIAL FIBRILLATION WITH RVR (H): ICD-10-CM

## 2022-12-25 DIAGNOSIS — R07.89 CHEST WALL PAIN: ICD-10-CM

## 2022-12-25 LAB
ANION GAP SERPL CALCULATED.3IONS-SCNC: 7 MMOL/L (ref 7–15)
BASOPHILS # BLD AUTO: 0 10E3/UL (ref 0–0.2)
BASOPHILS NFR BLD AUTO: 1 %
BUN SERPL-MCNC: 22.7 MG/DL (ref 8–23)
CALCIUM SERPL-MCNC: 8.7 MG/DL (ref 8.8–10.2)
CHLORIDE SERPL-SCNC: 104 MMOL/L (ref 98–107)
CREAT SERPL-MCNC: 0.9 MG/DL (ref 0.51–0.95)
D DIMER PPP FEU-MCNC: 1.04 UG/ML FEU (ref 0–0.5)
DEPRECATED HCO3 PLAS-SCNC: 25 MMOL/L (ref 22–29)
EOSINOPHIL # BLD AUTO: 0.1 10E3/UL (ref 0–0.7)
EOSINOPHIL NFR BLD AUTO: 2 %
ERYTHROCYTE [DISTWIDTH] IN BLOOD BY AUTOMATED COUNT: 14.6 % (ref 10–15)
GFR SERPL CREATININE-BSD FRML MDRD: 62 ML/MIN/1.73M2
GLUCOSE SERPL-MCNC: 151 MG/DL (ref 70–99)
HCT VFR BLD AUTO: 34.9 % (ref 35–47)
HGB BLD-MCNC: 11.3 G/DL (ref 11.7–15.7)
IMM GRANULOCYTES # BLD: 0 10E3/UL
IMM GRANULOCYTES NFR BLD: 1 %
LYMPHOCYTES # BLD AUTO: 1.7 10E3/UL (ref 0.8–5.3)
LYMPHOCYTES NFR BLD AUTO: 23 %
MCH RBC QN AUTO: 28.2 PG (ref 26.5–33)
MCHC RBC AUTO-ENTMCNC: 32.4 G/DL (ref 31.5–36.5)
MCV RBC AUTO: 87 FL (ref 78–100)
MONOCYTES # BLD AUTO: 0.3 10E3/UL (ref 0–1.3)
MONOCYTES NFR BLD AUTO: 5 %
NEUTROPHILS # BLD AUTO: 5 10E3/UL (ref 1.6–8.3)
NEUTROPHILS NFR BLD AUTO: 68 %
NRBC # BLD AUTO: 0 10E3/UL
NRBC BLD AUTO-RTO: 0 /100
NT-PROBNP SERPL-MCNC: 1271 PG/ML (ref 0–1800)
PLATELET # BLD AUTO: 219 10E3/UL (ref 150–450)
POTASSIUM SERPL-SCNC: 4.4 MMOL/L (ref 3.4–5.3)
POTASSIUM SERPL-SCNC: 5.5 MMOL/L (ref 3.4–5.3)
RBC # BLD AUTO: 4.01 10E6/UL (ref 3.8–5.2)
SODIUM SERPL-SCNC: 136 MMOL/L (ref 136–145)
TROPONIN T SERPL HS-MCNC: 15 NG/L
TROPONIN T SERPL HS-MCNC: 15 NG/L
TROPONIN T SERPL HS-MCNC: 20 NG/L
WBC # BLD AUTO: 7.2 10E3/UL (ref 4–11)

## 2022-12-25 PROCEDURE — 250N000011 HC RX IP 250 OP 636: Performed by: FAMILY MEDICINE

## 2022-12-25 PROCEDURE — 96361 HYDRATE IV INFUSION ADD-ON: CPT | Performed by: FAMILY MEDICINE

## 2022-12-25 PROCEDURE — 85379 FIBRIN DEGRADATION QUANT: CPT | Performed by: FAMILY MEDICINE

## 2022-12-25 PROCEDURE — 85014 HEMATOCRIT: CPT | Performed by: EMERGENCY MEDICINE

## 2022-12-25 PROCEDURE — 71046 X-RAY EXAM CHEST 2 VIEWS: CPT

## 2022-12-25 PROCEDURE — 99285 EMERGENCY DEPT VISIT HI MDM: CPT | Performed by: FAMILY MEDICINE

## 2022-12-25 PROCEDURE — 96375 TX/PRO/DX INJ NEW DRUG ADDON: CPT | Performed by: FAMILY MEDICINE

## 2022-12-25 PROCEDURE — 99285 EMERGENCY DEPT VISIT HI MDM: CPT | Mod: 25 | Performed by: FAMILY MEDICINE

## 2022-12-25 PROCEDURE — 71275 CT ANGIOGRAPHY CHEST: CPT

## 2022-12-25 PROCEDURE — 258N000003 HC RX IP 258 OP 636: Performed by: FAMILY MEDICINE

## 2022-12-25 PROCEDURE — 84132 ASSAY OF SERUM POTASSIUM: CPT | Performed by: FAMILY MEDICINE

## 2022-12-25 PROCEDURE — 93005 ELECTROCARDIOGRAM TRACING: CPT | Performed by: FAMILY MEDICINE

## 2022-12-25 PROCEDURE — C9113 INJ PANTOPRAZOLE SODIUM, VIA: HCPCS | Performed by: FAMILY MEDICINE

## 2022-12-25 PROCEDURE — 80048 BASIC METABOLIC PNL TOTAL CA: CPT | Performed by: EMERGENCY MEDICINE

## 2022-12-25 PROCEDURE — 84484 ASSAY OF TROPONIN QUANT: CPT | Mod: 91 | Performed by: FAMILY MEDICINE

## 2022-12-25 PROCEDURE — 84484 ASSAY OF TROPONIN QUANT: CPT | Performed by: EMERGENCY MEDICINE

## 2022-12-25 PROCEDURE — 36415 COLL VENOUS BLD VENIPUNCTURE: CPT | Performed by: EMERGENCY MEDICINE

## 2022-12-25 PROCEDURE — 96374 THER/PROPH/DIAG INJ IV PUSH: CPT | Mod: 59 | Performed by: FAMILY MEDICINE

## 2022-12-25 PROCEDURE — 250N000009 HC RX 250: Performed by: FAMILY MEDICINE

## 2022-12-25 PROCEDURE — 250N000013 HC RX MED GY IP 250 OP 250 PS 637: Performed by: FAMILY MEDICINE

## 2022-12-25 PROCEDURE — 93010 ELECTROCARDIOGRAM REPORT: CPT | Performed by: FAMILY MEDICINE

## 2022-12-25 PROCEDURE — 36415 COLL VENOUS BLD VENIPUNCTURE: CPT | Performed by: FAMILY MEDICINE

## 2022-12-25 PROCEDURE — 83880 ASSAY OF NATRIURETIC PEPTIDE: CPT | Performed by: FAMILY MEDICINE

## 2022-12-25 RX ORDER — HYDROMORPHONE HYDROCHLORIDE 1 MG/ML
0.3 INJECTION, SOLUTION INTRAMUSCULAR; INTRAVENOUS; SUBCUTANEOUS ONCE
Status: COMPLETED | OUTPATIENT
Start: 2022-12-25 | End: 2022-12-25

## 2022-12-25 RX ORDER — DILTIAZEM HCL 60 MG
60 TABLET ORAL EVERY 6 HOURS SCHEDULED
Status: DISCONTINUED | OUTPATIENT
Start: 2022-12-25 | End: 2022-12-25

## 2022-12-25 RX ORDER — DILTIAZEM HCL 60 MG
60 TABLET ORAL ONCE
Status: COMPLETED | OUTPATIENT
Start: 2022-12-25 | End: 2022-12-25

## 2022-12-25 RX ORDER — OXYCODONE HYDROCHLORIDE 5 MG/1
5 TABLET ORAL EVERY 6 HOURS PRN
Qty: 12 TABLET | Refills: 0 | Status: SHIPPED | OUTPATIENT
Start: 2022-12-25 | End: 2022-12-28

## 2022-12-25 RX ORDER — IOPAMIDOL 755 MG/ML
80 INJECTION, SOLUTION INTRAVASCULAR ONCE
Status: COMPLETED | OUTPATIENT
Start: 2022-12-25 | End: 2022-12-25

## 2022-12-25 RX ADMIN — SODIUM CHLORIDE 100 ML: 9 INJECTION, SOLUTION INTRAVENOUS at 08:31

## 2022-12-25 RX ADMIN — PANTOPRAZOLE SODIUM 40 MG: 40 INJECTION, POWDER, FOR SOLUTION INTRAVENOUS at 07:18

## 2022-12-25 RX ADMIN — HYDROMORPHONE HYDROCHLORIDE 0.3 MG: 1 INJECTION, SOLUTION INTRAMUSCULAR; INTRAVENOUS; SUBCUTANEOUS at 09:40

## 2022-12-25 RX ADMIN — IOPAMIDOL 80 ML: 755 INJECTION, SOLUTION INTRAVENOUS at 08:30

## 2022-12-25 RX ADMIN — DILTIAZEM HYDROCHLORIDE 60 MG: 60 TABLET, FILM COATED ORAL at 07:57

## 2022-12-25 RX ADMIN — SODIUM CHLORIDE 1000 ML: 9 INJECTION, SOLUTION INTRAVENOUS at 07:18

## 2022-12-25 RX ADMIN — ALUMINUM HYDROXIDE, MAGNESIUM HYDROXIDE, AND SIMETHICONE 30 ML: 200; 200; 20 SUSPENSION ORAL at 07:23

## 2022-12-25 ASSESSMENT — ACTIVITIES OF DAILY LIVING (ADL)
ADLS_ACUITY_SCORE: 37

## 2022-12-25 NOTE — DISCHARGE INSTRUCTIONS
As we discussed I am going to increase her Cardizem ER from 180 mg once daily to to 40 mg once daily effective immediately.  Alternating hot and cold packs to the left chest wall area as we discussed.  You can use Tylenol as baseline pain medication, oxycodone as prescribed for breakthrough pain.  Return to the emergency department if worse or changes.  Otherwise to follow-up with cardiology as planned for cardioversion.

## 2022-12-25 NOTE — ED TRIAGE NOTES
Pt arrives via EMS with CP and a-fib RVR. Pt with CP that started around 0100 this am rating 6/10. Pt states feels like her heart is racing. Per EMS nitroglycerin was administered at 0453, 0500 and 0527.      Triage Assessment       Row Name 12/25/22 0548       Triage Assessment (Adult)    Airway WDL WDL       Respiratory WDL    Respiratory WDL WDL       Skin Circulation/Temperature WDL    Skin Circulation/Temperature WDL WDL       Cardiac WDL    Cardiac WDL X;chest pain       Chest Pain Assessment    Chest Pain Location anterior chest, left;substernal    Character pressure       Peripheral/Neurovascular WDL    Peripheral Neurovascular WDL WDL    Capillary Refill, General less than/equal to 3 secs       Cognitive/Neuro/Behavioral WDL    Cognitive/Neuro/Behavioral WDL WDL       [place] Coma Scale    Best Eye Response 4-->(E4) spontaneous    Best Motor Response 6-->(M6) obeys commands    Best Verbal Response 5-->(V5) oriented    [place] Coma Scale Score 15

## 2022-12-25 NOTE — H&P (VIEW-ONLY)
History     Chief Complaint   Patient presents with     Chest Pain     A-fib RVR     HPI  Theodora Meng is a 87 year old female, past medical history is significant for stage III renal disease, subclinical hypothyroidism, type 2 diabetes, hypertension, GERD, type 2 diabetes, morbid obesity, vitamin D deficiency, GERD, presents to the emergency department with concerns of chest pain and A. fib with RVR.  History is obtained from the patient who states that she is normally up late at night and she was up at 1:00 in the morning and before she was about to go to bed just sitting doing nothing she developed a left sided chest pain below her left breast which was dull achy in nature, there is also a central chest pressure, she tried going to bed but slept fitfully or not at all throughout the course of much of the night before she called EMS shortly before being brought in this morning at around 6 AM.  She identifies the dull achy pain below her left breast which was unchanged with nitro's x3 in the ambulance as well as central chest pressure there is also more of a pleuritic pain in the left chest with deep breathing.  While the patient has had a confusing picture upon reviewing her EHR with respect to severe GERD as well as possibly obstructive CAD in the context of new onset atrial fibrillation with RVR managed by Dr. Jered Sheehan with cardiology, she states that some of the chest pain feels new or different from what she is experienced before hence the reason for calling EMS.  She tells me that she is going to have a cardioversion this coming Thursday with cardiology at [facility].  She notes no recent illness fever chills sweats cough runny nose sore throat.  No difficulties with voiding.  No bowel habit changes recently.          Allergies:  Allergies   Allergen Reactions     Zoloft [Sertraline] Palpitations, Diarrhea and Cramps       Problem List:    Patient Active Problem List    Diagnosis Date Noted      Chronic kidney disease, stage 3 10/25/2021     Priority: Medium     Left knee DJD 04/03/2019     Priority: Medium     Subclinical hyperthyroidism 11/12/2018     Priority: Medium     Type 2 diabetes mellitus without complication, without long-term current use of insulin (H) 11/10/2016     Priority: Medium     Essential hypertension with goal blood pressure less than 140/90 11/10/2016     Priority: Medium     Gastroesophageal reflux disease without esophagitis 11/10/2016     Priority: Medium     Endometrial polyp 06/18/2016     Priority: Medium     Thickened endometrium 04/21/2016     Priority: Medium     Thyroid nodule 03/24/2016     Priority: Medium     DDD (degenerative disc disease), cervical 02/13/2016     Priority: Medium     Type 2 diabetes mellitus without complication (H) 10/21/2015     Priority: Medium     Morbid obesity (H) 10/21/2015     Priority: Medium     Status post total knee replacement 11/29/2013     Priority: Medium     Fever 11/11/2013     Priority: Medium     Edema 11/07/2013     Priority: Medium     Nausea 11/07/2013     Priority: Medium     S/P total knee arthroplasty 10/30/2013     Priority: Medium     Right knee DJD 10/30/2013     Priority: Medium     OA (osteoarthritis) of knee 12/11/2012     Priority: Medium     Hyperlipidemia LDL goal <100 12/04/2012     Priority: Medium     Knee pain 11/07/2012     Priority: Medium     Insomnia 11/07/2012     Priority: Medium     Anxiety 11/15/2011     Priority: Medium     Type 2 diabetes mellitus with other ophthalmic complication, without long-term current use of insulin (H) 12/21/2010     Priority: Medium     Neuralgia 06/05/2009     Priority: Medium     Vitamin D deficiency 03/30/2009     Priority: Medium     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 Provider to review           Past Medical History:    Past Medical History:   Diagnosis Date     Depressive disorder      Diabetes (H)      Esophageal reflux      Other abnormal glucose      Other premature beats      Unspecified essential hypertension        Past Surgical History:    Past Surgical History:   Procedure Laterality Date     APPENDECTOMY       ARTHROPLASTY KNEE  10/30/2013    Procedure: ARTHROPLASTY KNEE;  Right Total Knee Arthroplasty;  Surgeon: Ousmane Mcgowan MD;  Location: [facility]     ARTHROPLASTY KNEE Left 4/3/2019    Procedure: Left Total Knee Arthroplasty;  Surgeon: Ousmane Mcgowan MD;  Location: [facility]     CHOLECYSTECTOMY, LAPOROSCOPIC      Cholecystectomy, Laparoscopic     DILATION AND CURETTAGE, OPERATIVE HYSTEROSCOPY WITH MORCELLATOR, COMBINED N/A 6/20/2016    Procedure: COMBINED DILATION AND CURETTAGE, OPERATIVE HYSTEROSCOPY WITH MORCELLATOR;  Surgeon: Bony Arredondo MD;  Location: [facility] OR     ESOPHAGOSCOPY, GASTROSCOPY, DUODENOSCOPY (EGD), COMBINED N/A 2/17/2016    Procedure: COMBINED ESOPHAGOSCOPY, GASTROSCOPY, DUODENOSCOPY (EGD), BIOPSY SINGLE OR MULTIPLE;  Surgeon: Mil Guevara MD;  Location: [facility]     SALPINGO OOPHORECTOMY,R/L/OZZY      Salpingo Oophorectomy, RT/LT/OZZY     TONSILLECTOMY         Family History:    Family History   Problem Relation Age of Onset     Heart Disease Mother      Heart Disease Father      Heart Disease Brother      Heart Disease Brother      Heart Disease Brother        Social History:  Marital Status:   [5]  Social History     Tobacco Use     Smoking status: Never     Smokeless tobacco: Never   Vaping Use     Vaping Use: Never used   Substance Use Topics     Alcohol use: No     Drug use: No        Medications:    acetaminophen (TYLENOL) 500 MG tablet  amiodarone (PACERONE) 200 MG tablet  atorvastatin (LIPITOR) 10 MG tablet  diltiazem ER (DILT-XR) 180 MG 24 hr capsule  diltiazem ER COATED BEADS (CARDIAZEM LA) 240 MG 24 hr tablet  esomeprazole (NEXIUM) 40 MG  "DR capsule  gabapentin (NEURONTIN) 300 MG capsule  lisinopril (ZESTRIL) 5 MG tablet  LORazepam (ATIVAN) 0.5 MG tablet  oxyCODONE (ROXICODONE) 5 MG tablet  rivaroxaban ANTICOAGULANT (XARELTO) 20 MG TABS tablet  torsemide (DEMADEX) 10 MG tablet  blood glucose monitoring (ACCU-CHEK COMPACT CARE KIT) meter device kit  blood glucose monitoring (NO BRAND SPECIFIED) test strip  famotidine (PEPCID) 20 MG tablet  sucralfate (CARAFATE) 1 GM tablet          Review of Systems   All other systems reviewed and are negative.      Physical Exam   BP: 110/88  Pulse: (!) 133  Temp: 98.2  F (36.8  C)  Resp: 18  Height: 162.6 cm (5' 4\")  Weight: 98.4 kg (217 lb)  SpO2: 97 %      Physical Exam  Vitals and nursing note reviewed.   Constitutional:       General: She is not in acute distress.     Appearance: She is well-developed and normal weight. She is not ill-appearing.   HENT:      Head: Normocephalic and atraumatic.   Eyes:      Extraocular Movements: Extraocular movements intact.      Pupils: Pupils are equal, round, and reactive to light.   Cardiovascular:      Rate and Rhythm: Normal rate and regular rhythm.      Heart sounds: Normal heart sounds.   Pulmonary:      Effort: Pulmonary effort is normal.      Breath sounds: Normal breath sounds.   Abdominal:      General: Bowel sounds are normal.      Palpations: Abdomen is soft.   Musculoskeletal:         General: Normal range of motion.      Cervical back: Normal range of motion and neck supple.   Skin:     General: Skin is warm and dry.      Capillary Refill: Capillary refill takes less than 2 seconds.   Neurological:      General: No focal deficit present.      Mental Status: She is alert.   Psychiatric:         Mood and Affect: Mood normal.         Behavior: Behavior normal.         ED Course                 Procedures              EKG Interpretation:      Interpreted by Taco Borrero MD  Time reviewed: Time obtained 5:45 AM time interpreted 6:35  bpm atrial " fibrillation with RVR.  No definite acute ischemia or infarct.      Critical Care time:  none               Results for orders placed or performed during the hospital encounter of 12/25/22 (from the past 24 hour(s))   Basic metabolic panel   Result Value Ref Range    Sodium 136 136 - 145 mmol/L    Potassium 5.5 (H) 3.4 - 5.3 mmol/L    Chloride 104 98 - 107 mmol/L    Carbon Dioxide (CO2) 25 22 - 29 mmol/L    Anion Gap 7 7 - 15 mmol/L    Urea Nitrogen 22.7 8.0 - 23.0 mg/dL    Creatinine 0.90 0.51 - 0.95 mg/dL    Calcium 8.7 (L) 8.8 - 10.2 mg/dL    Glucose 151 (H) 70 - 99 mg/dL    GFR Estimate 62 >60 mL/min/1.73m2   CBC with Platelets & Differential    Narrative    The following orders were created for panel order CBC with Platelets & Differential.  Procedure                               Abnormality         Status                     ---------                               -----------         ------                     CBC with platelets and d...[[id number]]  Abnormal            Final result                 Please view results for these tests on the individual orders.   Troponin T, High Sensitivity   Result Value Ref Range    Troponin T, High Sensitivity 20 (H) <=14 ng/L   CBC with platelets and differential   Result Value Ref Range    WBC Count 7.2 4.0 - 11.0 10e3/uL    RBC Count 4.01 3.80 - 5.20 10e6/uL    Hemoglobin 11.3 (L) 11.7 - 15.7 g/dL    Hematocrit 34.9 (L) 35.0 - 47.0 %    MCV 87 78 - 100 fL    MCH 28.2 26.5 - 33.0 pg    MCHC 32.4 31.5 - 36.5 g/dL    RDW 14.6 10.0 - 15.0 %    Platelet Count 219 150 - 450 10e3/uL    % Neutrophils 68 %    % Lymphocytes 23 %    % Monocytes 5 %    % Eosinophils 2 %    % Basophils 1 %    % Immature Granulocytes 1 %    NRBCs per 100 WBC 0 <1 /100    Absolute Neutrophils 5.0 1.6 - 8.3 10e3/uL    Absolute Lymphocytes 1.7 0.8 - 5.3 10e3/uL    Absolute Monocytes 0.3 0.0 - 1.3 10e3/uL    Absolute Eosinophils 0.1 0.0 - 0.7 10e3/uL    Absolute Basophils 0.0 0.0 - 0.2 10e3/uL    Absolute  Immature Granulocytes 0.0 <=0.4 10e3/uL    Absolute NRBCs 0.0 10e3/uL   NT pro BNP   Result Value Ref Range    N terminal Pro BNP Inpatient 1,271 0 - 1,800 pg/mL   XR Chest 2 Views    Narrative    EXAM: XR CHEST 2 VIEWS  LOCATION: [facility]  DATE/TIME: 12/25/2022 6:17 AM    INDICATION: chest pain  COMPARISON: CT chest/abdomen/pelvis 10/05/2022       Impression    IMPRESSION: Patchy retrocardiac left lower lobe opacity suspicious for infectious infiltrate. Probable trace left effusion. The right lung is clear. Heart size is mildly enlarged. No definite pulmonary vascular congestion. Atherosclerotic calcifications   of the aortic arch. No pneumothorax. No acute osseous abnormality.   Potassium   Result Value Ref Range    Potassium 4.4 3.4 - 5.3 mmol/L   D dimer quantitative   Result Value Ref Range    D-Dimer Quantitative 1.04 (H) 0.00 - 0.50 ug/mL FEU    Narrative    This D-dimer assay is intended for use in conjunction with a clinical pretest probability assessment model to exclude pulmonary embolism (PE) and deep venous thrombosis (DVT) in outpatients suspected of PE or DVT. The cut-off value is 0.50 ug/mL FEU.   Troponin T, High Sensitivity   Result Value Ref Range    Troponin T, High Sensitivity 15 (H) <=14 ng/L   CT Chest Pulmonary Embolism w Contrast    Narrative    EXAM: CT CHEST PULMONARY EMBOLISM WITH CONTRAST  LOCATION: [facility]  DATE/TIME: 12/25/2022, 8:58 AM    INDICATION: Chest pain, shortness of breath. Afib with RVR, elevated D-dimer, rule out pulmonary embolism.  COMPARISON: 10/05/2022.  TECHNIQUE: CT chest pulmonary angiogram during arterial phase injection of IV contrast. Multiplanar reformats and MIP reconstructions were performed. Dose reduction techniques were used.   CONTRAST: 80 mL Isovue 370.    FINDINGS:  ANGIOGRAM CHEST: Pulmonary arteries are normal caliber and negative for pulmonary emboli. Thoracic aorta is negative for  dissection. No CT evidence of right heart strain.    LUNGS AND PLEURA: Small left pleural effusion.    MEDIASTINUM/AXILLAE: Moderately enlarged heart. Small pericardial effusion. Moderately-sized hiatal hernia. No hilar or mediastinal lymphadenopathy.    CORONARY ARTERY CALCIFICATION: Severe.    UPPER ABDOMEN: Status post cholecystectomy    MUSCULOSKELETAL: Normal.      Impression    IMPRESSION:  1.  No pulmonary embolus, aortic dissection, or aneurysm.  2.  Small left pleural effusion.  3.  Moderately enlarged heart with coronary artery disease and trace pericardial effusion.       Troponin T, High Sensitivity   Result Value Ref Range    Troponin T, High Sensitivity 15 (H) <=14 ng/L   11:29 AM  All results were reviewed in the room with the patient and her daughter and son-in-law present.  She is more comfortable with pain medication and GI cocktail and Protonix given.  As noted the patient had no response in the ambulance to nitroglycerin whatsoever.  Her cardiac troponin is plateaued in a range that is previously noted for the patient.  I do not think her presentation today is likely ischemic although she is in A. fib with RVR initially around 120-140 beats per minute.  I gave her an additional dose of short acting diltiazem in the emergency department of 60 mg.  Her rate came down to around 100 210.  She is more comfortable.  The patient has plan for cardioversion this coming Thursday with cardiology at [facility].  She feels comfortable at the present time and would like to go home.  She definitely does have 2 different types of pain here 1 I think is most likely related to her longstanding severe GERD which is previously documented in her notes in the EHR, and a second pain which I suspect is most likely chest wall given her evaluation here today.  CT of the chest was reviewed as above and no concerning or serious etiology for the left-sided chest pain was identified in conjunction with her lab diagnostics and  cardiac troponin serially.  Disposition is to home I am increasing the patient's Cardizem from Cardizem  mg p.o. daily to 240 mg p.o. daily.  At her request I prescribed oxycodone for breakthrough pain not responsive to Tylenol.  Potential fall risk was discussed and disposition is to home.  Conversations were had with the patient's daughter and son-in-law present.        Medications   pantoprazole (PROTONIX) IV push injection 40 mg (40 mg Intravenous Given 12/25/22 0718)   lidocaine (viscous) (XYLOCAINE) 2 % 15 mL, alum & mag hydroxide-simethicone (MAALOX) 15 mL GI Cocktail (30 mLs Oral Given 12/25/22 0723)   0.9% sodium chloride BOLUS (0 mLs Intravenous Stopped 12/25/22 0916)   diltiazem (CARDIZEM) tablet 60 mg (60 mg Oral Given 12/25/22 0757)   iopamidol (ISOVUE-370) solution 80 mL (80 mLs Intravenous Given 12/25/22 0830)   sodium chloride 0.9 % bag 500mL for CT scan flush use (100 mLs Intravenous Given 12/25/22 0831)   HYDROmorphone (PF) (DILAUDID) injection 0.3 mg (0.3 mg Intravenous Given 12/25/22 0940)       Assessments & Plan (with Medical Decision Making)   Assessment and plan with medical decision making at the time stamp above.      Disclaimer: This note consists of symbols derived from keyboarding, dictation and/or voice recognition software. As a result, there may be errors in the script that have gone undetected. Please consider this when interpreting information found in this chart.      I have reviewed the nursing notes.    I have reviewed the findings, diagnosis, plan and need for follow up with the patient.          New Prescriptions    DILTIAZEM ER COATED BEADS (CARDIAZEM LA) 240 MG 24 HR TABLET    Take 1 tablet (240 mg) by mouth daily for 30 days    OXYCODONE (ROXICODONE) 5 MG TABLET    Take 1 tablet (5 mg) by mouth every 6 hours as needed       Final diagnoses:   Gastroesophageal reflux disease, unspecified whether esophagitis present   Atrial fibrillation with RVR (H)   Chest wall pain        12/25/2022   [facility] EMERGENCY DEPT     Tcao Borrero MD  12/25/22 1139

## 2022-12-25 NOTE — ED PROVIDER NOTES
History     Chief Complaint   Patient presents with     Chest Pain     A-fib RVR     HPI  Theodora Meng is a 87 year old female, past medical history is significant for stage III renal disease, subclinical hypothyroidism, type 2 diabetes, hypertension, GERD, type 2 diabetes, morbid obesity, vitamin D deficiency, GERD, presents to the emergency department with concerns of chest pain and A. fib with RVR.  History is obtained from the patient who states that she is normally up late at night and she was up at 1:00 in the morning and before she was about to go to bed just sitting doing nothing she developed a left sided chest pain below her left breast which was dull achy in nature, there is also a central chest pressure, she tried going to bed but slept fitfully or not at all throughout the course of much of the night before she called EMS shortly before being brought in this morning at around 6 AM.  She identifies the dull achy pain below her left breast which was unchanged with nitro's x3 in the ambulance as well as central chest pressure there is also more of a pleuritic pain in the left chest with deep breathing.  While the patient has had a confusing picture upon reviewing her EHR with respect to severe GERD as well as possibly obstructive CAD in the context of new onset atrial fibrillation with RVR managed by Dr. Jered Sheehan with cardiology, she states that some of the chest pain feels new or different from what she is experienced before hence the reason for calling EMS.  She tells me that she is going to have a cardioversion this coming Thursday with cardiology at [facility].  She notes no recent illness fever chills sweats cough runny nose sore throat.  No difficulties with voiding.  No bowel habit changes recently.          Allergies:  Allergies   Allergen Reactions     Zoloft [Sertraline] Palpitations, Diarrhea and Cramps       Problem List:    Patient Active Problem List    Diagnosis Date Noted      Chronic kidney disease, stage 3 10/25/2021     Priority: Medium     Left knee DJD 04/03/2019     Priority: Medium     Subclinical hyperthyroidism 11/12/2018     Priority: Medium     Type 2 diabetes mellitus without complication, without long-term current use of insulin (H) 11/10/2016     Priority: Medium     Essential hypertension with goal blood pressure less than 140/90 11/10/2016     Priority: Medium     Gastroesophageal reflux disease without esophagitis 11/10/2016     Priority: Medium     Endometrial polyp 06/18/2016     Priority: Medium     Thickened endometrium 04/21/2016     Priority: Medium     Thyroid nodule 03/24/2016     Priority: Medium     DDD (degenerative disc disease), cervical 02/13/2016     Priority: Medium     Type 2 diabetes mellitus without complication (H) 10/21/2015     Priority: Medium     Morbid obesity (H) 10/21/2015     Priority: Medium     Status post total knee replacement 11/29/2013     Priority: Medium     Fever 11/11/2013     Priority: Medium     Edema 11/07/2013     Priority: Medium     Nausea 11/07/2013     Priority: Medium     S/P total knee arthroplasty 10/30/2013     Priority: Medium     Right knee DJD 10/30/2013     Priority: Medium     OA (osteoarthritis) of knee 12/11/2012     Priority: Medium     Hyperlipidemia LDL goal <100 12/04/2012     Priority: Medium     Knee pain 11/07/2012     Priority: Medium     Insomnia 11/07/2012     Priority: Medium     Anxiety 11/15/2011     Priority: Medium     Type 2 diabetes mellitus with other ophthalmic complication, without long-term current use of insulin (H) 12/21/2010     Priority: Medium     Neuralgia 06/05/2009     Priority: Medium     Vitamin D deficiency 03/30/2009     Priority: Medium     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 Provider to review           Past Medical History:    Past Medical History:   Diagnosis Date     Depressive disorder      Diabetes (H)      Esophageal reflux      Other abnormal glucose      Other premature beats      Unspecified essential hypertension        Past Surgical History:    Past Surgical History:   Procedure Laterality Date     APPENDECTOMY       ARTHROPLASTY KNEE  10/30/2013    Procedure: ARTHROPLASTY KNEE;  Right Total Knee Arthroplasty;  Surgeon: Ousmane Mcgowan MD;  Location: [facility]     ARTHROPLASTY KNEE Left 4/3/2019    Procedure: Left Total Knee Arthroplasty;  Surgeon: Ousmane Mcgowan MD;  Location: [facility]     CHOLECYSTECTOMY, LAPOROSCOPIC      Cholecystectomy, Laparoscopic     DILATION AND CURETTAGE, OPERATIVE HYSTEROSCOPY WITH MORCELLATOR, COMBINED N/A 6/20/2016    Procedure: COMBINED DILATION AND CURETTAGE, OPERATIVE HYSTEROSCOPY WITH MORCELLATOR;  Surgeon: Bony Arredondo MD;  Location: [facility] OR     ESOPHAGOSCOPY, GASTROSCOPY, DUODENOSCOPY (EGD), COMBINED N/A 2/17/2016    Procedure: COMBINED ESOPHAGOSCOPY, GASTROSCOPY, DUODENOSCOPY (EGD), BIOPSY SINGLE OR MULTIPLE;  Surgeon: Mil Guevara MD;  Location: [facility]     SALPINGO OOPHORECTOMY,R/L/OZZY      Salpingo Oophorectomy, RT/LT/OZZY     TONSILLECTOMY         Family History:    Family History   Problem Relation Age of Onset     Heart Disease Mother      Heart Disease Father      Heart Disease Brother      Heart Disease Brother      Heart Disease Brother        Social History:  Marital Status:   [5]  Social History     Tobacco Use     Smoking status: Never     Smokeless tobacco: Never   Vaping Use     Vaping Use: Never used   Substance Use Topics     Alcohol use: No     Drug use: No        Medications:    acetaminophen (TYLENOL) 500 MG tablet  amiodarone (PACERONE) 200 MG tablet  atorvastatin (LIPITOR) 10 MG tablet  diltiazem ER (DILT-XR) 180 MG 24 hr capsule  diltiazem ER COATED BEADS (CARDIAZEM LA) 240 MG 24 hr tablet  esomeprazole (NEXIUM) 40 MG  "DR capsule  gabapentin (NEURONTIN) 300 MG capsule  lisinopril (ZESTRIL) 5 MG tablet  LORazepam (ATIVAN) 0.5 MG tablet  oxyCODONE (ROXICODONE) 5 MG tablet  rivaroxaban ANTICOAGULANT (XARELTO) 20 MG TABS tablet  torsemide (DEMADEX) 10 MG tablet  blood glucose monitoring (ACCU-CHEK COMPACT CARE KIT) meter device kit  blood glucose monitoring (NO BRAND SPECIFIED) test strip  famotidine (PEPCID) 20 MG tablet  sucralfate (CARAFATE) 1 GM tablet          Review of Systems   All other systems reviewed and are negative.      Physical Exam   BP: 110/88  Pulse: (!) 133  Temp: 98.2  F (36.8  C)  Resp: 18  Height: 162.6 cm (5' 4\")  Weight: 98.4 kg (217 lb)  SpO2: 97 %      Physical Exam  Vitals and nursing note reviewed.   Constitutional:       General: She is not in acute distress.     Appearance: She is well-developed and normal weight. She is not ill-appearing.   HENT:      Head: Normocephalic and atraumatic.   Eyes:      Extraocular Movements: Extraocular movements intact.      Pupils: Pupils are equal, round, and reactive to light.   Cardiovascular:      Rate and Rhythm: Normal rate and regular rhythm.      Heart sounds: Normal heart sounds.   Pulmonary:      Effort: Pulmonary effort is normal.      Breath sounds: Normal breath sounds.   Abdominal:      General: Bowel sounds are normal.      Palpations: Abdomen is soft.   Musculoskeletal:         General: Normal range of motion.      Cervical back: Normal range of motion and neck supple.   Skin:     General: Skin is warm and dry.      Capillary Refill: Capillary refill takes less than 2 seconds.   Neurological:      General: No focal deficit present.      Mental Status: She is alert.   Psychiatric:         Mood and Affect: Mood normal.         Behavior: Behavior normal.         ED Course                 Procedures              EKG Interpretation:      Interpreted by Taco Borrero MD  Time reviewed: Time obtained 5:45 AM time interpreted 6:35  bpm atrial " fibrillation with RVR.  No definite acute ischemia or infarct.      Critical Care time:  none               Results for orders placed or performed during the hospital encounter of 12/25/22 (from the past 24 hour(s))   Basic metabolic panel   Result Value Ref Range    Sodium 136 136 - 145 mmol/L    Potassium 5.5 (H) 3.4 - 5.3 mmol/L    Chloride 104 98 - 107 mmol/L    Carbon Dioxide (CO2) 25 22 - 29 mmol/L    Anion Gap 7 7 - 15 mmol/L    Urea Nitrogen 22.7 8.0 - 23.0 mg/dL    Creatinine 0.90 0.51 - 0.95 mg/dL    Calcium 8.7 (L) 8.8 - 10.2 mg/dL    Glucose 151 (H) 70 - 99 mg/dL    GFR Estimate 62 >60 mL/min/1.73m2   CBC with Platelets & Differential    Narrative    The following orders were created for panel order CBC with Platelets & Differential.  Procedure                               Abnormality         Status                     ---------                               -----------         ------                     CBC with platelets and d...[[id number]]  Abnormal            Final result                 Please view results for these tests on the individual orders.   Troponin T, High Sensitivity   Result Value Ref Range    Troponin T, High Sensitivity 20 (H) <=14 ng/L   CBC with platelets and differential   Result Value Ref Range    WBC Count 7.2 4.0 - 11.0 10e3/uL    RBC Count 4.01 3.80 - 5.20 10e6/uL    Hemoglobin 11.3 (L) 11.7 - 15.7 g/dL    Hematocrit 34.9 (L) 35.0 - 47.0 %    MCV 87 78 - 100 fL    MCH 28.2 26.5 - 33.0 pg    MCHC 32.4 31.5 - 36.5 g/dL    RDW 14.6 10.0 - 15.0 %    Platelet Count 219 150 - 450 10e3/uL    % Neutrophils 68 %    % Lymphocytes 23 %    % Monocytes 5 %    % Eosinophils 2 %    % Basophils 1 %    % Immature Granulocytes 1 %    NRBCs per 100 WBC 0 <1 /100    Absolute Neutrophils 5.0 1.6 - 8.3 10e3/uL    Absolute Lymphocytes 1.7 0.8 - 5.3 10e3/uL    Absolute Monocytes 0.3 0.0 - 1.3 10e3/uL    Absolute Eosinophils 0.1 0.0 - 0.7 10e3/uL    Absolute Basophils 0.0 0.0 - 0.2 10e3/uL    Absolute  Immature Granulocytes 0.0 <=0.4 10e3/uL    Absolute NRBCs 0.0 10e3/uL   NT pro BNP   Result Value Ref Range    N terminal Pro BNP Inpatient 1,271 0 - 1,800 pg/mL   XR Chest 2 Views    Narrative    EXAM: XR CHEST 2 VIEWS  LOCATION: [facility]  DATE/TIME: 12/25/2022 6:17 AM    INDICATION: chest pain  COMPARISON: CT chest/abdomen/pelvis 10/05/2022       Impression    IMPRESSION: Patchy retrocardiac left lower lobe opacity suspicious for infectious infiltrate. Probable trace left effusion. The right lung is clear. Heart size is mildly enlarged. No definite pulmonary vascular congestion. Atherosclerotic calcifications   of the aortic arch. No pneumothorax. No acute osseous abnormality.   Potassium   Result Value Ref Range    Potassium 4.4 3.4 - 5.3 mmol/L   D dimer quantitative   Result Value Ref Range    D-Dimer Quantitative 1.04 (H) 0.00 - 0.50 ug/mL FEU    Narrative    This D-dimer assay is intended for use in conjunction with a clinical pretest probability assessment model to exclude pulmonary embolism (PE) and deep venous thrombosis (DVT) in outpatients suspected of PE or DVT. The cut-off value is 0.50 ug/mL FEU.   Troponin T, High Sensitivity   Result Value Ref Range    Troponin T, High Sensitivity 15 (H) <=14 ng/L   CT Chest Pulmonary Embolism w Contrast    Narrative    EXAM: CT CHEST PULMONARY EMBOLISM WITH CONTRAST  LOCATION: [facility]  DATE/TIME: 12/25/2022, 8:58 AM    INDICATION: Chest pain, shortness of breath. Afib with RVR, elevated D-dimer, rule out pulmonary embolism.  COMPARISON: 10/05/2022.  TECHNIQUE: CT chest pulmonary angiogram during arterial phase injection of IV contrast. Multiplanar reformats and MIP reconstructions were performed. Dose reduction techniques were used.   CONTRAST: 80 mL Isovue 370.    FINDINGS:  ANGIOGRAM CHEST: Pulmonary arteries are normal caliber and negative for pulmonary emboli. Thoracic aorta is negative for  dissection. No CT evidence of right heart strain.    LUNGS AND PLEURA: Small left pleural effusion.    MEDIASTINUM/AXILLAE: Moderately enlarged heart. Small pericardial effusion. Moderately-sized hiatal hernia. No hilar or mediastinal lymphadenopathy.    CORONARY ARTERY CALCIFICATION: Severe.    UPPER ABDOMEN: Status post cholecystectomy    MUSCULOSKELETAL: Normal.      Impression    IMPRESSION:  1.  No pulmonary embolus, aortic dissection, or aneurysm.  2.  Small left pleural effusion.  3.  Moderately enlarged heart with coronary artery disease and trace pericardial effusion.       Troponin T, High Sensitivity   Result Value Ref Range    Troponin T, High Sensitivity 15 (H) <=14 ng/L   11:29 AM  All results were reviewed in the room with the patient and her daughter and son-in-law present.  She is more comfortable with pain medication and GI cocktail and Protonix given.  As noted the patient had no response in the ambulance to nitroglycerin whatsoever.  Her cardiac troponin is plateaued in a range that is previously noted for the patient.  I do not think her presentation today is likely ischemic although she is in A. fib with RVR initially around 120-140 beats per minute.  I gave her an additional dose of short acting diltiazem in the emergency department of 60 mg.  Her rate came down to around 100 210.  She is more comfortable.  The patient has plan for cardioversion this coming Thursday with cardiology at [facility].  She feels comfortable at the present time and would like to go home.  She definitely does have 2 different types of pain here 1 I think is most likely related to her longstanding severe GERD which is previously documented in her notes in the EHR, and a second pain which I suspect is most likely chest wall given her evaluation here today.  CT of the chest was reviewed as above and no concerning or serious etiology for the left-sided chest pain was identified in conjunction with her lab diagnostics and  cardiac troponin serially.  Disposition is to home I am increasing the patient's Cardizem from Cardizem  mg p.o. daily to 240 mg p.o. daily.  At her request I prescribed oxycodone for breakthrough pain not responsive to Tylenol.  Potential fall risk was discussed and disposition is to home.  Conversations were had with the patient's daughter and son-in-law present.        Medications   pantoprazole (PROTONIX) IV push injection 40 mg (40 mg Intravenous Given 12/25/22 0718)   lidocaine (viscous) (XYLOCAINE) 2 % 15 mL, alum & mag hydroxide-simethicone (MAALOX) 15 mL GI Cocktail (30 mLs Oral Given 12/25/22 0723)   0.9% sodium chloride BOLUS (0 mLs Intravenous Stopped 12/25/22 0916)   diltiazem (CARDIZEM) tablet 60 mg (60 mg Oral Given 12/25/22 0757)   iopamidol (ISOVUE-370) solution 80 mL (80 mLs Intravenous Given 12/25/22 0830)   sodium chloride 0.9 % bag 500mL for CT scan flush use (100 mLs Intravenous Given 12/25/22 0831)   HYDROmorphone (PF) (DILAUDID) injection 0.3 mg (0.3 mg Intravenous Given 12/25/22 0940)       Assessments & Plan (with Medical Decision Making)   Assessment and plan with medical decision making at the time stamp above.      Disclaimer: This note consists of symbols derived from keyboarding, dictation and/or voice recognition software. As a result, there may be errors in the script that have gone undetected. Please consider this when interpreting information found in this chart.      I have reviewed the nursing notes.    I have reviewed the findings, diagnosis, plan and need for follow up with the patient.          New Prescriptions    DILTIAZEM ER COATED BEADS (CARDIAZEM LA) 240 MG 24 HR TABLET    Take 1 tablet (240 mg) by mouth daily for 30 days    OXYCODONE (ROXICODONE) 5 MG TABLET    Take 1 tablet (5 mg) by mouth every 6 hours as needed       Final diagnoses:   Gastroesophageal reflux disease, unspecified whether esophagitis present   Atrial fibrillation with RVR (H)   Chest wall pain        12/25/2022   [facility] EMERGENCY DEPT     Taco Borrero MD  12/25/22 113

## 2022-12-26 ENCOUNTER — TELEPHONE (OUTPATIENT)
Dept: CARDIOLOGY | Facility: CLINIC | Age: 87
End: 2022-12-26

## 2022-12-26 NOTE — TELEPHONE ENCOUNTER
Health Call Center    Phone Message    May a detailed message be left on voicemail: yes     Reason for Call: Medication Question or concern regarding medication   Prescription Clarification  Name of Medication: diltiazem ER COATED BEADS (CARDIAZEM LA) 240 MG 24 hr tablet  Prescribing Provider: Dr. Starr    Pharmacy: n/a   What on the order needs clarification? Pt states she was in the ED over the weekend and this medication was increased and she would like to review the changes with the team and make sure they agree with the changes that were made. Please return call to discuss.           Action Taken: Other: Cardiology    Travel Screening: Not Applicable     Thank you!  [facility]

## 2022-12-26 NOTE — TELEPHONE ENCOUNTER
Disc with patient and answered questions. Uzma Patterson RN Cardiology December 26, 2022, 10:14 AM

## 2022-12-28 ENCOUNTER — ANESTHESIA EVENT (OUTPATIENT)
Dept: SURGERY | Facility: CLINIC | Age: 87
End: 2022-12-28
Payer: COMMERCIAL

## 2022-12-28 ASSESSMENT — ENCOUNTER SYMPTOMS: DYSRHYTHMIAS: 1

## 2022-12-28 NOTE — ANESTHESIA PREPROCEDURE EVALUATION
Anesthesia Pre-Procedure Evaluation    Patient: Theodora Meng   MRN: [id number] : 1935        Procedure : Procedure(s):  CARDIOVERSION          Past Medical History:   Diagnosis Date     Depressive disorder      Diabetes (H)      Esophageal reflux      Other abnormal glucose      Other premature beats      Unspecified essential hypertension       Past Surgical History:   Procedure Laterality Date     APPENDECTOMY       ARTHROPLASTY KNEE  10/30/2013    Procedure: ARTHROPLASTY KNEE;  Right Total Knee Arthroplasty;  Surgeon: Ousmane Mcgowan MD;  Location: [facility]     ARTHROPLASTY KNEE Left 4/3/2019    Procedure: Left Total Knee Arthroplasty;  Surgeon: Ousmane Mcgowan MD;  Location: [facility]     CHOLECYSTECTOMY, LAPOROSCOPIC      Cholecystectomy, Laparoscopic     DILATION AND CURETTAGE, OPERATIVE HYSTEROSCOPY WITH MORCELLATOR, COMBINED N/A 2016    Procedure: COMBINED DILATION AND CURETTAGE, OPERATIVE HYSTEROSCOPY WITH MORCELLATOR;  Surgeon: Bony Arredondo MD;  Location: [facility]     ESOPHAGOSCOPY, GASTROSCOPY, DUODENOSCOPY (EGD), COMBINED N/A 2016    Procedure: COMBINED ESOPHAGOSCOPY, GASTROSCOPY, DUODENOSCOPY (EGD), BIOPSY SINGLE OR MULTIPLE;  Surgeon: Mil Guevara MD;  Location: [facility]     SALPINGO OOPHORECTOMY,R/L/OZZY      Salpingo Oophorectomy, RT/LT/OZZY     TONSILLECTOMY        Allergies   Allergen Reactions     Zoloft [Sertraline] Palpitations, Diarrhea and Cramps      Social History     Tobacco Use     Smoking status: Never     Smokeless tobacco: Never   Substance Use Topics     Alcohol use: No      Wt Readings from Last 1 Encounters:   22 98.4 kg (217 lb)        Anesthesia Evaluation   Pt has had prior anesthetic. Type: General.    No history of anesthetic complications       ROS/MED HX  ENT/Pulmonary:    (-) tobacco use   Neurologic:     (+) peripheral neuropathy,     Cardiovascular:     (+) Dyslipidemia hypertension-----dysrhythmias, a-fib, valvular problems/murmurs  type: MR Mild MR. Previous cardiac testing   Echo: Date: 10/7/22 Results:  Interpretation Summary  Left ventricular systolic function is normal. The visual ejection fraction is 55-60%. Right ventricular global function is normal. Mild to moderate (1-2+) tricuspid regurgitation. Dilation of the inferior vena cava is present with abnormal respiratory variation in diameter. The rhythm was atrial fibrillation around 110-130 bpm.      Left Ventricle  The left ventricle is normal in size. There is normal left ventricular wall thickness. Left ventricular systolic function is normal. The visual ejection fraction is 55-60%. Left ventricular diastolic function is not assessable. No regional wall motion abnormalities noted.    Right Ventricle  The right ventricle is normal in structure, function and size.    Atria  The left atrium is mildly dilated. The right atrium is moderately dilated. Theinteratrial septum bows toward the right atrium, consistent with increased left atrial pressure. There is no color Doppler evidence of an atrial shunt.    Mitral Valve  There is moderate mitral annular calcification. There is mild (1+) mitral regurgitation.    Tricuspid Valve  There is mild to moderate (1-2+) tricuspid regurgitation. The right ventricular systolic pressure is approximated at 26.1 mmHg plus the right atrial pressure.    Aortic Valve  The aortic valve is not well visualized. Valve morphology is not well visualized, however it is functioning normally on Doppler interrogation.    Pulmonic Valve  The pulmonic valve is not well seen, but is grossly normal.    Vessels  The aortic root is normal size. Normal size ascending aorta. Dilation of the inferior vena cava is present with abnormal respiratory variation in diameter.    Pericardium  There is no pericardial effusion. Prominent pericardial/epicardial fat present.    Rhythm  The rhythm was rapid atrial fibrillation.  Stress Test: Date: Results:    ECG Reviewed: Date: 12/29/22  Results:  A-fib  Cath: Date: Results:      METS/Exercise Tolerance:     Hematologic:       Musculoskeletal:   (+) arthritis,     GI/Hepatic:     (+) GERD,     Renal/Genitourinary:     (+) renal disease (Stage 3), type: CRI,     Endo:     (+) type II DM, thyroid problem, hypothyroidism, Obesity (Class 2),     Psychiatric/Substance Use:     (+) psychiatric history depression and anxiety     Infectious Disease:       Malignancy:       Other:               OUTSIDE LABS:  CBC:   Lab Results   Component Value Date    WBC 7.2 12/25/2022    WBC 4.0 10/12/2022    HGB 11.3 (L) 12/25/2022    HGB 11.7 10/12/2022    HCT 34.9 (L) 12/25/2022    HCT 36.0 10/12/2022     12/25/2022     10/12/2022     BMP:   Lab Results   Component Value Date     12/25/2022     10/05/2022    POTASSIUM 4.4 12/25/2022    POTASSIUM 5.5 (H) 12/25/2022    CHLORIDE 104 12/25/2022    CHLORIDE 103 10/05/2022    CO2 25 12/25/2022    CO2 23 10/05/2022    BUN 22.7 12/25/2022    BUN 21.2 10/05/2022    CR 0.90 12/25/2022    CR 1.05 (H) 10/05/2022     (H) 12/25/2022     (H) 10/05/2022     COAGS: No results found for: PTT, INR, FIBR  POC:   Lab Results   Component Value Date     (H) 04/05/2019     HEPATIC:   Lab Results   Component Value Date    ALBUMIN 4.1 10/05/2022    PROTTOTAL 6.9 10/05/2022    ALT 17 10/05/2022    AST 20 10/05/2022    ALKPHOS 89 10/05/2022    BILITOTAL 0.4 10/05/2022     OTHER:   Lab Results   Component Value Date    A1C 7.2 (H) 10/27/2022    FEDERICO 8.7 (L) 12/25/2022    MAG 1.9 11/06/2012    LIPASE 28 10/05/2022    TSH 1.10 10/22/2021    T4 1.11 11/06/2018    CRP 86.5 (H) 11/13/2013       Anesthesia Plan    ASA Status:  3      Anesthesia Type: General.     - Airway: Native airway              Consents            Postoperative Care            Comments:                Donn Domingo DO, DO

## 2022-12-29 ENCOUNTER — HOSPITAL ENCOUNTER (OUTPATIENT)
Dept: MEDSURG UNIT | Facility: CLINIC | Age: 87
Discharge: HOME | End: 2022-12-29
Attending: INTERNAL MEDICINE
Payer: COMMERCIAL

## 2022-12-29 ENCOUNTER — ANESTHESIA (OUTPATIENT)
Dept: SURGERY | Facility: CLINIC | Age: 87
End: 2022-12-29
Payer: COMMERCIAL

## 2022-12-29 ENCOUNTER — HOSPITAL ENCOUNTER (OUTPATIENT)
Facility: CLINIC | Age: 87
Discharge: HOME | End: 2022-12-29
Admitting: INTERNAL MEDICINE
Payer: COMMERCIAL

## 2022-12-29 VITALS
HEIGHT: 64 IN | WEIGHT: 221.2 LBS | BODY MASS INDEX: 37.76 KG/M2 | DIASTOLIC BLOOD PRESSURE: 75 MMHG | HEART RATE: 85 BPM | OXYGEN SATURATION: 97 % | RESPIRATION RATE: 20 BRPM | SYSTOLIC BLOOD PRESSURE: 131 MMHG

## 2022-12-29 DIAGNOSIS — I48.91 NEW ONSET ATRIAL FIBRILLATION (H): ICD-10-CM

## 2022-12-29 LAB
MAGNESIUM SERPL-MCNC: 2 MG/DL (ref 1.6–2.3)
POTASSIUM BLD-SCNC: 3.9 MMOL/L (ref 3.4–5.3)

## 2022-12-29 PROCEDURE — 93010 ELECTROCARDIOGRAM REPORT: CPT | Mod: 76 | Performed by: INTERNAL MEDICINE

## 2022-12-29 PROCEDURE — 258N000003 HC RX IP 258 OP 636

## 2022-12-29 PROCEDURE — 999N000054 HC STATISTIC EKG NON-CHARGEABLE

## 2022-12-29 PROCEDURE — 250N000013 HC RX MED GY IP 250 OP 250 PS 637

## 2022-12-29 PROCEDURE — 36415 COLL VENOUS BLD VENIPUNCTURE: CPT

## 2022-12-29 PROCEDURE — 84132 ASSAY OF SERUM POTASSIUM: CPT

## 2022-12-29 PROCEDURE — 370N000017 HC ANESTHESIA TECHNICAL FEE, PER MIN

## 2022-12-29 PROCEDURE — 92960 CARDIOVERSION ELECTRIC EXT: CPT

## 2022-12-29 PROCEDURE — 999N000010 HC STATISTIC ANES STAT CODE-CRNA PER MINUTE

## 2022-12-29 PROCEDURE — 93005 ELECTROCARDIOGRAM TRACING: CPT

## 2022-12-29 PROCEDURE — 999N000184 HC STATISTIC TELEMETRY

## 2022-12-29 PROCEDURE — 83735 ASSAY OF MAGNESIUM: CPT

## 2022-12-29 PROCEDURE — 92960 CARDIOVERSION ELECTRIC EXT: CPT | Performed by: INTERNAL MEDICINE

## 2022-12-29 PROCEDURE — 250N000011 HC RX IP 250 OP 636: Performed by: NURSE ANESTHETIST, CERTIFIED REGISTERED

## 2022-12-29 RX ORDER — NALOXONE HYDROCHLORIDE 0.4 MG/ML
0.4 INJECTION, SOLUTION INTRAMUSCULAR; INTRAVENOUS; SUBCUTANEOUS
Status: DISCONTINUED | OUTPATIENT
Start: 2022-12-29 | End: 2022-12-29 | Stop reason: HOSPADM

## 2022-12-29 RX ORDER — SODIUM CHLORIDE 9 MG/ML
INJECTION, SOLUTION INTRAVENOUS CONTINUOUS
Status: DISCONTINUED | OUTPATIENT
Start: 2022-12-29 | End: 2022-12-29 | Stop reason: HOSPADM

## 2022-12-29 RX ORDER — FLUMAZENIL 0.1 MG/ML
0.2 INJECTION, SOLUTION INTRAVENOUS
Status: DISCONTINUED | OUTPATIENT
Start: 2022-12-29 | End: 2022-12-29 | Stop reason: HOSPADM

## 2022-12-29 RX ORDER — POTASSIUM CHLORIDE 1500 MG/1
40 TABLET, EXTENDED RELEASE ORAL
Status: DISCONTINUED | OUTPATIENT
Start: 2022-12-29 | End: 2022-12-29 | Stop reason: HOSPADM

## 2022-12-29 RX ORDER — MAGNESIUM SULFATE HEPTAHYDRATE 40 MG/ML
2 INJECTION, SOLUTION INTRAVENOUS
Status: DISCONTINUED | OUTPATIENT
Start: 2022-12-29 | End: 2022-12-29 | Stop reason: HOSPADM

## 2022-12-29 RX ORDER — NALOXONE HYDROCHLORIDE 0.4 MG/ML
0.2 INJECTION, SOLUTION INTRAMUSCULAR; INTRAVENOUS; SUBCUTANEOUS
Status: DISCONTINUED | OUTPATIENT
Start: 2022-12-29 | End: 2022-12-29 | Stop reason: HOSPADM

## 2022-12-29 RX ORDER — POTASSIUM CHLORIDE 1500 MG/1
20 TABLET, EXTENDED RELEASE ORAL
Status: DISCONTINUED | OUTPATIENT
Start: 2022-12-29 | End: 2022-12-29 | Stop reason: HOSPADM

## 2022-12-29 RX ORDER — ATROPINE SULFATE 0.1 MG/ML
.5-1 INJECTION INTRAVENOUS
Status: DISCONTINUED | OUTPATIENT
Start: 2022-12-29 | End: 2022-12-29 | Stop reason: HOSPADM

## 2022-12-29 RX ORDER — PROPOFOL 10 MG/ML
INJECTION, EMULSION INTRAVENOUS PRN
Status: DISCONTINUED | OUTPATIENT
Start: 2022-12-29 | End: 2022-12-29

## 2022-12-29 RX ADMIN — PROPOFOL 40 MG: 10 INJECTION, EMULSION INTRAVENOUS at 10:48

## 2022-12-29 RX ADMIN — SODIUM CHLORIDE: 9 INJECTION, SOLUTION INTRAVENOUS at 09:15

## 2022-12-29 RX ADMIN — POTASSIUM CHLORIDE 20 MEQ: 1500 TABLET, EXTENDED RELEASE ORAL at 09:54

## 2022-12-29 ASSESSMENT — LIFESTYLE VARIABLES: TOBACCO_USE: 0

## 2022-12-29 ASSESSMENT — ACTIVITIES OF DAILY LIVING (ADL)
ADLS_ACUITY_SCORE: 37
ADLS_ACUITY_SCORE: 37

## 2022-12-29 NOTE — DISCHARGE INSTRUCTIONS
Cardioversion Discharge Instructions    After you go home:       For 24 hours - due to the sedation you received:    Have an adult stay with you for 24 hours.   Relax and take it easy.  Do NOT make any important or legal decisions.  Do NOT drive or operate machines at home or at work.  Do NOT drink alcohol.    Diet:    Start with clear liquids and progress to your normal diet as you feel able.    Medicines:    Take your medications, including blood thinners, unless your provider tells you not to.  If you have stopped any medications, check with your provider about when to restart them.    Follow Up Appointments:    Follow up with your cardiologist at [facility] of patient preference as instructed.  Follow up with your primary care provider as needed.    Post cardioversion:    The skin on your chest or back may feel tender for 48 hours.  If your skin is tender, you may:    Use a cold pack on the site. Never use ice directly on your skin. Use the cold pack for 20 minutes. Remove it for at least 30 minutes before re-using.  Apply 1% hydrocortisone cream to the skin (sold at drug stores)  Take Advil (Ibuprofen) or Tylenol (Acetaminophen) per your provider's recommendations.      Call your provider if you have:    Weakness, dizziness, lightheadedness, or fainting.  Shortness of breath.  Irregular heartbeat, feelings of your heart fluttering or beating fast, hard or palpitations.   More than minor skin discomfort or redness where the cardioversion pads were placed.  Questions or concerns.      Call 911 if you have:    Pain in your chest, arm, shoulder, neck, or upper back.  You have problems speaking or seeing.  Weakness in your arm or leg.  You are unable to move your arm or leg.  You have uncontrolled bleeding.         [facility] [place]:    [phone] [facility] (7 days a week)

## 2022-12-29 NOTE — PROGRESS NOTES
Care Suites Admission Nursing Note    Patient Information  Name: Theodora Meng  Age: 87 year old  Reason for admission: Cardioversion  Care Suites arrival time: 0830    Visitor Information  Name: Emmy    Patient Admission/Assessment   Pre-procedure assessment complete: Yes  If abnormal assessment/labs, provider notified: N/A  NPO: Yes  Medications held per instructions/orders: Yes  Consent: obtained  Patient oriented to room: Yes  Education/questions answered: Yes  Plan/other: Cardiovert out of A flutter    Discharge Planning  Discharge name/phone number: Kit [phone]  Overnight post sedation caregiver: Elsa geiger  Discharge location: home    Ju Franco RN

## 2022-12-29 NOTE — INTERVAL H&P NOTE
"I have reviewed the surgical (or preoperative) H&P that is linked to this encounter, and examined the patient. There are no significant changes    Clinical Conditions Present on Arrival:  Clinically Significant Risk Factors Present on Admission          # Hyperkalemia: Highest K = 5.5 mmol/L in last 30 days, will monitor as appropriate         # Drug Induced Coagulation Defect: home medication list includes an anticoagulant medication   # DMII: A1C = 7.2 % (Ref range: 0.0 - 5.6 %) within past 3 months  # Obesity: Estimated body mass index is 37.97 kg/m  as calculated from the following:    Height as of this encounter: 1.626 m (5' 4\").    Weight as of this encounter: 100.3 kg (221 lb 3.2 oz).       "

## 2022-12-29 NOTE — ANESTHESIA CARE TRANSFER NOTE
Patient: Theodora Meng    Procedure: Procedure(s):  CARDIOVERSION       Diagnosis: Afib (H) [I48.91]  Diagnosis Additional Information: No value filed.    Anesthesia Type:   General     Note:    Oropharynx: oropharynx clear of all foreign objects and spontaneously breathing  Level of Consciousness: awake  Oxygen Supplementation: nasal cannula  Level of Supplemental Oxygen (L/min / FiO2): 4  Independent Airway: airway patency satisfactory and stable  Dentition: dentition unchanged  Vital Signs Stable: post-procedure vital signs reviewed and stable  Report to RN Given: handoff report given  Patient transferred to: Phase II    Handoff Report: Identifed the Patient, Identified the Reponsible Provider, Reviewed the pertinent medical history, Discussed the surgical course, Reviewed Intra-OP anesthesia mangement and issues during anesthesia, Set expectations for post-procedure period and Allowed opportunity for questions and acknowledgement of understanding      Vitals:  Vitals Value Taken Time   /69 12/29/22 1055   Temp     Pulse 85 12/29/22 1059   Resp 17 12/29/22 1059   SpO2 99 % 12/29/22 1059   Vitals shown include unvalidated device data.    Electronically Signed By: ALEX Kramer CRNA  December 29, 2022  11:01 AM

## 2022-12-29 NOTE — ANESTHESIA POSTPROCEDURE EVALUATION
Patient: Theodora Meng    Procedure: Procedure(s):  CARDIOVERSION       Anesthesia Type:  General    Note:     Postop Pain Control: Uneventful            Sign Out: Well controlled pain   PONV: No   Neuro/Psych: Uneventful            Sign Out: Acceptable/Baseline neuro status   Airway/Respiratory: Uneventful            Sign Out: Acceptable/Baseline resp. status   CV/Hemodynamics: Uneventful            Sign Out: Acceptable CV status   Other NRE: NONE   DID A NON-ROUTINE EVENT OCCUR?            Last vitals:  Vitals:    12/29/22 1114 12/29/22 1115 12/29/22 1115   BP:  132/81 131/75   Pulse: 98 85    Resp: 14 20    SpO2: 97% 97%        Electronically Signed By: Mike Lozano MD  December 29, 2022  2:59 PM

## 2022-12-29 NOTE — PROCEDURES
[facility]    Procedure: Cardioversion External    Date/Time: 12/29/2022 10:58 AM  Performed by: Jorge Andrade MD  Authorized by: George Starr MD       UNIVERSAL PROTOCOL   Site Marked: NA  Prior Images Obtained and Reviewed:  Yes  Required items: Required blood products, implants, devices and special equipment available    Patient identity confirmed:  Verbally with patient, arm band, provided demographic data and hospital-assigned identification number  Patient was reevaluated immediately before administering moderate or deep sedation or anesthesia  Confirmation Checklist:  Patient's identity using two indicators, relevant allergies, procedure was appropriate and matched the consent or emergent situation and correct equipment/implants were available  Time out: Immediately prior to the procedure a time out was called    Universal Protocol: the Joint Commission Universal Protocol was followed    Preparation: Patient was prepped and draped in usual sterile fashion       ANESTHESIA  Anesthesia was administered and monitored by anesthesiology.  See anesthesia documentation for details.    SEDATION  Patient Sedated: Yes    Sedation Type:  Deep  Sedation:  Propofol  Vital signs: Vital signs monitored during sedation      PROCEDURE DETAILS  Cardioversion basis: elective  Indications: failure of anti-arrhythmic medications  Pre-procedure rhythm: atrial fibrillation  Patient position: patient was placed in a supine position  Chest area: chest area exposed  Electrodes: pads  Electrodes placed: anterior-posterior  Number of attempts: 1    Details of Attempts:  A single, 120 J, synchronized shock with pads in AP position converted the rhythm from afib to SR   Post-procedure rhythm: normal sinus rhythm  Complications: no complications      PROCEDURE    Patient Tolerance:  Patient tolerated the procedure well with no immediate complications   The patient confirmed that she has been taking Xarelto  consistently for at least one month prior to the CV. She was recently prescribed amiodarone but has not taken it. I encouraged her to take it and talked about the potential risks. She was also advised to continue the Xarelto and diltiazem.

## 2022-12-29 NOTE — PRE-PROCEDURE
GENERAL PRE-PROCEDURE:   Procedure:  Electrical cardioversion of atrial fibrillation  Date/Time:  12/29/2022 10:34 AM    Verbal consent obtained?: Yes    Written consent obtained?: Yes    Risks and benefits: Risks, benefits and alternatives were discussed    Consent given by:  Patient  Patient states understanding of procedure being performed: Yes    Patient's understanding of procedure matches consent: Yes    Procedure consent matches procedure scheduled: Yes    Expected level of sedation:  Deep  Appropriately NPO:  Yes  ASA Class:  2  Mallampati  :  Grade 2- soft palate, base of uvula, tonsillar pillars, and portion of posterior pharyngeal wall visible  Lungs:  Lungs clear with good breath sounds bilaterally  Heart:  A-fib  History & Physical reviewed:  History and physical reviewed and no updates needed  Statement of review:  I have reviewed the lab findings, diagnostic data, medications, and the plan for sedation

## 2022-12-29 NOTE — PROGRESS NOTES
Care Suites Procedure Nursing Note    Patient Information  Name: Theodora Meng  Age: 87 year old    Procedure: Patient is alert and oriented. Pt denies difficulty swallowing or sleep apnea. Has dentures which shall remain in place. Patient has been NPO sufficient amount of time.    Discharge / post procedure instructions given to pt pre procedure w/ verbal understanding received.  All questions & concerns addressed. Family at bedside.   MD arrived, Sedation Assessment completed. Consent signed at this time.    CDV: anesthesia team arrived for DCCV,Time Out was then done. Propofol used for sedation while airway was managed by CRNA. Cardiologist gave one shock @ 120 joules and patient converted to SR. See rhythm strips. Pt awaken without issue, VS remain stable.    1140- Pt discharged per w/c to private vehicle. All personal belongings sent with pt. Resp even & unlabored upon transfer. Patient has stable vital signs.   Procedure start time: 0830  Procedure complete time: 1140  Concerns/abnormal assessment: No  If abnormal assessment, provider notified: N/A  Plan/Other: discharge to home    Care Suites Discharge Nursing Note    Discharge Education:  Discharge instructions reviewed: Yes  Additional education/resources provided: all questions answered  Patient/patient representative verbalizes understanding: Yes  Patient discharging on new medications: No  Medication education completed: Dr Laguerre encouraged pt to take the Amiodarone which had been previously Rx'd    Discharge Plans:   Discharge location: home  Discharge ride contacted: Yes  Approximate discharge time: 1140    Discharge Criteria:  Discharge criteria met and vital signs stable: Yes    Patient Belongs:  Patient belongings returned to patient: Yes    Ju Franco RN

## 2023-01-02 ENCOUNTER — TELEPHONE (OUTPATIENT)
Dept: CARDIOLOGY | Facility: CLINIC | Age: 88
End: 2023-01-02

## 2023-01-02 NOTE — TELEPHONE ENCOUNTER
Pt called to say she has been having dizziness and an off-balance feeling that has gotten worse since she started taking amiodarone on Friday 12/30/22. She had the same symptoms prior to the amiodarone but they have gotten worse since then. She wants to stop it. Does not take her BP and HR at home. Disc going to local pharmacy to see if they could do a BP and HR and have her report to the cardiology nurses to make sure she is not bradycardic or hypotensive. Was cardioverted on Thursday 12/29/22 and is still on Diltiazem 240 mg every day. Also has follow up 1/6/23 with Dr Starr,. Dr Starr off today but will discuss with him tomorrow. Uzma Patterson RN Cardiology January 2, 2023, 11:13 AM

## 2023-01-02 NOTE — TELEPHONE ENCOUNTER
Routing call to Dr. Starr-    Pt called back. /89 HR 50. Pt states she takes her diltiazem at 9:30am every morning and about an hour after taking it she starts to feel her legs get weak and feels off balance. She does NOT feel dizzy as in the room is spinning or feeling like she is going to faint, it is more of an unsteady balance issue due to her legs feeling weak. She started to us a cane to steady herself when walking.   She takes her Amiodarone every morning around 10:30am and the symptoms continue and intensify.   Notified pt will review vitals and symptoms with Dr. Starr tomorrow when he returns to clinic and call her back with his recommendations.     Pt also has f/u with Dr. Starr this Friday 1/6/23.    Janeen Apodaca RN

## 2023-01-02 NOTE — TELEPHONE ENCOUNTER
[facility] Call Center    Phone Message    May a detailed message be left on voicemail: yes     Reason for Call: Symptoms or Concerns     If patient has red-flag symptoms, warm transfer to triage line    Current symptom or concern: dizzy and off balance. Pt is concerned that this is side effect of amiodarone.    Symptoms have been present for:  2 week(s)    Has patient previously been seen for this? Yes    By : Dr. Starr    Date: 12/12/2022    Are there any new or worsening symptoms? Yes:       Action Taken: Other: cardio    Travel Screening: Not Applicable     Thank you!  [facility]

## 2023-01-03 LAB
ATRIAL RATE - MUSE: 375 BPM
ATRIAL RATE - MUSE: 82 BPM
DIASTOLIC BLOOD PRESSURE - MUSE: NORMAL MMHG
DIASTOLIC BLOOD PRESSURE - MUSE: NORMAL MMHG
INTERPRETATION ECG - MUSE: NORMAL
INTERPRETATION ECG - MUSE: NORMAL
P AXIS - MUSE: 83 DEGREES
P AXIS - MUSE: NORMAL DEGREES
PR INTERVAL - MUSE: 178 MS
PR INTERVAL - MUSE: NORMAL MS
QRS DURATION - MUSE: 70 MS
QRS DURATION - MUSE: 76 MS
QT - MUSE: 354 MS
QT - MUSE: 382 MS
QTC - MUSE: 446 MS
QTC - MUSE: 461 MS
R AXIS - MUSE: -18 DEGREES
R AXIS - MUSE: -27 DEGREES
SYSTOLIC BLOOD PRESSURE - MUSE: NORMAL MMHG
SYSTOLIC BLOOD PRESSURE - MUSE: NORMAL MMHG
T AXIS - MUSE: -40 DEGREES
T AXIS - MUSE: 27 DEGREES
VENTRICULAR RATE- MUSE: 102 BPM
VENTRICULAR RATE- MUSE: 82 BPM

## 2023-01-03 RX ORDER — DILTIAZEM HYDROCHLORIDE 180 MG/1
180 CAPSULE, EXTENDED RELEASE ORAL DAILY
COMMUNITY
Start: 2023-01-03 | End: 2023-01-09 | Stop reason: SINTOL

## 2023-01-03 NOTE — TELEPHONE ENCOUNTER
Decrease the dose of Dilt to 120 mg daily and ask her to take it and amio in the evening at  bedtime.  She should also stop torsemide, in case seh is still taking it. She needs to keep a BP, HR diary.      Matty

## 2023-01-03 NOTE — TELEPHONE ENCOUNTER
Called and reviewed providers message below with pt. The lowest dose of diltiazem she has at home is 180mg and with the weather she can not get to a pharmacy. She will decrease the diltiazem to 180mg for now. She has a follow up in clinic with Dr. Starr this Friday.   She took a dose of diltiazem and amio this am, she will start these meds again with lower dose diltiazem at bedtime tomorrow night.   She has NOT been taking the torsemide, she will continue to hold.   She will start keeping a diary of BP and HR. Reviewed propper BP technique and to take 1 hour after morning medications. We will bring the log with her for review at this Friday's visit.  Pt was grateful for the call back and verbalized an understanding.     Janeen Apodaca RN

## 2023-01-06 ENCOUNTER — HOSPITAL ENCOUNTER (OUTPATIENT)
Dept: CARDIOLOGY | Facility: CLINIC | Age: 88
Discharge: HOME | End: 2023-01-06
Attending: INTERNAL MEDICINE | Admitting: INTERNAL MEDICINE
Payer: COMMERCIAL

## 2023-01-06 ENCOUNTER — OFFICE VISIT (OUTPATIENT)
Dept: CARDIOLOGY | Facility: CLINIC | Age: 88
End: 2023-01-06
Attending: INTERNAL MEDICINE
Payer: COMMERCIAL

## 2023-01-06 VITALS
SYSTOLIC BLOOD PRESSURE: 134 MMHG | HEART RATE: 75 BPM | DIASTOLIC BLOOD PRESSURE: 81 MMHG | OXYGEN SATURATION: 100 % | WEIGHT: 218 LBS | BODY MASS INDEX: 37.42 KG/M2

## 2023-01-06 DIAGNOSIS — I48.91 ATRIAL FIBRILLATION, UNSPECIFIED TYPE (H): ICD-10-CM

## 2023-01-06 DIAGNOSIS — I48.91 NEW ONSET ATRIAL FIBRILLATION (H): ICD-10-CM

## 2023-01-06 PROCEDURE — 93010 ELECTROCARDIOGRAM REPORT: CPT | Performed by: INTERNAL MEDICINE

## 2023-01-06 PROCEDURE — 93005 ELECTROCARDIOGRAM TRACING: CPT | Performed by: CLINICAL EXERCISE PHYSIOLOGIST

## 2023-01-06 PROCEDURE — 99215 OFFICE O/P EST HI 40 MIN: CPT | Mod: 25 | Performed by: INTERNAL MEDICINE

## 2023-01-06 NOTE — PROGRESS NOTES
HPI and Plan:   Today I had the pleasure of seeing Theodora Meng at [facility]. She is a pleasant 87 year old patient who presents to the clinic for a follow up visit after undergoing DCCV.     Please refer to my prior note for detailed history.  Briefly the patient is a very nice lady whom I have been following very closely for the past few months.  I had initially seen her for generalized fatigue and shortness of breath which were thought to be secondary to atrial fibrillation +/-metoprolol.  I had initially switched her from metoprolol to diltiazem which improved her symptoms for 2 weeks.  She again reported these symptoms during the last visit and hence I decided to proceed with DC cardioversion which was performed 1 week ago.  She was also started on amiodarone after that.  She felt really good for 2-3 days after undergoing cardioversion but then started feeling lightheaded, dizziness and loss of balance after that.  She also feels that her fatigue although better has not resolved.  She is very worried about taking amiodarone due to various side effects of the medication.  In addition, she also gets severe pain in her belly after taking amiodarone and diltiazem.  Diltiazem was switched to p.m. which has improved but not resolved dizziness.  Her weight has been stable and she is not requiring diuretics.  She was also seen in the emergency department on Christmas Eve for left-sided chest pain which has been going on for many months.  Even though she was having severe pain during hospital presentation all the work-up was negative.  She however does have history of coronary artery calcification noted on the CT scan and had a stress test in 2016 which showed mild anterior ischemia which has been managed medically so far.    Assessment and plan:  1.  Symptomatic Atrial fibrillation- on anticoagulation  2.  Severe symptomatic GERD  3.  Essential hypertension  4.  Hyperlipidemia  5.  Type  2 diabetes  6.  Severe coronary calcification on CT chest- mild anterior ischemia on stress test in 2016- medically managed     It was pleasure to see Kamini and her daughter again in clinic today.  I am disappointed that she is not feeling completely back to normal even after all the interventions we have done.  She feels well for some time after an intervention but then goes back to feeling bad again.  Because she is having abdominal pain after taking diltiazem and amiodarone I have asked her to stop both these medications.  She wanted to restart atenolol in place of diltiazem which we can do.  I discussed with the patient and her daughter that both these medications are AV steven blocking agents and since she tolerated atenolol in the past I do not see a reason why she would react differently to it now.  As far as amiodarone is concerned I told her that she should hold it for 2 weeks.  She will come to see us in clinic after that.  During that visit we will restart amiodarone.  Depending upon how her symptoms respond to amio will decide whether to keep her on it long-term or not..  I also told her that she needs to stay on Xarelto for lifelong.  She understands.    Plan  1.  Discontinue diltiazem.  Start 25 mg of atenolol daily  2.  Hold amiodarone for 2 weeks.  Restart after clinic visit.  3.  Nuclear stress test to work-up atypical chest pain.  4.  TASHIA visit in 2 weeks to reassess symptoms    Thank you for allowing me to participate in the care of Theodora Meng    This note was completed in part using Dragon voice recognition software. Although reviewed after completion, some word and grammatical errors may occur.    George Starr MD  Cardiology    No orders of the defined types were placed in this encounter.      No orders of the defined types were placed in this encounter.      There are no discontinued medications.    Encounter Diagnosis   Name Primary?     New onset atrial fibrillation (H)         CURRENT MEDICATIONS:  Current Outpatient Medications   Medication Sig Dispense Refill     acetaminophen (TYLENOL) 500 MG tablet Take 1,000 mg by mouth 3 times daily  mg by mouth every 4 hours as needed       amiodarone (PACERONE) 200 MG tablet Take 1 tablet (200 mg) by mouth daily 90 tablet 3     atorvastatin (LIPITOR) 10 MG tablet TAKE 1 TABLET(10 MG) BY MOUTH DAILY 90 tablet 0     blood glucose monitoring (ACCU-CHEK COMPACT CARE KIT) meter device kit Use to test blood sugars 1 times daily or as directed. May sub formulary meter 1 kit 0     blood glucose monitoring (NO BRAND SPECIFIED) test strip Use to test blood sugars twice times daily or as directed 1 Box 3     diltiazem ER (DILT-XR) 180 MG 24 hr capsule Take 1 capsule (180 mg) by mouth daily       esomeprazole (NEXIUM) 40 MG DR capsule TAKE 1 CAPSULE EVERY       MORNING BEFORE BREAKFAST 90 capsule 3     famotidine (PEPCID) 20 MG tablet Take 1 tablet (20 mg) by mouth At Bedtime 60 tablet 1     gabapentin (NEURONTIN) 300 MG capsule TAKE 2 CAPSULES IN THE EVENING 180 capsule 3     lisinopril (ZESTRIL) 5 MG tablet TAKE 1 TABLET BY MOUTH EVERY DAY 90 tablet 2     LORazepam (ATIVAN) 0.5 MG tablet TAKE 1/2 to 1 TAB BY MOUTH EVERY 8 HOURS AS NEEDED FOR ANXIETY 10 tablet 0     rivaroxaban ANTICOAGULANT (XARELTO) 20 MG TABS tablet Take 1 tablet (20 mg) by mouth daily (with dinner) 90 tablet 1     torsemide (DEMADEX) 10 MG tablet Take 1 tablet (10 mg) by mouth daily (Patient taking differently: Take 10 mg by mouth daily as needed) 30 tablet 1       ALLERGIES     Allergies   Allergen Reactions     Zoloft [Sertraline] Palpitations, Diarrhea and Cramps       PAST MEDICAL HISTORY:  Past Medical History:   Diagnosis Date     Depressive disorder      Diabetes (H)      Esophageal reflux      Other abnormal glucose      Other premature beats      Unspecified essential hypertension        PAST SURGICAL HISTORY:  Past Surgical History:   Procedure Laterality Date      ANESTHESIA CARDIOVERSION N/A 12/29/2022    Procedure: CARDIOVERSION;  Surgeon: GENERIC ANESTHESIA PROVIDER;  Location:  OR     APPENDECTOMY       ARTHROPLASTY KNEE  10/30/2013    Procedure: ARTHROPLASTY KNEE;  Right Total Knee Arthroplasty;  Surgeon: Ousmane Mcgowan MD;  Location: [facility] OR     ARTHROPLASTY KNEE Left 4/3/2019    Procedure: Left Total Knee Arthroplasty;  Surgeon: Ousmane Mcgowan MD;  Location: [facility] OR     CHOLECYSTECTOMY, LAPOROSCOPIC      Cholecystectomy, Laparoscopic     DILATION AND CURETTAGE, OPERATIVE HYSTEROSCOPY WITH MORCELLATOR, COMBINED N/A 6/20/2016    Procedure: COMBINED DILATION AND CURETTAGE, OPERATIVE HYSTEROSCOPY WITH MORCELLATOR;  Surgeon: Bony Arredondo MD;  Location: [facility] OR     ESOPHAGOSCOPY, GASTROSCOPY, DUODENOSCOPY (EGD), COMBINED N/A 2/17/2016    Procedure: COMBINED ESOPHAGOSCOPY, GASTROSCOPY, DUODENOSCOPY (EGD), BIOPSY SINGLE OR MULTIPLE;  Surgeon: Mil Guevara MD;  Location: [facility] GI     SALPINGO OOPHORECTOMY,R/L/OZZY      Salpingo Oophorectomy, RT/LT/OZZY     TONSILLECTOMY         FAMILY HISTORY:  Family History   Problem Relation Age of Onset     Heart Disease Mother      Heart Disease Father      Heart Disease Brother      Heart Disease Brother      Heart Disease Brother        SOCIAL HISTORY:  Social History     Socioeconomic History     Marital status:    Tobacco Use     Smoking status: Never     Smokeless tobacco: Never   Vaping Use     Vaping Use: Never used   Substance and Sexual Activity     Alcohol use: No     Drug use: No     Sexual activity: Never       Review of Systems:  Skin:          Eyes:         ENT:         Respiratory:          Cardiovascular:         Gastroenterology:        Genitourinary:         Musculoskeletal:         Neurologic:         Psychiatric:         Heme/Lymph/Imm:         Endocrine:           Physical Exam:  Vitals: LMP  (LMP Unknown)   Eyes: No icterus.  Pulmonary: Chest symmetric, lungs clear bilaterally and no  crackles, wheezes or rales.  Cardiovascular: RRR with normal S1 and S2, no murmur, JVP normal.  Musculoskeletal: Edema of the lower extremities: None.  Neurologic: Oriented and appropriate without obvious focal deficits.   Psychiatric: Normal affect.     Recent Lab Results:  LIPID RESULTS:  Lab Results   Component Value Date    CHOL 122 10/27/2022    CHOL 175 11/03/2020    HDL 48 (L) 10/27/2022    HDL 71 11/03/2020    LDL 49 10/27/2022    LDL 66 11/03/2020    TRIG 123 10/27/2022    TRIG 188 (H) 11/03/2020    CHOLHDLRATIO 3.0 05/15/2015       LIVER ENZYME RESULTS:  Lab Results   Component Value Date    AST 20 10/05/2022    AST 12 11/18/2020    ALT 17 10/05/2022    ALT 15 11/18/2020       CBC RESULTS:  Lab Results   Component Value Date    WBC 7.2 12/25/2022    WBC 5.1 11/18/2020    RBC 4.01 12/25/2022    RBC 4.52 11/18/2020    HGB 11.3 (L) 12/25/2022    HGB 13.4 11/18/2020    HCT 34.9 (L) 12/25/2022    HCT 41.8 11/18/2020    MCV 87 12/25/2022    MCV 93 11/18/2020    MCH 28.2 12/25/2022    MCH 29.6 11/18/2020    MCHC 32.4 12/25/2022    MCHC 32.1 11/18/2020    RDW 14.6 12/25/2022    RDW 13.2 11/18/2020     12/25/2022     11/18/2020       BMP RESULTS:  Lab Results   Component Value Date     12/25/2022     11/18/2020    POTASSIUM 3.9 12/29/2022    POTASSIUM 3.6 11/18/2020    CHLORIDE 104 12/25/2022    CHLORIDE 108 03/23/2022    CHLORIDE 109 11/18/2020    CO2 25 12/25/2022    CO2 26 03/23/2022    CO2 28 11/18/2020    ANIONGAP 7 12/25/2022    ANIONGAP 5 03/23/2022    ANIONGAP 4 11/18/2020     (H) 12/25/2022     (H) 03/23/2022     (H) 11/18/2020    BUN 22.7 12/25/2022    BUN 20 03/23/2022    BUN 16 11/18/2020    CR 0.90 12/25/2022    CR 0.90 11/18/2020    GFRESTIMATED 62 12/25/2022    GFRESTIMATED 58 (L) 11/04/2021    GFRESTIMATED 58 (L) 11/18/2020    GFRESTBLACK 68 11/18/2020    FEDERICO 8.7 (L) 12/25/2022    FEDERICO 8.9 11/18/2020        A1C RESULTS:  Lab Results   Component Value  Date    A1C 7.2 (H) 10/27/2022    A1C 6.9 (H) 11/03/2020       INR RESULTS:  No results found for: INR    CC  George Starr MD  [id number]  [place] [id number]    All medical records were reviewed in detail and discussed with the patient. Greater than 30 mins were spent with the patient, 50% of this time was spent on counseling and coordination of care.  After visit summary was printed and given to the patient.

## 2023-01-06 NOTE — LETTER
1/6/2023    Yecenia Cardona MD  [address] [place] [id number]    RE: Theodora Meng       Dear Colleague,     I had the pleasure of seeing Theodora Meng in the [facility].  HPI and Plan:   Today I had the pleasure of seeing Theodora Meng at [facility]. She is a pleasant 87 year old patient who presents to the clinic for a follow up visit after undergoing DCCV.     Please refer to my prior note for detailed history.  Briefly the patient is a very nice lady whom I have been following very closely for the past few months.  I had initially seen her for generalized fatigue and shortness of breath which were thought to be secondary to atrial fibrillation +/-metoprolol.  I had initially switched her from metoprolol to diltiazem which improved her symptoms for 2 weeks.  She again reported these symptoms during the last visit and hence I decided to proceed with DC cardioversion which was performed 1 week ago.  She was also started on amiodarone after that.  She felt really good for 2-3 days after undergoing cardioversion but then started feeling lightheaded, dizziness and loss of balance after that.  She also feels that her fatigue although better has not resolved.  She is very worried about taking amiodarone due to various side effects of the medication.  In addition, she also gets severe pain in her belly after taking amiodarone and diltiazem.  Diltiazem was switched to p.m. which has improved but not resolved dizziness.  Her weight has been stable and she is not requiring diuretics.  She was also seen in the emergency department on Christmas Eve for left-sided chest pain which has been going on for many months.  Even though she was having severe pain during hospital presentation all the work-up was negative.  She however does have history of coronary artery calcification noted on the CT scan and had a stress test in 2016 which showed mild anterior  ischemia which has been managed medically so far.    Assessment and plan:  1.  Symptomatic Atrial fibrillation- on anticoagulation  2.  Severe symptomatic GERD  3.  Essential hypertension  4.  Hyperlipidemia  5.  Type 2 diabetes  6.  Severe coronary calcification on CT chest- mild anterior ischemia on stress test in 2016- medically managed     It was pleasure to see Kamini and her daughter again in clinic today.  I am disappointed that she is not feeling completely back to normal even after all the interventions we have done.  She feels well for some time after an intervention but then goes back to feeling bad again.  Because she is having abdominal pain after taking diltiazem and amiodarone I have asked her to stop both these medications.  She wanted to restart atenolol in place of diltiazem which we can do.  I discussed with the patient and her daughter that both these medications are AV steven blocking agents and since she tolerated atenolol in the past I do not see a reason why she would react differently to it now.  As far as amiodarone is concerned I told her that she should hold it for 2 weeks.  She will come to see us in clinic after that.  During that visit we will restart amiodarone.  Depending upon how her symptoms respond to amio will decide whether to keep her on it long-term or not..  I also told her that she needs to stay on Xarelto for lifelong.  She understands.    Plan  1.  Discontinue diltiazem.  Start 25 mg of atenolol daily  2.  Hold amiodarone for 2 weeks.  Restart after clinic visit.  3.  Nuclear stress test to work-up atypical chest pain.  4.  TASHIA visit in 2 weeks to reassess symptoms    Thank you for allowing me to participate in the care of Theodora Meng    This note was completed in part using Dragon voice recognition software. Although reviewed after completion, some word and grammatical errors may occur.    George Starr MD  Cardiology    No orders of the defined types were placed  in this encounter.      No orders of the defined types were placed in this encounter.      There are no discontinued medications.    Encounter Diagnosis   Name Primary?     New onset atrial fibrillation (H)        CURRENT MEDICATIONS:  Current Outpatient Medications   Medication Sig Dispense Refill     acetaminophen (TYLENOL) 500 MG tablet Take 1,000 mg by mouth 3 times daily  mg by mouth every 4 hours as needed       amiodarone (PACERONE) 200 MG tablet Take 1 tablet (200 mg) by mouth daily 90 tablet 3     atorvastatin (LIPITOR) 10 MG tablet TAKE 1 TABLET(10 MG) BY MOUTH DAILY 90 tablet 0     blood glucose monitoring (ACCU-CHEK COMPACT CARE KIT) meter device kit Use to test blood sugars 1 times daily or as directed. May sub formulary meter 1 kit 0     blood glucose monitoring (NO BRAND SPECIFIED) test strip Use to test blood sugars twice times daily or as directed 1 Box 3     diltiazem ER (DILT-XR) 180 MG 24 hr capsule Take 1 capsule (180 mg) by mouth daily       esomeprazole (NEXIUM) 40 MG DR capsule TAKE 1 CAPSULE EVERY       MORNING BEFORE BREAKFAST 90 capsule 3     famotidine (PEPCID) 20 MG tablet Take 1 tablet (20 mg) by mouth At Bedtime 60 tablet 1     gabapentin (NEURONTIN) 300 MG capsule TAKE 2 CAPSULES IN THE EVENING 180 capsule 3     lisinopril (ZESTRIL) 5 MG tablet TAKE 1 TABLET BY MOUTH EVERY DAY 90 tablet 2     LORazepam (ATIVAN) 0.5 MG tablet TAKE 1/2 to 1 TAB BY MOUTH EVERY 8 HOURS AS NEEDED FOR ANXIETY 10 tablet 0     rivaroxaban ANTICOAGULANT (XARELTO) 20 MG TABS tablet Take 1 tablet (20 mg) by mouth daily (with dinner) 90 tablet 1     torsemide (DEMADEX) 10 MG tablet Take 1 tablet (10 mg) by mouth daily (Patient taking differently: Take 10 mg by mouth daily as needed) 30 tablet 1       ALLERGIES     Allergies   Allergen Reactions     Zoloft [Sertraline] Palpitations, Diarrhea and Cramps       PAST MEDICAL HISTORY:  Past Medical History:   Diagnosis Date     Depressive disorder      Diabetes  (H)      Esophageal reflux      Other abnormal glucose      Other premature beats      Unspecified essential hypertension        PAST SURGICAL HISTORY:  Past Surgical History:   Procedure Laterality Date     ANESTHESIA CARDIOVERSION N/A 12/29/2022    Procedure: CARDIOVERSION;  Surgeon: GENERIC ANESTHESIA PROVIDER;  Location:  OR     APPENDECTOMY       ARTHROPLASTY KNEE  10/30/2013    Procedure: ARTHROPLASTY KNEE;  Right Total Knee Arthroplasty;  Surgeon: Ousmane Mcgowan MD;  Location: [place] OR     ARTHROPLASTY KNEE Left 4/3/2019    Procedure: Left Total Knee Arthroplasty;  Surgeon: Ousmane Mcgowan MD;  Location: [facility] OR     CHOLECYSTECTOMY, LAPOROSCOPIC      Cholecystectomy, Laparoscopic     DILATION AND CURETTAGE, OPERATIVE HYSTEROSCOPY WITH MORCELLATOR, COMBINED N/A 6/20/2016    Procedure: COMBINED DILATION AND CURETTAGE, OPERATIVE HYSTEROSCOPY WITH MORCELLATOR;  Surgeon: Bony Arredondo MD;  Location: [facility] OR     ESOPHAGOSCOPY, GASTROSCOPY, DUODENOSCOPY (EGD), COMBINED N/A 2/17/2016    Procedure: COMBINED ESOPHAGOSCOPY, GASTROSCOPY, DUODENOSCOPY (EGD), BIOPSY SINGLE OR MULTIPLE;  Surgeon: Mil Guevara MD;  Location: [facility] GI     SALPINGO OOPHORECTOMY,R/L/OZZY      Salpingo Oophorectomy, RT/LT/OZZY     TONSILLECTOMY         FAMILY HISTORY:  Family History   Problem Relation Age of Onset     Heart Disease Mother      Heart Disease Father      Heart Disease Brother      Heart Disease Brother      Heart Disease Brother        SOCIAL HISTORY:  Social History     Socioeconomic History     Marital status:    Tobacco Use     Smoking status: Never     Smokeless tobacco: Never   Vaping Use     Vaping Use: Never used   Substance and Sexual Activity     Alcohol use: No     Drug use: No     Sexual activity: Never       Review of Systems:  Skin:          Eyes:         ENT:         Respiratory:          Cardiovascular:         Gastroenterology:        Genitourinary:         Musculoskeletal:          Neurologic:         Psychiatric:         Heme/Lymph/Imm:         Endocrine:           Physical Exam:  Vitals: LMP  (LMP Unknown)   Eyes: No icterus.  Pulmonary: Chest symmetric, lungs clear bilaterally and no crackles, wheezes or rales.  Cardiovascular: RRR with normal S1 and S2, no murmur, JVP normal.  Musculoskeletal: Edema of the lower extremities: None.  Neurologic: Oriented and appropriate without obvious focal deficits.   Psychiatric: Normal affect.     Recent Lab Results:  LIPID RESULTS:  Lab Results   Component Value Date    CHOL 122 10/27/2022    CHOL 175 11/03/2020    HDL 48 (L) 10/27/2022    HDL 71 11/03/2020    LDL 49 10/27/2022    LDL 66 11/03/2020    TRIG 123 10/27/2022    TRIG 188 (H) 11/03/2020    CHOLHDLRATIO 3.0 05/15/2015       LIVER ENZYME RESULTS:  Lab Results   Component Value Date    AST 20 10/05/2022    AST 12 11/18/2020    ALT 17 10/05/2022    ALT 15 11/18/2020       CBC RESULTS:  Lab Results   Component Value Date    WBC 7.2 12/25/2022    WBC 5.1 11/18/2020    RBC 4.01 12/25/2022    RBC 4.52 11/18/2020    HGB 11.3 (L) 12/25/2022    HGB 13.4 11/18/2020    HCT 34.9 (L) 12/25/2022    HCT 41.8 11/18/2020    MCV 87 12/25/2022    MCV 93 11/18/2020    MCH 28.2 12/25/2022    MCH 29.6 11/18/2020    MCHC 32.4 12/25/2022    MCHC 32.1 11/18/2020    RDW 14.6 12/25/2022    RDW 13.2 11/18/2020     12/25/2022     11/18/2020       BMP RESULTS:  Lab Results   Component Value Date     12/25/2022     11/18/2020    POTASSIUM 3.9 12/29/2022    POTASSIUM 3.6 11/18/2020    CHLORIDE 104 12/25/2022    CHLORIDE 108 03/23/2022    CHLORIDE 109 11/18/2020    CO2 25 12/25/2022    CO2 26 03/23/2022    CO2 28 11/18/2020    ANIONGAP 7 12/25/2022    ANIONGAP 5 03/23/2022    ANIONGAP 4 11/18/2020     (H) 12/25/2022     (H) 03/23/2022     (H) 11/18/2020    BUN 22.7 12/25/2022    BUN 20 03/23/2022    BUN 16 11/18/2020    CR 0.90 12/25/2022    CR 0.90 11/18/2020    GFRESTIMATED 62  12/25/2022    GFRESTIMATED 58 (L) 11/04/2021    GFRESTIMATED 58 (L) 11/18/2020    GFRESTBLACK 68 11/18/2020    FEDERICO 8.7 (L) 12/25/2022    FEDERICO 8.9 11/18/2020        A1C RESULTS:  Lab Results   Component Value Date    A1C 7.2 (H) 10/27/2022    A1C 6.9 (H) 11/03/2020       INR RESULTS:  No results found for: INR    CC  George Starr MD  [address] [facility] [address]  [place] [address]    All medical records were reviewed in detail and discussed with the patient. Greater than 30 mins were spent with the patient, 50% of this time was spent on counseling and coordination of care.  After visit summary was printed and given to the patient.    Thank you for allowing me to participate in the care of your patient.      Sincerely,     George Starr MD     [facility]

## 2023-01-06 NOTE — NURSING NOTE
Chief Complaint   Patient presents with     Heart Problem     1 week Cardioversion Follow up       There were no vitals filed for this visit.  Wt Readings from Last 1 Encounters:   12/29/22 100.3 kg (221 lb 3.2 oz)     Arianne Rosales MA

## 2023-01-09 ENCOUNTER — TELEPHONE (OUTPATIENT)
Dept: CARDIOLOGY | Facility: CLINIC | Age: 88
End: 2023-01-09

## 2023-01-09 DIAGNOSIS — I48.91 ATRIAL FIBRILLATION (H): ICD-10-CM

## 2023-01-09 DIAGNOSIS — I10 ESSENTIAL HYPERTENSION: Primary | ICD-10-CM

## 2023-01-09 RX ORDER — ATENOLOL 25 MG/1
25 TABLET ORAL DAILY
Qty: 30 TABLET | Refills: 3 | Status: ON HOLD | OUTPATIENT
Start: 2023-01-09 | End: 2023-03-03

## 2023-01-09 NOTE — TELEPHONE ENCOUNTER
[facility] Cardiology Refill Guideline reviewed.  Medication meets criteria for refill. Diltiazem stopped at 1/6/23 visit with Dr Starr and atenolol 25 mg started but no order sent. Refills sent. Uzma Patterson RN Cardiology January 9, 2023, 7:59 AM

## 2023-01-11 ENCOUNTER — TELEPHONE (OUTPATIENT)
Dept: CARDIOLOGY | Facility: CLINIC | Age: 88
End: 2023-01-11

## 2023-01-11 NOTE — TELEPHONE ENCOUNTER
[facility] Call Center    Phone Message    May a detailed message be left on voicemail: yes     Reason for Call: Other: Kamini called to completely cancel the [place] Lexiscan stress test (nuc card) scheduled on 1/16/23 in [place]     Action Taken: Message routed to:  Other: cardiology    Travel Screening: Not Applicable     Thank you!  [facility]

## 2023-01-16 DIAGNOSIS — E78.5 HYPERLIPIDEMIA LDL GOAL <100: ICD-10-CM

## 2023-01-16 DIAGNOSIS — M79.2 NEURALGIA: ICD-10-CM

## 2023-01-16 DIAGNOSIS — I10 ESSENTIAL HYPERTENSION WITH GOAL BLOOD PRESSURE LESS THAN 140/90: ICD-10-CM

## 2023-01-16 RX ORDER — GABAPENTIN 300 MG/1
CAPSULE ORAL
Qty: 180 CAPSULE | Refills: 3 | Status: SHIPPED | OUTPATIENT
Start: 2023-01-16 | End: 2023-10-31

## 2023-01-16 RX ORDER — ATORVASTATIN CALCIUM 10 MG/1
10 TABLET, FILM COATED ORAL DAILY
Qty: 90 TABLET | Refills: 3 | Status: SHIPPED | OUTPATIENT
Start: 2023-01-16 | End: 2023-07-18

## 2023-01-16 RX ORDER — LISINOPRIL 5 MG/1
5 TABLET ORAL DAILY
Qty: 90 TABLET | Refills: 3 | Status: ON HOLD | OUTPATIENT
Start: 2023-01-16 | End: 2023-03-03

## 2023-01-18 ENCOUNTER — OFFICE VISIT (OUTPATIENT)
Dept: CARDIOLOGY | Facility: CLINIC | Age: 88
End: 2023-01-18
Attending: INTERNAL MEDICINE
Payer: COMMERCIAL

## 2023-01-18 VITALS
RESPIRATION RATE: 16 BRPM | SYSTOLIC BLOOD PRESSURE: 130 MMHG | WEIGHT: 215.2 LBS | HEART RATE: 74 BPM | BODY MASS INDEX: 36.94 KG/M2 | OXYGEN SATURATION: 99 % | DIASTOLIC BLOOD PRESSURE: 79 MMHG

## 2023-01-18 DIAGNOSIS — I10 BENIGN ESSENTIAL HYPERTENSION: ICD-10-CM

## 2023-01-18 DIAGNOSIS — I25.10 CORONARY ARTERY CALCIFICATION SEEN ON CT SCAN: ICD-10-CM

## 2023-01-18 DIAGNOSIS — E78.5 HYPERLIPIDEMIA LDL GOAL <70: ICD-10-CM

## 2023-01-18 DIAGNOSIS — I48.0 PAROXYSMAL ATRIAL FIBRILLATION (H): Primary | ICD-10-CM

## 2023-01-18 PROCEDURE — 99214 OFFICE O/P EST MOD 30 MIN: CPT | Performed by: NURSE PRACTITIONER

## 2023-01-18 RX ORDER — TORSEMIDE 10 MG/1
10 TABLET ORAL DAILY
Qty: 30 TABLET | Refills: 1 | Status: SHIPPED | OUTPATIENT
Start: 2023-01-18 | End: 2023-01-19

## 2023-01-18 NOTE — PATIENT INSTRUCTIONS
Medication Changes:  None     Recommendations:  Recommend omron blood pressure monitor.  Can be purchased at any pharmacy or online.     Follow-up:  Lexiscan nuclear stress test   See Jackie CALDERÓN for cardiology follow up at [place]: 1 month.   Call 6 months prior, to schedule.     Cardiology Scheduling~[phone]  Cardiology Clinic RN~[phone] (Uzma RN, Janeen RN, Pita RN)

## 2023-01-18 NOTE — PROGRESS NOTES
Cardiology Clinic Progress Note  Theodora Meng MRN# [id number]   YOB: 1935 Age: 87 year old      Primary Cardiologist:   Dr. Starr          History of Presenting Illness:      Theodora Meng is a pleasant 87 year old patient with a past cardiac history significant for   1. Paroxysmal atrial fibrillation    Symptomatic    Successful cardioversion 12/2022    Side effects with amio    Rate control and anticoagulated  2. Coronary artery calcification  3. Hypertension  4. Hyperlipidemia  Past medical history significant for DM type II, GERD.    Patient was seen by Dr. Starr in November 2022 for shortness of breath and fatigue.  These were thought to be secondary to atrial fibrillation and metoprolol.  Initially metoprolol was switched to diltiazem which improved her symptoms but only for 2 weeks.  She then had recurrent symptoms and proceeded with cardioversion.  After cardioversion she was started on amiodarone.  She felt good for 2 to 3 days post cardioversion but then experienced lightheaded, dizzy, loss of balance and fatigue had not improved.    On 12/4/2022 she was seen in the ED for chest pain.  She had negative cardiac work-up but was noted to have severe coronary calcification on CT.  Prior stress test in 2016 showed mild ischemia and this had been medically managed.    Patient was seen by Dr. Starr in January 2023.  She was concerned about side effects of amiodarone and noted severe abdominal pain after taking amiodarone and diltiazem.  Ultimately, diltiazem was switched to evening which improved some of the dizziness.  He recommended holding amiodarone for 2 weeks, to see if abdominal pain improved.  He discontinued diltiazem and restarted atenolol.  Recommended stress test.    Pt presents today, with her daughter, for testing follow-up.  Unfortunately, she has not had her stress test.  She is not against a stress test but was just tired of going through testing at the time.  She  continues with symptoms of heartburn that have increased in frequency and is having a GI evaluation.  Because of this, I did recommend still proceeding with stress testing for further work-up, which she is agreeable to.    Blood pressure today is well controlled.  Her abdominal pain significantly improved after holding amiodarone and switching to atenolol.  Ultimately, she is having a GI work-up for her heartburn and abdominal pains.  She does not feel that she has gone back into atrial fibrillation.  On exam, her rhythm is regular.  She would like to pursue rate control and if she returns to atrial fibrillation would then discuss a new plan, if needed. Patient reports no shortness of breath, PND, orthopnea, presyncope, syncope, edema, heart racing, or palpitations.        Labs:  LIPID RESULTS:  Lab Results   Component Value Date    CHOL 122 10/27/2022    CHOL 175 11/03/2020    HDL 48 (L) 10/27/2022    HDL 71 11/03/2020    LDL 49 10/27/2022    LDL 66 11/03/2020    TRIG 123 10/27/2022    TRIG 188 (H) 11/03/2020    CHOLHDLRATIO 3.0 05/15/2015       LIVER ENZYME RESULTS:  Lab Results   Component Value Date    AST 20 10/05/2022    AST 12 11/18/2020    ALT 17 10/05/2022    ALT 15 11/18/2020       CBC RESULTS:  Lab Results   Component Value Date    WBC 7.2 12/25/2022    WBC 5.1 11/18/2020    RBC 4.01 12/25/2022    RBC 4.52 11/18/2020    HGB 11.3 (L) 12/25/2022    HGB 13.4 11/18/2020    HCT 34.9 (L) 12/25/2022    HCT 41.8 11/18/2020    MCV 87 12/25/2022    MCV 93 11/18/2020    MCH 28.2 12/25/2022    MCH 29.6 11/18/2020    MCHC 32.4 12/25/2022    MCHC 32.1 11/18/2020    RDW 14.6 12/25/2022    RDW 13.2 11/18/2020     12/25/2022     11/18/2020       BMP RESULTS:  Lab Results   Component Value Date     12/25/2022     11/18/2020    POTASSIUM 3.9 12/29/2022    POTASSIUM 3.6 11/18/2020    CHLORIDE 104 12/25/2022    CHLORIDE 108 03/23/2022    CHLORIDE 109 11/18/2020    CO2 25 12/25/2022    CO2 26  03/23/2022    CO2 28 11/18/2020    ANIONGAP 7 12/25/2022    ANIONGAP 5 03/23/2022    ANIONGAP 4 11/18/2020     (H) 12/25/2022     (H) 03/23/2022     (H) 11/18/2020    BUN 22.7 12/25/2022    BUN 20 03/23/2022    BUN 16 11/18/2020    CR 0.90 12/25/2022    CR 0.90 11/18/2020    GFRESTIMATED 62 12/25/2022    GFRESTIMATED 58 (L) 11/04/2021    GFRESTIMATED 58 (L) 11/18/2020    GFRESTBLACK 68 11/18/2020    FEDERICO 8.7 (L) 12/25/2022    FEDERICO 8.9 11/18/2020        A1C RESULTS:  Lab Results   Component Value Date    A1C 7.2 (H) 10/27/2022    A1C 6.9 (H) 11/03/2020       INR RESULTS:  No results found for: INR    Results reviewed today.       Current Cardiac Medications     Atenolol 25 mg daily  Atorvastatin 10 mg daily  Lisinopril 5 mg daily  Torsemide 10 mg daily as needed   Xarelto 20 mg daily                   Assessment and Plan:       Plan    Patient Instructions   Medication Changes:  5. None     Recommendations:  1. Recommend omron blood pressure monitor.  Can be purchased at any pharmacy or online.   2. Check blood pressure at least 1 hour after medications. Call the clinic if your blood pressure is consistently greater than 130/80.      Follow-up:  1. Lexiscan nuclear stress test   2. See Jackie CALDERÓN for cardiology follow up at [facility]: 1 month.   Call 6 months prior, to schedule.     Cardiology Scheduling~[phone]  Cardiology Clinic RN~[phone] (Uzma RN, Janeen RN, Pita RN)          1. Paroxysmal atrial fibrillation    Symptomatic with episodes    Did not tolerate amiodarone d/t abd pain     Continue atenolol for rate control and Xarelto for anticoagulation      2. Coronary artery calcification    Heartburn symptoms     Continue statin, ACE inhibitor, beta-blocker      3. Hypertension    Controlled    Continue lisinopril, atenolol      4. Hyperlipidemia    Last LDL 49 on 10/2022    Continue atorvastatin 10 mg daily               Thank you for allowing me to participate in this  delightful patient's care.      This note was completed in part using Dragon voice recognition software. Although reviewed after completion, some word and grammatical errors may occur.    Jackie Manzano, ALEX CNP, APRN, CNP           Data:   All laboratory data reviewed      Total time spent today was 38 mins, reviewing labs, testing, notes, documenting notes, and seeing patient.          Constitutional:  cooperative, alert and oriented, well developed, well nourished, in no acute distress  overweight     Skin:  warm and dry to the touch         Head:  normocephalic       Eyes:  pupils equal and round       ENT:  no pallor or cyanosis       Neck:  no stiffness       Respiratory:  clear to auscultation; normal symmetry        Cardiac: regular rate and rhythm; normal S1 and S2                 pulses full and equal       GI:  abdomen soft, nondistended     Extremities and Muscular Skeletal:  no edema        Neurological:  affect appropriate; no gross motor deficits       Psych:  Alert and Oriented x 3 , appropriate affact

## 2023-01-18 NOTE — LETTER
1/18/2023    Yecenia Cardona MD  [address] [place] [id number]    RE: Theodora Flynnberg       Dear Colleague,     I had the pleasure of seeing Theodora Meng in the [facility].  Cardiology Clinic Progress Note  Theodora Meng MRN# [id number]   YOB: 1935 Age: 87 year old      Primary Cardiologist:   Dr. Starr          History of Presenting Illness:      Theodora Meng is a pleasant 87 year old patient with a past cardiac history significant for   1. Paroxysmal atrial fibrillation    Symptomatic    Successful cardioversion 12/2022    Side effects with amio    Rate control and anticoagulated  2. Coronary artery calcification  3. Hypertension  4. Hyperlipidemia  Past medical history significant for DM type II, GERD.    Patient was seen by Dr. Starr in November 2022 for shortness of breath and fatigue.  These were thought to be secondary to atrial fibrillation and metoprolol.  Initially metoprolol was switched to diltiazem which improved her symptoms but only for 2 weeks.  She then had recurrent symptoms and proceeded with cardioversion.  After cardioversion she was started on amiodarone.  She felt good for 2 to 3 days post cardioversion but then experienced lightheaded, dizzy, loss of balance and fatigue had not improved.    On 12/4/2022 she was seen in the ED for chest pain.  She had negative cardiac work-up but was noted to have severe coronary calcification on CT.  Prior stress test in 2016 showed mild ischemia and this had been medically managed.    Patient was seen by Dr. Starr in January 2023.  She was concerned about side effects of amiodarone and noted severe abdominal pain after taking amiodarone and diltiazem.  Ultimately, diltiazem was switched to evening which improved some of the dizziness.  He recommended holding amiodarone for 2 weeks, to see if abdominal pain improved.  He discontinued diltiazem and restarted atenolol.  Recommended stress  test.    Pt presents today, with her daughter, for testing follow-up.  Unfortunately, she has not had her stress test.  She is not against a stress test but was just tired of going through testing at the time.  She continues with symptoms of heartburn that have increased in frequency and is having a GI evaluation.  Because of this, I did recommend still proceeding with stress testing for further work-up, which she is agreeable to.    Blood pressure today is well controlled.  Her abdominal pain significantly improved after holding amiodarone and switching to atenolol.  Ultimately, she is having a GI work-up for her heartburn and abdominal pains.  She does not feel that she has gone back into atrial fibrillation.  On exam, her rhythm is regular.  She would like to pursue rate control and if she returns to atrial fibrillation would then discuss a new plan, if needed. Patient reports no shortness of breath, PND, orthopnea, presyncope, syncope, edema, heart racing, or palpitations.        Labs:  LIPID RESULTS:  Lab Results   Component Value Date    CHOL 122 10/27/2022    CHOL 175 11/03/2020    HDL 48 (L) 10/27/2022    HDL 71 11/03/2020    LDL 49 10/27/2022    LDL 66 11/03/2020    TRIG 123 10/27/2022    TRIG 188 (H) 11/03/2020    CHOLHDLRATIO 3.0 05/15/2015       LIVER ENZYME RESULTS:  Lab Results   Component Value Date    AST 20 10/05/2022    AST 12 11/18/2020    ALT 17 10/05/2022    ALT 15 11/18/2020       CBC RESULTS:  Lab Results   Component Value Date    WBC 7.2 12/25/2022    WBC 5.1 11/18/2020    RBC 4.01 12/25/2022    RBC 4.52 11/18/2020    HGB 11.3 (L) 12/25/2022    HGB 13.4 11/18/2020    HCT 34.9 (L) 12/25/2022    HCT 41.8 11/18/2020    MCV 87 12/25/2022    MCV 93 11/18/2020    MCH 28.2 12/25/2022    MCH 29.6 11/18/2020    MCHC 32.4 12/25/2022    MCHC 32.1 11/18/2020    RDW 14.6 12/25/2022    RDW 13.2 11/18/2020     12/25/2022     11/18/2020       BMP RESULTS:  Lab Results   Component Value Date      12/25/2022     11/18/2020    POTASSIUM 3.9 12/29/2022    POTASSIUM 3.6 11/18/2020    CHLORIDE 104 12/25/2022    CHLORIDE 108 03/23/2022    CHLORIDE 109 11/18/2020    CO2 25 12/25/2022    CO2 26 03/23/2022    CO2 28 11/18/2020    ANIONGAP 7 12/25/2022    ANIONGAP 5 03/23/2022    ANIONGAP 4 11/18/2020     (H) 12/25/2022     (H) 03/23/2022     (H) 11/18/2020    BUN 22.7 12/25/2022    BUN 20 03/23/2022    BUN 16 11/18/2020    CR 0.90 12/25/2022    CR 0.90 11/18/2020    GFRESTIMATED 62 12/25/2022    GFRESTIMATED 58 (L) 11/04/2021    GFRESTIMATED 58 (L) 11/18/2020    GFRESTBLACK 68 11/18/2020    FEDERICO 8.7 (L) 12/25/2022    FEDERICO 8.9 11/18/2020        A1C RESULTS:  Lab Results   Component Value Date    A1C 7.2 (H) 10/27/2022    A1C 6.9 (H) 11/03/2020       INR RESULTS:  No results found for: INR    Results reviewed today.       Current Cardiac Medications     Atenolol 25 mg daily  Atorvastatin 10 mg daily  Lisinopril 5 mg daily  Torsemide 10 mg daily as needed   Xarelto 20 mg daily                   Assessment and Plan:       Plan    Patient Instructions   Medication Changes:  5. None     Recommendations:  1. Recommend omron blood pressure monitor.  Can be purchased at any pharmacy or online.   2. Check blood pressure at least 1 hour after medications. Call the clinic if your blood pressure is consistently greater than 130/80.      Follow-up:  1. Lexiscan nuclear stress test   2. See Jackie CALDERÓN for cardiology follow up at [facility]: 1 month.   Call 6 months prior, to schedule.     Cardiology Scheduling~[phone]  Cardiology Clinic RN~[phone] (Uzma RN, Janeen RN, Pita RN)          1. Paroxysmal atrial fibrillation    Symptomatic with episodes    Did not tolerate amiodarone d/t abd pain     Continue atenolol for rate control and Xarelto for anticoagulation      2. Coronary artery calcification    Heartburn symptoms     Continue statin, ACE inhibitor,  beta-blocker      3. Hypertension    Controlled    Continue lisinopril, atenolol      4. Hyperlipidemia    Last LDL 49 on 10/2022    Continue atorvastatin 10 mg daily               Thank you for allowing me to participate in this delightful patient's care.      This note was completed in part using Dragon voice recognition software. Although reviewed after completion, some word and grammatical errors may occur.    ALEX Turner CNP, APRJEET, CNP           Data:   All laboratory data reviewed      Total time spent today was 38 mins, reviewing labs, testing, notes, documenting notes, and seeing patient.          Constitutional:  cooperative, alert and oriented, well developed, well nourished, in no acute distress  overweight     Skin:  warm and dry to the touch         Head:  normocephalic       Eyes:  pupils equal and round       ENT:  no pallor or cyanosis       Neck:  no stiffness       Respiratory:  clear to auscultation; normal symmetry        Cardiac: regular rate and rhythm; normal S1 and S2                 pulses full and equal       GI:  abdomen soft, nondistended     Extremities and Muscular Skeletal:  no edema        Neurological:  affect appropriate; no gross motor deficits       Psych:  Alert and Oriented x 3 , appropriate affact        Thank you for allowing me to participate in the care of your patient.      Sincerely,     ALEX Turner CNP     [facility]  cc:   George Starr MD  [address]  [place] [address]

## 2023-01-19 DIAGNOSIS — I10 BENIGN ESSENTIAL HYPERTENSION: ICD-10-CM

## 2023-01-19 RX ORDER — TORSEMIDE 10 MG/1
10 TABLET ORAL DAILY PRN
Qty: 30 TABLET | Refills: 1 | Status: SHIPPED | OUTPATIENT
Start: 2023-01-19 | End: 2023-02-22 | Stop reason: SINTOL

## 2023-01-19 NOTE — TELEPHONE ENCOUNTER
"[facility] Cardiology Refill Guideline reviewed.  Medication meets criteria for refill.     Pt only takes PRN. Notified pharmacy 90 day denied as only takes PRN. Rx updated to state \"daily as needed.\"    Janeen Apodaca RN      "

## 2023-01-19 NOTE — TELEPHONE ENCOUNTER
Pharmacy/ patient requesting 90 day refills.    Last Cardiology visit: 1/18/23  Next appt not yet scheduled.

## 2023-01-25 ENCOUNTER — TELEPHONE (OUTPATIENT)
Dept: FAMILY MEDICINE | Facility: CLINIC | Age: 88
End: 2023-01-25
Payer: COMMERCIAL

## 2023-01-25 DIAGNOSIS — R10.13 EPIGASTRIC PAIN: ICD-10-CM

## 2023-01-25 DIAGNOSIS — K21.9 GASTROESOPHAGEAL REFLUX DISEASE WITHOUT ESOPHAGITIS: Primary | ICD-10-CM

## 2023-01-25 NOTE — TELEPHONE ENCOUNTER
Patients daughter, Emmy, calling. Emmy states that patient was told that she needs to see gastroenterology. Was told by gastro that referral needs to come from her PCP.    Ok to call Emmy to let her know when referral is placed. [phone]

## 2023-01-27 ENCOUNTER — HOSPITAL ENCOUNTER (EMERGENCY)
Facility: CLINIC | Age: 88
Discharge: HOME | End: 2023-01-27
Attending: EMERGENCY MEDICINE | Admitting: EMERGENCY MEDICINE
Payer: COMMERCIAL

## 2023-01-27 VITALS
OXYGEN SATURATION: 99 % | RESPIRATION RATE: 14 BRPM | WEIGHT: 214 LBS | DIASTOLIC BLOOD PRESSURE: 85 MMHG | BODY MASS INDEX: 36.73 KG/M2 | HEART RATE: 88 BPM | TEMPERATURE: 98 F | SYSTOLIC BLOOD PRESSURE: 113 MMHG

## 2023-01-27 DIAGNOSIS — I48.92 ATRIAL FLUTTER, UNSPECIFIED TYPE (H): ICD-10-CM

## 2023-01-27 DIAGNOSIS — R10.13 ABDOMINAL PAIN, EPIGASTRIC: ICD-10-CM

## 2023-01-27 LAB
ALBUMIN SERPL BCG-MCNC: 3.3 G/DL (ref 3.5–5.2)
ALP SERPL-CCNC: 120 U/L (ref 35–104)
ALT SERPL W P-5'-P-CCNC: 10 U/L (ref 10–35)
ANION GAP SERPL CALCULATED.3IONS-SCNC: 13 MMOL/L (ref 7–15)
AST SERPL W P-5'-P-CCNC: 17 U/L (ref 10–35)
BASOPHILS # BLD AUTO: 0 10E3/UL (ref 0–0.2)
BASOPHILS NFR BLD AUTO: 1 %
BILIRUB SERPL-MCNC: 0.3 MG/DL
BUN SERPL-MCNC: 23.5 MG/DL (ref 8–23)
CALCIUM SERPL-MCNC: 9.1 MG/DL (ref 8.8–10.2)
CHLORIDE SERPL-SCNC: 102 MMOL/L (ref 98–107)
CREAT SERPL-MCNC: 0.9 MG/DL (ref 0.51–0.95)
DEPRECATED HCO3 PLAS-SCNC: 21 MMOL/L (ref 22–29)
EOSINOPHIL # BLD AUTO: 0.1 10E3/UL (ref 0–0.7)
EOSINOPHIL NFR BLD AUTO: 1 %
ERYTHROCYTE [DISTWIDTH] IN BLOOD BY AUTOMATED COUNT: 16 % (ref 10–15)
GFR SERPL CREATININE-BSD FRML MDRD: 62 ML/MIN/1.73M2
GLUCOSE SERPL-MCNC: 120 MG/DL (ref 70–99)
HCT VFR BLD AUTO: 34.3 % (ref 35–47)
HGB BLD-MCNC: 10.9 G/DL (ref 11.7–15.7)
HOLD SPECIMEN: NORMAL
IMM GRANULOCYTES # BLD: 0 10E3/UL
IMM GRANULOCYTES NFR BLD: 0 %
LIPASE SERPL-CCNC: 22 U/L (ref 13–60)
LYMPHOCYTES # BLD AUTO: 1.7 10E3/UL (ref 0.8–5.3)
LYMPHOCYTES NFR BLD AUTO: 33 %
MCH RBC QN AUTO: 28 PG (ref 26.5–33)
MCHC RBC AUTO-ENTMCNC: 31.8 G/DL (ref 31.5–36.5)
MCV RBC AUTO: 88 FL (ref 78–100)
MONOCYTES # BLD AUTO: 0.4 10E3/UL (ref 0–1.3)
MONOCYTES NFR BLD AUTO: 8 %
NEUTROPHILS # BLD AUTO: 3.1 10E3/UL (ref 1.6–8.3)
NEUTROPHILS NFR BLD AUTO: 57 %
NRBC # BLD AUTO: 0 10E3/UL
NRBC BLD AUTO-RTO: 0 /100
PLATELET # BLD AUTO: 324 10E3/UL (ref 150–450)
POTASSIUM SERPL-SCNC: 4 MMOL/L (ref 3.4–5.3)
PROT SERPL-MCNC: 7 G/DL (ref 6.4–8.3)
RBC # BLD AUTO: 3.89 10E6/UL (ref 3.8–5.2)
SODIUM SERPL-SCNC: 136 MMOL/L (ref 136–145)
TROPONIN T SERPL HS-MCNC: 14 NG/L
TSH SERPL DL<=0.005 MIU/L-ACNC: 1.13 UIU/ML (ref 0.3–4.2)
WBC # BLD AUTO: 5.3 10E3/UL (ref 4–11)

## 2023-01-27 PROCEDURE — 99284 EMERGENCY DEPT VISIT MOD MDM: CPT | Mod: 25 | Performed by: EMERGENCY MEDICINE

## 2023-01-27 PROCEDURE — 85025 COMPLETE CBC W/AUTO DIFF WBC: CPT | Performed by: EMERGENCY MEDICINE

## 2023-01-27 PROCEDURE — 93010 ELECTROCARDIOGRAM REPORT: CPT | Performed by: EMERGENCY MEDICINE

## 2023-01-27 PROCEDURE — 36415 COLL VENOUS BLD VENIPUNCTURE: CPT | Performed by: EMERGENCY MEDICINE

## 2023-01-27 PROCEDURE — 93005 ELECTROCARDIOGRAM TRACING: CPT

## 2023-01-27 PROCEDURE — 250N000013 HC RX MED GY IP 250 OP 250 PS 637: Performed by: EMERGENCY MEDICINE

## 2023-01-27 PROCEDURE — 99284 EMERGENCY DEPT VISIT MOD MDM: CPT

## 2023-01-27 PROCEDURE — 84443 ASSAY THYROID STIM HORMONE: CPT | Performed by: EMERGENCY MEDICINE

## 2023-01-27 PROCEDURE — 83690 ASSAY OF LIPASE: CPT | Performed by: EMERGENCY MEDICINE

## 2023-01-27 PROCEDURE — 84484 ASSAY OF TROPONIN QUANT: CPT | Performed by: EMERGENCY MEDICINE

## 2023-01-27 PROCEDURE — 80053 COMPREHEN METABOLIC PANEL: CPT | Performed by: EMERGENCY MEDICINE

## 2023-01-27 RX ORDER — SUCRALFATE ORAL 1 G/10ML
1 SUSPENSION ORAL 4 TIMES DAILY
Qty: 500 ML | Refills: 0 | Status: ON HOLD | OUTPATIENT
Start: 2023-01-27 | End: 2023-03-03

## 2023-01-27 RX ORDER — DILTIAZEM HYDROCHLORIDE 240 MG/1
CAPSULE, COATED, EXTENDED RELEASE ORAL
COMMUNITY
Start: 2022-12-25 | End: 2023-01-27

## 2023-01-27 RX ORDER — SUCRALFATE ORAL 1 G/10ML
1 SUSPENSION ORAL ONCE
Status: COMPLETED | OUTPATIENT
Start: 2023-01-27 | End: 2023-01-27

## 2023-01-27 RX ADMIN — SUCRALFATE 1 G: 1 SUSPENSION ORAL at 18:54

## 2023-01-27 ASSESSMENT — ACTIVITIES OF DAILY LIVING (ADL)
ADLS_ACUITY_SCORE: 35
ADLS_ACUITY_SCORE: 37
ADLS_ACUITY_SCORE: 37

## 2023-01-27 NOTE — ED TRIAGE NOTES
Abd pain for one week, diarrhea, no emesis, sore throat       Triage Assessment     Row Name 01/27/23 5488       Triage Assessment (Adult)    Airway WDL WDL       Respiratory WDL    Respiratory WDL WDL       Cognitive/Neuro/Behavioral WDL    Cognitive/Neuro/Behavioral WDL WDL

## 2023-01-28 NOTE — DISCHARGE INSTRUCTIONS
Follow-up with your cardiologist and primary care doctor first thing next week by phone.    Carafate, as directed.    Return to the emergency department for any problems.

## 2023-01-30 ENCOUNTER — TELEPHONE (OUTPATIENT)
Dept: CARDIOLOGY | Facility: CLINIC | Age: 88
End: 2023-01-30
Payer: COMMERCIAL

## 2023-01-30 NOTE — TELEPHONE ENCOUNTER
[facility] Call Center    Phone Message    May a detailed message be left on voicemail: yes     Reason for Call: Other: Per call from Emmy, patient back on atrial flutter and went to the ER last Friday. Patient scheduled with [facility] on 2/17/23, doesn't want to schedule Stress test at this time. Emmy requesting a call back to further discuss it.      Action Taken: Other: Cardio    Travel Screening: Not Applicable

## 2023-01-30 NOTE — TELEPHONE ENCOUNTER
Pts daughter Chelsea calling in stating pt was seen in ED last Friday 1/27/23 for abdominal pain. Pt was found to be in Afib/flutter.     Pt had DCCV 12/29/22- no A.fib detected post DCCV until this ED visit.    Per last clinic note -  PAF    Symptomatic with episodes    Did not tolerate amiodarone d/t abd pain     Continue atenolol for rate control and Xarelto for anticoagulation    Pt to schedule stress test- which pt has not yet scheduled.    Pt is scheduled to see you back on 2/17/23 with stress test prior.  Called and spoke with daughter. Pt continues to fight with acid reflux and abdominal pain. She finally is seeing GI this Monday 2/6/23. These symptoms give her little energy. She has not been eating much.   Reviewed with daughter that pt is currently still covered from an A.fib/flutter standpoint with Xarelto and Atenolol. She denies any CP, palpitations or SOB.   Wondering if her acid reflux/abd. Pain is triggering A fib/flutter?  Also could her acid reflux and fatigue be related to Coronary disease.  Reviewed with daughter would highly recommend stress test to r/o coronary disease.   Pt schedule for stress test after GI consult in hopes she will be feeling better to do stress test and have results back by f/u on 2/17/23. Daughter agreed.     Also notified her will forward call to Jackie Olivares CNP in the mean time and call her back if she has any other recommendations or in site prior to stress test and f/u.    Janeen Apodaca RN

## 2023-01-30 NOTE — TELEPHONE ENCOUNTER
Spoke with daughter Emmy. States her mom got an alert on her BP machine for an irregular heart beat so went to ED. Found to be in AFlutter. BP was good. Wondering if still should do Lexiscan while in AFlutter. States is fatigued and a bit dyspneic with activity. Will discuss with Jackie Olivares NP for recommendations. Has follow up on 2/17/23. Also has been struggling with gastric reflux for 2 weeks and will be having GI consult on 2/6/23. Uzma Patterson RN Cardiology January 30, 2023, 1:34 PM

## 2023-01-31 NOTE — ED PROVIDER NOTES
History     Chief Complaint   Patient presents with     Abdominal Pain     Abd pain for one week, diarrhea, no emesis, sore throat     HPI  Dorathy LÁZARO Meng is a 87 year old female who  has a past medical history of Depressive disorder, Diabetes (H), Esophageal reflux, and atrial fibrillation.  She presents to the emergency department reporting 4 months of upper abdominal discomfort.  She also complains of acid reflux.  Reports that she has had throat irritation, especially in the morning sour taste in her mouth.  She denies shortness of breath, fevers, blood in the stool or black or tarry stools.  She states that she had been seen by cardiology and converted to a sinus rhythm a while ago, but is still anticoagulated.  The abdominal discomfort is described as a fullness or burning sensation and has not worsened or improved by anything.        Allergies:  Allergies   Allergen Reactions     Zoloft [Sertraline] Palpitations, Diarrhea and Cramps       Problem List:    Patient Active Problem List    Diagnosis Date Noted     Coronary artery calcification seen on CT scan 01/18/2023     Priority: Medium     Paroxysmal atrial fibrillation (H) 01/18/2023     Priority: Medium     Chronic kidney disease, stage 3 10/25/2021     Priority: Medium     Left knee DJD 04/03/2019     Priority: Medium     Subclinical hyperthyroidism 11/12/2018     Priority: Medium     Type 2 diabetes mellitus without complication, without long-term current use of insulin (H) 11/10/2016     Priority: Medium     Benign essential hypertension 11/10/2016     Priority: Medium     Gastroesophageal reflux disease without esophagitis 11/10/2016     Priority: Medium     Endometrial polyp 06/18/2016     Priority: Medium     Thickened endometrium 04/21/2016     Priority: Medium     Thyroid nodule 03/24/2016     Priority: Medium     DDD (degenerative disc disease), cervical 02/13/2016     Priority: Medium     Type 2 diabetes mellitus without complication (H)  10/21/2015     Priority: Medium     Morbid obesity (H) 10/21/2015     Priority: Medium     Status post total knee replacement 11/29/2013     Priority: Medium     Fever 11/11/2013     Priority: Medium     Edema 11/07/2013     Priority: Medium     Nausea 11/07/2013     Priority: Medium     S/P total knee arthroplasty 10/30/2013     Priority: Medium     Right knee DJD 10/30/2013     Priority: Medium     OA (osteoarthritis) of knee 12/11/2012     Priority: Medium     Hyperlipidemia LDL goal <70 12/04/2012     Priority: Medium     Knee pain 11/07/2012     Priority: Medium     Insomnia 11/07/2012     Priority: Medium     Anxiety 11/15/2011     Priority: Medium     Type 2 diabetes mellitus with other ophthalmic complication, without long-term current use of insulin (H) 12/21/2010     Priority: Medium     Neuralgia 06/05/2009     Priority: Medium     Vitamin D deficiency 03/30/2009     Priority: Medium     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 Provider to review          Past Medical History:    Past Medical History:   Diagnosis Date     Depressive disorder      Diabetes (H)      Esophageal reflux      Other abnormal glucose      Other premature beats      Unspecified essential hypertension        Past Surgical History:    Past Surgical History:   Procedure Laterality Date     ANESTHESIA CARDIOVERSION N/A 12/29/2022    Procedure: CARDIOVERSION;  Surgeon: GENERIC ANESTHESIA PROVIDER;  Location:  OR     APPENDECTOMY       ARTHROPLASTY KNEE  10/30/2013    Procedure: ARTHROPLASTY KNEE;  Right Total Knee Arthroplasty;  Surgeon: Ousmane Mcgowan MD;  Location: [facility]     ARTHROPLASTY KNEE Left 4/3/2019    Procedure: Left Total Knee Arthroplasty;  Surgeon: Ousmane Mcgowan MD;  Location: [facility]     CHOLECYSTECTOMY, LAPOROSCOPIC       Cholecystectomy, Laparoscopic     DILATION AND CURETTAGE, OPERATIVE HYSTEROSCOPY WITH MORCELLATOR, COMBINED N/A 6/20/2016    Procedure: COMBINED DILATION AND CURETTAGE, OPERATIVE HYSTEROSCOPY WITH MORCELLATOR;  Surgeon: Bony Arredondo MD;  Location: [facility]     ESOPHAGOSCOPY, GASTROSCOPY, DUODENOSCOPY (EGD), COMBINED N/A 2/17/2016    Procedure: COMBINED ESOPHAGOSCOPY, GASTROSCOPY, DUODENOSCOPY (EGD), BIOPSY SINGLE OR MULTIPLE;  Surgeon: Mil Guevara MD;  Location: [facility]     SALPINGO OOPHORECTOMY,R/L/OZZY      Salpingo Oophorectomy, RT/LT/OZYZ     TONSILLECTOMY         Family History:    Family History   Problem Relation Age of Onset     Heart Disease Mother      Heart Disease Father      Heart Disease Brother      Heart Disease Brother      Heart Disease Brother        Social History:  Marital Status:   [5]  Social History     Tobacco Use     Smoking status: Never     Smokeless tobacco: Never   Vaping Use     Vaping Use: Never used   Substance Use Topics     Alcohol use: No     Drug use: No        Medications:    acetaminophen (TYLENOL) 500 MG tablet  atenolol (TENORMIN) 25 MG tablet  atorvastatin (LIPITOR) 10 MG tablet  esomeprazole (NEXIUM) 40 MG DR capsule  famotidine (PEPCID) 20 MG tablet  gabapentin (NEURONTIN) 300 MG capsule  lisinopril (ZESTRIL) 5 MG tablet  LORazepam (ATIVAN) 0.5 MG tablet  rivaroxaban ANTICOAGULANT (XARELTO) 20 MG TABS tablet  sucralfate (CARAFATE) 1 GM/10ML suspension  blood glucose monitoring (ACCU-CHEK COMPACT CARE KIT) meter device kit  blood glucose monitoring (NO BRAND SPECIFIED) test strip  torsemide (DEMADEX) 10 MG tablet          Review of Systems    Physical Exam   BP: 129/78  Pulse: 87  Temp: 98  F (36.7  C)  Resp: 14  Weight: 97.1 kg (214 lb)  SpO2: 99 %      Physical Exam  Vitals and nursing note reviewed.   Constitutional:       General: She is not in acute distress.     Appearance: She is well-developed. She is not ill-appearing, toxic-appearing or  diaphoretic.   HENT:      Head: Normocephalic and atraumatic.      Mouth/Throat:      Lips: Pink.      Mouth: Mucous membranes are moist.      Pharynx: Oropharynx is clear. No oropharyngeal exudate.   Eyes:      General: Lids are normal. No scleral icterus.     Extraocular Movements: Extraocular movements intact.      Right eye: No nystagmus.      Left eye: No nystagmus.      Conjunctiva/sclera: Conjunctivae normal.      Pupils: Pupils are equal, round, and reactive to light.   Neck:      Thyroid: No thyromegaly.      Vascular: No JVD.      Trachea: No tracheal deviation.   Cardiovascular:      Rate and Rhythm: Normal rate. Rhythm irregular.      Pulses: Normal pulses.      Heart sounds: Normal heart sounds. No murmur heard.    No friction rub. No gallop.   Pulmonary:      Effort: Pulmonary effort is normal. No respiratory distress.      Breath sounds: Normal breath sounds.   Abdominal:      General: Bowel sounds are normal. There is no distension.      Palpations: Abdomen is soft. There is no mass.      Tenderness: There is no abdominal tenderness. There is no guarding or rebound.   Musculoskeletal:         General: No tenderness. Normal range of motion.      Cervical back: Normal range of motion and neck supple. No erythema or rigidity.      Right lower leg: No edema.      Left lower leg: No edema.   Lymphadenopathy:      Cervical: No cervical adenopathy.   Skin:     General: Skin is warm and dry.      Capillary Refill: Capillary refill takes less than 2 seconds.      Coloration: Skin is not pale.      Findings: No erythema or rash.   Neurological:      Mental Status: She is alert and oriented to person, place, and time.      Cranial Nerves: No cranial nerve deficit.      Sensory: No sensory deficit.      Motor: Motor function is intact.   Psychiatric:         Mood and Affect: Mood and affect normal.         Speech: Speech normal.         Behavior: Behavior normal.         ED Course                 Procedures               EKG Interpretation:      Interpreted by Thompson Lew MD    Symptoms at time of EKG: Epigastric pain  Rhythm: Atrial flutter with variable conduction  Rate: 107  Ectopy: none  Conduction: normal  ST Segments/ T Waves: No acute ischemic changes  Q Waves: nonspecific  Comparison to prior: Patient is back in atrial flutter    Clinical Impression: Atrial flutter with variable conduction, no acute ischemic changes                Results for orders placed or performed during the hospital encounter of 01/27/23   Comprehensive metabolic panel     Status: Abnormal   Result Value Ref Range    Sodium 136 136 - 145 mmol/L    Potassium 4.0 3.4 - 5.3 mmol/L    Chloride 102 98 - 107 mmol/L    Carbon Dioxide (CO2) 21 (L) 22 - 29 mmol/L    Anion Gap 13 7 - 15 mmol/L    Urea Nitrogen 23.5 (H) 8.0 - 23.0 mg/dL    Creatinine 0.90 0.51 - 0.95 mg/dL    Calcium 9.1 8.8 - 10.2 mg/dL    Glucose 120 (H) 70 - 99 mg/dL    Alkaline Phosphatase 120 (H) 35 - 104 U/L    AST 17 10 - 35 U/L    ALT 10 10 - 35 U/L    Protein Total 7.0 6.4 - 8.3 g/dL    Albumin 3.3 (L) 3.5 - 5.2 g/dL    Bilirubin Total 0.3 <=1.2 mg/dL    GFR Estimate 62 >60 mL/min/1.73m2   Lipase     Status: Normal   Result Value Ref Range    Lipase 22 13 - 60 U/L   Troponin T, High Sensitivity     Status: Normal   Result Value Ref Range    Troponin T, High Sensitivity 14 <=14 ng/L   TSH with free T4 reflex     Status: Normal   Result Value Ref Range    TSH 1.13 0.30 - 4.20 uIU/mL   [place] Draw     Status: None    Narrative    The following orders were created for panel order [place] Draw.  Procedure                               Abnormality         Status                     ---------                               -----------         ------                     Extra Blue Top Tube[[id number]]                              Final result               Extra Red Top Tube[[id number]]                                                            Please view results for these  tests on the individual orders.   CBC with platelets and differential     Status: Abnormal   Result Value Ref Range    WBC Count 5.3 4.0 - 11.0 10e3/uL    RBC Count 3.89 3.80 - 5.20 10e6/uL    Hemoglobin 10.9 (L) 11.7 - 15.7 g/dL    Hematocrit 34.3 (L) 35.0 - 47.0 %    MCV 88 78 - 100 fL    MCH 28.0 26.5 - 33.0 pg    MCHC 31.8 31.5 - 36.5 g/dL    RDW 16.0 (H) 10.0 - 15.0 %    Platelet Count 324 150 - 450 10e3/uL    % Neutrophils 57 %    % Lymphocytes 33 %    % Monocytes 8 %    % Eosinophils 1 %    % Basophils 1 %    % Immature Granulocytes 0 %    NRBCs per 100 WBC 0 <1 /100    Absolute Neutrophils 3.1 1.6 - 8.3 10e3/uL    Absolute Lymphocytes 1.7 0.8 - 5.3 10e3/uL    Absolute Monocytes 0.4 0.0 - 1.3 10e3/uL    Absolute Eosinophils 0.1 0.0 - 0.7 10e3/uL    Absolute Basophils 0.0 0.0 - 0.2 10e3/uL    Absolute Immature Granulocytes 0.0 <=0.4 10e3/uL    Absolute NRBCs 0.0 10e3/uL   Extra Blue Top Tube     Status: None   Result Value Ref Range    Hold Specimen [facility]    CBC with platelets differential     Status: Abnormal    Narrative    The following orders were created for panel order CBC with platelets differential.  Procedure                               Abnormality         Status                     ---------                               -----------         ------                     CBC with platelets and d...[[id number]]  Abnormal            Final result                 Please view results for these tests on the individual orders.         Medications   sucralfate (CARAFATE) suspension 1 g (1 g Oral Given 1/27/23 1854)       Assessments & Plan (with Medical Decision Making)     I have reviewed the nursing notes.    I have reviewed the findings, diagnosis, plan and need for follow up with the patient.  This patient presented to the emergency department complaining of abdominal discomfort.  This has been somewhat chronic, over the past 4 months.  Nothing on history suggests upper GI bleed.  Hemoglobin is stable.  She  is back in atrial flutter, but rate is controlled and she is hemodynamically stable.  She is already anticoagulated.  No evidence of acute hepatobiliary process or acute pancreatitis on blood work.  Her abdominal exam is benign, there is no leukocytosis or fever, all decreasing suspicion for more serious intra-abdominal processes such as perforation, obstruction, diverticulitis, or other more serious pathology.  At this point in time, I am comfortable discharging her home and have asked her to follow-up with her cardiology clinic regarding the atrial flutter as she may need to be cardioverted again.  I am unsure as to whether or not she is a candidate for ablation procedure.  We will try Carafate for her abdominal discomfort and reflux symptoms.  I feel it is unlikely that the symptoms are secondary to myocardial ischemia given the fact she has no ischemic changes on her EKG and has a negative troponin.  She reports that she has an appointment with a gastroenterologist, this will be a good next step in work-up of her abdominal discomfort as endoscopy may be helpful.  She was told to return to the emergency department for any problems and was discharged in good condition.        Discharge Medication List as of 1/27/2023  7:15 PM      START taking these medications    Details   sucralfate (CARAFATE) 1 GM/10ML suspension Take 10 mLs (1 g) by mouth 4 times daily, Disp-500 mL, R-0, E-Prescribe             Final diagnoses:   Atrial flutter, unspecified type (H)   Abdominal pain, epigastric       1/27/2023   [facility] EMERGENCY DEPT     Thompson Lew MD  01/30/23 0096

## 2023-02-01 NOTE — TELEPHONE ENCOUNTER
Hard to say if her reflux/pain is triggering fib/flutter, not sure.   relux and fatigue could be anginal symptoms.   Glad they scheduled stress test.     ALEX Turner CNP

## 2023-02-02 NOTE — TELEPHONE ENCOUNTER
Called and reviewed providers message with daughter. She verbalized an understanding and will plan on bringing her mom in for stress test as planned on 2/15/23.    Janeen Apodaca RN

## 2023-02-06 ENCOUNTER — OFFICE VISIT (OUTPATIENT)
Dept: GASTROENTEROLOGY | Facility: CLINIC | Age: 88
End: 2023-02-06
Attending: FAMILY MEDICINE
Payer: COMMERCIAL

## 2023-02-06 VITALS
WEIGHT: 220 LBS | BODY MASS INDEX: 37.56 KG/M2 | DIASTOLIC BLOOD PRESSURE: 78 MMHG | SYSTOLIC BLOOD PRESSURE: 118 MMHG | HEIGHT: 64 IN

## 2023-02-06 DIAGNOSIS — K44.9 HIATAL HERNIA: Primary | ICD-10-CM

## 2023-02-06 DIAGNOSIS — K21.9 GASTROESOPHAGEAL REFLUX DISEASE WITHOUT ESOPHAGITIS: ICD-10-CM

## 2023-02-06 DIAGNOSIS — R10.13 EPIGASTRIC PAIN: ICD-10-CM

## 2023-02-06 PROCEDURE — 99204 OFFICE O/P NEW MOD 45 MIN: CPT | Performed by: NURSE PRACTITIONER

## 2023-02-06 RX ORDER — OMEPRAZOLE 40 MG/1
CAPSULE, DELAYED RELEASE ORAL
Qty: 90 CAPSULE | Refills: 1 | Status: SHIPPED | OUTPATIENT
Start: 2023-02-06 | End: 2023-03-07

## 2023-02-06 ASSESSMENT — PAIN SCALES - GENERAL: PAINLEVEL: MILD PAIN (3)

## 2023-02-06 NOTE — PROGRESS NOTES
"Gastroenterology CLINIC VISIT, NEW PATIENT    CC/REFERRING PROVIDER: Yecenia Armstrong  REASON FOR CONSULTATION: abdominal pain    HPI: 87 year old female with PMH of type 2 diabetes mellitus, CKD stage III, paroxysmal atrial fibrillation, and morbid obesity, was referred to GI clinic after ER visit on 1/27/2023. She went to ER for an episodes of atrial flutter but was complaining of ongoing epigastric pain for 4 months. She was given a prescription of Carafate but stopped it after taking a few doses. She felt that it makes her pain worse.  She found to be in a flutter in the emergency room.  They recommended to proceed with cardioversion, which was completed in December.  Now, patient is scheduled for stress test in 2 weeks. I reviewed the emergency room visit note and laboratory work.  Stable hemoglobin at 10.9 with hematocrit of 34.3.  Normal thyroid function test and lipase.  CMP showed mild elevation in alkaline phosphatase at 128 and slightly decreased albumin at 3.3. Normal electrolytes.     Patient's daughter is present here today.She stated that her mom has been having pain in her abdomen for more than 4 months, but the pain was less severe and  was better by the end of a day. Said that he pain was \"more like attacks\" but is constant now. Stated that she was has been taking Nexium for more than 20 years and is wondering if it still working.   Patient complains of pain from her throat all way down to below her ribs. Feels like pressure and burning. Denies dysphagia, but stated that she has been coughing a lot (chronic issue). She denies correlation of pain with food intake.   Complaints of occasional burping.  She was prescribed Carafate by emergency room provider, but she thought that it makes her symptoms worse. Said that her symptoms are worse in the morning. She sleeps in adjustable bed and raises her head up. Denies nausea or vomiting.    Describes her bowel movements are regular, once a day, soft " stools.  Regarding her appetite, patient states that some days she feels hungry and eats well, other days she might skip meals.  Her daughter is concerned that patient is losing weight gradually.    Patient had EGD in February 2016, which showed 5 mm sessile polyp in the stomach.She had normal colonoscopy back in 2008.  CT scan completed in October 2022 showed large sliding hiatal hernia.     ROS: 10pt ROS performed and otherwise negative.    PAST MEDICAL HISTORY:  Past Medical History:   Diagnosis Date     Depressive disorder      Diabetes (H)      Esophageal reflux      Other abnormal glucose      Other premature beats      Unspecified essential hypertension        PREVIOUS ABDOMINAL/GYNECOLOGIC SURGERIES:  Past Surgical History:   Procedure Laterality Date     ANESTHESIA CARDIOVERSION N/A 12/29/2022    Procedure: CARDIOVERSION;  Surgeon: GENERIC ANESTHESIA PROVIDER;  Location:  OR     APPENDECTOMY       ARTHROPLASTY KNEE  10/30/2013    Procedure: ARTHROPLASTY KNEE;  Right Total Knee Arthroplasty;  Surgeon: Ousmane Mcgowan MD;  Location: [facility]     ARTHROPLASTY KNEE Left 4/3/2019    Procedure: Left Total Knee Arthroplasty;  Surgeon: Ousmane Mcgowan MD;  Location: [facility]     CHOLECYSTECTOMY, LAPOROSCOPIC      Cholecystectomy, Laparoscopic     DILATION AND CURETTAGE, OPERATIVE HYSTEROSCOPY WITH MORCELLATOR, COMBINED N/A 6/20/2016    Procedure: COMBINED DILATION AND CURETTAGE, OPERATIVE HYSTEROSCOPY WITH MORCELLATOR;  Surgeon: Bony Arredondo MD;  Location: [facility]     ESOPHAGOSCOPY, GASTROSCOPY, DUODENOSCOPY (EGD), COMBINED N/A 2/17/2016    Procedure: COMBINED ESOPHAGOSCOPY, GASTROSCOPY, DUODENOSCOPY (EGD), BIOPSY SINGLE OR MULTIPLE;  Surgeon: Mil Guevara MD;  Location: [facility]     SALPINGO OOPHORECTOMY,R/L/OZZY      Salpingo Oophorectomy, RT/LT/OZZY     TONSILLECTOMY       PERTINENT MEDICATIONS:  Current Outpatient Medications   Medication Sig Dispense Refill     acetaminophen (TYLENOL) 500 MG  tablet Take 1,000 mg by mouth 3 times daily  mg by mouth every 4 hours as needed       atenolol (TENORMIN) 25 MG tablet Take 1 tablet (25 mg) by mouth daily 30 tablet 3     atorvastatin (LIPITOR) 10 MG tablet Take 1 tablet (10 mg) by mouth daily 90 tablet 3     famotidine (PEPCID) 20 MG tablet Take 1 tablet (20 mg) by mouth At Bedtime (Patient taking differently: Take 20 mg by mouth nightly as needed) 60 tablet 1     gabapentin (NEURONTIN) 300 MG capsule TAKE 2 CAPSULES IN THE EVENING 180 capsule 3     lisinopril (ZESTRIL) 5 MG tablet Take 1 tablet (5 mg) by mouth daily 90 tablet 3     LORazepam (ATIVAN) 0.5 MG tablet TAKE 1/2 to 1 TAB BY MOUTH EVERY 8 HOURS AS NEEDED FOR ANXIETY 10 tablet 0     omeprazole (PRILOSEC) 40 MG DR capsule Take 1 capsule (40 mg) by mouth 2 times daily for 30 days, THEN 1 capsule (40 mg) daily for 30 days. Take 30-60 min before breakfast and before supper X 1 month. Then, reduce the dose to 40 mg once a day 90 capsule 1     rivaroxaban ANTICOAGULANT (XARELTO) 20 MG TABS tablet Take 1 tablet (20 mg) by mouth daily (with dinner) 90 tablet 3     blood glucose monitoring (ACCU-CHEK COMPACT CARE KIT) meter device kit Use to test blood sugars 1 times daily or as directed. May sub formulary meter 1 kit 0     blood glucose monitoring (NO BRAND SPECIFIED) test strip Use to test blood sugars twice times daily or as directed 1 Box 3     sucralfate (CARAFATE) 1 GM/10ML suspension Take 10 mLs (1 g) by mouth 4 times daily 500 mL 0     torsemide (DEMADEX) 10 MG tablet Take 1 tablet (10 mg) by mouth daily as needed (edema/sweling) 30 tablet 1     No other OTC/herbal/supplements reported by patient.    SOCIAL HISTORY:  Social History     Socioeconomic History     Marital status:      Spouse name: Not on file     Number of children: Not on file     Years of education: Not on file     Highest education level: Not on file   Occupational History     Not on file   Tobacco Use     Smoking status:  "Never     Smokeless tobacco: Never   Vaping Use     Vaping Use: Never used   Substance and Sexual Activity     Alcohol use: No     Drug use: No     Sexual activity: Never   Other Topics Concern     Parent/sibling w/ CABG, MI or angioplasty before 65F 55M? Not Asked   Social History Narrative     Not on file     Social Determinants of Health     Financial Resource Strain: Not on file   Food Insecurity: Not on file   Transportation Needs: Not on file   Physical Activity: Not on file   Stress: Not on file   Social Connections: Not on file   Intimate Partner Violence: Not on file   Housing Stability: Not on file       FAMILY HISTORY:  Denies colon/panc/esophageal/other GI CA, no other Love or other HPS-related Kaiden. No IBD/celiac, no other AI/liver/thyroid disease.    Family History   Problem Relation Age of Onset     Heart Disease Mother      Heart Disease Father      Heart Disease Brother      Heart Disease Brother      Heart Disease Brother        PHYSICAL EXAMINATION:  Vitals reviewed  /78 (BP Location: Right arm, Patient Position: Sitting, Cuff Size: Adult Large)   Ht 1.626 m (5' 4\")   Wt 99.8 kg (220 lb)   LMP  (LMP Unknown)   BMI 37.76 kg/m      General: Patient appears well in no acute distress.Ambulates with a cane.  Skin: No visualized rash or lesions on visualized skin  Eyes: EOMI, no erythema, sclera icterus or discharge noted  Resp: breathing comfortably without accessory muscle usage, speaking in full sentences, no cough  Lung sounds clear  Card: Irregularly irregular S1 and S2. No murmur,gallop, or rub  Abdomen:  Assessment is completes in a chair as a patient not able to climb on exam table. Hypoactive bowel sounds X 4 quadrants. Soft to palpation, no guarding or rebound tenderness.Tenderness in epigastrium and LUQ  MSK: Appears to have normal range of motion based on visualized movements  Neurologic: No apparent tremors, facial movements symmetric  Psych: affect normal, alert and " "oriented      PERTINENT STUDIES Reviewed in EMR    ASSESSMENT/PLAN:  89-year-old female was referred to GI clinic by emergency room provider for evaluation of ongoing upper abdominal pain with associated acid reflux and chest discomfort.  History of cholecystectomy and large hiatal hernia on CT scan.  No signs of gastritis or stomach ulcers on EGD in 2016, but 5 mm sessile polyp was present. Normal colonoscopy in 2008.  Patient is here today with her daughter.  They verbalized concerns of ongoing symptoms of throat pain, epigastric pain, and acid reflux.  Reported that previously, her symptoms were more like \"attacks\", but now, she has a constant everyday issue. Per patient, her symptoms are not related to food intake. Stated that she has been on Nexium for about 20 years. Has been taking famotidine prn. Tried a few doses of sucralfate prescribed at the ER, but discontinued thinking that it makes her pain worse.  We discussed likely etiology of the patient's symptoms such as GERD, esophagitis, hiatal hernia, gastritis, or pancreatitis. We talked about management of hiatal hernia including surgical intervention. I explained to the patient that conservative interventions for sliding hiatal hernia needs to be optimized first.  I recommended the following:  - Advised against proceeding with upper GI endoscopy due to patient's recent cardiac issue and advanced age.  Explained to the patient that regardless of GI endoscopy findings, the treatment will be approximately the same but duration of the treatment may vary.  - Placed a referral for x-ray esophagram but suggested to give priority to cardiac evaluation.  - I recommended to switch from Nexium, which sounds not to be effective anymore, to omeprazole 40 mg twice a day for 1 months.  Then, reduce the dose to 40 mg once a day.  - Recommended to start taking famotidine every night instead of as needed.  - Resume Carafate intake 1 g 3 times a day for 2 weeks to determine " its effectiveness.  - Educated on GERD lifestyle and diet modification.      ICD-10-CM    1. Hiatal hernia  K44.9 Adult GI Clinic Follow-Up Order (Blank)     XR Esophagram     omeprazole (PRILOSEC) 40 MG DR capsule      2. Gastroesophageal reflux disease without esophagitis  K21.9 Adult GI  Referral - Consult Only     Adult GI Clinic Follow-Up Order (Blank)     omeprazole (PRILOSEC) 40 MG DR capsule      3. Epigastric pain  R10.13 Adult GI  Referral - Consult Only     Adult GI Clinic Follow-Up Order (Blank)     XR Esophagram     omeprazole (PRILOSEC) 40 MG DR capsule          Patient verbalized understanding and appreciation of care provided. Stated that all of the questions were answered to her/his satisfaction.  RTC in one month    Thank you for this consultation. It was a pleasure to participate in the care of this patient; please contact us with any further questions.    ALEX Cabrera, FNP-C  [facility]  Gastroenterology Department  [place], [place]    This note was created with Dragon voice recognition software, and while reviewed for accuracy, inadvertent minor typographic errors may occur. Please contact the provider if you have any questions.

## 2023-02-06 NOTE — LETTER
"    2/6/2023         RE: Theodora Meng  [address] [facility] [address]        Dear Colleague,    Thank you for referring your patient, Theodora Meng, to the [facility]. Please see a copy of my visit note below.    Gastroenterology CLINIC VISIT, NEW PATIENT    CC/REFERRING PROVIDER: Yecenia Armstrong  REASON FOR CONSULTATION: abdominal pain    HPI: 87 year old female with PMH of type 2 diabetes mellitus, CKD stage III, paroxysmal atrial fibrillation, and morbid obesity, was referred to GI clinic after ER visit on 1/27/2023. She went to ER for an episodes of atrial flutter but was complaining of ongoing epigastric pain for 4 months. She was given a prescription of Carafate but stopped it after taking a few doses. She felt that it makes her pain worse.  She found to be in a flutter in the emergency room.  They recommended to proceed with cardioversion, which was completed in December.  Now, patient is scheduled for stress test in 2 weeks. I reviewed the emergency room visit note and laboratory work.  Stable hemoglobin at 10.9 with hematocrit of 34.3.  Normal thyroid function test and lipase.  CMP showed mild elevation in alkaline phosphatase at 128 and slightly decreased albumin at 3.3. Normal electrolytes.     Patient's daughter is present here today.She stated that her mom has been having pain in her abdomen for more than 4 months, but the pain was less severe and  was better by the end of a day. Said that he pain was \"more like attacks\" but is constant now. Stated that she was has been taking Nexium for more than 20 years and is wondering if it still working.   Patient complains of pain from her throat all way down to below her ribs. Feels like pressure and burning. Denies dysphagia, but stated that she has been coughing a lot (chronic issue). She denies correlation of pain with food intake.   Complaints of occasional burping.  She was prescribed Carafate by emergency room " provider, but she thought that it makes her symptoms worse. Said that her symptoms are worse in the morning. She sleeps in adjustable bed and raises her head up. Denies nausea or vomiting.    Describes her bowel movements are regular, once a day, soft stools.  Regarding her appetite, patient states that some days she feels hungry and eats well, other days she might skip meals.  Her daughter is concerned that patient is losing weight gradually.    Patient had EGD in February 2016, which showed 5 mm sessile polyp in the stomach.She had normal colonoscopy back in 2008.  CT scan completed in October 2022 showed large sliding hiatal hernia.     ROS: 10pt ROS performed and otherwise negative.    PAST MEDICAL HISTORY:  Past Medical History:   Diagnosis Date     Depressive disorder      Diabetes (H)      Esophageal reflux      Other abnormal glucose      Other premature beats      Unspecified essential hypertension        PREVIOUS ABDOMINAL/GYNECOLOGIC SURGERIES:  Past Surgical History:   Procedure Laterality Date     ANESTHESIA CARDIOVERSION N/A 12/29/2022    Procedure: CARDIOVERSION;  Surgeon: GENERIC ANESTHESIA PROVIDER;  Location:  OR     APPENDECTOMY       ARTHROPLASTY KNEE  10/30/2013    Procedure: ARTHROPLASTY KNEE;  Right Total Knee Arthroplasty;  Surgeon: Ousmane Mcgowan MD;  Location: [facility]     ARTHROPLASTY KNEE Left 4/3/2019    Procedure: Left Total Knee Arthroplasty;  Surgeon: Ousmane Mcgowan MD;  Location: [facility]     CHOLECYSTECTOMY, LAPOROSCOPIC      Cholecystectomy, Laparoscopic     DILATION AND CURETTAGE, OPERATIVE HYSTEROSCOPY WITH MORCELLATOR, COMBINED N/A 6/20/2016    Procedure: COMBINED DILATION AND CURETTAGE, OPERATIVE HYSTEROSCOPY WITH MORCELLATOR;  Surgeon: Bony Arredondo MD;  Location: [facility]     ESOPHAGOSCOPY, GASTROSCOPY, DUODENOSCOPY (EGD), COMBINED N/A 2/17/2016    Procedure: COMBINED ESOPHAGOSCOPY, GASTROSCOPY, DUODENOSCOPY (EGD), BIOPSY SINGLE OR MULTIPLE;  Surgeon: Mil Guevara  MD Rocio;  Location: [facility]     SALPINGO OOPHORECTOMY,R/L/OZZY      Salpingo Oophorectomy, RT/LT/OZZY     TONSILLECTOMY       PERTINENT MEDICATIONS:  Current Outpatient Medications   Medication Sig Dispense Refill     acetaminophen (TYLENOL) 500 MG tablet Take 1,000 mg by mouth 3 times daily  mg by mouth every 4 hours as needed       atenolol (TENORMIN) 25 MG tablet Take 1 tablet (25 mg) by mouth daily 30 tablet 3     atorvastatin (LIPITOR) 10 MG tablet Take 1 tablet (10 mg) by mouth daily 90 tablet 3     famotidine (PEPCID) 20 MG tablet Take 1 tablet (20 mg) by mouth At Bedtime (Patient taking differently: Take 20 mg by mouth nightly as needed) 60 tablet 1     gabapentin (NEURONTIN) 300 MG capsule TAKE 2 CAPSULES IN THE EVENING 180 capsule 3     lisinopril (ZESTRIL) 5 MG tablet Take 1 tablet (5 mg) by mouth daily 90 tablet 3     LORazepam (ATIVAN) 0.5 MG tablet TAKE 1/2 to 1 TAB BY MOUTH EVERY 8 HOURS AS NEEDED FOR ANXIETY 10 tablet 0     omeprazole (PRILOSEC) 40 MG DR capsule Take 1 capsule (40 mg) by mouth 2 times daily for 30 days, THEN 1 capsule (40 mg) daily for 30 days. Take 30-60 min before breakfast and before supper X 1 month. Then, reduce the dose to 40 mg once a day 90 capsule 1     rivaroxaban ANTICOAGULANT (XARELTO) 20 MG TABS tablet Take 1 tablet (20 mg) by mouth daily (with dinner) 90 tablet 3     blood glucose monitoring (ACCU-CHEK COMPACT CARE KIT) meter device kit Use to test blood sugars 1 times daily or as directed. May sub formulary meter 1 kit 0     blood glucose monitoring (NO BRAND SPECIFIED) test strip Use to test blood sugars twice times daily or as directed 1 Box 3     sucralfate (CARAFATE) 1 GM/10ML suspension Take 10 mLs (1 g) by mouth 4 times daily 500 mL 0     torsemide (DEMADEX) 10 MG tablet Take 1 tablet (10 mg) by mouth daily as needed (edema/sweling) 30 tablet 1     No other OTC/herbal/supplements reported by patient.    SOCIAL HISTORY:  Social History     Socioeconomic  "History     Marital status:      Spouse name: Not on file     Number of children: Not on file     Years of education: Not on file     Highest education level: Not on file   Occupational History     Not on file   Tobacco Use     Smoking status: Never     Smokeless tobacco: Never   Vaping Use     Vaping Use: Never used   Substance and Sexual Activity     Alcohol use: No     Drug use: No     Sexual activity: Never   Other Topics Concern     Parent/sibling w/ CABG, MI or angioplasty before 65F 55M? Not Asked   Social History Narrative     Not on file     Social Determinants of Health     Financial Resource Strain: Not on file   Food Insecurity: Not on file   Transportation Needs: Not on file   Physical Activity: Not on file   Stress: Not on file   Social Connections: Not on file   Intimate Partner Violence: Not on file   Housing Stability: Not on file       FAMILY HISTORY:  Denies colon/panc/esophageal/other GI CA, no other Love or other HPS-related Kaiden. No IBD/celiac, no other AI/liver/thyroid disease.    Family History   Problem Relation Age of Onset     Heart Disease Mother      Heart Disease Father      Heart Disease Brother      Heart Disease Brother      Heart Disease Brother        PHYSICAL EXAMINATION:  Vitals reviewed  /78 (BP Location: Right arm, Patient Position: Sitting, Cuff Size: Adult Large)   Ht 1.626 m (5' 4\")   Wt 99.8 kg (220 lb)   LMP  (LMP Unknown)   BMI 37.76 kg/m      General: Patient appears well in no acute distress.Ambulates with a cane.  Skin: No visualized rash or lesions on visualized skin  Eyes: EOMI, no erythema, sclera icterus or discharge noted  Resp: breathing comfortably without accessory muscle usage, speaking in full sentences, no cough  Lung sounds clear  Card: Irregularly irregular S1 and S2. No murmur,gallop, or rub  Abdomen:  Assessment is completes in a chair as a patient not able to climb on exam table. Hypoactive bowel sounds X 4 quadrants. Soft to palpation, " "no guarding or rebound tenderness.Tenderness in epigastrium and LUQ  MSK: Appears to have normal range of motion based on visualized movements  Neurologic: No apparent tremors, facial movements symmetric  Psych: affect normal, alert and oriented      PERTINENT STUDIES Reviewed in EMR    ASSESSMENT/PLAN:  89-year-old female was referred to GI clinic by emergency room provider for evaluation of ongoing upper abdominal pain with associated acid reflux and chest discomfort.  History of cholecystectomy and large hiatal hernia on CT scan.  No signs of gastritis or stomach ulcers on EGD in 2016, but 5 mm sessile polyp was present. Normal colonoscopy in 2008.  Patient is here today with her daughter.  They verbalized concerns of ongoing symptoms of throat pain, epigastric pain, and acid reflux.  Reported that previously, her symptoms were more like \"attacks\", but now, she has a constant everyday issue. Per patient, her symptoms are not related to food intake. Stated that she has been on Nexium for about 20 years. Has been taking famotidine prn. Tried a few doses of sucralfate prescribed at the ER, but discontinued thinking that it makes her pain worse.  We discussed likely etiology of the patient's symptoms such as GERD, esophagitis, hiatal hernia, gastritis, or pancreatitis. We talked about management of hiatal hernia including surgical intervention. I explained to the patient that conservative interventions for sliding hiatal hernia needs to be optimized first.  I recommended the following:  - Advised against proceeding with upper GI endoscopy due to patient's recent cardiac issue and advanced age.  Explained to the patient that regardless of GI endoscopy findings, the treatment will be approximately the same but duration of the treatment may vary.  - Placed a referral for x-ray esophagram but suggested to give priority to cardiac evaluation.  - I recommended to switch from Nexium, which sounds not to be effective " anymore, to omeprazole 40 mg twice a day for 1 months.  Then, reduce the dose to 40 mg once a day.  - Recommended to start taking famotidine every night instead of as needed.  - Resume Carafate intake 1 g 3 times a day for 2 weeks to determine its effectiveness.  - Educated on GERD lifestyle and diet modification.      ICD-10-CM    1. Hiatal hernia  K44.9 Adult GI Clinic Follow-Up Order (Blank)     XR Esophagram     omeprazole (PRILOSEC) 40 MG DR capsule      2. Gastroesophageal reflux disease without esophagitis  K21.9 Adult GI  Referral - Consult Only     Adult GI Clinic Follow-Up Order (Blank)     omeprazole (PRILOSEC) 40 MG DR capsule      3. Epigastric pain  R10.13 Adult GI  Referral - Consult Only     Adult GI Clinic Follow-Up Order (Blank)     XR Esophagram     omeprazole (PRILOSEC) 40 MG DR capsule          Patient verbalized understanding and appreciation of care provided. Stated that all of the questions were answered to her/his satisfaction.  RTC in one month    Thank you for this consultation. It was a pleasure to participate in the care of this patient; please contact us with any further questions.    ALEX Cabrera, NAMITAP-C  [facility]  Gastroenterology Department  [place], [place]    This note was created with Dragon voice recognition software, and while reviewed for accuracy, inadvertent minor typographic errors may occur. Please contact the provider if you have any questions.      Again, thank you for allowing me to participate in the care of your patient.        Sincerely,        ALEX CABRERA CNP

## 2023-02-06 NOTE — PATIENT INSTRUCTIONS
"It was a pleasure taking care of you today.  I've included a brief summary of our discussion and care plan from today's visit below.  Please review this information with your primary care provider.  ______________________________________________________________________    My recommendations are summarized as follows:    Discontinue Nexium. Start omeprazole 40 mg twice a day, taking it 30-60 minutes before a meal. Take it for one month. Then, reduce the dose to 40 mg daily.    2. Resume sucralfate 1 gram dose three times a day for one month.    3. Take famotidine (Pepcid) before bed, every night.    4. I ordered x-ray esophageal barium study as an alternative to upper GI endoscopy, but I want you to have stress test first.    Return to GI Clinic in one month to review your progress.    ______________________________________________________________________         Gastroesophageal reflux  Gastroesophageal reflux, also called \"acid reflux,\" occurs when the stomach contents back up into the esophagus and/or mouth. Occasional reflux is normal and can happen in healthy infants, children, and adults, most often after eating a large meal. Most episodes are brief and do not cause bothersome symptoms or complications.   By contrast, people with gastroesophageal reflux disease (GERD) experience bothersome symptoms or damage to the esophagus as a result of acid reflux. Symptoms of GERD can include heartburn, regurgitation, and difficulty or pain with swallowing.     Lifestyle modifications for gastroesophageal reflux disease (GERD).   1. Change your eating habits.  -- It's best to eat several small meals instead of two or three large meals.  -- After you eat, wait 2 to 3 hours before you lie down. Late-night snacks aren't a good idea.   -- Chocolate, mint, and alcohol can make GERD worse. They relax the valve between the esophagus and the stomach.  -- Spicy foods, foods that have a lot of acid (like tomatoes and oranges), and " coffee can make GERD symptoms worse in some people. If your symptoms are worse after you eat a certain food, you may want to stop eating that food to see if your symptoms get better.    2. Do not smoke or chew tobacco. Saliva helps to neutralize refluxed acid, and smoking reduces the amount of saliva in the mouth and throat. Smoking also lowers the pressure in the lower esophageal sphincter and provokes coughing, causing frequent episodes of acid reflux in the esophagus.     3. If you have GERD symptoms at night, raise the head of your bed 6 in. (15 cm) to 8 in. (20 cm) by putting the frame on blocks or placing a foam wedge under the head of your mattress. (Adding extra pillows does not work.)  4. Avoid or reduce pressure on your stomach. Don't wear tight clothing around your middle.  5. Lose weight if you need to. Losing just 5 to 10 pounds can help.       What is a hiatal hernia?  A hiatal hernia is when a part of the stomach moves up into the chest area. Normally, the stomach sits below the diaphragm. The diaphragm is the layer of muscle that separates the organs in the chest from the organs in the belly. The esophagus, the tube that carries food from the mouth to the stomach, passes through a hole in the diaphragm. In people with a hiatal hernia, the stomach pushes up through that hole, too.  There are 2 types of hiatal hernia:   ?Sliding hernia - This is when the top of the stomach and the lower part of the esophagus squeeze up into the space above the diaphragm. This is the most common type of hiatal hernia.  ?Paraesophageal hernia - This is when the top of the stomach squeezes up into the space above the diaphragm. This is not very common, but it can be serious if the stomach folds up on itself. It can also cause bleeding from the stomach or trouble breathing.    What are the symptoms of a hiatal hernia?  A hiatal hernia does not usually cause symptoms. In some cases, though, it can cause stomach acid to leak  "into the esophagus. This is called \"acid reflux\" or \"gastroesophageal reflux.\" It can cause symptoms like:  ?Burning in the chest, known as heartburn  ?Burning in the throat or an acid taste in the throat  ?Stomach or chest pain  ?Trouble swallowing  ?A raspy voice or a sore throat  ?Unexplained cough    How are hiatal hernias treated?  People who have symptoms caused by a hiatal hernia can get treatment for their symptoms.  Treatment for symptoms involves taking the medicines that are used for acid reflux. People with a paraesophageal hernia, and some people with a sliding hernia, need surgery. For this surgery, the surgeon pulls the stomach back down and repairs the hole in the diaphragm. This way, the stomach does not slide up again.        ______________________________________________________________________    Who do I call with any questions after my visit?  Please be in touch if there are any further questions that arise following today's visit.  There are multiple ways to contact your gastroenterology care team.      During business hours, you may reach a Gastroenterology nurse at [phone], option 3.     To schedule or reschedule an appointment, please call [phone].   To schedule your imaging studies (CT, MRI, ultrasound)  call [phone] (or toll-free # [phone])  To schedule your lab work at [facility], please call [phone]    You can always send a secure message through [employer].  [employer] messages are answered by your nurse or doctor typically within 24 hours.  Please allow extra time on weekends and holidays.      For urgent/emergent questions after business hours, you may reach the on-call GI Fellow by contacting the [facility]  at [phone].    In order for your refill to be processed in a timely fashion, it is your responsibility to ensure you follow the recommendations from your provider regarding your laboratory studies and follow " up appointments.       How will I get the results of any tests ordered?    You will receive all of your results.  If you have signed up for [employer], any tests ordered at your visit will be available to you after your physician reviews them.  Typically this takes 1-2 weeks.  If there are urgent results that require a change in your care plan, your physician or nurse will call you to discuss the next steps.   What is [employer]?  [employer] is a secure way for you to access all of your healthcare records from the [facility].  It is a web based computer program, so you can sign on to it from any location.  It also allows you to send secure messages to your care team.  I recommend signing up for [employer] access if you have not already done so and are comfortable with using a computer.    How to I schedule a follow-up visit?  If you did not schedule a follow-up visit today, please call [phone] to schedule a follow-up office visit.      Sincerely,  BRITTNEY Cabrera,  [facility],  Division of Gastroenterology   ([facility])

## 2023-02-10 ENCOUNTER — OFFICE VISIT (OUTPATIENT)
Dept: FAMILY MEDICINE | Facility: CLINIC | Age: 88
End: 2023-02-10
Payer: COMMERCIAL

## 2023-02-10 VITALS
WEIGHT: 221 LBS | OXYGEN SATURATION: 98 % | HEART RATE: 94 BPM | SYSTOLIC BLOOD PRESSURE: 122 MMHG | HEIGHT: 64 IN | RESPIRATION RATE: 16 BRPM | BODY MASS INDEX: 37.73 KG/M2 | DIASTOLIC BLOOD PRESSURE: 70 MMHG

## 2023-02-10 DIAGNOSIS — N18.31 ANEMIA DUE TO STAGE 3A CHRONIC KIDNEY DISEASE (H): ICD-10-CM

## 2023-02-10 DIAGNOSIS — N18.31 TYPE 2 DIABETES MELLITUS WITH STAGE 3A CHRONIC KIDNEY DISEASE, WITHOUT LONG-TERM CURRENT USE OF INSULIN (H): Primary | ICD-10-CM

## 2023-02-10 DIAGNOSIS — D63.1 ANEMIA DUE TO STAGE 3A CHRONIC KIDNEY DISEASE (H): ICD-10-CM

## 2023-02-10 DIAGNOSIS — M79.89 LEG SWELLING: ICD-10-CM

## 2023-02-10 DIAGNOSIS — E66.01 CLASS 2 SEVERE OBESITY DUE TO EXCESS CALORIES WITH SERIOUS COMORBIDITY AND BODY MASS INDEX (BMI) OF 37.0 TO 37.9 IN ADULT (H): ICD-10-CM

## 2023-02-10 DIAGNOSIS — F41.9 ANXIETY: ICD-10-CM

## 2023-02-10 DIAGNOSIS — E66.812 CLASS 2 SEVERE OBESITY DUE TO EXCESS CALORIES WITH SERIOUS COMORBIDITY AND BODY MASS INDEX (BMI) OF 37.0 TO 37.9 IN ADULT (H): ICD-10-CM

## 2023-02-10 DIAGNOSIS — R53.83 FATIGUE, UNSPECIFIED TYPE: ICD-10-CM

## 2023-02-10 DIAGNOSIS — E55.9 VITAMIN D DEFICIENCY: ICD-10-CM

## 2023-02-10 DIAGNOSIS — N18.31 STAGE 3A CHRONIC KIDNEY DISEASE (H): ICD-10-CM

## 2023-02-10 DIAGNOSIS — E11.22 TYPE 2 DIABETES MELLITUS WITH STAGE 3A CHRONIC KIDNEY DISEASE, WITHOUT LONG-TERM CURRENT USE OF INSULIN (H): Primary | ICD-10-CM

## 2023-02-10 LAB
ANION GAP SERPL CALCULATED.3IONS-SCNC: 11 MMOL/L (ref 7–15)
BUN SERPL-MCNC: 22 MG/DL (ref 8–23)
CALCIUM SERPL-MCNC: 9.1 MG/DL (ref 8.8–10.2)
CHLORIDE SERPL-SCNC: 102 MMOL/L (ref 98–107)
CREAT SERPL-MCNC: 0.93 MG/DL (ref 0.51–0.95)
DEPRECATED CALCIDIOL+CALCIFEROL SERPL-MC: 12 UG/L (ref 20–75)
DEPRECATED HCO3 PLAS-SCNC: 24 MMOL/L (ref 22–29)
ERYTHROCYTE [DISTWIDTH] IN BLOOD BY AUTOMATED COUNT: 16.7 % (ref 10–15)
FERRITIN SERPL-MCNC: 158 NG/ML (ref 11–328)
GFR SERPL CREATININE-BSD FRML MDRD: 60 ML/MIN/1.73M2
GLUCOSE SERPL-MCNC: 145 MG/DL (ref 70–99)
HBA1C MFR BLD: 7.9 % (ref 0–5.6)
HCT VFR BLD AUTO: 34 % (ref 35–47)
HGB BLD-MCNC: 10.9 G/DL (ref 11.7–15.7)
HOLD SPECIMEN: NORMAL
IRON BINDING CAPACITY (ROCHE): 204 UG/DL (ref 240–430)
IRON SATN MFR SERPL: 25 % (ref 15–46)
IRON SERPL-MCNC: 52 UG/DL (ref 37–145)
MCH RBC QN AUTO: 28.2 PG (ref 26.5–33)
MCHC RBC AUTO-ENTMCNC: 32.1 G/DL (ref 31.5–36.5)
MCV RBC AUTO: 88 FL (ref 78–100)
NT-PROBNP SERPL-MCNC: 7713 PG/ML (ref 0–1800)
PLATELET # BLD AUTO: 361 10E3/UL (ref 150–450)
POTASSIUM SERPL-SCNC: 4.3 MMOL/L (ref 3.4–5.3)
RBC # BLD AUTO: 3.87 10E6/UL (ref 3.8–5.2)
SODIUM SERPL-SCNC: 137 MMOL/L (ref 136–145)
VIT B12 SERPL-MCNC: 436 PG/ML (ref 232–1245)
WBC # BLD AUTO: 7.8 10E3/UL (ref 4–11)

## 2023-02-10 PROCEDURE — 83880 ASSAY OF NATRIURETIC PEPTIDE: CPT | Performed by: STUDENT IN AN ORGANIZED HEALTH CARE EDUCATION/TRAINING PROGRAM

## 2023-02-10 PROCEDURE — 83036 HEMOGLOBIN GLYCOSYLATED A1C: CPT | Performed by: STUDENT IN AN ORGANIZED HEALTH CARE EDUCATION/TRAINING PROGRAM

## 2023-02-10 PROCEDURE — 36415 COLL VENOUS BLD VENIPUNCTURE: CPT | Performed by: STUDENT IN AN ORGANIZED HEALTH CARE EDUCATION/TRAINING PROGRAM

## 2023-02-10 PROCEDURE — 85027 COMPLETE CBC AUTOMATED: CPT | Performed by: STUDENT IN AN ORGANIZED HEALTH CARE EDUCATION/TRAINING PROGRAM

## 2023-02-10 PROCEDURE — 83540 ASSAY OF IRON: CPT | Performed by: STUDENT IN AN ORGANIZED HEALTH CARE EDUCATION/TRAINING PROGRAM

## 2023-02-10 PROCEDURE — 99215 OFFICE O/P EST HI 40 MIN: CPT | Performed by: STUDENT IN AN ORGANIZED HEALTH CARE EDUCATION/TRAINING PROGRAM

## 2023-02-10 PROCEDURE — 82607 VITAMIN B-12: CPT | Performed by: STUDENT IN AN ORGANIZED HEALTH CARE EDUCATION/TRAINING PROGRAM

## 2023-02-10 PROCEDURE — 82728 ASSAY OF FERRITIN: CPT | Performed by: STUDENT IN AN ORGANIZED HEALTH CARE EDUCATION/TRAINING PROGRAM

## 2023-02-10 PROCEDURE — 80048 BASIC METABOLIC PNL TOTAL CA: CPT | Performed by: STUDENT IN AN ORGANIZED HEALTH CARE EDUCATION/TRAINING PROGRAM

## 2023-02-10 PROCEDURE — 83550 IRON BINDING TEST: CPT | Performed by: STUDENT IN AN ORGANIZED HEALTH CARE EDUCATION/TRAINING PROGRAM

## 2023-02-10 PROCEDURE — 82306 VITAMIN D 25 HYDROXY: CPT | Performed by: STUDENT IN AN ORGANIZED HEALTH CARE EDUCATION/TRAINING PROGRAM

## 2023-02-10 RX ORDER — LORAZEPAM 0.5 MG/1
.25-.5 TABLET ORAL EVERY 8 HOURS PRN
Qty: 10 TABLET | Refills: 0 | Status: ON HOLD | OUTPATIENT
Start: 2023-02-10 | End: 2023-03-03

## 2023-02-10 ASSESSMENT — PAIN SCALES - GENERAL: PAINLEVEL: MODERATE PAIN (4)

## 2023-02-10 NOTE — LETTER
February 13, 2023      Theodora Meng  [address] [facility] [address]        Dear MsRamirezMeng,    We are writing to inform you of your test results.    There are a few lab findings that are important to review:      The first is that your iron levels are in the normal range, but your hemoglobin is still on the lower side at 10.9, which is the same as it was a few weeks ago, and just a little lower than a month ago. However, I think it would be helpful to take an iron supplement 3 times a week or every other day. If you take the iron supplement daily it can cause constipation, but taking it every other day or 3 times a week - like Monday, Wednesday, Friday - doesn't cause as much constipation, but works just as well as far as absorption and helping to improve your iron levels. I will send a prescription for you to your pharmacy - the [facility] in [place].     The vitamin D level is also quite low, which might be contributing to your fatigue. I do think it is best to treat you with the high dose vitamin d which you will take one time a week for 12 weeks, and then we'll get you on a daily supplement for that as well. I'll send these to the pharmacy as well.      Finally, we did a BNP test to check for fluid overload and it was much higher than it was in December, so I suspect the leg swelling, and possibly some of your fatigue, is related to fluid overload. I hope you've been able to take the water pill over the weekend. We may need you to take it more regularly moving forward. This is something you can maybe talk about with the cardiologist at your upcoming appointment as well.      Let me know if you have other questions.      Naty Santoro MD    Resulted Orders   BNP-N terminal pro   Result Value Ref Range    N Terminal Pro BNP Outpatient 7,713 (H) 0 - 1,800 pg/mL      Comment:      Reference range shown and results flagged as abnormal are for the outpatient, non acute settings. Establishing a  baseline value for each individual patient is useful for follow-up.    Suggested inpatient cut points for confirming diagnosis of CHF in an acute setting are:  >450 pg/mL (age 18 to less than 50)  >900 pg/mL (age 50 to less than 75)  >1800 pg/mL (75 yrs and older)    An inpatient or emergency department NT-proPBNP <300 pg/mL effectively rules out acute CHF, with 99% negative predictive value.       Vitamin B12   Result Value Ref Range    Vitamin B12 436 232 - 1,245 pg/mL   Vitamin D deficiency screening   Result Value Ref Range    Vitamin D, Total (25-Hydroxy) 12 (L) 20 - 75 ug/L    Narrative    Season, race, dietary intake, and treatment affect the concentration of 25-hydroxy-Vitamin D. Values may decrease during winter months and increase during summer months. Values 20-29 ug/L may indicate Vitamin D insufficiency and values <20 ug/L may indicate Vitamin D deficiency.    Vitamin D determination is routinely performed by an immunoassay specific for 25 hydroxyvitamin D3.  If an individual is on vitamin D2(ergocalciferol) supplementation, please specify 25 OH vitamin D2 and D3 level determination by LCMSMS test VITD23.     Hemoglobin A1c   Result Value Ref Range    Hemoglobin A1C 7.9 (H) 0.0 - 5.6 %      Comment:      Normal <5.7%   Prediabetes 5.7-6.4%    Diabetes 6.5% or higher     Note: Adopted from ADA consensus guidelines.   Basic metabolic panel   Result Value Ref Range    Sodium 137 136 - 145 mmol/L    Potassium 4.3 3.4 - 5.3 mmol/L    Chloride 102 98 - 107 mmol/L    Carbon Dioxide (CO2) 24 22 - 29 mmol/L    Anion Gap 11 7 - 15 mmol/L    Urea Nitrogen 22.0 8.0 - 23.0 mg/dL    Creatinine 0.93 0.51 - 0.95 mg/dL    Calcium 9.1 8.8 - 10.2 mg/dL    Glucose 145 (H) 70 - 99 mg/dL    GFR Estimate 60 (L) >60 mL/min/1.73m2      Comment:      eGFR calculated using 2021 CKD-EPI equation.   Ferritin   Result Value Ref Range    Ferritin 158 11 - 328 ng/mL   CBC with Platelets and Reflex to Iron Studies   Result Value Ref  Range    WBC Count 7.8 4.0 - 11.0 10e3/uL    RBC Count 3.87 3.80 - 5.20 10e6/uL    Hemoglobin 10.9 (L) 11.7 - 15.7 g/dL    Hematocrit 34.0 (L) 35.0 - 47.0 %    MCV 88 78 - 100 fL    MCH 28.2 26.5 - 33.0 pg    MCHC 32.1 31.5 - 36.5 g/dL    RDW 16.7 (H) 10.0 - 15.0 %    Platelet Count 361 150 - 450 10e3/uL   Extra Green Top (Lithium Heparin) Tube   Result Value Ref Range    Hold Specimen [facility]    Iron & Iron Binding Capacity   Result Value Ref Range    Iron 52 37 - 145 ug/dL    Iron Binding Capacity 204 (L) 240 - 430 ug/dL    Iron Sat Index 25 15 - 46 %       If you have any questions or concerns, please call the clinic at the number listed above.       Sincerely,      Naty Santoro MD

## 2023-02-10 NOTE — PATIENT INSTRUCTIONS
First steps:   - try the famotidine (pepcid) earlier in the evening and/or with a little snack, like crackers. See if that helps with the nausea. This is a 12 hour tablet, so take it at least 12 hours before you would wake up in the morning.  - If that isn't working, switch to Zantac (ranitidine) (75 or 150  mg) over the counter.       Swelling:   Take the water pill (torsemide) daily for the next 3 days. Then monitor your weight after that. If your weight increases by 2 lbs form one day to the next or 5 lbs within 1 week, take the water pill daily until you get back to your baseline weight.  Also, try to avoid added salt in foods as much as possible. Salt can increase swelling.       some Ensure or Boost, or a similar product at your local grocery store or shopping center. Drink one of these daily if you can. That will help improve your protein in your body.

## 2023-02-10 NOTE — LETTER
February 13, 2023      Tehodora LÁZARO Meng  [address] [facility] [address]        Dear MsYunierMeng,    We are writing to inform you of your test results.    The first is that your iron levels are in the normal range, but your hemoglobin is still on the lower side at 10.9, which is the same as it was a few weeks ago, and just a little lower than a month ago. However, I think it would be helpful to take an iron supplement 3 times a week or every other day. If you take the iron supplement daily it can cause constipation, but taking it every other day or 3 times a week - like Monday, Wednesday, Friday - doesn't cause as much constipation, but works just as well as far as absorption and helping to improve your iron levels. I will send a prescription for you to your pharmacy - the [facility] in [place].     The vitamin D level is also quite low, which might be contributing to your fatigue. I do think it is best to treat you with the high dose vitamin d which you will take one time a week for 12 weeks, and then we'll get you on a daily supplement for that as well. I'll send these to the pharmacy as well.      Finally, we did a BNP test to check for fluid overload and it was much higher than it was in December, so I suspect the leg swelling, and possibly some of your fatigue, is related to fluid overload. I hope you've been able to take the water pill over the weekend. We may need you to take it more regularly moving forward. This is something you can maybe talk about with the cardiologist at your upcoming appointment as well.     Resulted Orders   BNP-N terminal pro   Result Value Ref Range    N Terminal Pro BNP Outpatient 7,713 (H) 0 - 1,800 pg/mL      Comment:      Reference range shown and results flagged as abnormal are for the outpatient, non acute settings. Establishing a baseline value for each individual patient is useful for follow-up.    Suggested inpatient cut points for confirming diagnosis of CHF in an  acute setting are:  >450 pg/mL (age 18 to less than 50)  >900 pg/mL (age 50 to less than 75)  >1800 pg/mL (75 yrs and older)    An inpatient or emergency department NT-proPBNP <300 pg/mL effectively rules out acute CHF, with 99% negative predictive value.       Vitamin B12   Result Value Ref Range    Vitamin B12 436 232 - 1,245 pg/mL   Vitamin D deficiency screening   Result Value Ref Range    Vitamin D, Total (25-Hydroxy) 12 (L) 20 - 75 ug/L    Narrative    Season, race, dietary intake, and treatment affect the concentration of 25-hydroxy-Vitamin D. Values may decrease during winter months and increase during summer months. Values 20-29 ug/L may indicate Vitamin D insufficiency and values <20 ug/L may indicate Vitamin D deficiency.    Vitamin D determination is routinely performed by an immunoassay specific for 25 hydroxyvitamin D3.  If an individual is on vitamin D2(ergocalciferol) supplementation, please specify 25 OH vitamin D2 and D3 level determination by LCMSMS test VITD23.     Hemoglobin A1c   Result Value Ref Range    Hemoglobin A1C 7.9 (H) 0.0 - 5.6 %      Comment:      Normal <5.7%   Prediabetes 5.7-6.4%    Diabetes 6.5% or higher     Note: Adopted from ADA consensus guidelines.   Basic metabolic panel   Result Value Ref Range    Sodium 137 136 - 145 mmol/L    Potassium 4.3 3.4 - 5.3 mmol/L    Chloride 102 98 - 107 mmol/L    Carbon Dioxide (CO2) 24 22 - 29 mmol/L    Anion Gap 11 7 - 15 mmol/L    Urea Nitrogen 22.0 8.0 - 23.0 mg/dL    Creatinine 0.93 0.51 - 0.95 mg/dL    Calcium 9.1 8.8 - 10.2 mg/dL    Glucose 145 (H) 70 - 99 mg/dL    GFR Estimate 60 (L) >60 mL/min/1.73m2      Comment:      eGFR calculated using 2021 CKD-EPI equation.   Ferritin   Result Value Ref Range    Ferritin 158 11 - 328 ng/mL   CBC with Platelets and Reflex to Iron Studies   Result Value Ref Range    WBC Count 7.8 4.0 - 11.0 10e3/uL    RBC Count 3.87 3.80 - 5.20 10e6/uL    Hemoglobin 10.9 (L) 11.7 - 15.7 g/dL    Hematocrit 34.0 (L)  35.0 - 47.0 %    MCV 88 78 - 100 fL    MCH 28.2 26.5 - 33.0 pg    MCHC 32.1 31.5 - 36.5 g/dL    RDW 16.7 (H) 10.0 - 15.0 %    Platelet Count 361 150 - 450 10e3/uL   Extra Green Top (Lithium Heparin) Tube   Result Value Ref Range    Hold Specimen [facility]    Iron & Iron Binding Capacity   Result Value Ref Range    Iron 52 37 - 145 ug/dL    Iron Binding Capacity 204 (L) 240 - 430 ug/dL    Iron Sat Index 25 15 - 46 %       If you have any questions or concerns, please call the clinic at the number listed above.       Sincerely,      Naty Santoro MD

## 2023-02-13 RX ORDER — FERROUS SULFATE 325(65) MG
325 TABLET ORAL
Qty: 45 TABLET | Refills: 3 | Status: SHIPPED | OUTPATIENT
Start: 2023-02-13 | End: 2023-04-18

## 2023-02-13 RX ORDER — ERGOCALCIFEROL 1.25 MG/1
50000 CAPSULE, LIQUID FILLED ORAL WEEKLY
Qty: 12 CAPSULE | Refills: 0 | Status: ON HOLD | OUTPATIENT
Start: 2023-02-13 | End: 2023-03-03

## 2023-02-13 RX ORDER — CHOLECALCIFEROL (VITAMIN D3) 50 MCG
1 TABLET ORAL DAILY
Qty: 90 TABLET | Refills: 3 | Status: ON HOLD | OUTPATIENT
Start: 2023-05-02 | End: 2023-03-03

## 2023-02-14 ENCOUNTER — TRANSFERRED RECORDS (OUTPATIENT)
Dept: HEALTH INFORMATION MANAGEMENT | Facility: CLINIC | Age: 88
End: 2023-02-14
Payer: COMMERCIAL

## 2023-02-14 LAB — RETINOPATHY: NEGATIVE

## 2023-02-16 DIAGNOSIS — I48.0 PAROXYSMAL ATRIAL FIBRILLATION (H): Primary | ICD-10-CM

## 2023-02-20 ENCOUNTER — TELEPHONE (OUTPATIENT)
Dept: CARDIOLOGY | Facility: CLINIC | Age: 88
End: 2023-02-20
Payer: COMMERCIAL

## 2023-02-20 NOTE — TELEPHONE ENCOUNTER
Pts daughter Wendy calling in stating pt is retaining more water and is having side effects from Torsemide. She would like to discuss.    She can be reached at [phone]    Janeen Apodaca RN

## 2023-02-20 NOTE — TELEPHONE ENCOUNTER
Called daughter to discuss. States patient missed stress test last week because she fell in the garage on the way to the car to get to the stress test. No injury. When she went to her PCP to get checked out, was found to have a BNP of >7000 so was asked to take torsemide on a daily basis. Since then, whenever she takes the torsemide, she gets shaking and cold so badly for several hours. Daughter states patient watches sodium intake but hasn't been eating much due to severe reflux. Sleeping in recliner due to SOB occasionally. Weight is up 5# since she saw PCP on 2/10/23. Appt made with Jackie Olivares NP on 2/23/23 for evaluation. Uzma Patterson RN Cardiology February 20, 2023, 2:13 PM

## 2023-02-21 VITALS — BODY MASS INDEX: 37.25 KG/M2 | WEIGHT: 217 LBS

## 2023-02-21 NOTE — PROGRESS NOTES
Theodora is a 87 year old who is being evaluated via a billable video visit.      How would you like to obtain your AVS? MyChart  If the video visit is dropped, the invitation should be resent by: Text to cell phone: [phone]   Will anyone else be joining your video visit? No        Video-Visit Details    Type of service:  Video Visit   Video Start Time: 1:07 PM  Video End Time:1: 50 PM    Originating Location (pt. Location): Home    Distant Location (provider location):  Off-site  Platform used for Video Visit: [facility]        Cardiology Clinic Progress Note  Theodora Meng MRN# [id number]   YOB: 1935 Age: 87 year old      Primary Cardiologist:   Dr. Starr          History of Presenting Illness:      Theodora Meng is a pleasant 87 year old patient with a past cardiac history significant for   1. Paroxysmal atrial fibrillation/flutter    Symptomatic    Successful cardioversion 12/2022 with return of afib/flutter    Side effects with amio    Rate control and anticoagulated  2. Coronary artery calcification  3. Hypertension  4. Hyperlipidemia  Past medical history significant for DM type II, GERD.    Patient was seen by Dr. Starr in November 2022 for shortness of breath and fatigue.  These were thought to be secondary to atrial fibrillation and metoprolol.  Initially metoprolol was switched to diltiazem which improved her symptoms but only for 2 weeks.  She then had recurrent symptoms and proceeded with cardioversion.  After cardioversion she was started on amiodarone.  She felt good for 2 to 3 days post cardioversion but then experienced lightheaded, dizzy, loss of balance and fatigue had not improved.    On 12/4/2022 she was seen in the ED for chest pain.  She had negative cardiac work-up but was noted to have severe coronary calcification on CT. Stress test recommended. Prior stress test in 2016 showed mild ischemia which had been medically managed.    She has a history of dizziness on  "diltiazem so instead continued on atenolol.  She has had severe abdominal pain after amiodarone, so has pursued rate control.    Patient was seen by me in January 2023.  She still had not undergone her stress test and continued with \"heartburn\" that had increased in frequency.  She was set up for GI evaluation.  Abdominal pain improved after holding amiodarone.  She did not feel she had any further atrial fibrillation and rhythm was regular on exam.    Patient was seen in the ED on 1/27/2023 after having an irregular heart reading on her BP machine.  She was found to be in atrial fib/flutter 107 BPM.  She had fatigue and dyspnea on exertion.  They noted that she was set up for GI consult at the beginning of February.  She had negative cardiac work-up.  ED note reviewed today.    Telephone call on 2/20/2023 states patient was not able to participate in her scheduled stress test as she had a fall prior to getting there.  When she was seen by PCP she was found to have a BNP of greater than 7000 so they recommended starting torsemide daily.  Also noted that she was sleeping in the recliner.  Patient was not tolerating the torsemide due to shaking and cold feeling for several hours after taking it.  Weight was up 5 pounds in the prior 10 days.    Pt presents today, with her daughter, for 1 month follow-up.  She has not been tolerating the torsemide as she gets shakes and feels extremely cold about 4 hours after taking it.  She was taking 10 mg every other day.  Couple days ago she started taking torsemide 5 mg daily and has been tolerating that.  Ultimately she is requesting to try a different diuretic.  Weight is down only 1 pound at 218 pounds and she is normally about 214 pounds.  She continues with bilateral lower extremity edema to the knee.  I recommended compression stockings or elevating her legs. She has dyspnea on exertion and cough, but denies any shortness of breath at rest or orthopnea.  She sleeps in a " recliner for comfort.  Heart rates have been 90s to low 100s and 1 reading up to 120.  Generally these have been in the 90s.  Advised her to keep a closer eye on his heart rates and let us know if they are staying greater than 100.  She is agreeable to EP consult.  Her daughter asked about switching back to diltiazem.  Given that the patient previously reported side effects with the diltiazem of dizziness and abdominal pain I am hesitant to do so at this time.  If needed and room in her blood pressure, could consider increasing atenolol.  We also discussed possibility of adding digoxin if there is not improvement in her blood pressure.  Her daughter states that patient has had further decline with feeling more tired and weak which continues to worsen.  She will keep her appointment for stress testing next week and continues work-up with GI testing on Monday.   Patient reports no  PND, orthopnea, presyncope, syncope, heart racing, or palpitations.        Labs:  LIPID RESULTS:  Lab Results   Component Value Date    CHOL 122 10/27/2022    CHOL 175 11/03/2020    HDL 48 (L) 10/27/2022    HDL 71 11/03/2020    LDL 49 10/27/2022    LDL 66 11/03/2020    TRIG 123 10/27/2022    TRIG 188 (H) 11/03/2020    CHOLHDLRATIO 3.0 05/15/2015       LIVER ENZYME RESULTS:  Lab Results   Component Value Date    AST 17 01/27/2023    AST 12 11/18/2020    ALT 10 01/27/2023    ALT 15 11/18/2020       CBC RESULTS:  Lab Results   Component Value Date    WBC 7.8 02/10/2023    WBC 5.1 11/18/2020    RBC 3.87 02/10/2023    RBC 4.52 11/18/2020    HGB 10.9 (L) 02/10/2023    HGB 13.4 11/18/2020    HCT 34.0 (L) 02/10/2023    HCT 41.8 11/18/2020    MCV 88 02/10/2023    MCV 93 11/18/2020    MCH 28.2 02/10/2023    MCH 29.6 11/18/2020    MCHC 32.1 02/10/2023    MCHC 32.1 11/18/2020    RDW 16.7 (H) 02/10/2023    RDW 13.2 11/18/2020     02/10/2023     11/18/2020       BMP RESULTS:  Lab Results   Component Value Date     02/10/2023      11/18/2020    POTASSIUM 4.3 02/10/2023    POTASSIUM 3.9 12/29/2022    POTASSIUM 3.6 11/18/2020    CHLORIDE 102 02/10/2023    CHLORIDE 108 03/23/2022    CHLORIDE 109 11/18/2020    CO2 24 02/10/2023    CO2 26 03/23/2022    CO2 28 11/18/2020    ANIONGAP 11 02/10/2023    ANIONGAP 5 03/23/2022    ANIONGAP 4 11/18/2020     (H) 02/10/2023     (H) 03/23/2022     (H) 11/18/2020    BUN 22.0 02/10/2023    BUN 20 03/23/2022    BUN 16 11/18/2020    CR 0.93 02/10/2023    CR 0.90 11/18/2020    GFRESTIMATED 60 (L) 02/10/2023    GFRESTIMATED 58 (L) 11/04/2021    GFRESTIMATED 58 (L) 11/18/2020    GFRESTBLACK 68 11/18/2020    FEDERICO 9.1 02/10/2023    FEDERICO 8.9 11/18/2020        A1C RESULTS:  Lab Results   Component Value Date    A1C 7.9 (H) 02/10/2023    A1C 6.9 (H) 11/03/2020       INR RESULTS:  No results found for: INR    Results reviewed today.       Current Cardiac Medications     Atenolol 25 mg daily  Atorvastatin 10 mg daily  Lisinopril 5 mg daily  Torsemide 10 mg daily as needed   Xarelto 20 mg daily                   Assessment and Plan:       Plan    Patient Instructions   Medication Changes:  5. STOP Torsemide d/t side effects  6. START lasix 20 mg daily     Recommendations:  1. Check daily weights and call my nurse if your weight has increased more than 2 lbs in one day or 5 lbs in one week; if you feel more short of breath or have worsening swelling in your legs or abdomen.   2. Call if heart rates are staying higher than 100, we may need to increase atenolol.   3. Call if blood pressure is less than 90 on top or less than 100 with lightheadedness.    4. Elevate legs, compression stockings    Follow-up:  1. Call to schedule  Nonfasting lab in 1 week(BMP) - can be done Mon.   2. EP consult next [id number] management  3. Echo next available-reassess EF  4. Lexiscan 3/1/2023 as scheduled   5. See STEPHANE Mejia for cardiology follow up at [facility]: 3/3/2023 as scheduled.   Call 6 months prior, to  schedule.     Cardiology Scheduling~[phone]  Cardiology Clinic RN~[phone] (Uzma RN, Janeen RN, Pita RN)            1. Paroxysmal atrial fibrillation    Symptomatic with episodes    Did not tolerate amiodarone d/t abd pain     Continue atenolol for rate control and Xarelto for anticoagulation      2. Coronary artery calcification    Heartburn symptoms     Continue statin, ACE inhibitor, beta-blocker    Plan for stress test and having GI work-up      3. Hypertension    Controlled     Continue lisinopril, atenolol      4. Hyperlipidemia    Last LDL 49 on 10/2022    Continue atorvastatin 10 mg daily      5.   Acute HFpEF    Lower extremity edema, MOE    Did not tolerate torsemide due to side effects of shaking, cold    Switch torsemide to Lasix, continue beta-blocker, ACE inhibitor    Echo to reassess EF           Thank you for allowing me to participate in this delightful patient's care.      This note was completed in part using Dragon voice recognition software. Although reviewed after completion, some word and grammatical errors may occur.    Jackie Manzano, APRN CNP, APRN, CNP           Data:   All laboratory data reviewed      Total time spent today was 60 mins, reviewing labs, testing, notes, documenting notes, and seeing patient.         General Appearance:  no distress, normal body habitus, upright.  ENT/Mouth:  membranes moist, no nasal discharge or bleeding gums. Normal head shape, no apparent injury or laceration.  Eyes:  no scleral icterus, normal conjunctivae.  No observed jaundice.  Neck:  no apparent thyromegaly.   Chest/Lungs:  No breathing difficulty while speaking.  No audible wheezing.  Equal chest wall expansion.  No cough.  Cardiovascular:  No obviously elevated jugular venous pressure. No apparent pitting edema bilaterally  Abdomen:  no evidence of abdominal distention.   Extremities:  no cyanosis noted, bilat LE edema   Skin:  no xanthelasma, normal skin color. No  facial lacerations or other trauma.  Neurologic:  Normal arm motion bilateral, no tremors.  No apparent focal defect.  Psychiatric:  Alert and oriented x3, calm demeanor

## 2023-02-22 ENCOUNTER — VIRTUAL VISIT (OUTPATIENT)
Dept: CARDIOLOGY | Facility: CLINIC | Age: 88
End: 2023-02-22
Payer: COMMERCIAL

## 2023-02-22 DIAGNOSIS — I10 BENIGN ESSENTIAL HYPERTENSION: ICD-10-CM

## 2023-02-22 DIAGNOSIS — I48.19 PERSISTENT ATRIAL FIBRILLATION (H): Primary | ICD-10-CM

## 2023-02-22 DIAGNOSIS — I25.10 CORONARY ARTERY CALCIFICATION SEEN ON CT SCAN: ICD-10-CM

## 2023-02-22 DIAGNOSIS — I50.33 ACUTE ON CHRONIC HEART FAILURE WITH PRESERVED EJECTION FRACTION (HFPEF) (H): ICD-10-CM

## 2023-02-22 DIAGNOSIS — E78.5 HYPERLIPIDEMIA LDL GOAL <70: ICD-10-CM

## 2023-02-22 PROCEDURE — 99215 OFFICE O/P EST HI 40 MIN: CPT | Mod: 95 | Performed by: NURSE PRACTITIONER

## 2023-02-22 RX ORDER — FUROSEMIDE 20 MG
20 TABLET ORAL DAILY
Qty: 30 TABLET | Refills: 11 | Status: SHIPPED | OUTPATIENT
Start: 2023-02-22 | End: 2023-06-21

## 2023-02-22 NOTE — LETTER
2/22/2023    Yecenia Cardona MD  [address] [place] [id number]    RE: Theodora Meng       Dear Colleague,     I had the pleasure of seeing Theodora Meng in the [facility].  Theodora is a 87 year old who is being evaluated via a billable video visit.      How would you like to obtain your AVS? MyChart  If the video visit is dropped, the invitation should be resent by: Text to cell phone: [phone]   Will anyone else be joining your video visit? No        Video-Visit Details    Type of service:  Video Visit   Video Start Time: 1:07 PM  Video End Time:1: 50 PM    Originating Location (pt. Location): Home    Distant Location (provider location):  Off-site  Platform used for Video Visit: [facility]        Cardiology Clinic Progress Note  Theodora Meng MRN# [id number]   YOB: 1935 Age: 87 year old      Primary Cardiologist:   Dr. Starr          History of Presenting Illness:      Theodora Meng is a pleasant 87 year old patient with a past cardiac history significant for   1. Paroxysmal atrial fibrillation/flutter    Symptomatic    Successful cardioversion 12/2022 with return of afib/flutter    Side effects with amio    Rate control and anticoagulated  2. Coronary artery calcification  3. Hypertension  4. Hyperlipidemia  Past medical history significant for DM type II, GERD.    Patient was seen by Dr. Starr in November 2022 for shortness of breath and fatigue.  These were thought to be secondary to atrial fibrillation and metoprolol.  Initially metoprolol was switched to diltiazem which improved her symptoms but only for 2 weeks.  She then had recurrent symptoms and proceeded with cardioversion.  After cardioversion she was started on amiodarone.  She felt good for 2 to 3 days post cardioversion but then experienced lightheaded, dizzy, loss of balance and fatigue had not improved.    On 12/4/2022 she was seen in the ED for chest pain.  She had negative  "cardiac work-up but was noted to have severe coronary calcification on CT. Stress test recommended. Prior stress test in 2016 showed mild ischemia which had been medically managed.    She has a history of dizziness on diltiazem so instead continued on atenolol.  She has had severe abdominal pain after amiodarone, so has pursued rate control.    Patient was seen by me in January 2023.  She still had not undergone her stress test and continued with \"heartburn\" that had increased in frequency.  She was set up for GI evaluation.  Abdominal pain improved after holding amiodarone.  She did not feel she had any further atrial fibrillation and rhythm was regular on exam.    Patient was seen in the ED on 1/27/2023 after having an irregular heart reading on her BP machine.  She was found to be in atrial fib/flutter 107 BPM.  She had fatigue and dyspnea on exertion.  They noted that she was set up for GI consult at the beginning of February.  She had negative cardiac work-up.  ED note reviewed today.    Telephone call on 2/20/2023 states patient was not able to participate in her scheduled stress test as she had a fall prior to getting there.  When she was seen by PCP she was found to have a BNP of greater than 7000 so they recommended starting torsemide daily.  Also noted that she was sleeping in the recliner.  Patient was not tolerating the torsemide due to shaking and cold feeling for several hours after taking it.  Weight was up 5 pounds in the prior 10 days.    Pt presents today, with her daughter, for 1 month follow-up.  She has not been tolerating the torsemide as she gets shakes and feels extremely cold about 4 hours after taking it.  She was taking 10 mg every other day.  Couple days ago she started taking torsemide 5 mg daily and has been tolerating that.  Ultimately she is requesting to try a different diuretic.  Weight is down only 1 pound at 218 pounds and she is normally about 214 pounds.  She continues with " bilateral lower extremity edema to the knee.  I recommended compression stockings or elevating her legs. She has dyspnea on exertion and cough, but denies any shortness of breath at rest or orthopnea.  She sleeps in a recliner for comfort.  Heart rates have been 90s to low 100s and 1 reading up to 120.  Generally these have been in the 90s.  Advised her to keep a closer eye on his heart rates and let us know if they are staying greater than 100.  She is agreeable to EP consult.  Her daughter asked about switching back to diltiazem.  Given that the patient previously reported side effects with the diltiazem of dizziness and abdominal pain I am hesitant to do so at this time.  If needed and room in her blood pressure, could consider increasing atenolol.  We also discussed possibility of adding digoxin if there is not improvement in her blood pressure.  Her daughter states that patient has had further decline with feeling more tired and weak which continues to worsen.  She will keep her appointment for stress testing next week and continues work-up with GI testing on Monday.   Patient reports no  PND, orthopnea, presyncope, syncope, heart racing, or palpitations.        Labs:  LIPID RESULTS:  Lab Results   Component Value Date    CHOL 122 10/27/2022    CHOL 175 11/03/2020    HDL 48 (L) 10/27/2022    HDL 71 11/03/2020    LDL 49 10/27/2022    LDL 66 11/03/2020    TRIG 123 10/27/2022    TRIG 188 (H) 11/03/2020    CHOLHDLRATIO 3.0 05/15/2015       LIVER ENZYME RESULTS:  Lab Results   Component Value Date    AST 17 01/27/2023    AST 12 11/18/2020    ALT 10 01/27/2023    ALT 15 11/18/2020       CBC RESULTS:  Lab Results   Component Value Date    WBC 7.8 02/10/2023    WBC 5.1 11/18/2020    RBC 3.87 02/10/2023    RBC 4.52 11/18/2020    HGB 10.9 (L) 02/10/2023    HGB 13.4 11/18/2020    HCT 34.0 (L) 02/10/2023    HCT 41.8 11/18/2020    MCV 88 02/10/2023    MCV 93 11/18/2020    MCH 28.2 02/10/2023    MCH 29.6 11/18/2020    MCHC  32.1 02/10/2023    MCHC 32.1 11/18/2020    RDW 16.7 (H) 02/10/2023    RDW 13.2 11/18/2020     02/10/2023     11/18/2020       BMP RESULTS:  Lab Results   Component Value Date     02/10/2023     11/18/2020    POTASSIUM 4.3 02/10/2023    POTASSIUM 3.9 12/29/2022    POTASSIUM 3.6 11/18/2020    CHLORIDE 102 02/10/2023    CHLORIDE 108 03/23/2022    CHLORIDE 109 11/18/2020    CO2 24 02/10/2023    CO2 26 03/23/2022    CO2 28 11/18/2020    ANIONGAP 11 02/10/2023    ANIONGAP 5 03/23/2022    ANIONGAP 4 11/18/2020     (H) 02/10/2023     (H) 03/23/2022     (H) 11/18/2020    BUN 22.0 02/10/2023    BUN 20 03/23/2022    BUN 16 11/18/2020    CR 0.93 02/10/2023    CR 0.90 11/18/2020    GFRESTIMATED 60 (L) 02/10/2023    GFRESTIMATED 58 (L) 11/04/2021    GFRESTIMATED 58 (L) 11/18/2020    GFRESTBLACK 68 11/18/2020    FEDERICO 9.1 02/10/2023    FEDERICO 8.9 11/18/2020        A1C RESULTS:  Lab Results   Component Value Date    A1C 7.9 (H) 02/10/2023    A1C 6.9 (H) 11/03/2020       INR RESULTS:  No results found for: INR    Results reviewed today.       Current Cardiac Medications     Atenolol 25 mg daily  Atorvastatin 10 mg daily  Lisinopril 5 mg daily  Torsemide 10 mg daily as needed   Xarelto 20 mg daily                   Assessment and Plan:       Plan    Patient Instructions   Medication Changes:  5. STOP Torsemide d/t side effects  6. START lasix 20 mg daily     Recommendations:  1. Check daily weights and call my nurse if your weight has increased more than 2 lbs in one day or 5 lbs in one week; if you feel more short of breath or have worsening swelling in your legs or abdomen.   2. Call if heart rates are staying higher than 100, we may need to increase atenolol.   3. Call if blood pressure is less than 90 on top or less than 100 with lightheadedness.    4. Elevate legs, compression stockings    Follow-up:  1. Call to schedule  Nonfasting lab in 1 week(BMP) - can be done Mon.   2. EP consult  next [id number] management  3. Echo next available-reassess EF  4. Lexiscan 3/1/2023 as scheduled   5. See STEPHANE Mejia for cardiology follow up at [facility]: 3/3/2023 as scheduled.   Call 6 months prior, to schedule.     Cardiology Scheduling~[phone]  Cardiology Clinic RN~[phone] (Uzma RN, Janeen RN, Pita RN)            1. Paroxysmal atrial fibrillation    Symptomatic with episodes    Did not tolerate amiodarone d/t abd pain     Continue atenolol for rate control and Xarelto for anticoagulation      2. Coronary artery calcification    Heartburn symptoms     Continue statin, ACE inhibitor, beta-blocker    Plan for stress test and having GI work-up      3. Hypertension    Controlled     Continue lisinopril, atenolol      4. Hyperlipidemia    Last LDL 49 on 10/2022    Continue atorvastatin 10 mg daily      5.   Acute HFpEF    Lower extremity edema, MOE    Did not tolerate torsemide due to side effects of shaking, cold    Switch torsemide to Lasix, continue beta-blocker, ACE inhibitor    Echo to reassess EF           Thank you for allowing me to participate in this delightful patient's care.      This note was completed in part using Dragon voice recognition software. Although reviewed after completion, some word and grammatical errors may occur.    Jackie Manzano, APRN CNP, APRN, CNP           Data:   All laboratory data reviewed      Total time spent today was 60 mins, reviewing labs, testing, notes, documenting notes, and seeing patient.         General Appearance:  no distress, normal body habitus, upright.  ENT/Mouth:  membranes moist, no nasal discharge or bleeding gums. Normal head shape, no apparent injury or laceration.  Eyes:  no scleral icterus, normal conjunctivae.  No observed jaundice.  Neck:  no apparent thyromegaly.   Chest/Lungs:  No breathing difficulty while speaking.  No audible wheezing.  Equal chest wall expansion.  No cough.  Cardiovascular:  No obviously  elevated jugular venous pressure. No apparent pitting edema bilaterally  Abdomen:  no evidence of abdominal distention.   Extremities:  no cyanosis noted, bilat LE edema   Skin:  no xanthelasma, normal skin color. No facial lacerations or other trauma.  Neurologic:  Normal arm motion bilateral, no tremors.  No apparent focal defect.  Psychiatric:  Alert and oriented x3, calm demeanor      Thank you for allowing me to participate in the care of your patient.      Sincerely,     ALEX [facility]  cc:   No referring provider defined for this encounter.

## 2023-02-22 NOTE — PATIENT INSTRUCTIONS
Medication Changes:  STOP Torsemide   START lasix 20 mg daily     Recommendations:  Check daily weights and call my nurse if your weight has increased more than 2 lbs in one day or 5 lbs in one week; if you feel more short of breath or have worsening swelling in your legs or abdomen.   Call if heart rates are staying higher than 100, we may need to increase atenolol.   Call if blood pressure is less than 90 on top or less than 100 with lightheadedness.    Elevate legs     Follow-up:  Call to schedule  Nonfasting lab in 1 week(BMP) - can be done Mon.   EP consult next available   Echo next available   Lexiscan 3/1/2023  See STEPHANE Mejia for cardiology follow up at [facility]: 3/3/2023 as scheduled.   Call 6 months prior, to schedule.     Cardiology Scheduling~[phone]  Cardiology Clinic RN~[phone] (Uzma RN, Janeen RN, Pita RN)

## 2023-02-25 ENCOUNTER — APPOINTMENT (OUTPATIENT)
Dept: GENERAL RADIOLOGY | Facility: CLINIC | Age: 88
DRG: 308 | End: 2023-02-25
Attending: STUDENT IN AN ORGANIZED HEALTH CARE EDUCATION/TRAINING PROGRAM
Payer: MEDICARE

## 2023-02-25 ENCOUNTER — HOSPITAL ENCOUNTER (INPATIENT)
Facility: CLINIC | Age: 88
LOS: 6 days | Discharge: NURSING, REHAB OR LONG-TERM CARE | DRG: 308 | End: 2023-03-03
Attending: STUDENT IN AN ORGANIZED HEALTH CARE EDUCATION/TRAINING PROGRAM | Admitting: FAMILY MEDICINE
Payer: MEDICARE

## 2023-02-25 DIAGNOSIS — Z79.01 LONG TERM (CURRENT) USE OF ANTICOAGULANTS: ICD-10-CM

## 2023-02-25 DIAGNOSIS — I48.0 PAROXYSMAL ATRIAL FIBRILLATION (H): ICD-10-CM

## 2023-02-25 DIAGNOSIS — I50.9 ACUTE CONGESTIVE HEART FAILURE (H): Primary | ICD-10-CM

## 2023-02-25 DIAGNOSIS — I48.91 ATRIAL FIBRILLATION WITH RVR (H): ICD-10-CM

## 2023-02-25 DIAGNOSIS — R53.81 DECLINING FUNCTIONAL STATUS: ICD-10-CM

## 2023-02-25 DIAGNOSIS — E11.9 TYPE 2 DIABETES MELLITUS WITHOUT COMPLICATION, WITHOUT LONG-TERM CURRENT USE OF INSULIN (H): ICD-10-CM

## 2023-02-25 DIAGNOSIS — R79.89 ELEVATED TROPONIN: ICD-10-CM

## 2023-02-25 DIAGNOSIS — E83.42 HYPOMAGNESEMIA: ICD-10-CM

## 2023-02-25 DIAGNOSIS — I48.91 ATRIAL FIBRILLATION (H): ICD-10-CM

## 2023-02-25 DIAGNOSIS — I48.91 RAPID ATRIAL FIBRILLATION (H): ICD-10-CM

## 2023-02-25 DIAGNOSIS — M17.12 PRIMARY OSTEOARTHRITIS OF LEFT KNEE: ICD-10-CM

## 2023-02-25 DIAGNOSIS — K21.00 GASTROESOPHAGEAL REFLUX DISEASE WITH ESOPHAGITIS, UNSPECIFIED WHETHER HEMORRHAGE: ICD-10-CM

## 2023-02-25 DIAGNOSIS — R79.89 ELEVATED BRAIN NATRIURETIC PEPTIDE (BNP) LEVEL: ICD-10-CM

## 2023-02-25 DIAGNOSIS — I10 ESSENTIAL HYPERTENSION: ICD-10-CM

## 2023-02-25 DIAGNOSIS — R60.0 PERIPHERAL EDEMA: ICD-10-CM

## 2023-02-25 DIAGNOSIS — M62.81 GENERALIZED MUSCLE WEAKNESS: ICD-10-CM

## 2023-02-25 DIAGNOSIS — K21.9 GASTROESOPHAGEAL REFLUX DISEASE WITHOUT ESOPHAGITIS: ICD-10-CM

## 2023-02-25 PROBLEM — D64.9 ANEMIA: Status: ACTIVE | Noted: 2023-02-25

## 2023-02-25 LAB
ALBUMIN SERPL BCG-MCNC: 3.2 G/DL (ref 3.5–5.2)
ALP SERPL-CCNC: 131 U/L (ref 35–104)
ALT SERPL W P-5'-P-CCNC: 13 U/L (ref 10–35)
ANION GAP SERPL CALCULATED.3IONS-SCNC: 12 MMOL/L (ref 7–15)
APTT PPP: 33 SECONDS (ref 22–38)
AST SERPL W P-5'-P-CCNC: 26 U/L (ref 10–35)
BASOPHILS # BLD AUTO: 0 10E3/UL (ref 0–0.2)
BASOPHILS NFR BLD AUTO: 1 %
BILIRUB SERPL-MCNC: 0.5 MG/DL
BUN SERPL-MCNC: 19.1 MG/DL (ref 8–23)
CALCIUM SERPL-MCNC: 9.1 MG/DL (ref 8.8–10.2)
CHLORIDE SERPL-SCNC: 101 MMOL/L (ref 98–107)
CREAT SERPL-MCNC: 0.71 MG/DL (ref 0.51–0.95)
DEPRECATED HCO3 PLAS-SCNC: 23 MMOL/L (ref 22–29)
EOSINOPHIL # BLD AUTO: 0 10E3/UL (ref 0–0.7)
EOSINOPHIL NFR BLD AUTO: 0 %
ERYTHROCYTE [DISTWIDTH] IN BLOOD BY AUTOMATED COUNT: 17.2 % (ref 10–15)
GFR SERPL CREATININE-BSD FRML MDRD: 82 ML/MIN/1.73M2
GLUCOSE SERPL-MCNC: 142 MG/DL (ref 70–99)
HCT VFR BLD AUTO: 35.6 % (ref 35–47)
HGB BLD-MCNC: 11.5 G/DL (ref 11.7–15.7)
HOLD SPECIMEN: NORMAL
IMM GRANULOCYTES # BLD: 0.1 10E3/UL
IMM GRANULOCYTES NFR BLD: 1 %
INR PPP: 1.76 (ref 0.85–1.15)
LYMPHOCYTES # BLD AUTO: 1.1 10E3/UL (ref 0.8–5.3)
LYMPHOCYTES NFR BLD AUTO: 13 %
MAGNESIUM SERPL-MCNC: 1.4 MG/DL (ref 1.7–2.3)
MCH RBC QN AUTO: 28.5 PG (ref 26.5–33)
MCHC RBC AUTO-ENTMCNC: 32.3 G/DL (ref 31.5–36.5)
MCV RBC AUTO: 88 FL (ref 78–100)
MONOCYTES # BLD AUTO: 0.4 10E3/UL (ref 0–1.3)
MONOCYTES NFR BLD AUTO: 5 %
NEUTROPHILS # BLD AUTO: 6.7 10E3/UL (ref 1.6–8.3)
NEUTROPHILS NFR BLD AUTO: 80 %
NRBC # BLD AUTO: 0 10E3/UL
NRBC BLD AUTO-RTO: 0 /100
NT-PROBNP SERPL-MCNC: 5203 PG/ML (ref 0–1800)
PLATELET # BLD AUTO: 412 10E3/UL (ref 150–450)
POTASSIUM SERPL-SCNC: 4.6 MMOL/L (ref 3.4–5.3)
PROT SERPL-MCNC: 6.9 G/DL (ref 6.4–8.3)
RBC # BLD AUTO: 4.04 10E6/UL (ref 3.8–5.2)
SODIUM SERPL-SCNC: 136 MMOL/L (ref 136–145)
TROPONIN T SERPL HS-MCNC: 16 NG/L
TROPONIN T SERPL HS-MCNC: 18 NG/L
TSH SERPL DL<=0.005 MIU/L-ACNC: 1.62 UIU/ML (ref 0.3–4.2)
WBC # BLD AUTO: 8.3 10E3/UL (ref 4–11)

## 2023-02-25 PROCEDURE — 71045 X-RAY EXAM CHEST 1 VIEW: CPT

## 2023-02-25 PROCEDURE — 96365 THER/PROPH/DIAG IV INF INIT: CPT | Performed by: STUDENT IN AN ORGANIZED HEALTH CARE EDUCATION/TRAINING PROGRAM

## 2023-02-25 PROCEDURE — 250N000009 HC RX 250: Performed by: STUDENT IN AN ORGANIZED HEALTH CARE EDUCATION/TRAINING PROGRAM

## 2023-02-25 PROCEDURE — 250N000013 HC RX MED GY IP 250 OP 250 PS 637: Performed by: STUDENT IN AN ORGANIZED HEALTH CARE EDUCATION/TRAINING PROGRAM

## 2023-02-25 PROCEDURE — 99285 EMERGENCY DEPT VISIT HI MDM: CPT | Mod: 25 | Performed by: STUDENT IN AN ORGANIZED HEALTH CARE EDUCATION/TRAINING PROGRAM

## 2023-02-25 PROCEDURE — 84484 ASSAY OF TROPONIN QUANT: CPT | Performed by: STUDENT IN AN ORGANIZED HEALTH CARE EDUCATION/TRAINING PROGRAM

## 2023-02-25 PROCEDURE — 96375 TX/PRO/DX INJ NEW DRUG ADDON: CPT | Performed by: STUDENT IN AN ORGANIZED HEALTH CARE EDUCATION/TRAINING PROGRAM

## 2023-02-25 PROCEDURE — 96376 TX/PRO/DX INJ SAME DRUG ADON: CPT | Performed by: STUDENT IN AN ORGANIZED HEALTH CARE EDUCATION/TRAINING PROGRAM

## 2023-02-25 PROCEDURE — 83735 ASSAY OF MAGNESIUM: CPT | Performed by: STUDENT IN AN ORGANIZED HEALTH CARE EDUCATION/TRAINING PROGRAM

## 2023-02-25 PROCEDURE — 85610 PROTHROMBIN TIME: CPT | Performed by: STUDENT IN AN ORGANIZED HEALTH CARE EDUCATION/TRAINING PROGRAM

## 2023-02-25 PROCEDURE — 258N000003 HC RX IP 258 OP 636: Performed by: STUDENT IN AN ORGANIZED HEALTH CARE EDUCATION/TRAINING PROGRAM

## 2023-02-25 PROCEDURE — 250N000013 HC RX MED GY IP 250 OP 250 PS 637: Performed by: FAMILY MEDICINE

## 2023-02-25 PROCEDURE — 250N000011 HC RX IP 250 OP 636: Performed by: STUDENT IN AN ORGANIZED HEALTH CARE EDUCATION/TRAINING PROGRAM

## 2023-02-25 PROCEDURE — 83880 ASSAY OF NATRIURETIC PEPTIDE: CPT | Performed by: STUDENT IN AN ORGANIZED HEALTH CARE EDUCATION/TRAINING PROGRAM

## 2023-02-25 PROCEDURE — 36415 COLL VENOUS BLD VENIPUNCTURE: CPT | Performed by: STUDENT IN AN ORGANIZED HEALTH CARE EDUCATION/TRAINING PROGRAM

## 2023-02-25 PROCEDURE — 93010 ELECTROCARDIOGRAM REPORT: CPT | Performed by: STUDENT IN AN ORGANIZED HEALTH CARE EDUCATION/TRAINING PROGRAM

## 2023-02-25 PROCEDURE — 84443 ASSAY THYROID STIM HORMONE: CPT | Performed by: STUDENT IN AN ORGANIZED HEALTH CARE EDUCATION/TRAINING PROGRAM

## 2023-02-25 PROCEDURE — 99223 1ST HOSP IP/OBS HIGH 75: CPT | Mod: AI | Performed by: FAMILY MEDICINE

## 2023-02-25 PROCEDURE — 200N000001 HC R&B ICU

## 2023-02-25 PROCEDURE — 85730 THROMBOPLASTIN TIME PARTIAL: CPT | Performed by: STUDENT IN AN ORGANIZED HEALTH CARE EDUCATION/TRAINING PROGRAM

## 2023-02-25 PROCEDURE — 85025 COMPLETE CBC W/AUTO DIFF WBC: CPT | Performed by: STUDENT IN AN ORGANIZED HEALTH CARE EDUCATION/TRAINING PROGRAM

## 2023-02-25 PROCEDURE — 80053 COMPREHEN METABOLIC PANEL: CPT | Performed by: STUDENT IN AN ORGANIZED HEALTH CARE EDUCATION/TRAINING PROGRAM

## 2023-02-25 PROCEDURE — 93005 ELECTROCARDIOGRAM TRACING: CPT | Performed by: STUDENT IN AN ORGANIZED HEALTH CARE EDUCATION/TRAINING PROGRAM

## 2023-02-25 RX ORDER — DILTIAZEM HYDROCHLORIDE 5 MG/ML
10 INJECTION INTRAVENOUS ONCE
Status: COMPLETED | OUTPATIENT
Start: 2023-02-25 | End: 2023-02-25

## 2023-02-25 RX ORDER — FAMOTIDINE 20 MG/1
20 TABLET, FILM COATED ORAL
Status: DISCONTINUED | OUTPATIENT
Start: 2023-02-25 | End: 2023-03-03 | Stop reason: HOSPADM

## 2023-02-25 RX ORDER — PANTOPRAZOLE SODIUM 40 MG/1
40 TABLET, DELAYED RELEASE ORAL
Status: DISCONTINUED | OUTPATIENT
Start: 2023-02-26 | End: 2023-02-26

## 2023-02-25 RX ORDER — FERROUS SULFATE 325(65) MG
325 TABLET ORAL
Status: DISCONTINUED | OUTPATIENT
Start: 2023-02-27 | End: 2023-03-03 | Stop reason: HOSPADM

## 2023-02-25 RX ORDER — FUROSEMIDE 10 MG/ML
40 INJECTION INTRAMUSCULAR; INTRAVENOUS EVERY 8 HOURS
Status: COMPLETED | OUTPATIENT
Start: 2023-02-26 | End: 2023-02-26

## 2023-02-25 RX ORDER — LORAZEPAM 0.5 MG/1
.25-.5 TABLET ORAL EVERY 8 HOURS PRN
Status: DISCONTINUED | OUTPATIENT
Start: 2023-02-25 | End: 2023-03-03 | Stop reason: HOSPADM

## 2023-02-25 RX ORDER — ATORVASTATIN CALCIUM 10 MG/1
10 TABLET, FILM COATED ORAL EVERY EVENING
Status: DISCONTINUED | OUTPATIENT
Start: 2023-02-26 | End: 2023-03-03 | Stop reason: HOSPADM

## 2023-02-25 RX ORDER — ATENOLOL 25 MG/1
25 TABLET ORAL EVERY EVENING
Status: DISCONTINUED | OUTPATIENT
Start: 2023-02-26 | End: 2023-02-27

## 2023-02-25 RX ORDER — DILTIAZEM HYDROCHLORIDE 30 MG/1
30 TABLET, FILM COATED ORAL ONCE
Status: COMPLETED | OUTPATIENT
Start: 2023-02-25 | End: 2023-02-25

## 2023-02-25 RX ORDER — GABAPENTIN 300 MG/1
600 CAPSULE ORAL EVERY EVENING
Status: DISCONTINUED | OUTPATIENT
Start: 2023-02-25 | End: 2023-03-03 | Stop reason: HOSPADM

## 2023-02-25 RX ORDER — ACETAMINOPHEN 500 MG
1000 TABLET ORAL ONCE
Status: COMPLETED | OUTPATIENT
Start: 2023-02-25 | End: 2023-02-25

## 2023-02-25 RX ORDER — SUCRALFATE ORAL 1 G/10ML
1 SUSPENSION ORAL 2 TIMES DAILY
Status: DISCONTINUED | OUTPATIENT
Start: 2023-02-25 | End: 2023-03-03 | Stop reason: HOSPADM

## 2023-02-25 RX ORDER — FUROSEMIDE 10 MG/ML
40 INJECTION INTRAMUSCULAR; INTRAVENOUS ONCE
Status: COMPLETED | OUTPATIENT
Start: 2023-02-25 | End: 2023-02-25

## 2023-02-25 RX ORDER — LISINOPRIL 5 MG/1
5 TABLET ORAL DAILY
Status: DISCONTINUED | OUTPATIENT
Start: 2023-02-26 | End: 2023-03-03 | Stop reason: HOSPADM

## 2023-02-25 RX ORDER — DILTIAZEM HCL IN NACL,ISO-OSM 125 MG/125
5-15 PLASTIC BAG, INJECTION (ML) INTRAVENOUS CONTINUOUS
Status: DISCONTINUED | OUTPATIENT
Start: 2023-02-25 | End: 2023-02-25

## 2023-02-25 RX ADMIN — DILTIAZEM HYDROCHLORIDE 5 MG/HR: 5 INJECTION, SOLUTION INTRAVENOUS at 20:41

## 2023-02-25 RX ADMIN — DILTIAZEM HYDROCHLORIDE 10 MG: 5 INJECTION, SOLUTION INTRAVENOUS at 17:17

## 2023-02-25 RX ADMIN — ACETAMINOPHEN 1000 MG: 500 TABLET ORAL at 20:35

## 2023-02-25 RX ADMIN — DILTIAZEM HYDROCHLORIDE 10 MG: 5 INJECTION, SOLUTION INTRAVENOUS at 15:32

## 2023-02-25 RX ADMIN — GABAPENTIN 600 MG: 300 CAPSULE ORAL at 22:00

## 2023-02-25 RX ADMIN — RIVAROXABAN 20 MG: 10 TABLET, FILM COATED ORAL at 22:00

## 2023-02-25 RX ADMIN — DILTIAZEM HYDROCHLORIDE 10 MG/HR: 5 INJECTION, SOLUTION INTRAVENOUS at 21:19

## 2023-02-25 RX ADMIN — FUROSEMIDE 40 MG: 10 INJECTION, SOLUTION INTRAMUSCULAR; INTRAVENOUS at 17:17

## 2023-02-25 ASSESSMENT — ACTIVITIES OF DAILY LIVING (ADL)
ADLS_ACUITY_SCORE: 37
DOING_ERRANDS_INDEPENDENTLY_DIFFICULTY: NO
NUMBER_OF_TIMES_PATIENT_HAS_FALLEN_WITHIN_LAST_SIX_MONTHS: 1
WEAR_GLASSES_OR_BLIND: YES
ADLS_ACUITY_SCORE: 24
DRESSING/BATHING_DIFFICULTY: NO
VISION_MANAGEMENT: GLASSESS
DIFFICULTY_EATING/SWALLOWING: NO
CHANGE_IN_FUNCTIONAL_STATUS_SINCE_ONSET_OF_CURRENT_ILLNESS/INJURY: NO
ADLS_ACUITY_SCORE: 37
WALKING_OR_CLIMBING_STAIRS_DIFFICULTY: NO
FALL_HISTORY_WITHIN_LAST_SIX_MONTHS: YES
EQUIPMENT_CURRENTLY_USED_AT_HOME: CANE, STRAIGHT
CONCENTRATING,_REMEMBERING_OR_MAKING_DECISIONS_DIFFICULTY: NO
TOILETING_ISSUES: NO

## 2023-02-25 NOTE — ED TRIAGE NOTES
Sob x 1 week and shoulder and abdominal pain.      Triage Assessment     Row Name 02/25/23 1410       Triage Assessment (Adult)    Airway WDL WDL       Respiratory WDL    Respiratory WDL X       Cardiac WDL    Cardiac WDL X

## 2023-02-25 NOTE — ED PROVIDER NOTES
History     Chief Complaint   Patient presents with     Shortness of Breath     HPI  Theodora Meng is a 87 year old female with documented past medical history which includes morbid obesity, essential hypertension, stage III CKD, paroxysmal atrial fibrillation and chronic anticoagulation therapy who presents to the department for evaluation of several weeks of progressive fatigue, generalized weakness, shortness of breath on exertion, lower extremity edema and weight gain.  Patient estimates she has gained 6 pounds over the past 1-2 weeks, now over the past few days she is finding it difficult to ambulate within her home.  She awoke this morning short of breath and in a panic.  However she denies symptoms such as fever, chills, chest pain, productive cough, back pain, abdominal pain or gastrointestinal symptoms.  Patient is currently prescribed atenolol for rate control and furosemide for diuretic, although has trialed multiple different medications in the past and seems to suffer from medication sensitivities.      Allergies:  Allergies   Allergen Reactions     Zoloft [Sertraline] Palpitations, Diarrhea and Cramps       Problem List:    Patient Active Problem List    Diagnosis Date Noted     Peripheral edema 02/25/2023     Priority: Medium     Generalized muscle weakness 02/25/2023     Priority: Medium     Elevated troponin 02/25/2023     Priority: Medium     Rapid atrial fibrillation (H) 02/25/2023     Priority: Medium     Elevated brain natriuretic peptide (BNP) level 02/25/2023     Priority: Medium     Declining functional status 02/25/2023     Priority: Medium     Coronary artery calcification seen on CT scan 01/18/2023     Priority: Medium     Paroxysmal atrial fibrillation (H) 01/18/2023     Priority: Medium     Chronic kidney disease, stage 3 10/25/2021     Priority: Medium     Left knee DJD 04/03/2019     Priority: Medium     Subclinical hyperthyroidism 11/12/2018     Priority: Medium     Type 2  diabetes mellitus without complication, without long-term current use of insulin (H) 11/10/2016     Priority: Medium     Benign essential hypertension 11/10/2016     Priority: Medium     Gastroesophageal reflux disease without esophagitis 11/10/2016     Priority: Medium     Endometrial polyp 06/18/2016     Priority: Medium     Thickened endometrium 04/21/2016     Priority: Medium     Thyroid nodule 03/24/2016     Priority: Medium     DDD (degenerative disc disease), cervical 02/13/2016     Priority: Medium     Type 2 diabetes mellitus without complication (H) 10/21/2015     Priority: Medium     Morbid obesity (H) 10/21/2015     Priority: Medium     Status post total knee replacement 11/29/2013     Priority: Medium     Fever 11/11/2013     Priority: Medium     Edema 11/07/2013     Priority: Medium     Nausea 11/07/2013     Priority: Medium     S/P total knee arthroplasty 10/30/2013     Priority: Medium     Right knee DJD 10/30/2013     Priority: Medium     OA (osteoarthritis) of knee 12/11/2012     Priority: Medium     Hyperlipidemia LDL goal <70 12/04/2012     Priority: Medium     Knee pain 11/07/2012     Priority: Medium     Insomnia 11/07/2012     Priority: Medium     Anxiety 11/15/2011     Priority: Medium     Type 2 diabetes mellitus with other ophthalmic complication, without long-term current use of insulin (H) 12/21/2010     Priority: Medium     Neuralgia 06/05/2009     Priority: Medium     Vitamin D deficiency 03/30/2009     Priority: Medium     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 Provider to review          Past Medical History:    Past Medical History:   Diagnosis Date     Depressive disorder      Diabetes (H)      Esophageal reflux      Other abnormal glucose      Other premature beats      Unspecified essential  hypertension        Past Surgical History:    Past Surgical History:   Procedure Laterality Date     ANESTHESIA CARDIOVERSION N/A 12/29/2022    Procedure: CARDIOVERSION;  Surgeon: GENERIC ANESTHESIA PROVIDER;  Location:  OR     APPENDECTOMY       ARTHROPLASTY KNEE  10/30/2013    Procedure: ARTHROPLASTY KNEE;  Right Total Knee Arthroplasty;  Surgeon: Ousmane Mcgowan MD;  Location: [facility]     ARTHROPLASTY KNEE Left 4/3/2019    Procedure: Left Total Knee Arthroplasty;  Surgeon: Ousmane Mcgowan MD;  Location: [facility]     CHOLECYSTECTOMY, LAPOROSCOPIC      Cholecystectomy, Laparoscopic     DILATION AND CURETTAGE, OPERATIVE HYSTEROSCOPY WITH MORCELLATOR, COMBINED N/A 6/20/2016    Procedure: COMBINED DILATION AND CURETTAGE, OPERATIVE HYSTEROSCOPY WITH MORCELLATOR;  Surgeon: Bony Arredondo MD;  Location: [facility]     ESOPHAGOSCOPY, GASTROSCOPY, DUODENOSCOPY (EGD), COMBINED N/A 2/17/2016    Procedure: COMBINED ESOPHAGOSCOPY, GASTROSCOPY, DUODENOSCOPY (EGD), BIOPSY SINGLE OR MULTIPLE;  Surgeon: Mil Guevara MD;  Location: [facility]     SALPINGO OOPHORECTOMY,R/L/OZZY      Salpingo Oophorectomy, RT/LT/OZZY     TONSILLECTOMY         Family History:    Family History   Problem Relation Age of Onset     Heart Disease Mother      Heart Disease Father      Heart Disease Brother      Heart Disease Brother      Heart Disease Brother        Social History:  Marital Status:   [5]  Social History     Tobacco Use     Smoking status: Never     Smokeless tobacco: Never   Vaping Use     Vaping Use: Never used   Substance Use Topics     Alcohol use: No     Drug use: No        Medications:    acetaminophen (TYLENOL) 500 MG tablet  atenolol (TENORMIN) 25 MG tablet  atorvastatin (LIPITOR) 10 MG tablet  blood glucose monitoring (ACCU-CHEK COMPACT CARE KIT) meter device kit  blood glucose monitoring (NO BRAND SPECIFIED) test strip  famotidine (PEPCID) 20 MG tablet  ferrous sulfate (FEROSUL) 325 (65 Fe) MG tablet  furosemide  "(LASIX) 20 MG tablet  gabapentin (NEURONTIN) 300 MG capsule  lisinopril (ZESTRIL) 5 MG tablet  LORazepam (ATIVAN) 0.5 MG tablet  omeprazole (PRILOSEC) 40 MG DR capsule  rivaroxaban ANTICOAGULANT (XARELTO) 20 MG TABS tablet  sucralfate (CARAFATE) 1 GM/10ML suspension  vitamin D2 (ERGOCALCIFEROL) 29881 units (1250 mcg) capsule  [START ON 5/2/2023] vitamin D3 (CHOLECALCIFEROL) 50 mcg (2000 units) tablet          Review of Systems  Constitutional: Positive for fatigue.  Negative for fever or chills.  Cardiovascular:  Negative for chest discomfort.  Respiratory: Positive for shortness of breath when lying flat or upon exertion.  Negative for cough hemoptysis.  Gastrointestinal:  Negative for abdominal pain, nausea or vomiting.   Musculoskeletal:  Denies recent fall or injury.  Neurological:  Negative for headache or dizziness.    All others reviewed and are negative.      Physical Exam   BP: 133/84  Pulse: 120  Temp: 98.1  F (36.7  C)  Resp: 18  Height: 162.6 cm (5' 4\")  Weight: 99.8 kg (220 lb)  SpO2: 97 %      Physical Exam  Constitutional:  Well developed, well nourished.  Appears nontoxic and in no acute distress.    HENT:  Normocephalic and atraumatic.  Symmetric in appearance.  Eyes:  Conjunctivae are normal.  Cardiovascular:  No cyanosis.  Irregular rhythm with rapid rate.  No audible murmurs noted.  2+ bilateral lower extremity edema.  Respiratory:  Effort normal without sign of respiratory distress.  No audible wheezing or stridor.  CTAB.   Gastrointestinal:  Soft nondistended abdomen.  Nontender and without guarding.  No rigidity or rebound tenderness.  Negative Cody's sign.  Negative McBurney's point.    Musculoskeletal:  Moves extremities spontaneously.  Neurological:  Patient is alert.  Skin:  Skin is warm and dry.  Psychiatric:  Normal mood and affect.      ED Course                 Procedures                EKG Interpretation:      Interpreted by: Ousmane Santos  Time reviewed: Upon completion    Symptoms " at time of EKG: Asymptomatic   Rhythm: Irregular narrow complex rhythm  Rate: 140 bpm  Axis: Normal    Conduction: None atypical   ST Segments/ T Waves: No pathologic ST-elevations or T-wave abnormalities.  Q Waves: None  Comparison to prior: Similar morphology to previous     Clinical Impression: Atrial fibrillation without sign of ischemia         Critical Care time:               Results for orders placed or performed during the hospital encounter of 02/25/23 (from the past 24 hour(s))   CBC with platelets differential    Narrative    The following orders were created for panel order CBC with platelets differential.  Procedure                               Abnormality         Status                     ---------                               -----------         ------                     CBC with platelets and d...[[id number]]  Abnormal            Final result                 Please view results for these tests on the individual orders.   Comprehensive metabolic panel   Result Value Ref Range    Sodium 136 136 - 145 mmol/L    Potassium 4.6 3.4 - 5.3 mmol/L    Chloride 101 98 - 107 mmol/L    Carbon Dioxide (CO2) 23 22 - 29 mmol/L    Anion Gap 12 7 - 15 mmol/L    Urea Nitrogen 19.1 8.0 - 23.0 mg/dL    Creatinine 0.71 0.51 - 0.95 mg/dL    Calcium 9.1 8.8 - 10.2 mg/dL    Glucose 142 (H) 70 - 99 mg/dL    Alkaline Phosphatase 131 (H) 35 - 104 U/L    AST 26 10 - 35 U/L    ALT 13 10 - 35 U/L    Protein Total 6.9 6.4 - 8.3 g/dL    Albumin 3.2 (L) 3.5 - 5.2 g/dL    Bilirubin Total 0.5 <=1.2 mg/dL    GFR Estimate 82 >60 mL/min/1.73m2   INR   Result Value Ref Range    INR 1.76 (H) 0.85 - 1.15   Partial thromboplastin time   Result Value Ref Range    aPTT 33 22 - 38 Seconds   Troponin T, High Sensitivity   Result Value Ref Range    Troponin T, High Sensitivity 16 (H) <=14 ng/L   Magnesium   Result Value Ref Range    Magnesium 1.4 (L) 1.7 - 2.3 mg/dL   TSH with free T4 reflex   Result Value Ref Range    TSH 1.62 0.30 - 4.20  uIU/mL   Nt probnp inpatient (BNP)   Result Value Ref Range    N terminal Pro BNP Inpatient 5,203 (H) 0 - 1,800 pg/mL   [place] Draw    Narrative    The following orders were created for panel order [place] Draw.  Procedure                               Abnormality         Status                     ---------                               -----------         ------                     Extra Red Top Tube[[id number]]                               Final result                 Please view results for these tests on the individual orders.   CBC with platelets and differential   Result Value Ref Range    WBC Count 8.3 4.0 - 11.0 10e3/uL    RBC Count 4.04 3.80 - 5.20 10e6/uL    Hemoglobin 11.5 (L) 11.7 - 15.7 g/dL    Hematocrit 35.6 35.0 - 47.0 %    MCV 88 78 - 100 fL    MCH 28.5 26.5 - 33.0 pg    MCHC 32.3 31.5 - 36.5 g/dL    RDW 17.2 (H) 10.0 - 15.0 %    Platelet Count 412 150 - 450 10e3/uL    % Neutrophils 80 %    % Lymphocytes 13 %    % Monocytes 5 %    % Eosinophils 0 %    % Basophils 1 %    % Immature Granulocytes 1 %    NRBCs per 100 WBC 0 <1 /100    Absolute Neutrophils 6.7 1.6 - 8.3 10e3/uL    Absolute Lymphocytes 1.1 0.8 - 5.3 10e3/uL    Absolute Monocytes 0.4 0.0 - 1.3 10e3/uL    Absolute Eosinophils 0.0 0.0 - 0.7 10e3/uL    Absolute Basophils 0.0 0.0 - 0.2 10e3/uL    Absolute Immature Granulocytes 0.1 <=0.4 10e3/uL    Absolute NRBCs 0.0 10e3/uL   Extra Red Top Tube   Result Value Ref Range    Hold Specimen JIC    XR Chest Port 1 View    Narrative    EXAM: XR CHEST PORT 1 VIEW  LOCATION: [facility]  DATE/TIME: 2/25/2023 4:06 PM    INDICATION: CHF with rapid atrial fibrillation.  COMPARISON: 12/25/2022      Impression    IMPRESSION: Mild cardiomegaly with unchanged mediastinal contours. Normal vasculature. Left basilar airspace disease obscuring the diaphragm due to atelectasis versus pneumonia with a small left pleural effusion. There is no pneumothorax. Right lung is   clear.        Medications   diltiazem (CARDIZEM) injection 10 mg (has no administration in time range)   furosemide (LASIX) injection 40 mg (has no administration in time range)   diltiazem (CARDIZEM) injection 10 mg (10 mg Intravenous $Given 2/25/23 [id number])       Assessments & Plan (with Medical Decision Making)   Theodora Meng is a 87 year old female who presented to the department via wheelchair and accompanied by daughter for evaluation of 1-2 weeks of progressive weakness, fatigue, peripheral edema and orthopnea.  In reviewing records, patient seems to have a long history of medication intolerance, in particular with regards to rate control medications for atrial fibrillation.  She arrived to the department tachycardic but normotensive, relatively clear lung sounds to auscultation and benign abdominal examination.  EKG morphology similar to previous on file, chest pain-free and high-sensitivity troponin only mildly elevated likely due to cardiac demand.  Heart rate of nearly 140 bpm improved with initial dose of 10 mg IV diltiazem.  BNP value remains elevated and near previous on file, received 40 mg IV dose of furosemide as well.  Suspect patient has suffered from persistent decompensated heart failure which is likely related to her rapid atrial fibrillation which is not well rate controlled.  Patient lives independently on single level home, although 5 stairs to get in, and has not been able to safely ambulate around her home due to deconditioning and functional decline.  She will require hospital admission, accepted by hospitalist in agreement with initial plan.        Disclaimer:  This note consists of symbols derived from keyboarding, dictation, and/or voice recognition software.  As a result, there may be errors in the script that have gone undetected.  Please consider this when interpreting information found in the chart.          I have reviewed the nursing notes.    I have reviewed the findings, diagnosis,  plan and need for follow up with the patient.            New Prescriptions    No medications on file       Final diagnoses:   Rapid atrial fibrillation (H)   Peripheral edema   Elevated brain natriuretic peptide (BNP) level   Elevated troponin   Generalized muscle weakness   Declining functional status       2/25/2023   [facility] EMERGENCY DEPT     Ousmane Santos DO  02/25/23 [id number]

## 2023-02-25 NOTE — H&P
[facility]    History and Physical  Hospital Medicine       Date of Admission:  2/25/2023  Date of Service: 2/25/2023     Assessment & Plan   Theodora Meng is a 87 year old female who presents on 2/25/2023 with A-fib with RVR, and possible CHF exacerbation    Principal Problem:    Atrial fibrillation with RVR (H)  Patient is a 87 year old female with  history of A-fib, hypertension, diabetes, coronary artery calcification now presented with progressive fatigue, weakness and shortness of breath.  In the ED she was found to be in A-fib with RVR with heart rate 140.   troponin slightly elevated at 16 ->18, BNP elevated at 5203  Chest x-ray showed Mild cardiomegaly with unchanged mediastinal contours. Normal vasculature. Left basilar airspace disease obscuring the diaphragm due to atelectasis versus pneumonia with a small left pleural effusion. There is no pneumothorax. Right lung is   Clear.  Patient was given 10 mg IV diltiazem x2 for rate control, she did not respond well to diltiazem IV push and was subsequently started on diltiazem drip and was admitted to the ICU    -Admission to ICU  -Continue diltiazem drip  -Telemetry monitoring  -Hold atenolol for now  -Continue Xarelto for stroke prevention      Acute HF (congestive heart failure) (H)  She follows with cardiology, last appointment 02/22/2023, she was noted to have lower extremity edema and dyspnea on exertion at that time.  Torsemide was switched to Lasix due to intolerance.  Patient reports he only took 1 dose of 20 mg Lasix.  -Patient received Lasix IV 60 mg in the ED  -Continue Lasix IV every 8 hours, will give 40 mg at 1 AM, reassess in the morning for further diuresis  -Echocardiogram on Monday  -Intake and output  -Daily weight    Type 2 diabetes mellitus with other ophthalmic complication, without long-term current use of insulin (H)        Hyperlipidemia LDL goal <70  Chronic stable problem  Continue home Lipitor     "Benign essential hypertension    Coronary artery calcification seen on CT scan  Blood pressure at goal  Continue current medication, hold atenolol for now as she is on diltiazem drip     Elevated troponin  tropon 16 -> 18  EKG no concern for ischemia      Generalized muscle weakness    Declining functional status  Presented with fatigue and weakness  PT/OT    Anemia  Hemoglobin 11.5, appears to be baseline  No current concern for bleeding  Repeat CBC in the a.m.  Clinically Significant Risk Factors Present on Admission            # Hypomagnesemia: Lowest Mg = 1.4 mg/dL in last 2 days, will replace as needed   # Hypoalbuminemia: Lowest albumin = 3.2 g/dL at 2/25/2023  3:18 PM, will monitor as appropriate  # Drug Induced Coagulation Defect: home medication list includes an anticoagulant medication    # Hypertension: home medication list includes antihypertensive(s)     # DMII: A1C = 7.9 % (Ref range: 0.0 - 5.6 %) within past 6 months    # Obesity: Estimated body mass index is 37.76 kg/m  as calculated from the following:    Height as of this encounter: 1.626 m (5' 4\").    Weight as of this encounter: 99.8 kg (220 lb).                Diet: 2 Gram Sodium Diet Other - please comment  DVT Prophylaxis: DOAC  Cid Catheter: Not present  Code Status: Full Code  Lines: IV line     Disposition Plan      Expected Discharge Date: 02/27/2023             Entered: Jorge L Ryan MD 02/26/2023, 12:15 AM     Status: Patient is appropriate for ICU  Jorge L Ryan MD        The patient's care was discussed with the Patient.    Primary Care Physician   Yecenia Armstrnog [phone]    History is obtained from the patient,  and review of old records via the EMR.    History of Present Illness   Theodora Meng is a 87 year old female with past medical history of A-fib, hypertension, diabetes, coronary artery calcification now presents on 2/25/2023 with progressive fatigue, weakness and shortness of breath.  Patient is currently " taking atenolol 25 mg daily for rate control for A-fib, she was just switched from torsemide to Lasix 20 mg because she did not tolerate torsemide due to side effects of shaking and cold, she only took 1 dose of Lasix  In the ED she was found to be in A-fib with RVR with heart rate 140.  Labs showed hemoglobin 11.5, appears to be baseline, troponin slightly elevated at 16, BNP elevated at 5203  Chest x-ray showed Mild cardiomegaly with unchanged mediastinal contours. Normal vasculature. Left basilar airspace disease obscuring the diaphragm due to atelectasis versus pneumonia with a small left pleural effusion. There is no pneumothorax. Right lung is   Clear.  Patient was given 10 mg IV diltiazem x2 for rate control, she did not respond well to diltiazem IV push and was subsequently started on diltiazem drip and admitted to the ICU    Review of Systems   The 10 point Review of Systems is negative other than noted in the HPI or here.     Past Medical History      Past Medical History:   Diagnosis Date     Depressive disorder      Diabetes (H)      Esophageal reflux      Other abnormal glucose      Other premature beats      Unspecified essential hypertension      Patient Active Problem List    Diagnosis Date Noted     Atrial fibrillation with RVR (H) 02/25/2023     Priority: High     CHF (congestive heart failure) (H) 02/25/2023     Priority: High     Peripheral edema 02/25/2023     Priority: Medium     Generalized muscle weakness 02/25/2023     Priority: Medium     Elevated troponin 02/25/2023     Priority: Medium     Elevated brain natriuretic peptide (BNP) level 02/25/2023     Priority: Medium     Declining functional status 02/25/2023     Priority: Medium     Anemia 02/25/2023     Priority: Medium     Rapid atrial fibrillation (H) 02/25/2023     Priority: Medium     Coronary artery calcification seen on CT scan 01/18/2023     Priority: Medium     Paroxysmal atrial fibrillation (H) 01/18/2023     Priority: Medium      Chronic kidney disease, stage 3 10/25/2021     Priority: Medium     Left knee DJD 04/03/2019     Priority: Medium     Subclinical hyperthyroidism 11/12/2018     Priority: Medium     Type 2 diabetes mellitus without complication, without long-term current use of insulin (H) 11/10/2016     Priority: Medium     Benign essential hypertension 11/10/2016     Priority: Medium     Gastroesophageal reflux disease without esophagitis 11/10/2016     Priority: Medium     Endometrial polyp 06/18/2016     Priority: Medium     Thickened endometrium 04/21/2016     Priority: Medium     Thyroid nodule 03/24/2016     Priority: Medium     DDD (degenerative disc disease), cervical 02/13/2016     Priority: Medium     Type 2 diabetes mellitus without complication (H) 10/21/2015     Priority: Medium     Morbid obesity (H) 10/21/2015     Priority: Medium     Status post total knee replacement 11/29/2013     Priority: Medium     Fever 11/11/2013     Priority: Medium     Edema 11/07/2013     Priority: Medium     Nausea 11/07/2013     Priority: Medium     S/P total knee arthroplasty 10/30/2013     Priority: Medium     Right knee DJD 10/30/2013     Priority: Medium     OA (osteoarthritis) of knee 12/11/2012     Priority: Medium     Hyperlipidemia LDL goal <70 12/04/2012     Priority: Medium     Knee pain 11/07/2012     Priority: Medium     Insomnia 11/07/2012     Priority: Medium     Anxiety 11/15/2011     Priority: Medium     Type 2 diabetes mellitus with other ophthalmic complication, without long-term current use of insulin (H) 12/21/2010     Priority: Medium     Neuralgia 06/05/2009     Priority: Medium     Vitamin D deficiency 03/30/2009     Priority: Medium     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 Provider to review          Past Surgical History      Past Surgical History:   Procedure Laterality Date     ANESTHESIA CARDIOVERSION N/A 12/29/2022    Procedure: CARDIOVERSION;  Surgeon: GENERIC ANESTHESIA PROVIDER;  Location:  OR     APPENDECTOMY       ARTHROPLASTY KNEE  10/30/2013    Procedure: ARTHROPLASTY KNEE;  Right Total Knee Arthroplasty;  Surgeon: Ousmane Mcgowan MD;  Location: [facility]     ARTHROPLASTY KNEE Left 4/3/2019    Procedure: Left Total Knee Arthroplasty;  Surgeon: Ousmane Mcgowan MD;  Location: [facility] OR     CHOLECYSTECTOMY, LAPOROSCOPIC      Cholecystectomy, Laparoscopic     DILATION AND CURETTAGE, OPERATIVE HYSTEROSCOPY WITH MORCELLATOR, COMBINED N/A 6/20/2016    Procedure: COMBINED DILATION AND CURETTAGE, OPERATIVE HYSTEROSCOPY WITH MORCELLATOR;  Surgeon: Bony Arredondo MD;  Location: [facility] OR     ESOPHAGOSCOPY, GASTROSCOPY, DUODENOSCOPY (EGD), COMBINED N/A 2/17/2016    Procedure: COMBINED ESOPHAGOSCOPY, GASTROSCOPY, DUODENOSCOPY (EGD), BIOPSY SINGLE OR MULTIPLE;  Surgeon: Mil Guevara MD;  Location: [facility] GI     SALPINGO OOPHORECTOMY,R/L/OZZY      Salpingo Oophorectomy, RT/LT/OZZY     TONSILLECTOMY          Prior to Admission Medications   Prior to Admission Medications   Prescriptions Last Dose Informant Patient Reported? Taking?   LORazepam (ATIVAN) 0.5 MG tablet Past Month Self No Yes   Sig: Take 0.5-1 tablets (0.25-0.5 mg) by mouth every 8 hours as needed for anxiety TAKE 1/2 to 1 TAB BY MOUTH EVERY 8 HOURS AS NEEDED FOR ANXIETY   acetaminophen (TYLENOL) 500 MG tablet 2/24/2023 at pm Self Yes Yes   Sig: Take 1,000 mg by mouth every 6 hours as needed   atenolol (TENORMIN) 25 MG tablet 2/24/2023 at pm Self No Yes   Sig: Take 1 tablet (25 mg) by mouth daily   Patient taking differently: Take 25 mg by mouth every evening   atorvastatin (LIPITOR) 10 MG tablet 2/24/2023 at pm Self No Yes   Sig: Take 1 tablet (10 mg) by mouth daily   Patient taking differently: Take 10 mg by mouth every evening   blood glucose monitoring (ACCU-CHEK  COMPACT CARE KIT) meter device kit not doing it Self No No   Sig: Use to test blood sugars 1 times daily or as directed. May sub formulary meter   Patient not taking: Reported on 2/21/2023   blood glucose monitoring (NO BRAND SPECIFIED) test strip not doing it Self No No   Sig: Use to test blood sugars twice times daily or as directed   Patient not taking: Reported on 2/21/2023   famotidine (PEPCID) 20 MG tablet  Self No No   Sig: Take 1 tablet (20 mg) by mouth At Bedtime   Patient taking differently: Take 20 mg by mouth nightly as needed   ferrous sulfate (FEROSUL) 325 (65 Fe) MG tablet new, not yet Self No No   Sig: Take 1 tablet (325 mg) by mouth Every Mon, Wed, Fri Morning   furosemide (LASIX) 20 MG tablet 2/24/2023 at am Self No Yes   Sig: Take 1 tablet (20 mg) by mouth daily   gabapentin (NEURONTIN) 300 MG capsule 2/24/2023 at pm Self No Yes   Sig: TAKE 2 CAPSULES IN THE EVENING   Patient taking differently: Take 600 mg by mouth every evening TAKE 2 CAPSULES IN THE EVENING   lisinopril (ZESTRIL) 5 MG tablet 2/25/2023 at am Self No Yes   Sig: Take 1 tablet (5 mg) by mouth daily   omeprazole (PRILOSEC) 40 MG DR capsule 2/25/2023 at am, on bid still Self No Yes   Sig: Take 1 capsule (40 mg) by mouth 2 times daily for 30 days, THEN 1 capsule (40 mg) daily for 30 days. Take 30-60 min before breakfast and before supper X 1 month. Then, reduce the dose to 40 mg once a day   rivaroxaban ANTICOAGULANT (XARELTO) 20 MG TABS tablet 2/24/2023 at 1800 Self No Yes   Sig: Take 1 tablet (20 mg) by mouth daily (with dinner)   sucralfate (CARAFATE) 1 GM/10ML suspension 2/24/2023 at pm Self No Yes   Sig: Take 10 mLs (1 g) by mouth 4 times daily   Patient taking differently: Take 1 g by mouth 2 times daily   vitamin D2 (ERGOCALCIFEROL) 70633 units (1250 mcg) capsule new, not yet Self No No   Sig: Take 1 capsule (50,000 Units) by mouth once a week for 12 doses   Patient not taking: Reported on 2/21/2023   vitamin D3  "(CHOLECALCIFEROL) 50 mcg (2000 units) tablet new, not yet Self No No   Sig: Take 1 tablet (50 mcg) by mouth daily   Patient not taking: Reported on 2/21/2023      Facility-Administered Medications: None     Allergies   Allergies   Allergen Reactions     Zoloft [Sertraline] Palpitations, Diarrhea and Cramps       Family History    Family History   Problem Relation Age of Onset     Heart Disease Mother      Heart Disease Father      Heart Disease Brother      Heart Disease Brother      Heart Disease Brother        Social History   Social History     Socioeconomic History     Marital status:      Spouse name: Not on file     Number of children: Not on file     Years of education: Not on file     Highest education level: Not on file   Occupational History     Not on file   Tobacco Use     Smoking status: Never     Smokeless tobacco: Never   Vaping Use     Vaping Use: Never used   Substance and Sexual Activity     Alcohol use: No     Drug use: No     Sexual activity: Never   Other Topics Concern     Parent/sibling w/ CABG, MI or angioplasty before 65F 55M? Not Asked   Social History Narrative     Not on file     Social Determinants of Health     Financial Resource Strain: Not on file   Food Insecurity: Not on file   Transportation Needs: Not on file   Physical Activity: Not on file   Stress: Not on file   Social Connections: Not on file   Intimate Partner Violence: Not on file   Housing Stability: Not on file       Physical Exam   BP 94/62   Pulse 102   Temp 98.1  F (36.7  C) (Oral)   Resp 25   Ht 1.626 m (5' 4\")   Wt 99.8 kg (220 lb)   LMP  (LMP Unknown)   SpO2 95%   BMI 37.76 kg/m       Weight: 220 lbs 0 oz Body mass index is 37.76 kg/m .     Constitutional: Alert, oriented, cooperative, no apparent distress, appears nontoxic,  Eyes: Eyes are clear, pupils are reactive.  HENT: Oropharynx is clear and moist. No evidence of cranial trauma.  Lymph/Hematologic: No epitrochlear, axillary, anterior or " posterior cervical, or supraclavicular lymphadenopathy is appreciated.  Cardiovascular: Regular rate and rhythm, normal S1 and S2, and no murmur noted. . Good peripheral pulses in wrists bilaterally. Non pitting lower extremity edema.  Respiratory: Clear to auscultation bilaterally.   GI: Soft, non-tender, normal bowel sounds, no hepatomegaly.  Genitourinary: Deferred  Musculoskeletal: Normal muscle bulk and tone.  Skin: Warm and dry, no rashes.   Neurologic: Neck supple. Cranial nerves are grossly intact.  is symmetric.     Data   Data reviewed today:   Recent Labs   Lab 02/25/23  1518   WBC 8.3   HGB 11.5*   MCV 88      INR 1.76*      POTASSIUM 4.6   CHLORIDE 101   CO2 23   BUN 19.1   CR 0.71   ANIONGAP 12   FEDERICO 9.1   *   ALBUMIN 3.2*   PROTTOTAL 6.9   BILITOTAL 0.5   ALKPHOS 131*   ALT 13   AST 26       Recent Results (from the past 24 hour(s))   XR Chest Port 1 View    Narrative    EXAM: XR CHEST PORT 1 VIEW  LOCATION: [facility]  DATE/TIME: 2/25/2023 4:06 PM    INDICATION: CHF with rapid atrial fibrillation.  COMPARISON: 12/25/2022      Impression    IMPRESSION: Mild cardiomegaly with unchanged mediastinal contours. Normal vasculature. Left basilar airspace disease obscuring the diaphragm due to atelectasis versus pneumonia with a small left pleural effusion. There is no pneumothorax. Right lung is   clear.       I personally reviewed the chest x-ray image(s) showing Mild cardiomegaly

## 2023-02-26 ENCOUNTER — APPOINTMENT (OUTPATIENT)
Dept: PHYSICAL THERAPY | Facility: CLINIC | Age: 88
DRG: 308 | End: 2023-02-26
Payer: MEDICARE

## 2023-02-26 LAB
ANION GAP SERPL CALCULATED.3IONS-SCNC: 16 MMOL/L (ref 7–15)
BUN SERPL-MCNC: 16.2 MG/DL (ref 8–23)
CALCIUM SERPL-MCNC: 8.7 MG/DL (ref 8.8–10.2)
CHLORIDE SERPL-SCNC: 97 MMOL/L (ref 98–107)
CREAT SERPL-MCNC: 0.83 MG/DL (ref 0.51–0.95)
DEPRECATED HCO3 PLAS-SCNC: 24 MMOL/L (ref 22–29)
GFR SERPL CREATININE-BSD FRML MDRD: 68 ML/MIN/1.73M2
GLUCOSE BLDC GLUCOMTR-MCNC: 149 MG/DL (ref 70–99)
GLUCOSE BLDC GLUCOMTR-MCNC: 152 MG/DL (ref 70–99)
GLUCOSE BLDC GLUCOMTR-MCNC: 165 MG/DL (ref 70–99)
GLUCOSE SERPL-MCNC: 149 MG/DL (ref 70–99)
HOLD SPECIMEN: NORMAL
MAGNESIUM SERPL-MCNC: 1.3 MG/DL (ref 1.7–2.3)
MAGNESIUM SERPL-MCNC: 2.3 MG/DL (ref 1.7–2.3)
POTASSIUM SERPL-SCNC: 3.5 MMOL/L (ref 3.4–5.3)
SODIUM SERPL-SCNC: 137 MMOL/L (ref 136–145)
TROPONIN T SERPL HS-MCNC: 15 NG/L

## 2023-02-26 PROCEDURE — 36415 COLL VENOUS BLD VENIPUNCTURE: CPT | Performed by: FAMILY MEDICINE

## 2023-02-26 PROCEDURE — 80048 BASIC METABOLIC PNL TOTAL CA: CPT | Performed by: HOSPITALIST

## 2023-02-26 PROCEDURE — 200N000001 HC R&B ICU

## 2023-02-26 PROCEDURE — 97161 PT EVAL LOW COMPLEX 20 MIN: CPT | Mod: GP | Performed by: PHYSICAL THERAPIST

## 2023-02-26 PROCEDURE — 250N000011 HC RX IP 250 OP 636: Performed by: HOSPITALIST

## 2023-02-26 PROCEDURE — 250N000013 HC RX MED GY IP 250 OP 250 PS 637: Performed by: FAMILY MEDICINE

## 2023-02-26 PROCEDURE — 36416 COLLJ CAPILLARY BLOOD SPEC: CPT | Performed by: HOSPITALIST

## 2023-02-26 PROCEDURE — 250N000011 HC RX IP 250 OP 636: Performed by: FAMILY MEDICINE

## 2023-02-26 PROCEDURE — 84484 ASSAY OF TROPONIN QUANT: CPT | Performed by: FAMILY MEDICINE

## 2023-02-26 PROCEDURE — 99233 SBSQ HOSP IP/OBS HIGH 50: CPT | Performed by: HOSPITALIST

## 2023-02-26 PROCEDURE — 83735 ASSAY OF MAGNESIUM: CPT | Performed by: HOSPITALIST

## 2023-02-26 PROCEDURE — 258N000003 HC RX IP 258 OP 636: Performed by: STUDENT IN AN ORGANIZED HEALTH CARE EDUCATION/TRAINING PROGRAM

## 2023-02-26 PROCEDURE — 250N000013 HC RX MED GY IP 250 OP 250 PS 637: Performed by: HOSPITALIST

## 2023-02-26 PROCEDURE — 250N000009 HC RX 250: Performed by: STUDENT IN AN ORGANIZED HEALTH CARE EDUCATION/TRAINING PROGRAM

## 2023-02-26 RX ORDER — PANTOPRAZOLE SODIUM 40 MG/1
40 TABLET, DELAYED RELEASE ORAL
Status: COMPLETED | OUTPATIENT
Start: 2023-02-26 | End: 2023-02-27

## 2023-02-26 RX ORDER — ACETAMINOPHEN 325 MG/1
650 TABLET ORAL EVERY 4 HOURS PRN
Status: DISCONTINUED | OUTPATIENT
Start: 2023-02-26 | End: 2023-03-03 | Stop reason: HOSPADM

## 2023-02-26 RX ORDER — FUROSEMIDE 10 MG/ML
40 INJECTION INTRAMUSCULAR; INTRAVENOUS EVERY 12 HOURS
Status: DISCONTINUED | OUTPATIENT
Start: 2023-02-26 | End: 2023-03-01

## 2023-02-26 RX ORDER — DILTIAZEM HCL 60 MG
60 TABLET ORAL EVERY 6 HOURS SCHEDULED
Status: DISCONTINUED | OUTPATIENT
Start: 2023-02-26 | End: 2023-02-27

## 2023-02-26 RX ORDER — FUROSEMIDE 10 MG/ML
40 INJECTION INTRAMUSCULAR; INTRAVENOUS EVERY 12 HOURS
Status: DISCONTINUED | OUTPATIENT
Start: 2023-02-26 | End: 2023-02-26

## 2023-02-26 RX ORDER — MAGNESIUM OXIDE 400 MG/1
400 TABLET ORAL ONCE
Status: DISCONTINUED | OUTPATIENT
Start: 2023-02-26 | End: 2023-02-26

## 2023-02-26 RX ORDER — MAGNESIUM SULFATE HEPTAHYDRATE 40 MG/ML
4 INJECTION, SOLUTION INTRAVENOUS ONCE
Status: COMPLETED | OUTPATIENT
Start: 2023-02-26 | End: 2023-02-26

## 2023-02-26 RX ADMIN — ACETAMINOPHEN 650 MG: 325 TABLET, FILM COATED ORAL at 22:16

## 2023-02-26 RX ADMIN — LISINOPRIL 5 MG: 5 TABLET ORAL at 08:47

## 2023-02-26 RX ADMIN — RIVAROXABAN 20 MG: 10 TABLET, FILM COATED ORAL at 17:36

## 2023-02-26 RX ADMIN — MAGNESIUM SULFATE HEPTAHYDRATE 4 G: 40 INJECTION, SOLUTION INTRAVENOUS at 08:58

## 2023-02-26 RX ADMIN — FUROSEMIDE 40 MG: 10 INJECTION, SOLUTION INTRAMUSCULAR; INTRAVENOUS at 01:13

## 2023-02-26 RX ADMIN — ATORVASTATIN CALCIUM 10 MG: 10 TABLET, FILM COATED ORAL at 17:36

## 2023-02-26 RX ADMIN — ACETAMINOPHEN 650 MG: 325 TABLET, FILM COATED ORAL at 11:42

## 2023-02-26 RX ADMIN — DILTIAZEM HYDROCHLORIDE 60 MG: 60 TABLET, FILM COATED ORAL at 17:36

## 2023-02-26 RX ADMIN — GABAPENTIN 600 MG: 300 CAPSULE ORAL at 22:16

## 2023-02-26 RX ADMIN — DILTIAZEM HYDROCHLORIDE 60 MG: 60 TABLET, FILM COATED ORAL at 22:16

## 2023-02-26 RX ADMIN — SUCRALFATE 1 G: 1 SUSPENSION ORAL at 08:47

## 2023-02-26 RX ADMIN — SUCRALFATE 1 G: 1 SUSPENSION ORAL at 19:51

## 2023-02-26 RX ADMIN — DILTIAZEM HYDROCHLORIDE 15 MG/HR: 5 INJECTION, SOLUTION INTRAVENOUS at 04:50

## 2023-02-26 RX ADMIN — DILTIAZEM HYDROCHLORIDE 60 MG: 60 TABLET, FILM COATED ORAL at 11:03

## 2023-02-26 RX ADMIN — PANTOPRAZOLE SODIUM 40 MG: 40 TABLET, DELAYED RELEASE ORAL at 16:18

## 2023-02-26 RX ADMIN — PANTOPRAZOLE SODIUM 40 MG: 40 TABLET, DELAYED RELEASE ORAL at 06:57

## 2023-02-26 RX ADMIN — FUROSEMIDE 40 MG: 10 INJECTION, SOLUTION INTRAMUSCULAR; INTRAVENOUS at 16:18

## 2023-02-26 RX ADMIN — FUROSEMIDE 40 MG: 10 INJECTION, SOLUTION INTRAMUSCULAR; INTRAVENOUS at 08:53

## 2023-02-26 ASSESSMENT — ACTIVITIES OF DAILY LIVING (ADL)
ADLS_ACUITY_SCORE: 27
ADLS_ACUITY_SCORE: 28
ADLS_ACUITY_SCORE: 27
ADLS_ACUITY_SCORE: 28
ADLS_ACUITY_SCORE: 27
DEPENDENT_IADLS:: TRANSPORTATION
ADLS_ACUITY_SCORE: 27
ADLS_ACUITY_SCORE: 27
ADLS_ACUITY_SCORE: 28

## 2023-02-26 NOTE — ED NOTES
"[facility]   Admission Handoff    The patient is Theodora Meng, 87 year old who arrived in the ED by CAR from emergency room with a complaint of Shortness of Breath  . The patient's current symptoms are new and during this time the symptoms have decreased. In the ED, patient was diagnosed with   Final diagnoses:   Rapid atrial fibrillation (H)   Peripheral edema   Elevated brain natriuretic peptide (BNP) level   Elevated troponin   Generalized muscle weakness   Declining functional status         Needed?: No    Allergies:    Allergies   Allergen Reactions    Zoloft [Sertraline] Palpitations, Diarrhea and Cramps       Past Medical Hx:   Past Medical History:   Diagnosis Date    Depressive disorder     Diabetes (H)     Esophageal reflux     Other abnormal glucose     Other premature beats     Unspecified essential hypertension        Initial vitals were: BP: 133/84  Pulse: 120  Temp: 98.1  F (36.7  C)  Resp: 18  Height: 162.6 cm (5' 4\")  Weight: 99.8 kg (220 lb)  SpO2: 97 %   Recent vital Signs: BP (!) 117/99   Pulse 98   Temp 98.1  F (36.7  C) (Tympanic)   Resp 20   Ht 1.626 m (5' 4\")   Wt 99.8 kg (220 lb)   LMP  (LMP Unknown)   SpO2 99%   BMI 37.76 kg/m      Elimination Status: Continent: Yes     Activity Level: SBA    Fall Status: Reason for falls risk: Elimination  nonskid shoes/slippers when out of bed, patient and family education, assistive device/personal items within reach, and activity supervised    Baseline Mental status: WDL  Current Mental Status changes: at basesline    Infection present or suspected this encounter: no  Sepsis suspected: No    Isolation type: none    Bariatric equipment needed?: No    In the ED these meds were given:   Medications   diltiazem (CARDIZEM) injection 10 mg (10 mg Intravenous $Given 2/25/23 1532)   diltiazem (CARDIZEM) injection 10 mg (10 mg Intravenous $Given 2/25/23 1717)   furosemide (LASIX) injection 40 mg (40 mg " Intravenous $Given 2/25/23 4727)       Drips running?  No    Home pump  No    Current LDAs: Peripheral IV: Site right wrist; Gauge 20 IV push only, no IV fluids     Results:   Labs/Imaging  Ordered and Resulted from Time of ED Arrival Up to the Time of Departure from the ED  Results for orders placed or performed during the hospital encounter of 02/25/23 (from the past 24 hour(s))   CBC with platelets differential    Narrative    The following orders were created for panel order CBC with platelets differential.  Procedure                               Abnormality         Status                     ---------                               -----------         ------                     CBC with platelets and d...[[id number]]  Abnormal            Final result                 Please view results for these tests on the individual orders.   Comprehensive metabolic panel   Result Value Ref Range    Sodium 136 136 - 145 mmol/L    Potassium 4.6 3.4 - 5.3 mmol/L    Chloride 101 98 - 107 mmol/L    Carbon Dioxide (CO2) 23 22 - 29 mmol/L    Anion Gap 12 7 - 15 mmol/L    Urea Nitrogen 19.1 8.0 - 23.0 mg/dL    Creatinine 0.71 0.51 - 0.95 mg/dL    Calcium 9.1 8.8 - 10.2 mg/dL    Glucose 142 (H) 70 - 99 mg/dL    Alkaline Phosphatase 131 (H) 35 - 104 U/L    AST 26 10 - 35 U/L    ALT 13 10 - 35 U/L    Protein Total 6.9 6.4 - 8.3 g/dL    Albumin 3.2 (L) 3.5 - 5.2 g/dL    Bilirubin Total 0.5 <=1.2 mg/dL    GFR Estimate 82 >60 mL/min/1.73m2   INR   Result Value Ref Range    INR 1.76 (H) 0.85 - 1.15   Partial thromboplastin time   Result Value Ref Range    aPTT 33 22 - 38 Seconds   Troponin T, High Sensitivity   Result Value Ref Range    Troponin T, High Sensitivity 16 (H) <=14 ng/L   Magnesium   Result Value Ref Range    Magnesium 1.4 (L) 1.7 - 2.3 mg/dL   TSH with free T4 reflex   Result Value Ref Range    TSH 1.62 0.30 - 4.20 uIU/mL   Nt probnp inpatient (BNP)   Result Value Ref Range    N terminal Pro BNP Inpatient 5,203 (H) 0 - 1,800  pg/mL   [facility] Draw    Narrative    The following orders were created for panel order [facility] Draw.  Procedure                               Abnormality         Status                     ---------                               -----------         ------                     Extra Red Top Tube[[id number]]                               Final result                 Please view results for these tests on the individual orders.   CBC with platelets and differential   Result Value Ref Range    WBC Count 8.3 4.0 - 11.0 10e3/uL    RBC Count 4.04 3.80 - 5.20 10e6/uL    Hemoglobin 11.5 (L) 11.7 - 15.7 g/dL    Hematocrit 35.6 35.0 - 47.0 %    MCV 88 78 - 100 fL    MCH 28.5 26.5 - 33.0 pg    MCHC 32.3 31.5 - 36.5 g/dL    RDW 17.2 (H) 10.0 - 15.0 %    Platelet Count 412 150 - 450 10e3/uL    % Neutrophils 80 %    % Lymphocytes 13 %    % Monocytes 5 %    % Eosinophils 0 %    % Basophils 1 %    % Immature Granulocytes 1 %    NRBCs per 100 WBC 0 <1 /100    Absolute Neutrophils 6.7 1.6 - 8.3 10e3/uL    Absolute Lymphocytes 1.1 0.8 - 5.3 10e3/uL    Absolute Monocytes 0.4 0.0 - 1.3 10e3/uL    Absolute Eosinophils 0.0 0.0 - 0.7 10e3/uL    Absolute Basophils 0.0 0.0 - 0.2 10e3/uL    Absolute Immature Granulocytes 0.1 <=0.4 10e3/uL    Absolute NRBCs 0.0 10e3/uL   Extra Red Top Tube   Result Value Ref Range    Hold Specimen JIC    XR Chest Port 1 View    Narrative    EXAM: XR CHEST PORT 1 VIEW  LOCATION: [facility]  DATE/TIME: 2/25/2023 4:06 PM    INDICATION: CHF with rapid atrial fibrillation.  COMPARISON: 12/25/2022      Impression    IMPRESSION: Mild cardiomegaly with unchanged mediastinal contours. Normal vasculature. Left basilar airspace disease obscuring the diaphragm due to atelectasis versus pneumonia with a small left pleural effusion. There is no pneumothorax. Right lung is   clear.       For the majority of the shift this patient's behavior was Green     Cardiac Rhythm: Atrial rhythm  Pt needs tele?  Yes  Skin/wound Issues: None    Code Status: Full Code    Pain control: good    Nausea control: pt had none    Abnormal labs/tests/findings requiring intervention: bnp    Patient tested for COVID 19 prior to admission: no    OBS brochure/video discussed/provided to patient/family: N/A     Family present during ED course? Yes        Tasks needing completion: None    Maddie Sky RN

## 2023-02-26 NOTE — CONSULTS
Care Management Initial Consult    General Information  Assessment completed with: Patient,    Type of CM/SW Visit: Initial Assessment    Primary Care Provider verified and updated as needed:     Readmission within the last 30 days:      Reason for Consult: discharge planning  Advance Care Planning: Advance Care Planning Reviewed: no concerns identified        Communication Assessment  Patient's communication style: spoken language (English or Bilingual)    Hearing Difficulty or Deaf: yes   Wear Glasses or Blind: yes    Cognitive  Cognitive/Neuro/Behavioral: WDL                      Living Environment:   People in home: alone     Current living Arrangements: house      Able to return to prior arrangements: yes     Family/Social Support:  Care provided by: self  Provides care for: no one  Marital Status:   Children          Description of Support System: Supportive, Involved    Support Assessment: Adequate family and caregiver support, Adequate social supports    Current Resources:   Patient receiving home care services: No     Community Resources: None  Equipment currently used at home: cane, straight  Supplies currently used at home: Diabetic Supplies    Employment/Financial:  Employment Status: retired        Financial Concerns: No concerns identified      Lifestyle & Psychosocial Needs:  Social Determinants of Health     Tobacco Use: Low Risk      Smoking Tobacco Use: Never     Smokeless Tobacco Use: Never     Passive Exposure: Not on file   Alcohol Use: Not on file   Financial Resource Strain: Not on file   Food Insecurity: Not on file   Transportation Needs: Not on file   Physical Activity: Not on file   Stress: Not on file   Social Connections: Not on file   Intimate Partner Violence: Not on file   Depression: Not at risk     PHQ-2 Score: 0   Housing Stability: Not on file       Functional Status:  Prior to admission patient needed assistance:   Dependent ADLs:: Ambulation-cane,  Ambulation-walker  Dependent IADLs:: Transportation    Mental Health Status:  Mental Health Status: No Current Concerns       Chemical Dependency Status:  Chemical Dependency Status: No Current Concerns           Values/Beliefs:  Spiritual, Cultural Beliefs, Anabaptist Practices, Values that affect care: no             Additional Information:    CM received Referral to assist with discharge planning. Spoke with patient over the phone and introduced self and role.     Patient lives alone in a one level home (5 stairs to enter home) independently in the community. Patient is independent with ADLs at baseline. She uses a cane to assist with ambulation, however does have a walker at home if needed. Patient states that her daughter, Emmy, is involved and supportive.     Discussed therapy recommendations for TCU vs home care at discharge. Patient reports that she is not interested in TCU but is interested in home care. Patient has had home care services in the past but does not remember which agency, she does not have an agency preference.     Patient states that daughter will transport at discharge.    Home care referrals pending.     PLAN: Home with home care     PRICILA MARTIN RN

## 2023-02-26 NOTE — PROGRESS NOTES
End Of Shift Note    Situation: 86 y/o admitted 2/25/23 for A fib with RVR    Plan: Diltiazem drip changed to 60 mg PO Q6H, Lasix 40 mg Q12H, Monitor HR and rhythm, possible discharge in 2 days to home with home care.    Subjective/Objective:    Neuro: A&O x4, able to make needs known    Cardiac: A-fib 90's on occasion low 100s, oral diltiazem, diuresing, BLE,  VSS, afebrile, ,denies CP    Resp: LS clear equal BILAT, diminished at bases, SpO2 adequate on room air, denies SOB    GI/: Adequate urine output, pure wick in place    MSK:  Generalized weakness, uses cane at baseline assist of 1    Skin: WDL    LDAs: PIV R. Saline locked    Pain controlled with PRN Tylenol

## 2023-02-26 NOTE — PROGRESS NOTES
[facility]  Internal Medicine Progress Note      Assessment and Plans:     Patient is a 87 year old female with  history of A-fib, hypertension, diabetes, coronary artery calcification, on 02/25/23, presented with progressive fatigue, weakness and shortness of breath. In the ED she was found to be in A-fib with RVR with heart rate 140. She was admitted for AFib with RVR and acute on chronic HFPEF       Atrial fibrillation with RVR    Troponin slightly elevated at 16 ->18  BNP elevated at 5203.  Chest x-ray showed Mild cardiomegaly with unchanged mediastinal contours. Normal vasculature. Left basilar airspace disease obscuring the diaphragm due to atelectasis versus pneumonia with a small left pleural effusion. There is no pneumothorax. Right lung is clear.    Patient was given 10 mg IV diltiazem x2 for rate control, she did not respond well to diltiazem IV push and was subsequently started on diltiazem drip and was admitted to the ICU     ---   Stop Diltiazem drip  ---   Start on oral diltiazem 60 mg every 6 hr  ---   Ct on Telemetry monitoring  ---   Hold atenolol for now  ---   Continue Xarelto for stroke prevention  ---   Follow-up ECHO  ---   Pt did not tolerate metoprolol before  ---   She reports that she was on diltiazem before, but was taken off as she gt dizzy with dose increase.      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    Torsemide was switched to Lasix due to intolerance.   Patient reports he only took 1 dose of 20 mg Lasix.  Last ECHO showed EF of 55-60% in 10/22    ---   Ct Lasix 40 mg IV BID  ---   Follow-up Echocardiogram  ---   Intake and output  ---   Daily weight, baseline weight is 212 lb    Hypomagensemia  ---   Mg of 1.3   ---   RN protocol ordered    Type 2 diabetes mellitus with other ophthalmic complication, without long-term current use  "of insulin    ---   Hb A1C was 7.9 on 02/10/23  ---   Not on home medications  ---   Consider starting on metformin or some of the newer agent     Hyperlipidemia LDL goal <70  Chronic stable problem  ---   Continue home Lipitor    Benign essential hypertension  Coronary artery calcification seen on CT scan  Blood pressure at goal  ---   Continue current medication,   ---   hold atenolol for now as she is on diltiazem     Elevated troponin  Tropon 16 -> 18  EKG no concern for ischemia     Generalized muscle weakness  Declining functional status  ---   Presented with fatigue and weakness  ---   PT/OT    Anemia  Hemoglobin 11.5, appears to be baseline  No current concern for bleeding    ---   Repeat CBC in the a.m.      Clinically Significant Risk Factors Present on Admission -           # Hypomagnesemia: Lowest Mg = 1.4 mg/dL in last 2 days, will replace as needed   # Hypoalbuminemia: Lowest albumin = 3.2 g/dL at 2/25/2023  3:18 PM, will monitor as appropriate  # Drug Induced Coagulation Defect: home medication list includes an anticoagulant medication    # Hypertension: home medication list includes antihypertensive(s)     # DMII: A1C = 7.9 % (Ref range: 0.0 - 5.6 %) within past 6 months    # Obesity: Estimated body mass index is 37.76 kg/m  as calculated from the following:    Height as of this encounter: 1.626 m (5' 4\").    Weight as of this encounter: 99.8 kg (220 lb).         Diet: 2 Gram Sodium Diet Other - please comment  DVT Prophylaxis: DOAC  Cid Catheter: Not present  Code Status: Full Code  Lines: IV line         Disposition Plan        Expected Discharge Date: 02/28/2023                 DVT Prophylaxis: Xarelto  Code Status: Full Code  Disposition: Anticipate discharge on 02/28/23, PT, OT assessment pending      Janet Ospina MD, MPH.  Internal Medicine Attending  [place], [place]    Click on the link below to page me.   Text Page  (7am - 5pm, M-F)    Subjective: "     Sob an chest tightness is better  No chest pain  No fever  No congestion  Drinks one quartz of water per day  Does not eat too much salt    -Data reviewed today: I reviewed all new labs and imaging results over the last 24 hours.     Exam:       Temp: 97.5  F (36.4  C) Temp src: Oral BP: 134/70 Pulse: 79   Resp: 10 SpO2: 96 % O2 Device: None (Room air)    Vitals:    02/25/23 1411   Weight: 99.8 kg (220 lb)     Vital Signs with Ranges  Temp:  [97.5  F (36.4  C)-98.1  F (36.7  C)] 97.5  F (36.4  C)  Pulse:  [] 79  Resp:  [10-62] 10  BP: ()/() 134/70  SpO2:  [94 %-99 %] 96 %  I/O last 3 completed shifts:  In: 279.83 [P.O.:240; I.V.:39.83]  Out: 800 [Urine:800]    Constitutional: No distress noted, Alert, Answering questions appropriately  Respiratory: No wheezing onr crackles, diminished AE in left base.   Cardiovascular: S1S2 normal, no new murmur  GI: Soft, non tender  Skin/Integumen: No rash      Medications     Continuing ACE inhibitor/ARB/ARNI from home medication list OR ACE inhibitor/ARB order already placed during this visit       - MEDICATION INSTRUCTIONS -       diltiazem (CARDIZEM) infusion ADULT Stopped (02/26/23 1001)       [Held by provider] atenolol  25 mg Oral QPM     atorvastatin  10 mg Oral QPM     [START ON 2/27/2023] ferrous sulfate  325 mg Oral Q Mon Wed Fri AM     gabapentin  600 mg Oral QPM     lisinopril  5 mg Oral Daily     pantoprazole  40 mg Oral BID AC     rivaroxaban ANTICOAGULANT  20 mg Oral Daily with supper     sucralfate  1 g Oral BID       Data   Recent Labs   Lab 02/26/23  0916 02/26/23  0807 02/26/23  0444 02/26/23  0209 02/25/23  1518   WBC  --   --   --   --  8.3   HGB  --   --   --   --  11.5*   MCV  --   --   --   --  88   PLT  --   --   --   --  412   INR  --   --   --   --  1.76*   NA  --   --  137  --  136   POTASSIUM  --   --  3.5  --  4.6   CHLORIDE  --   --  97*  --  101   CO2  --   --  24  --  23   BUN  --   --  16.2  --  19.1   CR  --   --  0.83  --   0.71   ANIONGAP  --   --  16*  --  12   FEDERICO  --   --  8.7*  --  9.1   * 149* 149*   < > 142*   ALBUMIN  --   --   --   --  3.2*   PROTTOTAL  --   --   --   --  6.9   BILITOTAL  --   --   --   --  0.5   ALKPHOS  --   --   --   --  131*   ALT  --   --   --   --  13   AST  --   --   --   --  26    < > = values in this interval not displayed.       Recent Results (from the past 24 hour(s))   XR Chest Port 1 View    Narrative    EXAM: XR CHEST PORT 1 VIEW  LOCATION: [facility]  DATE/TIME: 2/25/2023 4:06 PM    INDICATION: CHF with rapid atrial fibrillation.  COMPARISON: 12/25/2022      Impression    IMPRESSION: Mild cardiomegaly with unchanged mediastinal contours. Normal vasculature. Left basilar airspace disease obscuring the diaphragm due to atelectasis versus pneumonia with a small left pleural effusion. There is no pneumothorax. Right lung is   clear.

## 2023-02-26 NOTE — PROGRESS NOTES
"[place] [facility] [place]    Patient admitted to room ICU 3 at approximately 2200 via cart from emergency room. Patient was accompanied by nurse and daughter.     Verbal SBAR report received from Maddie LAROSE prior to patient arrival.     Patient trasferred to bed via self. Patient alert and oriented X 3. The patient is not having any pain.  . Admission vital signs: Blood pressure 114/71, pulse 118, temperature 98  F (36.7  C), resp. rate 18, height 1.626 m (5' 4\"), weight 99.8 kg (220 lb), SpO2 94 %, not currently breastfeeding. Patient was oriented to plan of care, call light, bed controls, tv, telephone, bathroom and visiting hours.     Risk Assessment    The following safety risks were identified during admission: none. Yellow risk band applied: NO.     Skin Initial Assessment    This writer admitted this patient and completed a full skin assessment and Christian score in the Adult PCS flowsheet. Appropriate interventions initiated as needed.     Secondary skin check completed by .    Christian Risk Assessment  Sensory Perception: 4-->no impairment  Moisture: 4-->rarely moist  Activity: 3-->walks occasionally  Mobility: 3-->slightly limited  Nutrition: 3-->adequate  Friction and Shear: 3-->no apparent problem  Christian Score: 20    Education    Patient has a [place] to Observation order: No  Observation education completed and documented: N/A      Daniel Kaur RN    "

## 2023-02-26 NOTE — PROGRESS NOTES
02/26/23 1200   Appointment Info   Signing Clinician's Name / Credentials (PT) Chaya Quiles, PT, DPT   Living Environment   People in Home alone   Current Living Arrangements house   Home Accessibility stairs within home   Number of Stairs, Within Home, Primary five   Living Environment Comments Pt states that she lives in a house by herself and notices that her balance has gotten worse lately.   Self-Care   Usual Activity Tolerance good   Current Activity Tolerance poor   Regular Exercise No   Equipment Currently Used at Home cane, straight   Fall history within last six months yes   Number of times patient has fallen within last six months 1   Activity/Exercise/Self-Care Comment Pt states that her balance has been getting worse and she uses a cane to walk. She has a walker at home if needed.   General Information   Onset of Illness/Injury or Date of Surgery 02/25/23   Referring Physician Janet Ospina MD   Patient/Family Therapy Goals Statement (PT) improve balance and go home   Pertinent History of Current Problem (include personal factors and/or comorbidities that impact the POC) Patient is a 87 year old female with  history of A-fib, hypertension, diabetes, coronary artery calcification, on 02/25/23, presented with progressive fatigue, weakness and shortness of breath. In the ED she was found to be in A-fib with RVR with heart rate 140.   Existing Precautions/Restrictions fall   General Observations Pt alert but did not wish to participate in out of bed PT today. Inital assessment performed and will cont to treat pt while here.   Cognition   Affect/Mental Status (Cognition) WNL   Pain Assessment   Patient Currently in Pain No   Integumentary/Edema   Integumentary/Edema no deficits were identifed   Range of Motion (ROM)   ROM Comment dec knee flexion AROM bilat   Strength (Manual Muscle Testing)   Strength (Manual Muscle Testing) Able to perform R SLR;Able to perform L SLR   Strength Comments BL LE  grossly 3+/5   Bed Mobility   Comment, (Bed Mobility) unable to assess   Transfers   Comment, (Transfers) pt did not wish to peform any out of bed activity; unable to assess   Gait/Stairs (Locomotion)   [place] Level (Gait) unable to assess   Sensory Examination   Sensory Perception WNL   Coordination   Coordination no deficits were identified   Muscle Tone   Muscle Tone no deficits were identified   Clinical Impression   Criteria for Skilled Therapeutic Intervention Yes, treatment indicated   PT Diagnosis (PT) deconditioning   Influenced by the following impairments weakness   Functional limitations due to impairments gait, balance, transfers, bed mobility   Clinical Presentation (PT Evaluation Complexity) Stable/Uncomplicated   Clinical Presentation Rationale clinical reasoning   Clinical Decision Making (Complexity) low complexity   Planned Therapy Interventions (PT) balance training;bed mobility training;gait training;home exercise program;patient/family education;ROM (range of motion);stair training;strengthening;transfer training;risk factor education;home program guidelines   Risk & Benefits of therapy have been explained evaluation/treatment results reviewed;care plan/treatment goals reviewed;patient;participants included;participants voiced agreement with care plan   Clinical Impression Comments Pt alert but did not wish to get out of bed today despite encouragement. Will benefit from cont PT d/t history of falls and reports of balance difficulties.   PT Total Evaluation Time   PT Ines Low Complexity Minutes (32571) 10   Physical Therapy Goals   PT Frequency Daily   PT Predicted Duration/Target Date for Goal Attainment 03/03/23   PT Goals Transfers;Bed Mobility;Gait;Stairs   PT: Bed Mobility Supervision/stand-by assist;Supine to/from sit   PT: Transfers Supervision/stand-by assist;Sit to/from stand;Bed to/from chair;Assistive device   PT: Gait Modified independent;Rolling walker;25 feet   PT: Stairs  Modified independent;5 stairs   PT Discharge Planning   PT Plan gait, stairs, transfers   PT Discharge Recommendation (DC Rec) Transitional Care Facility;home with home care physical therapy   PT Rationale for DC Rec Pt lives at home alone and has 5 stairs within the home. Given history of falls, will benefit from cont PT to facilitate safe return home alone. However, pt currently states she would prefer homecare   Total Session Time   Total Session Time (sum of timed and untimed services) 10

## 2023-02-26 NOTE — PROGRESS NOTES
Assumed care: 2200 to 0700    Neuro:  A/O x 4.  Makes needs known.     Cardiac:  Tele shows afib rate 80s to 90s on dilt gtt at 15 mg/hr.  VSS.  BLE edema.  Diuresing.     Respiratory:  RA.  LS dim.     GI/: Purewick in place.  Good UOP.      Activity:  Assist x1 with cane.     Pain: Controlled.     Skin/wound: Intact.      LDA's:  PIV x1 with diltiazem drip at 15 mg/hr.

## 2023-02-27 ENCOUNTER — APPOINTMENT (OUTPATIENT)
Dept: OCCUPATIONAL THERAPY | Facility: CLINIC | Age: 88
DRG: 308 | End: 2023-02-27
Payer: MEDICARE

## 2023-02-27 ENCOUNTER — APPOINTMENT (OUTPATIENT)
Dept: PHYSICAL THERAPY | Facility: CLINIC | Age: 88
DRG: 308 | End: 2023-02-27
Payer: MEDICARE

## 2023-02-27 ENCOUNTER — APPOINTMENT (OUTPATIENT)
Dept: CARDIOLOGY | Facility: CLINIC | Age: 88
DRG: 308 | End: 2023-02-27
Attending: FAMILY MEDICINE
Payer: MEDICARE

## 2023-02-27 LAB
ANION GAP SERPL CALCULATED.3IONS-SCNC: 14 MMOL/L (ref 7–15)
BUN SERPL-MCNC: 16.8 MG/DL (ref 8–23)
CALCIUM SERPL-MCNC: 8.6 MG/DL (ref 8.8–10.2)
CHLORIDE SERPL-SCNC: 95 MMOL/L (ref 98–107)
CREAT SERPL-MCNC: 0.82 MG/DL (ref 0.51–0.95)
DEPRECATED HCO3 PLAS-SCNC: 27 MMOL/L (ref 22–29)
GFR SERPL CREATININE-BSD FRML MDRD: 69 ML/MIN/1.73M2
GLUCOSE SERPL-MCNC: 143 MG/DL (ref 70–99)
HOLD SPECIMEN: NORMAL
LVEF ECHO: NORMAL
MAGNESIUM SERPL-MCNC: 1.7 MG/DL (ref 1.7–2.3)
POTASSIUM SERPL-SCNC: 3.2 MMOL/L (ref 3.4–5.3)
POTASSIUM SERPL-SCNC: 4.5 MMOL/L (ref 3.4–5.3)
SODIUM SERPL-SCNC: 136 MMOL/L (ref 136–145)

## 2023-02-27 PROCEDURE — 250N000013 HC RX MED GY IP 250 OP 250 PS 637: Performed by: HOSPITALIST

## 2023-02-27 PROCEDURE — 93321 DOPPLER ECHO F-UP/LMTD STD: CPT | Mod: 26 | Performed by: INTERNAL MEDICINE

## 2023-02-27 PROCEDURE — 36415 COLL VENOUS BLD VENIPUNCTURE: CPT | Performed by: HOSPITALIST

## 2023-02-27 PROCEDURE — 84132 ASSAY OF SERUM POTASSIUM: CPT | Performed by: HOSPITALIST

## 2023-02-27 PROCEDURE — 250N000011 HC RX IP 250 OP 636: Performed by: HOSPITALIST

## 2023-02-27 PROCEDURE — 93325 DOPPLER ECHO COLOR FLOW MAPG: CPT | Mod: 26 | Performed by: INTERNAL MEDICINE

## 2023-02-27 PROCEDURE — 200N000001 HC R&B ICU

## 2023-02-27 PROCEDURE — 250N000013 HC RX MED GY IP 250 OP 250 PS 637: Performed by: FAMILY MEDICINE

## 2023-02-27 PROCEDURE — 97530 THERAPEUTIC ACTIVITIES: CPT | Mod: GP

## 2023-02-27 PROCEDURE — 93308 TTE F-UP OR LMTD: CPT | Mod: 26 | Performed by: INTERNAL MEDICINE

## 2023-02-27 PROCEDURE — 80048 BASIC METABOLIC PNL TOTAL CA: CPT | Performed by: HOSPITALIST

## 2023-02-27 PROCEDURE — 99233 SBSQ HOSP IP/OBS HIGH 50: CPT | Performed by: HOSPITALIST

## 2023-02-27 PROCEDURE — 83735 ASSAY OF MAGNESIUM: CPT | Performed by: HOSPITALIST

## 2023-02-27 PROCEDURE — 999N000208 ECHOCARDIOGRAM LIMITED

## 2023-02-27 PROCEDURE — 97165 OT EVAL LOW COMPLEX 30 MIN: CPT | Mod: GO

## 2023-02-27 PROCEDURE — 255N000002 HC RX 255 OP 636: Performed by: HOSPITALIST

## 2023-02-27 RX ORDER — POTASSIUM CHLORIDE 1500 MG/1
40 TABLET, EXTENDED RELEASE ORAL ONCE
Status: COMPLETED | OUTPATIENT
Start: 2023-02-27 | End: 2023-02-27

## 2023-02-27 RX ORDER — DILTIAZEM HCL 60 MG
60 TABLET ORAL EVERY 6 HOURS SCHEDULED
Status: DISCONTINUED | OUTPATIENT
Start: 2023-02-27 | End: 2023-03-02

## 2023-02-27 RX ORDER — PANTOPRAZOLE SODIUM 40 MG/1
40 TABLET, DELAYED RELEASE ORAL
Status: DISCONTINUED | OUTPATIENT
Start: 2023-02-27 | End: 2023-03-03 | Stop reason: HOSPADM

## 2023-02-27 RX ORDER — ATENOLOL 25 MG/1
25 TABLET ORAL EVERY EVENING
Status: DISCONTINUED | OUTPATIENT
Start: 2023-02-27 | End: 2023-02-28

## 2023-02-27 RX ADMIN — LISINOPRIL 5 MG: 5 TABLET ORAL at 08:35

## 2023-02-27 RX ADMIN — RIVAROXABAN 20 MG: 10 TABLET, FILM COATED ORAL at 17:06

## 2023-02-27 RX ADMIN — GABAPENTIN 600 MG: 300 CAPSULE ORAL at 21:42

## 2023-02-27 RX ADMIN — PANTOPRAZOLE SODIUM 40 MG: 40 TABLET, DELAYED RELEASE ORAL at 06:43

## 2023-02-27 RX ADMIN — DILTIAZEM HYDROCHLORIDE 60 MG: 60 TABLET, FILM COATED ORAL at 06:30

## 2023-02-27 RX ADMIN — ATENOLOL 25 MG: 25 TABLET ORAL at 17:06

## 2023-02-27 RX ADMIN — DILTIAZEM HYDROCHLORIDE 60 MG: 60 TABLET, FILM COATED ORAL at 17:06

## 2023-02-27 RX ADMIN — HUMAN ALBUMIN MICROSPHERES AND PERFLUTREN 2 ML: 10; .22 INJECTION, SOLUTION INTRAVENOUS at 08:20

## 2023-02-27 RX ADMIN — ACETAMINOPHEN 650 MG: 325 TABLET, FILM COATED ORAL at 08:47

## 2023-02-27 RX ADMIN — POTASSIUM CHLORIDE 40 MEQ: 20 TABLET, EXTENDED RELEASE ORAL at 06:43

## 2023-02-27 RX ADMIN — ACETAMINOPHEN 650 MG: 325 TABLET, FILM COATED ORAL at 21:41

## 2023-02-27 RX ADMIN — ATORVASTATIN CALCIUM 10 MG: 10 TABLET, FILM COATED ORAL at 17:06

## 2023-02-27 RX ADMIN — FUROSEMIDE 40 MG: 10 INJECTION, SOLUTION INTRAMUSCULAR; INTRAVENOUS at 04:32

## 2023-02-27 RX ADMIN — FERROUS SULFATE TAB 325 MG (65 MG ELEMENTAL FE) 325 MG: 325 (65 FE) TAB at 08:35

## 2023-02-27 RX ADMIN — SUCRALFATE 1 G: 1 SUSPENSION ORAL at 19:38

## 2023-02-27 RX ADMIN — PANTOPRAZOLE SODIUM 40 MG: 40 TABLET, DELAYED RELEASE ORAL at 17:06

## 2023-02-27 RX ADMIN — POTASSIUM CHLORIDE 40 MEQ: 20 TABLET, EXTENDED RELEASE ORAL at 10:38

## 2023-02-27 RX ADMIN — SUCRALFATE 1 G: 1 SUSPENSION ORAL at 08:35

## 2023-02-27 ASSESSMENT — ACTIVITIES OF DAILY LIVING (ADL)
ADLS_ACUITY_SCORE: 31
ADLS_ACUITY_SCORE: 31
ADLS_ACUITY_SCORE: 27
ADLS_ACUITY_SCORE: 40
ADLS_ACUITY_SCORE: 31
ADLS_ACUITY_SCORE: 27
ADLS_ACUITY_SCORE: 31

## 2023-02-27 NOTE — PROGRESS NOTES
Care Management Follow Up    Length of Stay (days): 2    Expected Discharge Date: 03/02/2023   Concerns to be Addressed:   Discharge planning    Patient plan of care discussed at interdisciplinary rounds: Yes    Anticipated Discharge Disposition: Home Care     Anticipated Discharge Services: None  Anticipated Discharge DME: None    Patient/family educated on Medicare website which has current facility and service quality ratings: yes  Education Provided on the Discharge Plan:  yes  Patient/Family in Agreement with the Plan: yes    Referrals Placed by CM/SW: Internal Clinic Care Coordination, Homecare  Private pay costs discussed: Not applicable    Additional Information:  Per IDT rounds today, MD team states that pt IS NOT medically stable for discharge today.  MD anticipates pt to remain hospitalized another 2-32 days.     PT/OT recommends TCU vs Home Care services upon discharge.  Per medical team notes, pt is not interested in TCU cares, but is agreeable to Home Care services.     Pt has been accepted for cares via [facility] Phone: [phone] Fax: [phone] for RN, PT/OT and HHA services when medically stable.  SW provided update today to their intake team.    Transportation discussed with pt/family & family is hopeful to be able to transport at time of discharge.    Care Management team to follow for discharge plans.    JANA Hawley

## 2023-02-27 NOTE — PROGRESS NOTES
Occupational Therapy       02/27/23 1100   Appointment Info   Signing Clinician's Name / Credentials (OT) Jeane Chaudhari, OTR/L   Living Environment   People in Home alone   Current Living Arrangements house   Home Accessibility stairs within home   Number of Stairs, Within Home, Primary five   Stair Railings, Within Home, Primary railings safe and in good condition   Living Environment Comments Patient reporst she is indepednent with ADLs, daughter assist with laundry as it is in the basement.   Self-Care   Equipment Currently Used at Home cane, straight;walker, rolling  (uses cane only)   Fall history within last six months yes   Number of times patient has fallen within last six months 1   General Information   Onset of Illness/Injury or Date of Surgery 02/25/23   Referring Physician Jorge L Ryan MD   Patient/Family Therapy Goal Statement (OT) none stated   Additional Occupational Profile Info/Pertinent History of Current Problem Theodora Meng is a 87 year old female who presents on 2/25/2023 with A-fib with RVR, and possible CHF exacerbation   Existing Precautions/Restrictions fall  (limited ability to due activity due to dizziness (A. Fib w/ RVR))   Cognitive Status Examination   Orientation Status orientation to person, place and time   Range of Motion Comprehensive   General Range of Motion no range of motion deficits identified   Strength Comprehensive (MMT)   General Manual Muscle Testing (MMT) Assessment no strength deficits identified   Transfers   Transfers sit-stand transfer   Sit-Stand Transfer   Sit-Stand Jay (Transfers) contact guard   Sit/Stand Transfer Comments Patient in chair when PT/OT arrived, agreeable to activity. BP 80s/40s when PT/OT arrived, discussed with RN. RN stated to wait 2 minutes and take another BPs. Recheck 105/60s. Patient in A. Fib,  at rest HR 90s-130s, upon standing HR ranged from 90s-160s, BP dropped again to 90s/50s. Patient symptomatic, reporting like  she may faint. OT/PT returned patient to sitting. Dizziness improved. patient left in chair with call light in reach. PT/OT notified RN of symptoms.   Clinical Impression   Criteria for Skilled Therapeutic Interventions Met (OT) Yes, treatment indicated   OT Diagnosis Decreased ADL independence   Influenced by the following impairments A. Fib with RVR   OT Problem List-Impairments impacting ADL problems related to;activity tolerance impaired;balance   Assessment of Occupational Performance 1-3 Performance Deficits   Identified Performance Deficits trsfs, toileting, standing tasks   Planned Therapy Interventions (OT) ADL retraining;balance training;bed mobility training;transfer training   Clinical Decision Making Complexity (OT) low complexity   Risk & Benefits of therapy have been explained evaluation/treatment results reviewed;care plan/treatment goals reviewed   OT Total Evaluation Time   OT Eval, Low Complexity Minutes (83424) 15   OT Goals   Therapy Frequency (OT) 5 times/wk   OT Predicted Duration/Target Date for Goal Attainment 03/06/23   OT Goals Hygiene/Grooming;Transfers;Toilet Transfer/Toileting   OT: Hygiene/Grooming supervision/stand-by assist;while standing   OT: Transfer Supervision/stand-by assist   OT: Toilet Transfer/Toileting Supervision/stand-by assist;toilet transfer;cleaning and garment management   OT Discharge Planning   OT Plan POC Mon 1/5: OT trsfs, g/h standing, toileting-monitor dizziness, limited evaluation due to dizziness, and low BP with high heart rate   OT Discharge Recommendation (DC Rec) Transitional Care Facility;home with home care occupational therapy   OT Rationale for DC Rec Patient previously independent with ADLs and lives alone, pending progress and patient's medically stability to participate in therapy, patient will likely be able to discharge home with home care. IF patient has prolonged hospital stay where she is unable to participate in therapist due to medical  issues, patient may  need TCU to improve stregnth and endurance.   OT Brief overview of current status CGA for standing, limited ability to participate due to dizziness/heart rate   Total Session Time   Total Session Time (sum of timed and untimed services) 15

## 2023-02-27 NOTE — PROGRESS NOTES
"End Of Shift Note    Situation: 86 y/o admitted 2/25 with Afib RVR comes from home alone.    Plan: PO diltiazem, stopped lasix and restarted Atenolol, Monitor HR & Rhythm, I&O, possibly discharge home with home care.     Subjective/Objective:    Neuro: A&O, clearly states wants and needs. Daughter very involved in cares with lots of appropriate questions.     Cardiac: Afib 120-140's. Oral cardizem, restarted atenolol, VSS, afebrile and soft BP's. MD's are aware. Gets slightly slight headed if she stands up too fast. Pt has long history of feeling dizzy -per patients daughter.     Resp: LS clear diminished, RA    GI/: Purwick in with ample output most of morning. Has tapered off this afternoon.     Endo: DM but no blood sugars ordered    MSK: Generalized weakness, uses a cane at home however, here we used a walker and gaitbelt.    Skin: Lymphedema to bilateral lower extremities.     LDAs: Saline locked    Protocols: K+ and Mag for tomorrow AM rechecks.     Question for MD from the \"Stress Test\" folks. She is scheduled for stress test on March 1st. Do you want the patient to keep this apt while in patient or does this need to be rescheduled? RN to follow-up and call [phone] [facility] tomorrow and let them know.       "

## 2023-02-27 NOTE — PROGRESS NOTES
[facility]  Internal Medicine Progress Note      Assessment and Plans:     Patient is a 87 year old female with  history of A-fib, hypertension, diabetes, coronary artery calcification, on 02/25/23, presented with progressive fatigue, weakness and shortness of breath. In the ED she was found to be in A-fib with RVR with heart rate 140. She was admitted for AFib with RVR and acute on chronic HFPEF       Atrial fibrillation with RVR    Troponin slightly elevated at 16 ->18  BNP elevated at 5203.  Chest x-ray showed Mild cardiomegaly with unchanged mediastinal contours. Normal vasculature. Left basilar airspace disease obscuring the diaphragm due to atelectasis versus pneumonia with a small left pleural effusion. There is no pneumothorax. Right lung is clear.    Patient was given 10 mg IV diltiazem x2 for rate control, she did not respond well to diltiazem IV push and was subsequently started on diltiazem drip and was admitted to the ICU    ECHO done on 02/27/23 showed normal EF at 55 %. Likely has diastolic HF. IVC size is normal with normal respiratory variability.      ---   Keep  off Diltiazem drip  ---   Ct on oral diltiazem 60 mg every 6 hr. HR still not well controlled. Resume Atenolol 25 mg every day  with hold parameters.   ---   Ct on Telemetry monitoring  ---   Continue Xarelto for stroke prevention  ---   ECHO done on 02/27/23 showed normal EF at 55 %. Likely has diastolic HF. IVC size is normal with normal respiratory variability.   ---   Pt did not tolerate metoprolol before  ---   She reports that she was on diltiazem before, but was taken off as she gt dizzy with dose increase.      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    Torsemide was switched to Lasix due to intolerance.   Patient reports he only took 1 dose of 20 mg Lasix.  Last ECHO showed EF of  "55-60% in 10/22    ---   Hold lasix for now. She does not have much fluid on board. IVC size is normal with normal resp vriability. BP is soft. Hold Lasix 40 mg IV BID  ---   Intake and output  ---   Daily weight, baseline weight is 212 lb    Hypomagensemia  ---   Mg of 1.3= > corrected  ---   RN protocol ordered    Hypokalemia  ---   Replace with protocol    Type 2 diabetes mellitus with other ophthalmic complication, without long-term current use of insulin    ---   Hb A1C was 7.9 on 02/10/23  ---   Not on home medications  ---   Consider starting on metformin or some of the newer agent     Hyperlipidemia LDL goal <70  Chronic stable problem  ---   Continue home Lipitor    Benign essential hypertension  Coronary artery calcification seen on CT scan  Blood pressure on soft side  ---   Hold home dose of lisinopril  ---   Continue diltizem and resume atenolol    Elevated troponin  Tropon 16 -> 18  EKG no concern for ischemia     Generalized muscle weakness  Declining functional status  ---   Presented with fatigue and weakness  ---   PT/OT    Anemia  Hemoglobin 11.5, appears to be baseline  No current concern for bleeding    ---   Follow-up periodically.      Clinically Significant Risk Factors Present on Admission -           # Hypomagnesemia: Lowest Mg = 1.4 mg/dL in last 2 days, will replace as needed   # Hypoalbuminemia: Lowest albumin = 3.2 g/dL at 2/25/2023  3:18 PM, will monitor as appropriate  # Drug Induced Coagulation Defect: home medication list includes an anticoagulant medication    # Hypertension: home medication list includes antihypertensive(s)     # DMII: A1C = 7.9 % (Ref range: 0.0 - 5.6 %) within past 6 months    # Obesity: Estimated body mass index is 37.76 kg/m  as calculated from the following:    Height as of this encounter: 1.626 m (5' 4\").    Weight as of this encounter: 99.8 kg (220 lb).         Diet: 2 Gram Sodium Diet Other - please comment  DVT Prophylaxis: DOAC  Cid Catheter: Not " present  Code Status: Full Code  Lines: IV line         Disposition Plan     Expected Discharge Date: 02/28/2023                 DVT Prophylaxis: Xarelto  Code Status: Full Code  Disposition: Anticipate discharge on 02/28/23, PT, OT assessment pending      Janet Ospina MD, MPH.  Internal Medicine Attending  [place], [place]    Click on the link below to page me.   Text Page  (7am - 5pm, M-F)    Subjective:     She was a little oozy on standing  Leg swelling is down  Sob an chest tightness is better  No chest pain  No fever  No congestion  Drinks one quartz of water per day  Does not eat too much salt    -Data reviewed today: I reviewed all new labs and imaging results over the last 24 hours.     Exam:       Temp: 98  F (36.7  C) Temp src: Axillary BP: 109/69 Pulse: (!) 135   Resp: 17 SpO2: 94 % O2 Device: None (Room air)    Vitals:    02/25/23 1411 02/27/23 0442   Weight: 99.8 kg (220 lb) 98.5 kg (217 lb 2.5 oz)     Vital Signs with Ranges  Temp:  [97.8  F (36.6  C)-98  F (36.7  C)] 98  F (36.7  C)  Pulse:  [] 135  Resp:  [10-27] 17  BP: ()/() 109/69  SpO2:  [90 %-96 %] 94 %  I/O last 3 completed shifts:  In: 1035 [P.O.:1035]  Out: 3925 [Urine:3925]    Constitutional: No distress noted, Alert, Answering questions appropriately  Respiratory: No wheezing onr crackles, diminished AE in left base.   Cardiovascular: S1S2 normal, no new murmur  GI: Soft, non tender  Skin/Integumen: No rash  BL ankle edema has improved.       Medications     Continuing ACE inhibitor/ARB/ARNI from home medication list OR ACE inhibitor/ARB order already placed during this visit       - MEDICATION INSTRUCTIONS -         atenolol  25 mg Oral QPM     atorvastatin  10 mg Oral QPM     diltiazem  60 mg Oral Q6H LA NENA     ferrous sulfate  325 mg Oral Q Mon Wed Fri AM     [Held by provider] furosemide  40 mg Intravenous Q12H     gabapentin  600 mg Oral QPM     [Held by provider] lisinopril  5 mg  Oral Daily     pantoprazole  40 mg Oral BID AC     rivaroxaban ANTICOAGULANT  20 mg Oral Daily with supper     sucralfate  1 g Oral BID       Data   Recent Labs   Lab 23  0439 23  0916 23  0807 23  0444 23  0209 23  1518   WBC  --   --   --   --   --  8.3   HGB  --   --   --   --   --  11.5*   MCV  --   --   --   --   --  88   PLT  --   --   --   --   --  412   INR  --   --   --   --   --  1.76*     --   --  137  --  136   POTASSIUM 3.2*  --   --  3.5  --  4.6   CHLORIDE 95*  --   --  97*  --  101   CO2 27  --   --  24  --  23   BUN 16.8  --   --  16.2  --  19.1   CR 0.82  --   --  0.83  --  0.71   ANIONGAP 14  --   --  16*  --  12   FEDERICO 8.6*  --   --  8.7*  --  9.1   * 165* 149* 149*   < > 142*   ALBUMIN  --   --   --   --   --  3.2*   PROTTOTAL  --   --   --   --   --  6.9   BILITOTAL  --   --   --   --   --  0.5   ALKPHOS  --   --   --   --   --  131*   ALT  --   --   --   --   --  13   AST  --   --   --   --   --  26    < > = values in this interval not displayed.       Recent Results (from the past 24 hour(s))   Echocardiogram Limited   Result Value    LVEF  55%    Narrative    [id number]  [id number]  [id number]  [id number]^GEE^CONNOR     [facility]  Echocardiography Laboratory  [facility] [place] [address]     Name: THOR DOMÍNGUEZ  MRN: [id number]  : 1935  Study Date: 2023 08:07 AM  Age: 87 yrs  Gender: Female  Patient Location: [facility]  Reason For Study: Atrial Fibrillation, CHF  Ordering Physician: CONNOR FLYNN  Referring Physician: Vaneck, Yecenia  Performed By: Rebecca Disla RDCS     BSA: 2.0 m2  Height: 64 in  Weight: 220 lb  HR: 115  BP: 115/67 mmHg  ______________________________________________________________________________  Procedure  Limited Echo Adult. Optison (NDC #[id number]) given intravenously. Technically  difficult study. Poor acoustic  windows.  ______________________________________________________________________________  Interpretation Summary     Pleural effusion, Correlate with alternative imaging.     Technically difficult study. Poor acoustic windows despite contrast use.  Irregular rhythm making assessment challenging. Limited echo.     The visual ejection fraction is estimated at 55%. Cannot accurately assess for  wall motion abnormalities.  RV is mildly dilated, not well seen. Function is difficult to assess, probably  low normal.  Atria are enlarged.  No hemodynamically significant valve disease.  Trivial anterior pericardial effusion  ______________________________________________________________________________  Left Ventricle  The left ventricle is normal in size. The visual ejection fraction is  estimated at 55%. Regional wall motion abnormalities cannot be excluded due to  limited visualization.     Right Ventricle  The right ventricle is mildly dilated. The right ventricle is not well  visualized. Right ventricular function cannot be assessed due to poor image  quality.     Mitral Valve  The mitral valve leaflets appear thickened, but open well. There is moderate  mitral annular calcification. There is mild (1+) mitral regurgitation.     Tricuspid Valve  There is mild (1+) tricuspid regurgitation. The right ventricular systolic  pressure is approximated at 19.9 mmHg plus the right atrial pressure.     Aortic Valve  No hemodynamically significant valvular aortic stenosis.     Vessels  The inferior vena cava was normal in size with preserved respiratory  variability.     Pericardium  Trivial anterior pericardial effusion.  ______________________________________________________________________________  MMode/2D Measurements & Calculations  IVSd: 0.91 cm  LVIDd: 4.2 cm  LVIDs: 3.1 cm  LVPWd: 1.1 cm  FS: 27.4 %  LV mass(C)d: 136.9 grams  LV mass(C)dI: 67.2 grams/m2  RWT: 0.50     Doppler Measurements & Calculations  TR max suzan: 222.0  /sec  TR max P.9 mmHg     ______________________________________________________________________________  Report approved by: Jordan Georges 2023 10:15 AM

## 2023-02-27 NOTE — PLAN OF CARE
Goal Outcome Evaluation:    Heart rates ranged from 90s-120s overnight. AM cardizem held d/t low SBP- given late at 0615 after SBP reached 125. Pt A&O X4, using call light appropriately. Pure-wic  remains in place, 800mL clear yellow urine noted this AM after AM lasix. Pt denies pain or shortness of breath. Potassium replaced this AM.   Jessenia Presley RN on 2/27/2023 at 7:01 AM

## 2023-02-27 NOTE — CONSULTS
CLINICAL NUTRITION SERVICES    Reason for Assessment:  Low-sodium (2 g/day) nutrition education    Diet History:  Pt reports no history of receiving low-sodium nutrition education in the past.    Nutrition Diagnosis:  Food- and nutrition-related knowledge deficit r/t no previous knowledge of low-sodium diet AEB pt report of no previous formal low-sodium nutrition education.    Nutrition Prescription/Recs:  Continue low-sodium diet.      Interventions:  Nutrition Education  1. Provided verbal instruction on low-sodium meal planning.  2. Provided the following handouts: How to Read Nutrition Labels, Low-Sodium Foods and Drinks, Tips for a Low-Sodium Diet, Seasoning Your Foods Without Adding Salt, and Managing Fluid Restriction.      Goals:    Pt will verbalize at least five high sodium foods and the importance of avoiding added salt to foods for cooking or seasoning foods.     Follow-up:   Patient to ask any further nutrition-related questions before discharge. In addition, pt may request outpatient RD appointment.    Cherelle Ca RDN, JOSAFAT  Clinical Dietitian  Office: [phone]  Weekend pager: [phone]

## 2023-02-28 ENCOUNTER — APPOINTMENT (OUTPATIENT)
Dept: PHYSICAL THERAPY | Facility: CLINIC | Age: 88
DRG: 308 | End: 2023-02-28
Payer: MEDICARE

## 2023-02-28 ENCOUNTER — APPOINTMENT (OUTPATIENT)
Dept: OCCUPATIONAL THERAPY | Facility: CLINIC | Age: 88
DRG: 308 | End: 2023-02-28
Payer: MEDICARE

## 2023-02-28 LAB
ANION GAP SERPL CALCULATED.3IONS-SCNC: 13 MMOL/L (ref 7–15)
BUN SERPL-MCNC: 21 MG/DL (ref 8–23)
CALCIUM SERPL-MCNC: 8.9 MG/DL (ref 8.8–10.2)
CHLORIDE SERPL-SCNC: 95 MMOL/L (ref 98–107)
CREAT SERPL-MCNC: 0.84 MG/DL (ref 0.51–0.95)
DEPRECATED HCO3 PLAS-SCNC: 26 MMOL/L (ref 22–29)
GFR SERPL CREATININE-BSD FRML MDRD: 67 ML/MIN/1.73M2
GLUCOSE SERPL-MCNC: 142 MG/DL (ref 70–99)
HOLD SPECIMEN: NORMAL
MAGNESIUM SERPL-MCNC: 1.7 MG/DL (ref 1.7–2.3)
POTASSIUM SERPL-SCNC: 4.2 MMOL/L (ref 3.4–5.3)
SODIUM SERPL-SCNC: 134 MMOL/L (ref 136–145)

## 2023-02-28 PROCEDURE — 83735 ASSAY OF MAGNESIUM: CPT | Performed by: HOSPITALIST

## 2023-02-28 PROCEDURE — 82310 ASSAY OF CALCIUM: CPT | Performed by: HOSPITALIST

## 2023-02-28 PROCEDURE — 250N000013 HC RX MED GY IP 250 OP 250 PS 637: Performed by: HOSPITALIST

## 2023-02-28 PROCEDURE — 250N000013 HC RX MED GY IP 250 OP 250 PS 637: Performed by: FAMILY MEDICINE

## 2023-02-28 PROCEDURE — 36415 COLL VENOUS BLD VENIPUNCTURE: CPT | Performed by: HOSPITALIST

## 2023-02-28 PROCEDURE — 120N000004 HC R&B MS OVERFLOW

## 2023-02-28 PROCEDURE — 99232 SBSQ HOSP IP/OBS MODERATE 35: CPT | Performed by: HOSPITALIST

## 2023-02-28 PROCEDURE — 97140 MANUAL THERAPY 1/> REGIONS: CPT | Mod: GP | Performed by: PHYSICAL THERAPIST

## 2023-02-28 PROCEDURE — 97535 SELF CARE MNGMENT TRAINING: CPT | Mod: GO

## 2023-02-28 PROCEDURE — 97530 THERAPEUTIC ACTIVITIES: CPT | Mod: GP | Performed by: PHYSICAL THERAPIST

## 2023-02-28 RX ORDER — FUROSEMIDE 20 MG
20 TABLET ORAL DAILY
Status: DISCONTINUED | OUTPATIENT
Start: 2023-02-28 | End: 2023-03-03 | Stop reason: HOSPADM

## 2023-02-28 RX ORDER — ATENOLOL 25 MG/1
25 TABLET ORAL 2 TIMES DAILY
Status: DISCONTINUED | OUTPATIENT
Start: 2023-02-28 | End: 2023-03-03 | Stop reason: HOSPADM

## 2023-02-28 RX ORDER — LIDOCAINE 40 MG/G
CREAM TOPICAL
Status: DISCONTINUED | OUTPATIENT
Start: 2023-02-28 | End: 2023-03-03 | Stop reason: HOSPADM

## 2023-02-28 RX ADMIN — DILTIAZEM HYDROCHLORIDE 60 MG: 60 TABLET, FILM COATED ORAL at 17:34

## 2023-02-28 RX ADMIN — DILTIAZEM HYDROCHLORIDE 60 MG: 60 TABLET, FILM COATED ORAL at 23:40

## 2023-02-28 RX ADMIN — PANTOPRAZOLE SODIUM 40 MG: 40 TABLET, DELAYED RELEASE ORAL at 16:52

## 2023-02-28 RX ADMIN — GABAPENTIN 600 MG: 300 CAPSULE ORAL at 21:36

## 2023-02-28 RX ADMIN — ACETAMINOPHEN 650 MG: 325 TABLET, FILM COATED ORAL at 08:25

## 2023-02-28 RX ADMIN — SUCRALFATE 1 G: 1 SUSPENSION ORAL at 08:25

## 2023-02-28 RX ADMIN — FUROSEMIDE 20 MG: 20 TABLET ORAL at 10:47

## 2023-02-28 RX ADMIN — ATORVASTATIN CALCIUM 10 MG: 10 TABLET, FILM COATED ORAL at 16:52

## 2023-02-28 RX ADMIN — DILTIAZEM HYDROCHLORIDE 60 MG: 60 TABLET, FILM COATED ORAL at 12:14

## 2023-02-28 RX ADMIN — ATENOLOL 25 MG: 25 TABLET ORAL at 20:26

## 2023-02-28 RX ADMIN — PANTOPRAZOLE SODIUM 40 MG: 40 TABLET, DELAYED RELEASE ORAL at 06:12

## 2023-02-28 RX ADMIN — DILTIAZEM HYDROCHLORIDE 60 MG: 60 TABLET, FILM COATED ORAL at 06:12

## 2023-02-28 RX ADMIN — DILTIAZEM HYDROCHLORIDE 60 MG: 60 TABLET, FILM COATED ORAL at 00:02

## 2023-02-28 RX ADMIN — RIVAROXABAN 20 MG: 10 TABLET, FILM COATED ORAL at 16:52

## 2023-02-28 RX ADMIN — SUCRALFATE 1 G: 1 SUSPENSION ORAL at 20:26

## 2023-02-28 RX ADMIN — ACETAMINOPHEN 650 MG: 325 TABLET, FILM COATED ORAL at 21:36

## 2023-02-28 ASSESSMENT — ACTIVITIES OF DAILY LIVING (ADL)
ADLS_ACUITY_SCORE: 33
ADLS_ACUITY_SCORE: 40
ADLS_ACUITY_SCORE: 33
ADLS_ACUITY_SCORE: 33
ADLS_ACUITY_SCORE: 35
ADLS_ACUITY_SCORE: 33
ADLS_ACUITY_SCORE: 33
ADLS_ACUITY_SCORE: 35
ADLS_ACUITY_SCORE: 33
ADLS_ACUITY_SCORE: 33

## 2023-02-28 NOTE — PLAN OF CARE
Lymphedema therapy- B LE to knees wrapped with compression wraps, ok to remove if painful, not tolerated, or increased SOB at rest, please do not throw wraps away as they can be reused with her, lymphedema therapy to return 3/1/23 in the AM. Recommend home care lymphedema therapy.

## 2023-02-28 NOTE — PROGRESS NOTES
Rested well thru the night.  HR overnight high 80's to 110's remains in A. Fib.  Receiving po Cardizem 60 mg every 6 hours.  SBP's improved since Lasix held and restarted Atenolol po on evening shift.  Electrolyte replacement blood draws were WNL, K+ 4.2 and Magnesium 1.7.  Urine output - Per pure-wick external  Catheter only 175 thru the night but yellow clear urine.  Lung sounds mid shift questionable fine crackles but not noted at 0400 assessment.  Continue with plan of care for electrolyte management and rate control with PO Cardizem.

## 2023-02-28 NOTE — PROGRESS NOTES
"Care Management Follow Up    Length of Stay (days): 3    Expected Discharge Date: 03/01/2023     Concerns to be Addressed: discharge planning     Patient plan of care discussed at interdisciplinary rounds: Yes    Anticipated Discharge Disposition: Home Care; [facility] Phone: [phone] Fax: [phone] RN, PT/OT and HHA     Anticipated Discharge Services: None  Anticipated Discharge DME: None    Patient/family educated on Medicare website which has current facility and service quality ratings: yes  Education Provided on the Discharge Plan:  yes  Patient/Family in Agreement with the Plan: yes    Referrals Placed by CM/SW: Internal Clinic Care Coordination, Homecare  Private pay costs discussed: Not applicable    Additional Information:  Per IDT rounds today, MD team states that pt IS NOT medically stable for discharge today.  MD anticipates pt to remain hospitalized another day.     PT/OT recommends TCU upon discharge but pt will not even consider a TCU stay at this time, despite SW providing education on twice daily PT/OT services.  Pt requests to return home with home care services.    Pt has been accepted for cares with [facility] Phone: [phone] Fax: [phone] for RN, PT/OT and HHA services.  [facility] does not have Lymph services, so will need a \"teachable\" party.  Pt shared that her daughter or grand daughter would be able to assist with her care needs at home.     Transportation discussed with patient & she shared her daughter will transport at discharge.    Care Management team to follow for discharge plans.        JANA Hawley        "

## 2023-02-28 NOTE — PROGRESS NOTES
[facility]  Internal Medicine Progress Note      Assessment and Plans:     Patient is a 87 year old female with  history of A-fib, hypertension, diabetes, coronary artery calcification, on 02/25/23, presented with progressive fatigue, weakness and shortness of breath. In the ED she was found to be in A-fib with RVR with heart rate 140. She was admitted for AFib with RVR and acute on chronic HFPEF       Atrial fibrillation with RVR    Troponin slightly elevated at 16 =>18  BNP elevated at 5203.  Chest x-ray showed Mild cardiomegaly with unchanged mediastinal contours. Normal vasculature. Left basilar airspace disease obscuring the diaphragm due to atelectasis versus pneumonia with a small left pleural effusion. There is no pneumothorax. Right lung is clear.    Patient was given 10 mg IV diltiazem x2 for rate control, she did not respond well to diltiazem IV push and was subsequently started on diltiazem drip and was admitted to the ICU    ECHO done on 02/27/23 showed normal EF at 55 %. Likely has diastolic HF. IVC size is normal with normal respiratory variability.      ---   Keep  off Diltiazem drip  ---   Ct on oral diltiazem 60 mg every 6 hr. HR still not well controlled. Increase Atenolol to 25 mg BID with hold parameters.   ---   Ct on Telemetry monitoring  ---   Continue Xarelto for stroke prevention  ---   ECHO done on 02/27/23 showed normal EF at 55 %. Likely has diastolic HF. IVC size is normal with normal respiratory variability.   ---   Pt did not tolerate metoprolol before  ---   She reports that she was on diltiazem before, but was taken off as she gt dizzy with dose increase.      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    Torsemide was switched to Lasix due to intolerance.   Patient reports he only took 1 dose of 20 mg Lasix.  Last ECHO showed EF of  "55-60% in 10/22    ---   Hold IV lasix. Start back at low dose lasix 20 mg daily.   ---   Intake and output  ---   Daily weight, baseline weight is 212 lb    Hypomagensemia  ---   Mg of 1.3= > corrected  ---   RN protocol ordered    Hypokalemia  ---   Replace with protocol    Type 2 diabetes mellitus with other ophthalmic complication, without long-term current use of insulin    ---   Hb A1C was 7.9 on 02/10/23  ---   Not on home medications  ---   Consider starting on metformin or some of the newer agent     Hyperlipidemia LDL goal <70  Chronic stable problem  ---   Continue home Lipitor    Benign essential hypertension  Coronary artery calcification seen on CT scan  Blood pressure on soft side  ---   Hold home dose of lisinopril  ---   Continue diltizem and resume atenolol    Elevated troponin  Tropon 16 -> 18  EKG no concern for ischemia     Generalized muscle weakness  Declining functional status  ---   Presented with fatigue and weakness  ---   PT/OT    Anemia  Hemoglobin 11.5, appears to be baseline  No current concern for bleeding    ---   Follow-up periodically.      Clinically Significant Risk Factors Present on Admission -     # Hypomagnesemia: Lowest Mg = 1.4 mg/dL in last 2 days, will replace as needed   # Hypoalbuminemia: Lowest albumin = 3.2 g/dL at 2/25/2023  3:18 PM, will monitor as appropriate  # Drug Induced Coagulation Defect: home medication list includes an anticoagulant medication    # Hypertension: home medication list includes antihypertensive(s)     # DMII: A1C = 7.9 % (Ref range: 0.0 - 5.6 %) within past 6 months    # Obesity: Estimated body mass index is 37.76 kg/m  as calculated from the following:    Height as of this encounter: 1.626 m (5' 4\").    Weight as of this encounter: 99.8 kg (220 lb).         Diet: 2 Gram Sodium Diet Other - please comment  DVT Prophylaxis: DOAC  Cid Catheter: Not present  Code Status: Full Code  Lines: IV line       Disposition Plan     Expected Discharge " Date: 02/28/2023                 DVT Prophylaxis: Xarelto  Code Status: Full Code  Disposition: Anticipate discharge on 02/28/23, PT, OT assessment pending      Janet Ospina MD, MPH.  Internal Medicine Attending  [place], [place]    Click on the link below to page me.   Text Page  (7am - 5pm, M-F)    Subjective:     Less dizzy. Doing better.   Leg swelling is down  Sob an chest tightness is better  No chest pain  No fever  No congestion  Drinks one quartz of water per day  Does not eat too much salt    -Data reviewed today: I reviewed all new labs and imaging results over the last 24 hours.     Exam:       Temp: 98  F (36.7  C) Temp src: Axillary BP: 110/89 Pulse: 98   Resp: 18 SpO2: 95 % O2 Device: None (Room air)    Vitals:    02/25/23 1411 02/27/23 0442 02/28/23 0612   Weight: 99.8 kg (220 lb) 98.5 kg (217 lb 2.5 oz) 97.8 kg (215 lb 9.8 oz)     Vital Signs with Ranges  Temp:  [97.5  F (36.4  C)-98  F (36.7  C)] 98  F (36.7  C)  Pulse:  [] 98  Resp:  [10-22] 18  BP: ()/(71-92) 110/89  SpO2:  [93 %-98 %] 95 %  I/O last 3 completed shifts:  In: 600 [P.O.:600]  Out: 975 [Urine:975]    Constitutional: No distress noted, Alert, Answering questions appropriately  Respiratory: No wheezing onr crackles, diminished AE in left base.   Cardiovascular: S1S2 normal, no new murmur  GI: Soft, non tender  Skin/Integumen: No rash  BL ankle edema has improved.     Medications        Continuing ACE inhibitor/ARB/ARNI from home medication list OR ACE inhibitor/ARB order already placed during this visit       - MEDICATION INSTRUCTIONS -         atenolol  25 mg Oral BID     atorvastatin  10 mg Oral QPM     diltiazem  60 mg Oral Q6H LA NENA     ferrous sulfate  325 mg Oral Q Mon Wed Fri AM     [Held by provider] furosemide  40 mg Intravenous Q12H     furosemide  20 mg Oral Daily     gabapentin  600 mg Oral QPM     [Held by provider] lisinopril  5 mg Oral Daily     pantoprazole  40 mg Oral  BID AC     rivaroxaban ANTICOAGULANT  20 mg Oral Daily with supper     sodium chloride (PF)  3 mL Intracatheter Q8H     sucralfate  1 g Oral BID     Data      Recent Labs   Lab 02/28/23 0447 02/27/23  1512 02/27/23  0439 02/26/23  0916 02/26/23  0807 02/26/23  0444 02/26/23  0209 02/25/23  1518   WBC  --   --   --   --   --   --   --  8.3   HGB  --   --   --   --   --   --   --  11.5*   MCV  --   --   --   --   --   --   --  88   PLT  --   --   --   --   --   --   --  412   INR  --   --   --   --   --   --   --  1.76*   *  --  136  --   --  137  --  136   POTASSIUM 4.2 4.5 3.2*  --   --  3.5  --  4.6   CHLORIDE 95*  --  95*  --   --  97*  --  101   CO2 26  --  27  --   --  24  --  23   BUN 21.0  --  16.8  --   --  16.2  --  19.1   CR 0.84  --  0.82  --   --  0.83  --  0.71   ANIONGAP 13  --  14  --   --  16*  --  12   FEDERICO 8.9  --  8.6*  --   --  8.7*  --  9.1   *  --  143* 165*   < > 149*   < > 142*   ALBUMIN  --   --   --   --   --   --   --  3.2*   PROTTOTAL  --   --   --   --   --   --   --  6.9   BILITOTAL  --   --   --   --   --   --   --  0.5   ALKPHOS  --   --   --   --   --   --   --  131*   ALT  --   --   --   --   --   --   --  13   AST  --   --   --   --   --   --   --  26    < > = values in this interval not displayed.     No results found for this or any previous visit (from the past 24 hour(s)).

## 2023-03-01 ENCOUNTER — APPOINTMENT (OUTPATIENT)
Dept: PHYSICAL THERAPY | Facility: CLINIC | Age: 88
DRG: 308 | End: 2023-03-01
Payer: MEDICARE

## 2023-03-01 LAB
HOLD SPECIMEN: NORMAL
MAGNESIUM SERPL-MCNC: 1.6 MG/DL (ref 1.7–2.3)
POTASSIUM SERPL-SCNC: 4.3 MMOL/L (ref 3.4–5.3)

## 2023-03-01 PROCEDURE — 250N000013 HC RX MED GY IP 250 OP 250 PS 637: Performed by: HOSPITALIST

## 2023-03-01 PROCEDURE — 99232 SBSQ HOSP IP/OBS MODERATE 35: CPT | Performed by: HOSPITALIST

## 2023-03-01 PROCEDURE — 250N000013 HC RX MED GY IP 250 OP 250 PS 637: Performed by: FAMILY MEDICINE

## 2023-03-01 PROCEDURE — 84132 ASSAY OF SERUM POTASSIUM: CPT | Performed by: HOSPITALIST

## 2023-03-01 PROCEDURE — 97140 MANUAL THERAPY 1/> REGIONS: CPT | Mod: GP | Performed by: PHYSICAL MEDICINE & REHABILITATION

## 2023-03-01 PROCEDURE — 83735 ASSAY OF MAGNESIUM: CPT | Performed by: HOSPITALIST

## 2023-03-01 PROCEDURE — 120N000004 HC R&B MS OVERFLOW

## 2023-03-01 PROCEDURE — 36415 COLL VENOUS BLD VENIPUNCTURE: CPT | Performed by: HOSPITALIST

## 2023-03-01 RX ADMIN — METFORMIN HYDROCHLORIDE 250 MG: 500 TABLET, FILM COATED ORAL at 17:03

## 2023-03-01 RX ADMIN — RIVAROXABAN 20 MG: 10 TABLET, FILM COATED ORAL at 17:02

## 2023-03-01 RX ADMIN — DILTIAZEM HYDROCHLORIDE 60 MG: 60 TABLET, FILM COATED ORAL at 17:02

## 2023-03-01 RX ADMIN — ATENOLOL 25 MG: 25 TABLET ORAL at 08:26

## 2023-03-01 RX ADMIN — DILTIAZEM HYDROCHLORIDE 60 MG: 60 TABLET, FILM COATED ORAL at 07:32

## 2023-03-01 RX ADMIN — DILTIAZEM HYDROCHLORIDE 60 MG: 60 TABLET, FILM COATED ORAL at 23:38

## 2023-03-01 RX ADMIN — FUROSEMIDE 20 MG: 20 TABLET ORAL at 08:26

## 2023-03-01 RX ADMIN — ACETAMINOPHEN 650 MG: 325 TABLET, FILM COATED ORAL at 10:33

## 2023-03-01 RX ADMIN — GABAPENTIN 600 MG: 300 CAPSULE ORAL at 21:07

## 2023-03-01 RX ADMIN — METFORMIN HYDROCHLORIDE 250 MG: 500 TABLET, FILM COATED ORAL at 12:06

## 2023-03-01 RX ADMIN — FERROUS SULFATE TAB 325 MG (65 MG ELEMENTAL FE) 325 MG: 325 (65 FE) TAB at 08:25

## 2023-03-01 RX ADMIN — PANTOPRAZOLE SODIUM 40 MG: 40 TABLET, DELAYED RELEASE ORAL at 17:03

## 2023-03-01 RX ADMIN — SUCRALFATE 1 G: 1 SUSPENSION ORAL at 21:07

## 2023-03-01 RX ADMIN — PANTOPRAZOLE SODIUM 40 MG: 40 TABLET, DELAYED RELEASE ORAL at 07:32

## 2023-03-01 RX ADMIN — ATENOLOL 25 MG: 25 TABLET ORAL at 21:07

## 2023-03-01 RX ADMIN — ACETAMINOPHEN 650 MG: 325 TABLET, FILM COATED ORAL at 21:07

## 2023-03-01 RX ADMIN — ATORVASTATIN CALCIUM 10 MG: 10 TABLET, FILM COATED ORAL at 17:03

## 2023-03-01 RX ADMIN — SUCRALFATE 1 G: 1 SUSPENSION ORAL at 08:25

## 2023-03-01 ASSESSMENT — ACTIVITIES OF DAILY LIVING (ADL)
ADLS_ACUITY_SCORE: 33
ADLS_ACUITY_SCORE: 31
ADLS_ACUITY_SCORE: 27
ADLS_ACUITY_SCORE: 34
ADLS_ACUITY_SCORE: 33
ADLS_ACUITY_SCORE: 27
ADLS_ACUITY_SCORE: 33
ADLS_ACUITY_SCORE: 27
ADLS_ACUITY_SCORE: 27

## 2023-03-01 NOTE — PROVIDER NOTIFICATION
"Dr. Ospina paged: \"Bed 3 D. [employer]: Pt hasn't had a bowel movement in 4 days, can you order some stool softeners please? Thanks! Misti LAROSE\"    -No new orders, no call back.  "

## 2023-03-01 NOTE — PROGRESS NOTES
[facility] care [id number]  Neuro: A&Ox4.   Cardiac: Afib, rate 100's throughout the day, occasionally up to 130 with activity. VSS.   Respiratory: Sating adequetly on RA.  GI/: Adequate urine output. No BM this shift.  Diet/appetite: Tolerating regular diet.   Activity:  Assist of SBA with walker, up to chair throughout the day.  Pain: At acceptable level on current regimen.   Skin: No new deficits noted. Lymph wraps rewrapped by physical therapy, skin checked under wraps when they were removed, no defects noted.   LDA's: PIV    Plan: Continue with POC. Notify primary team with changes. Possible discharge to TCU tomorrow.

## 2023-03-01 NOTE — PROGRESS NOTES
Care Management Follow Up    Length of Stay (days): 4    Expected Discharge Date: 03/01/2023     Concerns to be Addressed: discharge planning     Patient plan of care discussed at interdisciplinary rounds: Yes    Anticipated Discharge Disposition:  Transitional Care referrals now pending.  Pt's [place], called this writer today x4 & requested that TCU referrals be sent.  Pt now agreeable to TCU cares.     Anticipated Discharge Services: None  Anticipated Discharge DME: None    Patient/family educated on Medicare website which has current facility and service quality ratings: yes  Education Provided on the Discharge Plan:  Yes  Patient/Family in Agreement with the Plan: yes    Referrals Placed by CM/SW: Internal Clinic Care Coordination, Homecare, Post Acute Facilities  Private pay costs discussed: Not applicable ; may need   Additional Information:  Per IDT rounds today, MD team states that pt IS medically stable for discharge today.  MD anticipates pt to remain hospitalized another day.     PT/OT recommends TCU vs Home care upon discharge, and pt is now in agreement with TCU referrals being sent for cares after having extended discussion with her daughter, Emmy.    SW provided education regarding Medicare.gov; how pt's insurance will cover services upon discharge, Medicare's homebound status, and offered choice of agency for cares.  Per discussion with pt/family, they requested referrals be sent to:    Service Provider Request Status Selected Services Address Phone Fax Patient Preferred   [facility]  Pending - Request Sent N/A [address] [facility] [address] [phone]    [facility] ([facility])  Pending - Request Sent N/A [address] [facility] [id number] [phone]    [facility] ([facility])  Pending - Request Sent N/A [address] [facility] [facility] [id number] [phone] --   [facility]  (SNF)  Pending - [facility] Sent N/A [address] [id number] [phone] --   [facility] ([facility])  Pending - [facility] Sent N/A [address] [id number] [phone] --   [facility] ([facility])  Pending - Request Sent N/A [address] [facility] [address] [phone] --   [facility] ([facility])  Pending - Request Sent N/A [address] [facility] [address] [phone] --   [facility])  Pending - Request Sent N/A [address] , [address] [id number] [phone] --     Home Medical Care    Service Provider Request Status Selected Services Address Phone Fax Patient Preferred   [facility] ([facility])   [facility] [address] [facility] [id number] [phone] --   Internal Comment last updated by Pau Weber LSW 2/28/2023 3148    Daughter, Emmy or grand daughter should be a teachable party, per pt.                Transportation discussed with pt/family & Emmy will need 45 minute lead time to transport pt, if pt able to get in/out of Emmy's car.    Care Management team to follow for discharge plans.    JANA Hawley

## 2023-03-01 NOTE — PROGRESS NOTES
End Of Shift Note      Situation: 86 y/o admitted 2/25 with Afib RVR comes from home alone.     Plan: PO diltiazem,Atenolol, Monitor HR & Rhythm, I&O, possibly discharge home with home care. rescheduled the stress test for a later date.      Subjective/Objective:     Neuro: A&O, clearly states wants and needs. Daughter very involved in cares with lots of appropriate questions.      Cardiac: Afib HR 's. Oral cardizem, atenolol, lasix, BP's bad in range, afebrile. Gets slightly headed if she stands up too fast. Pt has long history of feeling dizzy -per patients daughter.      Resp: LS clear diminished, RA     GI/: Purwik out for daytime use with pull up and trips to the toilet.       MSK: Generalized weakness, stable gait,  uses a cane at home however, here we used a walker and gaitbelt.     Skin: Lymphedema wraps to bilateral lower extremities.      LDAs: Saline locked     Protocols: K+ and Mag for tomorrow AM rechecks.

## 2023-03-01 NOTE — PLAN OF CARE
Goal Outcome Evaluation:      Plan of Care Reviewed With: patient    Overall Patient Progress: improving      Outcome Evaluation: HR has been more stable 70's to 110's increased with activity still in A. Fib, RA saturations mid 90's.  requested tylenol at HS for rest.  Plan of care continues.  continue to monitor closely.

## 2023-03-01 NOTE — PROGRESS NOTES
Shift Summary [id number]  End Of Shift Note    Situation: Admitted 2/25 with Afib RVR, lives home alone    Plan: PO Diltiazem, atenolol. Monitor HR, BP.   Subjective/Objective:    Neuro: A/Ox4,afebrile.    Cardiac: Afib -130, 130 with activity to bathroom. No light headedness or dizziness noted when getting up this shift.     Resp: Room Air, LS clear    GI/: Voiding per bathroom.     MSK: Up with 1 assist, GB, and walker. Steady on feet. Denies pain.     Skin: BLE Lymphedema wraps in place.     LDAs: PIVx SL    Protocols: K+ and Magnesium, AM labs ordered for 3/2. No replacement needed this AM.,

## 2023-03-01 NOTE — PROGRESS NOTES
[facility]  Internal Medicine Progress Note    Assessment and Plans:     Patient is a 87 year old female with  history of A-fib, hypertension, diabetes, coronary artery calcification, on 02/25/23, presented with progressive fatigue, weakness and shortness of breath. In the ED she was found to be in A-fib with RVR with heart rate 140. She was admitted for AFib with RVR and acute on chronic HFPEF     Atrial fibrillation with RVR  Troponin slightly elevated at 16 =>18.  BNP elevated at 5203.  Chest x-ray showed Mild cardiomegaly with unchanged mediastinal contours. Normal vasculature. Left basilar airspace disease obscuring the diaphragm due to atelectasis versus pneumonia with a small left pleural effusion. There is no pneumothorax. Right lung is clear.    Patient was given 10 mg IV diltiazem x2 for rate control, she did not respond well to diltiazem IV push and was subsequently started on diltiazem drip and was admitted to the ICU.    ECHO done on 02/27/23 showed normal EF at 55 %. Likely has diastolic HF. IVC size is normal with normal respiratory variability.      ---   Keep  off Diltiazem drip.  ---   Ct on oral diltiazem 60 mg every 6 hr. HR still not well controlled. Ct on Atenolol to 25 mg BID with hold parameters.   ---   Ct on Telemetry monitoring.  ---   Continue Xarelto for stroke prevention.  ---   ECHO done on 02/27/23 showed normal EF at 55 %. Likely has diastolic HF. IVC size is normal with normal respiratory variability.   ---   Pt did not tolerate metoprolol before.  ---   She reports that she was on diltiazem before, but was taken off as she gt dizzy with dose increase.      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    Torsemide was switched to Lasix due to intolerance.   Patient reports he only took 1 dose of 20 mg Lasix.  Last ECHO showed EF of 55-60%  "in 10/22    ---   Hold IV lasix. Ct on low dose lasix 20 mg daily.   ---   Intake and output  ---   Daily weight, baseline weight is 212 lb, she is back to baseline weight.     Hypomagnesemia  ---   Mg of 1.3 => 1.6  ---   RN protocol ordered    Hypokalemia  ---   Replace with protocol    Type 2 diabetes mellitus with other ophthalmic complication, without long-term current use of insulin    ---   Hb A1C was 7.9 on 02/10/23  ---   Not on home medications  ---   Consider starting on metformin or some of the newer agent     Hyperlipidemia LDL goal <70  Chronic stable problem  ---   Continue home Lipitor    Benign essential hypertension  Coronary artery calcification seen on CT scan  Blood pressure on soft side  ---   Hold home dose of lisinopril  ---   Continue diltizem and resume atenolol    Elevated troponin  Tropon 16 -> 18  EKG no concern for ischemia     Generalized muscle weakness  Declining functional status  ---   Presented with fatigue and weakness  ---   PT/OT    Anemia  Hemoglobin 11.5, appears to be baseline  No current concern for bleeding    ---   Follow-up periodically.      Clinically Significant Risk Factors Present on Admission     # Hypomagnesemia: Lowest Mg = 1.4 mg/dL in last 2 days, will replace as needed   # Hypoalbuminemia: Lowest albumin = 3.2 g/dL at 2/25/2023  3:18 PM, will monitor as appropriate  # Drug Induced Coagulation Defect: home medication list includes an anticoagulant medication    # Hypertension: home medication list includes antihypertensive(s)     # DMII: A1C = 7.9 % (Ref range: 0.0 - 5.6 %) within past 6 months    # Obesity: Estimated body mass index is 37.76 kg/m  as calculated from the following:    Height as of this encounter: 1.626 m (5' 4\").    Weight as of this encounter: 99.8 kg (220 lb).         Diet: 2 Gram Sodium Diet Other - please comment  DVT Prophylaxis: DOAC  Cid Catheter: Not present  Code Status: Full Code  Lines: IV line       Disposition Plan     Expected " Discharge Date: 02/28/2023               DVT Prophylaxis: Xarelto  Code Status: Full Code  Disposition: Anticipate discharge on 02/28/23, PT, OT assessment pending      Janet Ospina MD, MPH.  Internal Medicine Attending  [place], [place]    Click on the link below to page me.   Text Page  (7am - 5pm, M-F)    Subjective:     Not feeking so hot  She has occasional stomach pain. She has that home.  Leg swelling is down  Sob an chest tightness is better  No chest pain  No fever  No congestion  Drinks one quartz of water per day  Does not eat too much salt.    -Data reviewed today: I reviewed all new labs and imaging results over the last 24 hours.     Exam:       Temp: 98.3  F (36.8  C) Temp src: Oral BP: 122/88 (standing at side of bed) Pulse: 80   Resp: 18 SpO2: 93 % O2 Device: None (Room air)    Vitals:    02/27/23 0442 02/28/23 0612 03/01/23 0950   Weight: 98.5 kg (217 lb 2.5 oz) 97.8 kg (215 lb 9.8 oz) 96.2 kg (212 lb 1.3 oz)     Vital Signs with Ranges  Temp:  [97.8  F (36.6  C)-98.6  F (37  C)] 98.3  F (36.8  C)  Pulse:  [] 80  Resp:  [11-23] 18  BP: (110-136)/(67-99) 122/88  SpO2:  [93 %-97 %] 93 %  I/O last 3 completed shifts:  In: 250 [P.O.:250]  Out: 875 [Urine:875]    Constitutional: No distress noted, Alert, Answering questions appropriately  Respiratory: No wheezing onr crackles, diminished AE in left base.   Cardiovascular: S1S2 normal, no new murmur  GI: Soft, non tender  Skin/Integumen: No rash  BL ankle edema has improved.     Medications        Continuing ACE inhibitor/ARB/ARNI from home medication list OR ACE inhibitor/ARB order already placed during this visit       - MEDICATION INSTRUCTIONS -         atenolol  25 mg Oral BID     atorvastatin  10 mg Oral QPM     diltiazem  60 mg Oral Q6H LA NENA     ferrous sulfate  325 mg Oral Q Mon Wed Fri AM     [Held by provider] furosemide  40 mg Intravenous Q12H     furosemide  20 mg Oral Daily     gabapentin  600 mg  Oral QPM     [Held by provider] lisinopril  5 mg Oral Daily     pantoprazole  40 mg Oral BID AC     rivaroxaban ANTICOAGULANT  20 mg Oral Daily with supper     sodium chloride (PF)  3 mL Intracatheter Q8H     sucralfate  1 g Oral BID     Data      Recent Labs   Lab 03/01/23 0456 02/28/23 0447 02/27/23  1512 02/27/23  0439 02/26/23  0916 02/26/23  0807 02/26/23  0444 02/26/23  0209 02/25/23  1518   WBC  --   --   --   --   --   --   --   --  8.3   HGB  --   --   --   --   --   --   --   --  11.5*   MCV  --   --   --   --   --   --   --   --  88   PLT  --   --   --   --   --   --   --   --  412   INR  --   --   --   --   --   --   --   --  1.76*   NA  --  134*  --  136  --   --  137  --  136   POTASSIUM 4.3 4.2 4.5 3.2*  --   --  3.5  --  4.6   CHLORIDE  --  95*  --  95*  --   --  97*  --  101   CO2  --  26  --  27  --   --  24  --  23   BUN  --  21.0  --  16.8  --   --  16.2  --  19.1   CR  --  0.84  --  0.82  --   --  0.83  --  0.71   ANIONGAP  --  13  --  14  --   --  16*  --  12   FEDERICO  --  8.9  --  8.6*  --   --  8.7*  --  9.1   GLC  --  142*  --  143* 165*   < > 149*   < > 142*   ALBUMIN  --   --   --   --   --   --   --   --  3.2*   PROTTOTAL  --   --   --   --   --   --   --   --  6.9   BILITOTAL  --   --   --   --   --   --   --   --  0.5   ALKPHOS  --   --   --   --   --   --   --   --  131*   ALT  --   --   --   --   --   --   --   --  13   AST  --   --   --   --   --   --   --   --  26    < > = values in this interval not displayed.     No results found for this or any previous visit (from the past 24 hour(s)).

## 2023-03-02 ENCOUNTER — APPOINTMENT (OUTPATIENT)
Dept: OCCUPATIONAL THERAPY | Facility: CLINIC | Age: 88
DRG: 308 | End: 2023-03-02
Payer: MEDICARE

## 2023-03-02 ENCOUNTER — APPOINTMENT (OUTPATIENT)
Dept: PHYSICAL THERAPY | Facility: CLINIC | Age: 88
DRG: 308 | End: 2023-03-02
Payer: MEDICARE

## 2023-03-02 ENCOUNTER — TELEPHONE (OUTPATIENT)
Dept: CARDIOLOGY | Facility: CLINIC | Age: 88
End: 2023-03-02
Payer: COMMERCIAL

## 2023-03-02 LAB
ANION GAP SERPL CALCULATED.3IONS-SCNC: 11 MMOL/L (ref 7–15)
BUN SERPL-MCNC: 20.3 MG/DL (ref 8–23)
CALCIUM SERPL-MCNC: 8.6 MG/DL (ref 8.8–10.2)
CHLORIDE SERPL-SCNC: 97 MMOL/L (ref 98–107)
CREAT SERPL-MCNC: 0.74 MG/DL (ref 0.51–0.95)
DEPRECATED HCO3 PLAS-SCNC: 27 MMOL/L (ref 22–29)
GFR SERPL CREATININE-BSD FRML MDRD: 78 ML/MIN/1.73M2
GLUCOSE BLDC GLUCOMTR-MCNC: 122 MG/DL (ref 70–99)
GLUCOSE BLDC GLUCOMTR-MCNC: 185 MG/DL (ref 70–99)
GLUCOSE SERPL-MCNC: 144 MG/DL (ref 70–99)
HOLD SPECIMEN: NORMAL
MAGNESIUM SERPL-MCNC: 1.7 MG/DL (ref 1.7–2.3)
POTASSIUM SERPL-SCNC: 3.8 MMOL/L (ref 3.4–5.3)
SODIUM SERPL-SCNC: 135 MMOL/L (ref 136–145)

## 2023-03-02 PROCEDURE — 250N000013 HC RX MED GY IP 250 OP 250 PS 637: Performed by: HOSPITALIST

## 2023-03-02 PROCEDURE — 36415 COLL VENOUS BLD VENIPUNCTURE: CPT | Performed by: HOSPITALIST

## 2023-03-02 PROCEDURE — 80048 BASIC METABOLIC PNL TOTAL CA: CPT | Performed by: HOSPITALIST

## 2023-03-02 PROCEDURE — 97140 MANUAL THERAPY 1/> REGIONS: CPT | Mod: GP

## 2023-03-02 PROCEDURE — 83735 ASSAY OF MAGNESIUM: CPT | Performed by: HOSPITALIST

## 2023-03-02 PROCEDURE — 120N000001 HC R&B MED SURG/OB

## 2023-03-02 PROCEDURE — 97116 GAIT TRAINING THERAPY: CPT | Mod: GP

## 2023-03-02 PROCEDURE — 97110 THERAPEUTIC EXERCISES: CPT | Mod: GO

## 2023-03-02 PROCEDURE — 250N000013 HC RX MED GY IP 250 OP 250 PS 637: Performed by: FAMILY MEDICINE

## 2023-03-02 PROCEDURE — 97530 THERAPEUTIC ACTIVITIES: CPT | Mod: GO

## 2023-03-02 PROCEDURE — 99232 SBSQ HOSP IP/OBS MODERATE 35: CPT | Performed by: HOSPITALIST

## 2023-03-02 RX ORDER — DILTIAZEM HYDROCHLORIDE 120 MG/1
240 CAPSULE, COATED, EXTENDED RELEASE ORAL DAILY
Status: DISCONTINUED | OUTPATIENT
Start: 2023-03-03 | End: 2023-03-02

## 2023-03-02 RX ORDER — DILTIAZEM HYDROCHLORIDE 120 MG/1
240 CAPSULE, COATED, EXTENDED RELEASE ORAL DAILY
Status: DISCONTINUED | OUTPATIENT
Start: 2023-03-02 | End: 2023-03-02

## 2023-03-02 RX ORDER — DILTIAZEM HYDROCHLORIDE 180 MG/1
180 CAPSULE, COATED, EXTENDED RELEASE ORAL DAILY
Status: DISCONTINUED | OUTPATIENT
Start: 2023-03-02 | End: 2023-03-02

## 2023-03-02 RX ORDER — DILTIAZEM HYDROCHLORIDE 180 MG/1
180 CAPSULE, COATED, EXTENDED RELEASE ORAL DAILY
Status: DISCONTINUED | OUTPATIENT
Start: 2023-03-03 | End: 2023-03-03 | Stop reason: HOSPADM

## 2023-03-02 RX ORDER — DILTIAZEM HYDROCHLORIDE 120 MG/1
120 CAPSULE, COATED, EXTENDED RELEASE ORAL ONCE
Status: COMPLETED | OUTPATIENT
Start: 2023-03-02 | End: 2023-03-02

## 2023-03-02 RX ADMIN — RIVAROXABAN 20 MG: 10 TABLET, FILM COATED ORAL at 17:19

## 2023-03-02 RX ADMIN — SUCRALFATE 1 G: 1 SUSPENSION ORAL at 19:58

## 2023-03-02 RX ADMIN — GABAPENTIN 600 MG: 300 CAPSULE ORAL at 19:58

## 2023-03-02 RX ADMIN — PANTOPRAZOLE SODIUM 40 MG: 40 TABLET, DELAYED RELEASE ORAL at 17:19

## 2023-03-02 RX ADMIN — ATENOLOL 25 MG: 25 TABLET ORAL at 19:58

## 2023-03-02 RX ADMIN — METFORMIN HYDROCHLORIDE 250 MG: 500 TABLET, FILM COATED ORAL at 07:27

## 2023-03-02 RX ADMIN — DILTIAZEM HYDROCHLORIDE 120 MG: 120 CAPSULE, COATED, EXTENDED RELEASE ORAL at 13:43

## 2023-03-02 RX ADMIN — DILTIAZEM HYDROCHLORIDE 60 MG: 60 TABLET, FILM COATED ORAL at 06:09

## 2023-03-02 RX ADMIN — ATENOLOL 25 MG: 25 TABLET ORAL at 07:27

## 2023-03-02 RX ADMIN — FUROSEMIDE 20 MG: 20 TABLET ORAL at 07:27

## 2023-03-02 RX ADMIN — PANTOPRAZOLE SODIUM 40 MG: 40 TABLET, DELAYED RELEASE ORAL at 06:09

## 2023-03-02 RX ADMIN — ATORVASTATIN CALCIUM 10 MG: 10 TABLET, FILM COATED ORAL at 17:19

## 2023-03-02 RX ADMIN — METFORMIN HYDROCHLORIDE 250 MG: 500 TABLET, FILM COATED ORAL at 18:15

## 2023-03-02 RX ADMIN — MAGNESIUM 64 MG (MAGNESIUM CHLORIDE) TABLET,DELAYED RELEASE 535 MG: at 11:06

## 2023-03-02 RX ADMIN — ACETAMINOPHEN 650 MG: 325 TABLET, FILM COATED ORAL at 18:30

## 2023-03-02 RX ADMIN — SUCRALFATE 1 G: 1 SUSPENSION ORAL at 07:27

## 2023-03-02 ASSESSMENT — ACTIVITIES OF DAILY LIVING (ADL)
ADLS_ACUITY_SCORE: 25
ADLS_ACUITY_SCORE: 25
ADLS_ACUITY_SCORE: 29
ADLS_ACUITY_SCORE: 25
ADLS_ACUITY_SCORE: 25
ADLS_ACUITY_SCORE: 29
ADLS_ACUITY_SCORE: 25
ADLS_ACUITY_SCORE: 27
ADLS_ACUITY_SCORE: 29
ADLS_ACUITY_SCORE: 29

## 2023-03-02 NOTE — PROGRESS NOTES
[facility]  Internal Medicine Progress Note    Assessment and Plans:     Patient is a 87 year old female with  history of A-fib, hypertension, diabetes, coronary artery calcification, on 02/25/23, presented with progressive fatigue, weakness and shortness of breath. In the ED she was found to be in A-fib with RVR with heart rate 140. She was admitted for AFib with RVR and acute on chronic HFPEF     Atrial fibrillation with RVR  Troponin slightly elevated at 16 =>18.  BNP elevated at 5203.  Chest x-ray showed Mild cardiomegaly with unchanged mediastinal contours. Normal vasculature. Left basilar airspace disease obscuring the diaphragm due to atelectasis versus pneumonia with a small left pleural effusion. There is no pneumothorax. Right lung is clear.    Patient was given 10 mg IV diltiazem x2 for rate control, she did not respond well to diltiazem IV push and was subsequently started on diltiazem drip and was admitted to the ICU.    ECHO done on 02/27/23 showed normal EF at 55 %. Likely has diastolic HF. IVC size is normal with normal respiratory variability.      ---   Off Diltiazem drip.  ---   Was on oral diltiazem 60 mg every 6 hr. HR was not well controlled, so we added Atenolol to 25 mg BID with hold parameters. She has some dizziness with standing. So we will cut back on diltiazem to 180 mg every day and see how she does.   ---   Ct on Telemetry monitoring.  ---   Continue Xarelto for stroke prevention.  ---   ECHO done on 02/27/23 showed normal EF at 55 %. Likely has diastolic HF. IVC size is normal with normal respiratory variability.   ---   Pt did not tolerate metoprolol before.  ---   She reports that she was on diltiazem before, but was taken off as she gt dizzy with dose increase.      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    Torsemide was switched  "to Lasix due to intolerance.   Patient reports he only took 1 dose of 20 mg Lasix.  Last ECHO showed EF of 55-60% in 10/22    ---   Hold IV lasix. Ct on low dose lasix 20 mg daily.   ---   Intake and output  ---   Daily weight, baseline weight is 212 lb, she is back to baseline weight.     Hypomagnesemia  ---   Mg of 1.3 => 1.6  ---   RN protocol ordered    Hypokalemia  ---   Replace with protocol    Type 2 diabetes mellitus with other ophthalmic complication, without long-term current use of insulin    ---   Hb A1C was 7.9 on 02/10/23  ---   Not on home medications  ---   Consider starting on metformin or some of the newer agent     Hyperlipidemia LDL goal <70  Chronic stable problem  ---   Continue home Lipitor    Benign essential hypertension  Coronary artery calcification seen on CT scan  Blood pressure on soft side  ---   Hold home dose of lisinopril  ---   Continue diltizem and resume atenolol    Elevated troponin  Tropon 16 -> 18  EKG no concern for ischemia     Generalized muscle weakness  Declining functional status  ---   Presented with fatigue and weakness  ---   PT/OT    Anemia  Hemoglobin 11.5, appears to be baseline  No current concern for bleeding    ---   Follow-up periodically.      Clinically Significant Risk Factors Present on Admission     # Hypomagnesemia: Lowest Mg = 1.4 mg/dL in last 2 days, will replace as needed   # Hypoalbuminemia: Lowest albumin = 3.2 g/dL at 2/25/2023  3:18 PM, will monitor as appropriate  # Drug Induced Coagulation Defect: home medication list includes an anticoagulant medication    # Hypertension: home medication list includes antihypertensive(s)     # DMII: A1C = 7.9 % (Ref range: 0.0 - 5.6 %) within past 6 months    # Obesity: Estimated body mass index is 37.76 kg/m  as calculated from the following:    Height as of this encounter: 1.626 m (5' 4\").    Weight as of this encounter: 99.8 kg (220 lb).         Diet: 2 Gram Sodium Diet Other - please comment  DVT " Prophylaxis: DOAC  Cid Catheter: Not present  Code Status: Full Code  Lines: IV line       Disposition Plan     Expected Discharge Date: 02/28/2023               DVT Prophylaxis: Xarelto  Code Status: Full Code  Disposition: Anticipate discharge on 02/28/23, PT, OT assessment pending      Janet Ospina MD, MPH.  Internal Medicine Attending  [place], [place]    Click on the link below to page me.   Text Page  (7am - 5pm, M-F)    Subjective:     Feels better but some what dizzy on standing  Some sob with activity. Not as bad when she came in  Leg swelling is down  Sob an chest tightness is better  No chest pain  No fever  No congestion  Drinks one quartz of water per day  Does not eat too much salt.    -Data reviewed today: I reviewed all new labs and imaging results over the last 24 hours.     Exam:       Temp: 98  F (36.7  C) Temp src: Axillary BP: 115/73 Pulse: 89   Resp: 20 SpO2: 95 % O2 Device: None (Room air)    Vitals:    02/27/23 0442 02/28/23 0612 03/01/23 0950   Weight: 98.5 kg (217 lb 2.5 oz) 97.8 kg (215 lb 9.8 oz) 96.2 kg (212 lb 1.3 oz)     Vital Signs with Ranges  Temp:  [98  F (36.7  C)-98.8  F (37.1  C)] 98  F (36.7  C)  Pulse:  [] 89  Resp:  [16-20] 20  BP: ()/(59-93) 115/73  SpO2:  [92 %-96 %] 95 %  I/O last 3 completed shifts:  In: 480 [P.O.:480]  Out: 1300 [Urine:1300]    Constitutional: No distress noted, Alert, Answering questions appropriately  Respiratory: No wheezing onr crackles, diminished AE in left base.   Cardiovascular: S1S2 normal, no new murmur  GI: Soft, non tender  Skin/Integumen: No rash  BL ankle edema has improved.     Medications        Continuing ACE inhibitor/ARB/ARNI from home medication list OR ACE inhibitor/ARB order already placed during this visit       - MEDICATION INSTRUCTIONS -         atenolol  25 mg Oral BID     atorvastatin  10 mg Oral QPM     [START ON 3/3/2023] diltiazem ER COATED BEADS  240 mg Oral Daily      ferrous sulfate  325 mg Oral Q Mon Wed Fri AM     furosemide  20 mg Oral Daily     gabapentin  600 mg Oral QPM     [Held by provider] lisinopril  5 mg Oral Daily     metFORMIN  250 mg Oral BID w/meals     pantoprazole  40 mg Oral BID AC     rivaroxaban ANTICOAGULANT  20 mg Oral Daily with supper     sodium chloride (PF)  3 mL Intracatheter Q8H     sucralfate  1 g Oral BID     Data      Recent Labs   Lab 03/02/23  0538 03/01/23  0456 02/28/23  0447 02/27/23  1512 02/27/23  0439 02/26/23  0209 02/25/23 1518   WBC  --   --   --   --   --   --  8.3   HGB  --   --   --   --   --   --  11.5*   MCV  --   --   --   --   --   --  88   PLT  --   --   --   --   --   --  412   INR  --   --   --   --   --   --  1.76*   *  --  134*  --  136   < > 136   POTASSIUM 3.8 4.3 4.2   < > 3.2*   < > 4.6   CHLORIDE 97*  --  95*  --  95*   < > 101   CO2 27  --  26  --  27   < > 23   BUN 20.3  --  21.0  --  16.8   < > 19.1   CR 0.74  --  0.84  --  0.82   < > 0.71   ANIONGAP 11  --  13  --  14   < > 12   FEDERICO 8.6*  --  8.9  --  8.6*   < > 9.1   *  --  142*  --  143*   < > 142*   ALBUMIN  --   --   --   --   --   --  3.2*   PROTTOTAL  --   --   --   --   --   --  6.9   BILITOTAL  --   --   --   --   --   --  0.5   ALKPHOS  --   --   --   --   --   --  131*   ALT  --   --   --   --   --   --  13   AST  --   --   --   --   --   --  26    < > = values in this interval not displayed.     No results found for this or any previous visit (from the past 24 hour(s)).

## 2023-03-02 NOTE — TELEPHONE ENCOUNTER
We can just start with a hospital follow-up and then decide if we still need a stress test.     ALEX Turner CNP

## 2023-03-02 NOTE — PROGRESS NOTES
Care Management Follow Up    Length of Stay (days): 5    Expected Discharge Date: 03/03/23     Concerns to be Addressed: discharge planning       Patient plan of care discussed at interdisciplinary rounds: Yes    Anticipated Discharge Disposition:  Transitional Care     Anticipated Discharge Services: RN, PT/OT, Lymphedema  Anticipated Discharge DME: David    Patient/family educated on Medicare website which has current facility and service quality ratings: yes  Education Provided on the Discharge Plan:  yes  Patient/Family in Agreement with the Plan: yes    Referrals Placed by CM/SW: Internal Clinic Care Coordination, Homecare, Post Acute Facilities  Private pay costs discussed: Not applicable    Additional Information:  Update received during IDT rounds. Informed Pt is not medically stable for discharge today.     CM received clinical acceptance from [facility]. CM reached out to [facility] to confirm if they can accept the pt tomorrow 03/03/23.    Spoke to Pt and charlee Booth regarding TCU bed at [facility]. Both very pleased with bed offer. Stated Pt had a very positive experience at [facility] during her last TCU stay.     [id number]    CM called to cancel pending referral with [employer]. #[phone]    PLAN: Discharge to [facility] -Friday 3/3  (Phone: [phone] Admissions: [phone] Fax: [phone])    Charlee Booth available for transport--arriving to hospital around 12:30-1:00    JANA Juárez

## 2023-03-02 NOTE — PLAN OF CARE
Problem: Plan of Care - These are the overarching goals to be used throughout the patient stay.    Goal: Optimal Comfort and Wellbeing  Outcome: Progressing  Goal: Readiness for Transition of Care  Outcome: Progressing     Problem: Risk for Delirium  Goal: Optimal Coping  Outcome: Progressing     Problem: Plan of Care - These are the overarching goals to be used throughout the patient stay.    Goal: Absence of Hospital-Acquired Illness or Injury  Intervention: Identify and Manage Fall Risk  Recent Flowsheet Documentation  Taken 3/1/2023 2000 by Nicole Collins RN  Safety Promotion/Fall Prevention:   activity supervised   fall prevention program maintained   clutter free environment maintained   nonskid shoes/slippers when out of bed   lighting adjusted   room near nurse's station   room organization consistent   supervised activity   treat reversible contributory factors  Intervention: Prevent Skin Injury  Recent Flowsheet Documentation  Taken 3/1/2023 2338 by Nicole Collins RN  Body Position: position changed independently  Taken 3/1/2023 2107 by Nicole Collins RN  Body Position: position changed independently  Taken 3/1/2023 2019 by Nicole Collins RN  Body Position: position changed independently  Taken 3/1/2023 2000 by Nicole Collins RN  Body Position: position changed independently     Problem: Risk for Delirium  Goal: Improved Attention and Thought Clarity  Intervention: Maximize Cognitive Function  Recent Flowsheet Documentation  Taken 3/1/2023 2000 by Nicole Collins RN  Sensory Stimulation Regulation: care clustered  Reorientation Measures:   glasses use encouraged   clock in view   Goal Outcome Evaluation:         Patient is alert and orientated , uses call light for assistance, up with SBA GB and walker. Tylenol given for sleep request by patient.

## 2023-03-02 NOTE — TELEPHONE ENCOUNTER
Follow up moved to 3/17/23. Patients [facility] will determine if patient is able to complete stress test for 3/8/23. If she is still in the TCU, she will cancel the test. No new questions at this time. Jogre Ambrosio RN

## 2023-03-02 NOTE — CONFIDENTIAL NOTE
Routing to Jackie Olivares,    Patient is currently ICU IP for shortness of breath, eventually transitioning to TCU so she is not able to make the appointment with you tomorrow. Originally has F/U planned from stress test that was not completed Monday d/t being IP. Original IP admit dx was afib RVR, still not controlled as of today being inpatient.     Virtual visit on 2/22/23 - see note for in depth history    1. Paroxysmal atrial fibrillation/flutter    Symptomatic    Successful cardioversion 12/2022 with return of afib/flutter    Side effects with amio    Rate control and anticoagulated  2. Coronary artery calcification  3. Hypertension  4. Hyperlipidemia  Past medical history significant for DM type II, GERD.     IP notes strongly feel like it's diastolic HF although echo from 2/27/23 notes LVEF 55%, RV mildly dilated and enlarged atria, mild MVR and TVR.     Questioning is you would still want to stress test when patient is stronger and able to complete it or simply have an OV instead?    Jorge Ambrosio RN

## 2023-03-02 NOTE — PROGRESS NOTES
End Of Shift Note    Situation: 2/25 Admit Afib RVR, lives alone, Daughter Emmy active in care    Plan: PO Diltiazem, atenolol. Monitor labs and VS    Subjective/Objective:    Neuro: A/Ox4, afebrile    Cardiac: Afib 100-130, increased HR with activity to bathroom and ambulation in room. No dizziness or lightheaded feeling noted by patient this shift.     Resp: Room Air, LS Clear    GI/: Voiding per bathroom, no Bm since 2/25 per patient. Offered prune juice and patient declined. Patient states she uses Milk of Magnesium at home and will take a day or two to have BM. Passing Flatus     MSK: Up with SBA, GB, walker. Steady gait.     Skin: BLE Lymphedema wraps in place.     LDAs: PIVx1 SL Flushed  Protocols: K+, Magnesium AM labs ordered. No replacements needed.

## 2023-03-03 VITALS
SYSTOLIC BLOOD PRESSURE: 123 MMHG | RESPIRATION RATE: 16 BRPM | DIASTOLIC BLOOD PRESSURE: 65 MMHG | HEIGHT: 64 IN | BODY MASS INDEX: 35.49 KG/M2 | HEART RATE: 101 BPM | WEIGHT: 207.89 LBS | TEMPERATURE: 97.7 F | OXYGEN SATURATION: 91 %

## 2023-03-03 LAB
GLUCOSE BLDC GLUCOMTR-MCNC: 136 MG/DL (ref 70–99)
GLUCOSE BLDC GLUCOMTR-MCNC: 176 MG/DL (ref 70–99)
HOLD SPECIMEN: NORMAL
MAGNESIUM SERPL-MCNC: 1.6 MG/DL (ref 1.7–2.3)
POTASSIUM SERPL-SCNC: 3.7 MMOL/L (ref 3.4–5.3)

## 2023-03-03 PROCEDURE — 36415 COLL VENOUS BLD VENIPUNCTURE: CPT | Performed by: HOSPITALIST

## 2023-03-03 PROCEDURE — 250N000013 HC RX MED GY IP 250 OP 250 PS 637: Performed by: HOSPITALIST

## 2023-03-03 PROCEDURE — 99239 HOSP IP/OBS DSCHRG MGMT >30: CPT | Performed by: HOSPITALIST

## 2023-03-03 PROCEDURE — 83735 ASSAY OF MAGNESIUM: CPT | Performed by: HOSPITALIST

## 2023-03-03 PROCEDURE — 84132 ASSAY OF SERUM POTASSIUM: CPT | Performed by: HOSPITALIST

## 2023-03-03 PROCEDURE — 250N000011 HC RX IP 250 OP 636: Performed by: HOSPITALIST

## 2023-03-03 PROCEDURE — 250N000013 HC RX MED GY IP 250 OP 250 PS 637: Performed by: FAMILY MEDICINE

## 2023-03-03 RX ORDER — FAMOTIDINE 20 MG/1
20 TABLET, FILM COATED ORAL
Qty: 60 TABLET | Refills: 1 | DISCHARGE
Start: 2023-03-03 | End: 2023-03-15

## 2023-03-03 RX ORDER — MAGNESIUM SULFATE HEPTAHYDRATE 40 MG/ML
2 INJECTION, SOLUTION INTRAVENOUS ONCE
Status: COMPLETED | OUTPATIENT
Start: 2023-03-03 | End: 2023-03-03

## 2023-03-03 RX ORDER — ACETAMINOPHEN 325 MG/1
650 TABLET ORAL EVERY 6 HOURS PRN
DISCHARGE
Start: 2023-03-03

## 2023-03-03 RX ORDER — SUCRALFATE ORAL 1 G/10ML
1 SUSPENSION ORAL 2 TIMES DAILY
DISCHARGE
Start: 2023-03-03 | End: 2023-03-03

## 2023-03-03 RX ORDER — DILTIAZEM HYDROCHLORIDE 180 MG/1
180 CAPSULE, COATED, EXTENDED RELEASE ORAL DAILY
DISCHARGE
Start: 2023-03-04 | End: 2023-03-16

## 2023-03-03 RX ORDER — DILTIAZEM HYDROCHLORIDE 180 MG/1
180 CAPSULE, COATED, EXTENDED RELEASE ORAL DAILY
DISCHARGE
Start: 2023-03-04 | End: 2023-03-03

## 2023-03-03 RX ORDER — SUCRALFATE ORAL 1 G/10ML
1 SUSPENSION ORAL 2 TIMES DAILY
DISCHARGE
Start: 2023-03-03 | End: 2023-03-15

## 2023-03-03 RX ORDER — ATENOLOL 25 MG/1
25 TABLET ORAL 2 TIMES DAILY
Qty: 30 TABLET | Refills: 3 | Status: ON HOLD | DISCHARGE
Start: 2023-03-03 | End: 2023-05-11

## 2023-03-03 RX ADMIN — FUROSEMIDE 20 MG: 20 TABLET ORAL at 08:52

## 2023-03-03 RX ADMIN — MAGNESIUM 64 MG (MAGNESIUM CHLORIDE) TABLET,DELAYED RELEASE 535 MG: at 08:52

## 2023-03-03 RX ADMIN — SUCRALFATE 1 G: 1 SUSPENSION ORAL at 08:52

## 2023-03-03 RX ADMIN — METFORMIN HYDROCHLORIDE 250 MG: 500 TABLET, FILM COATED ORAL at 08:53

## 2023-03-03 RX ADMIN — DILTIAZEM HYDROCHLORIDE 180 MG: 180 CAPSULE, COATED, EXTENDED RELEASE ORAL at 08:52

## 2023-03-03 RX ADMIN — FERROUS SULFATE TAB 325 MG (65 MG ELEMENTAL FE) 325 MG: 325 (65 FE) TAB at 08:52

## 2023-03-03 RX ADMIN — ATENOLOL 25 MG: 25 TABLET ORAL at 08:52

## 2023-03-03 RX ADMIN — PANTOPRAZOLE SODIUM 40 MG: 40 TABLET, DELAYED RELEASE ORAL at 06:54

## 2023-03-03 RX ADMIN — MAGNESIUM SULFATE HEPTAHYDRATE 2 G: 40 INJECTION, SOLUTION INTRAVENOUS at 11:04

## 2023-03-03 ASSESSMENT — ACTIVITIES OF DAILY LIVING (ADL)
ADLS_ACUITY_SCORE: 27
ADLS_ACUITY_SCORE: 25
ADLS_ACUITY_SCORE: 26
ADLS_ACUITY_SCORE: 26
ADLS_ACUITY_SCORE: 25

## 2023-03-03 NOTE — PROGRESS NOTES
"Pt alert, oriented, SBA with belt and walker. Denies pain, nausea reports SOB with activity. On RA. LS clear. BLLE lymphedema wraps. Telemetry monitoring. Blood glucose monitoring, no insulin orders. HR fluctuating between 90 and 115 overnight, asymptomatic.     Plan discharge to TCU today.   /69 (BP Location: Left arm)   Pulse 85   Temp 98.3  F (36.8  C) (Oral)   Resp 18   Ht 1.626 m (5' 4\")   Wt 96.2 kg (212 lb 1.3 oz)   LMP  (LMP Unknown)   SpO2 97%   BMI 36.40 kg/m      "

## 2023-03-03 NOTE — PLAN OF CARE
Physical Therapy and Lymphedema Discharge Summary    Reason for therapy discharge:    Discharged to transitional care facility.    Progress towards therapy goal(s). See goals on Care Plan in [facility] electronic health record for goal details.  Goals partially met.  Barriers to achieving goals:   discharge from facility.    Therapy recommendation(s):    Continued therapy is recommended.  Rationale/Recommendations:  Recommend continued PT and lymphedema therapy at TCU, PT approached to re-wrap BLE however pt wanting to defer to TCU.

## 2023-03-03 NOTE — PLAN OF CARE
Occupational Therapy Discharge Summary    Reason for therapy discharge:    Discharged to transitional care facility.    Progress towards therapy goal(s). See goals on Care Plan in [facility] electronic health record for goal details.  Goals partially met.  Barriers to achieving goals:   discharge from facility.    Therapy recommendation(s):    Continued therapy is recommended.  Rationale/Recommendations:  increased ind and safety with BADLS and functional transfers with increased strength.

## 2023-03-03 NOTE — PLAN OF CARE
Pt discharged to [facility] TCU at 1310, transported by daughter.  Pt denies pain or SOA, VSS.  Report given to Diane at [facility].  Discharge instructions given to daughter as well.  All belongings sent with pt.

## 2023-03-03 NOTE — PLAN OF CARE
Problem: Plan of Care - These are the overarching goals to be used throughout the patient stay.    Goal: Optimal Comfort and Wellbeing  Outcome: Progressing  Intervention: Provide Person-Centered Care  Recent Flowsheet Documentation  Taken 3/2/2023 1831 by Dorian Holland, RN  Trust Relationship/Rapport:   care explained   questions answered   questions encouraged   Pt's hr has fluctuated between the 90's and has peaked in the 120's but has not been sustained there. PT is SBA with a walker and is able to make her needs known. Pt is alert and oriented x4. Given prn tylenol for a stomach ache.   PT set to discharge to TCU tomorrow.

## 2023-03-03 NOTE — PROGRESS NOTES
Care Management Discharge Note    Discharge Date: 03/03/2023       Discharge Disposition: Transitional Care; [facility] (Phone: [phone] Admissions: [phone] Fax: [phone])    Discharge Services: None    Discharge DME: None    Discharge Transportation: family or friend will provide    Private pay costs discussed: Not applicable    PAS Confirmation Code:  ([id number])  Patient/family educated on Medicare website which has current facility and service quality ratings: yes    Education Provided on the Discharge Plan:  Yes  Persons Notified of Discharge Plans: pt, [facility] admissions, left detailed message for daughterEmmy,  Patient/Family in Agreement with the Plan: yes    Handoff Referral Completed: Yes    Additional Information:  Per IDT rounds, MD states that pt is medically stable for discharge today.    PT/OT recommends that TCU cares and pt & family are in agreement.    Pt is accepted for cares at [facility] (Phone: [phone] Admissions: [phone] Fax: [phone])    Transportation discussed and daughterEmmy to transport around 1pm.  SW did leave detailed message for Emmy reminding of her intention to transport pt today & discharge to [facility].      Pau Weber, JANA

## 2023-03-03 NOTE — DISCHARGE SUMMARY
[facility]  Discharge Summary    Theodora Meng MRN# [id number]   YOB: 1935 Age: 87 year old     Date of Admission:  2/25/2023  Date of Discharge:  3/3/2023  Admitting Physician:  Jorge L Ryan MD  Discharge Physician:  Janet Ospina MD  Discharging Service:  Internal Medicine     Primary Provider: Yecenia Armstrong              Discharge Diagnosis:     Primary Discharge Diagnosis:      Atrial fibrillation with RVR  Acute on chronic HFPEF  Hypomagnesemia  Hypokalemia  Type 2 diabetes mellitus with other ophthalmic complication, without long-term current use of insulin  Hyperlipidemia LDL goal <70  Benign essential hypertension  Coronary artery calcification seen on CT scan  Elevated troponin  Tropon 16 -> 18  EKG no concern for ischemia  Generalized muscle weakness  Declining functional status  Anemia  Hemoglobin 11.5, appears to be baseline  No current concern for bleeding  Hypomagnesemia: Lowest Mg = 1.4 mg/dL in last 2 days, will replace as needed   Hypoalbuminemia: Lowest albumin = 3.2 g/dL at 2/25/2023  3:18 PM, will monitor as appropriate  Drug Induced Coagulation Defect: home medication list includes an anticoagulant medication    Hypertension: home medication list includes antihypertensive(s)     DMII: A1C = 7.9 % (Ref range: 0.0 - 5.6 %) within past 6 months    Obesity: Estimated body mass index is 37.76 kg/m  as calculated from the following:      Past Medical History:   Diagnosis Date     Depressive disorder      Diabetes (H)      Esophageal reflux      Other abnormal glucose      Other premature beats      Unspecified essential hypertension                     Discharge Medications:     Current Discharge Medication List      START taking these medications    Details   diltiazem ER COATED BEADS (CARDIZEM CD/CARTIA XT) 180 MG 24 hr capsule Take 1 capsule (180 mg) by mouth daily    Associated Diagnoses: Atrial fibrillation with RVR (H); Paroxysmal atrial  fibrillation (H)      magnesium chloride 535 (64 Mg) MG TBEC CR tablet Take 1 tablet (535 mg) by mouth daily    Associated Diagnoses: Hypomagnesemia      metFORMIN (GLUCOPHAGE) 500 MG tablet Take 0.5 tablets (250 mg) by mouth 2 times daily (with meals)    Associated Diagnoses: Type 2 diabetes mellitus without complication, without long-term current use of insulin (H)         CONTINUE these medications which have CHANGED    Details   acetaminophen (TYLENOL) 325 MG tablet Take 2 tablets (650 mg) by mouth every 6 hours as needed for pain or fever    Associated Diagnoses: Primary osteoarthritis of left knee      atenolol (TENORMIN) 25 MG tablet Take 1 tablet (25 mg) by mouth 2 times daily  Qty: 30 tablet, Refills: 3    Comments: Hold for SBP less than 100 or HR less than 55  Associated Diagnoses: Essential hypertension; Atrial fibrillation with RVR (H)      famotidine (PEPCID) 20 MG tablet Take 1 tablet (20 mg) by mouth nightly as needed (heartburn)  Qty: 60 tablet, Refills: 1    Associated Diagnoses: Gastroesophageal reflux disease without esophagitis      sucralfate (CARAFATE) 1 GM/10ML suspension Take 10 mLs (1 g) by mouth 2 times daily    Associated Diagnoses: Gastroesophageal reflux disease with esophagitis, unspecified whether hemorrhage         CONTINUE these medications which have NOT CHANGED    Details   atorvastatin (LIPITOR) 10 MG tablet Take 1 tablet (10 mg) by mouth daily  Qty: 90 tablet, Refills: 3    Associated Diagnoses: Hyperlipidemia LDL goal <100      furosemide (LASIX) 20 MG tablet Take 1 tablet (20 mg) by mouth daily  Qty: 30 tablet, Refills: 11    Associated Diagnoses: Acute on chronic heart failure with preserved ejection fraction (HFpEF) (H)      gabapentin (NEURONTIN) 300 MG capsule TAKE 2 CAPSULES IN THE EVENING  Qty: 180 capsule, Refills: 3    Associated Diagnoses: Neuralgia      omeprazole (PRILOSEC) 40 MG DR capsule Take 1 capsule (40 mg) by mouth 2 times daily for 30 days, THEN 1 capsule  (40 mg) daily for 30 days. Take 30-60 min before breakfast and before supper X 1 month. Then, reduce the dose to 40 mg once a day  Qty: 90 capsule, Refills: 1    Associated Diagnoses: Gastroesophageal reflux disease without esophagitis; Epigastric pain; Hiatal hernia      rivaroxaban ANTICOAGULANT (XARELTO) 20 MG TABS tablet Take 1 tablet (20 mg) by mouth daily (with dinner)  Qty: 90 tablet, Refills: 3    Associated Diagnoses: Paroxysmal atrial fibrillation (H)      blood glucose monitoring (ACCU-CHEK COMPACT CARE KIT) meter device kit Use to test blood sugars 1 times daily or as directed. May sub formulary meter  Qty: 1 kit, Refills: 0    Associated Diagnoses: Type 2 diabetes mellitus without complication, without long-term current use of insulin (H)      blood glucose monitoring (NO BRAND SPECIFIED) test strip Use to test blood sugars twice times daily or as directed  Qty: 1 Box, Refills: 3    Associated Diagnoses: Type 2 diabetes mellitus without complication, without long-term current use of insulin (H)      ferrous sulfate (FEROSUL) 325 (65 Fe) MG tablet Take 1 tablet (325 mg) by mouth Every Mon, Wed, Fri Morning  Qty: 45 tablet, Refills: 3    Associated Diagnoses: Fatigue, unspecified type; Anemia due to stage 3a chronic kidney disease (H)         STOP taking these medications       lisinopril (ZESTRIL) 5 MG tablet Comments:   Reason for Stopping:         LORazepam (ATIVAN) 0.5 MG tablet Comments:   Reason for Stopping:         vitamin D2 (ERGOCALCIFEROL) 09267 units (1250 mcg) capsule Comments:   Reason for Stopping:         vitamin D3 (CHOLECALCIFEROL) 50 mcg (2000 units) tablet Comments:   Reason for Stopping:                    Hospital Course:     Patient is a 87 year old female with  history of A-fib, hypertension, diabetes, coronary artery calcification, on 02/25/23, presented with progressive fatigue, weakness and shortness of breath. In the ED she was found to be in A-fib with RVR with heart rate 140.  She was admitted for AFib with RVR and acute on chronic HFPEF     Atrial fibrillation with RVR  Troponin slightly elevated at 16 =>18.  BNP elevated at 5203.  Chest x-ray showed Mild cardiomegaly with unchanged mediastinal contours. Normal vasculature. Left basilar airspace disease obscuring the diaphragm due to atelectasis versus pneumonia with a small left pleural effusion. There is no pneumothorax. Right lung is clear.     Patient was given 10 mg IV diltiazem x2 for rate control, she did not respond well to diltiazem IV push and was subsequently started on diltiazem drip and was admitted to the ICU.     ECHO done on 02/27/23 showed normal EF at 55 %. Likely has diastolic HF. IVC size is normal with normal respiratory variability.      ---   Off Diltiazem drip.  ---   Was on oral diltiazem 60 mg every 6 hr. HR was not well controlled, so we added Atenolol to 25 mg BID with hold parameters. She has some dizziness with standing. So we will cut back on diltiazem to 180 mg every day and see how she does.   ---   Ct on Telemetry monitoring.  ---   Continue Xarelto for stroke prevention.  ---   ECHO done on 02/27/23 showed normal EF at 55 %. Likely has diastolic HF. IVC size is normal with normal respiratory variability.   ---   Pt did not tolerate metoprolol before.  ---   She reports that she was on diltiazem before, but was taken off as she gt dizzy with dose increase.      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    Torsemide was switched to Lasix due to intolerance.   Patient reports he only took 1 dose of 20 mg Lasix.  Last ECHO showed EF of 55-60% in 10/22     ---   Hold IV lasix. Ct on low dose lasix 20 mg daily.   ---   Intake and output  ---   Daily weight, baseline weight is 212 lb, she is back to baseline weight.      Hypomagnesemia  ---   Mg of 1.3 => 1.6  ---   RN protocol  "ordered     Hypokalemia  ---   Replace with protocol     Type 2 diabetes mellitus with other ophthalmic complication, without long-term current use of insulin     ---   Hb A1C was 7.9 on 02/10/23  ---   Not on home medications  ---   Consider starting on metformin or some of the newer agent     Hyperlipidemia LDL goal <70  Chronic stable problem  ---   Continue home Lipitor     Benign essential hypertension  Coronary artery calcification seen on CT scan  Blood pressure on soft side  ---   Hold home dose of lisinopril  ---   Continue diltizem and resume atenolol     Elevated troponin  Tropon 16 -> 18  EKG no concern for ischemia     Generalized muscle weakness  Declining functional status  ---   Presented with fatigue and weakness  ---   PT/OT     Anemia  Hemoglobin 11.5, appears to be baseline  No current concern for bleeding     ---   Follow-up periodically.      Clinically Significant Risk Factors Present on Admission      # Hypomagnesemia: Lowest Mg = 1.4 mg/dL in last 2 days, will replace as needed   # Hypoalbuminemia: Lowest albumin = 3.2 g/dL at 2/25/2023  3:18 PM, will monitor as appropriate  # Drug Induced Coagulation Defect: home medication list includes an anticoagulant medication    # Hypertension: home medication list includes antihypertensive(s)     # DMII: A1C = 7.9 % (Ref range: 0.0 - 5.6 %) within past 6 months    # Obesity: Estimated body mass index is 37.76 kg/m  as calculated from the following:    Height as of this encounter: 1.626 m (5' 4\").    Weight as of this encounter: 99.8 kg (220 lb).         Diet: 2 Gram Sodium Diet Other - please comment  DVT Prophylaxis: DOAC  Cid Catheter: Not present  Code Status: Full Code  Lines: IV line          Discharge Disposition:   Discharged to TCU           Condition on Discharge:   Discharge condition: Stable   Code status on discharge: Full Code           Procedures:   None          Consultations:   Consultation during this admission received from None. " "              Final Day of Progress before Discharge:       Physical Exam:  Blood pressure 123/65, pulse 101, temperature 97.7  F (36.5  C), temperature source Oral, resp. rate 16, height 1.626 m (5' 4\"), weight 94.3 kg (207 lb 14.3 oz), SpO2 91 %, not currently breastfeeding.  GENERAL: Alert and oriented x 3. NAD.   HEENT: Anicteric sclera. PERRL. Mucous membranes moist and without lesions.   CV: RRR. S1, S2. No murmurs appreciated.   RESPIRATORY: Effort normal. Lungs CTAB with no wheezing, rales, rhonchi.   GI: Abdomen soft and non distended with normoactive bowel sounds present in all quadrants. No tenderness, rebound, guarding.      Data:  All laboratory data reviewed             Significant Results:     Results for orders placed or performed during the hospital encounter of 02/25/23   XR Chest Port 1 View     Status: None    Narrative    EXAM: XR CHEST PORT 1 VIEW  LOCATION: [facility]  DATE/TIME: 2/25/2023 4:06 PM    INDICATION: CHF with rapid atrial fibrillation.  COMPARISON: 12/25/2022      Impression    IMPRESSION: Mild cardiomegaly with unchanged mediastinal contours. Normal vasculature. Left basilar airspace disease obscuring the diaphragm due to atelectasis versus pneumonia with a small left pleural effusion. There is no pneumothorax. Right lung is   clear.   Comprehensive metabolic panel     Status: Abnormal   Result Value Ref Range    Sodium 136 136 - 145 mmol/L    Potassium 4.6 3.4 - 5.3 mmol/L    Chloride 101 98 - 107 mmol/L    Carbon Dioxide (CO2) 23 22 - 29 mmol/L    Anion Gap 12 7 - 15 mmol/L    Urea Nitrogen 19.1 8.0 - 23.0 mg/dL    Creatinine 0.71 0.51 - 0.95 mg/dL    Calcium 9.1 8.8 - 10.2 mg/dL    Glucose 142 (H) 70 - 99 mg/dL    Alkaline Phosphatase 131 (H) 35 - 104 U/L    AST 26 10 - 35 U/L    ALT 13 10 - 35 U/L    Protein Total 6.9 6.4 - 8.3 g/dL    Albumin 3.2 (L) 3.5 - 5.2 g/dL    Bilirubin Total 0.5 <=1.2 mg/dL    GFR Estimate 82 >60 mL/min/1.73m2   INR     " Status: Abnormal   Result Value Ref Range    INR 1.76 (H) 0.85 - 1.15   Partial thromboplastin time     Status: Normal   Result Value Ref Range    aPTT 33 22 - 38 Seconds   Troponin T, High Sensitivity     Status: Abnormal   Result Value Ref Range    Troponin T, High Sensitivity 16 (H) <=14 ng/L   Magnesium     Status: Abnormal   Result Value Ref Range    Magnesium 1.4 (L) 1.7 - 2.3 mg/dL   TSH with free T4 reflex     Status: Normal   Result Value Ref Range    TSH 1.62 0.30 - 4.20 uIU/mL   Nt probnp inpatient (BNP)     Status: Abnormal   Result Value Ref Range    N terminal Pro BNP Inpatient 5,203 (H) 0 - 1,800 pg/mL   [place] Draw     Status: None    Narrative    The following orders were created for panel order [place] Draw.  Procedure                               Abnormality         Status                     ---------                               -----------         ------                     Extra Red Top Tube[[id number]]                               Final result                 Please view results for these tests on the individual orders.   CBC with platelets and differential     Status: Abnormal   Result Value Ref Range    WBC Count 8.3 4.0 - 11.0 10e3/uL    RBC Count 4.04 3.80 - 5.20 10e6/uL    Hemoglobin 11.5 (L) 11.7 - 15.7 g/dL    Hematocrit 35.6 35.0 - 47.0 %    MCV 88 78 - 100 fL    MCH 28.5 26.5 - 33.0 pg    MCHC 32.3 31.5 - 36.5 g/dL    RDW 17.2 (H) 10.0 - 15.0 %    Platelet Count 412 150 - 450 10e3/uL    % Neutrophils 80 %    % Lymphocytes 13 %    % Monocytes 5 %    % Eosinophils 0 %    % Basophils 1 %    % Immature Granulocytes 1 %    NRBCs per 100 WBC 0 <1 /100    Absolute Neutrophils 6.7 1.6 - 8.3 10e3/uL    Absolute Lymphocytes 1.1 0.8 - 5.3 10e3/uL    Absolute Monocytes 0.4 0.0 - 1.3 10e3/uL    Absolute Eosinophils 0.0 0.0 - 0.7 10e3/uL    Absolute Basophils 0.0 0.0 - 0.2 10e3/uL    Absolute Immature Granulocytes 0.1 <=0.4 10e3/uL    Absolute NRBCs 0.0 10e3/uL   Extra Red Top Tube     Status:  None   Result Value Ref Range    Hold Specimen JIC    Troponin T, High Sensitivity     Status: Abnormal   Result Value Ref Range    Troponin T, High Sensitivity 18 (H) <=14 ng/L   Extra Tube     Status: None    Narrative    The following orders were created for panel order Extra Tube.  Procedure                               Abnormality         Status                     ---------                               -----------         ------                     Extra Blue Top Tube[[id number]]                              Final result               Extra Purple Top Tube[[id number]]                            Final result                 Please view results for these tests on the individual orders.   Extra Blue Top Tube     Status: None   Result Value Ref Range    Hold Specimen JIC    Extra Purple Top Tube     Status: None   Result Value Ref Range    Hold Specimen JIC    Troponin T, High Sensitivity     Status: Abnormal   Result Value Ref Range    Troponin T, High Sensitivity 15 (H) <=14 ng/L   Glucose by meter     Status: Abnormal   Result Value Ref Range    GLUCOSE BY METER POCT 152 (H) 70 - 99 mg/dL   Extra Tube     Status: None    Narrative    The following orders were created for panel order Extra Tube.  Procedure                               Abnormality         Status                     ---------                               -----------         ------                     Extra Purple Top Tube[[id number]]                            Final result                 Please view results for these tests on the individual orders.   Extra Purple Top Tube     Status: None   Result Value Ref Range    Hold Specimen JIC    Glucose by meter     Status: Abnormal   Result Value Ref Range    GLUCOSE BY METER POCT 149 (H) 70 - 99 mg/dL   Basic metabolic panel     Status: Abnormal   Result Value Ref Range    Sodium 137 136 - 145 mmol/L    Potassium 3.5 3.4 - 5.3 mmol/L    Chloride 97 (L) 98 - 107 mmol/L    Carbon Dioxide (CO2) 24 22 - 29  mmol/L    Anion Gap 16 (H) 7 - 15 mmol/L    Urea Nitrogen 16.2 8.0 - 23.0 mg/dL    Creatinine 0.83 0.51 - 0.95 mg/dL    Calcium 8.7 (L) 8.8 - 10.2 mg/dL    Glucose 149 (H) 70 - 99 mg/dL    GFR Estimate 68 >60 mL/min/1.73m2   Magnesium     Status: Abnormal   Result Value Ref Range    Magnesium 1.3 (L) 1.7 - 2.3 mg/dL   Magnesium     Status: Normal   Result Value Ref Range    Magnesium 2.3 1.7 - 2.3 mg/dL   Glucose by meter     Status: Abnormal   Result Value Ref Range    GLUCOSE BY METER POCT 165 (H) 70 - 99 mg/dL   Basic metabolic panel     Status: Abnormal   Result Value Ref Range    Sodium 136 136 - 145 mmol/L    Potassium 3.2 (L) 3.4 - 5.3 mmol/L    Chloride 95 (L) 98 - 107 mmol/L    Carbon Dioxide (CO2) 27 22 - 29 mmol/L    Anion Gap 14 7 - 15 mmol/L    Urea Nitrogen 16.8 8.0 - 23.0 mg/dL    Creatinine 0.82 0.51 - 0.95 mg/dL    Calcium 8.6 (L) 8.8 - 10.2 mg/dL    Glucose 143 (H) 70 - 99 mg/dL    GFR Estimate 69 >60 mL/min/1.73m2   Magnesium     Status: Normal   Result Value Ref Range    Magnesium 1.7 1.7 - 2.3 mg/dL   Extra Tube     Status: None    Narrative    The following orders were created for panel order Extra Tube.  Procedure                               Abnormality         Status                     ---------                               -----------         ------                     Extra Purple Top Tube[[id number]]                            Final result                 Please view results for these tests on the individual orders.   Extra Purple Top Tube     Status: None   Result Value Ref Range    Hold Specimen [facility]    Potassium     Status: Normal   Result Value Ref Range    Potassium 4.5 3.4 - 5.3 mmol/L   Basic metabolic panel     Status: Abnormal   Result Value Ref Range    Sodium 134 (L) 136 - 145 mmol/L    Potassium 4.2 3.4 - 5.3 mmol/L    Chloride 95 (L) 98 - 107 mmol/L    Carbon Dioxide (CO2) 26 22 - 29 mmol/L    Anion Gap 13 7 - 15 mmol/L    Urea Nitrogen 21.0 8.0 - 23.0 mg/dL    Creatinine  0.84 0.51 - 0.95 mg/dL    Calcium 8.9 8.8 - 10.2 mg/dL    Glucose 142 (H) 70 - 99 mg/dL    GFR Estimate 67 >60 mL/min/1.73m2   Magnesium     Status: Normal   Result Value Ref Range    Magnesium 1.7 1.7 - 2.3 mg/dL   Extra Tube     Status: None    Narrative    The following orders were created for panel order Extra Tube.  Procedure                               Abnormality         Status                     ---------                               -----------         ------                     Extra Purple Top Tube[[id number]]                            Final result                 Please view results for these tests on the individual orders.   Extra Purple Top Tube     Status: None   Result Value Ref Range    Hold Specimen JI    Potassium     Status: Normal   Result Value Ref Range    Potassium 4.3 3.4 - 5.3 mmol/L   Magnesium     Status: Abnormal   Result Value Ref Range    Magnesium 1.6 (L) 1.7 - 2.3 mg/dL   Extra Tube     Status: None    Narrative    The following orders were created for panel order Extra Tube.  Procedure                               Abnormality         Status                     ---------                               -----------         ------                     Extra Purple Top Tube[[id number]]                            Final result                 Please view results for these tests on the individual orders.   Extra Purple Top Tube     Status: None   Result Value Ref Range    Hold Specimen JI    Magnesium     Status: Normal   Result Value Ref Range    Magnesium 1.7 1.7 - 2.3 mg/dL   Basic metabolic panel     Status: Abnormal   Result Value Ref Range    Sodium 135 (L) 136 - 145 mmol/L    Potassium 3.8 3.4 - 5.3 mmol/L    Chloride 97 (L) 98 - 107 mmol/L    Carbon Dioxide (CO2) 27 22 - 29 mmol/L    Anion Gap 11 7 - 15 mmol/L    Urea Nitrogen 20.3 8.0 - 23.0 mg/dL    Creatinine 0.74 0.51 - 0.95 mg/dL    Calcium 8.6 (L) 8.8 - 10.2 mg/dL    Glucose 144 (H) 70 - 99 mg/dL    GFR Estimate 78 >60  mL/min/1.73m2   Extra Tube     Status: None    Narrative    The following orders were created for panel order Extra Tube.  Procedure                               Abnormality         Status                     ---------                               -----------         ------                     Extra Purple Top Tube[[id number]]                            Final result                 Please view results for these tests on the individual orders.   Extra Purple Top Tube     Status: None   Result Value Ref Range    Hold Specimen JIC    Glucose by meter     Status: Abnormal   Result Value Ref Range    GLUCOSE BY METER POCT 122 (H) 70 - 99 mg/dL   Glucose by meter     Status: Abnormal   Result Value Ref Range    GLUCOSE BY METER POCT 185 (H) 70 - 99 mg/dL   Potassium     Status: Normal   Result Value Ref Range    Potassium 3.7 3.4 - 5.3 mmol/L   Magnesium     Status: Abnormal   Result Value Ref Range    Magnesium 1.6 (L) 1.7 - 2.3 mg/dL   Glucose by meter     Status: Abnormal   Result Value Ref Range    GLUCOSE BY METER POCT 176 (H) 70 - 99 mg/dL   Extra Tube     Status: None    Narrative    The following orders were created for panel order Extra Tube.  Procedure                               Abnormality         Status                     ---------                               -----------         ------                     Extra Purple Top Tube[[id number]]                            Final result                 Please view results for these tests on the individual orders.   Extra Purple Top Tube     Status: None   Result Value Ref Range    Hold Specimen     Glucose by meter     Status: Abnormal   Result Value Ref Range    GLUCOSE BY METER POCT 136 (H) 70 - 99 mg/dL   Echocardiogram Limited     Status: None   Result Value Ref Range    LVEF  55%     Narrative    [id number]  [id number]  [id number]  [id number]^GEE^CONNOR     [facility]  Echocardiography Laboratory  [facility] [facility] [place] [address]     Name:  THOR DOMÍNGUEZ  MRN: [id number]  : 1935  Study Date: 2023 08:07 AM  Age: 87 yrs  Gender: Female  Patient Location: [facility]  Reason For Study: Atrial Fibrillation, CHF  Ordering Physician: CONNOR FLYNN  Referring Physician: Yecenia Armstrong  Performed By: Rebecca Disla RDCS     BSA: 2.0 m2  Height: 64 in  Weight: 220 lb  HR: 115  BP: 115/67 mmHg  ______________________________________________________________________________  Procedure  Limited Echo Adult. Optison (NDC #5852-1737) given intravenously. Technically  difficult study. Poor acoustic windows.  ______________________________________________________________________________  Interpretation Summary     Pleural effusion, Correlate with alternative imaging.     Technically difficult study. Poor acoustic windows despite contrast use.  Irregular rhythm making assessment challenging. Limited echo.     The visual ejection fraction is estimated at 55%. Cannot accurately assess for  wall motion abnormalities.  RV is mildly dilated, not well seen. Function is difficult to assess, probably  low normal.  Atria are enlarged.  No hemodynamically significant valve disease.  Trivial anterior pericardial effusion  ______________________________________________________________________________  Left Ventricle  The left ventricle is normal in size. The visual ejection fraction is  estimated at 55%. Regional wall motion abnormalities cannot be excluded due to  limited visualization.     Right Ventricle  The right ventricle is mildly dilated. The right ventricle is not well  visualized. Right ventricular function cannot be assessed due to poor image  quality.     Mitral Valve  The mitral valve leaflets appear thickened, but open well. There is moderate  mitral annular calcification. There is mild (1+) mitral regurgitation.     Tricuspid Valve  There is mild (1+) tricuspid regurgitation. The right ventricular systolic  pressure is approximated at 19.9 mmHg plus the  right atrial pressure.     Aortic Valve  No hemodynamically significant valvular aortic stenosis.     Vessels  The inferior vena cava was normal in size with preserved respiratory  variability.     Pericardium  Trivial anterior pericardial effusion.  ______________________________________________________________________________  MMode/2D Measurements & Calculations  IVSd: 0.91 cm  LVIDd: 4.2 cm  LVIDs: 3.1 cm  LVPWd: 1.1 cm  FS: 27.4 %  LV mass(C)d: 136.9 grams  LV mass(C)dI: 67.2 grams/m2  RWT: 0.50     Doppler Measurements & Calculations  TR max suzan: 222.0 cm/sec  TR max P.9 mmHg     ______________________________________________________________________________  Report approved by: Jrodan Georges 2023 10:15 AM         CBC with platelets differential     Status: Abnormal    Narrative    The following orders were created for panel order CBC with platelets differential.  Procedure                               Abnormality         Status                     ---------                               -----------         ------                     CBC with platelets and d...[[id number]]  Abnormal            Final result                 Please view results for these tests on the individual orders.      No results found for this or any previous visit (from the past 48 hour(s)).             Pending Results:   Unresulted Labs Ordered in the Past 30 Days of this Admission     No orders found from 2023 to 2023.                  Discharge Instructions and Follow-Up:     Discharge Procedure Orders   Basic metabolic panel   Standing Status: Future Standing Exp. Date: 24     Magnesium   Standing Status: Future Standing Exp. Date: 24     Primary Care - Care Coordination Referral   Standing Status: Future   Referral Priority: Routine: Next available opening Referral Type: Care Coordination   Number of Visits Requested: 1     Reason for your hospital stay   Order Comments: Admitted for AFib with RVR  and and acute diastolic heart failure. Both now better.     Follow-up and recommended labs and tests    Order Comments: Follow up with primary care provider, Yecenia Cardona, within 7 days for hospital follow- up.  The following labs/tests are recommended: BMP in 3 days.    Out pt Follow up with cardiology in 2 weeks time. Re Follow-up on CHF and Afib     Activity   Order Comments: Your activity upon discharge: activity as tolerated     Order Specific Question Answer Comments   Is discharge order? Yes      General info for SNF   Order Comments: Length of Stay Estimate: Short Term Care: Estimated # of Days <30  Condition at Discharge: Improving  Level of care:skilled   Rehabilitation Potential: Excellent  Admission H&P remains valid and up-to-date: Yes  Recent Chemotherapy: N/A  Use Nursing Home Standing Orders: Yes     Mantoux instructions   Order Comments: Give two-step Mantoux (PPD) Per Facility Policy Yes     Intake and output   Order Comments: Every shift     Daily weights   Order Comments: Call Provider for weight gain of more than 2 pounds per day or 5 pounds per week.     Activity - Up with nursing assistance     Order Specific Question Answer Comments   Is discharge order? Yes      Physical Therapy Adult Consult   Order Comments: Evaluate and treat as clinically indicated.    Reason:  weakness     Occupational Therapy Adult Consult   Order Comments: Evaluate and treat as clinically indicated.    Reason:  weakness     Lymphedema Therapy Adult Consult   Order Comments: Evaluate and treat as clinically indicated.    Reason:  BL leg edema, hx of CHf     Diet   Order Comments: Follow this diet upon discharge: Orders Placed This Encounter      2 Gram Sodium Diet Other - please comment     Order Specific Question Answer Comments   Is discharge order? Yes             Janet Ospina MD  Internal Medicine Staff Hospitalist  [phone]  March 3, 2023        Time spent on patient: 35 minutes total including face  to face and coordinating care time reviewing current illness, any medication changes, and the care plan for today.

## 2023-03-04 NOTE — PROGRESS NOTES
"[facility] GERIATRICS  INITIAL VISIT NOTE  March 7, 2023    PRIMARY CARE PROVIDER AND CLINIC: Yecenia Armstrong [address] [facility] [id number]    [facility] Medical Record Number: [id number]  Place of Service where encounter took place: [facility] ([facility]) [[id number]]    Chief Complaint   Patient presents with     Hospital F/U     [facility] 2/25/2023 - 3/3/2023     HPI:    Theodora Meng is a 87 year old (1935) female was admitted to the above facility from [facility]. Hospital stay 2/25/23 through 3/3/23 where they were admitted for Afib, CHF. Now admitted to this facility for rehab, medical management and nursing care.      History obtained from: facility chart records, facility staff, patient report and [facility] chart review.      Brief Hospital Course: PMH of Afib, HTN, DM2, CAD, who presented with fatigue and SOB. Found to be in Afib with RVR with HR in the 140s. BNP elevated, and felt to be in CHF exacerbation.  Was started on diltiazem gtt and atenolol resumed. Was transitioned to oral diltiazem, had some dizziness so diltiazem dose reduced. Was diuresed with IV lasix. Did have elevated troponin, but not felt to be due to ischemia. HR stabilized and felt to be at dry weight. Potassium and magnesium replaced. Was started on metformin for A1C of 7.9%. When medically stable was discharged to [facility] for further rehab and medical management.       TCU Course: Seen today in room. Is feeling well today, but gets tired easily. Did have some palpitations a couple of night ago after she woke up to go to the bathroom, she had the nurse check her vitals then was told they were \"fine.\" She took some medication for indigestion and symptoms when away. She has a stress test tomorrow. No chest pain, some SOB with PT. She was seen by lymphedema and was told they were going to order her some compression stockings. She denies any pain. Appetite has been ok, she " does feel constipated, is wondering if her new iron pill is contributing to this.     CODE STATUS/ADVANCE DIRECTIVES: CPR/Full code     ALLERGIES:  Allergies   Allergen Reactions     Zoloft [Sertraline] Palpitations, Diarrhea and Cramps       PAST MEDICAL HISTORY:   Past Medical History:   Diagnosis Date     Depressive disorder      Diabetes (H)      Esophageal reflux      Other abnormal glucose      Other premature beats      Unspecified essential hypertension      PAST SURGICAL HISTORY:   Past Surgical History:   Procedure Laterality Date     ANESTHESIA CARDIOVERSION N/A 12/29/2022    Procedure: CARDIOVERSION;  Surgeon: GENERIC ANESTHESIA PROVIDER;  Location:  OR     APPENDECTOMY       ARTHROPLASTY KNEE  10/30/2013    Procedure: ARTHROPLASTY KNEE;  Right Total Knee Arthroplasty;  Surgeon: Ousmane Mcgowan MD;  Location: [facility] OR     ARTHROPLASTY KNEE Left 4/3/2019    Procedure: Left Total Knee Arthroplasty;  Surgeon: Ousmane Mcgowan MD;  Location: [facility] OR     CHOLECYSTECTOMY, LAPOROSCOPIC      Cholecystectomy, Laparoscopic     DILATION AND CURETTAGE, OPERATIVE HYSTEROSCOPY WITH MORCELLATOR, COMBINED N/A 6/20/2016    Procedure: COMBINED DILATION AND CURETTAGE, OPERATIVE HYSTEROSCOPY WITH MORCELLATOR;  Surgeon: Bony Arredondo MD;  Location: [facility] OR     ESOPHAGOSCOPY, GASTROSCOPY, DUODENOSCOPY (EGD), COMBINED N/A 2/17/2016    Procedure: COMBINED ESOPHAGOSCOPY, GASTROSCOPY, DUODENOSCOPY (EGD), BIOPSY SINGLE OR MULTIPLE;  Surgeon: Mil Guevara MD;  Location: [facility] GI     SALPINGO OOPHORECTOMY,R/L/OZZY      Salpingo Oophorectomy, RT/LT/OZZY     TONSILLECTOMY       FAMILY HISTORY:   Family History   Problem Relation Age of Onset     Heart Disease Mother      Heart Disease Father      Heart Disease Brother      Heart Disease Brother      Heart Disease Brother          SOCIAL HISTORY:   Patient's living condition: lives alone    MEDICATIONS  Post Discharge Medication Reconciliation Status: discharge  medications reconciled and changed, per note/orders.  Current Outpatient Medications   Medication Sig Dispense Refill     omeprazole (PRILOSEC) 40 MG DR capsule Take 1 capsule (40 mg) by mouth daily for 30 days Take 30-60 min before breakfast  1     sennosides (SENOKOT) 8.6 MG tablet Take 1 tablet by mouth daily And 1 tab PO daily PRN       acetaminophen (TYLENOL) 325 MG tablet Take 2 tablets (650 mg) by mouth every 6 hours as needed for pain or fever       atenolol (TENORMIN) 25 MG tablet Take 1 tablet (25 mg) by mouth 2 times daily 30 tablet 3     atorvastatin (LIPITOR) 10 MG tablet Take 1 tablet (10 mg) by mouth daily (Patient taking differently: Take 10 mg by mouth every evening) 90 tablet 3     blood glucose monitoring (ACCU-CHEK COMPACT CARE KIT) meter device kit Use to test blood sugars 1 times daily or as directed. May sub formulary meter (Patient not taking: Reported on 2/21/2023) 1 kit 0     blood glucose monitoring (NO BRAND SPECIFIED) test strip Use to test blood sugars twice times daily or as directed (Patient not taking: Reported on 2/21/2023) 1 Box 3     diltiazem ER COATED BEADS (CARDIZEM CD/CARTIA XT) 180 MG 24 hr capsule Take 1 capsule (180 mg) by mouth daily       famotidine (PEPCID) 20 MG tablet Take 1 tablet (20 mg) by mouth nightly as needed (heartburn) 60 tablet 1     ferrous sulfate (FEROSUL) 325 (65 Fe) MG tablet Take 1 tablet (325 mg) by mouth Every Mon, Wed, Fri Morning 45 tablet 3     furosemide (LASIX) 20 MG tablet Take 1 tablet (20 mg) by mouth daily 30 tablet 11     gabapentin (NEURONTIN) 300 MG capsule TAKE 2 CAPSULES IN THE EVENING (Patient taking differently: Take 600 mg by mouth every evening TAKE 2 CAPSULES IN THE EVENING) 180 capsule 3     magnesium chloride 535 (64 Mg) MG TBEC CR tablet Take 1 tablet (535 mg) by mouth daily       metFORMIN (GLUCOPHAGE) 500 MG tablet Take 0.5 tablets (250 mg) by mouth 2 times daily (with meals)       rivaroxaban ANTICOAGULANT (XARELTO) 20 MG TABS  "tablet Take 1 tablet (20 mg) by mouth daily (with dinner) 90 tablet 3     sucralfate (CARAFATE) 1 GM/10ML suspension Take 10 mLs (1 g) by mouth 2 times daily       ROS:  10 point ROS neg other than the symptoms noted above in the HPI.    PHYSICAL EXAM:  /76   Pulse 94   Temp 97.8  F (36.6  C)   Resp 18   Ht 1.626 m (5' 4\")   Wt 95.7 kg (211 lb)   LMP  (LMP Unknown)   SpO2 94%   BMI 36.22 kg/m    Physical Exam  Cardiovascular:      Rate and Rhythm: Normal rate. Rhythm irregular.   Pulmonary:      Effort: Pulmonary effort is normal.      Breath sounds: Normal breath sounds.   Abdominal:      General: Bowel sounds are normal.   Musculoskeletal:      Right lower leg: Edema present.      Left lower leg: Edema present.   Neurological:      Mental Status: She is alert and oriented to person, place, and time.   Psychiatric:         Mood and Affect: Mood normal.          LABORATORY/IMAGING DATA:  Reviewed as per [facility] and[facility]or [facility]    ASSESSMENT/PLAN:  Atrial fibrillation with RVR (H)  Reviewed recent cardiology notes, HR up to the 140s. New on diltiazem. Palpitations x1 in TCU but HR controlled at that time. History of dizziness on diltiazem, denies this currently.  Discussed with patient, nursing staff.   - continue diltiazem ER 180mg daily, atenolol 25mg BID  - anticoagulated on Xarelto,   - monitor and adjust, Follow-up with cardiology as scheduled on 3/17.     Acute on chronic heart failure with preserved ejection fraction (HFpEF) (H)  Benign essential hypertension  Coronary artery calcification seen on CT scan  Elevated brain natriuretic peptide (BNP) level  Had echo done while IP, reviewed results: EF 55%. Appears compensated currently reviewed medications, up coming appointments with patient, nursing staff. Lisinopril was stopped while IP -120, weight stable ~210lbs  - Stress Test on 3/8  - continue atenolol, diltiazem, lasix 20mg daily, atorvastatin  - daily weights, lymphedema therapy to " follow  - follow-up with cardiology as scheduled on 3/17  - low salt diet    Class 2 severe obesity due to excess calories with serious comorbidity and body mass index (BMI) of 37.0 to 37.9 in adult (H)  BMI 36, may affect recovery  - PT/OT  - dietician to follow in TCU    Anemia, unspecified type  Reviewed labs, medications with patient. Recent Hgb stable ~11  - continue ferrous sulfate M/W/F    Type 2 diabetes mellitus without complication, without long-term current use of insulin (H)  A1C 7.9%, was started on metformin while IP. BG in -180.  Discussed with patient, nursing staff.   - BG checks BID  - metformin 250mg BID  - monitor and adjust     Stage 3a chronic kidney disease (H)  Baseline creatinine ~0.8  - BMP PRN  - avoid nephrotoxic medications     Hypomagnesemia  Has been on PPI BID due to GI issues, suspect this is contributing. Mg 1.6 on 3/6. Discussed with nursing staff  - increased slow mag to 2 tabs daily  - Mg level on 3/13    Gastroesophageal reflux disease without esophagitis  Epigastric pain  Hiatal hernia  Follows with GI, continues to with some break through symptoms, has been on omeprazole BID x 30 days. Discussed with patient, nursing staff.   - omeprazole (PRILOSEC) 40 MG DR capsule; Take 1 capsule (40 mg) by mouth daily for 30 days Take 30-60 min before breakfast  - follow-up with GI for esophogram on 3/13  - continue sucralfate BID, famotidine PRN  - monitor and adjust     Physical deconditioning  In the setting of recent hospitalization. Discussed with therapy,   - PT/OT eval and treat. Discharge planning per their recommendation  -  to assist with discharge planning.       Slow transit constipation  Reports constipated - ? Iron supplement contributing.   - sennosides (SENOKOT) 8.6 MG tablet; Take 1 tablet by mouth daily And 1 tab PO daily PRN  - monitor and adjust       Orders:   1. Mg level on 3/13  2. Senna 1 tab PO daily and 1 tab daily PRN     Total  time spent with patient visit at the skilled nursing facility was 45 min including patient visit and review of past records. Total time spent reviewing records from hospitalization within my organization including labs and review of Echo and imaging reports, review of 2 most recent cardiology notes from within organization review of TCU facility records, medication reconciliation discussion of plan of care  with nursing staff and therapy, and time spent with counseling and coordinating care due to review of medications, discussion of plan of care and patient education d/t CHF, Afib with RVR, GERD, constipation, deconditioning and others as stated above       Electronically signed by:  ALEX Hall CNP

## 2023-03-05 ENCOUNTER — LAB REQUISITION (OUTPATIENT)
Dept: LAB | Facility: CLINIC | Age: 88
End: 2023-03-05

## 2023-03-05 DIAGNOSIS — E83.42 HYPOMAGNESEMIA: ICD-10-CM

## 2023-03-05 DIAGNOSIS — N18.9 CHRONIC KIDNEY DISEASE, UNSPECIFIED: ICD-10-CM

## 2023-03-06 ENCOUNTER — TELEPHONE (OUTPATIENT)
Dept: GERIATRICS | Facility: CLINIC | Age: 88
End: 2023-03-06
Payer: COMMERCIAL

## 2023-03-06 LAB
ANION GAP SERPL CALCULATED.3IONS-SCNC: 12 MMOL/L (ref 7–15)
BUN SERPL-MCNC: 19.7 MG/DL (ref 8–23)
CALCIUM SERPL-MCNC: 8.9 MG/DL (ref 8.8–10.2)
CHLORIDE SERPL-SCNC: 98 MMOL/L (ref 98–107)
CREAT SERPL-MCNC: 0.78 MG/DL (ref 0.51–0.95)
DEPRECATED HCO3 PLAS-SCNC: 26 MMOL/L (ref 22–29)
GFR SERPL CREATININE-BSD FRML MDRD: 74 ML/MIN/1.73M2
GLUCOSE SERPL-MCNC: 115 MG/DL (ref 70–99)
MAGNESIUM SERPL-MCNC: 1.6 MG/DL (ref 1.7–2.3)
POTASSIUM SERPL-SCNC: 4.1 MMOL/L (ref 3.4–5.3)
SODIUM SERPL-SCNC: 136 MMOL/L (ref 136–145)

## 2023-03-06 PROCEDURE — P9603 ONE-WAY ALLOW PRORATED MILES: HCPCS | Performed by: NURSE PRACTITIONER

## 2023-03-06 PROCEDURE — 80048 BASIC METABOLIC PNL TOTAL CA: CPT | Performed by: NURSE PRACTITIONER

## 2023-03-06 PROCEDURE — 83735 ASSAY OF MAGNESIUM: CPT | Performed by: NURSE PRACTITIONER

## 2023-03-06 NOTE — RESULT ENCOUNTER NOTE
BMP good, MG low, so let's increase magnesium chloride CR (64mg) to 2 tabs PO daily for hypomagnesemia.     Thanks  Shakila

## 2023-03-06 NOTE — TELEPHONE ENCOUNTER
[facility]iatric[facility] Lab Note     Provider: ALEX Pham CNP  Facility: [facility] Facility Type:  TCU    Allergies   Allergen Reactions     Zoloft [Sertraline] Palpitations, Diarrhea and Cramps       Labs Reviewed by provider: Magnesium, BMP    Verbal Order/Direction given by Provider: BMP good, MG low, so let's increase magnesium chloride CR (64mg) to 2 tabs PO daily for hypomagnesemia.       Provider giving Order:  ALEX Pham CNP    Verbal Order given to: Diane De La Garza RN

## 2023-03-07 ENCOUNTER — TRANSITIONAL CARE UNIT VISIT (OUTPATIENT)
Dept: GERIATRICS | Facility: CLINIC | Age: 88
End: 2023-03-07
Payer: COMMERCIAL

## 2023-03-07 VITALS
OXYGEN SATURATION: 94 % | DIASTOLIC BLOOD PRESSURE: 76 MMHG | SYSTOLIC BLOOD PRESSURE: 124 MMHG | BODY MASS INDEX: 36.02 KG/M2 | TEMPERATURE: 97.8 F | WEIGHT: 211 LBS | HEART RATE: 94 BPM | HEIGHT: 64 IN | RESPIRATION RATE: 18 BRPM

## 2023-03-07 DIAGNOSIS — I10 BENIGN ESSENTIAL HYPERTENSION: ICD-10-CM

## 2023-03-07 DIAGNOSIS — E11.9 TYPE 2 DIABETES MELLITUS WITHOUT COMPLICATION, WITHOUT LONG-TERM CURRENT USE OF INSULIN (H): ICD-10-CM

## 2023-03-07 DIAGNOSIS — M79.2 NEURALGIA: ICD-10-CM

## 2023-03-07 DIAGNOSIS — N18.31 ANEMIA DUE TO STAGE 3A CHRONIC KIDNEY DISEASE (H): ICD-10-CM

## 2023-03-07 DIAGNOSIS — I10 ESSENTIAL HYPERTENSION: ICD-10-CM

## 2023-03-07 DIAGNOSIS — I50.33 ACUTE ON CHRONIC HEART FAILURE WITH PRESERVED EJECTION FRACTION (HFPEF) (H): ICD-10-CM

## 2023-03-07 DIAGNOSIS — I48.0 PAROXYSMAL ATRIAL FIBRILLATION (H): ICD-10-CM

## 2023-03-07 DIAGNOSIS — K59.01 SLOW TRANSIT CONSTIPATION: ICD-10-CM

## 2023-03-07 DIAGNOSIS — R10.13 EPIGASTRIC PAIN: ICD-10-CM

## 2023-03-07 DIAGNOSIS — N18.31 STAGE 3A CHRONIC KIDNEY DISEASE (H): ICD-10-CM

## 2023-03-07 DIAGNOSIS — R79.89 ELEVATED BRAIN NATRIURETIC PEPTIDE (BNP) LEVEL: ICD-10-CM

## 2023-03-07 DIAGNOSIS — E66.01 CLASS 2 SEVERE OBESITY DUE TO EXCESS CALORIES WITH SERIOUS COMORBIDITY AND BODY MASS INDEX (BMI) OF 37.0 TO 37.9 IN ADULT (H): ICD-10-CM

## 2023-03-07 DIAGNOSIS — K44.9 HIATAL HERNIA: ICD-10-CM

## 2023-03-07 DIAGNOSIS — E66.812 CLASS 2 SEVERE OBESITY DUE TO EXCESS CALORIES WITH SERIOUS COMORBIDITY AND BODY MASS INDEX (BMI) OF 37.0 TO 37.9 IN ADULT (H): ICD-10-CM

## 2023-03-07 DIAGNOSIS — M17.12 PRIMARY OSTEOARTHRITIS OF LEFT KNEE: ICD-10-CM

## 2023-03-07 DIAGNOSIS — R53.83 FATIGUE, UNSPECIFIED TYPE: ICD-10-CM

## 2023-03-07 DIAGNOSIS — D64.9 ANEMIA, UNSPECIFIED TYPE: ICD-10-CM

## 2023-03-07 DIAGNOSIS — E83.42 HYPOMAGNESEMIA: ICD-10-CM

## 2023-03-07 DIAGNOSIS — K21.9 GASTROESOPHAGEAL REFLUX DISEASE WITHOUT ESOPHAGITIS: ICD-10-CM

## 2023-03-07 DIAGNOSIS — R53.81 PHYSICAL DECONDITIONING: ICD-10-CM

## 2023-03-07 DIAGNOSIS — D63.1 ANEMIA DUE TO STAGE 3A CHRONIC KIDNEY DISEASE (H): ICD-10-CM

## 2023-03-07 DIAGNOSIS — I25.10 CORONARY ARTERY CALCIFICATION SEEN ON CT SCAN: ICD-10-CM

## 2023-03-07 DIAGNOSIS — I48.91 ATRIAL FIBRILLATION WITH RVR (H): Primary | ICD-10-CM

## 2023-03-07 DIAGNOSIS — E78.5 HYPERLIPIDEMIA LDL GOAL <100: ICD-10-CM

## 2023-03-07 PROCEDURE — 99310 SBSQ NF CARE HIGH MDM 45: CPT | Performed by: NURSE PRACTITIONER

## 2023-03-07 RX ORDER — OMEPRAZOLE 40 MG/1
40 CAPSULE, DELAYED RELEASE ORAL DAILY
Refills: 1
Start: 2023-03-07 | End: 2023-04-06

## 2023-03-07 RX ORDER — SENNOSIDES 8.6 MG
1 TABLET ORAL DAILY
Start: 2023-03-07 | End: 2023-04-18

## 2023-03-07 NOTE — LETTER
"    3/7/2023        RE: Theodora Meng  [address] [facility] [address]        [facility] GERIATRICS  INITIAL VISIT NOTE  March 7, 2023    PRIMARY CARE PROVIDER AND CLINIC: Yecenia Armstrong [address] [facility] [address]    [facility] Medical Record Number: [id number]  Place of Service where encounter took place: [facility] ([facility]) [[id number]]    Chief Complaint   Patient presents with     Hospital F/U     [facility] 2/25/2023 - 3/3/2023     HPI:    Theodora Meng is a 87 year old (1935) female was admitted to the above facility from [facility]. Hospital stay 2/25/23 through 3/3/23 where they were admitted for Afib, CHF. Now admitted to this facility for rehab, medical management and nursing care.      History obtained from: facility chart records, facility staff, patient report and [facility] chart review.      Brief Hospital Course: PMH of Afib, HTN, DM2, CAD, who presented with fatigue and SOB. Found to be in Afib with RVR with HR in the 140s. BNP elevated, and felt to be in CHF exacerbation.  Was started on diltiazem gtt and atenolol resumed. Was transitioned to oral diltiazem, had some dizziness so diltiazem dose reduced. Was diuresed with IV lasix. Did have elevated troponin, but not felt to be due to ischemia. HR stabilized and felt to be at dry weight. Potassium and magnesium replaced. Was started on metformin for A1C of 7.9%. When medically stable was discharged to U for further rehab and medical management.       TCU Course: Seen today in room. Is feeling well today, but gets tired easily. Did have some palpitations a couple of night ago after she woke up to go to the bathroom, she had the nurse check her vitals then was told they were \"fine.\" She took some medication for indigestion and symptoms when away. She has a stress test tomorrow. No chest pain, some SOB with PT. She was seen by lymphedema and was told they were going " to order her some compression stockings. She denies any pain. Appetite has been ok, she does feel constipated, is wondering if her new iron pill is contributing to this.     CODE STATUS/ADVANCE DIRECTIVES: CPR/Full code     ALLERGIES:  Allergies   Allergen Reactions     Zoloft [Sertraline] Palpitations, Diarrhea and Cramps       PAST MEDICAL HISTORY:   Past Medical History:   Diagnosis Date     Depressive disorder      Diabetes (H)      Esophageal reflux      Other abnormal glucose      Other premature beats      Unspecified essential hypertension      PAST SURGICAL HISTORY:   Past Surgical History:   Procedure Laterality Date     ANESTHESIA CARDIOVERSION N/A 12/29/2022    Procedure: CARDIOVERSION;  Surgeon: GENERIC ANESTHESIA PROVIDER;  Location:  OR     APPENDECTOMY       ARTHROPLASTY KNEE  10/30/2013    Procedure: ARTHROPLASTY KNEE;  Right Total Knee Arthroplasty;  Surgeon: Ousmane Mcgowan MD;  Location: [facility] OR     ARTHROPLASTY KNEE Left 4/3/2019    Procedure: Left Total Knee Arthroplasty;  Surgeon: Ousmane Mcgowan MD;  Location: [facility] OR     CHOLECYSTECTOMY, LAPOROSCOPIC      Cholecystectomy, Laparoscopic     DILATION AND CURETTAGE, OPERATIVE HYSTEROSCOPY WITH MORCELLATOR, COMBINED N/A 6/20/2016    Procedure: COMBINED DILATION AND CURETTAGE, OPERATIVE HYSTEROSCOPY WITH MORCELLATOR;  Surgeon: Bony Arredondo MD;  Location: [facility] OR     ESOPHAGOSCOPY, GASTROSCOPY, DUODENOSCOPY (EGD), COMBINED N/A 2/17/2016    Procedure: COMBINED ESOPHAGOSCOPY, GASTROSCOPY, DUODENOSCOPY (EGD), BIOPSY SINGLE OR MULTIPLE;  Surgeon: Mil Guevara MD;  Location: [facility] GI     SALPINGO OOPHORECTOMY,R/L/OZZY      Salpingo Oophorectomy, RT/LT/OZZY     TONSILLECTOMY       FAMILY HISTORY:   Family History   Problem Relation Age of Onset     Heart Disease Mother      Heart Disease Father      Heart Disease Brother      Heart Disease Brother      Heart Disease Brother          SOCIAL HISTORY:   Patient's living condition: lives  alone    MEDICATIONS  Post Discharge Medication Reconciliation Status: discharge medications reconciled and changed, per note/orders.  Current Outpatient Medications   Medication Sig Dispense Refill     omeprazole (PRILOSEC) 40 MG DR capsule Take 1 capsule (40 mg) by mouth daily for 30 days Take 30-60 min before breakfast  1     sennosides (SENOKOT) 8.6 MG tablet Take 1 tablet by mouth daily And 1 tab PO daily PRN       acetaminophen (TYLENOL) 325 MG tablet Take 2 tablets (650 mg) by mouth every 6 hours as needed for pain or fever       atenolol (TENORMIN) 25 MG tablet Take 1 tablet (25 mg) by mouth 2 times daily 30 tablet 3     atorvastatin (LIPITOR) 10 MG tablet Take 1 tablet (10 mg) by mouth daily (Patient taking differently: Take 10 mg by mouth every evening) 90 tablet 3     blood glucose monitoring (ACCU-CHEK COMPACT CARE KIT) meter device kit Use to test blood sugars 1 times daily or as directed. May sub formulary meter (Patient not taking: Reported on 2/21/2023) 1 kit 0     blood glucose monitoring (NO BRAND SPECIFIED) test strip Use to test blood sugars twice times daily or as directed (Patient not taking: Reported on 2/21/2023) 1 Box 3     diltiazem ER COATED BEADS (CARDIZEM CD/CARTIA XT) 180 MG 24 hr capsule Take 1 capsule (180 mg) by mouth daily       famotidine (PEPCID) 20 MG tablet Take 1 tablet (20 mg) by mouth nightly as needed (heartburn) 60 tablet 1     ferrous sulfate (FEROSUL) 325 (65 Fe) MG tablet Take 1 tablet (325 mg) by mouth Every Mon, Wed, Fri Morning 45 tablet 3     furosemide (LASIX) 20 MG tablet Take 1 tablet (20 mg) by mouth daily 30 tablet 11     gabapentin (NEURONTIN) 300 MG capsule TAKE 2 CAPSULES IN THE EVENING (Patient taking differently: Take 600 mg by mouth every evening TAKE 2 CAPSULES IN THE EVENING) 180 capsule 3     magnesium chloride 535 (64 Mg) MG TBEC CR tablet Take 1 tablet (535 mg) by mouth daily       metFORMIN (GLUCOPHAGE) 500 MG tablet Take 0.5 tablets (250 mg) by  "mouth 2 times daily (with meals)       rivaroxaban ANTICOAGULANT (XARELTO) 20 MG TABS tablet Take 1 tablet (20 mg) by mouth daily (with dinner) 90 tablet 3     sucralfate (CARAFATE) 1 GM/10ML suspension Take 10 mLs (1 g) by mouth 2 times daily       ROS:  10 point ROS neg other than the symptoms noted above in the HPI.    PHYSICAL EXAM:  /76   Pulse 94   Temp 97.8  F (36.6  C)   Resp 18   Ht 1.626 m (5' 4\")   Wt 95.7 kg (211 lb)   LMP  (LMP Unknown)   SpO2 94%   BMI 36.22 kg/m    Physical Exam  Cardiovascular:      Rate and Rhythm: Normal rate. Rhythm irregular.   Pulmonary:      Effort: Pulmonary effort is normal.      Breath sounds: Normal breath sounds.   Abdominal:      General: Bowel sounds are normal.   Musculoskeletal:      Right lower leg: Edema present.      Left lower leg: Edema present.   Neurological:      Mental Status: She is alert and oriented to person, place, and time.   Psychiatric:         Mood and Affect: Mood normal.          LABORATORY/IMAGING DATA:  [facility]    ASSESSMENT/PLAN:  Atrial fibrillation with RVR (H)  Reviewed recent cardiology notes, HR up to the 140s. New on diltiazem. Palpitations x1 in TCU but HR controlled at that time. History of dizziness on diltiazem, denies this currently.  Discussed with patient, nursing staff.   - continue diltiazem ER 180mg daily, atenolol 25mg BID  - anticoagulated on Xarelto,   - monitor and adjust, Follow-up with cardiology as scheduled on 3/17.     Acute on chronic heart failure with preserved ejection fraction (HFpEF) (H)  Benign essential hypertension  Coronary artery calcification seen on CT scan  Elevated brain natriuretic peptide (BNP) level  Had echo done while IP, reviewed results: EF 55%. Appears compensated currently reviewed medications, up coming appointments with patient, nursing staff. Lisinopril was stopped while IP -120, weight stable ~210lbs  - Stress Test on 3/8  - continue " atenolol, diltiazem, lasix 20mg daily, atorvastatin  - daily weights, lymphedema therapy to follow  - follow-up with cardiology as scheduled on 3/17  - low salt diet    Class 2 severe obesity due to excess calories with serious comorbidity and body mass index (BMI) of 37.0 to 37.9 in adult (H)  BMI 36, may affect recovery  - PT/OT  - dietician to follow in TCU    Anemia, unspecified type  Reviewed labs, medications with patient. Recent Hgb stable ~11  - continue ferrous sulfate M/W/F    Type 2 diabetes mellitus without complication, without long-term current use of insulin (H)  A1C 7.9%, was started on metformin while IP. BG in -180.  Discussed with patient, nursing staff.   - BG checks BID  - metformin 250mg BID  - monitor and adjust     Stage 3a chronic kidney disease (H)  Baseline creatinine ~0.8  - BMP PRN  - avoid nephrotoxic medications     Hypomagnesemia  Has been on PPI BID due to GI issues, suspect this is contributing. Mg 1.6 on 3/6. Discussed with nursing staff  - increased slow mag to 2 tabs daily  - Mg level on 3/13    Gastroesophageal reflux disease without esophagitis  Epigastric pain  Hiatal hernia  Follows with GI, continues to with some break through symptoms, has been on omeprazole BID x 30 days. Discussed with patient, nursing staff.   - omeprazole (PRILOSEC) 40 MG DR capsule; Take 1 capsule (40 mg) by mouth daily for 30 days Take 30-60 min before breakfast  - follow-up with GI for esophogram on 3/13  - continue sucralfate BID, famotidine PRN  - monitor and adjust     Physical deconditioning  In the setting of recent hospitalization. Discussed with therapy,   - PT/OT eval and treat. Discharge planning per their recommendation  -  to assist with discharge planning.       Slow transit constipation  Reports constipated - ? Iron supplement contributing.   - sennosides (SENOKOT) 8.6 MG tablet; Take 1 tablet by mouth daily And 1 tab PO daily PRN  - monitor and adjust        Orders:   1. Mg level on 3/13  2. Senna 1 tab PO daily and 1 tab daily PRN     Total time spent with patient visit at the skilled nursing facility was 45 min including patient visit and review of past records. Total time spent reviewing records from hospitalization within my organization including labs and review of Echo and imaging reports, review of 2 most recent cardiology notes from within organization review of TCU facility records, medication reconciliation discussion of plan of care  with nursing staff and therapy, and time spent with counseling and coordinating care due to review of medications, discussion of plan of care and patient education d/t CHF, Afib with RVR, GERD, constipation, deconditioning and others as stated above       Electronically signed by:  ALEX Hall CNP         Sincerely,        ALEX Hall CNP

## 2023-03-08 ENCOUNTER — HOSPITAL ENCOUNTER (OUTPATIENT)
Dept: CARDIOLOGY | Facility: CLINIC | Age: 88
Discharge: HOME | End: 2023-03-08
Attending: INTERNAL MEDICINE
Payer: COMMERCIAL

## 2023-03-08 ENCOUNTER — HOSPITAL ENCOUNTER (OUTPATIENT)
Dept: NUCLEAR MEDICINE | Facility: CLINIC | Age: 88
Setting detail: NUCLEAR MEDICINE
Discharge: HOME | End: 2023-03-08
Attending: INTERNAL MEDICINE
Payer: COMMERCIAL

## 2023-03-08 VITALS — HEIGHT: 64 IN | BODY MASS INDEX: 37.56 KG/M2 | WEIGHT: 220 LBS

## 2023-03-08 DIAGNOSIS — I48.91 NEW ONSET ATRIAL FIBRILLATION (H): ICD-10-CM

## 2023-03-08 LAB
CV BLOOD PRESSURE: 54 MMHG
CV STRESS MAX HR HE: 95
NUC STRESS EJECTION FRACTION: 63 %
RATE PRESSURE PRODUCT: NORMAL
STRESS ECHO BASELINE DIASTOLIC HE: 85
STRESS ECHO BASELINE HR: 97 BPM
STRESS ECHO BASELINE SYSTOLIC BP: 142
STRESS ECHO CALCULATED PERCENT HR: 71 %
STRESS ECHO LAST STRESS DIASTOLIC BP: 79
STRESS ECHO LAST STRESS SYSTOLIC BP: 128
STRESS ECHO TARGET HR: 134
STRESS/REST PERFUSION RATIO: 1

## 2023-03-08 PROCEDURE — 93016 CV STRESS TEST SUPVJ ONLY: CPT | Performed by: INTERNAL MEDICINE

## 2023-03-08 PROCEDURE — A9502 TC99M TETROFOSMIN: HCPCS | Performed by: INTERNAL MEDICINE

## 2023-03-08 PROCEDURE — 343N000001 HC RX 343: Performed by: INTERNAL MEDICINE

## 2023-03-08 PROCEDURE — 78452 HT MUSCLE IMAGE SPECT MULT: CPT

## 2023-03-08 PROCEDURE — 93018 CV STRESS TEST I&R ONLY: CPT | Performed by: INTERNAL MEDICINE

## 2023-03-08 PROCEDURE — 78452 HT MUSCLE IMAGE SPECT MULT: CPT | Mod: 26 | Performed by: INTERNAL MEDICINE

## 2023-03-08 PROCEDURE — 93017 CV STRESS TEST TRACING ONLY: CPT

## 2023-03-08 PROCEDURE — 250N000011 HC RX IP 250 OP 636: Performed by: INTERNAL MEDICINE

## 2023-03-08 RX ORDER — REGADENOSON 0.08 MG/ML
0.4 INJECTION, SOLUTION INTRAVENOUS ONCE
Status: COMPLETED | OUTPATIENT
Start: 2023-03-08 | End: 2023-03-08

## 2023-03-08 RX ADMIN — TETROFOSMIN 9.6 MILLICURIE: 1.38 INJECTION, POWDER, LYOPHILIZED, FOR SOLUTION INTRAVENOUS at 09:25

## 2023-03-08 RX ADMIN — REGADENOSON 0.4 MG: 0.08 INJECTION, SOLUTION INTRAVENOUS at 10:56

## 2023-03-08 RX ADMIN — TETROFOSMIN 30.5 MILLICURIE: 1.38 INJECTION, POWDER, LYOPHILIZED, FOR SOLUTION INTRAVENOUS at 11:00

## 2023-03-13 ENCOUNTER — HOSPITAL ENCOUNTER (OUTPATIENT)
Dept: GENERAL RADIOLOGY | Facility: CLINIC | Age: 88
Discharge: HOME | End: 2023-03-13
Attending: NURSE PRACTITIONER | Admitting: NURSE PRACTITIONER
Payer: COMMERCIAL

## 2023-03-13 ENCOUNTER — TELEPHONE (OUTPATIENT)
Dept: GERIATRICS | Facility: CLINIC | Age: 88
End: 2023-03-13
Payer: COMMERCIAL

## 2023-03-13 DIAGNOSIS — R10.13 EPIGASTRIC PAIN: ICD-10-CM

## 2023-03-13 DIAGNOSIS — K44.9 HIATAL HERNIA: ICD-10-CM

## 2023-03-13 PROCEDURE — 74220 X-RAY XM ESOPHAGUS 1CNTRST: CPT

## 2023-03-13 NOTE — PROGRESS NOTES
[facility] GERIATRICS DISCHARGE SUMMARY  Patient Name: Theodora Meng  YOB: 1935  [facility] Medical Record Number: [id number]  Place of Service Where Encounter Took Place: [facility] (TCU) [[id number]]    PRIMARY CARE PROVIDER AND CLINIC RESPONSIBLE AFTER TRANSFER: Yecenia Cardona MD, [id number]  / [place] [address]; [facility] Provider     Transferring providers: ALEX Pham CNP; Nick Garrison MD  Recent Hospitalization/ED: [facility] stay 2/23/23 to 3/3/23.  Date of SNF Admission: March 03, 2023  Date of SNF (anticipated) Discharge: March 17, 2023  Discharged to: previous independent home  Cognitive Scores: BIMS: 14/15  Physical Function: Ambulating 150-200 ft with supervision, transfers independently  DME: [facility]    CODE STATUS/ADVANCE DIRECTIVES DISCUSSION: Prior - CPR/Full Code  ALLERGIES: Zoloft [sertraline]    NURSING FACILITY COURSE   Medication Changes/Rationale:     Magnesium increased to BID due to Mg level of 1.5    Summary of nursing facility stay:   Brief Hospital Course: PMH of Afib, HTN, DM2, CAD, who presented with fatigue and SOB. Found to be in Afib with RVR with HR in the 140s. BNP elevated, and felt to be in CHF exacerbation.  Was started on diltiazem gtt and atenolol resumed. Was transitioned to oral diltiazem, had some dizziness so diltiazem dose reduced. Was diuresed with IV lasix. Did have elevated troponin, but not felt to be due to ischemia. HR stabilized and felt to be at dry weight. Potassium and magnesium replaced. Was started on metformin for A1C of 7.9%. When medically stable was discharged to TCU for further rehab and medical management    Atrial fibrillation with RVR (H)  Presented with HR up to the 140s, New on diltiazem. HR controlled in TCU 70-90, did have some palpitations last night, but PM dose of atenolol was found on room floor this AM. No further palpitations this morning.   - continue  diltiazem ER 180mg daily, atenolol 25mg BID  - anticoagulated on Xarelto,   - Follow-up with cardiology as scheduled on 3/17    Acute on chronic heart failure with preserved ejection fraction (HFpEF) (H)  Benign essential hypertension  Coronary artery calcification seen on CT scan  Had echo done while IP, reviewed results: EF 55%. Appears compensated currently. Weight stable ~210lbs, -130. Had stress test on 3/8, was negative for ischemia or infarction, normal LV function. Chronic lymphedema to BLE which was stable.   - continue atenolol, diltiazem, lasix 20mg daily, atorvastatin  - daily weight  - follow-up with cardiology as scheduled on 3/17  - low salt diet    Anemia, unspecified type  Recent Hgb stable ~11  - continue ferrous sulfate M/W/F       Type 2 diabetes mellitus without complication, without long-term current use of insulin (H)  A1C 7.9%, was started on metformin while IP. -200 in TCU   - continue metformin 250mg BID with meals   - follow-up with PCP    Stage 3a chronic kidney disease (H)  Baseline creatinine ~0.8, stable    Gastroesophageal reflux disease without esophagitis  Esophageal dysmotility  Hiatal hernia  Follows with GI, had esophagram on 3/13, showed small hiatal hernia, dysmotility, mostly in the upper thoracic esophagus.    - SLP on with home care per request of GI  - continue omeprazole 40mg daily, sucralfate BID, famotidine BID PRN  - follow-up with GI    Hypomagnesemia  In the the setting of PPI use, was started on Slow mag while IP, increased to BID in TCU for Mg Level of 1.5  - continue Slow Mag 1 tab BID, follow-up with PCP    Physical deconditioning  IN the setting of hospitalization. Made progress with therapy-  - home OT, PT, RN, HHA, and SLP    Slow transit constipation   BM  - continue senna-s daily and 1 tab daily PRN    Anxiety  Rare, report she uses ativan ~8 times a year to help her sleep when she has bad nights and is anxious. Discussed that such minimal use if  likely ok,  - follow-up with PCP      Discharge Medications:  MED REC REQUIRED  Post Medication Reconciliation Status:  Medication reconciliation previously completed during another office visit      Current Outpatient Medications   Medication Sig Dispense Refill     acetaminophen (TYLENOL) 325 MG tablet Take 2 tablets (650 mg) by mouth every 6 hours as needed for pain or fever       atenolol (TENORMIN) 25 MG tablet Take 1 tablet (25 mg) by mouth 2 times daily 30 tablet 3     atorvastatin (LIPITOR) 10 MG tablet Take 1 tablet (10 mg) by mouth daily (Patient taking differently: Take 10 mg by mouth every evening) 90 tablet 3     blood glucose monitoring (ACCU-CHEK COMPACT CARE KIT) meter device kit Use to test blood sugars 1 times daily or as directed. May sub formulary meter 1 kit 0     blood glucose monitoring (NO BRAND SPECIFIED) test strip Use to test blood sugars twice times daily or as directed 1 Box 3     diltiazem ER COATED BEADS (CARDIZEM CD/CARTIA XT) 180 MG 24 hr capsule Take 1 capsule (180 mg) by mouth daily       ferrous sulfate (FEROSUL) 325 (65 Fe) MG tablet Take 1 tablet (325 mg) by mouth Every Mon, Wed, Fri Morning 45 tablet 3     furosemide (LASIX) 20 MG tablet Take 1 tablet (20 mg) by mouth daily 30 tablet 11     gabapentin (NEURONTIN) 300 MG capsule TAKE 2 CAPSULES IN THE EVENING (Patient taking differently: Take 600 mg by mouth every evening TAKE 2 CAPSULES IN THE EVENING) 180 capsule 3     magnesium chloride 535 (64 Mg) MG TBEC CR tablet Take 1 tablet (535 mg) by mouth 2 times daily       metFORMIN (GLUCOPHAGE) 500 MG tablet Take 0.5 tablets (250 mg) by mouth 2 times daily (with meals)       omeprazole (PRILOSEC) 40 MG DR capsule Take 1 capsule (40 mg) by mouth daily for 30 days Take 30-60 min before breakfast  1     rivaroxaban ANTICOAGULANT (XARELTO) 20 MG TABS tablet Take 1 tablet (20 mg) by mouth daily (with dinner) 90 tablet 3     sennosides (SENOKOT) 8.6 MG tablet Take 1 tablet by mouth  "daily And 1 tab PO daily PRN       sucralfate (CARAFATE) 1 GM/10ML suspension Take 10 mLs (1 g) by mouth 2 times daily 414 mL 0     Controlled medications:   not applicable/none     Past Medical History:   Past Medical History:   Diagnosis Date     Depressive disorder      Diabetes (H)      Esophageal reflux      Other abnormal glucose      Other premature beats      Unspecified essential hypertension      Physical Exam:   Vitals: BP (!) 140/96   Pulse 96   Temp 97.5  F (36.4  C)   Resp 18   Ht 1.626 m (5' 4\")   Wt 94.8 kg (209 lb)   LMP  (LMP Unknown)   SpO2 97%   BMI 35.87 kg/m    BMI: Body mass index is 35.87 kg/m .  GENERAL APPEARANCE:  Alert, in no distress, cooperative,   RESP:  lungs clear to auscultation , no respiratory distress   CV:  regular rate and rhythm, no murmur, rub, or gallop, 2-3+ edema BLE, non pitting  ABDOMEN:  bowel sounds normal,   M/S:   no gross joint deformities  NEURO:   Cn 2-12 grossly intact,   PSYCH:  oriented X 3, affect and mood normal       SNF labs: Recent labs in [facility] reviewed by me today.     DISCHARGE PLAN:    Follow up labs: No labs orders/due    Medical Follow Up:     Follow up with primary care provider in 2 weeks    Follow up with specialist cardiology, GI   Current [place] scheduled appointments: None.    Discharge Services: Home Care: Physical Therapy, Occupational Therapy, Registered Nurse, Home Health Aide, Speech Therapy. From: [facility]    Discharge Instructions Verbalized to Patient at Discharge:     Weigh yourself daily in the morning and keep a record. Call your primary clinic: a) if you are more short of breath, or b) if your weight changes more than 3 pounds in one day or more than 5 pounds in one week.     TOTAL DISCHARGE TIME: Greater than 30 minutes  Electronically signed by:  ALEX Hall CNP     Documentation of Face to Face and Certification for Home Health Services    I certify that services are/were furnished while this patient " was under the care of a physician and that a physician or an allowed non-physician practitioner (NPP), had a face-to-face encounter that meets the physician face-to-face encounter requirements. The encounter was in whole, or in part, related to the primary reason for home health. The patient is confined to his/her home and needs intermittent skilled nursing, physical therapy, speech-language pathology, or the continued need for occupational therapy. A plan of care has been established by a physician and is periodically reviewed by a physician.  Date of Face-to-Face Encounter: 3/16/2023.    I certify that, based on my findings, the following services are medically necessary home health services: Nursing, Occupational Therapy, Physical Therapy, Speech Language Therapy and home health aide.    My clinical findings support the need for the above skilled services because: Requires assistance of another person or specialized equipment to access medical services because patient: Is unable to walk greater than 150 feet without rest...    Patient to re-establish plan of care with their PCP within 7-10 days after leaving the facility to reestablish care.  Medicare certified PECOS provider: ALEX Hall CNP  Date: March 16, 2023    Dr.Yasser Meli MD, signing F2F and only signing for initial order. Please send all follow up questions and concerns or needed follow up signatures to the PCP, who [facility] has on file as:  Yecenia Armstrong.

## 2023-03-13 NOTE — TELEPHONE ENCOUNTER
[facility] Geriatrics Lab Note     Provider: ALEX Pham CNP  Facility: [facility]  Facility Type:  TCU    Allergies   Allergen Reactions     Zoloft [Sertraline] Palpitations, Diarrhea and Cramps       Labs Reviewed by provider: Phlebotomist missed twice and was unable to draw Magnesium level today. Lab rescheduled for 3/15/23.     Verbal Order/Direction given by Provider: NP notified of rescheduled lab draw    Provider giving Order:  ALEX Pham CNP, RN

## 2023-03-14 ENCOUNTER — DOCUMENTATION ONLY (OUTPATIENT)
Dept: OTHER | Facility: CLINIC | Age: 88
End: 2023-03-14

## 2023-03-14 ENCOUNTER — LAB REQUISITION (OUTPATIENT)
Dept: LAB | Facility: CLINIC | Age: 88
End: 2023-03-14

## 2023-03-14 DIAGNOSIS — E83.42 HYPOMAGNESEMIA: ICD-10-CM

## 2023-03-15 ENCOUNTER — VIRTUAL VISIT (OUTPATIENT)
Dept: GASTROENTEROLOGY | Facility: CLINIC | Age: 88
End: 2023-03-15
Payer: COMMERCIAL

## 2023-03-15 ENCOUNTER — TELEPHONE (OUTPATIENT)
Dept: GERIATRICS | Facility: CLINIC | Age: 88
End: 2023-03-15

## 2023-03-15 ENCOUNTER — TELEPHONE (OUTPATIENT)
Dept: GASTROENTEROLOGY | Facility: CLINIC | Age: 88
End: 2023-03-15

## 2023-03-15 DIAGNOSIS — K44.9 HIATAL HERNIA: ICD-10-CM

## 2023-03-15 DIAGNOSIS — K21.9 GASTROESOPHAGEAL REFLUX DISEASE WITHOUT ESOPHAGITIS: ICD-10-CM

## 2023-03-15 DIAGNOSIS — E83.42 HYPOMAGNESEMIA: ICD-10-CM

## 2023-03-15 DIAGNOSIS — K22.89 PRESBYESOPHAGUS: Primary | ICD-10-CM

## 2023-03-15 DIAGNOSIS — R10.13 EPIGASTRIC PAIN: ICD-10-CM

## 2023-03-15 LAB — MAGNESIUM SERPL-MCNC: 1.5 MG/DL (ref 1.7–2.3)

## 2023-03-15 PROCEDURE — 83735 ASSAY OF MAGNESIUM: CPT | Performed by: NURSE PRACTITIONER

## 2023-03-15 PROCEDURE — P9603 ONE-WAY ALLOW PRORATED MILES: HCPCS | Performed by: NURSE PRACTITIONER

## 2023-03-15 PROCEDURE — 99214 OFFICE O/P EST MOD 30 MIN: CPT | Mod: VID | Performed by: NURSE PRACTITIONER

## 2023-03-15 RX ORDER — SUCRALFATE ORAL 1 G/10ML
1 SUSPENSION ORAL 2 TIMES DAILY
Qty: 414 ML | Refills: 0 | Status: SHIPPED | OUTPATIENT
Start: 2023-03-15 | End: 2023-04-18

## 2023-03-15 NOTE — TELEPHONE ENCOUNTER
Spoke with Daughter Emmy.  Patient is currently in Transitional Care in [place] and will be discharged on Friday with home care services being provided by [facility].    Daughter did provide number for Arlen, [phone] at [facility].  Arlen is working with home care agency for discharge orders.  Message left with Arlen to see about adding Speech Therapy for Esophageal Dysmotility.     Sagrario Schulz RN

## 2023-03-15 NOTE — TELEPHONE ENCOUNTER
Order relayed to Lily Park RN at [facility] RN    ----- Message from ALEX Phillips CNP sent at 3/15/2023 12:20 PM CDT -----  Mag remains lows, let's change her magnesium chloride CR (64mg) to 1 tab PO BID. She is discharging Friday so will need to follow-up with PCP.     Thanks,  Shakila

## 2023-03-15 NOTE — PATIENT INSTRUCTIONS
"It was a pleasure taking care of you today.  I've included a brief summary of our discussion and care plan from today's visit below.  Please review this information with your primary care provider.  ______________________________________________________________________    My recommendations are summarized as follows:    As we discussed today, I ordered speech therapy to work on improvement of your swallowing.    2. Please continue omeprazole at the current dose for other 2-3 months. Then, can try reducing the dose to 20 mg a day.    3. Continue sucralfate 1 gram twice a day for other 4-5 weeks. Then, stop the medication.    4.  Contact the clinic if your GI symptoms become worse.    Return to GI Clinic in 6 months to review your progress.    ______________________________________________________________________    What is dysphagia?  Dysphagia is the medical term for \"trouble swallowing.\" Sometimes, dysphagia happens if you eat too fast or don't chew your food well enough. But if you have dysphagia, you might have a serious medical problem that needs to be treated right away.  What causes dysphagia?  Dysphagia is usually caused by a problem in the upper part of your digestive tract.  Often, the problem is in the esophagus, the tube that connects your mouth to your stomach. But it can also happen because of a problem in the mouth or throat.  What are the symptoms of dysphagia?  The symptoms include:  ?Not being able to swallow  ?Pain while swallowing  ?Feeling like food is stuck in your throat or chest  ?Coughing or gagging while swallowing  ?Drooling  ?Trouble speaking  Are there tests for dysphagia?  Yes. Your doctor or nurse will do an exam and ask about your symptoms. Other tests might include:  ? Barium X-ray - For this test, you drink a thick liquid called a \"barium solution.\" It coats the inside of your esophagus. The barium shows up on X-rays, so the doctor can see any problems in the esophagus.  ?Swallowing " "study - For this test, you eat different foods coated with barium.  X-rays taken throughout the test show if you have problems with the muscles in your mouth or throat. This test is also called a \"videofluoroscopy.\"  ?Upper endoscopy - For this test, the doctor puts a thin, flexible tube into your mouth, down your throat, and into your esophagus. The tube (called an endoscope) has a camera and a light on it, so the doctor can see inside the esophagus.  ?Manometry - This test measures the pressure at different places inside your esophagus. To do this, a small tube that senses pressure is inserted through your nose, down your throat, and into your esophagus. The results of the test can tell your doctor how well the muscles that help you swallow are working.  How is dysphagia treated?  The treatment depends on what is causing the dysphagia.  If you have dysphagia because of a problem in your mouth and the upper part of your throat, your doctor might refer you to a speech or swallowing specialist. This person can teach you exercises to help you swallow, and might also suggest ways to change your diet.  If the problem affects your esophagus, treatments can include:  ?Esophageal dilation - For this procedure, the doctor uses an endoscope (see above) with a special balloon on the end to gently stretch and widen your esophagus.  ?Surgery - Doctors can do surgery to remove any tumors or other abnormal tissue in the esophagus.  ?Medicines - Medicines used to treat dysphagia include:  Medicines that reduce stomach acid, such as proton pump inhibitors (omeprazole, pantoprazole)  Medicines to treat an infection of the esophagus     ______________________________________________________________________    Who do I call with any questions after my visit?  Please be in touch if there are any further questions that arise following today's visit.  There are multiple ways to contact your gastroenterology care team.      During " business hours, you may reach a Gastroenterology nurse at [phone], option 3.     To schedule or reschedule an appointment, please call [phone].   To schedule your imaging studies (CT, MRI, ultrasound)  call [phone] (or toll-free # [phone])  To schedule your lab work at [facility], please call [phone]    You can always send a secure message through Blekko.  Blekko messages are answered by your nurse or doctor typically within 24 hours.  Please allow extra time on weekends and holidays.      For urgent/emergent questions after business hours, you may reach the on-call GI Fellow by contacting the [facility]  at [phone].    In order for your refill to be processed in a timely fashion, it is your responsibility to ensure you follow the recommendations from your provider regarding your laboratory studies and follow up appointments.       How will I get the results of any tests ordered?    You will receive all of your results.  If you have signed up for [employer], any tests ordered at your visit will be available to you after your physician reviews them.  Typically this takes 1-2 weeks.  If there are urgent results that require a change in your care plan, your physician or nurse will call you to discuss the next steps.   What is Blekko?  Blekko is a secure way for you to access all of your healthcare records from the [facility].  It is a web based computer program, so you can sign on to it from any location.  It also allows you to send secure messages to your care team.  I recommend signing up for Blekko access if you have not already done so and are comfortable with using a computer.    How to I schedule a follow-up visit?  If you did not schedule a follow-up visit today, please call [phone] to schedule a follow-up office visit.      Sincerely,  BRITTNEY Cabrera,  [facility],  Division of Gastroenterology    ([facility])

## 2023-03-15 NOTE — PROGRESS NOTES
GASTROENTEROLOGY RETURN VIRTUAL VISIT      How would you like to obtain your AVS? Mail a copy  If the video visit is dropped, the invitation should be resent by: Text to cell phone: [phone]  Will anyone else be joining your video visit? Daughter Emmy Loaiza will be with patient for visit.    Video-Visit Details    Type of service:  Video Visit    Video Start Time (time video started): 0934    Video End Time (time video stopped): 1001    Originating Location (pt. Location): Assisted Living        Distant Location (provider location):  Off-site    Mode of Communication:  Video Conference via scanR    Physician has received verbal consent for a Video Visit from the patient? Yes    Gastroenterology NEW PATIENT / RETURN VIRTUAL VISIT    CC/REFERRING PROVIDER: No ref. provider found  REASON FOR CONSULTATION: follow up on abdominal pain    HPI: 87 year old female with PMH of 2 diabetes mellitus, CKD stage III, paroxysmal atrial fibrillation, CHF, and morbid obesity presenting to GI clinic for one month follow up. 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 Has been followed by cardiology.   Her daughter Emmy is present and participates in conversation.    I saw the patient on 2/6 for upper abdominal pain for 4 months and acid reflux. Switched the patient from Nexium to omeprazole 40 mg twice a day for one month, followed by 40 mg daily. Provided prescription for sucralfate and added famotidine in the evening. Noted finding of a large hiatal her rose mary on CT. Suspected dysphagia and referred the patient to XR esophagram, which she completed 2 days ago and would like to review findings of the test.  Patient reported intermittent postprandial upper abdominal pains. It was worse about one week ago, when she was undergoing several cardiac tests. No pain today. Concerned that pain persists despite interventions and abstaining spicy and greasy  food. Takes medications as prescribed. Denies aspirating or chocking on foods or liquids but at times, has difficulty swallowing pills. No nausea or vomiting. No changes in bowel habits. No black stools.    Copy from previous visit HPI:  Patient complains of pain from her throat all way down to below her ribs. Feels like pressure and burning. Denies dysphagia, but stated that she has been coughing a lot (chronic issue). She denies correlation of pain with food intake.   Complaints of occasional burping.  She was prescribed Carafate by emergency room provider, but she thought that it makes her symptoms worse. Said that her symptoms are worse in the morning. She sleeps in adjustable bed and raises her head up. Denies nausea or vomiting.    Describes her bowel movements are regular, once a day, soft stools.  Regarding her appetite, patient states that some days she feels hungry and eats well, other days she might skip meals.  Her daughter is concerned that patient is losing weight gradually.     ROS: 10pt ROS performed and otherwise negative.    PAST MEDICAL HISTORY:  Past Medical History:   Diagnosis Date     Depressive disorder      Diabetes (H)      Esophageal reflux      Other abnormal glucose      Other premature beats      Unspecified essential hypertension        PREVIOUS ABDOMINAL/GYNECOLOGIC SURGERIES:    Past Surgical History:   Procedure Laterality Date     ANESTHESIA CARDIOVERSION N/A 12/29/2022    Procedure: CARDIOVERSION;  Surgeon: GENERIC ANESTHESIA PROVIDER;  Location:  OR     APPENDECTOMY       ARTHROPLASTY KNEE  10/30/2013    Procedure: ARTHROPLASTY KNEE;  Right Total Knee Arthroplasty;  Surgeon: Ousmane Mcgowan MD;  Location: [facility] OR     ARTHROPLASTY KNEE Left 4/3/2019    Procedure: Left Total Knee Arthroplasty;  Surgeon: Ousmane Mcgowan MD;  Location: [facility] OR     CHOLECYSTECTOMY, LAPOROSCOPIC      Cholecystectomy, Laparoscopic     DILATION AND CURETTAGE, OPERATIVE HYSTEROSCOPY WITH MORCELLATOR,  COMBINED N/A 6/20/2016    Procedure: COMBINED DILATION AND CURETTAGE, OPERATIVE HYSTEROSCOPY WITH MORCELLATOR;  Surgeon: Bony Arredondo MD;  Location: [facility] OR     ESOPHAGOSCOPY, GASTROSCOPY, DUODENOSCOPY (EGD), COMBINED N/A 2/17/2016    Procedure: COMBINED ESOPHAGOSCOPY, GASTROSCOPY, DUODENOSCOPY (EGD), BIOPSY SINGLE OR MULTIPLE;  Surgeon: Mil Guevara MD;  Location: [facility] GI     SALPINGO OOPHORECTOMY,R/L/OZZY      Salpingo Oophorectomy, RT/LT/OZZY     TONSILLECTOMY           PERTINENT MEDICATIONS:  Current Outpatient Medications   Medication Sig Dispense Refill     acetaminophen (TYLENOL) 325 MG tablet Take 2 tablets (650 mg) by mouth every 6 hours as needed for pain or fever       atenolol (TENORMIN) 25 MG tablet Take 1 tablet (25 mg) by mouth 2 times daily 30 tablet 3     atorvastatin (LIPITOR) 10 MG tablet Take 1 tablet (10 mg) by mouth daily (Patient taking differently: Take 10 mg by mouth every evening) 90 tablet 3     blood glucose monitoring (ACCU-CHEK COMPACT CARE KIT) meter device kit Use to test blood sugars 1 times daily or as directed. May sub formulary meter (Patient not taking: Reported on 2/21/2023) 1 kit 0     blood glucose monitoring (NO BRAND SPECIFIED) test strip Use to test blood sugars twice times daily or as directed (Patient not taking: Reported on 2/21/2023) 1 Box 3     diltiazem ER COATED BEADS (CARDIZEM CD/CARTIA XT) 180 MG 24 hr capsule Take 1 capsule (180 mg) by mouth daily       famotidine (PEPCID) 20 MG tablet Take 1 tablet (20 mg) by mouth nightly as needed (heartburn) 60 tablet 1     ferrous sulfate (FEROSUL) 325 (65 Fe) MG tablet Take 1 tablet (325 mg) by mouth Every Mon, Wed, Fri Morning 45 tablet 3     furosemide (LASIX) 20 MG tablet Take 1 tablet (20 mg) by mouth daily 30 tablet 11     gabapentin (NEURONTIN) 300 MG capsule TAKE 2 CAPSULES IN THE EVENING (Patient taking differently: Take 600 mg by mouth every evening TAKE 2 CAPSULES IN THE EVENING) 180 capsule 3      magnesium chloride 535 (64 Mg) MG TBEC CR tablet Take 1 tablet (535 mg) by mouth daily       metFORMIN (GLUCOPHAGE) 500 MG tablet Take 0.5 tablets (250 mg) by mouth 2 times daily (with meals)       omeprazole (PRILOSEC) 40 MG DR capsule Take 1 capsule (40 mg) by mouth daily for 30 days Take 30-60 min before breakfast  1     rivaroxaban ANTICOAGULANT (XARELTO) 20 MG TABS tablet Take 1 tablet (20 mg) by mouth daily (with dinner) 90 tablet 3     sennosides (SENOKOT) 8.6 MG tablet Take 1 tablet by mouth daily And 1 tab PO daily PRN       sucralfate (CARAFATE) 1 GM/10ML suspension Take 10 mLs (1 g) by mouth 2 times daily         No other OTC/herbal/supplements reported by patient.    SOCIAL HISTORY:  Social History     Socioeconomic History     Marital status:      Spouse name: Not on file     Number of children: Not on file     Years of education: Not on file     Highest education level: Not on file   Occupational History     Not on file   Tobacco Use     Smoking status: Never     Smokeless tobacco: Never   Vaping Use     Vaping Use: Never used   Substance and Sexual Activity     Alcohol use: No     Drug use: No     Sexual activity: Never   Other Topics Concern     Parent/sibling w/ CABG, MI or angioplasty before 65F 55M? Not Asked   Social History Narrative     Not on file     Social Determinants of Health     Financial Resource Strain: Not on file   Food Insecurity: Not on file   Transportation Needs: Not on file   Physical Activity: Not on file   Stress: Not on file   Social Connections: Not on file   Intimate Partner Violence: Not on file   Housing Stability: Not on file       FAMILY HISTORY:  Denies colon/panc/esophageal/other GI CA, no other Love or other HPS-related [place]. No IBD/celiac, no other AI/liver/thyroid disease.    Family History   Problem Relation Age of Onset     Heart Disease Mother      Heart Disease Father      Heart Disease Brother      Heart Disease Brother      Heart Disease Brother         PHYSICAL EXAMINATION:  Vitals reviewed  LMP  (LMP Unknown)     General: Patient appears well in no acute distress.   Skin: No visualized rash or lesions on visualized skin  Eyes: EOMI, no erythema, sclera icterus or discharge noted  Resp: breathing comfortably without accessory muscle usage, speaking in full sentences, no cough  MSK: Appears to have normal range of motion based on visualized movements  Neurologic: No apparent tremors, facial movements symmetric  Psych: affect normal, alert and oriented      PERTINENT STUDIES Reviewed in EMR  3/13/23 Esophagram  IMPRESSION:  1. Limited single contrast esophagram as described above. Mild  narrowing of the distal thoracic esophagus appears to be due to  contraction and does open up during the exam.  2. Small hiatal hernia.  3. Dysmotility with some escape from the primary stripping wave which  is not completely cleared by secondary stripping waves. Stasis of the  contrast is mostly in the upper thoracic esophagus. Tertiary  contractions are also noted (presbyesophagus).  4. No definite gastroesophageal reflux was seen.  5. A 1.3 cm diameter barium pill passed through the esophagus and  through the gastroesophageal junction into the gastric cardia (which  is in the hiatal hernia). This did not pass into the remaining portion  of the stomach until it dissolved.      ASSESSMENT/PLAN:  87 year old female  presented  to GI clinic for one month follow up on substernal discomfort and upper abdominal pain. Patient underwent esophagram two days ago. We reviewed the study report  and I explained to the patient and her daughter significance of the findings of mild distal esophageal stricture, hiatal hernia, esophageal dysmotility, and presbyesophagus.  We explored options of symptomatic management versus more aggressive/invasive testing for other possible causes of patient's abdominal discomfort. We briefly discussed gastroparesis symptoms and gastric emptying study and  EGD, and decided to postpone additional tests. I explained risks for EGD and anesthesia complications given the patient's advanced age and cardiac history.  Most likely, the patient's weight loss is related to CHF exacerbation.  Patient had EGD in February 2016, which showed 5 mm sessile polyp in the stomach. She had normal colonoscopy back in 2008.  CT scan completed in October 2022 showed large sliding hiatal hernia.     Recommended the following:  -Continue 40 mg of omeprazole daily for another 2 or 3 months. Then, attempt to reduce the dose to 20 mg a day. Educated on correct administration of the medication. Patient's daughter mentioned that they have no control over timing of the medication in the transition unit, but the patient takes it when staff brings her pills.  - Continue sucralfate 1 gram twice a day for 4-5 weeks.  - Referral to a speech therapist. Asked our GI care manager to assist with scheduling. Patient is currently in TCU, posthospitalization.  - Educated on prevention of aspiration. Do not lay down after eating, stay in upright position for at least 30 minutes. Eat slow, chew well. Use chin tuck technique for swallowing. Consume small frequent meals.  - If abdominal pain persist, may refer the patient to gastric emptying study or provide a trial of Reglan.      ICD-10-CM    1. Presbyesophagus  K22.89 Speech Therapy Referral      2. Gastroesophageal reflux disease without esophagitis  K21.9 Adult GI Clinic Follow-Up Order (Blank)     Speech Therapy Referral     sucralfate (CARAFATE) 1 GM/10ML suspension      3. Epigastric pain  R10.13 Adult GI Clinic Follow-Up Order (Blank)     sucralfate (CARAFATE) 1 GM/10ML suspension      4. Hiatal hernia  K44.9 Adult GI Clinic Follow-Up Order (Blank)     Speech Therapy Referral     sucralfate (CARAFATE) 1 GM/10ML suspension          RTC in 6 months    Thank you for this consultation. It was a pleasure to participate in the care of this patient; please  contact us with any further questions.    ALEX Cabrera, OTIS-CARLOS  [facility]  Gastroenterology Department  [place], [place]    This note was created with Dragon voice recognition software, and while reviewed for accuracy, inadvertent minor typographic errors may occur. Please contact the provider if you have any questions.

## 2023-03-16 ENCOUNTER — DISCHARGE SUMMARY NURSING HOME (OUTPATIENT)
Dept: GERIATRICS | Facility: CLINIC | Age: 88
End: 2023-03-16
Payer: COMMERCIAL

## 2023-03-16 VITALS
DIASTOLIC BLOOD PRESSURE: 96 MMHG | TEMPERATURE: 97.5 F | WEIGHT: 209 LBS | BODY MASS INDEX: 35.68 KG/M2 | RESPIRATION RATE: 18 BRPM | SYSTOLIC BLOOD PRESSURE: 140 MMHG | OXYGEN SATURATION: 97 % | HEIGHT: 64 IN | HEART RATE: 96 BPM

## 2023-03-16 DIAGNOSIS — K44.9 HIATAL HERNIA: ICD-10-CM

## 2023-03-16 DIAGNOSIS — N18.31 STAGE 3A CHRONIC KIDNEY DISEASE (H): ICD-10-CM

## 2023-03-16 DIAGNOSIS — I50.33 ACUTE ON CHRONIC HEART FAILURE WITH PRESERVED EJECTION FRACTION (HFPEF) (H): ICD-10-CM

## 2023-03-16 DIAGNOSIS — K59.01 SLOW TRANSIT CONSTIPATION: ICD-10-CM

## 2023-03-16 DIAGNOSIS — I48.91 ATRIAL FIBRILLATION WITH RVR (H): Primary | ICD-10-CM

## 2023-03-16 DIAGNOSIS — I48.0 PAROXYSMAL ATRIAL FIBRILLATION (H): ICD-10-CM

## 2023-03-16 DIAGNOSIS — E83.42 HYPOMAGNESEMIA: ICD-10-CM

## 2023-03-16 DIAGNOSIS — R53.81 PHYSICAL DECONDITIONING: ICD-10-CM

## 2023-03-16 DIAGNOSIS — K22.4 ESOPHAGEAL DYSMOTILITY: ICD-10-CM

## 2023-03-16 DIAGNOSIS — F41.9 ANXIETY: ICD-10-CM

## 2023-03-16 DIAGNOSIS — I25.10 CORONARY ARTERY CALCIFICATION SEEN ON CT SCAN: ICD-10-CM

## 2023-03-16 DIAGNOSIS — D64.9 ANEMIA, UNSPECIFIED TYPE: ICD-10-CM

## 2023-03-16 DIAGNOSIS — K21.9 GASTROESOPHAGEAL REFLUX DISEASE WITHOUT ESOPHAGITIS: ICD-10-CM

## 2023-03-16 DIAGNOSIS — I10 BENIGN ESSENTIAL HYPERTENSION: ICD-10-CM

## 2023-03-16 DIAGNOSIS — E11.9 TYPE 2 DIABETES MELLITUS WITHOUT COMPLICATION, WITHOUT LONG-TERM CURRENT USE OF INSULIN (H): ICD-10-CM

## 2023-03-16 PROCEDURE — 99316 NF DSCHRG MGMT 30 MIN+: CPT | Performed by: NURSE PRACTITIONER

## 2023-03-16 RX ORDER — DILTIAZEM HYDROCHLORIDE 180 MG/1
180 CAPSULE, COATED, EXTENDED RELEASE ORAL DAILY
Qty: 30 CAPSULE | Refills: 0 | Status: ON HOLD | OUTPATIENT
Start: 2023-03-16 | End: 2023-05-11

## 2023-03-16 NOTE — TELEPHONE ENCOUNTER
Spoke with Arlen WHITE at TCU.  Home care agency does provide speech therapy.  Arlen will ask discharging provider to write order for ST at time of discharge.  If provider is uncomfortable, Arlen will let GI team know and GI provider can send order.     Daughter Emmy updated.     Sagrario Schulz RN

## 2023-03-16 NOTE — LETTER
3/16/2023        RE: Theodora Mneg  [address]  [place] [address]        [facility] GERIATRICS DISCHARGE SUMMARY  Patient Name: Theodora Meng  YOB: 1935  [place] Medical Record Number: [id number]  Place of Service Where Encounter Took Place: [facility] (TCU) [[id number]]    PRIMARY CARE PROVIDER AND CLINIC RESPONSIBLE AFTER TRANSFER: Yecenia Cardona MD, [address] [place] [address] [place] [address]; [facility] Provider     Transferring providers: ALEX Pham CNP; Nick Garrison MD  Recent Hospitalization/ED: [facility] stay 2/23/23 to 3/3/23.  Date of SNF Admission: March 03, 2023  Date of SNF (anticipated) Discharge: March 17, 2023  Discharged to: previous independent home  Cognitive Scores: BIMS: 14/15  Physical Function: Ambulating 150-200 ft with supervision, transfers independently  DME: Walker    CODE STATUS/ADVANCE DIRECTIVES DISCUSSION: Prior - CPR/Full Code  ALLERGIES: Zoloft [sertraline]    NURSING FACILITY COURSE   Medication Changes/Rationale:     Magnesium increased to BID due to Mg level of 1.5    Summary of nursing facility stay:   Brief Hospital Course: PMH of Afib, HTN, DM2, CAD, who presented with fatigue and SOB. Found to be in Afib with RVR with HR in the 140s. BNP elevated, and felt to be in CHF exacerbation.  Was started on diltiazem gtt and atenolol resumed. Was transitioned to oral diltiazem, had some dizziness so diltiazem dose reduced. Was diuresed with IV lasix. Did have elevated troponin, but not felt to be due to ischemia. HR stabilized and felt to be at dry weight. Potassium and magnesium replaced. Was started on metformin for A1C of 7.9%. When medically stable was discharged to TCU for further rehab and medical management    Atrial fibrillation with RVR (H)  Presented with HR up to the 140s, New on diltiazem. HR controlled in TCU 70-90, did have some palpitations last night, but PM dose of atenolol  was found on room floor this AM. No further palpitations this morning.   - continue diltiazem ER 180mg daily, atenolol 25mg BID  - anticoagulated on Xarelto,   - Follow-up with cardiology as scheduled on 3/17    Acute on chronic heart failure with preserved ejection fraction (HFpEF) (H)  Benign essential hypertension  Coronary artery calcification seen on CT scan  Had echo done while IP, reviewed results: EF 55%. Appears compensated currently. Weight stable ~210lbs, -130. Had stress test on 3/8, was negative for ischemia or infarction, normal LV function. Chronic lymphedema to BLE which was stable.   - continue atenolol, diltiazem, lasix 20mg daily, atorvastatin  - daily weight  - follow-up with cardiology as scheduled on 3/17  - low salt diet    Anemia, unspecified type  Recent Hgb stable ~11  - continue ferrous sulfate M/W/F       Type 2 diabetes mellitus without complication, without long-term current use of insulin (H)  A1C 7.9%, was started on metformin while IP. -200 in TCU   - continue metformin 250mg BID with meals   - follow-up with PCP    Stage 3a chronic kidney disease (H)  Baseline creatinine ~0.8, stable    Gastroesophageal reflux disease without esophagitis  Esophageal dysmotility  Hiatal hernia  Follows with GI, had esophagram on 3/13, showed small hiatal hernia, dysmotility, mostly in the upper thoracic esophagus.    - SLP on with home care per request of GI  - continue omeprazole 40mg daily, sucralfate BID, famotidine BID PRN  - follow-up with GI    Hypomagnesemia  In the the setting of PPI use, was started on Slow mag while IP, increased to BID in TCU for Mg Level of 1.5  - continue Slow Mag 1 tab BID, follow-up with PCP    Physical deconditioning  IN the setting of hospitalization. Made progress with therapy-  - home OT, PT, RN, HHA, and SLP    Slow transit constipation   BM  - continue senna-s daily and 1 tab daily PRN    Anxiety  Rare, report she uses ativan ~8 times a year to help  her sleep when she has bad nights and is anxious. Discussed that such minimal use if likely ok,  - follow-up with PCP      Discharge Medications:  MED REC REQUIRED  Post Medication Reconciliation Status:  Medication reconciliation previously completed during another office visit      Current Outpatient Medications   Medication Sig Dispense Refill     acetaminophen (TYLENOL) 325 MG tablet Take 2 tablets (650 mg) by mouth every 6 hours as needed for pain or fever       atenolol (TENORMIN) 25 MG tablet Take 1 tablet (25 mg) by mouth 2 times daily 30 tablet 3     atorvastatin (LIPITOR) 10 MG tablet Take 1 tablet (10 mg) by mouth daily (Patient taking differently: Take 10 mg by mouth every evening) 90 tablet 3     blood glucose monitoring (ACCU-CHEK COMPACT CARE KIT) meter device kit Use to test blood sugars 1 times daily or as directed. May sub formulary meter 1 kit 0     blood glucose monitoring (NO BRAND SPECIFIED) test strip Use to test blood sugars twice times daily or as directed 1 Box 3     diltiazem ER COATED BEADS (CARDIZEM CD/CARTIA XT) 180 MG 24 hr capsule Take 1 capsule (180 mg) by mouth daily       ferrous sulfate (FEROSUL) 325 (65 Fe) MG tablet Take 1 tablet (325 mg) by mouth Every Mon, Wed, Fri Morning 45 tablet 3     furosemide (LASIX) 20 MG tablet Take 1 tablet (20 mg) by mouth daily 30 tablet 11     gabapentin (NEURONTIN) 300 MG capsule TAKE 2 CAPSULES IN THE EVENING (Patient taking differently: Take 600 mg by mouth every evening TAKE 2 CAPSULES IN THE EVENING) 180 capsule 3     magnesium chloride 535 (64 Mg) MG TBEC CR tablet Take 1 tablet (535 mg) by mouth 2 times daily       metFORMIN (GLUCOPHAGE) 500 MG tablet Take 0.5 tablets (250 mg) by mouth 2 times daily (with meals)       omeprazole (PRILOSEC) 40 MG DR capsule Take 1 capsule (40 mg) by mouth daily for 30 days Take 30-60 min before breakfast  1     rivaroxaban ANTICOAGULANT (XARELTO) 20 MG TABS tablet Take 1 tablet (20 mg) by mouth daily (with  "dinner) 90 tablet 3     sennosides (SENOKOT) 8.6 MG tablet Take 1 tablet by mouth daily And 1 tab PO daily PRN       sucralfate (CARAFATE) 1 GM/10ML suspension Take 10 mLs (1 g) by mouth 2 times daily 414 mL 0     Controlled medications:   not applicable/none     Past Medical History:   Past Medical History:   Diagnosis Date     Depressive disorder      Diabetes (H)      Esophageal reflux      Other abnormal glucose      Other premature beats      Unspecified essential hypertension      Physical Exam:   Vitals: BP (!) 140/96   Pulse 96   Temp 97.5  F (36.4  C)   Resp 18   Ht 1.626 m (5' 4\")   Wt 94.8 kg (209 lb)   LMP  (LMP Unknown)   SpO2 97%   BMI 35.87 kg/m    BMI: Body mass index is 35.87 kg/m .  GENERAL APPEARANCE:  Alert, in no distress, cooperative,   RESP:  lungs clear to auscultation , no respiratory distress   CV:  regular rate and rhythm, no murmur, rub, or gallop, 2-3+ edema BLE, non pitting  ABDOMEN:  bowel sounds normal,   M/S:   no gross joint deformities  NEURO:   Cn 2-12 grossly intact,   PSYCH:  oriented X 3, affect and mood normal       SNF labs: Recent labs in [facility] reviewed by me today.     DISCHARGE PLAN:    Follow up labs: No labs orders/due    Medical Follow Up:     Follow up with primary care provider in 2 weeks    Follow up with specialist cardiology, GI   Current [place] scheduled appointments: None.    Discharge Services: Home Care: Physical Therapy, Occupational Therapy, Registered Nurse, Home Health Aide, Speech Therapy. From: [facility]    Discharge Instructions Verbalized to Patient at Discharge:     Weigh yourself daily in the morning and keep a record. Call your primary clinic: a) if you are more short of breath, or b) if your weight changes more than 3 pounds in one day or more than 5 pounds in one week.     TOTAL DISCHARGE TIME: Greater than 30 minutes  Electronically signed by:  ALEX Hall CNP     Documentation of Face to Face and Certification for " Home Health Services    I certify that services are/were furnished while this patient was under the care of a physician and that a physician or an allowed non-physician practitioner (NPP), had a face-to-face encounter that meets the physician face-to-face encounter requirements. The encounter was in whole, or in part, related to the primary reason for home health. The patient is confined to his/her home and needs intermittent skilled nursing, physical therapy, speech-language pathology, or the continued need for occupational therapy. A plan of care has been established by a physician and is periodically reviewed by a physician.  Date of Face-to-Face Encounter: 3/16/2023.    I certify that, based on my findings, the following services are medically necessary home health services: Nursing, Occupational Therapy, Physical Therapy, Speech Language Therapy and home health aide.    My clinical findings support the need for the above skilled services because: Requires assistance of another person or specialized equipment to access medical services because patient: Is unable to walk greater than 150 feet without rest...    Patient to re-establish plan of care with their PCP within 7-10 days after leaving the facility to reestablish care.  Medicare certified PECOS provider: ALEX Hall CNP  Date: March 16, 2023    Dr.Yasser Meli MD, signing F2F and only signing for initial order. Please send all follow up questions and concerns or needed follow up signatures to the PCP, who [place] has on file as:  Yecenia Armstrong.              Sincerely,        ALEX Hall CNP

## 2023-03-17 ENCOUNTER — OFFICE VISIT (OUTPATIENT)
Dept: CARDIOLOGY | Facility: CLINIC | Age: 88
End: 2023-03-17
Attending: NURSE PRACTITIONER
Payer: COMMERCIAL

## 2023-03-17 VITALS
OXYGEN SATURATION: 98 % | BODY MASS INDEX: 35.94 KG/M2 | SYSTOLIC BLOOD PRESSURE: 111 MMHG | HEART RATE: 83 BPM | WEIGHT: 209.4 LBS | DIASTOLIC BLOOD PRESSURE: 67 MMHG

## 2023-03-17 DIAGNOSIS — I50.32 CHRONIC HEART FAILURE WITH PRESERVED EJECTION FRACTION (HFPEF) (H): ICD-10-CM

## 2023-03-17 DIAGNOSIS — I25.10 CORONARY ARTERY CALCIFICATION SEEN ON CT SCAN: ICD-10-CM

## 2023-03-17 DIAGNOSIS — E78.5 HYPERLIPIDEMIA LDL GOAL <70: ICD-10-CM

## 2023-03-17 DIAGNOSIS — I10 BENIGN ESSENTIAL HYPERTENSION: ICD-10-CM

## 2023-03-17 DIAGNOSIS — I48.19 PERSISTENT ATRIAL FIBRILLATION (H): Primary | ICD-10-CM

## 2023-03-17 PROCEDURE — 99215 OFFICE O/P EST HI 40 MIN: CPT | Performed by: NURSE PRACTITIONER

## 2023-03-17 PROCEDURE — 93000 ELECTROCARDIOGRAM COMPLETE: CPT | Performed by: NURSE PRACTITIONER

## 2023-03-17 NOTE — PROGRESS NOTES
Cardiology Clinic Progress Note  Theodora Meng MRN# [id number]   YOB: 1935 Age: 87 year old      Primary Cardiologist:   Dr. Starr          History of Presenting Illness:      Theodora Meng is a pleasant 87 year old patient with a past cardiac history significant for   1. Persistent atrial fibrillation/flutter    Symptomatic    Successful cardioversion 12/2022 with return of afib/flutter    Side effects abdominal pain with amio and dizziness with diltiazem     Rate control and anticoagulated  2. Coronary artery calcification    Chest CT 12/2022     stress test 2016 mild ischemia    lexiscan 3/2023 no ischemia or infarction   3. Chronic HFpEF    R/t Afib RVR     Did not tolerate torsemide due to shaking, switched to Lasix    Dry weight 214 pounds.  4. Hypertension  5. Hyperlipidemia  Past medical history significant for DM type II, GERD.    November 2022 she had shortness of breath and fatigue.  This continued even after cardioversion and starting amiodarone.  She ultimately had side effects with amiodarone and this was discontinued and pursued rate control.  She had negative cardiac work-up for chest pain in December 2022 but noted to have severe coronary calcification on CT.  Therefore, stress test recommended. In February 2023 she had elevated BNP and orthopnea, so was started on torsemide.    Patient was hospitalized in February 2023 for fatigue, weakness, SOB.  She was in A-fib RVR at 140 with heart failure.  Echo showed EF 55%, diastolic heart failure.  Heart rates were not controlled on diltiazem so added back in atenolol also.  Hospital discharge summary reviewed today.    Pt presents today, with her daughter, for hospital follow-up.  Lexiscan nuclear stress test showed no ischemia or infarction.  Results reviewed today.      EKG today, personally reviewed by me, shows atrial fibrillation with ventricular rate 91 bpm.  We reviewed checking heart rates at home and notifying us if  these are tachycardic.  We also discussed checking daily weights and low-sodium diet.  She was given written education about this today. Weight today in the clinic is 209 and was 207 at discharge.  She denies any significant heart failure symptoms and continues with mild lower extremity edema which has been stable.  She denies any anginal symptoms.  She reports weakness after her extended hospital and TCU stay. Patient reports no chest pain, shortness of breath, PND, orthopnea, presyncope, syncope, heart racing, or palpitations.      Patient prefers a female and would like to switch to Dr. Drake.        Labs:  LIPID RESULTS:  Lab Results   Component Value Date    CHOL 122 10/27/2022    CHOL 175 11/03/2020    HDL 48 (L) 10/27/2022    HDL 71 11/03/2020    LDL 49 10/27/2022    LDL 66 11/03/2020    TRIG 123 10/27/2022    TRIG 188 (H) 11/03/2020    CHOLHDLRATIO 3.0 05/15/2015       LIVER ENZYME RESULTS:  Lab Results   Component Value Date    AST 26 02/25/2023    AST 12 11/18/2020    ALT 13 02/25/2023    ALT 15 11/18/2020       CBC RESULTS:  Lab Results   Component Value Date    WBC 8.3 02/25/2023    WBC 5.1 11/18/2020    RBC 4.04 02/25/2023    RBC 4.52 11/18/2020    HGB 11.5 (L) 02/25/2023    HGB 13.4 11/18/2020    HCT 35.6 02/25/2023    HCT 41.8 11/18/2020    MCV 88 02/25/2023    MCV 93 11/18/2020    MCH 28.5 02/25/2023    MCH 29.6 11/18/2020    MCHC 32.3 02/25/2023    MCHC 32.1 11/18/2020    RDW 17.2 (H) 02/25/2023    RDW 13.2 11/18/2020     02/25/2023     11/18/2020       BMP RESULTS:  Lab Results   Component Value Date     03/06/2023     11/18/2020    POTASSIUM 4.1 03/06/2023    POTASSIUM 3.9 12/29/2022    POTASSIUM 3.6 11/18/2020    CHLORIDE 98 03/06/2023    CHLORIDE 108 03/23/2022    CHLORIDE 109 11/18/2020    CO2 26 03/06/2023    CO2 26 03/23/2022    CO2 28 11/18/2020    ANIONGAP 12 03/06/2023    ANIONGAP 5 03/23/2022    ANIONGAP 4 11/18/2020     (H) 03/06/2023     (H)  03/03/2023     (H) 03/23/2022     (H) 11/18/2020    BUN 19.7 03/06/2023    BUN 20 03/23/2022    BUN 16 11/18/2020    CR 0.78 03/06/2023    CR 0.90 11/18/2020    GFRESTIMATED 74 03/06/2023    GFRESTIMATED 58 (L) 11/04/2021    GFRESTIMATED 58 (L) 11/18/2020    GFRESTBLACK 68 11/18/2020    FEDERICO 8.9 03/06/2023    FEDERICO 8.9 11/18/2020        A1C RESULTS:  Lab Results   Component Value Date    A1C 7.9 (H) 02/10/2023    A1C 6.9 (H) 11/03/2020       INR RESULTS:  Lab Results   Component Value Date    INR 1.76 (H) 02/25/2023       Results reviewed today.       Current Cardiac Medications     Current Outpatient Medications   Medication Sig Dispense Refill     acetaminophen (TYLENOL) 325 MG tablet Take 2 tablets (650 mg) by mouth every 6 hours as needed for pain or fever       atenolol (TENORMIN) 25 MG tablet Take 1 tablet (25 mg) by mouth 2 times daily 30 tablet 3     atorvastatin (LIPITOR) 10 MG tablet Take 1 tablet (10 mg) by mouth daily (Patient taking differently: Take 10 mg by mouth every evening) 90 tablet 3     blood glucose monitoring (ACCU-CHEK COMPACT CARE KIT) meter device kit Use to test blood sugars 1 times daily or as directed. May sub formulary meter 1 kit 0     blood glucose monitoring (NO BRAND SPECIFIED) test strip Use to test blood sugars twice times daily or as directed 1 Box 3     diltiazem ER COATED BEADS (CARDIZEM CD/CARTIA XT) 180 MG 24 hr capsule Take 1 capsule (180 mg) by mouth daily 30 capsule 0     ferrous sulfate (FEROSUL) 325 (65 Fe) MG tablet Take 1 tablet (325 mg) by mouth Every Mon, Wed, Fri Morning 45 tablet 3     furosemide (LASIX) 20 MG tablet Take 1 tablet (20 mg) by mouth daily 30 tablet 11     gabapentin (NEURONTIN) 300 MG capsule TAKE 2 CAPSULES IN THE EVENING (Patient taking differently: Take 600 mg by mouth every evening TAKE 2 CAPSULES IN THE EVENING) 180 capsule 3     magnesium chloride 535 (64 Mg) MG TBEC CR tablet Take 1 tablet (535 mg) by mouth 2 times daily 60 tablet  0     metFORMIN (GLUCOPHAGE) 500 MG tablet Take 0.5 tablets (250 mg) by mouth 2 times daily (with meals) 60 tablet 0     omeprazole (PRILOSEC) 40 MG DR capsule Take 1 capsule (40 mg) by mouth daily for 30 days Take 30-60 min before breakfast  1     rivaroxaban ANTICOAGULANT (XARELTO) 20 MG TABS tablet Take 1 tablet (20 mg) by mouth daily (with dinner) 90 tablet 3     sennosides (SENOKOT) 8.6 MG tablet Take 1 tablet by mouth daily And 1 tab PO daily PRN       sucralfate (CARAFATE) 1 GM/10ML suspension Take 10 mLs (1 g) by mouth 2 times daily 414 mL 0                      Assessment and Plan:       Plan    Patient Instructions   Medication Changes:  1. None     Recommendations:  1. Check daily weights and call the clinic if your weight has increased more than 2 lbs in one day or 5 lbs in one week; if you feel more short of breath or have worsening swelling in your legs or abdomen.  2. 2000 mg sodium diet   3. 4-8 8 ounce glasses of fluids per day  4. Call if heart rates are staying greater than 100  5. Call if blood pressure is less than 90 on top or less than 100 with lightheadedness.      Follow-up:  1. EP consult with Dr. Cates, as scheduled in person at [place]   2. See Dr. Drake for cardiology follow up at [facility]: June 2023.   Call 6 months prior, to schedule.     Cardiology Scheduling~[phone]  Cardiology Clinic RN~[phone] (Uzma RN, Janeen RN, Pita RN)            1. Persistent atrial fibrillation    Symptomatic with episodes    Did not tolerate amiodarone d/t abd pain     Elevated [id number] score    Continue atenolol and diltiazem  for rate control and Xarelto for anticoagulation        2. Coronary artery calcification    Heartburn symptoms     Continue statin, beta-blocker, CCB, no ASA on anticoagulation    Plan for stress test and having GI work-up        3. Hypertension    Controlled     Continue diltiazem, atenolol        4. Hyperlipidemia    Last LDL 49 on 10/2022    Continue  atorvastatin 10 mg daily        5.     Chronic HFpEF    Lower extremity edema mild     Did not tolerate torsemide due to side effects of shaking, cold    Switched torsemide to Lasix, continue beta-blocker                  Thank you for allowing me to participate in this delightful patient's care.      This note was completed in part using Dragon voice recognition software. Although reviewed after completion, some word and grammatical errors may occur.    Jackie Manzano, APRN CNP, APRN, CNP           Data:   All laboratory data reviewed      Total time spent today was 48 mins, reviewing labs, testing, notes, documenting notes, and seeing patient.          Constitutional:  cooperative, alert and oriented, well developed, well nourished, in no acute distress  overweight     Skin:  warm and dry to the touch         Head:  normocephalic       Eyes:  pupils equal and round       ENT:  no pallor or cyanosis       Neck:  no stiffness       Respiratory:  clear to auscultation; normal symmetry        Cardiac: regular rate and irregularly irregular rhythm; normal S1 and S2                 pulses full and equal     GI:  abdomen soft, nondistended     Extremities and Muscular Skeletal:   mild lower extremity edema        Neurological:  affect appropriate; no gross motor deficits       Psych:  Alert and Oriented x 3 , appropriate affact

## 2023-03-17 NOTE — PATIENT INSTRUCTIONS
Medication Changes:  None     Recommendations:  Check daily weights and call the clinic if your weight has increased more than 2 lbs in one day or 5 lbs in one week; if you feel more short of breath or have worsening swelling in your legs or abdomen.  2000 mg sodium diet   4-8 8 ounce glasses of fluids per day  Call if heart rates are staying greater than 100  Call if blood pressure is less than 90 on top or less than 100 with lightheadedness.      Follow-up:  EP consult with Dr. Cates, as scheduled in person at [place]   See Dr. Starr for cardiology follow up at [facility]: June 2023.   Call 6 months prior, to schedule.     Cardiology Scheduling~[phone]  Cardiology Clinic RN~[phone] (Uzma RN, Janeen RN, Pita RN)

## 2023-03-17 NOTE — LETTER
3/17/2023    Yecenia Cardona MD  [address] [place] [id number]    RE: Theodora Meng       Dear Colleague,     I had the pleasure of seeing Theodora Meng in the [facility].  Cardiology Clinic Progress Note  Theodora Meng MRN# [id number]   YOB: 1935 Age: 87 year old      Primary Cardiologist:   Dr. Starr          History of Presenting Illness:      Theodora Meng is a pleasant 87 year old patient with a past cardiac history significant for   1. Persistent atrial fibrillation/flutter    Symptomatic    Successful cardioversion 12/2022 with return of afib/flutter    Side effects abdominal pain with amio and dizziness with diltiazem     Rate control and anticoagulated  2. Coronary artery calcification    Chest CT 12/2022     stress test 2016 mild ischemia    lexiscan 3/2023 no ischemia or infarction   3. Chronic HFpEF    R/t Afib RVR     Did not tolerate torsemide due to shaking, switched to Lasix    Dry weight 214 pounds.  4. Hypertension  5. Hyperlipidemia  Past medical history significant for DM type II, GERD.    November 2022 she had shortness of breath and fatigue.  This continued even after cardioversion and starting amiodarone.  She ultimately had side effects with amiodarone and this was discontinued and pursued rate control.  She had negative cardiac work-up for chest pain in December 2022 but noted to have severe coronary calcification on CT.  Therefore, stress test recommended. In February 2023 she had elevated BNP and orthopnea, so was started on torsemide.    Patient was hospitalized in February 2023 for fatigue, weakness, SOB.  She was in A-fib RVR at 140 with heart failure.  Echo showed EF 55%, diastolic heart failure.  Heart rates were not controlled on diltiazem so added back in atenolol also.  Hospital discharge summary reviewed today.    Pt presents today, with her daughter, for hospital follow-up.  Lexiscan nuclear stress test showed no  ischemia or infarction.  Results reviewed today.      EKG today, personally reviewed by me, shows atrial fibrillation with ventricular rate 91 bpm.  We reviewed checking heart rates at home and notifying us if these are tachycardic.  We also discussed checking daily weights and low-sodium diet.  She was given written education about this today. Weight today in the clinic is 209 and was 207 at discharge.  She denies any significant heart failure symptoms and continues with mild lower extremity edema which has been stable.  She denies any anginal symptoms.  She reports weakness after her extended hospital and TCU stay. Patient reports no chest pain, shortness of breath, PND, orthopnea, presyncope, syncope, heart racing, or palpitations.      Patient prefers a female and would like to switch to Dr. Drake.        Labs:  LIPID RESULTS:  Lab Results   Component Value Date    CHOL 122 10/27/2022    CHOL 175 11/03/2020    HDL 48 (L) 10/27/2022    HDL 71 11/03/2020    LDL 49 10/27/2022    LDL 66 11/03/2020    TRIG 123 10/27/2022    TRIG 188 (H) 11/03/2020    CHOLHDLRATIO 3.0 05/15/2015       LIVER ENZYME RESULTS:  Lab Results   Component Value Date    AST 26 02/25/2023    AST 12 11/18/2020    ALT 13 02/25/2023    ALT 15 11/18/2020       CBC RESULTS:  Lab Results   Component Value Date    WBC 8.3 02/25/2023    WBC 5.1 11/18/2020    RBC 4.04 02/25/2023    RBC 4.52 11/18/2020    HGB 11.5 (L) 02/25/2023    HGB 13.4 11/18/2020    HCT 35.6 02/25/2023    HCT 41.8 11/18/2020    MCV 88 02/25/2023    MCV 93 11/18/2020    MCH 28.5 02/25/2023    MCH 29.6 11/18/2020    MCHC 32.3 02/25/2023    MCHC 32.1 11/18/2020    RDW 17.2 (H) 02/25/2023    RDW 13.2 11/18/2020     02/25/2023     11/18/2020       BMP RESULTS:  Lab Results   Component Value Date     03/06/2023     11/18/2020    POTASSIUM 4.1 03/06/2023    POTASSIUM 3.9 12/29/2022    POTASSIUM 3.6 11/18/2020    CHLORIDE 98 03/06/2023    CHLORIDE 108 03/23/2022     CHLORIDE 109 11/18/2020    CO2 26 03/06/2023    CO2 26 03/23/2022    CO2 28 11/18/2020    ANIONGAP 12 03/06/2023    ANIONGAP 5 03/23/2022    ANIONGAP 4 11/18/2020     (H) 03/06/2023     (H) 03/03/2023     (H) 03/23/2022     (H) 11/18/2020    BUN 19.7 03/06/2023    BUN 20 03/23/2022    BUN 16 11/18/2020    CR 0.78 03/06/2023    CR 0.90 11/18/2020    GFRESTIMATED 74 03/06/2023    GFRESTIMATED 58 (L) 11/04/2021    GFRESTIMATED 58 (L) 11/18/2020    GFRESTBLACK 68 11/18/2020    FEDERICO 8.9 03/06/2023    FEDERICO 8.9 11/18/2020        A1C RESULTS:  Lab Results   Component Value Date    A1C 7.9 (H) 02/10/2023    A1C 6.9 (H) 11/03/2020       INR RESULTS:  Lab Results   Component Value Date    INR 1.76 (H) 02/25/2023       Results reviewed today.       Current Cardiac Medications     Current Outpatient Medications   Medication Sig Dispense Refill     acetaminophen (TYLENOL) 325 MG tablet Take 2 tablets (650 mg) by mouth every 6 hours as needed for pain or fever       atenolol (TENORMIN) 25 MG tablet Take 1 tablet (25 mg) by mouth 2 times daily 30 tablet 3     atorvastatin (LIPITOR) 10 MG tablet Take 1 tablet (10 mg) by mouth daily (Patient taking differently: Take 10 mg by mouth every evening) 90 tablet 3     blood glucose monitoring (ACCU-CHEK COMPACT CARE KIT) meter device kit Use to test blood sugars 1 times daily or as directed. May sub formulary meter 1 kit 0     blood glucose monitoring (NO BRAND SPECIFIED) test strip Use to test blood sugars twice times daily or as directed 1 Box 3     diltiazem ER COATED BEADS (CARDIZEM CD/CARTIA XT) 180 MG 24 hr capsule Take 1 capsule (180 mg) by mouth daily 30 capsule 0     ferrous sulfate (FEROSUL) 325 (65 Fe) MG tablet Take 1 tablet (325 mg) by mouth Every Mon, Wed, Fri Morning 45 tablet 3     furosemide (LASIX) 20 MG tablet Take 1 tablet (20 mg) by mouth daily 30 tablet 11     gabapentin (NEURONTIN) 300 MG capsule TAKE 2 CAPSULES IN THE EVENING (Patient  taking differently: Take 600 mg by mouth every evening TAKE 2 CAPSULES IN THE EVENING) 180 capsule 3     magnesium chloride 535 (64 Mg) MG TBEC CR tablet Take 1 tablet (535 mg) by mouth 2 times daily 60 tablet 0     metFORMIN (GLUCOPHAGE) 500 MG tablet Take 0.5 tablets (250 mg) by mouth 2 times daily (with meals) 60 tablet 0     omeprazole (PRILOSEC) 40 MG DR capsule Take 1 capsule (40 mg) by mouth daily for 30 days Take 30-60 min before breakfast  1     rivaroxaban ANTICOAGULANT (XARELTO) 20 MG TABS tablet Take 1 tablet (20 mg) by mouth daily (with dinner) 90 tablet 3     sennosides (SENOKOT) 8.6 MG tablet Take 1 tablet by mouth daily And 1 tab PO daily PRN       sucralfate (CARAFATE) 1 GM/10ML suspension Take 10 mLs (1 g) by mouth 2 times daily 414 mL 0                      Assessment and Plan:       Plan    Patient Instructions   Medication Changes:  1. None     Recommendations:  1. Check daily weights and call the clinic if your weight has increased more than 2 lbs in one day or 5 lbs in one week; if you feel more short of breath or have worsening swelling in your legs or abdomen.  2. 2000 mg sodium diet   3. 4-8 8 ounce glasses of fluids per day  4. Call if heart rates are staying greater than 100  5. Call if blood pressure is less than 90 on top or less than 100 with lightheadedness.      Follow-up:  1. EP consult with Dr. Cates, as scheduled in person at [facility]   2. See Dr. Drake for cardiology follow up at [facility]: June 2023.   Call 6 months prior, to schedule.     Cardiology Scheduling~[phone]  Cardiology Clinic RN~[phone] (Uzma RN, Janeen RN, Pita RN)            1. Persistent atrial fibrillation    Symptomatic with episodes    Did not tolerate amiodarone d/t abd pain     Elevated [id number] score    Continue atenolol and diltiazem  for rate control and Xarelto for anticoagulation        2. Coronary artery calcification    Heartburn symptoms     Continue statin, beta-blocker, CCB, no ASA  on anticoagulation    Plan for stress test and having GI work-up        3. Hypertension    Controlled     Continue diltiazem, atenolol        4. Hyperlipidemia    Last LDL 49 on 10/2022    Continue atorvastatin 10 mg daily        5.     Chronic HFpEF    Lower extremity edema mild     Did not tolerate torsemide due to side effects of shaking, cold    Switched torsemide to Lasix, continue beta-blocker                  Thank you for allowing me to participate in this delightful patient's care.      This note was completed in part using Dragon voice recognition software. Although reviewed after completion, some word and grammatical errors may occur.    ALEX Turner CNP, APRN, CNP           Data:   All laboratory data reviewed      Total time spent today was 48 mins, reviewing labs, testing, notes, documenting notes, and seeing patient.          Constitutional:  cooperative, alert and oriented, well developed, well nourished, in no acute distress  overweight     Skin:  warm and dry to the touch         Head:  normocephalic       Eyes:  pupils equal and round       ENT:  no pallor or cyanosis       Neck:  no stiffness       Respiratory:  clear to auscultation; normal symmetry        Cardiac: regular rate and irregularly irregular rhythm; normal S1 and S2                 pulses full and equal     GI:  abdomen soft, nondistended     Extremities and Muscular Skeletal:   mild lower extremity edema        Neurological:  affect appropriate; no gross motor deficits       Psych:  Alert and Oriented x 3 , appropriate affact    Thank you for allowing me to participate in the care of your patient.      Sincerely,     ALEX Turner CNP     [facility]  cc:   ALEX Layne CNP  [address]  [place] [address]

## 2023-04-01 ENCOUNTER — HEALTH MAINTENANCE LETTER (OUTPATIENT)
Age: 88
End: 2023-04-01

## 2023-04-04 ENCOUNTER — MEDICAL CORRESPONDENCE (OUTPATIENT)
Dept: HEALTH INFORMATION MANAGEMENT | Facility: CLINIC | Age: 88
End: 2023-04-04
Payer: COMMERCIAL

## 2023-04-10 ENCOUNTER — TELEPHONE (OUTPATIENT)
Dept: CARDIOLOGY | Facility: CLINIC | Age: 88
End: 2023-04-10

## 2023-04-10 NOTE — TELEPHONE ENCOUNTER
[facility] Call Center    Phone Message    May a detailed message be left on voicemail: yes     Reason for Call: Other: Pt wasnt feeling well today and requested daughter to call and switch appointment to virtual. Appointment same time at 2:45 will now be virtual, send link to [phone]. Thank you.      Action Taken: Other: Cardiology    Travel Screening: Not Applicable     Thank you!  [facility]

## 2023-04-10 NOTE — TELEPHONE ENCOUNTER
4/10/23 Clinic protocol - new pt cannot be seen virtually. Messaged Yvonne in scheduling to change appt to in person on another day  [place] 1040 am

## 2023-04-18 ENCOUNTER — OFFICE VISIT (OUTPATIENT)
Dept: FAMILY MEDICINE | Facility: CLINIC | Age: 88
End: 2023-04-18
Payer: COMMERCIAL

## 2023-04-18 VITALS
RESPIRATION RATE: 14 BRPM | HEART RATE: 90 BPM | WEIGHT: 211 LBS | BODY MASS INDEX: 36.02 KG/M2 | SYSTOLIC BLOOD PRESSURE: 122 MMHG | TEMPERATURE: 98.1 F | HEIGHT: 64 IN | DIASTOLIC BLOOD PRESSURE: 72 MMHG | OXYGEN SATURATION: 98 %

## 2023-04-18 DIAGNOSIS — I48.91 ATRIAL FIBRILLATION WITH RVR (H): Primary | ICD-10-CM

## 2023-04-18 DIAGNOSIS — E11.9 TYPE 2 DIABETES MELLITUS WITHOUT COMPLICATION, WITHOUT LONG-TERM CURRENT USE OF INSULIN (H): ICD-10-CM

## 2023-04-18 DIAGNOSIS — E83.42 HYPOMAGNESEMIA: ICD-10-CM

## 2023-04-18 DIAGNOSIS — I50.32 CHRONIC HEART FAILURE WITH PRESERVED EJECTION FRACTION (HFPEF) (H): ICD-10-CM

## 2023-04-18 DIAGNOSIS — K59.01 SLOW TRANSIT CONSTIPATION: ICD-10-CM

## 2023-04-18 PROBLEM — I50.9 ACUTE CONGESTIVE HEART FAILURE (H): Status: RESOLVED | Noted: 2023-02-25 | Resolved: 2023-04-18

## 2023-04-18 LAB
ANION GAP SERPL CALCULATED.3IONS-SCNC: 12 MMOL/L (ref 7–15)
BUN SERPL-MCNC: 19.6 MG/DL (ref 8–23)
CALCIUM SERPL-MCNC: 9.2 MG/DL (ref 8.8–10.2)
CHLORIDE SERPL-SCNC: 101 MMOL/L (ref 98–107)
CREAT SERPL-MCNC: 0.89 MG/DL (ref 0.51–0.95)
DEPRECATED HCO3 PLAS-SCNC: 27 MMOL/L (ref 22–29)
GFR SERPL CREATININE-BSD FRML MDRD: 62 ML/MIN/1.73M2
GLUCOSE SERPL-MCNC: 136 MG/DL (ref 70–99)
HBA1C MFR BLD: 7.1 % (ref 0–5.6)
MAGNESIUM SERPL-MCNC: 1.5 MG/DL (ref 1.7–2.3)
POTASSIUM SERPL-SCNC: 4 MMOL/L (ref 3.4–5.3)
SODIUM SERPL-SCNC: 140 MMOL/L (ref 136–145)

## 2023-04-18 PROCEDURE — 83735 ASSAY OF MAGNESIUM: CPT | Performed by: STUDENT IN AN ORGANIZED HEALTH CARE EDUCATION/TRAINING PROGRAM

## 2023-04-18 PROCEDURE — 80048 BASIC METABOLIC PNL TOTAL CA: CPT | Performed by: STUDENT IN AN ORGANIZED HEALTH CARE EDUCATION/TRAINING PROGRAM

## 2023-04-18 PROCEDURE — 36415 COLL VENOUS BLD VENIPUNCTURE: CPT | Performed by: STUDENT IN AN ORGANIZED HEALTH CARE EDUCATION/TRAINING PROGRAM

## 2023-04-18 PROCEDURE — 99214 OFFICE O/P EST MOD 30 MIN: CPT | Performed by: STUDENT IN AN ORGANIZED HEALTH CARE EDUCATION/TRAINING PROGRAM

## 2023-04-18 PROCEDURE — 83036 HEMOGLOBIN GLYCOSYLATED A1C: CPT | Performed by: STUDENT IN AN ORGANIZED HEALTH CARE EDUCATION/TRAINING PROGRAM

## 2023-04-18 RX ORDER — SENNOSIDES 8.6 MG
1 TABLET ORAL DAILY PRN
Qty: 60 TABLET | Refills: 1 | Status: SHIPPED | OUTPATIENT
Start: 2023-04-18 | End: 2023-06-19

## 2023-04-18 ASSESSMENT — PAIN SCALES - GENERAL: PAINLEVEL: NO PAIN (0)

## 2023-04-18 NOTE — LETTER
April 18, 2023      Theodora LÁZARO Taqueria  [address] [facility] [address]        Dear ,    We are writing to inform you of your test results.    The kidney function and most of your electrolytes look. However, the magnesium is still lower than I would like. I won't be surprised if this is at least in part due to the diltiazem. I think we should Increase your magnesium dose over the next 2 weeks to 3 tablets daily. I'll send a new prescription to the pharmacy.     The A1c is better than it was last time.     Resulted Orders   Basic metabolic panel   Result Value Ref Range    Sodium 140 136 - 145 mmol/L    Potassium 4.0 3.4 - 5.3 mmol/L    Chloride 101 98 - 107 mmol/L    Carbon Dioxide (CO2) 27 22 - 29 mmol/L    Anion Gap 12 7 - 15 mmol/L    Urea Nitrogen 19.6 8.0 - 23.0 mg/dL    Creatinine 0.89 0.51 - 0.95 mg/dL    Calcium 9.2 8.8 - 10.2 mg/dL    Glucose 136 (H) 70 - 99 mg/dL    GFR Estimate 62 >60 mL/min/1.73m2      Comment:      eGFR calculated using 2021 CKD-EPI equation.   Magnesium   Result Value Ref Range    Magnesium 1.5 (L) 1.7 - 2.3 mg/dL   Hemoglobin A1c   Result Value Ref Range    Hemoglobin A1C 7.1 (H) 0.0 - 5.6 %      Comment:      Normal <5.7%   Prediabetes 5.7-6.4%    Diabetes 6.5% or higher     Note: Adopted from ADA consensus guidelines.       If you have any questions or concerns, please call the clinic at the number listed above.       Sincerely,      Naty Santoro MD

## 2023-04-18 NOTE — PROGRESS NOTES
Assessment & Plan     Patient is a very pleasant 88 year old who presents for recheck after hospital/TCU stay for HFpEF exacerbation. She has since had visits with GI and cardiology. Continues to feel deconditioned, but working with home PT.    Atrial fibrillation with RVR (H)  Plan for rate control as she has historically not tolerated rhythm control. In a fib today. Continue Xarelto, atenolol, and diltiazem per cards.     Chronic heart failure with preserved ejection fraction (HFpEF) (H)  Hypomagnesemia  Has had hypomagnesemia, is on magnesium supplement. Was in the hospital for HFpEF exacerbation and diuresed. Dry weight of 214 per cards note 3/17/23. She is stable today. Takes lasix daily now so will recheck BMP. Follow up with cardiology as previously planned.   - Magnesium  - Basic metabolic panel    Per cards 3/17/23 note  1. Check daily weights and call the clinic if your weight has increased more than 2 lbs in one day or 5 lbs in one week; if you feel more short of breath or have worsening swelling in your legs or abdomen.  2. 2000 mg sodium diet   3. 4-8 8 ounce glasses of fluids per day  4. Call if heart rates are staying greater than 100  5. Call if blood pressure is less than 90 on top or less than 100 with lightheadedness.      Type 2 diabetes mellitus without complication, without long-term current use of insulin (H)  She is interested in checking a1c again today, which is reasonable. She is on metformin.   - Hemoglobin A1c    Slow transit constipation  Has had constipation, likely worsened by iron supplement. Will stop iron supplement as fatigue likely 2/2 heart failure and only with mild iron deficiency. Refill of senna given today for symptomatic relief PRN of constipation   - sennosides (SENOKOT) 8.6 MG tablet  Dispense: 60 tablet; Refill: 1    For results, plan to mail results and give patient a call with any abnormal results that need follow up    I spent a total of 37 minutes on the day of  "the visit.   Time spent by me doing chart review, history and exam, documentation and further activities per the note    MED REC REQUIRED  Post Medication Reconciliation Status:  Discharge medications reconciled and changed, see notes/orders    BMI:   Estimated body mass index is 36.22 kg/m  as calculated from the following:    Height as of this encounter: 1.626 m (5' 4\").    Weight as of this encounter: 95.7 kg (211 lb).       Naty Santoro MD  [facility]    Chay Yip is a 88 year old, presenting for the following health issues:  Hospital F/U    [facility]   Hospital Follow-up Visit:    Hospital/Nursing Home/IP Rehab Facility: [facility]  Date of Admission: 02/25/2023  Date of Discharge: 03/03/2023  Reason(s) for Admission: Acute congestive heart failure    Was your hospitalization related to COVID-19? No   Problems taking medications regularly:  None  Medication changes since discharge: None  Problems adhering to non-medication therapy:  None    Summary of hospitalization:  [facility] discharge summary reviewed  Diagnostic Tests/Treatments reviewed.  Follow up needed: magnesium  Other Healthcare Providers Involved in Patient s Care:         Physical Therapy  Update since discharge: improved.       In TCU until MARCH 17  Seen by cardiology on 3/17 as well - Dry weight 214 lbs    In bed for 3 days.   checking daily weights. 2 lbs over base weight ight now. Varies. Up 1 lb or down 1 lb.     Appetite is better now than before. Wasn't good for at least 3 weeks.     Still feeling weak, doing PT.   Able to walk with a cane - using a walker before, so there is some improvement. Been outside walking a couple days.     Plan of care communicated with patient           Review of Systems   Constitutional, HEENT, cardiovascular, pulmonary, GI, , musculoskeletal, neuro, skin, endocrine and psych systems are negative, except as otherwise noted.    " "  Objective    /72 (BP Location: Right arm, Patient Position: Sitting, Cuff Size: Adult Regular)   Pulse 90   Temp 98.1  F (36.7  C) (Tympanic)   Resp 14   Ht 1.626 m (5' 4\")   Wt 95.7 kg (211 lb)   LMP  (LMP Unknown)   SpO2 98%   BMI 36.22 kg/m    Body mass index is 36.22 kg/m .  Physical Exam  Constitutional:       Appearance: Normal appearance.   HENT:      Head: Normocephalic.   Eyes:      General: No scleral icterus.     Extraocular Movements: Extraocular movements intact.      Conjunctiva/sclera: Conjunctivae normal.   Cardiovascular:      Rate and Rhythm: Normal rate. Rhythm irregular.      Heart sounds: Normal heart sounds.   Pulmonary:      Effort: Pulmonary effort is normal.      Breath sounds: Normal breath sounds. No wheezing, rhonchi or rales.   Musculoskeletal:         General: Normal range of motion.      Cervical back: Normal range of motion.      Right lower leg: Edema (1+ to knee) present.      Left lower leg: Edema (1+ to knee) present.   Skin:     General: Skin is dry.   Neurological:      General: No focal deficit present.      Mental Status: She is alert and oriented to person, place, and time.         Results from this visitNo results found for any visits on 04/18/23.              "

## 2023-04-18 NOTE — PATIENT INSTRUCTIONS
Iron  Iron is found in a few types of foods. Good sources include:   Red meat, poultry, fish, eggs  Dried fruits (especially raisins, prunes, figs)  Legumes such as dried beans, peas, and lentils  Breads and cereals with iron added  Blackstrap molasses  Spinach  Foods cooked in cast-iron pans. This is especially true of acidic foods, such as tomatoes and alonso.  Wheat germ  Dried fruits  Nuts and seeds  Whole grain and fortified breads and cereals  Dried beans, lentils, and split peas  Leafy, dark-green vegetables  Eggs

## 2023-04-21 ENCOUNTER — OFFICE VISIT (OUTPATIENT)
Dept: CARDIOLOGY | Facility: CLINIC | Age: 88
End: 2023-04-21
Attending: NURSE PRACTITIONER
Payer: COMMERCIAL

## 2023-04-21 VITALS
BODY MASS INDEX: 36.54 KG/M2 | OXYGEN SATURATION: 98 % | SYSTOLIC BLOOD PRESSURE: 120 MMHG | WEIGHT: 214 LBS | DIASTOLIC BLOOD PRESSURE: 72 MMHG | HEIGHT: 64 IN | HEART RATE: 90 BPM

## 2023-04-21 DIAGNOSIS — I25.10 CORONARY ARTERY CALCIFICATION SEEN ON CT SCAN: ICD-10-CM

## 2023-04-21 DIAGNOSIS — E78.5 HYPERLIPIDEMIA LDL GOAL <70: ICD-10-CM

## 2023-04-21 DIAGNOSIS — I48.19 PERSISTENT ATRIAL FIBRILLATION (H): ICD-10-CM

## 2023-04-21 DIAGNOSIS — I10 BENIGN ESSENTIAL HYPERTENSION: ICD-10-CM

## 2023-04-21 PROCEDURE — 99205 OFFICE O/P NEW HI 60 MIN: CPT | Performed by: INTERNAL MEDICINE

## 2023-04-21 NOTE — LETTER
4/21/2023    Yecenia Cardona MD  [address] [place] [id number]    RE: Theodora Meng       Dear Colleague,     I had the pleasure of seeing Theodora Meng in the [facility].  Electrophysiology/ Clinic Note         H&P and Plan:     REASON FOR VISIT: Electrophysiology evaluation.      HISTORY OF PRESENT ILLNESS: Patient is a pleasant 88-year-old lady with a history of hypertension, hyperlipidemia, diabetes, HFpEF and persistent atrial fibrillation, who is here for evaluation.    Patient presents with symptomatic A-fib in November of last year.  At that time, she was started on amiodarone and underwent cardioversion.  She did well after cardioversion, however she did not tolerate amiodarone and medication was discontinued.  Later, she had recurrence of A-fib and was started on rate control strategy.  Since recurrence of A-fib, she started having issues with heart failure.  She was admitted in February with CHF exacerbation, and he stayed in the ICU for several days.     She was recently evaluated cardiology clinic, and she continues to have symptoms related to heart failure.  She also complains of palpitation on the daily basis (especially at night).  She denies any other symptoms such as chest pain, lightheadedness, near-syncope or syncope.    She is currently on a combination of Xarelto, atenolol and diltiazem.  EKG is compatible with atrial fibrillation.    PREVIOUS STUDIES (personally reviewed):  -Echo (2/27/2023): EF of 55%.  Trivial pericardial effusion.  -NM stress test (3/8/2023): Negative for ischemia.      ASSESSMENT AND PLAN:   1.  HFpEF/Persistent atrial fibrillation.    Patient presents with worsening heart failure symptoms since the diagnosis of persistent A-fib.  She was intolerant to amiodarone, and continues to be be symptomatic despite of AV node agents.    I had an extensive discussion with her and her daughter.  Options are another attempt of cardioversion for  "with amiodarone therapy or continue rate control strategy.  Regarding rate control strategy, her blood pressure seems to be somewhat dangerous and we may not have much room to improve her medications.  We also discussed possibility of pacemaker/AV node ablation.  We discussed the pros and cons of each options.  I explained the procedure in details.  She understands that there is a 2-3% risk of care associated procedure, and that she will be pacemaker dependent after AV node ablation.    After discussion, she would like to think about the options.  We will follow-up over the phone and finalize plan.    2.  Embolic prevention.  [id number] score of 5-6.  Continue anticoagulation with Xarelto.        Cooper Cates MD    Physical Exam:  Vitals: /72 (BP Location: Right arm, Patient Position: Sitting, Cuff Size: Adult Large)   Pulse 90   Ht 1.626 m (5' 4.02\")   Wt 97.1 kg (214 lb)   LMP  (LMP Unknown)   SpO2 98%   BMI 36.71 kg/m      Constitutional:  AAO x3.  Pt is in NAD.  HEAD: normocephalic.  SKIN: Skin normal color, texture and turgor with no lesions or eruptions.  Eyes: PERRL, EOMI.  ENT:  Supple, normal JVP. No lymphadenopathy or thyroid enlargement.  Chest:  CTAB.  Cardiac:  IIR, variable sound of S1 and Normal S2.  No murmurs rubs or gallop.   Abdomen:  Normal BS.  Soft, non-tender and non-distended.  No rebound or guarding.    Extremities:  Pedious pulses palpable B/L.  LE edema noticed.   Neurological: Strength and sensation grossly symmetric and intact throughout.       CURRENT MEDICATIONS:  Current Outpatient Medications   Medication Sig Dispense Refill    acetaminophen (TYLENOL) 325 MG tablet Take 2 tablets (650 mg) by mouth every 6 hours as needed for pain or fever      atenolol (TENORMIN) 25 MG tablet Take 1 tablet (25 mg) by mouth 2 times daily 30 tablet 3    atorvastatin (LIPITOR) 10 MG tablet Take 1 tablet (10 mg) by mouth daily (Patient taking differently: Take 10 mg by mouth every " evening) 90 tablet 3    blood glucose monitoring (ACCU-CHEK COMPACT CARE KIT) meter device kit Use to test blood sugars 1 times daily or as directed. May sub formulary meter 1 kit 0    blood glucose monitoring (NO BRAND SPECIFIED) test strip Use to test blood sugars twice times daily or as directed 1 Box 3    diltiazem ER COATED BEADS (CARDIZEM CD/CARTIA XT) 180 MG 24 hr capsule Take 1 capsule (180 mg) by mouth daily 30 capsule 0    furosemide (LASIX) 20 MG tablet Take 1 tablet (20 mg) by mouth daily 30 tablet 11    gabapentin (NEURONTIN) 300 MG capsule TAKE 2 CAPSULES IN THE EVENING (Patient taking differently: Take 600 mg by mouth every evening TAKE 2 CAPSULES IN THE EVENING) 180 capsule 3    magnesium chloride 535 (64 Mg) MG TBEC CR tablet Take 1 tablet (535 mg) by mouth 2 times daily 60 tablet 3    magnesium chloride 535 (64 Mg) MG TBEC CR tablet Take 1 tablet (535 mg) by mouth 3 times daily for 10 days 30 tablet 0    metFORMIN (GLUCOPHAGE) 500 MG tablet Take 0.5 tablets (250 mg) by mouth 2 times daily (with meals) 60 tablet 0    rivaroxaban ANTICOAGULANT (XARELTO) 20 MG TABS tablet Take 1 tablet (20 mg) by mouth daily (with dinner) 90 tablet 3    sennosides (SENOKOT) 8.6 MG tablet Take 1 tablet by mouth daily as needed for constipation And 1 tab PO daily PRN 60 tablet 1       ALLERGIES     Allergies   Allergen Reactions    Zoloft [Sertraline] Palpitations, Diarrhea and Cramps       PAST MEDICAL HISTORY:  Past Medical History:   Diagnosis Date    Depressive disorder     Diabetes (H)     Esophageal reflux     Other abnormal glucose     Other premature beats     Unspecified essential hypertension        PAST SURGICAL HISTORY:  Past Surgical History:   Procedure Laterality Date    ANESTHESIA CARDIOVERSION N/A 12/29/2022    Procedure: CARDIOVERSION;  Surgeon: GENERIC ANESTHESIA PROVIDER;  Location: [facility]    APPENDECTOMY      ARTHROPLASTY KNEE  10/30/2013    Procedure: ARTHROPLASTY KNEE;  Right Total Knee  Arthroplasty;  Surgeon: Ousmane Mcgowan MD;  Location: [facility]    ARTHROPLASTY KNEE Left 4/3/2019    Procedure: Left Total Knee Arthroplasty;  Surgeon: Ousmane Mcgowan MD;  Location: [facility]    CHOLECYSTECTOMY, LAPOROSCOPIC      Cholecystectomy, Laparoscopic    DILATION AND CURETTAGE, OPERATIVE HYSTEROSCOPY WITH MORCELLATOR, COMBINED N/A 6/20/2016    Procedure: COMBINED DILATION AND CURETTAGE, OPERATIVE HYSTEROSCOPY WITH MORCELLATOR;  Surgeon: Bony Arredondo MD;  Location: [facility]    ESOPHAGOSCOPY, GASTROSCOPY, DUODENOSCOPY (EGD), COMBINED N/A 2/17/2016    Procedure: COMBINED ESOPHAGOSCOPY, GASTROSCOPY, DUODENOSCOPY (EGD), BIOPSY SINGLE OR MULTIPLE;  Surgeon: Mil Guevara MD;  Location: [facility]    SALPINGO OOPHORECTOMY,R/L/OZZY      Salpingo Oophorectomy, RT/LT/OZZY    TONSILLECTOMY         FAMILY HISTORY:  The patient's family history was reviewed and is non-contributory for heart disease.    SOCIAL HISTORY:  Social History     Socioeconomic History    Marital status:    Tobacco Use    Smoking status: Never    Smokeless tobacco: Never   Vaping Use    Vaping status: Never Used   Substance and Sexual Activity    Alcohol use: No    Drug use: No    Sexual activity: Never       Review of Systems:  Skin:        Eyes:       ENT:       Respiratory:       Cardiovascular:       Gastroenterology:      Genitourinary:       Musculoskeletal:       Neurologic:       Psychiatric:       Heme/Lymph/Imm:       Endocrine:           Recent Lab Results:  LIPID RESULTS:  Lab Results   Component Value Date    CHOL 122 10/27/2022    CHOL 175 11/03/2020    HDL 48 (L) 10/27/2022    HDL 71 11/03/2020    LDL 49 10/27/2022    LDL 66 11/03/2020    TRIG 123 10/27/2022    TRIG 188 (H) 11/03/2020    CHOLHDLRATIO 3.0 05/15/2015       LIVER ENZYME RESULTS:  Lab Results   Component Value Date    AST 26 02/25/2023    AST 12 11/18/2020    ALT 13 02/25/2023    ALT 15 11/18/2020       CBC RESULTS:  Lab Results   Component Value Date     WBC 8.3 02/25/2023    WBC 5.1 11/18/2020    RBC 4.04 02/25/2023    RBC 4.52 11/18/2020    HGB 11.5 (L) 02/25/2023    HGB 13.4 11/18/2020    HCT 35.6 02/25/2023    HCT 41.8 11/18/2020    MCV 88 02/25/2023    MCV 93 11/18/2020    MCH 28.5 02/25/2023    MCH 29.6 11/18/2020    MCHC 32.3 02/25/2023    MCHC 32.1 11/18/2020    RDW 17.2 (H) 02/25/2023    RDW 13.2 11/18/2020     02/25/2023     11/18/2020       BMP RESULTS:  Lab Results   Component Value Date     04/18/2023     11/18/2020    POTASSIUM 4.0 04/18/2023    POTASSIUM 3.9 12/29/2022    POTASSIUM 3.6 11/18/2020    CHLORIDE 101 04/18/2023    CHLORIDE 108 03/23/2022    CHLORIDE 109 11/18/2020    CO2 27 04/18/2023    CO2 26 03/23/2022    CO2 28 11/18/2020    ANIONGAP 12 04/18/2023    ANIONGAP 5 03/23/2022    ANIONGAP 4 11/18/2020     (H) 04/18/2023     (H) 03/03/2023     (H) 03/23/2022     (H) 11/18/2020    BUN 19.6 04/18/2023    BUN 20 03/23/2022    BUN 16 11/18/2020    CR 0.89 04/18/2023    CR 0.90 11/18/2020    GFRESTIMATED 62 04/18/2023    GFRESTIMATED 58 (L) 11/04/2021    GFRESTIMATED 58 (L) 11/18/2020    GFRESTBLACK 68 11/18/2020    FEDERICO 9.2 04/18/2023    FEDERICO 8.9 11/18/2020        A1C RESULTS:  Lab Results   Component Value Date    A1C 7.1 (H) 04/18/2023    A1C 6.9 (H) 11/03/2020       INR RESULTS:  Lab Results   Component Value Date    INR 1.76 (H) 02/25/2023         ECHOCARDIOGRAM  No results found for this or any previous visit (from the past 8760 hour(s)).      No orders of the defined types were placed in this encounter.    No orders of the defined types were placed in this encounter.    There are no discontinued medications.      Encounter Diagnoses   Name Primary?    Persistent atrial fibrillation (H)     Benign essential hypertension     Hyperlipidemia LDL goal <70     Coronary artery calcification seen on CT scan          CC  Jackie Olivares, APRN CNP  [id number]  [place]  [place]  [address]      Thank you for allowing me to participate in the care of your patient.      Sincerely,     Cooper Cates MD     [facility]

## 2023-04-21 NOTE — PROGRESS NOTES
Electrophysiology/ Clinic Note         H&P and Plan:     REASON FOR VISIT: Electrophysiology evaluation.      HISTORY OF PRESENT ILLNESS: Patient is a pleasant 88-year-old lady with a history of hypertension, hyperlipidemia, diabetes, HFpEF and persistent atrial fibrillation, who is here for evaluation.    Patient presents with symptomatic A-fib in November of last year.  At that time, she was started on amiodarone and underwent cardioversion.  She did well after cardioversion, however she did not tolerate amiodarone and medication was discontinued.  Later, she had recurrence of A-fib and was started on rate control strategy.  Since recurrence of A-fib, she started having issues with heart failure.  She was admitted in February with CHF exacerbation, and he stayed in the ICU for several days.     She was recently evaluated cardiology clinic, and she continues to have symptoms related to heart failure.  She also complains of palpitation on the daily basis (especially at night).  She denies any other symptoms such as chest pain, lightheadedness, near-syncope or syncope.    She is currently on a combination of Xarelto, atenolol and diltiazem.  EKG is compatible with atrial fibrillation.    PREVIOUS STUDIES (personally reviewed):  -Echo (2/27/2023): EF of 55%.  Trivial pericardial effusion.  -NM stress test (3/8/2023): Negative for ischemia.      ASSESSMENT AND PLAN:   1.  HFpEF/Persistent atrial fibrillation.    Patient presents with worsening heart failure symptoms since the diagnosis of persistent A-fib.  She was intolerant to amiodarone, and continues to be be symptomatic despite of AV node agents.    I had an extensive discussion with her and her daughter.  Options are another attempt of cardioversion for with amiodarone therapy or continue rate control strategy.  Regarding rate control strategy, her blood pressure seems to be somewhat dangerous and we may not have much room to improve her medications.  We also  "discussed possibility of pacemaker/AV node ablation.  We discussed the pros and cons of each options.  I explained the procedure in details.  She understands that there is a 2-3% risk of care associated procedure, and that she will be pacemaker dependent after AV node ablation.    After discussion, she would like to think about the options.  We will follow-up over the phone and finalize plan.    2.  Embolic prevention.  [id number] score of 5-6.  Continue anticoagulation with Xarelto.        Cooper Cates MD    Physical Exam:  Vitals: /72 (BP Location: Right arm, Patient Position: Sitting, Cuff Size: Adult Large)   Pulse 90   Ht 1.626 m (5' 4.02\")   Wt 97.1 kg (214 lb)   LMP  (LMP Unknown)   SpO2 98%   BMI 36.71 kg/m      Constitutional:  AAO x3.  Pt is in NAD.  HEAD: normocephalic.  SKIN: Skin normal color, texture and turgor with no lesions or eruptions.  Eyes: PERRL, EOMI.  ENT:  Supple, normal JVP. No lymphadenopathy or thyroid enlargement.  Chest:  CTAB.  Cardiac:  IIR, variable sound of S1 and Normal S2.  No murmurs rubs or gallop.   Abdomen:  Normal BS.  Soft, non-tender and non-distended.  No rebound or guarding.    Extremities:  Pedious pulses palpable B/L.  LE edema noticed.   Neurological: Strength and sensation grossly symmetric and intact throughout.       CURRENT MEDICATIONS:  Current Outpatient Medications   Medication Sig Dispense Refill     acetaminophen (TYLENOL) 325 MG tablet Take 2 tablets (650 mg) by mouth every 6 hours as needed for pain or fever       atenolol (TENORMIN) 25 MG tablet Take 1 tablet (25 mg) by mouth 2 times daily 30 tablet 3     atorvastatin (LIPITOR) 10 MG tablet Take 1 tablet (10 mg) by mouth daily (Patient taking differently: Take 10 mg by mouth every evening) 90 tablet 3     blood glucose monitoring (ACCU-CHEK COMPACT CARE KIT) meter device kit Use to test blood sugars 1 times daily or as directed. May sub formulary meter 1 kit 0     blood glucose monitoring " (NO BRAND SPECIFIED) test strip Use to test blood sugars twice times daily or as directed 1 Box 3     diltiazem ER COATED BEADS (CARDIZEM CD/CARTIA XT) 180 MG 24 hr capsule Take 1 capsule (180 mg) by mouth daily 30 capsule 0     furosemide (LASIX) 20 MG tablet Take 1 tablet (20 mg) by mouth daily 30 tablet 11     gabapentin (NEURONTIN) 300 MG capsule TAKE 2 CAPSULES IN THE EVENING (Patient taking differently: Take 600 mg by mouth every evening TAKE 2 CAPSULES IN THE EVENING) 180 capsule 3     magnesium chloride 535 (64 Mg) MG TBEC CR tablet Take 1 tablet (535 mg) by mouth 2 times daily 60 tablet 3     magnesium chloride 535 (64 Mg) MG TBEC CR tablet Take 1 tablet (535 mg) by mouth 3 times daily for 10 days 30 tablet 0     metFORMIN (GLUCOPHAGE) 500 MG tablet Take 0.5 tablets (250 mg) by mouth 2 times daily (with meals) 60 tablet 0     rivaroxaban ANTICOAGULANT (XARELTO) 20 MG TABS tablet Take 1 tablet (20 mg) by mouth daily (with dinner) 90 tablet 3     sennosides (SENOKOT) 8.6 MG tablet Take 1 tablet by mouth daily as needed for constipation And 1 tab PO daily PRN 60 tablet 1       ALLERGIES     Allergies   Allergen Reactions     Zoloft [Sertraline] Palpitations, Diarrhea and Cramps       PAST MEDICAL HISTORY:  Past Medical History:   Diagnosis Date     Depressive disorder      Diabetes (H)      Esophageal reflux      Other abnormal glucose      Other premature beats      Unspecified essential hypertension        PAST SURGICAL HISTORY:  Past Surgical History:   Procedure Laterality Date     ANESTHESIA CARDIOVERSION N/A 12/29/2022    Procedure: CARDIOVERSION;  Surgeon: GENERIC ANESTHESIA PROVIDER;  Location:  OR     APPENDECTOMY       ARTHROPLASTY KNEE  10/30/2013    Procedure: ARTHROPLASTY KNEE;  Right Total Knee Arthroplasty;  Surgeon: Ousmane Mcgowan MD;  Location: [facility]     ARTHROPLASTY KNEE Left 4/3/2019    Procedure: Left Total Knee Arthroplasty;  Surgeon: Ousmane Mcgowan MD;  Location: [facility]      CHOLECYSTECTOMY, LAPOROSCOPIC      Cholecystectomy, Laparoscopic     DILATION AND CURETTAGE, OPERATIVE HYSTEROSCOPY WITH MORCELLATOR, COMBINED N/A 6/20/2016    Procedure: COMBINED DILATION AND CURETTAGE, OPERATIVE HYSTEROSCOPY WITH MORCELLATOR;  Surgeon: Bony Arredondo MD;  Location: [facility]     ESOPHAGOSCOPY, GASTROSCOPY, DUODENOSCOPY (EGD), COMBINED N/A 2/17/2016    Procedure: COMBINED ESOPHAGOSCOPY, GASTROSCOPY, DUODENOSCOPY (EGD), BIOPSY SINGLE OR MULTIPLE;  Surgeon: Mil Guevara MD;  Location: [facility]     SALPINGO OOPHORECTOMY,R/L/OZZY      Salpingo Oophorectomy, RT/LT/OZZY     TONSILLECTOMY         FAMILY HISTORY:  The patient's family history was reviewed and is non-contributory for heart disease.    SOCIAL HISTORY:  Social History     Socioeconomic History     Marital status:    Tobacco Use     Smoking status: Never     Smokeless tobacco: Never   Vaping Use     Vaping status: Never Used   Substance and Sexual Activity     Alcohol use: No     Drug use: No     Sexual activity: Never       Review of Systems:  Skin:        Eyes:       ENT:       Respiratory:       Cardiovascular:       Gastroenterology:      Genitourinary:       Musculoskeletal:       Neurologic:       Psychiatric:       Heme/Lymph/Imm:       Endocrine:           Recent Lab Results:  LIPID RESULTS:  Lab Results   Component Value Date    CHOL 122 10/27/2022    CHOL 175 11/03/2020    HDL 48 (L) 10/27/2022    HDL 71 11/03/2020    LDL 49 10/27/2022    LDL 66 11/03/2020    TRIG 123 10/27/2022    TRIG 188 (H) 11/03/2020    CHOLHDLRATIO 3.0 05/15/2015       LIVER ENZYME RESULTS:  Lab Results   Component Value Date    AST 26 02/25/2023    AST 12 11/18/2020    ALT 13 02/25/2023    ALT 15 11/18/2020       CBC RESULTS:  Lab Results   Component Value Date    WBC 8.3 02/25/2023    WBC 5.1 11/18/2020    RBC 4.04 02/25/2023    RBC 4.52 11/18/2020    HGB 11.5 (L) 02/25/2023    HGB 13.4 11/18/2020    HCT 35.6 02/25/2023    HCT 41.8 11/18/2020     MCV 88 02/25/2023    MCV 93 11/18/2020    MCH 28.5 02/25/2023    MCH 29.6 11/18/2020    MCHC 32.3 02/25/2023    MCHC 32.1 11/18/2020    RDW 17.2 (H) 02/25/2023    RDW 13.2 11/18/2020     02/25/2023     11/18/2020       BMP RESULTS:  Lab Results   Component Value Date     04/18/2023     11/18/2020    POTASSIUM 4.0 04/18/2023    POTASSIUM 3.9 12/29/2022    POTASSIUM 3.6 11/18/2020    CHLORIDE 101 04/18/2023    CHLORIDE 108 03/23/2022    CHLORIDE 109 11/18/2020    CO2 27 04/18/2023    CO2 26 03/23/2022    CO2 28 11/18/2020    ANIONGAP 12 04/18/2023    ANIONGAP 5 03/23/2022    ANIONGAP 4 11/18/2020     (H) 04/18/2023     (H) 03/03/2023     (H) 03/23/2022     (H) 11/18/2020    BUN 19.6 04/18/2023    BUN 20 03/23/2022    BUN 16 11/18/2020    CR 0.89 04/18/2023    CR 0.90 11/18/2020    GFRESTIMATED 62 04/18/2023    GFRESTIMATED 58 (L) 11/04/2021    GFRESTIMATED 58 (L) 11/18/2020    GFRESTBLACK 68 11/18/2020    FEDERICO 9.2 04/18/2023    FEDERICO 8.9 11/18/2020        A1C RESULTS:  Lab Results   Component Value Date    A1C 7.1 (H) 04/18/2023    A1C 6.9 (H) 11/03/2020       INR RESULTS:  Lab Results   Component Value Date    INR 1.76 (H) 02/25/2023         ECHOCARDIOGRAM  No results found for this or any previous visit (from the past 8760 hour(s)).      No orders of the defined types were placed in this encounter.    No orders of the defined types were placed in this encounter.    There are no discontinued medications.      Encounter Diagnoses   Name Primary?     Persistent atrial fibrillation (H)      Benign essential hypertension      Hyperlipidemia LDL goal <70      Coronary artery calcification seen on CT scan          CC  Jackie Olivares APRN CNP  [id number]  [place] [address]

## 2023-04-24 ENCOUNTER — TELEPHONE (OUTPATIENT)
Dept: CARDIOLOGY | Facility: CLINIC | Age: 88
End: 2023-04-24
Payer: COMMERCIAL

## 2023-04-24 DIAGNOSIS — Z95.0 CARDIAC PACEMAKER IN SITU: ICD-10-CM

## 2023-04-24 DIAGNOSIS — I49.5 SICK SINUS SYNDROME (H): ICD-10-CM

## 2023-04-24 DIAGNOSIS — Z98.890 HX OF ATRIOVENTRICULAR NODE ABLATION: ICD-10-CM

## 2023-04-24 DIAGNOSIS — I48.19 PERSISTENT ATRIAL FIBRILLATION (H): Primary | ICD-10-CM

## 2023-04-24 NOTE — TELEPHONE ENCOUNTER
----- Message from Yvonne Resendiz sent at 4/24/2023  8:19 AM CDT -----  When you find out if she wants to do this could you place an order and let me know?  Thank you!  Yvonne  ----- Message -----  From: Cooper Cates MD  Sent: 4/21/2023  10:21 AM CDT  To: Yvonne Resendiz; [facility] Nurse    I saw patient in clinic today.  We discussed possibility of pacemaker/AV node ablation.  She would like to think about the options.      Please contact patient on Monday to finalize plan.  If she does decide to have pacemaker/AV node ablation, I favor implantation of a [employer] device.  She should discontinue Xarelto 2 days prior to the procedure.  She was thinking either 5/1 or 5/10 as potential dates.    Thank you.    Cooper      Received above message from Dr. Cates and the Device , Yvonne.  Called and spoke with patient who stated that she would like to move forward with the AVNA and PPM.  She stated that 5/1 would not work for her, but 5/10 may.  Explained that Yvonne would be calling her to schedule and would work with her to find a day that works for her, too. Also notified patient that she will need to hold her Xarelto 2 days prior to the procedure and that we will call her 3 business days prior to review full pre-procedure instructions.  Patient verbalized understanding and agreement with plan.     Confirmed with Dr. Cates that this would be a single chamber PPM.  Order for AVNA and PPM entered.  Message sent to Yvonne to reach out to patient to schedule.  Device clinic and Yvonne's phone numbers provided to patient.     LACHELLE Tatum

## 2023-05-05 DIAGNOSIS — I48.19 PERSISTENT ATRIAL FIBRILLATION (H): Primary | ICD-10-CM

## 2023-05-05 RX ORDER — LIDOCAINE 40 MG/G
CREAM TOPICAL
Status: CANCELLED | OUTPATIENT
Start: 2023-05-05

## 2023-05-05 RX ORDER — CEFAZOLIN SODIUM 2 G/100ML
2 INJECTION, SOLUTION INTRAVENOUS
Status: CANCELLED | OUTPATIENT
Start: 2023-05-05

## 2023-05-05 RX ORDER — SODIUM CHLORIDE 450 MG/100ML
INJECTION, SOLUTION INTRAVENOUS CONTINUOUS
Status: CANCELLED | OUTPATIENT
Start: 2023-05-05

## 2023-05-05 NOTE — PROGRESS NOTES
Pre-procedure instructions for device implant/explant/gen change:     Anticoagulation: [id number] score of 5-6 Xarelto hold 2 day's prior per Dr. Cates (hold 5/8)   Oral diabetes meds: Metformin (5/10/23)  Insulin: NA  SGLT2 Inhibitors - hold 3-4 days prior to procedure (Invokana, Farxiga, Jardiance, Steglatro):na  Diuretic: Lasix (hold 5/10/23)  Contrast allergy: NA  NPO 8 hours prior to arrival time (2:30am)  May have clear liquids 2 hours prior to arrival time (8:30am)    -Shower the morning of the procedure, and then put on a clean shirt in order to help prevent infection.     -Post-procedure transportation and 24 hours monitoring set up. Please call before coming in if plans change.  With limited bed availability due to COVID, overnight hospital stays will be allowed for clinical reasons only.    -No driving for 24 hours post procedure due to sedation.     -Take temperature the morning of the procedure and call [facility] at [phone] if it is above 100.0.  Also call [facility] if pt has any new cold symptoms evening prior or AM of procedure.     -Review arrival time 10:30am and location.     Left VM to call back.

## 2023-05-08 ENCOUNTER — TELEPHONE (OUTPATIENT)
Dept: CARDIOLOGY | Facility: CLINIC | Age: 88
End: 2023-05-08
Payer: COMMERCIAL

## 2023-05-08 DIAGNOSIS — I48.0 PAROXYSMAL ATRIAL FIBRILLATION (H): ICD-10-CM

## 2023-05-08 NOTE — TELEPHONE ENCOUNTER
Pts daughter calling in wanting clarification on what meds she should hold and not hold prior to pacemaker implant. She states they received a generic letter in the mail that was not specific to the meds she takes. Please call her back to discuss.    Emmy [phone]

## 2023-05-09 ENCOUNTER — TELEPHONE (OUTPATIENT)
Dept: MEDSURG UNIT | Facility: CLINIC | Age: 88
End: 2023-05-09
Payer: COMMERCIAL

## 2023-05-09 RX ORDER — RIVAROXABAN 20 MG/1
TABLET, FILM COATED ORAL
Qty: 90 TABLET | Refills: 1 | Status: SHIPPED | OUTPATIENT
Start: 2023-05-09 | End: 2023-10-31

## 2023-05-09 NOTE — TELEPHONE ENCOUNTER
Requested Prescriptions   Pending Prescriptions Disp Refills     XARELTO ANTICOAGULANT 20 MG TABS tablet [Pharmacy Med Name: XARELTO TAB 20MG] 90 tablet 1     Sig: TAKE 1 TABLET DAILY WITH   DINNER       Direct Oral Anticoagulant Agents Failed - 5/8/2023  4:00 PM        Failed - Creatinine Clearance greater than 50 ml/min on file in past 3 mos     No lab results found.          Passed - Normal Platelets on file in past 12 months     Recent Labs   Lab Test 02/25/23  1518                  Passed - Medication is active on med list        Passed - Serum creatinine less than or equal to 1.4 on file in past 3 mos     Recent Labs   Lab Test 04/18/23  1016 06/09/16  1345 04/13/16  1016   CR 0.89   < >  --    CREAT  --   --  0.8    < > = values in this interval not displayed.       Ok to refill medication if creatinine is low          Passed - Patient is 18 years of age or older        Passed - No active pregnancy on record        Passed - No positive pregnancy test within past 12 months        Passed - Recent (6 mo) or future (30 days) visit within the authorizing provider's specialty

## 2023-05-10 ENCOUNTER — HOSPITAL ENCOUNTER (OUTPATIENT)
Facility: CLINIC | Age: 88
Setting detail: OBSERVATION
Discharge: HOME | End: 2023-05-11
Admitting: INTERNAL MEDICINE
Payer: COMMERCIAL

## 2023-05-10 DIAGNOSIS — I48.19 PERSISTENT ATRIAL FIBRILLATION (H): ICD-10-CM

## 2023-05-10 DIAGNOSIS — Z95.0 CARDIAC PACEMAKER IN SITU: Primary | ICD-10-CM

## 2023-05-10 DIAGNOSIS — I48.0 PAROXYSMAL ATRIAL FIBRILLATION (H): ICD-10-CM

## 2023-05-10 DIAGNOSIS — I49.5 SICK SINUS SYNDROME (H): ICD-10-CM

## 2023-05-10 LAB
ANION GAP SERPL CALCULATED.3IONS-SCNC: 16 MMOL/L (ref 7–15)
BUN SERPL-MCNC: 15.4 MG/DL (ref 8–23)
CALCIUM SERPL-MCNC: 9.2 MG/DL (ref 8.8–10.2)
CHLORIDE SERPL-SCNC: 103 MMOL/L (ref 98–107)
CREAT SERPL-MCNC: 0.85 MG/DL (ref 0.51–0.95)
DEPRECATED HCO3 PLAS-SCNC: 22 MMOL/L (ref 22–29)
ERYTHROCYTE [DISTWIDTH] IN BLOOD BY AUTOMATED COUNT: 14.9 % (ref 10–15)
GFR SERPL CREATININE-BSD FRML MDRD: 66 ML/MIN/1.73M2
GLUCOSE SERPL-MCNC: 142 MG/DL (ref 70–99)
HCT VFR BLD AUTO: 37.3 % (ref 35–47)
HGB BLD-MCNC: 11.5 G/DL (ref 11.7–15.7)
MCH RBC QN AUTO: 28.6 PG (ref 26.5–33)
MCHC RBC AUTO-ENTMCNC: 30.8 G/DL (ref 31.5–36.5)
MCV RBC AUTO: 93 FL (ref 78–100)
PLATELET # BLD AUTO: 299 10E3/UL (ref 150–450)
POTASSIUM SERPL-SCNC: 3.9 MMOL/L (ref 3.4–5.3)
RBC # BLD AUTO: 4.02 10E6/UL (ref 3.8–5.2)
SODIUM SERPL-SCNC: 141 MMOL/L (ref 136–145)
WBC # BLD AUTO: 4.9 10E3/UL (ref 4–11)

## 2023-05-10 PROCEDURE — 99153 MOD SED SAME PHYS/QHP EA: CPT | Performed by: INTERNAL MEDICINE

## 2023-05-10 PROCEDURE — C1786 PMKR, SINGLE, RATE-RESP: HCPCS | Performed by: INTERNAL MEDICINE

## 2023-05-10 PROCEDURE — 33207 INSERT HEART PM VENTRICULAR: CPT | Performed by: INTERNAL MEDICINE

## 2023-05-10 PROCEDURE — 250N000013 HC RX MED GY IP 250 OP 250 PS 637: Performed by: INTERNAL MEDICINE

## 2023-05-10 PROCEDURE — 82310 ASSAY OF CALCIUM: CPT | Performed by: INTERNAL MEDICINE

## 2023-05-10 PROCEDURE — 999N000054 HC STATISTIC EKG NON-CHARGEABLE

## 2023-05-10 PROCEDURE — 93650 ICAR CATH ABLTJ AV NODE FUNC: CPT | Performed by: INTERNAL MEDICINE

## 2023-05-10 PROCEDURE — 93010 ELECTROCARDIOGRAM REPORT: CPT | Performed by: INTERNAL MEDICINE

## 2023-05-10 PROCEDURE — 258N000002 HC RX IP 258 OP 250: Performed by: INTERNAL MEDICINE

## 2023-05-10 PROCEDURE — 120N000001 HC R&B MED SURG/OB

## 2023-05-10 PROCEDURE — 33207 INSERT HEART PM VENTRICULAR: CPT | Mod: KX | Performed by: INTERNAL MEDICINE

## 2023-05-10 PROCEDURE — 85014 HEMATOCRIT: CPT | Performed by: INTERNAL MEDICINE

## 2023-05-10 PROCEDURE — 99152 MOD SED SAME PHYS/QHP 5/>YRS: CPT | Performed by: INTERNAL MEDICINE

## 2023-05-10 PROCEDURE — 93005 ELECTROCARDIOGRAM TRACING: CPT

## 2023-05-10 PROCEDURE — C1898 LEAD, PMKR, OTHER THAN TRANS: HCPCS | Performed by: INTERNAL MEDICINE

## 2023-05-10 PROCEDURE — C1894 INTRO/SHEATH, NON-LASER: HCPCS | Performed by: INTERNAL MEDICINE

## 2023-05-10 PROCEDURE — C1733 CATH, EP, OTHR THAN COOL-TIP: HCPCS | Performed by: INTERNAL MEDICINE

## 2023-05-10 PROCEDURE — 36415 COLL VENOUS BLD VENIPUNCTURE: CPT | Performed by: INTERNAL MEDICINE

## 2023-05-10 PROCEDURE — 250N000009 HC RX 250: Performed by: INTERNAL MEDICINE

## 2023-05-10 PROCEDURE — C1887 CATHETER, GUIDING: HCPCS | Performed by: INTERNAL MEDICINE

## 2023-05-10 PROCEDURE — 250N000011 HC RX IP 250 OP 636: Performed by: INTERNAL MEDICINE

## 2023-05-10 PROCEDURE — 272N000001 HC OR GENERAL SUPPLY STERILE: Performed by: INTERNAL MEDICINE

## 2023-05-10 DEVICE — IMP LEAD PACING BIPOLAR CAPSUREFIX NOVUS 52CM 5076-52: Type: IMPLANTABLE DEVICE | Status: FUNCTIONAL

## 2023-05-10 DEVICE — PACEMAKER AZURE XT SR MRI SYS: Type: IMPLANTABLE DEVICE | Status: FUNCTIONAL

## 2023-05-10 RX ORDER — ATORVASTATIN CALCIUM 10 MG/1
10 TABLET, FILM COATED ORAL EVERY EVENING
Status: DISCONTINUED | OUTPATIENT
Start: 2023-05-10 | End: 2023-05-11 | Stop reason: HOSPADM

## 2023-05-10 RX ORDER — NALOXONE HYDROCHLORIDE 0.4 MG/ML
0.2 INJECTION, SOLUTION INTRAMUSCULAR; INTRAVENOUS; SUBCUTANEOUS
Status: DISCONTINUED | OUTPATIENT
Start: 2023-05-10 | End: 2023-05-11 | Stop reason: HOSPADM

## 2023-05-10 RX ORDER — SODIUM CHLORIDE 450 MG/100ML
INJECTION, SOLUTION INTRAVENOUS CONTINUOUS
Status: DISCONTINUED | OUTPATIENT
Start: 2023-05-10 | End: 2023-05-10 | Stop reason: HOSPADM

## 2023-05-10 RX ORDER — ACETAMINOPHEN 325 MG/1
650 TABLET ORAL EVERY 6 HOURS PRN
Status: DISCONTINUED | OUTPATIENT
Start: 2023-05-10 | End: 2023-05-11 | Stop reason: HOSPADM

## 2023-05-10 RX ORDER — BUPIVACAINE HYDROCHLORIDE 2.5 MG/ML
INJECTION, SOLUTION EPIDURAL; INFILTRATION; INTRACAUDAL
Status: DISCONTINUED | OUTPATIENT
Start: 2023-05-10 | End: 2023-05-10 | Stop reason: HOSPADM

## 2023-05-10 RX ORDER — DOBUTAMINE HYDROCHLORIDE 200 MG/100ML
5-40 INJECTION INTRAVENOUS CONTINUOUS PRN
Status: DISCONTINUED | OUTPATIENT
Start: 2023-05-10 | End: 2023-05-10 | Stop reason: HOSPADM

## 2023-05-10 RX ORDER — HEPARIN SODIUM 200 [USP'U]/100ML
100-500 INJECTION, SOLUTION INTRAVENOUS CONTINUOUS PRN
Status: DISCONTINUED | OUTPATIENT
Start: 2023-05-10 | End: 2023-05-10 | Stop reason: HOSPADM

## 2023-05-10 RX ORDER — LIDOCAINE 40 MG/G
CREAM TOPICAL
Status: DISCONTINUED | OUTPATIENT
Start: 2023-05-10 | End: 2023-05-10 | Stop reason: HOSPADM

## 2023-05-10 RX ORDER — FUROSEMIDE 20 MG
20 TABLET ORAL DAILY
Status: DISCONTINUED | OUTPATIENT
Start: 2023-05-11 | End: 2023-05-11 | Stop reason: HOSPADM

## 2023-05-10 RX ORDER — NALOXONE HYDROCHLORIDE 0.4 MG/ML
0.4 INJECTION, SOLUTION INTRAMUSCULAR; INTRAVENOUS; SUBCUTANEOUS
Status: DISCONTINUED | OUTPATIENT
Start: 2023-05-10 | End: 2023-05-11 | Stop reason: HOSPADM

## 2023-05-10 RX ORDER — MIDAZOLAM HCL IN 0.9 % NACL/PF 1 MG/ML
.5-6 PLASTIC BAG, INJECTION (ML) INTRAVENOUS CONTINUOUS PRN
Status: DISCONTINUED | OUTPATIENT
Start: 2023-05-10 | End: 2023-05-10 | Stop reason: HOSPADM

## 2023-05-10 RX ORDER — SENNOSIDES 8.6 MG
1 TABLET ORAL DAILY PRN
Status: DISCONTINUED | OUTPATIENT
Start: 2023-05-10 | End: 2023-05-11 | Stop reason: HOSPADM

## 2023-05-10 RX ORDER — CEFAZOLIN SODIUM 2 G/100ML
2 INJECTION, SOLUTION INTRAVENOUS EVERY 8 HOURS
Status: DISCONTINUED | OUTPATIENT
Start: 2023-05-10 | End: 2023-05-11 | Stop reason: HOSPADM

## 2023-05-10 RX ORDER — OXYCODONE AND ACETAMINOPHEN 5; 325 MG/1; MG/1
1 TABLET ORAL EVERY 4 HOURS PRN
Status: DISCONTINUED | OUTPATIENT
Start: 2023-05-10 | End: 2023-05-11 | Stop reason: HOSPADM

## 2023-05-10 RX ORDER — FENTANYL CITRATE 50 UG/ML
INJECTION, SOLUTION INTRAMUSCULAR; INTRAVENOUS
Status: DISCONTINUED | OUTPATIENT
Start: 2023-05-10 | End: 2023-05-10 | Stop reason: HOSPADM

## 2023-05-10 RX ORDER — GABAPENTIN 300 MG/1
600 CAPSULE ORAL EVERY EVENING
Status: DISCONTINUED | OUTPATIENT
Start: 2023-05-10 | End: 2023-05-11 | Stop reason: HOSPADM

## 2023-05-10 RX ORDER — CEFAZOLIN SODIUM 1 G/3ML
1 INJECTION, POWDER, FOR SOLUTION INTRAMUSCULAR; INTRAVENOUS
Status: DISCONTINUED | OUTPATIENT
Start: 2023-05-10 | End: 2023-05-10 | Stop reason: HOSPADM

## 2023-05-10 RX ORDER — HEPARIN SODIUM 200 [USP'U]/100ML
100-600 INJECTION, SOLUTION INTRAVENOUS CONTINUOUS PRN
Status: DISCONTINUED | OUTPATIENT
Start: 2023-05-10 | End: 2023-05-10 | Stop reason: HOSPADM

## 2023-05-10 RX ORDER — CEFAZOLIN SODIUM 2 G/100ML
2 INJECTION, SOLUTION INTRAVENOUS
Status: DISCONTINUED | OUTPATIENT
Start: 2023-05-10 | End: 2023-05-10 | Stop reason: HOSPADM

## 2023-05-10 RX ADMIN — GABAPENTIN 600 MG: 300 CAPSULE ORAL at 21:57

## 2023-05-10 RX ADMIN — METFORMIN HYDROCHLORIDE 250 MG: 500 TABLET ORAL at 20:09

## 2023-05-10 RX ADMIN — SODIUM CHLORIDE: 4.5 INJECTION, SOLUTION INTRAVENOUS at 11:00

## 2023-05-10 RX ADMIN — MAGNESIUM 64 MG (MAGNESIUM CHLORIDE) TABLET,DELAYED RELEASE 535 MG: at 20:09

## 2023-05-10 RX ADMIN — ATORVASTATIN CALCIUM 10 MG: 10 TABLET, FILM COATED ORAL at 21:57

## 2023-05-10 RX ADMIN — ACETAMINOPHEN 650 MG: 325 TABLET ORAL at 23:45

## 2023-05-10 RX ADMIN — ACETAMINOPHEN 650 MG: 325 TABLET ORAL at 16:03

## 2023-05-10 RX ADMIN — OXYCODONE HYDROCHLORIDE AND ACETAMINOPHEN 1 TABLET: 5; 325 TABLET ORAL at 20:09

## 2023-05-10 RX ADMIN — CEFAZOLIN SODIUM 2 G: 2 INJECTION, SOLUTION INTRAVENOUS at 23:45

## 2023-05-10 ASSESSMENT — ACTIVITIES OF DAILY LIVING (ADL)
ADLS_ACUITY_SCORE: 28
ADLS_ACUITY_SCORE: 24
ADLS_ACUITY_SCORE: 28
ADLS_ACUITY_SCORE: 37

## 2023-05-10 NOTE — Clinical Note
Tested the right ventricular lead. See vendor printout/MD dictation. Clear bilaterally, pupils equal, round and reactive to light.

## 2023-05-10 NOTE — PROGRESS NOTES
1725 - Patient transferred to [address] [place] [address] with family member at bedside and all personal belongings. VSS at transfer. Bedside report and assessment with LACHELLE Mckenna.

## 2023-05-10 NOTE — PROGRESS NOTES
Care Suites Admission Nursing Note    Patient Information  Name: Theodora Meng  Age: 88 year old  Reason for admission: AVNA/PPM  Care Suites arrival time: 1030    Visitor Information  Name: Emmy  Informed of visitor restrictions: Yes  2 visitor allowed per patient      Patient Admission/Assessment   Pre-procedure assessment complete: Yes  If abnormal assessment/labs, provider notified: N/A  NPO: Yes  Medications held per instructions/orders: Yes  Consent: obtained  If applicable, pregnancy test status: deferred  Patient oriented to room: Yes  Education/questions answered: Yes  Plan/other: Proceed as scheduled    Discharge Planning  Discharge name/phone number: Emmy [phone]  Overnight post sedation caregiver: Staying overnight in hospital  Discharge location: [place]    Nenita Green RN

## 2023-05-10 NOTE — PRE-PROCEDURE
GENERAL PRE-PROCEDURE:   Procedure:  PPM and AVN ablation  Date/Time:  5/10/2023 10:44 AM    Written consent obtained?: Yes    Risks and benefits: Risks, benefits and alternatives were discussed    Consent given by:  Patient  Patient states understanding of procedure being performed: Yes    Patient's understanding of procedure matches consent: Yes    Procedure consent matches procedure scheduled: Yes    Expected level of sedation:  Moderate  Appropriately NPO:  Yes  Mallampati  :  Grade 2- soft palate, base of uvula, tonsillar pillars, and portion of posterior pharyngeal wall visible  Lungs:  Lungs clear with good breath sounds bilaterally  Heart:  Normal heart sounds and rate  History & Physical reviewed:  History and physical reviewed and no updates needed  Statement of review:  I have reviewed the lab findings, diagnostic data, medications, and the plan for sedation

## 2023-05-10 NOTE — PROGRESS NOTES
Care Suites Post Procedure Note    Patient Information  Name: Theodora Meng  Age: 88 year old    Post Procedure  Time patient returned to Care Suites: 1530  Concerns/abnormal assessment: No.  If abnormal assessment, provider notified: N/A  Plan/Other: Per orders.     Bedrest for 4 hours. Rn reviewed activity restrictions pre and post-procedure with patient and family member. Patient gave verbal understanding.     Nenita Green RN

## 2023-05-11 ENCOUNTER — TRANSFERRED RECORDS (OUTPATIENT)
Dept: HEALTH INFORMATION MANAGEMENT | Facility: CLINIC | Age: 88
End: 2023-05-11

## 2023-05-11 ENCOUNTER — APPOINTMENT (OUTPATIENT)
Dept: GENERAL RADIOLOGY | Facility: CLINIC | Age: 88
End: 2023-05-11
Attending: INTERNAL MEDICINE
Payer: COMMERCIAL

## 2023-05-11 VITALS
TEMPERATURE: 97.4 F | SYSTOLIC BLOOD PRESSURE: 124 MMHG | OXYGEN SATURATION: 98 % | BODY MASS INDEX: 35.96 KG/M2 | DIASTOLIC BLOOD PRESSURE: 65 MMHG | WEIGHT: 210.6 LBS | HEART RATE: 80 BPM | RESPIRATION RATE: 18 BRPM | HEIGHT: 64 IN

## 2023-05-11 PROCEDURE — 99214 OFFICE O/P EST MOD 30 MIN: CPT | Performed by: PHYSICIAN ASSISTANT

## 2023-05-11 PROCEDURE — 250N000013 HC RX MED GY IP 250 OP 250 PS 637: Performed by: INTERNAL MEDICINE

## 2023-05-11 PROCEDURE — 999N000065 XR CHEST 2 VIEWS

## 2023-05-11 PROCEDURE — G0378 HOSPITAL OBSERVATION PER HR: HCPCS

## 2023-05-11 PROCEDURE — 250N000011 HC RX IP 250 OP 636: Performed by: INTERNAL MEDICINE

## 2023-05-11 PROCEDURE — 93005 ELECTROCARDIOGRAM TRACING: CPT

## 2023-05-11 PROCEDURE — 93010 ELECTROCARDIOGRAM REPORT: CPT | Performed by: INTERNAL MEDICINE

## 2023-05-11 RX ADMIN — CEFAZOLIN SODIUM 2 G: 2 INJECTION, SOLUTION INTRAVENOUS at 09:21

## 2023-05-11 RX ADMIN — MAGNESIUM 64 MG (MAGNESIUM CHLORIDE) TABLET,DELAYED RELEASE 535 MG: at 08:05

## 2023-05-11 RX ADMIN — FUROSEMIDE 20 MG: 20 TABLET ORAL at 08:06

## 2023-05-11 RX ADMIN — ACETAMINOPHEN 650 MG: 325 TABLET ORAL at 10:57

## 2023-05-11 RX ADMIN — OXYCODONE HYDROCHLORIDE AND ACETAMINOPHEN 1 TABLET: 5; 325 TABLET ORAL at 04:10

## 2023-05-11 RX ADMIN — METFORMIN HYDROCHLORIDE 250 MG: 500 TABLET ORAL at 08:05

## 2023-05-11 ASSESSMENT — ACTIVITIES OF DAILY LIVING (ADL)
ADLS_ACUITY_SCORE: 28

## 2023-05-11 NOTE — PLAN OF CARE
Goal Outcome Evaluation:  Orientation/Cognitive: A&O X4  Observation Goals (Met/ Not Met): Inpatient status   Mobility Level/Assist Equipment: Ax1/SBA GB/Walker   Fall Risk (Y/N): Yes   Behavior Concerns: None   Pain Management: PRN Tylenol and PERCOCET  Tele/VS/O2: VSS on RA, Tele- V-paced   ABNL Lab/BG: Hgb- 11.5   Diet: Regular   Bowel/Bladder: Incontinent at times   Skin Concerns: Pacemaker site CDI    Drains/Devices: PIV SL   Tests/Procedures for next shift: X-ray   Anticipated DC date & active delays: 5/11  Patient Stated Goal for Today: To go home

## 2023-05-11 NOTE — PROGRESS NOTES
[facility]    EP Progress Note    Date of Service (when I saw the patient): 05/11/2023     Assessment & Plan   Theodora Meng is a 88 year old female with h/o HTN, HFpEF, HL, DM2, GERD and persistent AFib/flutter with CAC who was admitted on 5/10/2023 for elective AVN ablation and PPM implantation.    1. Persistent AFib -   - S/p DCCV 12/2022 but recurrent atrial flutter.  Had dizziness with CCB and abdominal pain on amiodarone  - Sxs of SOB and fatigue persisted despite rate control. Medications limited d/t ADRs and relative hypotension.    - S/p AVN ablation and placement of single chamber Medtronic PPM 5/10/2023 with Dr. Cates    - CXR PENDING  - Device interrogation (Indra) looks good - 100% AFib. 97.6% RV pacing in VVIR 80/130. Junctional escape 40 bpm  - Device teaching (Tosin) PENDING    - PTA on atenolol 25 mg BID and Diltiazem 180  - Post AVN ablation, BB and CCB stopped    - EKG  80 bpm with underlying AFib    - Feels slightly dizzy - wonders if d/t pain med (Percocet) given 0400 contributing as was worse when she first got up to go to CXR, now better.   - Notes PPM site discomfort - c/o ache - Percocet helped but now dizzy. No pleuritic component    - PTA on Xarelto 20 mg daily. Dose appropriate for CrCl of 52 ml/min (using ABW)     PLAN:   1. Device RN will arrange follow-up in clinic ([place]) in 7-10 days for turn downs   2. Continue to monitor BP off of atenolol 25 BID and Diltiazem 180   3. Restart Xarelto 20 mg daily    4. Ice/Tylenol for pain at pacer site. Avoid Perctomorrow 5/12 PMocet given dizziness   5. Discharge today if CXR looks OK    ADDENDUM 5/11/2023 -  CXR reviewed. No PTX. Dr. Cates reviewed CXR and noted proper lead placement. Confirmed with LACHELLE Mckenna that no recurrent dizziness. Pain controlled with ice and Tylenol.  OK for discharge. Order placed.     2. HTN -   - PTA on atenolol 25 mg BID, Diltiazem 180, Lasix 20 mg daily. BPs had been soft,  limiting use of AVN blocking agents  - Atenolol and Diltiazem both stopped; BPs 130-140s     PLAN:   1. Follow BP as OP. If BP remains elevated, consider carvedilol, lisinopril, or amlodipine for BP   2. See Dr. Drake 6/2023 as previously arranged    Valeria Agudelo PA-C, MSPAS      Interval History   Dizziness this AM, improving. She noted it after percocet given 0400. No falls  Notes pain at PPM site - small hematoma noted. Bandage with scant dried blood    Physical Exam   Temp: 97.4  F (36.3  C) Temp src: Oral BP: 134/76 Pulse: 80   Resp: 18 SpO2: 94 % O2 Device: None (Room air) Oxygen Delivery: 2 LPM  Vitals:    05/10/23 1049 05/11/23 0647   Weight: 95.7 kg (210 lb 14.4 oz) 95.5 kg (210 lb 9.6 oz)     Vital Signs with Ranges  Temp:  [97.4  F (36.3  C)-97.9  F (36.6  C)] 97.4  F (36.3  C)  Pulse:  [78-85] 80  Resp:  [16-18] 18  BP: ()/(59-84) 134/76  SpO2:  [93 %-99 %] 94 %  I/O last 3 completed shifts:  In: 400 [P.O.:400]  Out: -     Telemetry: AFib with     Constitutional: Awake, alert. Ready to eat breakfast  Respiratory: CTA B  Cardiovascular: Regular. PPM site with mild hematoma. Scant dried blood on bandage  Musculoskeletal: No c/o groin site discomfort    Medications       atorvastatin  10 mg Oral QPM     ceFAZolin  2 g Intravenous Q8H     furosemide  20 mg Oral Daily     gabapentin  600 mg Oral QPM     magnesium chloride  535 mg Oral BID     metFORMIN  250 mg Oral BID w/meals       Data   I personally reviewed no images or EKG's today.  Results for orders placed or performed during the hospital encounter of 05/10/23 (from the past 24 hour(s))   EKG 12-lead, tracing only   Result Value Ref Range    Systolic Blood Pressure  mmHg    Diastolic Blood Pressure  mmHg    Ventricular Rate 107 BPM    Atrial Rate 159 BPM    MI Interval  ms    QRS Duration 72 ms     ms    QTc 365 ms    P Axis  degrees    R AXIS 3 degrees    T Axis 136 degrees    Interpretation ECG       Atrial fibrillation  with rapid ventricular response  Low voltage QRS  Nonspecific ST and T wave abnormality  Abnormal ECG  When compared with ECG of 29-DEC-2022 11:18,  Atrial fibrillation has replaced Sinus rhythm  Nonspecific T wave abnormality, worse in Anterolateral leads  QT has shortened     CBC with platelets   Result Value Ref Range    WBC Count 4.9 4.0 - 11.0 10e3/uL    RBC Count 4.02 3.80 - 5.20 10e6/uL    Hemoglobin 11.5 (L) 11.7 - 15.7 g/dL    Hematocrit 37.3 35.0 - 47.0 %    MCV 93 78 - 100 fL    MCH 28.6 26.5 - 33.0 pg    MCHC 30.8 (L) 31.5 - 36.5 g/dL    RDW 14.9 10.0 - 15.0 %    Platelet Count 299 150 - 450 10e3/uL   Basic metabolic panel   Result Value Ref Range    Sodium 141 136 - 145 mmol/L    Potassium 3.9 3.4 - 5.3 mmol/L    Chloride 103 98 - 107 mmol/L    Carbon Dioxide (CO2) 22 22 - 29 mmol/L    Anion Gap 16 (H) 7 - 15 mmol/L    Urea Nitrogen 15.4 8.0 - 23.0 mg/dL    Creatinine 0.85 0.51 - 0.95 mg/dL    Calcium 9.2 8.8 - 10.2 mg/dL    Glucose 142 (H) 70 - 99 mg/dL    GFR Estimate 66 >60 mL/min/1.73m2   EKG 12-lead, tracing only   Result Value Ref Range    Systolic Blood Pressure  mmHg    Diastolic Blood Pressure  mmHg    Ventricular Rate 80 BPM    Atrial Rate 72 BPM    MI Interval  ms    QRS Duration 158 ms     ms    QTc 539 ms    P Axis  degrees    R AXIS -87 degrees    T Axis 80 degrees    Interpretation ECG       Ventricular-paced rhythm  Abnormal ECG  When compared with ECG of 10-MAY-2023 10:59, (unconfirmed)  Electronic ventricular pacemaker has replaced Atrial fibrillation

## 2023-05-11 NOTE — DISCHARGE INSTRUCTIONS
Keep track of Blood Pressure. We stopped atenolol and Diltiazem and BP may increase. If consistently >140mmHg, please call us @ [phone] and we can adjust BP meds before you see Dr. Drake in 6/2023       Discharge Instructions for Pacemaker Implantation and AV Node Ablation  You have had a procedure to insert a pacemaker. Once inside your body, this small electronic device helps keep your heart from beating too slowly. A pacemaker can t fix existing heart problems. But it can help you feel better and have more energy. As you recover, follow all of the instructions you are given, including those below.    Activity  Follow the instructions you are given about limiting your activity.  Do not raise your arm on the incision side above shoulder level for 3 weeks. This gives the device lead wires time to attach securely inside your heart.  Ask your doctor when you can expect to return to work.  You can still exercise. It s good for your body and your heart. Talk with your doctor about an exercise plan.    Other Precautions for Pacemaker Site  Follow your doctor's directions carefully for wound care. You may remove the outer dressing in 3 - 4 days (Saturday). Leave the steri-strips in place; these will be removed at your 1 week follow-up. Never put any creams, lotions, or products like peroxide on an incision unless your doctor tells you to.   You may shower once the outer dressing has been removed.   Before you receive any treatment, tell all health care providers (including your dentist) that you have a pacemaker.  You will be given an ID card that contains information about your pacemaker. Always carry this card with you. You can show this card if your pacemaker sets off a metal detector. You should also show it to avoid screening with a hand-held security wand.  Keep your cell phone away from your pacemaker. Don t carry the phone in your shirt pocket, even when it s turned off.  Avoid strong magnets. Examples are  those used in MRIs or in hand-held security wands.  Avoid strong electrical fields. Examples are those made by radio transmitting towers,  ham  radios, and heavy-duty electrical equipment.  Avoid leaning over the open casillas of a running car. A running engine creates an electrical field. Most household and yard appliances will not cause any problems. If you use any large power tools, such as an industrial , talk with your doctor.   Do NOT lift, push or pull more than 10 pounds (equal to a gallon of milk) for 2-3 weeks  For right groin site: NO repetitive motions such as loading , vacuuming, raking, shoveling for 1 week    Care of Groin Puncture Site:  Check the puncture site every 1-2 hours while awake.  For 2-3 days, when you cough, sneeze, laugh or move your bowels, hold your hand over the puncture sites and press firmly.  Dressing should be removed after 24 hours.  Then apply a band aid daily for at least 3 days (if needed). If there is minor oozing, apply another band aid and remove it after 12 hours.   It is normal to have a small bruise or pea size lump at the sites.  You may shower after 3-4 days as per pacemaker instructions. Do NOT take a bath, or use a hot tub or pool until groin site heals, which may take up to a week.  Do NOT scrub the site. Do not use lotion or powder near the puncture site.    Bleeding at groin site:  If you start bleeding from the groin site, lie down flat and press firmly on the site for 10 minutes or until bleeding stops.   Once bleeding stops, lay flat for 1-2 hours.  Call the device clinicif bleeding does not stop or if it is after hours you will need to go to ER.  Go to ER or Call 911 right away if you have heavy bleeding or bleeding that does not stop.    Follow-Up  Follow up in the device clinic as scheduled. You have an appointment scheduled for 5/19 @ 11:00 . Your appointment is at   [facility] in [place], [facility] [address], [address],  [place] [address]  Park in [facility] (west of [facility]), walk across the skway, stay on the 2nd floor, heart clinic is to the left of the large staircase just past the elevators   Your 6 week  appointment for your pacemaker is 6/20/2023 @ 1:40pm @ [facility] in [place], [facility], Floor [address], [address], [place] [address]   .  Make regular follow-up appointments with your device clinic. They will check the pacemaker to make sure it s working properly.    When to Call Your Device Clinic [phone]  Call your doctor immediately if you have any of the following:  Dizziness  Chest pain  Lack of energy  Fainting spells  Rapid pulse or pounding heartbeat  Shortness of breath  Pain around your pacemaker  Fever above 100.4 F (38 C) or other signs of infection (redness, swelling, drainage, or warmth at the incision site)    For your groin site, please call the nurse if:  Chest pain not relieved by acetaminophen or ibuprofen  Shortness of breath  Increased groin pain or a large or growing hard lump around the site.  Groin site is red, swollen, hot or tender.  Blood or fluid is draining from the groin site.  You have chills or a fever greater than 101 F (38 C).  Your leg feels numb, cool or changes color.  If groin pain is not relieved by Tylenol or Ibuprofen.  Recurrent irregular or fast heart rate lasting over 2-3 hours.  Any questions or concerns.       5895-7060 [employer]. [address] [place] [address]. All rights reserved. This information is not intended as a substitute for professional medical care. Always follow your healthcare professional's instructions.    [facility] in [place]: [phone]  Device Clinic (Monday to Friday, 8am-4pm): [phone]  *The device clinic is closed on weekends and holidays.  Any calls received during this time will be answered on the next business  day. For any urgent questions after hours,  please call the main clinic number and you will be put in contact with the cardiologist on call.

## 2023-05-11 NOTE — UTILIZATION REVIEW
"[facility] Utilization Review  Admission Status; Secondary Review Determination     Admission Date: 5/10/2023 10:29 AM      Under the authority of the Utilization Management Committee, the utilization review process indicated a secondary review on the above patient.  The review outcome is based on review of the medical records, discussions with staff, and applying clinical experience noted on the date of the review.        (X) Observation Status Appropriate - This patient does not meet hospital inpatient criteria and is placed in observation status. If this patient's primary payer is Medicare and was admitted as an inpatient, Condition Code 44 should be used and patient status changed to \"observation\".   () Observation Status concurrent Review           RATIONALE FOR DETERMINATION   88-year-old female with history of persistent atrial fibrillation, sick sinus syndrome, hypertension, HFpEF, hyperlipidemia, diabetes mellitus, GERD, admitted with persistent A-fib/flutter with CAC admitted for elective AVN ablation and PPM implantation.  Patient now ready for discharge after 1 midnight.  Patient does not meet criteria for inpatient stay, recommend change to observation status, communicated to YASIR De      The severity of illness, intensity of service provided, expected LOS make the care appropriate for observation status at this time.        The information on this document is developed by the utilization review team in order for the business office to ensure compliance.  This only denotes the appropriateness of proper admission status and does not reflect the quality of care rendered.              Sincerely,       Mary Hairston MD  Physician Advisor  Utilization Review-[place]    Phone: [phone]       "  used

## 2023-05-11 NOTE — PROGRESS NOTES
Post Device Implant Instructions    Dressing:  Clean, dry, and intact  Chest XR reviewed:  Yes  Pneumo present?:  No  Lead Measurements per Device Rep:  WNL    Education Provided:  - Avoid raising left arm above the level of the shoulder for 3 weeks.  - Do not shower until outer occlusive dressing has been removed.  - Remove outer dressing in 3 - 4 days.  - Leave steri-strips in place, these will be removed at the 1 week check.   - Call Device Clinic with increased swelling, drainage, or fever > 101 degrees.   - Follow-up with the Device Clinic as scheduled.     Pt verbalized understanding of instructions and AVS updated.    Maira LAROSE

## 2023-05-11 NOTE — PROGRESS NOTES
Discharge summary handed to pt, no new medication given to pt. Teaching done and pt verbalized understanding. Pt discharged to home with family.

## 2023-05-12 LAB
ATRIAL RATE - MUSE: 159 BPM
DIASTOLIC BLOOD PRESSURE - MUSE: NORMAL MMHG
INTERPRETATION ECG - MUSE: NORMAL
P AXIS - MUSE: NORMAL DEGREES
PR INTERVAL - MUSE: NORMAL MS
QRS DURATION - MUSE: 72 MS
QT - MUSE: 274 MS
QTC - MUSE: 365 MS
R AXIS - MUSE: 3 DEGREES
SYSTOLIC BLOOD PRESSURE - MUSE: NORMAL MMHG
T AXIS - MUSE: 136 DEGREES
VENTRICULAR RATE- MUSE: 107 BPM

## 2023-05-13 ENCOUNTER — TELEPHONE (OUTPATIENT)
Dept: NURSING | Facility: CLINIC | Age: 88
End: 2023-05-13
Payer: COMMERCIAL

## 2023-05-13 NOTE — TELEPHONE ENCOUNTER
Home Care calling, requesting order for: Continue nursing services one time weekly for 10 weeks.     The Home Care/Assisted Living/Nursing Facility is calling regarding an established patient.  Has the patient seen Home Care in the past or is currently residing in Assisted Living or Nursing Facility? Martina Orozco calling from [facility] imageloop. requesting the following orders that are within the Home Care, Assisted Living or Nursing Home Eval and Treatment standing order and can be signed as standing order signature required by RN.    Preferred Call Back Number: [phone]    Home Care Visits Continuation    Any additional Orders:  Are there any orders requested, not stated above, that are outside of the standing order and must be routed to a licensed practitioner for approval?    No    Writer has verified Requestor will send fax to have orders signed.

## 2023-05-14 ENCOUNTER — PATIENT OUTREACH (OUTPATIENT)
Dept: CARE COORDINATION | Facility: CLINIC | Age: 88
End: 2023-05-14
Payer: COMMERCIAL

## 2023-05-14 NOTE — PROGRESS NOTES
Connected Care Resource Center Contact  [facility]/Voicemail     Clinical Data: Transitional Care Management Outreach     Outreach attempted x 2.  Left message on patient's voicemail, providing [facility]'s 24/7 scheduling and nurse triage phone number [phone] ([phone]) for questions/concerns and/or to schedule an appt with an [facility] provider, if they do not have a PCP.      Plan:  [facility] will do no further outreaches at this time.       Mary Hillman MA  Connected Care Resource Center, [facility]    *Connected Care Resource Team does NOT follow patient ongoing. Referrals are identified based on internal discharge reports and the outreach is to ensure patient has an understanding of their discharge instructions.

## 2023-05-15 LAB
ATRIAL RATE - MUSE: 72 BPM
DIASTOLIC BLOOD PRESSURE - MUSE: NORMAL MMHG
INTERPRETATION ECG - MUSE: NORMAL
P AXIS - MUSE: NORMAL DEGREES
PR INTERVAL - MUSE: NORMAL MS
QRS DURATION - MUSE: 158 MS
QT - MUSE: 468 MS
QTC - MUSE: 539 MS
R AXIS - MUSE: -87 DEGREES
SYSTOLIC BLOOD PRESSURE - MUSE: NORMAL MMHG
T AXIS - MUSE: 80 DEGREES
VENTRICULAR RATE- MUSE: 80 BPM

## 2023-05-19 ENCOUNTER — ANCILLARY PROCEDURE (OUTPATIENT)
Dept: CARDIOLOGY | Facility: CLINIC | Age: 88
End: 2023-05-19
Attending: INTERNAL MEDICINE
Payer: COMMERCIAL

## 2023-05-19 DIAGNOSIS — Z95.0 CARDIAC PACEMAKER IN SITU: ICD-10-CM

## 2023-05-19 PROCEDURE — 93279 PRGRMG DEV EVAL PM/LDLS PM: CPT | Performed by: INTERNAL MEDICINE

## 2023-06-05 LAB
MDC_IDC_LEAD_IMPLANT_DT: NORMAL
MDC_IDC_LEAD_LOCATION: NORMAL
MDC_IDC_LEAD_LOCATION_DETAIL_1: NORMAL
MDC_IDC_LEAD_MFG: NORMAL
MDC_IDC_LEAD_MODEL: NORMAL
MDC_IDC_LEAD_POLARITY_TYPE: NORMAL
MDC_IDC_LEAD_SERIAL: NORMAL
MDC_IDC_MSMT_BATTERY_DTM: NORMAL
MDC_IDC_MSMT_BATTERY_REMAINING_LONGEVITY: 155 MO
MDC_IDC_MSMT_BATTERY_RRT_TRIGGER: 2.62
MDC_IDC_MSMT_BATTERY_STATUS: NORMAL
MDC_IDC_MSMT_BATTERY_VOLTAGE: 3.18 V
MDC_IDC_MSMT_LEADCHNL_RV_IMPEDANCE_VALUE: 399 OHM
MDC_IDC_MSMT_LEADCHNL_RV_IMPEDANCE_VALUE: 494 OHM
MDC_IDC_MSMT_LEADCHNL_RV_PACING_THRESHOLD_AMPLITUDE: 0.62 V
MDC_IDC_MSMT_LEADCHNL_RV_PACING_THRESHOLD_PULSEWIDTH: 0.4 MS
MDC_IDC_MSMT_LEADCHNL_RV_SENSING_INTR_AMPL: 19.38 MV
MDC_IDC_PG_IMPLANT_DTM: NORMAL
MDC_IDC_PG_MFG: NORMAL
MDC_IDC_PG_MODEL: NORMAL
MDC_IDC_PG_SERIAL: NORMAL
MDC_IDC_PG_TYPE: NORMAL
MDC_IDC_SESS_CLINIC_NAME: NORMAL
MDC_IDC_SESS_DTM: NORMAL
MDC_IDC_SESS_TYPE: NORMAL
MDC_IDC_SET_BRADY_HYSTRATE: NORMAL
MDC_IDC_SET_BRADY_LOWRATE: 70 {BEATS}/MIN
MDC_IDC_SET_BRADY_MAX_SENSOR_RATE: 130 {BEATS}/MIN
MDC_IDC_SET_BRADY_MODE: NORMAL
MDC_IDC_SET_LEADCHNL_RV_PACING_AMPLITUDE: 2 V
MDC_IDC_SET_LEADCHNL_RV_PACING_ANODE_ELECTRODE_1: NORMAL
MDC_IDC_SET_LEADCHNL_RV_PACING_ANODE_LOCATION_1: NORMAL
MDC_IDC_SET_LEADCHNL_RV_PACING_CAPTURE_MODE: NORMAL
MDC_IDC_SET_LEADCHNL_RV_PACING_CATHODE_ELECTRODE_1: NORMAL
MDC_IDC_SET_LEADCHNL_RV_PACING_CATHODE_LOCATION_1: NORMAL
MDC_IDC_SET_LEADCHNL_RV_PACING_POLARITY: NORMAL
MDC_IDC_SET_LEADCHNL_RV_PACING_PULSEWIDTH: 0.4 MS
MDC_IDC_SET_LEADCHNL_RV_SENSING_ANODE_ELECTRODE_1: NORMAL
MDC_IDC_SET_LEADCHNL_RV_SENSING_ANODE_LOCATION_1: NORMAL
MDC_IDC_SET_LEADCHNL_RV_SENSING_CATHODE_ELECTRODE_1: NORMAL
MDC_IDC_SET_LEADCHNL_RV_SENSING_CATHODE_LOCATION_1: NORMAL
MDC_IDC_SET_LEADCHNL_RV_SENSING_POLARITY: NORMAL
MDC_IDC_SET_LEADCHNL_RV_SENSING_SENSITIVITY: 0.9 MV
MDC_IDC_SET_ZONE_DETECTION_INTERVAL: 360 MS
MDC_IDC_SET_ZONE_TYPE: NORMAL
MDC_IDC_STAT_BRADY_DTM_END: NORMAL
MDC_IDC_STAT_BRADY_DTM_START: NORMAL
MDC_IDC_STAT_BRADY_RV_PERCENT_PACED: 99.94 %
MDC_IDC_STAT_EPISODE_RECENT_COUNT: 0
MDC_IDC_STAT_EPISODE_RECENT_COUNT: 0
MDC_IDC_STAT_EPISODE_RECENT_COUNT_DTM_END: NORMAL
MDC_IDC_STAT_EPISODE_RECENT_COUNT_DTM_END: NORMAL
MDC_IDC_STAT_EPISODE_RECENT_COUNT_DTM_START: NORMAL
MDC_IDC_STAT_EPISODE_RECENT_COUNT_DTM_START: NORMAL
MDC_IDC_STAT_EPISODE_TOTAL_COUNT: 0
MDC_IDC_STAT_EPISODE_TOTAL_COUNT: 0
MDC_IDC_STAT_EPISODE_TOTAL_COUNT_DTM_END: NORMAL
MDC_IDC_STAT_EPISODE_TOTAL_COUNT_DTM_END: NORMAL
MDC_IDC_STAT_EPISODE_TOTAL_COUNT_DTM_START: NORMAL
MDC_IDC_STAT_EPISODE_TOTAL_COUNT_DTM_START: NORMAL
MDC_IDC_STAT_EPISODE_TYPE: NORMAL

## 2023-06-13 ENCOUNTER — TELEPHONE (OUTPATIENT)
Dept: FAMILY MEDICINE | Facility: CLINIC | Age: 88
End: 2023-06-13
Payer: COMMERCIAL

## 2023-06-13 DIAGNOSIS — K21.9 GASTROESOPHAGEAL REFLUX DISEASE WITHOUT ESOPHAGITIS: Primary | ICD-10-CM

## 2023-06-13 DIAGNOSIS — E11.9 TYPE 2 DIABETES MELLITUS WITHOUT COMPLICATION, WITHOUT LONG-TERM CURRENT USE OF INSULIN (H): ICD-10-CM

## 2023-06-13 NOTE — TELEPHONE ENCOUNTER
Medication Question or Refill    Contacts       Type Contact Phone/Fax    06/13/2023 04:49 PM CDT Phone (Incoming) Emmy Loaiza (Emergency Contact) [phone]     Ok to leave a detailed message          What medication are you calling about (include dose and sig)?: Prescriptions - Metformin Started in ICU / Omniprezolone (prilosec) _> Need refills     Preferred Pharmacy:   [facility] - [place], [place] - [address] [place] [address]  Phone: [phone] Fax: [phone]    [email or web address] - A MAIL ORDER [employer]  User for all [employer] all locations.  New  [employer] pt must call 1-800-4-REFILL to  verify address.  Phone: [phone] Fax: [phone]    [facility] - MAIL ORDER MAINT MEDS - NON-EPRESCRIBE  No address on file  Phone: [phone] Fax: [phone]    [facility] - YASIR Horan - [address] [facility] AT Portal to Registered [facility] Sites  [facility]  [place] [address]  Phone: [phone] Fax: [phone]    [id number] - [place], [place] - [address] [facility] AT [facility] [address]  [address] [facility] [address]  Phone: [phone] Fax: [phone]    [id number] - [place], [place] - [address]  [email or web address] [facility] [place]  [address]  [facility] [address]  Phone: [phone] Fax: [phone]      Controlled Substance Agreement on file:   CSA -- Patient Level:    CSA: None found at the patient level.       Who prescribed the medication?: Dr Romero    Do you need a refill? Yes    When did you use the medication last? Has 2 days of metformin - has 2 days of prolisec     Patient offered an appointment? No    Do you have any questions or concerns?  No      Could we send this information to you in [facility] or would you prefer to receive a phone call?:   Patient would prefer a phone call   Okay to leave a detailed message?: Yes at Home number on file [phone] (home)

## 2023-06-19 ENCOUNTER — OFFICE VISIT (OUTPATIENT)
Dept: CARDIOLOGY | Facility: CLINIC | Age: 88
End: 2023-06-19
Attending: NURSE PRACTITIONER
Payer: COMMERCIAL

## 2023-06-19 VITALS
SYSTOLIC BLOOD PRESSURE: 126 MMHG | BODY MASS INDEX: 35 KG/M2 | DIASTOLIC BLOOD PRESSURE: 75 MMHG | OXYGEN SATURATION: 100 % | HEART RATE: 76 BPM | WEIGHT: 205 LBS | HEIGHT: 64 IN | RESPIRATION RATE: 20 BRPM

## 2023-06-19 DIAGNOSIS — I50.32 CHRONIC HEART FAILURE WITH PRESERVED EJECTION FRACTION (HFPEF) (H): ICD-10-CM

## 2023-06-19 DIAGNOSIS — E78.5 HYPERLIPIDEMIA LDL GOAL <70: ICD-10-CM

## 2023-06-19 DIAGNOSIS — I48.19 PERSISTENT ATRIAL FIBRILLATION (H): ICD-10-CM

## 2023-06-19 DIAGNOSIS — I25.10 CORONARY ARTERY CALCIFICATION SEEN ON CT SCAN: ICD-10-CM

## 2023-06-19 DIAGNOSIS — I10 BENIGN ESSENTIAL HYPERTENSION: ICD-10-CM

## 2023-06-19 PROCEDURE — 99215 OFFICE O/P EST HI 40 MIN: CPT | Performed by: INTERNAL MEDICINE

## 2023-06-19 NOTE — PROGRESS NOTES
HPI:     This is an 88 yr old female with PMH GERD, HLD, T2DM, CAD, medically managed, and AFIB s/p AVNA/PPM here for new patient visit. She formally saw my colleagues but would prefer female cardiologist. She underwent PPM this spring and has been doing remarkably well. She has rates changed from 80 down to 60 to prevent dizziness. She is tolerating 70 bpm now. Swelling is overall stable. No chest pain or SOB. Off diltiazem now. Off atenolol. Only taking lasix 20 mg daily.    ASSESSMENT/PLAN:    1.  Symptomatic Atrial fibrillation - on anticoagulation,  No bleeding  -s/p PPM    2.  Essential hypertension  -controlled, off medications     3.  Hyperlipidemia  -continue statin  -annual lipids    4. CAD: negative stress test  -continue risk factor modification   -on xarelto, no aspirin    5. Hypomag: diagnosed in hospital, still on chronic mag, follow up labs and PCP evaluation     Follow up one year with echocardiogram  Ongoing device checks to be routed to me    Sarah Drake MD MSC      PAST MEDICAL HISTORY  Past Medical History:   Diagnosis Date     Depressive disorder      Diabetes (H)      Esophageal reflux      Other abnormal glucose      Other premature beats      Unspecified essential hypertension        CURRENT MEDICATIONS  Current Outpatient Medications   Medication Sig Dispense Refill     acetaminophen (TYLENOL) 325 MG tablet Take 2 tablets (650 mg) by mouth every 6 hours as needed for pain or fever       atorvastatin (LIPITOR) 10 MG tablet Take 1 tablet (10 mg) by mouth daily (Patient taking differently: Take 10 mg by mouth every evening) 90 tablet 3     furosemide (LASIX) 20 MG tablet Take 1 tablet (20 mg) by mouth daily 30 tablet 11     gabapentin (NEURONTIN) 300 MG capsule TAKE 2 CAPSULES IN THE EVENING (Patient taking differently: Take 600 mg by mouth every evening TAKE 2 CAPSULES IN THE EVENING) 180 capsule 3     magnesium chloride 535 (64 Mg) MG TBEC CR tablet Take 1 tablet (535 mg) by  mouth 2 times daily 60 tablet 3     metFORMIN (GLUCOPHAGE) 500 MG tablet Take 0.5 tablets (250 mg) by mouth 2 times daily (with meals) 90 tablet 1     omeprazole (PRILOSEC) 20 MG DR capsule Take 1 capsule (20 mg) by mouth daily 90 capsule 1     XARELTO ANTICOAGULANT 20 MG TABS tablet TAKE 1 TABLET DAILY WITH   DINNER 90 tablet 1     blood glucose monitoring (ACCU-CHEK COMPACT CARE KIT) meter device kit Use to test blood sugars 1 times daily or as directed. May sub formulary meter (Patient not taking: Reported on 6/19/2023) 1 kit 0     blood glucose monitoring (NO BRAND SPECIFIED) test strip Use to test blood sugars twice times daily or as directed (Patient not taking: Reported on 6/19/2023) 1 Box 3       PAST SURGICAL HISTORY:  Past Surgical History:   Procedure Laterality Date     ANESTHESIA CARDIOVERSION N/A 12/29/2022    Procedure: CARDIOVERSION;  Surgeon: GENERIC ANESTHESIA PROVIDER;  Location:  OR     APPENDECTOMY       ARTHROPLASTY KNEE  10/30/2013    Procedure: ARTHROPLASTY KNEE;  Right Total Knee Arthroplasty;  Surgeon: Ousmane Mcgowan MD;  Location: [facility]     ARTHROPLASTY KNEE Left 4/3/2019    Procedure: Left Total Knee Arthroplasty;  Surgeon: Ousmane Mcgowan MD;  Location: [facility]     CHOLECYSTECTOMY, LAPOROSCOPIC      Cholecystectomy, Laparoscopic     DILATION AND CURETTAGE, OPERATIVE HYSTEROSCOPY WITH MORCELLATOR, COMBINED N/A 6/20/2016    Procedure: COMBINED DILATION AND CURETTAGE, OPERATIVE HYSTEROSCOPY WITH MORCELLATOR;  Surgeon: Bony Arredondo MD;  Location: [facility]     EP ABLATION AV NODE N/A 5/10/2023    Procedure: Ablation Atrioventricular Node;  Surgeon: Cooper Cates MD;  Location:  HE[facility] CARDIAC CATH LAB     EP PACEMAKER DEVICE & LEAD IMPLANT- RIGHT VENTRICULAR N/A 5/10/2023    Procedure: Pacemaker Device & Lead Implant- Right ventricular;  Surgeon: Cooper Cates MD;  Location:  HEART CARDIAC CATH LAB     ESOPHAGOSCOPY, GASTROSCOPY, DUODENOSCOPY (EGD), COMBINED  N/A 2/17/2016    Procedure: COMBINED ESOPHAGOSCOPY, GASTROSCOPY, DUODENOSCOPY (EGD), BIOPSY SINGLE OR MULTIPLE;  Surgeon: Mil Guevara MD;  Location: [facility]     SALPINGO OOPHORECTOMY,R/L/OZZY      Salpingo Oophorectomy, RT/LT/OZZY     TONSILLECTOMY         ALLERGIES     Allergies   Allergen Reactions     Zoloft [Sertraline] Palpitations, Diarrhea and Cramps       FAMILY HISTORY  Family History   Problem Relation Age of Onset     Heart Disease Mother      Heart Disease Father      Heart Disease Brother      Heart Disease Brother      Heart Disease Brother        SOCIAL HISTORY  Social History     Socioeconomic History     Marital status:      Spouse name: Not on file     Number of children: Not on file     Years of education: Not on file     Highest education level: Not on file   Occupational History     Not on file   Tobacco Use     Smoking status: Never     Smokeless tobacco: Never   Vaping Use     Vaping status: Never Used   Substance and Sexual Activity     Alcohol use: No     Drug use: No     Sexual activity: Never   Other Topics Concern     Parent/sibling w/ CABG, MI or angioplasty before 65F 55M? Not Asked   Social History Narrative     Not on file     Social Determinants of Health     Financial Resource Strain: Not on file   Food Insecurity: Not on file   Transportation Needs: Not on file   Physical Activity: Not on file   Stress: Not on file   Social Connections: Not on file   Intimate Partner Violence: Not on file   Housing Stability: Not on file       ROS:   Constitutional: No fever, chills, or sweats. No weight gain/loss   ENT: No visual disturbance, ear ache, epistaxis, sore throat  Allergies/Immunologic: Negative  Respiratory: No cough, hemoptysia  Cardiovascular: As per HPI  GI: No nausea, vomiting, hematemesis, melena, or hematochezia  : No urinary frequency, dysuria, or hematuria  Integument: Negative  Psychiatric: Negative  Neuro: Negative  Endocrinology: Negative   Musculoskeletal:  "Negative  Vascular: No walking impairment, claudication, ischemic rest pain or nonhealing wounds    EXAM:  /75   Pulse 76   Resp 20   Ht 1.626 m (5' 4\")   Wt 93 kg (205 lb)   LMP  (LMP Unknown)   SpO2 100%   BMI 35.19 kg/m    In general, the patient is a pleasant female in no apparent distress.    HEENT: NC/AT.  PERRLA.  EOMI.  Sclerae white, not injected.    Neck: No adenopathy.  No thyromegaly. Carotids +2/2 bilaterally without bruits.  No jugular venous distension.   Heart: RRR. Normal S1, S2 splits physiologically. No murmur, rub, click, or gallop.   Lungs: CTA.  No ronchi, wheezes, rales.  No dullness to percussion.   Abdomen: Soft, nontender, nondistended.   Extremities: No clubbing, cyanosis, or edema.   Vascular: No bruits are noted.    Labs:  LIPID RESULTS:  Lab Results   Component Value Date    CHOL 122 10/27/2022    CHOL 175 11/03/2020    HDL 48 (L) 10/27/2022    HDL 71 11/03/2020    LDL 49 10/27/2022    LDL 66 11/03/2020    TRIG 123 10/27/2022    TRIG 188 (H) 11/03/2020    CHOLHDLRATIO 3.0 05/15/2015    NHDL 74 10/27/2022    NHDL 104 11/03/2020       LIVER ENZYME RESULTS:  Lab Results   Component Value Date    AST 26 02/25/2023    AST 12 11/18/2020    ALT 13 02/25/2023    ALT 15 11/18/2020       CBC RESULTS:  Lab Results   Component Value Date    WBC 4.9 05/10/2023    WBC 5.1 11/18/2020    RBC 4.02 05/10/2023    RBC 4.52 11/18/2020    HGB 11.5 (L) 05/10/2023    HGB 13.4 11/18/2020    HCT 37.3 05/10/2023    HCT 41.8 11/18/2020    MCV 93 05/10/2023    MCV 93 11/18/2020    MCH 28.6 05/10/2023    MCH 29.6 11/18/2020    MCHC 30.8 (L) 05/10/2023    MCHC 32.1 11/18/2020    RDW 14.9 05/10/2023    RDW 13.2 11/18/2020     05/10/2023     11/18/2020       BMP RESULTS:  Lab Results   Component Value Date     05/10/2023     11/18/2020    POTASSIUM 3.9 05/10/2023    POTASSIUM 3.9 12/29/2022    POTASSIUM 3.6 11/18/2020    CHLORIDE 103 05/10/2023    CHLORIDE 108 03/23/2022    " CHLORIDE 109 11/18/2020    CO2 22 05/10/2023    CO2 26 03/23/2022    CO2 28 11/18/2020    ANIONGAP 16 (H) 05/10/2023    ANIONGAP 5 03/23/2022    ANIONGAP 4 11/18/2020     (H) 05/10/2023     (H) 03/03/2023     (H) 03/23/2022     (H) 11/18/2020    BUN 15.4 05/10/2023    BUN 20 03/23/2022    BUN 16 11/18/2020    CR 0.85 05/10/2023    CR 0.90 11/18/2020    GFRESTIMATED 66 05/10/2023    GFRESTIMATED 58 (L) 11/04/2021    GFRESTIMATED 58 (L) 11/18/2020    GFRESTBLACK 68 11/18/2020    FEDERICO 9.2 05/10/2023    FEDERICO 8.9 11/18/2020        A1C RESULTS:  Lab Results   Component Value Date    A1C 7.1 (H) 04/18/2023    A1C 6.9 (H) 11/03/2020

## 2023-06-19 NOTE — PATIENT INSTRUCTIONS
Labs at [place] (Magnesium and chem panel)  Recommend PCP follow up  Echocardiogram and follow up with Dr. Drake in one year

## 2023-06-19 NOTE — LETTER
6/19/2023    Yecenia Cardona MD  [address] [place] [id number]    RE: Theodora Meng       Dear Colleague,     I had the pleasure of seeing Theodora Meng in the [facility].            HPI:     This is an 88 yr old female with PMH GERD, HLD, T2DM, CAD, medically managed, and AFIB s/p AVNA/PPM here for new patient visit. She formally saw my colleagues but would prefer female cardiologist. She underwent PPM this spring and has been doing remarkably well. She has rates changed from 80 down to 60 to prevent dizziness. She is tolerating 70 bpm now. Swelling is overall stable. No chest pain or SOB. Off diltiazem now. Off atenolol. Only taking lasix 20 mg daily.    ASSESSMENT/PLAN:    1.  Symptomatic Atrial fibrillation - on anticoagulation,  No bleeding  -s/p PPM    2.  Essential hypertension  -controlled, off medications     3.  Hyperlipidemia  -continue statin  -annual lipids    4. CAD: negative stress test  -continue risk factor modification   -on xarelto, no aspirin    5. Hypomag: diagnosed in hospital, still on chronic mag, follow up labs and PCP evaluation     Follow up one year with echocardiogram  Ongoing device checks to be routed to me    Sarah Drake MD MSC      PAST MEDICAL HISTORY  Past Medical History:   Diagnosis Date    Depressive disorder     Diabetes (H)     Esophageal reflux     Other abnormal glucose     Other premature beats     Unspecified essential hypertension        CURRENT MEDICATIONS  Current Outpatient Medications   Medication Sig Dispense Refill    acetaminophen (TYLENOL) 325 MG tablet Take 2 tablets (650 mg) by mouth every 6 hours as needed for pain or fever      atorvastatin (LIPITOR) 10 MG tablet Take 1 tablet (10 mg) by mouth daily (Patient taking differently: Take 10 mg by mouth every evening) 90 tablet 3    furosemide (LASIX) 20 MG tablet Take 1 tablet (20 mg) by mouth daily 30 tablet 11    gabapentin (NEURONTIN) 300 MG capsule TAKE 2 CAPSULES IN  THE EVENING (Patient taking differently: Take 600 mg by mouth every evening TAKE 2 CAPSULES IN THE EVENING) 180 capsule 3    magnesium chloride 535 (64 Mg) MG TBEC CR tablet Take 1 tablet (535 mg) by mouth 2 times daily 60 tablet 3    metFORMIN (GLUCOPHAGE) 500 MG tablet Take 0.5 tablets (250 mg) by mouth 2 times daily (with meals) 90 tablet 1    omeprazole (PRILOSEC) 20 MG DR capsule Take 1 capsule (20 mg) by mouth daily 90 capsule 1    XARELTO ANTICOAGULANT 20 MG TABS tablet TAKE 1 TABLET DAILY WITH   DINNER 90 tablet 1    blood glucose monitoring (ACCU-CHEK COMPACT CARE KIT) meter device kit Use to test blood sugars 1 times daily or as directed. May sub formulary meter (Patient not taking: Reported on 6/19/2023) 1 kit 0    blood glucose monitoring (NO BRAND SPECIFIED) test strip Use to test blood sugars twice times daily or as directed (Patient not taking: Reported on 6/19/2023) 1 Box 3       PAST SURGICAL HISTORY:  Past Surgical History:   Procedure Laterality Date    ANESTHESIA CARDIOVERSION N/A 12/29/2022    Procedure: CARDIOVERSION;  Surgeon: GENERIC ANESTHESIA PROVIDER;  Location:  OR    APPENDECTOMY      ARTHROPLASTY KNEE  10/30/2013    Procedure: ARTHROPLASTY KNEE;  Right Total Knee Arthroplasty;  Surgeon: Ousmane Mcgowan MD;  Location: [facility]    ARTHROPLASTY KNEE Left 4/3/2019    Procedure: Left Total Knee Arthroplasty;  Surgeon: Ousmane Mcgowan MD;  Location: [facility]    CHOLECYSTECTOMY, LAPOROSCOPIC      Cholecystectomy, Laparoscopic    DILATION AND CURETTAGE, OPERATIVE HYSTEROSCOPY WITH MORCELLATOR, COMBINED N/A 6/20/2016    Procedure: COMBINED DILATION AND CURETTAGE, OPERATIVE HYSTEROSCOPY WITH MORCELLATOR;  Surgeon: Bony Arredondo MD;  Location: [facility]    EP ABLATION AV NODE N/A 5/10/2023    Procedure: Ablation Atrioventricular Node;  Surgeon: Cooper Cates MD;  Location:  HEART CARDIAC CATH LAB    EP PACEMAKER DEVICE & LEAD IMPLANT- RIGHT VENTRICULAR N/A 5/10/2023    Procedure:  Pacemaker Device & Lead Implant- Right ventricular;  Surgeon: Cooper Cates MD;  Location: [facility] CARDIAC CATH LAB    ESOPHAGOSCOPY, GASTROSCOPY, DUODENOSCOPY (EGD), COMBINED N/A 2/17/2016    Procedure: COMBINED ESOPHAGOSCOPY, GASTROSCOPY, DUODENOSCOPY (EGD), BIOPSY SINGLE OR MULTIPLE;  Surgeon: Mil Guevara MD;  Location: [facility]    SALPINGO OOPHORECTOMY,R/L/OZZY      Salpingo Oophorectomy, RT/LT/OZZY    TONSILLECTOMY         ALLERGIES     Allergies   Allergen Reactions    Zoloft [Sertraline] Palpitations, Diarrhea and Cramps       FAMILY HISTORY  Family History   Problem Relation Age of Onset    Heart Disease Mother     Heart Disease Father     Heart Disease Brother     Heart Disease Brother     Heart Disease Brother        SOCIAL HISTORY  Social History     Socioeconomic History    Marital status:      Spouse name: Not on file    Number of children: Not on file    Years of education: Not on file    Highest education level: Not on file   Occupational History    Not on file   Tobacco Use    Smoking status: Never    Smokeless tobacco: Never   Vaping Use    Vaping status: Never Used   Substance and Sexual Activity    Alcohol use: No    Drug use: No    Sexual activity: Never   Other Topics Concern    Parent/sibling w/ CABG, MI or angioplasty before 65F 55M? Not Asked   Social History Narrative    Not on file     Social Determinants of Health     Financial Resource Strain: Not on file   Food Insecurity: Not on file   Transportation Needs: Not on file   Physical Activity: Not on file   Stress: Not on file   Social Connections: Not on file   Intimate Partner Violence: Not on file   Housing Stability: Not on file       ROS:   Constitutional: No fever, chills, or sweats. No weight gain/loss   ENT: No visual disturbance, ear ache, epistaxis, sore throat  Allergies/Immunologic: Negative  Respiratory: No cough, hemoptysia  Cardiovascular: As per HPI  GI: No nausea, vomiting, hematemesis, melena, or  "hematochezia  : No urinary frequency, dysuria, or hematuria  Integument: Negative  Psychiatric: Negative  Neuro: Negative  Endocrinology: Negative   Musculoskeletal: Negative  Vascular: No walking impairment, claudication, ischemic rest pain or nonhealing wounds    EXAM:  /75   Pulse 76   Resp 20   Ht 1.626 m (5' 4\")   Wt 93 kg (205 lb)   LMP  (LMP Unknown)   SpO2 100%   BMI 35.19 kg/m    In general, the patient is a pleasant female in no apparent distress.    HEENT: NC/AT.  PERRLA.  EOMI.  Sclerae white, not injected.    Neck: No adenopathy.  No thyromegaly. Carotids +2/2 bilaterally without bruits.  No jugular venous distension.   Heart: RRR. Normal S1, S2 splits physiologically. No murmur, rub, click, or gallop.   Lungs: CTA.  No ronchi, wheezes, rales.  No dullness to percussion.   Abdomen: Soft, nontender, nondistended.   Extremities: No clubbing, cyanosis, or edema.   Vascular: No bruits are noted.    Labs:  LIPID RESULTS:  Lab Results   Component Value Date    CHOL 122 10/27/2022    CHOL 175 11/03/2020    HDL 48 (L) 10/27/2022    HDL 71 11/03/2020    LDL 49 10/27/2022    LDL 66 11/03/2020    TRIG 123 10/27/2022    TRIG 188 (H) 11/03/2020    CHOLHDLRATIO 3.0 05/15/2015    NHDL 74 10/27/2022    NHDL 104 11/03/2020       LIVER ENZYME RESULTS:  Lab Results   Component Value Date    AST 26 02/25/2023    AST 12 11/18/2020    ALT 13 02/25/2023    ALT 15 11/18/2020       CBC RESULTS:  Lab Results   Component Value Date    WBC 4.9 05/10/2023    WBC 5.1 11/18/2020    RBC 4.02 05/10/2023    RBC 4.52 11/18/2020    HGB 11.5 (L) 05/10/2023    HGB 13.4 11/18/2020    HCT 37.3 05/10/2023    HCT 41.8 11/18/2020    MCV 93 05/10/2023    MCV 93 11/18/2020    MCH 28.6 05/10/2023    MCH 29.6 11/18/2020    MCHC 30.8 (L) 05/10/2023    MCHC 32.1 11/18/2020    RDW 14.9 05/10/2023    RDW 13.2 11/18/2020     05/10/2023     11/18/2020       BMP RESULTS:  Lab Results   Component Value Date     05/10/2023 "     11/18/2020    POTASSIUM 3.9 05/10/2023    POTASSIUM 3.9 12/29/2022    POTASSIUM 3.6 11/18/2020    CHLORIDE 103 05/10/2023    CHLORIDE 108 03/23/2022    CHLORIDE 109 11/18/2020    CO2 22 05/10/2023    CO2 26 03/23/2022    CO2 28 11/18/2020    ANIONGAP 16 (H) 05/10/2023    ANIONGAP 5 03/23/2022    ANIONGAP 4 11/18/2020     (H) 05/10/2023     (H) 03/03/2023     (H) 03/23/2022     (H) 11/18/2020    BUN 15.4 05/10/2023    BUN 20 03/23/2022    BUN 16 11/18/2020    CR 0.85 05/10/2023    CR 0.90 11/18/2020    GFRESTIMATED 66 05/10/2023    GFRESTIMATED 58 (L) 11/04/2021    GFRESTIMATED 58 (L) 11/18/2020    GFRESTBLACK 68 11/18/2020    FEDERICO 9.2 05/10/2023    FEDERICO 8.9 11/18/2020        A1C RESULTS:  Lab Results   Component Value Date    A1C 7.1 (H) 04/18/2023    A1C 6.9 (H) 11/03/2020             Thank you for allowing me to participate in the care of your patient.      Sincerely,     Sarah Drake MD     [facility]  cc:   Jackie Olivares, APRN CNP  [id number]  [place] [id number]

## 2023-06-20 ENCOUNTER — TELEPHONE (OUTPATIENT)
Dept: CARDIOLOGY | Facility: CLINIC | Age: 88
End: 2023-06-20
Payer: COMMERCIAL

## 2023-06-20 ENCOUNTER — HOSPITAL ENCOUNTER (OUTPATIENT)
Dept: CARDIOLOGY | Facility: CLINIC | Age: 88
Discharge: HOME | End: 2023-06-20
Attending: INTERNAL MEDICINE | Admitting: INTERNAL MEDICINE
Payer: COMMERCIAL

## 2023-06-20 DIAGNOSIS — I50.33 ACUTE ON CHRONIC HEART FAILURE WITH PRESERVED EJECTION FRACTION (HFPEF) (H): ICD-10-CM

## 2023-06-20 DIAGNOSIS — Z95.0 CARDIAC PACEMAKER IN SITU: ICD-10-CM

## 2023-06-20 PROCEDURE — 93279 PRGRMG DEV EVAL PM/LDLS PM: CPT

## 2023-06-20 PROCEDURE — 93279 PRGRMG DEV EVAL PM/LDLS PM: CPT | Mod: 26 | Performed by: INTERNAL MEDICINE

## 2023-06-20 NOTE — TELEPHONE ENCOUNTER
Patient presented to the clinic for routine post PPM check. Asymptomatic NSVT episode noted from 6/18/23. EGM shows PVCs/couplets and 3-6 beat runs of NSVT (AVNA) w/ rates in the 170s. Patient does not recall any symptoms at time of the episode. This is the first NSVT episode patient has had. Not on a beta blocker. EF 55% 2023. Will route to Dr. Drake per protocol.          Also, patient takes 20mg lasix daily and is wondering if this med can be discontinued or decreased d/t side effect of frequency.    SHAKA RN

## 2023-06-21 ENCOUNTER — TELEPHONE (OUTPATIENT)
Dept: FAMILY MEDICINE | Facility: CLINIC | Age: 88
End: 2023-06-21
Payer: COMMERCIAL

## 2023-06-21 RX ORDER — FUROSEMIDE 20 MG
20 TABLET ORAL DAILY PRN
Qty: 30 TABLET | Refills: 11 | COMMUNITY
Start: 2023-06-21 | End: 2023-11-13

## 2023-06-21 NOTE — TELEPHONE ENCOUNTER
Discussed with Dr. Drake. Pt was seen in the clinic 2 days ago and discussed with pt she was to have lab work done to reassess her magnesium level as she has low magnesium despite being on supplement. This could be triggering her electrical issues. Pt also c/o increase/frequency of urination.   Per Dr. Drake pt to f/u with PCP and have lab work done as discussed first for hypomag. Called and reiterated this with pt and also sent a staff message to PCP.  Pt states she will call [facility] to schedule these visits.   Pt states her LE swelling is now gone and at times feels dizzy/lightheaded. Per Dr. Drake okay to take lasix as needed for wt gain/swelling.   Pt verbalized an understanding.     Janeen Apodaca RN

## 2023-06-21 NOTE — TELEPHONE ENCOUNTER
"----- Message from Janeen Apodaca RN sent at 6/21/2023  1:10 PM CDT -----  Hello-    Pt had a recent device check showing - \"PVCs/couplets and 3-6 beat runs of NSVT (AVNA) w/ rates in the 170s. Patient does not recall any symptoms at time of the episode. This is the first NSVT episode patient has had.\"    We saw pt in Cardiology on Monday 6/19/23 and asked her to have labs done to reassess Magnesium level as her level was still low and looks like you had increased her Mag supplement about 2 months ago. She is now having NSVT.   Called pt again today and asked her to schedule lab visit and follow up with PCP for further magnesium management if needed.    Told pt I would also send a heads up to make sure this gets scheduled    Thanks    Janeen Apodaca RN      "

## 2023-06-22 LAB
MDC_IDC_EPISODE_DTM: NORMAL
MDC_IDC_EPISODE_DURATION: 0 S
MDC_IDC_EPISODE_ID: 1
MDC_IDC_EPISODE_TYPE: NORMAL
MDC_IDC_LEAD_IMPLANT_DT: NORMAL
MDC_IDC_LEAD_LOCATION: NORMAL
MDC_IDC_LEAD_LOCATION_DETAIL_1: NORMAL
MDC_IDC_LEAD_MFG: NORMAL
MDC_IDC_LEAD_MODEL: NORMAL
MDC_IDC_LEAD_POLARITY_TYPE: NORMAL
MDC_IDC_LEAD_SERIAL: NORMAL
MDC_IDC_MSMT_BATTERY_DTM: NORMAL
MDC_IDC_MSMT_BATTERY_REMAINING_LONGEVITY: 158 MO
MDC_IDC_MSMT_BATTERY_RRT_TRIGGER: 2.62
MDC_IDC_MSMT_BATTERY_STATUS: NORMAL
MDC_IDC_MSMT_BATTERY_VOLTAGE: 3.19 V
MDC_IDC_MSMT_LEADCHNL_RV_IMPEDANCE_VALUE: 456 OHM
MDC_IDC_MSMT_LEADCHNL_RV_IMPEDANCE_VALUE: 532 OHM
MDC_IDC_MSMT_LEADCHNL_RV_PACING_THRESHOLD_AMPLITUDE: 0.5 V
MDC_IDC_MSMT_LEADCHNL_RV_PACING_THRESHOLD_PULSEWIDTH: 0.4 MS
MDC_IDC_MSMT_LEADCHNL_RV_SENSING_INTR_AMPL: 15.75 MV
MDC_IDC_MSMT_LEADCHNL_RV_SENSING_INTR_AMPL: 7 MV
MDC_IDC_PG_IMPLANT_DTM: NORMAL
MDC_IDC_PG_MFG: NORMAL
MDC_IDC_PG_MODEL: NORMAL
MDC_IDC_PG_SERIAL: NORMAL
MDC_IDC_PG_TYPE: NORMAL
MDC_IDC_SESS_CLINIC_NAME: NORMAL
MDC_IDC_SESS_DTM: NORMAL
MDC_IDC_SESS_TYPE: NORMAL
MDC_IDC_SET_BRADY_HYSTRATE: NORMAL
MDC_IDC_SET_BRADY_LOWRATE: 70 {BEATS}/MIN
MDC_IDC_SET_BRADY_MAX_SENSOR_RATE: 130 {BEATS}/MIN
MDC_IDC_SET_BRADY_MODE: NORMAL
MDC_IDC_SET_LEADCHNL_RV_PACING_AMPLITUDE: 2 V
MDC_IDC_SET_LEADCHNL_RV_PACING_ANODE_ELECTRODE_1: NORMAL
MDC_IDC_SET_LEADCHNL_RV_PACING_ANODE_LOCATION_1: NORMAL
MDC_IDC_SET_LEADCHNL_RV_PACING_CAPTURE_MODE: NORMAL
MDC_IDC_SET_LEADCHNL_RV_PACING_CATHODE_ELECTRODE_1: NORMAL
MDC_IDC_SET_LEADCHNL_RV_PACING_CATHODE_LOCATION_1: NORMAL
MDC_IDC_SET_LEADCHNL_RV_PACING_POLARITY: NORMAL
MDC_IDC_SET_LEADCHNL_RV_PACING_PULSEWIDTH: 0.4 MS
MDC_IDC_SET_LEADCHNL_RV_SENSING_ANODE_ELECTRODE_1: NORMAL
MDC_IDC_SET_LEADCHNL_RV_SENSING_ANODE_LOCATION_1: NORMAL
MDC_IDC_SET_LEADCHNL_RV_SENSING_CATHODE_ELECTRODE_1: NORMAL
MDC_IDC_SET_LEADCHNL_RV_SENSING_CATHODE_LOCATION_1: NORMAL
MDC_IDC_SET_LEADCHNL_RV_SENSING_POLARITY: NORMAL
MDC_IDC_SET_LEADCHNL_RV_SENSING_SENSITIVITY: 0.9 MV
MDC_IDC_SET_ZONE_DETECTION_INTERVAL: 360 MS
MDC_IDC_SET_ZONE_TYPE: NORMAL
MDC_IDC_STAT_BRADY_DTM_END: NORMAL
MDC_IDC_STAT_BRADY_DTM_START: NORMAL
MDC_IDC_STAT_BRADY_RV_PERCENT_PACED: 99.94 %
MDC_IDC_STAT_EPISODE_RECENT_COUNT: 0
MDC_IDC_STAT_EPISODE_RECENT_COUNT: 1
MDC_IDC_STAT_EPISODE_RECENT_COUNT_DTM_END: NORMAL
MDC_IDC_STAT_EPISODE_RECENT_COUNT_DTM_END: NORMAL
MDC_IDC_STAT_EPISODE_RECENT_COUNT_DTM_START: NORMAL
MDC_IDC_STAT_EPISODE_RECENT_COUNT_DTM_START: NORMAL
MDC_IDC_STAT_EPISODE_TOTAL_COUNT: 0
MDC_IDC_STAT_EPISODE_TOTAL_COUNT: 1
MDC_IDC_STAT_EPISODE_TOTAL_COUNT_DTM_END: NORMAL
MDC_IDC_STAT_EPISODE_TOTAL_COUNT_DTM_END: NORMAL
MDC_IDC_STAT_EPISODE_TOTAL_COUNT_DTM_START: NORMAL
MDC_IDC_STAT_EPISODE_TOTAL_COUNT_DTM_START: NORMAL
MDC_IDC_STAT_EPISODE_TYPE: NORMAL

## 2023-07-05 ENCOUNTER — LAB (OUTPATIENT)
Dept: LAB | Facility: CLINIC | Age: 88
End: 2023-07-05
Payer: COMMERCIAL

## 2023-07-05 DIAGNOSIS — I50.32 CHRONIC HEART FAILURE WITH PRESERVED EJECTION FRACTION (HFPEF) (H): ICD-10-CM

## 2023-07-05 LAB
ANION GAP SERPL CALCULATED.3IONS-SCNC: 13 MMOL/L (ref 7–15)
BUN SERPL-MCNC: 18.2 MG/DL (ref 8–23)
CALCIUM SERPL-MCNC: 9.4 MG/DL (ref 8.8–10.2)
CHLORIDE SERPL-SCNC: 103 MMOL/L (ref 98–107)
CREAT SERPL-MCNC: 0.9 MG/DL (ref 0.51–0.95)
DEPRECATED HCO3 PLAS-SCNC: 25 MMOL/L (ref 22–29)
GFR SERPL CREATININE-BSD FRML MDRD: 62 ML/MIN/1.73M2
GLUCOSE SERPL-MCNC: 141 MG/DL (ref 70–99)
MAGNESIUM SERPL-MCNC: 1.9 MG/DL (ref 1.7–2.3)
POTASSIUM SERPL-SCNC: 3.6 MMOL/L (ref 3.4–5.3)
SODIUM SERPL-SCNC: 141 MMOL/L (ref 136–145)

## 2023-07-05 PROCEDURE — 36415 COLL VENOUS BLD VENIPUNCTURE: CPT

## 2023-07-05 PROCEDURE — 80048 BASIC METABOLIC PNL TOTAL CA: CPT

## 2023-07-05 PROCEDURE — 83735 ASSAY OF MAGNESIUM: CPT

## 2023-07-06 NOTE — RESULT ENCOUNTER NOTE
Electrolytes, magnesium and kidney function WNL. Pt has 7/18/23 follow up with Dr Santoro for hypomagnesemia. Pt notified via [facility]. Will also forward to Dr Santoro for her information.

## 2023-07-18 ENCOUNTER — OFFICE VISIT (OUTPATIENT)
Dept: FAMILY MEDICINE | Facility: CLINIC | Age: 88
End: 2023-07-18
Payer: COMMERCIAL

## 2023-07-18 VITALS
RESPIRATION RATE: 18 BRPM | WEIGHT: 206 LBS | BODY MASS INDEX: 35.17 KG/M2 | OXYGEN SATURATION: 99 % | HEART RATE: 72 BPM | HEIGHT: 64 IN | SYSTOLIC BLOOD PRESSURE: 132 MMHG | DIASTOLIC BLOOD PRESSURE: 74 MMHG

## 2023-07-18 DIAGNOSIS — E78.5 HYPERLIPIDEMIA LDL GOAL <100: ICD-10-CM

## 2023-07-18 DIAGNOSIS — N18.31 STAGE 3A CHRONIC KIDNEY DISEASE (H): Primary | ICD-10-CM

## 2023-07-18 DIAGNOSIS — E11.9 TYPE 2 DIABETES MELLITUS WITHOUT COMPLICATION, WITHOUT LONG-TERM CURRENT USE OF INSULIN (H): ICD-10-CM

## 2023-07-18 DIAGNOSIS — E83.42 HYPOMAGNESEMIA: ICD-10-CM

## 2023-07-18 DIAGNOSIS — K21.9 GASTROESOPHAGEAL REFLUX DISEASE WITHOUT ESOPHAGITIS: ICD-10-CM

## 2023-07-18 DIAGNOSIS — E55.9 VITAMIN D DEFICIENCY: ICD-10-CM

## 2023-07-18 LAB
DEPRECATED CALCIDIOL+CALCIFEROL SERPL-MC: 14 UG/L (ref 20–75)
HBA1C MFR BLD: 7 % (ref 0–5.6)

## 2023-07-18 PROCEDURE — 99214 OFFICE O/P EST MOD 30 MIN: CPT | Performed by: STUDENT IN AN ORGANIZED HEALTH CARE EDUCATION/TRAINING PROGRAM

## 2023-07-18 PROCEDURE — 36415 COLL VENOUS BLD VENIPUNCTURE: CPT | Performed by: STUDENT IN AN ORGANIZED HEALTH CARE EDUCATION/TRAINING PROGRAM

## 2023-07-18 PROCEDURE — 83036 HEMOGLOBIN GLYCOSYLATED A1C: CPT | Performed by: STUDENT IN AN ORGANIZED HEALTH CARE EDUCATION/TRAINING PROGRAM

## 2023-07-18 PROCEDURE — 82306 VITAMIN D 25 HYDROXY: CPT | Performed by: STUDENT IN AN ORGANIZED HEALTH CARE EDUCATION/TRAINING PROGRAM

## 2023-07-18 RX ORDER — CHOLECALCIFEROL (VITAMIN D3) 50 MCG
1 TABLET ORAL DAILY
Qty: 90 TABLET | Refills: 3 | Status: SHIPPED | OUTPATIENT
Start: 2023-10-03 | End: 2023-07-20

## 2023-07-18 RX ORDER — ERGOCALCIFEROL 1.25 MG/1
50000 CAPSULE, LIQUID FILLED ORAL WEEKLY
Qty: 12 CAPSULE | Refills: 0 | Status: SHIPPED | OUTPATIENT
Start: 2023-07-18 | End: 2023-07-20 | Stop reason: SINTOL

## 2023-07-18 RX ORDER — FAMOTIDINE 20 MG/1
TABLET, FILM COATED ORAL
COMMUNITY
Start: 2023-03-17

## 2023-07-18 RX ORDER — OMEPRAZOLE 40 MG/1
40 CAPSULE, DELAYED RELEASE ORAL DAILY
Qty: 90 CAPSULE | Refills: 3 | Status: SHIPPED | OUTPATIENT
Start: 2023-07-18 | End: 2024-07-22

## 2023-07-18 RX ORDER — ATORVASTATIN CALCIUM 10 MG/1
10 TABLET, FILM COATED ORAL EVERY EVENING
Qty: 90 TABLET | Refills: 3 | Status: SHIPPED | OUTPATIENT
Start: 2023-07-18 | End: 2024-07-08

## 2023-07-18 ASSESSMENT — PAIN SCALES - GENERAL: PAINLEVEL: NO PAIN (0)

## 2023-07-18 NOTE — LETTER
July 18, 2023      Theodora Meng  [address] [place] [id number]        Dear MsDominga,    We are writing to inform you of your test results.    Your vitamin D is quite low again. I sent prescriptions for Vitamin D supplementation to the pharmacy to start with high dose vitamin D once weekly for 12 weeks, followed by daily supplementation.     Your A1c is a little better than before and definitely at goal. Keep up the good work!     Results from this visit  Results for orders placed or performed in visit on 07/18/23   Vitamin D deficiency screening     Status: Abnormal   Result Value Ref Range    Vitamin D, Total (25-Hydroxy) 14 (L) 20 - 75 ug/L    Narrative    Season, race, dietary intake, and treatment affect the concentration of 25-hydroxy-Vitamin D. Values may decrease during winter months and increase during summer months. Values 20-29 ug/L may indicate Vitamin D insufficiency and values <20 ug/L may indicate Vitamin D deficiency.    Vitamin D determination is routinely performed by an immunoassay specific for 25 hydroxyvitamin D3.  If an individual is on vitamin D2(ergocalciferol) supplementation, please specify 25 OH vitamin D2 and D3 level determination by LCMSMS test [id number].     Hemoglobin A1c     Status: Abnormal   Result Value Ref Range    Hemoglobin A1C 7.0 (H) 0.0 - 5.6 %         If you have any questions or concerns, please call the clinic at the number listed above.       Sincerely,

## 2023-07-18 NOTE — PATIENT INSTRUCTIONS
We will go down to 1 tablet of magnesium daily, then recheck the magnesium in 1 month.     If your magnesium is still normal, then we will stop magnesium, recheck in 1 month.     If your magnesium is low again, we will increase back to twice daily.       For lasix:   - if you gain 2 lbs in 1 day or 5 lbs in 1 week, take lasix to get it back down to goal/baseline.

## 2023-07-18 NOTE — PROGRESS NOTES
Assessment & Plan     Patient is a very pleasant 88-year-old female who presents today for follow-up on laboratory studies, recheck of magnesium.  We discussed other concerns as well today.    Stage 3a chronic kidney disease (H)  Patient has a history of chronic kidney disease, due for urine test.  This was ordered, but unable to be collected today.  - Albumin Random Urine Quantitative with Creat Ratio  - Albumin Random Urine Quantitative with Creat Ratio    Hypomagnesemia  Regarding her magnesium, most recent level was within normal limits.  We discussed decreasing to just 1 tablet daily of the magnesium, recheck in 1 month.  If magnesium is remaining within normal limits, then trial off of magnesium supplementation, recheck a month after that.  If it has decreased again, increase back to twice daily magnesium.  - magnesium chloride 535 (64 Mg) MG TBEC CR tablet  Dispense: 90 tablet; Refill: 0  - Magnesium  - PRIMARY CARE FOLLOW-UP SCHEDULING    Hyperlipidemia LDL goal <100  Refill of atorvastatin today  - atorvastatin (LIPITOR) 10 MG tablet  Dispense: 90 tablet; Refill: 3    Gastroesophageal reflux disease without esophagitis  Refill.  Patient last had a refill at 20 mg daily, however she is noting that 40 mg is required to manage her symptoms.  Prescription sent for 40 mg daily.  - omeprazole (PRILOSEC) 40 MG DR capsule  Dispense: 90 capsule; Refill: 3    Type 2 diabetes mellitus without complication, without long-term current use of insulin (H)  Recheck of hemoglobin A1c today.  It was slightly elevated at her last visit.  Patient has historically had variable control of glucoses.  A1c today is at 7.0, which is a great goal given her age.  - Hemoglobin A1c  - Hemoglobin A1c    Vitamin D deficiency  History of significantly low vitamin D.  Vitamin D level was obtained today, and remains quite low at 14.  We will plan to treat her with high-dose vitamin D, followed by daily supplementation.  - Vitamin D  Spoke to pt and he will stay on baraclude for now  And is getting copay assistance  To do mri early sept and labs wk before    Pt doing well and no problem with the biopsy he had and will f/u with Dr Abigail li    V v nov or dec    "deficiency screening  - Vitamin D deficiency screening  -     vitamin D2 (ERGOCALCIFEROL) 92608 units (1250 mcg) capsule; Take 1 capsule (50,000 Units) by mouth once a week for 12 doses  -     vitamin D3 (CHOLECALCIFEROL) 50 mcg (2000 units) tablet; Take 1 tablet (50 mcg) by mouth daily    I spent a total of 38 minutes on the day of the visit.   Time spent by me doing chart review, history and exam, documentation and further activities per the note     BMI:   Estimated body mass index is 35.36 kg/m  as calculated from the following:    Height as of this encounter: 1.626 m (5' 4\").    Weight as of this encounter: 93.4 kg (206 lb).       Naty Santoro MD  [facility]    Chay Yip is a 88 year old, presenting for the following health issues:  Results (Magnesium and BMP 07/05/2023)        7/18/2023     8:51 AM   Additional Questions   Roomed by Jackie   Accompanied by Self     HPI   Has a pacemaker placed in May.   Working good.   Not going off now. At first it was, but that's better ow     Generally feeling improved from previous.  Would like refill of some medications today.    Review of Systems   Constitutional, HEENT, cardiovascular, pulmonary, GI, , musculoskeletal, neuro, skin, endocrine and psych systems are negative, except as otherwise noted.      Objective    /74 (BP Location: Right arm, Patient Position: Sitting, Cuff Size: Adult Large)   Pulse 72   Resp 18   Ht 1.626 m (5' 4\")   Wt 93.4 kg (206 lb)   LMP  (LMP Unknown)   SpO2 99%   BMI 35.36 kg/m    Body mass index is 35.36 kg/m .  Physical Exam  Constitutional:       Appearance: Normal appearance.   HENT:      Head: Normocephalic.   Eyes:      General: No scleral icterus.     Extraocular Movements: Extraocular movements intact.      Conjunctiva/sclera: Conjunctivae normal.   Cardiovascular:      Rate and Rhythm: Normal rate.   Pulmonary:      Effort: Pulmonary effort is normal.   Musculoskeletal:    "      General: Normal range of motion.      Cervical back: Normal range of motion.   Neurological:      General: No focal deficit present.      Mental Status: She is alert and oriented to person, place, and time.           Results from this visit  Results for orders placed or performed in visit on 07/18/23   Vitamin D deficiency screening     Status: Abnormal   Result Value Ref Range    Vitamin D, Total (25-Hydroxy) 14 (L) 20 - 75 ug/L    Narrative    Season, race, dietary intake, and treatment affect the concentration of 25-hydroxy-Vitamin D. Values may decrease during winter months and increase during summer months. Values 20-29 ug/L may indicate Vitamin D insufficiency and values <20 ug/L may indicate Vitamin D deficiency.    Vitamin D determination is routinely performed by an immunoassay specific for 25 hydroxyvitamin D3.  If an individual is on vitamin D2(ergocalciferol) supplementation, please specify 25 OH vitamin D2 and D3 level determination by LCMSMS test [id number].     Hemoglobin A1c     Status: Abnormal   Result Value Ref Range    Hemoglobin A1C 7.0 (H) 0.0 - 5.6 %

## 2023-07-19 ENCOUNTER — TELEPHONE (OUTPATIENT)
Dept: FAMILY MEDICINE | Facility: CLINIC | Age: 88
End: 2023-07-19
Payer: COMMERCIAL

## 2023-07-19 DIAGNOSIS — E55.9 VITAMIN D DEFICIENCY: ICD-10-CM

## 2023-07-19 NOTE — TELEPHONE ENCOUNTER
I spoke with Theodora and she's unable to take the oral vit d. She states due to GERD. She's had to stop prior treatments. Is there another option for her? Please advise.    Linda Levy,JOSE

## 2023-07-20 RX ORDER — CHOLECALCIFEROL (VITAMIN D3) 50 MCG
1 TABLET ORAL DAILY
Qty: 90 TABLET | Refills: 3 | Status: SHIPPED | OUTPATIENT
Start: 2023-07-20 | End: 2024-04-01

## 2023-07-20 NOTE — TELEPHONE ENCOUNTER
Spoke with patient, relayed Dr. Santoro's detailed message below and patient verbalized good understanding.     Patient states she is willing to give it a try, she thinks she has tried both tabs and caps in past.    Julie Behrendt RN

## 2023-07-20 NOTE — TELEPHONE ENCOUNTER
I recall that now. At minimum, would like her to try oral supplementation. We try capsule vs tablet (whichever she hasn't tried yet), and may need twice daily dosing. There aren't a lot of other good options.     Sent daily tablet to the pharmacy. She can take with some food to decrease GERD symptoms.     Naty Santoro MD

## 2023-08-16 ENCOUNTER — LAB (OUTPATIENT)
Dept: LAB | Facility: CLINIC | Age: 88
End: 2023-08-16
Attending: STUDENT IN AN ORGANIZED HEALTH CARE EDUCATION/TRAINING PROGRAM
Payer: COMMERCIAL

## 2023-08-16 DIAGNOSIS — E83.42 HYPOMAGNESEMIA: ICD-10-CM

## 2023-08-16 LAB — MAGNESIUM SERPL-MCNC: 1.8 MG/DL (ref 1.7–2.3)

## 2023-08-16 PROCEDURE — 36415 COLL VENOUS BLD VENIPUNCTURE: CPT

## 2023-08-16 PROCEDURE — 83735 ASSAY OF MAGNESIUM: CPT

## 2023-09-18 ENCOUNTER — LAB (OUTPATIENT)
Dept: LAB | Facility: CLINIC | Age: 88
End: 2023-09-18
Payer: COMMERCIAL

## 2023-09-18 DIAGNOSIS — E83.42 HYPOMAGNESEMIA: ICD-10-CM

## 2023-09-18 LAB — MAGNESIUM SERPL-MCNC: 1.7 MG/DL (ref 1.7–2.3)

## 2023-09-18 PROCEDURE — 36415 COLL VENOUS BLD VENIPUNCTURE: CPT

## 2023-09-18 PROCEDURE — 83735 ASSAY OF MAGNESIUM: CPT

## 2023-09-22 ENCOUNTER — ANCILLARY PROCEDURE (OUTPATIENT)
Dept: CARDIOLOGY | Facility: CLINIC | Age: 88
End: 2023-09-22
Attending: INTERNAL MEDICINE
Payer: COMMERCIAL

## 2023-09-22 DIAGNOSIS — I49.5 SICK SINUS SYNDROME (H): ICD-10-CM

## 2023-09-22 DIAGNOSIS — Z98.890 HX OF ATRIOVENTRICULAR NODE ABLATION: ICD-10-CM

## 2023-09-22 DIAGNOSIS — Z95.0 CARDIAC PACEMAKER IN SITU: ICD-10-CM

## 2023-09-22 LAB
MDC_IDC_EPISODE_DTM: NORMAL
MDC_IDC_EPISODE_DTM: NORMAL
MDC_IDC_EPISODE_DURATION: 0 S
MDC_IDC_EPISODE_DURATION: 2 S
MDC_IDC_EPISODE_ID: 2
MDC_IDC_EPISODE_ID: 3
MDC_IDC_EPISODE_TYPE: NORMAL
MDC_IDC_EPISODE_TYPE: NORMAL
MDC_IDC_LEAD_IMPLANT_DT: NORMAL
MDC_IDC_LEAD_LOCATION: NORMAL
MDC_IDC_LEAD_LOCATION_DETAIL_1: NORMAL
MDC_IDC_LEAD_MFG: NORMAL
MDC_IDC_LEAD_MODEL: NORMAL
MDC_IDC_LEAD_POLARITY_TYPE: NORMAL
MDC_IDC_LEAD_SERIAL: NORMAL
MDC_IDC_MSMT_BATTERY_DTM: NORMAL
MDC_IDC_MSMT_BATTERY_REMAINING_LONGEVITY: 155 MO
MDC_IDC_MSMT_BATTERY_RRT_TRIGGER: 2.62
MDC_IDC_MSMT_BATTERY_STATUS: NORMAL
MDC_IDC_MSMT_BATTERY_VOLTAGE: 3.15 V
MDC_IDC_MSMT_LEADCHNL_RV_IMPEDANCE_VALUE: 418 OHM
MDC_IDC_MSMT_LEADCHNL_RV_IMPEDANCE_VALUE: 532 OHM
MDC_IDC_MSMT_LEADCHNL_RV_PACING_THRESHOLD_AMPLITUDE: 0.5 V
MDC_IDC_MSMT_LEADCHNL_RV_PACING_THRESHOLD_PULSEWIDTH: 0.4 MS
MDC_IDC_MSMT_LEADCHNL_RV_SENSING_INTR_AMPL: 12.5 MV
MDC_IDC_MSMT_LEADCHNL_RV_SENSING_INTR_AMPL: 12.5 MV
MDC_IDC_PG_IMPLANT_DTM: NORMAL
MDC_IDC_PG_MFG: NORMAL
MDC_IDC_PG_MODEL: NORMAL
MDC_IDC_PG_SERIAL: NORMAL
MDC_IDC_PG_TYPE: NORMAL
MDC_IDC_SESS_CLINIC_NAME: NORMAL
MDC_IDC_SESS_DTM: NORMAL
MDC_IDC_SESS_TYPE: NORMAL
MDC_IDC_SET_BRADY_HYSTRATE: NORMAL
MDC_IDC_SET_BRADY_LOWRATE: 70 {BEATS}/MIN
MDC_IDC_SET_BRADY_MAX_SENSOR_RATE: 130 {BEATS}/MIN
MDC_IDC_SET_BRADY_MODE: NORMAL
MDC_IDC_SET_LEADCHNL_RV_PACING_AMPLITUDE: 2 V
MDC_IDC_SET_LEADCHNL_RV_PACING_ANODE_ELECTRODE_1: NORMAL
MDC_IDC_SET_LEADCHNL_RV_PACING_ANODE_LOCATION_1: NORMAL
MDC_IDC_SET_LEADCHNL_RV_PACING_CAPTURE_MODE: NORMAL
MDC_IDC_SET_LEADCHNL_RV_PACING_CATHODE_ELECTRODE_1: NORMAL
MDC_IDC_SET_LEADCHNL_RV_PACING_CATHODE_LOCATION_1: NORMAL
MDC_IDC_SET_LEADCHNL_RV_PACING_POLARITY: NORMAL
MDC_IDC_SET_LEADCHNL_RV_PACING_PULSEWIDTH: 0.4 MS
MDC_IDC_SET_LEADCHNL_RV_SENSING_ANODE_ELECTRODE_1: NORMAL
MDC_IDC_SET_LEADCHNL_RV_SENSING_ANODE_LOCATION_1: NORMAL
MDC_IDC_SET_LEADCHNL_RV_SENSING_CATHODE_ELECTRODE_1: NORMAL
MDC_IDC_SET_LEADCHNL_RV_SENSING_CATHODE_LOCATION_1: NORMAL
MDC_IDC_SET_LEADCHNL_RV_SENSING_POLARITY: NORMAL
MDC_IDC_SET_LEADCHNL_RV_SENSING_SENSITIVITY: 0.9 MV
MDC_IDC_SET_ZONE_DETECTION_INTERVAL: 360 MS
MDC_IDC_SET_ZONE_TYPE: NORMAL
MDC_IDC_STAT_BRADY_DTM_END: NORMAL
MDC_IDC_STAT_BRADY_DTM_START: NORMAL
MDC_IDC_STAT_BRADY_RV_PERCENT_PACED: 99.96 %
MDC_IDC_STAT_EPISODE_RECENT_COUNT: 0
MDC_IDC_STAT_EPISODE_RECENT_COUNT: 0
MDC_IDC_STAT_EPISODE_RECENT_COUNT: 2
MDC_IDC_STAT_EPISODE_RECENT_COUNT_DTM_END: NORMAL
MDC_IDC_STAT_EPISODE_RECENT_COUNT_DTM_START: NORMAL
MDC_IDC_STAT_EPISODE_TOTAL_COUNT: 0
MDC_IDC_STAT_EPISODE_TOTAL_COUNT: 0
MDC_IDC_STAT_EPISODE_TOTAL_COUNT: 3
MDC_IDC_STAT_EPISODE_TOTAL_COUNT_DTM_END: NORMAL
MDC_IDC_STAT_EPISODE_TOTAL_COUNT_DTM_START: NORMAL
MDC_IDC_STAT_EPISODE_TYPE: NORMAL

## 2023-09-22 PROCEDURE — 93296 REM INTERROG EVL PM/IDS: CPT | Performed by: INTERNAL MEDICINE

## 2023-09-22 PROCEDURE — 93294 REM INTERROG EVL PM/LDLS PM: CPT | Performed by: INTERNAL MEDICINE

## 2023-10-31 DIAGNOSIS — M79.2 NEURALGIA: ICD-10-CM

## 2023-10-31 DIAGNOSIS — I48.0 PAROXYSMAL ATRIAL FIBRILLATION (H): ICD-10-CM

## 2023-10-31 RX ORDER — RIVAROXABAN 20 MG/1
TABLET, FILM COATED ORAL
Qty: 90 TABLET | Refills: 1 | Status: SHIPPED | OUTPATIENT
Start: 2023-10-31 | End: 2024-04-01

## 2023-10-31 RX ORDER — GABAPENTIN 300 MG/1
CAPSULE ORAL
Qty: 180 CAPSULE | Refills: 3 | Status: SHIPPED | OUTPATIENT
Start: 2023-10-31

## 2023-11-13 ENCOUNTER — OFFICE VISIT (OUTPATIENT)
Dept: FAMILY MEDICINE | Facility: CLINIC | Age: 88
End: 2023-11-13
Payer: COMMERCIAL

## 2023-11-13 VITALS
DIASTOLIC BLOOD PRESSURE: 72 MMHG | OXYGEN SATURATION: 97 % | BODY MASS INDEX: 36.7 KG/M2 | SYSTOLIC BLOOD PRESSURE: 130 MMHG | RESPIRATION RATE: 14 BRPM | HEIGHT: 64 IN | HEART RATE: 70 BPM | WEIGHT: 215 LBS

## 2023-11-13 DIAGNOSIS — I25.10 CORONARY ARTERY CALCIFICATION SEEN ON CT SCAN: ICD-10-CM

## 2023-11-13 DIAGNOSIS — Z00.00 ENCOUNTER FOR MEDICARE ANNUAL WELLNESS EXAM: Primary | ICD-10-CM

## 2023-11-13 DIAGNOSIS — E11.39 TYPE 2 DIABETES MELLITUS WITH OTHER OPHTHALMIC COMPLICATION, WITHOUT LONG-TERM CURRENT USE OF INSULIN (H): ICD-10-CM

## 2023-11-13 DIAGNOSIS — E83.42 HYPOMAGNESEMIA: ICD-10-CM

## 2023-11-13 DIAGNOSIS — E05.90 SUBCLINICAL HYPERTHYROIDISM: ICD-10-CM

## 2023-11-13 DIAGNOSIS — I50.32 CHRONIC HEART FAILURE WITH PRESERVED EJECTION FRACTION (H): ICD-10-CM

## 2023-11-13 DIAGNOSIS — Z51.81 ENCOUNTER FOR THERAPEUTIC DRUG MONITORING: ICD-10-CM

## 2023-11-13 DIAGNOSIS — E55.9 VITAMIN D DEFICIENCY: ICD-10-CM

## 2023-11-13 DIAGNOSIS — N18.31 STAGE 3A CHRONIC KIDNEY DISEASE (H): ICD-10-CM

## 2023-11-13 LAB
ALBUMIN SERPL BCG-MCNC: 4.3 G/DL (ref 3.5–5.2)
ALP SERPL-CCNC: 78 U/L (ref 35–104)
ALT SERPL W P-5'-P-CCNC: 8 U/L (ref 0–50)
ANION GAP SERPL CALCULATED.3IONS-SCNC: 10 MMOL/L (ref 7–15)
AST SERPL W P-5'-P-CCNC: 17 U/L (ref 0–45)
BILIRUB SERPL-MCNC: 0.5 MG/DL
BUN SERPL-MCNC: 19 MG/DL (ref 8–23)
CALCIUM SERPL-MCNC: 9.6 MG/DL (ref 8.8–10.2)
CHLORIDE SERPL-SCNC: 103 MMOL/L (ref 98–107)
CHOLEST SERPL-MCNC: 176 MG/DL
CREAT SERPL-MCNC: 1.08 MG/DL (ref 0.51–0.95)
DEPRECATED HCO3 PLAS-SCNC: 26 MMOL/L (ref 22–29)
EGFRCR SERPLBLD CKD-EPI 2021: 49 ML/MIN/1.73M2
GLUCOSE SERPL-MCNC: 128 MG/DL (ref 70–99)
HBA1C MFR BLD: 7.1 % (ref 0–5.6)
HDLC SERPL-MCNC: 69 MG/DL
LDLC SERPL CALC-MCNC: 70 MG/DL
MAGNESIUM SERPL-MCNC: 1.8 MG/DL (ref 1.7–2.3)
NONHDLC SERPL-MCNC: 107 MG/DL
POTASSIUM SERPL-SCNC: 4.5 MMOL/L (ref 3.4–5.3)
PROT SERPL-MCNC: 7.2 G/DL (ref 6.4–8.3)
SODIUM SERPL-SCNC: 139 MMOL/L (ref 135–145)
TRIGL SERPL-MCNC: 184 MG/DL
TSH SERPL DL<=0.005 MIU/L-ACNC: 2.43 UIU/ML (ref 0.3–4.2)
VIT B12 SERPL-MCNC: 490 PG/ML (ref 232–1245)
VIT D+METAB SERPL-MCNC: 14 NG/ML (ref 20–50)

## 2023-11-13 PROCEDURE — 82607 VITAMIN B-12: CPT | Performed by: STUDENT IN AN ORGANIZED HEALTH CARE EDUCATION/TRAINING PROGRAM

## 2023-11-13 PROCEDURE — 80061 LIPID PANEL: CPT | Performed by: STUDENT IN AN ORGANIZED HEALTH CARE EDUCATION/TRAINING PROGRAM

## 2023-11-13 PROCEDURE — 90480 ADMN SARSCOV2 VAC 1/ONLY CMP: CPT | Performed by: STUDENT IN AN ORGANIZED HEALTH CARE EDUCATION/TRAINING PROGRAM

## 2023-11-13 PROCEDURE — 99397 PER PM REEVAL EST PAT 65+ YR: CPT | Mod: 25 | Performed by: STUDENT IN AN ORGANIZED HEALTH CARE EDUCATION/TRAINING PROGRAM

## 2023-11-13 PROCEDURE — 82306 VITAMIN D 25 HYDROXY: CPT | Performed by: STUDENT IN AN ORGANIZED HEALTH CARE EDUCATION/TRAINING PROGRAM

## 2023-11-13 PROCEDURE — 36415 COLL VENOUS BLD VENIPUNCTURE: CPT | Performed by: STUDENT IN AN ORGANIZED HEALTH CARE EDUCATION/TRAINING PROGRAM

## 2023-11-13 PROCEDURE — 99214 OFFICE O/P EST MOD 30 MIN: CPT | Mod: 25 | Performed by: STUDENT IN AN ORGANIZED HEALTH CARE EDUCATION/TRAINING PROGRAM

## 2023-11-13 PROCEDURE — 83036 HEMOGLOBIN GLYCOSYLATED A1C: CPT | Performed by: STUDENT IN AN ORGANIZED HEALTH CARE EDUCATION/TRAINING PROGRAM

## 2023-11-13 PROCEDURE — 83735 ASSAY OF MAGNESIUM: CPT | Performed by: STUDENT IN AN ORGANIZED HEALTH CARE EDUCATION/TRAINING PROGRAM

## 2023-11-13 PROCEDURE — 91320 SARSCV2 VAC 30MCG TRS-SUC IM: CPT | Performed by: STUDENT IN AN ORGANIZED HEALTH CARE EDUCATION/TRAINING PROGRAM

## 2023-11-13 PROCEDURE — 84443 ASSAY THYROID STIM HORMONE: CPT | Performed by: STUDENT IN AN ORGANIZED HEALTH CARE EDUCATION/TRAINING PROGRAM

## 2023-11-13 PROCEDURE — 99207 PR FOOT EXAM NO CHARGE: CPT | Performed by: STUDENT IN AN ORGANIZED HEALTH CARE EDUCATION/TRAINING PROGRAM

## 2023-11-13 PROCEDURE — 80053 COMPREHEN METABOLIC PANEL: CPT | Performed by: STUDENT IN AN ORGANIZED HEALTH CARE EDUCATION/TRAINING PROGRAM

## 2023-11-13 RX ORDER — FUROSEMIDE 20 MG
20 TABLET ORAL DAILY PRN
Qty: 90 TABLET | Refills: 3 | Status: SHIPPED | OUTPATIENT
Start: 2023-11-13

## 2023-11-13 ASSESSMENT — PAIN SCALES - GENERAL: PAINLEVEL: NO PAIN (0)

## 2023-11-13 ASSESSMENT — PATIENT HEALTH QUESTIONNAIRE - PHQ9: SUM OF ALL RESPONSES TO PHQ QUESTIONS 1-9: 1

## 2023-11-13 ASSESSMENT — ACTIVITIES OF DAILY LIVING (ADL): CURRENT_FUNCTION: NEEDS ASSISTANCE

## 2023-11-13 NOTE — LETTER
November 15, 2023      Theodora LÁZARO [address]  [address] [facility] [address]        Dear ,    We are writing to inform you of your test results.    Your vitamin D level is more low now than it was before. I know you've had issues with vitamin D supplements in the past. We could potentially try a combination calcium and vitamin D supplement to see if that improves you're ability to tolerate the supplement. It is really important to bone health that we improve this vitamin D level. I can try to send this combination to the pharmacy but it is available over the counter, so insurance likely won't cover it (but we could try).     Your kidney function is a little bit worse now compared to recently, however, is similar to where it has been in the past. Ideally we would get that urine sample to look for protein, so if you're able to come in for a lab only appointment to have that done, that would be helpful.     Your cholesterol continues to look great.     Your A1c is similar ot previous, so your diabetes has been pretty well controlled in the last 7 months. I do think we should check in in about 6 months for diabetes to make sure it stays well controlled.     Thyroid, vitamin B12, magnesium were all normal.     Resulted Orders   Vitamin B12   Result Value Ref Range    Vitamin B12 490 232 - 1,245 pg/mL   Vitamin D deficiency screening   Result Value Ref Range    Vitamin D, Total (25-Hydroxy) 14 (L) 20 - 50 ng/mL      Comment:      mild to moderate deficiency    Narrative    Season, race, dietary intake, and treatment affect the concentration of 25-hydroxy-Vitamin D. Values may decrease during winter months and increase during summer months.    Vitamin D determination is routinely performed by an immunoassay specific for 25 hydroxyvitamin D3.  If an individual is on vitamin D2(ergocalciferol) supplementation, please specify 25 OH vitamin D2 and D3 level determination by LCMSMS test [id number].     TSH with free T4  reflex   Result Value Ref Range    TSH 2.43 0.30 - 4.20 uIU/mL   Comprehensive metabolic panel   Result Value Ref Range    Sodium 139 135 - 145 mmol/L      Comment:      Reference intervals for this test were updated on 09/26/2023 to more accurately reflect our healthy population. There may be differences in the flagging of prior results with similar values performed with this method. Interpretation of those prior results can be made in the context of the updated reference intervals.     Potassium 4.5 3.4 - 5.3 mmol/L    Carbon Dioxide (CO2) 26 22 - 29 mmol/L    Anion Gap 10 7 - 15 mmol/L    Urea Nitrogen 19.0 8.0 - 23.0 mg/dL    Creatinine 1.08 (H) 0.51 - 0.95 mg/dL    GFR Estimate 49 (L) >60 mL/min/1.73m2    Calcium 9.6 8.8 - 10.2 mg/dL    Chloride 103 98 - 107 mmol/L    Glucose 128 (H) 70 - 99 mg/dL    Alkaline Phosphatase 78 35 - 104 U/L    AST 17 0 - 45 U/L      Comment:      Reference intervals for this test were updated on 6/12/2023 to more accurately reflect our healthy population. There may be differences in the flagging of prior results with similar values performed with this method. Interpretation of those prior results can be made in the context of the updated reference intervals.    ALT 8 0 - 50 U/L      Comment:      Reference intervals for this test were updated on 6/12/2023 to more accurately reflect our healthy population. There may be differences in the flagging of prior results with similar values performed with this method. Interpretation of those prior results can be made in the context of the updated reference intervals.      Protein Total 7.2 6.4 - 8.3 g/dL    Albumin 4.3 3.5 - 5.2 g/dL    Bilirubin Total 0.5 <=1.2 mg/dL   Hemoglobin A1c   Result Value Ref Range    Hemoglobin A1C 7.1 (H) 0.0 - 5.6 %      Comment:      Normal <5.7%   Prediabetes 5.7-6.4%    Diabetes 6.5% or higher     Note: Adopted from ADA consensus guidelines.   Magnesium   Result Value Ref Range    Magnesium 1.8 1.7 - 2.3  mg/dL   Lipid panel reflex to direct LDL Fasting   Result Value Ref Range    Cholesterol 176 <200 mg/dL    Triglycerides 184 (H) <150 mg/dL    Direct Measure HDL 69 >=50 mg/dL    LDL Cholesterol Calculated 70 <=100 mg/dL    Non HDL Cholesterol 107 <130 mg/dL    Narrative    Cholesterol  Desirable:  <200 mg/dL    Triglycerides  Normal:  Less than 150 mg/dL  Borderline High:  150-199 mg/dL  High:  200-499 mg/dL  Very High:  Greater than or equal to 500 mg/dL    Direct Measure HDL  Female:  Greater than or equal to 50 mg/dL   Male:  Greater than or equal to 40 mg/dL    LDL Cholesterol  Desirable:  <100mg/dL  Above Desirable:  100-129 mg/dL   Borderline High:  130-159 mg/dL   High:  160-189 mg/dL   Very High:  >= 190 mg/dL    Non HDL Cholesterol  Desirable:  130 mg/dL  Above Desirable:  130-159 mg/dL  Borderline High:  160-189 mg/dL  High:  190-219 mg/dL  Very High:  Greater than or equal to 220 mg/dL       If you have any questions or concerns, please call the clinic at the number listed above.       Sincerely,      Naty Santoro MD

## 2023-11-13 NOTE — PROGRESS NOTES
Assessment & Plan     Encounter for Medicare annual wellness exam  Patient is a very pleasant 88 year old who presents today for annual visit. She has aged out of breast, colon, and cervical cancer screening. She is due for vaccinations today and she opts for only one; we collectively decided on COVID. She will get flu shot at community pharmacy.     Stage 3a chronic kidney disease (H)  History of CKD 3a with occasional GFR in the 50's. Most recently has had GFR in the 60's. Due for albumin:creat, ordered today.   - Albumin Random Urine Quantitative with Creat Ratio  - Albumin Random Urine Quantitative with Creat Ratio    Coronary artery calcification seen on CT scan  Due for annual lipid panel. Results pending at this time. Plan to continue atorvastatin 10 mg at this time.   - Lipid panel reflex to direct LDL Fasting  - Lipid panel reflex to direct LDL Fasting    Chronic heart failure with preserved ejection fraction (H)  Taking lasix every other day, causing some fatigue for her. Some days with more urine output compared to others. Monitors weight at home. Minimal pitting edema lower extremities today. Refill of lasix sent, will check annual labs as below.   - furosemide (LASIX) 20 MG tablet  Dispense: 90 tablet; Refill: 3  - Comprehensive metabolic panel  - TSH with free T4 reflex  - TSH with free T4 reflex  - Comprehensive metabolic panel    Vitamin D deficiency  History of low vitamin D earlier this year, instructed to take high dose vit D, she reports previously not being able to tolerate vitamin D supplementation, so I'm not sure that she was actually able to do high dose treatment. Will recheck today.   - Vitamin D deficiency screening  - Vitamin D deficiency screening    Encounter for therapeutic drug monitoring  On metformin for many years. With increased fatigue at times,as well as some variable neuropathic type pain in her feet, will check vitamin b12.   - Vitamin B12  - Vitamin B12    Subclinical  "hyperthyroidism  Has not had TSH recently. Increasing fatigue at times. Will check TSH  - TSH with free T4 reflex  - TSH with free T4 reflex    Type 2 diabetes mellitus with other ophthalmic complication, without long-term current use of insulin (H)  Due for annual labs, which are collected. Foot exam mostly reassuring. She does have some slight decreased sensation on the left foot dorsal aspect. A1c is very stable today, plan to follow up 6 months to 1 year.   - Albumin Random Urine Quantitative with Creat Ratio  - Lipid panel reflex to direct LDL Fasting  - FOOT EXAM  - Hemoglobin A1c  - Hemoglobin A1c  - Lipid panel reflex to direct LDL Fasting  - Albumin Random Urine Quantitative with Creat Ratio    Hypomagnesemia  Is no longer on supplementation, will recheck today to ensure she has not dropped again.   - Magnesium  - Magnesium       BMI:   Estimated body mass index is 36.9 kg/m  as calculated from the following:    Height as of this encounter: 1.626 m (5' 4\").    Weight as of this encounter: 97.5 kg (215 lb).     Naty Santoro MD  [facility]    Chay Yip is a 88 year old, presenting for the following health issues:  Wellness Visit        11/13/2023    10:26 AM   Additional Questions   Roomed by Jackie SALES   Accompanied by Self       HPI     Annual Wellness Visit    Patient has been advised of split billing requirements and indicates understanding: Yes     Are you in the first 12 months of your Medicare Part B coverage?  No    Physical Health:  In general, how would you rate your overall physical health? fair  Outside of work, how many days during the week do you exercise?6-7 days/week  Outside of work, approximately how many minutes a day do you exercise?15-30 minutes  If you drink alcohol do you typically have >3 drinks per day or >7 drinks per week? No  Do you usually eat at least 4 servings of fruit and vegetables a day, include whole grains & fiber and avoid " "regularly eating high fat or \"junk\" foods? Yes  Do you have any problems taking medications regularly? No  Do you have any side effects from medications? not applicable  Needs assistance for the following daily activities: laundry  Which of the following safety concerns are present in your home?  none identified   Hearing impairment: Yes, Difficulty following a conversation in a noisy restaurant or crowded room.  In the past 6 months, have you been bothered by leaking of urine? yes    Mental Health:  In general, how would you rate your overall mental or emotional health? good        Pacemaker installed in May.   Doing better since then.   Walks daily (weather permitting).   Was dealing with a lot of fatigue, still dealing with it, but more tolerable.     Took vitamin d supplement in the past but had stomach issues with it.     No checking glucoses  BP at home - better than what we get here.   Oximeter -     Lightheaded sometimes   Sometimes with position changes.   No room spinning.     \"Balance is not real good\"   No recent falls.     A little loss of sensation in the feet, but improves with ambulation.     Today's PHQ-9 Score:       2023    10:31 AM   PHQ-9 SCORE   PHQ-9 Total Score 1       Do you feel safe in your environment? Yes    Have you ever done Advance Care Planning? (For example, a Health Directive, POLST, or a discussion with a medical provider or your loved ones about your wishes)? Yes, advance care planning is on file.    Fall risk:  Fallen 2 or more times in the past year?: Yes  Any fall with injury in the past year?: Yes  Timed Up and Go Test (>13.5 is fall risk; contact physician) : 17  Working to improve physical abilities, increasing physical activity.     Cognitive Screenin) Repeat 3 items (Leader, Season, Table)    2) Clock draw: NORMAL  3) 3 item recall: Recalls 2 objects   Results: NORMAL clock, 1-2 items recalled: COGNITIVE IMPAIRMENT LESS LIKELY    Mini-CogTM Copyright S Dick. " Licensed by the author for use in [facility]; reprinted with permission (trena@[facility]). All rights reserved.      Social History     Tobacco Use    Smoking status: Never    Smokeless tobacco: Never   Substance Use Topics    Alcohol use: No              No data to display            Do you have a current opioid prescription? No  Do you use any other controlled substances or medications that are not prescribed by a provider? None    Current providers sharing in care for this patient include:   Patient Care Team:  Yecenia Armstrong MD as PCP - General  Oniel Meadows MD as Assigned Surgical Provider  Marisabel Fall APRN CNP as Nurse Practitioner (Emergency Medicine)  George Starr MD as MD (Cardiology)  Radha Gomez APRN CNP as Nurse Practitioner  Jackie Olivares APRN CNP as Nurse Practitioner (Cardiovascular Disease)  Jackie Olivares APRN CNP as Assigned Heart and Vascular Provider  Naty Santoro MD as Assigned PCP    The following health maintenance items are reviewed in Epic and correct as of today:  Health Maintenance   Topic Date Due    HF ACTION PLAN  Never done    ZOSTER IMMUNIZATION (1 of 2) Never done    RSV VACCINE (Pregnancy & 60+) (1 - 1-dose 60+ series) Never done    MICROALBUMIN  03/27/2020    MEDICARE ANNUAL WELLNESS VISIT  10/22/2022    INFLUENZA VACCINE (1) 09/01/2023    LIPID  10/27/2023    BMP  01/05/2024    EYE EXAM  02/14/2024    ALT  02/25/2024    CBC  05/10/2024    HEMOGLOBIN  05/10/2024    A1C  05/13/2024    DIABETIC FOOT EXAM  11/13/2024    ANNUAL REVIEW OF HM ORDERS  11/13/2024    FALL RISK ASSESSMENT  11/13/2024    DTAP/TDAP/TD IMMUNIZATION (2 - Td or Tdap) 05/15/2025    ADVANCE CARE PLANNING  11/13/2028    TSH W/FREE T4 REFLEX  Completed    PHQ-2 (once per calendar year)  Completed    Pneumococcal Vaccine: 65+ Years  Completed    URINALYSIS  Completed    COVID-19 Vaccine  Completed    IPV IMMUNIZATION  Aged Out    HPV IMMUNIZATION  Aged Out     "MENINGITIS IMMUNIZATION  Aged Out    RSV MONOCLONAL ANTIBODY  Aged Out    MAMMO SCREENING  Discontinued       Patient has been advised of split billing requirements and indicates understanding: Yes    Appropriate preventive services were discussed with this patient, including applicable screening as appropriate for fall prevention, nutrition, physical activity, Tobacco-use cessation, weight loss and cognition.  Checklist reviewing preventive services available has been given to the patient.    Review of Systems   Constitutional, HEENT, cardiovascular, pulmonary, GI, , musculoskeletal, neuro, skin, endocrine and psych systems are negative, except as otherwise noted.       Objective    /72 (BP Location: Right arm, Patient Position: Sitting, Cuff Size: Adult Regular)   Pulse 70   Resp 14   Ht 1.626 m (5' 4\")   Wt 97.5 kg (215 lb)   LMP  (LMP Unknown)   SpO2 97%   BMI 36.90 kg/m    Body mass index is 36.9 kg/m .  Physical Exam   GENERAL: healthy, alert and no distress  EYES: Eyes grossly normal to inspection, and conjunctivae and sclerae normal  HENT: ear canals and TM's normal, nose and mouth without ulcers or lesions  NECK: no asymmetry, masses, or scars   RESP: distant breath sounds, lungs clear to auscultation - no rales, rhonchi or wheezes  CV: regular rate and rhythm, no murmur, click or rub, trace bilateral peripheral edema and peripheral pulses strong  MS: no gross musculoskeletal defects noted, no edema  SKIN: no suspicious lesions or rashes  NEURO: Normal strength and tone, mentation intact and speech normal  PSYCH: mentation appears normal, affect normal/bright    Diabetic Foot Screen:  Any complaints of increased pain or numbness ?  YES numbness  Is there a foot ulcer now or a history of foot ulcer? No  Does the foot have an abnormal shape? No  Are the nails thick, too long or ingrown? No  Are there any redness or open areas? No         Sensation Testing done at all points on the diagram with " monofilament     Right Foot: Sensation Normal at all points  Left Foot: Sensation Absent at the following points  and 10     Risk Category: 1- Loss of protective sensation with normal appearing foot (no weakness, deformity, callous, pre-ulcer or h/o ulceration)  Performed by Naty Santoro MD       Results from this visit  Results for orders placed or performed in visit on 11/13/23   Hemoglobin A1c     Status: Abnormal   Result Value Ref Range    Hemoglobin A1C 7.1 (H) 0.0 - 5.6 %                   The patient was provided with suggestions to help her develop a healthy physical lifestyle.  The patient reports that she has difficulty with activities of daily living. I have asked that the patient make a follow up appointment PRN where this issue will be further evaluated and addressed.  The patient was provided with written information regarding signs of hearing loss.  Information on urinary incontinence and treatment options given to patient.  She is at risk for falling and has been provided with information to reduce the risk of falling at home.

## 2023-11-13 NOTE — PATIENT INSTRUCTIONS
Patient Education   Personalized Prevention Plan  You are due for the preventive services outlined below.  Your care team is available to assist you in scheduling these services.  If you have already completed any of these items, please share that information with your care team to update in your medical record.  Health Maintenance Due   Topic Date Due     Heart Failure Action Plan  Never done     Zoster (Shingles) Vaccine (1 of 2) Never done     RSV VACCINE (Pregnancy & 60+) (1 - 1-dose 60+ series) Never done     Kidney Microalbumin Urine Test  03/27/2020     Annual Wellness Visit  10/22/2022     ANNUAL REVIEW OF HM ORDERS  03/23/2023     Diabetic Foot Exam  04/19/2023     Flu Vaccine (1) 09/01/2023     COVID-19 Vaccine (4 - 2023-24 season) 09/01/2023     Cholesterol Lab  10/27/2023     Your Health Risk Assessment indicates you feel you are not in good health    A healthy lifestyle helps keep the body fit and the mind alert. It helps protect you from disease, helps you fight disease, and helps prevent chronic disease (disease that doesn't go away) from getting worse. This is important as you get older and begin to notice twinges in muscles and joints and a decline in the strength and stamina you once took for granted. A healthy lifestyle includes good healthcare, good nutrition, weight control, recreation, and regular exercise. Avoid harmful substances and do what you can to keep safe. Another part of a healthy lifestyle is stay mentally active and socially involved.    Good healthcare   Have a wellness visit every year.   If you have new symptoms, let us know right away. Don't wait until the next checkup.   Take medicines exactly as prescribed and keep your medicines in a safe place. Tell us if your medicine causes problems.   Healthy diet and weight control   Eat 3 or 4 small, nutritious, low-fat, high-fiber meals a day. Include a variety of fruits, vegetables, and whole-grain foods.   Make sure you get enough  calcium in your diet. Calcium, vitamin D, and exercise help prevent osteoporosis (bone thinning).   If you live alone, try eating with others when you can. That way you get a good meal and have company while you eat it.   Try to keep a healthy weight. If you eat more calories than your body uses for energy, it will be stored as fat and you will gain weight.     Recreation   Recreation is not limited to sports and team events. It includes any activity that provides relaxation, interest, enjoyment, and exercise. Recreation provides an outlet for physical, mental, and social energy. It can give a sense of worth and achievement. It can help you stay healthy.    Mental Exercise and Social Involvement  Mental and emotional health is as important as physical health. Keep in touch with friends and family. Stay as active as possible. Continue to learn and challenge yourself.   Things you can do to stay mentally active are:  Learn something new, like a foreign language or musical instrument.   Play [employer] or do crossword puzzles. If you cannot find people to play these games with you at home, you can play them with others on your computer through the Internet.   Join a games club--anything from card games to chess or checkers or lawn bowling.   Start a new hobby.   Go back to school.   Volunteer.   Read.   Keep up with world events.  Activities of Daily Living    Your Health Risk Assessment indicates you have difficulties with activities of daily living such as housework, bathing, preparing meals, taking medication, etc. Please make a follow up appointment for us to address this issue in more detail.  Hearing Loss: Care Instructions  Overview     Hearing loss is a sudden or slow decrease in how well you hear. It can range from slight to profound. Permanent hearing loss can occur with aging. It also can happen when you are exposed long-term to loud noise. Examples include listening to loud music, riding motorcycles, or being  around other loud machines.  Hearing loss can affect your work and home life. It can make you feel lonely or depressed. You may feel that you have lost your independence. But hearing aids and other devices can help you hear better and feel connected to others.  Follow-up care is a key part of your treatment and safety. Be sure to make and go to all appointments, and call your doctor if you are having problems. It's also a good idea to know your test results and keep a list of the medicines you take.  How can you care for yourself at home?  Avoid loud noises whenever possible. This helps keep your hearing from getting worse.  Always wear hearing protection around loud noises.  Wear a hearing aid as directed.  A professional can help you pick a hearing aid that will work best for you.  You can also get hearing aids over the counter for mild to moderate hearing loss.  Have hearing tests as your doctor suggests. They can show whether your hearing has changed. Your hearing aid may need to be adjusted.  Use other devices as needed. These may include:  Telephone amplifiers and hearing aids that can connect to a television, stereo, radio, or microphone.  Devices that use lights or vibrations. These alert you to the doorbell, a ringing telephone, or a baby monitor.  Television closed-captioning. This shows the words at the bottom of the screen. Most new TVs can do this.  TTY (text telephone). This lets you type messages back and forth on the telephone instead of talking or listening. These devices are also called TDD. When messages are typed on the keyboard, they are sent over the phone line to a receiving TTY. The message is shown on a monitor.  Use text messaging, social media, and email if it is hard for you to communicate by telephone.  Try to learn a listening technique called speechreading. It is not lipreading. You pay attention to people's gestures, expressions, posture, and tone of voice. These clues can help you  "understand what a person is saying. Face the person you are talking to, and have them face you. Make sure the lighting is good. You need to see the other person's face clearly.  Think about counseling if you need help to adjust to your hearing loss.  When should you call for help?  Watch closely for changes in your health, and be sure to contact your doctor if:    You think your hearing is getting worse.     You have new symptoms, such as dizziness or nausea.   Where can you learn more?  Go to https://www.[employer].net/patiented  Enter R798 in the search box to learn more about \"Hearing Loss: Care Instructions.\"  Current as of: February 28, 2023               Content Version: 13.8    9402-5126 [employer].   Care instructions adapted under license by your healthcare professional. If you have questions about a medical condition or this instruction, always ask your healthcare professional. [employer] disclaims any warranty or liability for your use of this information.      Bladder Training: Care Instructions  Your Care Instructions     Bladder training is used to treat urge incontinence and stress incontinence. Urge incontinence means that the need to urinate comes on so fast that you can't get to a toilet in time. Stress incontinence means that you leak urine because of pressure on your bladder. For example, it may happen when you laugh, cough, or lift something heavy.  Bladder training can increase how long you can wait before you have to urinate. It can also help your bladder hold more urine. And it can give you better control over the urge to urinate.  It is important to remember that bladder training takes a few weeks to a few months to make a difference. You may not see results right away, but don't give up.  Follow-up care is a key part of your treatment and safety. Be sure to make and go to all appointments, and call your doctor if you are having problems. It's also a good idea to " know your test results and keep a list of the medicines you take.  How can you care for yourself at home?  Work with your doctor to come up with a bladder training program that is right for you. You may use one or more of the following methods.  Delayed urination  In the beginning, try to keep from urinating for 5 minutes after you first feel the need to go.  While you wait, take deep, slow breaths to relax. Kegel exercises can also help you delay the need to go to the bathroom.  After some practice, when you can easily wait 5 minutes to urinate, try to wait 10 minutes before you urinate.  Slowly increase the waiting period until you are able to control when you have to urinate.  Scheduled urination  Empty your bladder when you first wake up in the morning.  Schedule times throughout the day when you will urinate.  Start by going to the bathroom every hour, even if you don't need to go.  Slowly increase the time between trips to the bathroom.  When you have found a schedule that works well for you, keep doing it.  If you wake up during the night and have to urinate, do it. Apply your schedule to waking hours only.  Kegel exercises  These tighten and strengthen pelvic muscles, which can help you control the flow of urine. (If doing these exercises causes pain, stop doing them and talk with your doctor.) To do Kegel exercises:  Squeeze your muscles as if you were trying not to pass gas. Or squeeze your muscles as if you were stopping the flow of urine. Your belly, legs, and buttocks shouldn't move.  Hold the squeeze for 3 seconds, then relax for 5 to 10 seconds.  Start with 3 seconds, then add 1 second each week until you are able to squeeze for 10 seconds.  Repeat the exercise 10 times a session. Do 3 to 8 sessions a day.  When should you call for help?  Watch closely for changes in your health, and be sure to contact your doctor if:    Your incontinence is getting worse.     You do not get better as expected.  "  Where can you learn more?  Go to https://www.[employer].net/patiented  Enter V684 in the search box to learn more about \"Bladder Training: Care Instructions.\"  Current as of: February 28, 2023               Content Version: 13.8    6726-2749 [employer].   Care instructions adapted under license by your healthcare professional. If you have questions about a medical condition or this instruction, always ask your healthcare professional. [employer] disclaims any warranty or liability for your use of this information.      Preventing Falls: Care Instructions  Injuries and health problems such as trouble walking or poor eyesight can increase your risk of falling. So can some medicines. But there are things you can do to help prevent falls. You can exercise to get stronger. You can also arrange your home to make it safer.    Talk to your doctor about the medicines you take. Ask if any of them increase the risk of falls and whether they can be changed or stopped.   Try to exercise regularly. It can help improve your strength and balance. This can help lower your risk of falling.     Practice fall safety and prevention.    Wear low-heeled shoes that fit well and give your feet good support. Talk to your doctor if you have foot problems that make this hard.  Carry a cellphone or wear a medical alert device that you can use to call for help.  Use stepladders instead of chairs to reach high objects. Don't climb if you're at risk for falls. Ask for help, if needed.  Wear the correct eyeglasses, if you need them.    Make your home safer.    Remove rugs, cords, clutter, and furniture from walkways.  Keep your house well lit. Use night-lights in hallways and bathrooms.  Install and use sturdy handrails on stairways.  Wear nonskid footwear, even inside. Don't walk barefoot or in socks without shoes.    Be safe outside.    Use handrails, curb cuts, and ramps whenever possible.  Keep your hands free by " "using a shoulder bag or backpack.  Try to walk in well-lit areas. Watch out for uneven ground, changes in pavement, and debris.  Be careful in the winter. Walk on the grass or gravel when sidewalks are slippery. Use de-icer on steps and walkways. Add non-slip devices to shoes.    Put grab bars and nonskid mats in your shower or tub and near the toilet. Try to use a shower chair or bath bench when bathing.   Get into a tub or shower by putting in your weaker leg first. Get out with your strong side first. Have a phone or medical alert device in the bathroom with you.   Where can you learn more?  Go to https://www.[employer]net/patiented  Enter G117 in the search box to learn more about \"Preventing Falls: Care Instructions.\"  Current as of: July 18, 2023               Content Version: 13.8    9824-6564 [employer].   Care instructions adapted under license by your healthcare professional. If you have questions about a medical condition or this instruction, always ask your healthcare professional. [employer] disclaims any warranty or liability for your use of this information.      How to Get Up Safely After a Fall: Care Instructions  Overview     If you have injuries, health problems, or other reasons that may make it easy for you to fall at home, it is a good idea to learn how to get up safely after a fall. Learning how to get up correctly can help you avoid making an injury worse.  Also, knowing what to do if you cannot get up can help you stay safe until help arrives.  Follow-up care is a key part of your treatment and safety. Be sure to make and go to all appointments, and call your doctor if you are having problems. It's also a good idea to know your test results and keep a list of the medicines you take.  How can you care for yourself after a fall?  If you think you can get up  First lie still for a few minutes and think about how you feel. If your body feels okay and you think you " can get up safely, follow the rest of the steps below:  Look for a chair or other piece of furniture that is close to you.  Roll onto your side and rest. Roll by turning your head in the direction you want to roll, move your shoulder and arm, then hip and leg in the same direction.  Lie still for a moment to let your blood pressure adjust.  Slowly push your upper body up, lift your head, and take a moment to rest.  Slowly get up on your hands and knees, and crawl to the chair or other stable piece of furniture.  Put your hands on the chair.  Move one foot forward, and place it flat on the floor. Your other leg should be bent with the knee on the floor.  Rise slowly, turn your body, and sit in the chair. Stay seated for a bit and think about how you feel. Call for help. Even if you feel okay, let someone know what happened to you. You might not know that you have a serious injury.  If you cannot get up  If you think you are injured after a fall or you cannot get up, try not to panic.  Call out for help.  If you have a phone within reach or you have an emergency call device, use it to call for help.  If you do not have a phone within reach, try to slide yourself toward it. If you cannot get to the phone, try to slide toward a door or window or a place where you think you can be heard.  [place] or use an object to make noise so someone might hear you.  If you can reach something that you can use for a pillow, place it under your head. Try to stay warm by covering yourself with a blanket or clothing while you wait for help.  When should you call for help?   Call 911 anytime you think you may need emergency care. For example, call if:    You passed out (lost consciousness).     You cannot get up after a fall.     You have severe pain.   Call your doctor now or seek immediate medical care if:    You have new or worse pain.     You are dizzy or lightheaded.     You hit your head.   Watch closely for changes in your health,  "and be sure to contact your doctor if:    You do not get better as expected.   Where can you learn more?  Go to https://www.[employer].net/patiented  Enter G513 in the search box to learn more about \"How to Get Up Safely After a Fall: Care Instructions.\"  Current as of: November 13, 2022               Content Version: 13.8    9887-2110 [employer].   Care instructions adapted under license by your healthcare professional. If you have questions about a medical condition or this instruction, always ask your healthcare professional. [employer] disclaims any warranty or liability for your use of this information.         "

## 2023-11-30 DIAGNOSIS — E11.9 TYPE 2 DIABETES MELLITUS WITHOUT COMPLICATION, WITHOUT LONG-TERM CURRENT USE OF INSULIN (H): ICD-10-CM

## 2023-12-01 NOTE — TELEPHONE ENCOUNTER
Prescription approved per [facility] Refill Protocol     Shayla Melgar     RN MSN

## 2024-01-23 ENCOUNTER — TELEPHONE (OUTPATIENT)
Dept: CARDIOLOGY | Facility: CLINIC | Age: 89
End: 2024-01-23

## 2024-01-23 ENCOUNTER — ANCILLARY PROCEDURE (OUTPATIENT)
Dept: CARDIOLOGY | Facility: CLINIC | Age: 89
End: 2024-01-23
Attending: INTERNAL MEDICINE
Payer: COMMERCIAL

## 2024-01-23 DIAGNOSIS — Z98.890 HX OF ATRIOVENTRICULAR NODE ABLATION: ICD-10-CM

## 2024-01-23 DIAGNOSIS — Z95.0 CARDIAC PACEMAKER IN SITU: ICD-10-CM

## 2024-01-23 DIAGNOSIS — I47.20 VENTRICULAR TACHYCARDIA (H): Primary | ICD-10-CM

## 2024-01-23 DIAGNOSIS — I49.5 SICK SINUS SYNDROME (H): ICD-10-CM

## 2024-01-23 PROCEDURE — 93294 REM INTERROG EVL PM/LDLS PM: CPT | Performed by: INTERNAL MEDICINE

## 2024-01-23 PROCEDURE — 93296 REM INTERROG EVL PM/IDS: CPT | Performed by: INTERNAL MEDICINE

## 2024-01-23 NOTE — TELEPHONE ENCOUNTER
Dr. Drake,    BIN: Your patient had 5 ventricular high rate episodes on today's device check. Due to the fact that patient is dependent (AVNA) and paces 100% of the time, we have to assume these episodes are NSVT. However, there is some irregularity noted on the EGMs. Episodes lasted 20 - >45 seconds (unable to see offset) with rates 140-250bpm. EF 55% (2/2023). I left a message for patient to call back regarding symptoms. Any changes?                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 EGMs show NSVT (AVNA) with some irregularity and morphology changes, lasting 20 - >45 seconds (all occurring on the same day 11/16), rates 140-250bpm. EF 55% (2/2023). Reviewed findings with LACHELLE Medina- will send to Dr Drake for review.     Care Plan: F/u PPM [facility] q 3 months. OV w/ Dr. Drake and echo due 6/2024. LM with results and next appointment. CORINA Casillas

## 2024-01-30 NOTE — TELEPHONE ENCOUNTER
Called and discussed with pt. She states she did speak with the device nurse and does not remember if she felt this when it occurred. She did have written on the calendar that she did not feel good this day but does not know what her symptoms specifically were.   Notified pt that we recommend follow up with EP MD or TASHIA to discuss and work up this up further. Pt in agreement. Will place order and route to scheduling to get scheduled.     Pt to call clinic sooner if feeling CP, SOB, dizzy/lightheadedness, racing heart, palpitations or syncope. Pt verbalized an understanding.    Janeen Apodaca RN

## 2024-01-31 ENCOUNTER — TELEPHONE (OUTPATIENT)
Dept: CARDIOLOGY | Facility: CLINIC | Age: 89
End: 2024-01-31
Payer: COMMERCIAL

## 2024-01-31 NOTE — TELEPHONE ENCOUNTER
Called and explained to pt that she is scheduled for an in clinic virtual visit with Dr. Soriano. She will come into the clinic for an EKG and then remain in the clinic room and complete a virtual visit with Dr. Soriano over the computer as he will not be here at [facility] working.  He is working from [facility] this day.   Pt verbalized an understanding and will come in as planned on 2/6/24.    Janeen Apodaca RN

## 2024-01-31 NOTE — TELEPHONE ENCOUNTER
[facility] Call Center    Phone Message    May a detailed message be left on voicemail: yes     Reason for Call: Other: Patient called with some confusion in regards to appt scheduled on 02/06. Appt is scheduled as virtual, but patient was told to come in and do an EKG. Needing clarification on if this appt is to be in person, so patient can have EKG done or is this appt virtual with no EKG. Please call patient back to further discuss.     Action Taken: Other: Cardiology    Travel Screening: Not Applicable      Thank you!  [facility]

## 2024-02-05 LAB
MDC_IDC_EPISODE_DTM: NORMAL
MDC_IDC_EPISODE_DURATION: 0 S
MDC_IDC_EPISODE_DURATION: 0 S
MDC_IDC_EPISODE_DURATION: 1 S
MDC_IDC_EPISODE_ID: 4
MDC_IDC_EPISODE_ID: 5
MDC_IDC_EPISODE_ID: 6
MDC_IDC_EPISODE_ID: 7
MDC_IDC_EPISODE_ID: 8
MDC_IDC_EPISODE_TYPE: NORMAL
MDC_IDC_LEAD_CONNECTION_STATUS: NORMAL
MDC_IDC_LEAD_IMPLANT_DT: NORMAL
MDC_IDC_LEAD_LOCATION: NORMAL
MDC_IDC_LEAD_LOCATION_DETAIL_1: NORMAL
MDC_IDC_LEAD_MFG: NORMAL
MDC_IDC_LEAD_MODEL: NORMAL
MDC_IDC_LEAD_POLARITY_TYPE: NORMAL
MDC_IDC_LEAD_SERIAL: NORMAL
MDC_IDC_MSMT_BATTERY_DTM: NORMAL
MDC_IDC_MSMT_BATTERY_REMAINING_LONGEVITY: 147 MO
MDC_IDC_MSMT_BATTERY_RRT_TRIGGER: 2.62
MDC_IDC_MSMT_BATTERY_STATUS: NORMAL
MDC_IDC_MSMT_BATTERY_VOLTAGE: 3.1 V
MDC_IDC_MSMT_LEADCHNL_RV_IMPEDANCE_VALUE: 399 OHM
MDC_IDC_MSMT_LEADCHNL_RV_IMPEDANCE_VALUE: 456 OHM
MDC_IDC_MSMT_LEADCHNL_RV_PACING_THRESHOLD_AMPLITUDE: 0.62 V
MDC_IDC_MSMT_LEADCHNL_RV_PACING_THRESHOLD_PULSEWIDTH: 0.4 MS
MDC_IDC_MSMT_LEADCHNL_RV_SENSING_INTR_AMPL: 7.62 MV
MDC_IDC_MSMT_LEADCHNL_RV_SENSING_INTR_AMPL: 7.62 MV
MDC_IDC_PG_IMPLANT_DTM: NORMAL
MDC_IDC_PG_MFG: NORMAL
MDC_IDC_PG_MODEL: NORMAL
MDC_IDC_PG_SERIAL: NORMAL
MDC_IDC_PG_TYPE: NORMAL
MDC_IDC_SESS_CLINIC_NAME: NORMAL
MDC_IDC_SESS_DTM: NORMAL
MDC_IDC_SESS_TYPE: NORMAL
MDC_IDC_SET_BRADY_HYSTRATE: NORMAL
MDC_IDC_SET_BRADY_LOWRATE: 70 {BEATS}/MIN
MDC_IDC_SET_BRADY_MAX_SENSOR_RATE: 130 {BEATS}/MIN
MDC_IDC_SET_BRADY_MODE: NORMAL
MDC_IDC_SET_LEADCHNL_RV_PACING_AMPLITUDE: 2 V
MDC_IDC_SET_LEADCHNL_RV_PACING_ANODE_ELECTRODE_1: NORMAL
MDC_IDC_SET_LEADCHNL_RV_PACING_ANODE_LOCATION_1: NORMAL
MDC_IDC_SET_LEADCHNL_RV_PACING_CAPTURE_MODE: NORMAL
MDC_IDC_SET_LEADCHNL_RV_PACING_CATHODE_ELECTRODE_1: NORMAL
MDC_IDC_SET_LEADCHNL_RV_PACING_CATHODE_LOCATION_1: NORMAL
MDC_IDC_SET_LEADCHNL_RV_PACING_POLARITY: NORMAL
MDC_IDC_SET_LEADCHNL_RV_PACING_PULSEWIDTH: 0.4 MS
MDC_IDC_SET_LEADCHNL_RV_SENSING_ANODE_ELECTRODE_1: NORMAL
MDC_IDC_SET_LEADCHNL_RV_SENSING_ANODE_LOCATION_1: NORMAL
MDC_IDC_SET_LEADCHNL_RV_SENSING_CATHODE_ELECTRODE_1: NORMAL
MDC_IDC_SET_LEADCHNL_RV_SENSING_CATHODE_LOCATION_1: NORMAL
MDC_IDC_SET_LEADCHNL_RV_SENSING_POLARITY: NORMAL
MDC_IDC_SET_LEADCHNL_RV_SENSING_SENSITIVITY: 0.9 MV
MDC_IDC_SET_ZONE_DETECTION_INTERVAL: 360 MS
MDC_IDC_SET_ZONE_STATUS: NORMAL
MDC_IDC_SET_ZONE_TYPE: NORMAL
MDC_IDC_SET_ZONE_VENDOR_TYPE: NORMAL
MDC_IDC_STAT_BRADY_DTM_END: NORMAL
MDC_IDC_STAT_BRADY_DTM_START: NORMAL
MDC_IDC_STAT_BRADY_RV_PERCENT_PACED: 99.9 %
MDC_IDC_STAT_EPISODE_RECENT_COUNT: 0
MDC_IDC_STAT_EPISODE_RECENT_COUNT: 0
MDC_IDC_STAT_EPISODE_RECENT_COUNT: 5
MDC_IDC_STAT_EPISODE_RECENT_COUNT_DTM_END: NORMAL
MDC_IDC_STAT_EPISODE_RECENT_COUNT_DTM_START: NORMAL
MDC_IDC_STAT_EPISODE_TOTAL_COUNT: 0
MDC_IDC_STAT_EPISODE_TOTAL_COUNT: 0
MDC_IDC_STAT_EPISODE_TOTAL_COUNT: 8
MDC_IDC_STAT_EPISODE_TOTAL_COUNT_DTM_END: NORMAL
MDC_IDC_STAT_EPISODE_TOTAL_COUNT_DTM_START: NORMAL
MDC_IDC_STAT_EPISODE_TYPE: NORMAL

## 2024-02-06 ENCOUNTER — HOSPITAL ENCOUNTER (OUTPATIENT)
Dept: CARDIOLOGY | Facility: CLINIC | Age: 89
Discharge: HOME | End: 2024-02-06
Attending: INTERNAL MEDICINE
Payer: COMMERCIAL

## 2024-02-06 ENCOUNTER — VIRTUAL VISIT (OUTPATIENT)
Dept: CARDIOLOGY | Facility: CLINIC | Age: 89
End: 2024-02-06
Attending: INTERNAL MEDICINE
Payer: COMMERCIAL

## 2024-02-06 VITALS
RESPIRATION RATE: 16 BRPM | SYSTOLIC BLOOD PRESSURE: 156 MMHG | WEIGHT: 221 LBS | HEART RATE: 74 BPM | BODY MASS INDEX: 37.93 KG/M2 | OXYGEN SATURATION: 100 % | DIASTOLIC BLOOD PRESSURE: 78 MMHG

## 2024-02-06 DIAGNOSIS — Z95.0 CARDIAC PACEMAKER IN SITU: ICD-10-CM

## 2024-02-06 DIAGNOSIS — Z98.890 HX OF ATRIOVENTRICULAR NODE ABLATION: ICD-10-CM

## 2024-02-06 DIAGNOSIS — I49.5 SICK SINUS SYNDROME (H): Primary | ICD-10-CM

## 2024-02-06 DIAGNOSIS — I48.19 PERSISTENT ATRIAL FIBRILLATION (H): Primary | ICD-10-CM

## 2024-02-06 DIAGNOSIS — I47.20 VENTRICULAR TACHYCARDIA (H): ICD-10-CM

## 2024-02-06 DIAGNOSIS — I49.5 SICK SINUS SYNDROME (H): ICD-10-CM

## 2024-02-06 PROCEDURE — 99213 OFFICE O/P EST LOW 20 MIN: CPT | Mod: 95 | Performed by: INTERNAL MEDICINE

## 2024-02-06 PROCEDURE — 99207 CARDIAC DEVICE CHECK - IN CLINIC: CPT | Performed by: INTERNAL MEDICINE

## 2024-02-06 NOTE — PROGRESS NOTES
A total of 30 minutes was spent with clinic visit, including chart review, including if available echo and cath images, and ECG, Holter, Event and coordinating care    General:  no apparent distress, normal body habitus, sitting upright.  ENT/Mouth:  membranes moist, no nasal discharge.  Normal head shape, no apparent injury or laceration.  Eyes:  no scleral icterus, normal conjunctivae.  No observed jaundice.  Neck:  no apparent neck swelling.   Chest/Lungs:  No breathing difficulty while speaking.  No audible wheezing.  No cough during conversation.  Cardiovascular:  No obviously elevated jugular venous pressure.  No apparent edema bilaterally in LE.   Abdomen:  no obvious abdominal distention.   Extremities:  no apparent cyanosis.  Skin:  no xanthelasma.  No facial lacerations.  Neurologic:  Normal arm motion bilateral, no tremors.    Psychiatric:  Alert, calm demeanor    Delightful pt with medical refractory AF, s/p AVN ablation and ppm and h/o CHFpEF. Doing well overall since procedure without palpitations. Ppm was checked a few weeks ago shows normal function. Chronic insomnia. Tired in the am but better in the pm or early evening. Notes hr at 85 bpm after a walk. Sob with walking a short distance not getting worse. Walks daily. Noticed left leg swelling. Told pt to defer to Dr. Drake to adjust lasix if needs to. Ecg shows AF with V pacing. Will adjust her rate adaptive to be more sensitive. No need to see EP on a regular basis.    Nicholas Soriano MD

## 2024-02-06 NOTE — LETTER
2/6/2024    Yecenia Cardona MD  [address] [place] [id number]    RE: Theodora Meng       Dear Colleague,     I had the pleasure of seeing Theodora Meng in the [facility].  A total of 30 minutes was spent with clinic visit, including chart review, including if available echo and cath images, and ECG, Holter, Event and coordinating care    General:  no apparent distress, normal body habitus, sitting upright.  ENT/Mouth:  membranes moist, no nasal discharge.  Normal head shape, no apparent injury or laceration.  Eyes:  no scleral icterus, normal conjunctivae.  No observed jaundice.  Neck:  no apparent neck swelling.   Chest/Lungs:  No breathing difficulty while speaking.  No audible wheezing.  No cough during conversation.  Cardiovascular:  No obviously elevated jugular venous pressure.  No apparent edema bilaterally in LE.   Abdomen:  no obvious abdominal distention.   Extremities:  no apparent cyanosis.  Skin:  no xanthelasma.  No facial lacerations.  Neurologic:  Normal arm motion bilateral, no tremors.    Psychiatric:  Alert, calm demeanor    Delightful pt with medical refractory AF, s/p AVN ablation and ppm and h/o CHFpEF. Doing well overall since procedure without palpitations. Ppm was checked a few weeks ago shows normal function. Chronic insomnia. Tired in the am but better in the pm or early evening. Notes hr at 85 bpm after a walk. Sob with walking a short distance not getting worse. Walks daily. Noticed left leg swelling. Told pt to defer to Dr. Drake to adjust lasix if needs to. Ecg shows AF with V pacing. Will adjust her rate adaptive to be more sensitive. No need to see EP on a regular basis.    Nicholas Soriano MD     Thank you for allowing me to participate in the care of your patient.    Sincerely,     Nicholas Mccabe MD     [facility]  cc:   Sarah Drake MD  [address] [place] [address]

## 2024-02-07 LAB
MDC_IDC_LEAD_CONNECTION_STATUS: NORMAL
MDC_IDC_LEAD_IMPLANT_DT: NORMAL
MDC_IDC_LEAD_LOCATION: NORMAL
MDC_IDC_LEAD_LOCATION_DETAIL_1: NORMAL
MDC_IDC_LEAD_MFG: NORMAL
MDC_IDC_LEAD_MODEL: NORMAL
MDC_IDC_LEAD_POLARITY_TYPE: NORMAL
MDC_IDC_LEAD_SERIAL: NORMAL
MDC_IDC_MSMT_BATTERY_DTM: NORMAL
MDC_IDC_MSMT_BATTERY_REMAINING_LONGEVITY: 147 MO
MDC_IDC_MSMT_BATTERY_RRT_TRIGGER: 2.62
MDC_IDC_MSMT_BATTERY_STATUS: NORMAL
MDC_IDC_MSMT_BATTERY_VOLTAGE: 3.09 V
MDC_IDC_MSMT_LEADCHNL_RV_IMPEDANCE_VALUE: 399 OHM
MDC_IDC_MSMT_LEADCHNL_RV_IMPEDANCE_VALUE: 456 OHM
MDC_IDC_MSMT_LEADCHNL_RV_PACING_THRESHOLD_AMPLITUDE: 0.62 V
MDC_IDC_MSMT_LEADCHNL_RV_PACING_THRESHOLD_PULSEWIDTH: 0.4 MS
MDC_IDC_MSMT_LEADCHNL_RV_SENSING_INTR_AMPL: 9.5 MV
MDC_IDC_MSMT_LEADCHNL_RV_SENSING_INTR_AMPL: 9.5 MV
MDC_IDC_PG_IMPLANT_DTM: NORMAL
MDC_IDC_PG_MFG: NORMAL
MDC_IDC_PG_MODEL: NORMAL
MDC_IDC_PG_SERIAL: NORMAL
MDC_IDC_PG_TYPE: NORMAL
MDC_IDC_SESS_CLINIC_NAME: NORMAL
MDC_IDC_SESS_DTM: NORMAL
MDC_IDC_SESS_TYPE: NORMAL
MDC_IDC_SET_BRADY_HYSTRATE: NORMAL
MDC_IDC_SET_BRADY_LOWRATE: 70 {BEATS}/MIN
MDC_IDC_SET_BRADY_MAX_SENSOR_RATE: 130 {BEATS}/MIN
MDC_IDC_SET_BRADY_MODE: NORMAL
MDC_IDC_SET_LEADCHNL_RV_PACING_AMPLITUDE: 2 V
MDC_IDC_SET_LEADCHNL_RV_PACING_ANODE_ELECTRODE_1: NORMAL
MDC_IDC_SET_LEADCHNL_RV_PACING_ANODE_LOCATION_1: NORMAL
MDC_IDC_SET_LEADCHNL_RV_PACING_CAPTURE_MODE: NORMAL
MDC_IDC_SET_LEADCHNL_RV_PACING_CATHODE_ELECTRODE_1: NORMAL
MDC_IDC_SET_LEADCHNL_RV_PACING_CATHODE_LOCATION_1: NORMAL
MDC_IDC_SET_LEADCHNL_RV_PACING_POLARITY: NORMAL
MDC_IDC_SET_LEADCHNL_RV_PACING_PULSEWIDTH: 0.4 MS
MDC_IDC_SET_LEADCHNL_RV_SENSING_ANODE_ELECTRODE_1: NORMAL
MDC_IDC_SET_LEADCHNL_RV_SENSING_ANODE_LOCATION_1: NORMAL
MDC_IDC_SET_LEADCHNL_RV_SENSING_CATHODE_ELECTRODE_1: NORMAL
MDC_IDC_SET_LEADCHNL_RV_SENSING_CATHODE_LOCATION_1: NORMAL
MDC_IDC_SET_LEADCHNL_RV_SENSING_POLARITY: NORMAL
MDC_IDC_SET_LEADCHNL_RV_SENSING_SENSITIVITY: 0.9 MV
MDC_IDC_SET_ZONE_DETECTION_INTERVAL: 360 MS
MDC_IDC_SET_ZONE_STATUS: NORMAL
MDC_IDC_SET_ZONE_TYPE: NORMAL
MDC_IDC_SET_ZONE_VENDOR_TYPE: NORMAL
MDC_IDC_STAT_BRADY_DTM_END: NORMAL
MDC_IDC_STAT_BRADY_DTM_START: NORMAL
MDC_IDC_STAT_BRADY_RV_PERCENT_PACED: 99.93 %
MDC_IDC_STAT_EPISODE_RECENT_COUNT: 0
MDC_IDC_STAT_EPISODE_RECENT_COUNT: 0
MDC_IDC_STAT_EPISODE_RECENT_COUNT: 7
MDC_IDC_STAT_EPISODE_RECENT_COUNT_DTM_END: NORMAL
MDC_IDC_STAT_EPISODE_RECENT_COUNT_DTM_START: NORMAL
MDC_IDC_STAT_EPISODE_TOTAL_COUNT: 0
MDC_IDC_STAT_EPISODE_TOTAL_COUNT: 0
MDC_IDC_STAT_EPISODE_TOTAL_COUNT: 8
MDC_IDC_STAT_EPISODE_TOTAL_COUNT_DTM_END: NORMAL
MDC_IDC_STAT_EPISODE_TOTAL_COUNT_DTM_START: NORMAL
MDC_IDC_STAT_EPISODE_TYPE: NORMAL

## 2024-04-01 ENCOUNTER — OFFICE VISIT (OUTPATIENT)
Dept: FAMILY MEDICINE | Facility: CLINIC | Age: 89
End: 2024-04-01
Payer: COMMERCIAL

## 2024-04-01 VITALS
OXYGEN SATURATION: 100 % | DIASTOLIC BLOOD PRESSURE: 78 MMHG | HEIGHT: 64 IN | WEIGHT: 227 LBS | SYSTOLIC BLOOD PRESSURE: 128 MMHG | HEART RATE: 74 BPM | BODY MASS INDEX: 38.76 KG/M2 | RESPIRATION RATE: 14 BRPM

## 2024-04-01 DIAGNOSIS — E11.39 TYPE 2 DIABETES MELLITUS WITH OTHER OPHTHALMIC COMPLICATION, WITHOUT LONG-TERM CURRENT USE OF INSULIN (H): ICD-10-CM

## 2024-04-01 DIAGNOSIS — R09.82 POSTNASAL DRIP: ICD-10-CM

## 2024-04-01 DIAGNOSIS — E66.01 CLASS 2 SEVERE OBESITY DUE TO EXCESS CALORIES WITH SERIOUS COMORBIDITY AND BODY MASS INDEX (BMI) OF 38.0 TO 38.9 IN ADULT (H): ICD-10-CM

## 2024-04-01 DIAGNOSIS — N18.31 STAGE 3A CHRONIC KIDNEY DISEASE (H): ICD-10-CM

## 2024-04-01 DIAGNOSIS — R93.89 THICKENED ENDOMETRIUM: ICD-10-CM

## 2024-04-01 DIAGNOSIS — I50.32 CHRONIC HEART FAILURE WITH PRESERVED EJECTION FRACTION (HFPEF) (H): ICD-10-CM

## 2024-04-01 DIAGNOSIS — Z78.0 POSTMENOPAUSAL STATUS: ICD-10-CM

## 2024-04-01 DIAGNOSIS — N93.9 VAGINAL SPOTTING: Primary | ICD-10-CM

## 2024-04-01 DIAGNOSIS — E66.812 CLASS 2 SEVERE OBESITY DUE TO EXCESS CALORIES WITH SERIOUS COMORBIDITY AND BODY MASS INDEX (BMI) OF 38.0 TO 38.9 IN ADULT (H): ICD-10-CM

## 2024-04-01 DIAGNOSIS — I48.0 PAROXYSMAL ATRIAL FIBRILLATION (H): ICD-10-CM

## 2024-04-01 DIAGNOSIS — Z86.018 HISTORY OF UTERINE FIBROID: ICD-10-CM

## 2024-04-01 LAB
HBA1C MFR BLD: 7.2 % (ref 0–5.6)
HGB BLD-MCNC: 12.1 G/DL (ref 11.7–15.7)

## 2024-04-01 PROCEDURE — 83036 HEMOGLOBIN GLYCOSYLATED A1C: CPT | Performed by: STUDENT IN AN ORGANIZED HEALTH CARE EDUCATION/TRAINING PROGRAM

## 2024-04-01 PROCEDURE — 99214 OFFICE O/P EST MOD 30 MIN: CPT | Performed by: STUDENT IN AN ORGANIZED HEALTH CARE EDUCATION/TRAINING PROGRAM

## 2024-04-01 PROCEDURE — 36415 COLL VENOUS BLD VENIPUNCTURE: CPT | Performed by: STUDENT IN AN ORGANIZED HEALTH CARE EDUCATION/TRAINING PROGRAM

## 2024-04-01 PROCEDURE — 85018 HEMOGLOBIN: CPT | Performed by: STUDENT IN AN ORGANIZED HEALTH CARE EDUCATION/TRAINING PROGRAM

## 2024-04-01 RX ORDER — FLUTICASONE PROPIONATE 50 MCG
1 SPRAY, SUSPENSION (ML) NASAL DAILY
Qty: 18.2 ML | Refills: 1 | Status: SHIPPED | OUTPATIENT
Start: 2024-04-01

## 2024-04-01 RX ORDER — RESPIRATORY SYNCYTIAL VIRUS VACCINE 120MCG/0.5
0.5 KIT INTRAMUSCULAR ONCE
Qty: 1 EACH | Refills: 0 | Status: CANCELLED | OUTPATIENT
Start: 2024-04-01 | End: 2024-04-01

## 2024-04-01 ASSESSMENT — PAIN SCALES - GENERAL: PAINLEVEL: NO PAIN (0)

## 2024-04-01 NOTE — PROGRESS NOTES
Assessment & Plan     Patient is a pleasant 89-year-old female presents today for vaginal spotting.  In addition, we discussed postnasal drip, leg swelling, and updated labs.    Vaginal spotting  History of uterine fibroid  Postmenopausal status  The past couple months, patient has had a few episodes of vaginal spotting with pink discharge, no enrique blood, no clots. Reviewed CT AP from 2022 that did show mild thickened endometrium with fibroids that had not changed in size. This does not appear to have been followed up on. We discussed pelvic exam today vs starting with pelvic US for recheck on fibroids and endometrium. She opts to forego pelvic exam today. Follow up/referral to [facility]BG[facility] pending results.   - US Pelvic Complete with Transvaginal; Future    Postnasal drip  Has had ongoing postnasal drip, rhinorrhea, phlegm in the throat throughout this winter. Found to have middle ear effusion bilaterally today. Normal pulmonary exam. Opted to start with flonase for symptomatic relief.  - fluticasone (FLONASE) 50 MCG/ACT nasal spray; Spray 1 spray into both nostrils daily    Chronic heart failure with preserved ejection fraction (HFpEF) (H)  Discussed leg swelling today. She has trace pitting edema RLE, no pitting edema LLE, but bilateral calves appear enlarged, possibly underlying lymphedema related. Discussed considering referral to lymphedema if she desires, and she will let me know if she wishes to pursue that. For now continue every other day lasix.     Stage 3a chronic kidney disease (H)  Due for annual labs, obtained today.   - Albumin Random Urine Quantitative with Creat Ratio; Future  - Hemoglobin; Future  - Hemoglobin  - Albumin Random Urine Quantitative with Creat Ratio; Future    Type 2 diabetes mellitus with other ophthalmic complication, without long-term current use of insulin (H)  Requests recheck A1c , ordered today. She is due for albumin:creat ratio. Metformin refilled.   - metFORMIN (GLUCOPHAGE) 500  "MG tablet; Take 0.5 tablets (250 mg) by mouth 2 times daily (with meals)  - Hemoglobin A1c; Future  - Hemoglobin A1c  - Albumin Random Urine Quantitative with Creat Ratio; Future    Paroxysmal atrial fibrillation (H)  Refill, stable.   - rivaroxaban ANTICOAGULANT (XARELTO ANTICOAGULANT) 20 MG TABS tablet; Take 1 tablet (20 mg) by mouth daily (with dinner)    Class 2 severe obesity due to excess calories with serious comorbidity and body mass index (BMI) of 38.0 to 38.9 in adult (H)  Did not discuss in detail today. Patient monitors weight at home, takes lasix accordingly.       I spent a total of 35 minutes on the day of the visit.   Time spent by me doing chart review, history and exam, documentation and further activities per the note      BMI  Estimated body mass index is 38.96 kg/m  as calculated from the following:    Height as of this encounter: 1.626 m (5' 4\").    Weight as of this encounter: 103 kg (227 lb).       Chay Yip is a 89 year old, presenting for the following health issues:  Vaginal Problem and Diabetes        4/1/2024     1:18 PM   Additional Questions   Roomed by Jackie SALES   Accompanied by Self     HPI       Vaginal Symptoms  Onset/Duration: couple months  Description:  Vaginal Discharge: pinkish tint   Itching (Pruritis): No  Burning sensation:  No  Odor: YES  Accompanying Signs & Symptoms:  Urinary symptoms: No  Abdominal pain: YES- lower left side  Fever: No  History:   Sexually active: No  New Partner: N/A  Possibility of Pregnancy:  No  Recent antibiotic use: No  Previous vaginitis issues: No  Precipitating or alleviating factors: None  Therapies tried and outcome: none    A little pink of a discharge a couple months ago, jsut happen once during the day  A month later, the same day of the month it happened again, same thing.   Some slight LLQ groin pain   Hasn't had a pelvic examination in years.   Has had D&C in the past.   No cramping, no urinary symptoms.   Sometimes  " "alittle brown colored discharge. On her pads.     No vaginal itching or burning.   Uses a wipe to clean up after urinating.     Still has uterus.       Diabetes Follow-up    How often are you checking your blood sugar? Not at all  What concerns do you have today about your diabetes? None   Do you have any of these symptoms? (Select all that apply)  Numbness in feet- legs not feet  Have you had a diabetic eye exam in the last 12 months? Yes- Date of last eye exam: unsure,  Location: [place]      Phlegm in throat  Uses lozenges, which helps.   Blowing nose.   All winter.   No recent illnesses.   Nothing else tried.     BP Readings from Last 2 Encounters:   04/01/24 128/78   02/06/24 (!) 156/78     Hemoglobin A1C (%)   Date Value   04/01/2024 7.2 (H)   11/13/2023 7.1 (H)   11/03/2020 6.9 (H)   11/18/2019 6.7 (H)     LDL Cholesterol Calculated (mg/dL)   Date Value   11/13/2023 70   10/27/2022 49   11/03/2020 66   11/18/2019 82             Review of Systems  Constitutional, HEENT, cardiovascular, pulmonary, gi and gu systems are negative, except as otherwise noted.      Objective    /78 (BP Location: Right arm, Patient Position: Sitting, Cuff Size: Adult Large)   Pulse 74   Resp 14   Ht 1.626 m (5' 4\")   Wt 103 kg (227 lb)   LMP  (LMP Unknown)   SpO2 100%   BMI 38.96 kg/m    Body mass index is 38.96 kg/m .  Physical Exam  Constitutional:       Appearance: Normal appearance.   HENT:      Head: Normocephalic.      Right Ear: A middle ear effusion is present.      Left Ear: A middle ear effusion is present.   Eyes:      General: No scleral icterus.     Extraocular Movements: Extraocular movements intact.      Conjunctiva/sclera: Conjunctivae normal.   Cardiovascular:      Rate and Rhythm: Normal rate.   Pulmonary:      Effort: Pulmonary effort is normal.      Breath sounds: No wheezing, rhonchi or rales.   Abdominal:      General: Abdomen is flat.      Palpations: Abdomen is soft.      Tenderness: There is no " abdominal tenderness.   Musculoskeletal:         General: Normal range of motion.      Cervical back: Normal range of motion.      Right lower leg: Edema (trace pitting) present.      Left lower leg: Edema present.   Neurological:      General: No focal deficit present.      Mental Status: She is alert and oriented to person, place, and time.           Results from this visit  Results for orders placed or performed in visit on 04/01/24   Hemoglobin A1c     Status: Abnormal   Result Value Ref Range    Hemoglobin A1C 7.2 (H) 0.0 - 5.6 %   Hemoglobin     Status: Normal   Result Value Ref Range    Hemoglobin 12.1 11.7 - 15.7 g/dL               Signed Electronically by: Naty Santoro MD

## 2024-04-01 NOTE — PATIENT INSTRUCTIONS
Consider lymphedema therapy referral if you'd like to for the leg swelling      Give the nose spray a try. If it isn't covered by insurance, pick it up over the counter. Options include Flonase (fluticasone) or Nasonex (mometasone). Use 1 spray in each nostril 1-2 times daily (I recommend at least at bedtime) for 2 weeks or so and see if that helps with the phlegm and runny nose.     If it takes it away, then you can just go to as needed instead of doing it daily.     If not, let me know and we can trouble shoot, consider other causes.

## 2024-04-18 ENCOUNTER — HOSPITAL ENCOUNTER (OUTPATIENT)
Dept: ULTRASOUND IMAGING | Facility: CLINIC | Age: 89
Discharge: HOME | End: 2024-04-18
Attending: STUDENT IN AN ORGANIZED HEALTH CARE EDUCATION/TRAINING PROGRAM | Admitting: STUDENT IN AN ORGANIZED HEALTH CARE EDUCATION/TRAINING PROGRAM
Payer: COMMERCIAL

## 2024-04-18 DIAGNOSIS — N93.9 VAGINAL SPOTTING: ICD-10-CM

## 2024-04-18 DIAGNOSIS — Z78.0 POSTMENOPAUSAL STATUS: ICD-10-CM

## 2024-04-18 DIAGNOSIS — Z86.018 HISTORY OF UTERINE FIBROID: ICD-10-CM

## 2024-04-18 PROCEDURE — 76830 TRANSVAGINAL US NON-OB: CPT

## 2024-05-01 ENCOUNTER — OFFICE VISIT (OUTPATIENT)
Dept: OBGYN | Facility: CLINIC | Age: 89
End: 2024-05-01
Payer: COMMERCIAL

## 2024-05-01 VITALS
BODY MASS INDEX: 39.06 KG/M2 | RESPIRATION RATE: 16 BRPM | DIASTOLIC BLOOD PRESSURE: 85 MMHG | SYSTOLIC BLOOD PRESSURE: 144 MMHG | HEART RATE: 80 BPM | WEIGHT: 228.8 LBS | HEIGHT: 64 IN

## 2024-05-01 DIAGNOSIS — N95.0 PMB (POSTMENOPAUSAL BLEEDING): Primary | ICD-10-CM

## 2024-05-01 DIAGNOSIS — R93.89 THICKENED ENDOMETRIUM: ICD-10-CM

## 2024-05-01 DIAGNOSIS — D25.0 SUBMUCOUS UTERINE FIBROID: ICD-10-CM

## 2024-05-01 LAB
CLUE CELLS: PRESENT
TRICHOMONAS, WET PREP: ABNORMAL
WBC'S/HIGH POWER FIELD, WET PREP: ABNORMAL
YEAST, WET PREP: ABNORMAL

## 2024-05-01 PROCEDURE — 2894A PR VOIDCORRECT: CPT | Performed by: OBSTETRICS & GYNECOLOGY

## 2024-05-01 PROCEDURE — 87591 N.GONORRHOEAE DNA AMP PROB: CPT | Performed by: OBSTETRICS & GYNECOLOGY

## 2024-05-01 PROCEDURE — 87491 CHLMYD TRACH DNA AMP PROBE: CPT | Performed by: OBSTETRICS & GYNECOLOGY

## 2024-05-01 PROCEDURE — 99459 PELVIC EXAMINATION: CPT | Performed by: OBSTETRICS & GYNECOLOGY

## 2024-05-01 PROCEDURE — 99203 OFFICE O/P NEW LOW 30 MIN: CPT | Mod: 25 | Performed by: OBSTETRICS & GYNECOLOGY

## 2024-05-01 PROCEDURE — 87210 SMEAR WET MOUNT SALINE/INK: CPT | Performed by: OBSTETRICS & GYNECOLOGY

## 2024-05-01 PROCEDURE — 88305 TISSUE EXAM BY PATHOLOGIST: CPT | Performed by: PATHOLOGY

## 2024-05-01 PROCEDURE — 58100 BIOPSY OF UTERUS LINING: CPT | Performed by: OBSTETRICS & GYNECOLOGY

## 2024-05-01 NOTE — PROGRESS NOTES
Chief Complaint   Patient presents with    Consult     Post menopausal bleeding- Noticed 4 months ago, Some bleeding and brown discharge.  Pain in grown.         HPI:  Theodora Meng, 89 year old,  presents with complaints of light spotting or brown in her pads.  She noticed it a couple of months ago.  She was told she had a fibroid in the past but told to leave it alone.  She has urinary leakage.  Mostly in the morning when she gets up, she leaks a lot.  Otherwise water pills contribute to leaking.        Past Medical History:   Diagnosis Date    Depressive disorder     Diabetes (H)     Esophageal reflux     Other abnormal glucose     Other premature beats     Unspecified essential hypertension      Past Surgical History:   Procedure Laterality Date    ANESTHESIA CARDIOVERSION N/A 2022    Procedure: CARDIOVERSION;  Surgeon: GENERIC ANESTHESIA PROVIDER;  Location:  OR    APPENDECTOMY      ARTHROPLASTY KNEE  10/30/2013    Procedure: ARTHROPLASTY KNEE;  Right Total Knee Arthroplasty;  Surgeon: Ousmane Mcgowan MD;  Location: [facility]    ARTHROPLASTY KNEE Left 4/3/2019    Procedure: Left Total Knee Arthroplasty;  Surgeon: Ousmane Mcgowan MD;  Location: [facility]    CHOLECYSTECTOMY, LAPOROSCOPIC      Cholecystectomy, Laparoscopic    DILATION AND CURETTAGE, OPERATIVE HYSTEROSCOPY WITH MORCELLATOR, COMBINED N/A 2016    Procedure: COMBINED DILATION AND CURETTAGE, OPERATIVE HYSTEROSCOPY WITH MORCELLATOR;  Surgeon: Bony Arredondo MD;  Location: [facility]    EP ABLATION AV NODE N/A 5/10/2023    Procedure: Ablation Atrioventricular Node;  Surgeon: Cooper Cates MD;  Location:  [facility]    EP PACEMAKER DEVICE & LEAD IMPLANT- RIGHT VENTRICULAR N/A 5/10/2023    Procedure: Pacemaker Device & Lead Implant- Right ventricular;  Surgeon: Cooper Cates MD;  Location:  [facility]    ESOPHAGOSCOPY, GASTROSCOPY, DUODENOSCOPY (EGD), COMBINED N/A 2016     Procedure: COMBINED ESOPHAGOSCOPY, GASTROSCOPY, DUODENOSCOPY (EGD), BIOPSY SINGLE OR MULTIPLE;  Surgeon: Mil Guevara MD;  Location: [facility]    SALPINGO OOPHORECTOMY,R/L/OZZY      Salpingo Oophorectomy, RT/LT/OZZY    TONSILLECTOMY       Social History     Socioeconomic History    Marital status:      Spouse name: Not on file    Number of children: Not on file    Years of education: Not on file    Highest education level: Not on file   Occupational History    Not on file   Tobacco Use    Smoking status: Never    Smokeless tobacco: Never   Vaping Use    Vaping status: Never Used   Substance and Sexual Activity    Alcohol use: No    Drug use: No    Sexual activity: Never   Other Topics Concern    Parent/sibling w/ CABG, MI or angioplasty before 65F 55M? Not Asked   Social History Narrative    Not on file     Social Determinants of Health     Financial Resource Strain: Not on file   Food Insecurity: Not on file   Transportation Needs: Not on file   Physical Activity: Not on file   Stress: Not on file   Social Connections: Not on file   Interpersonal Safety: Low Risk  (11/13/2023)    Interpersonal Safety     Do you feel physically and emotionally safe where you currently live?: Yes     Within the past 12 months, have you been hit, slapped, kicked or otherwise physically hurt by someone?: No     Within the past 12 months, have you been humiliated or emotionally abused in other ways by your partner or ex-partner?: No   Housing Stability: Not on file     Family History   Problem Relation Age of Onset    Heart Disease Mother     Heart Disease Father     Heart Disease Brother     Heart Disease Brother     Heart Disease Brother         Current Outpatient Medications   Medication Sig Dispense Refill    ACE/ARB/ARNI NOT PRESCRIBED (INTENTIONAL) Please choose reason not prescribed from choices below.      acetaminophen (TYLENOL) 325 MG tablet Take 2 tablets (650 mg) by mouth every 6 hours as needed for pain or fever  "     atorvastatin (LIPITOR) 10 MG tablet Take 1 tablet (10 mg) by mouth every evening 90 tablet 3    BETA BLOCKER NOT PRESCRIBED (INTENTIONAL) Please choose reason not prescribed from choices below.      famotidine (PEPCID) 20 MG tablet TAKE ONE TABLET BY MOUTH NIGHTLY AS NEEDED FOR HEARTBURN      fluticasone (FLONASE) 50 MCG/ACT nasal spray Spray 1 spray into both nostrils daily 18.2 mL 1    furosemide (LASIX) 20 MG tablet Take 1 tablet (20 mg) by mouth daily as needed (as needed for swelling/wt gain) for swelling or weight gain 90 tablet 3    gabapentin (NEURONTIN) 300 MG capsule TAKE 2 CAPSULES EVERY      EVENING 180 capsule 3    metFORMIN (GLUCOPHAGE) 500 MG tablet Take 0.5 tablets (250 mg) by mouth 2 times daily (with meals) 90 tablet 3    omeprazole (PRILOSEC) 40 MG DR capsule Take 1 capsule (40 mg) by mouth daily 90 capsule 3    rivaroxaban ANTICOAGULANT (XARELTO ANTICOAGULANT) 20 MG TABS tablet Take 1 tablet (20 mg) by mouth daily (with dinner) 90 tablet 3        Allergies:     Allergies   Allergen Reactions    Zoloft [Sertraline] Palpitations, Diarrhea and Cramps        ROS:  See HPI      Physical Exam:  BP (!) 144/85 (BP Location: Right arm, Patient Position: Sitting, Cuff Size: Adult Large)   Pulse 80   Resp 16   Ht 1.626 m (5' 4\")   Wt 103.8 kg (228 lb 12.8 oz)   LMP  (LMP Unknown)   BMI 39.27 kg/m       Appearance: well developed, well nourished, no acute distress  Head Exam normocephalic, no lesions or deformities  Abdomen: soft, non-tender, no masses, no organomegaly, no hernia,  Skin: no lesions  Extremities: no edema  Psych: orientated x3    Genitourinary Exam  Vulva: normal, no lesions  Urethral meatus: normal size and location, no lesions or discharge  Urethra: no tenderness or masses  Bladder: no fullness or tenderness  Vagina: no cystocele and rectocele  Cervix: normal appearance, no lesions, no discharge, no cervical motion tenderness  Uterus: normal position, mobile, non-tender, size 4 " weeks  Adnexa: no palpable masses bilaterally      CLINICAL HISTORY: Vaginal spotting. History of uterine fibroid.  Postmenopausal status.    TECHNIQUE: Transabdominal scans were performed. Endovaginal ultrasound  was performed to better visualize the adnexa.    COMPARISON: 10/5/2022. 4/22/2016.    FINDINGS:  UTERUS AND ENDOMETRIUM: Uterus measures 9.5 x 4.7 x 3.7 cm. Uterus is  normal in size and position. Heterogeneity of the uterine myometrium  is seen with suspected uterine calcifications. There is heterogenous  endometrial thickening measuring 20 mm, previously 23 mm. A solid  heterogenous mass is seen at the fundus to the left of midline located  either within or adjacent to the endometrium and measuring 2.8 x 2.5 x  2 cm, corresponding to a previously described submucosal fibroid  measuring up to 2.5 cm.    RIGHT OVARY: Not seen. Either obscured or surgically absent.    LEFT OVARY: Not seen. Either obscured or surgically absent.    No significant free fluid.   Impression:     IMPRESSION:  1. Nonspecific endometrial thickening. Consider tissue sampling if  clinically indicated.  2. Similar-sized mass at the fundus, corresponding to a previously  described submucosal fibroid.  3. Nonvisualization of the ovaries, which may be absent.    BYRON DALTON MD          Procedure: Endometrial Biopsy.  Indication:    Encounter Diagnoses   Name Primary?    PMB (postmenopausal bleeding) Yes    Thickened endometrium     Submucous uterine fibroid             The consent was signed. The patient was premedicated with ibuprofen at home.  A speculum was placed in the vagina and the cervix was visualized.  The cervix was cleaned with betadyne x3.  An Allis was placed on the anterior lip of the cervix. A pipelle was inserted.  The uterus sounded to 8 cm.   Adequate sample taken and sent to pathology.   The Allis was removed.  Bleeding was minimal at end of procedure. The patient tolerated the procedure well.  She was  discharged with instructions to call for heavy bleeding, foul smelling discharge, fevers, chills, pain or concerns.    Assessment/Plan:  Encounter Diagnoses   Name Primary?    PMB (postmenopausal bleeding) Yes    Thickened endometrium     Submucous uterine fibroid          Endometrial biopsy discussed and performed today.  Vaginal swabs done with the biopsy.  If benign, we discussed hysteroscopy to evaluate the abnormal area in the endometrium and possibly remove a fibroid.  Will discuss more after results.        Shira Ramirez MD on 5/1/2024 at 2:09 PM

## 2024-05-02 LAB
C TRACH DNA SPEC QL PROBE+SIG AMP: NEGATIVE
N GONORRHOEA DNA SPEC QL NAA+PROBE: NEGATIVE

## 2024-05-03 LAB
PATH REPORT.COMMENTS IMP SPEC: NORMAL
PATH REPORT.COMMENTS IMP SPEC: NORMAL
PATH REPORT.FINAL DX SPEC: NORMAL
PATH REPORT.GROSS SPEC: NORMAL
PATH REPORT.MICROSCOPIC SPEC OTHER STN: NORMAL
PATH REPORT.RELEVANT HX SPEC: NORMAL
PHOTO IMAGE: NORMAL

## 2024-05-07 ENCOUNTER — ANCILLARY PROCEDURE (OUTPATIENT)
Dept: CARDIOLOGY | Facility: CLINIC | Age: 89
End: 2024-05-07
Attending: INTERNAL MEDICINE
Payer: COMMERCIAL

## 2024-05-07 DIAGNOSIS — Z98.890 HX OF ATRIOVENTRICULAR NODE ABLATION: ICD-10-CM

## 2024-05-07 DIAGNOSIS — I49.5 SICK SINUS SYNDROME (H): ICD-10-CM

## 2024-05-07 DIAGNOSIS — Z95.0 CARDIAC PACEMAKER IN SITU: ICD-10-CM

## 2024-05-07 PROCEDURE — 93294 REM INTERROG EVL PM/LDLS PM: CPT | Performed by: INTERNAL MEDICINE

## 2024-05-07 PROCEDURE — 93296 REM INTERROG EVL PM/IDS: CPT | Performed by: INTERNAL MEDICINE

## 2024-05-08 LAB
MDC_IDC_LEAD_CONNECTION_STATUS: NORMAL
MDC_IDC_LEAD_IMPLANT_DT: NORMAL
MDC_IDC_LEAD_LOCATION: NORMAL
MDC_IDC_LEAD_LOCATION_DETAIL_1: NORMAL
MDC_IDC_LEAD_MFG: NORMAL
MDC_IDC_LEAD_MODEL: NORMAL
MDC_IDC_LEAD_POLARITY_TYPE: NORMAL
MDC_IDC_LEAD_SERIAL: NORMAL
MDC_IDC_MSMT_BATTERY_DTM: NORMAL
MDC_IDC_MSMT_BATTERY_REMAINING_LONGEVITY: 143 MO
MDC_IDC_MSMT_BATTERY_RRT_TRIGGER: 2.62
MDC_IDC_MSMT_BATTERY_STATUS: NORMAL
MDC_IDC_MSMT_BATTERY_VOLTAGE: 3.05 V
MDC_IDC_MSMT_LEADCHNL_RV_IMPEDANCE_VALUE: 380 OHM
MDC_IDC_MSMT_LEADCHNL_RV_IMPEDANCE_VALUE: 437 OHM
MDC_IDC_MSMT_LEADCHNL_RV_PACING_THRESHOLD_AMPLITUDE: 0.62 V
MDC_IDC_MSMT_LEADCHNL_RV_PACING_THRESHOLD_PULSEWIDTH: 0.4 MS
MDC_IDC_MSMT_LEADCHNL_RV_SENSING_INTR_AMPL: 2.25 MV
MDC_IDC_MSMT_LEADCHNL_RV_SENSING_INTR_AMPL: 2.25 MV
MDC_IDC_PG_IMPLANT_DTM: NORMAL
MDC_IDC_PG_MFG: NORMAL
MDC_IDC_PG_MODEL: NORMAL
MDC_IDC_PG_SERIAL: NORMAL
MDC_IDC_PG_TYPE: NORMAL
MDC_IDC_SESS_CLINIC_NAME: NORMAL
MDC_IDC_SESS_DTM: NORMAL
MDC_IDC_SESS_TYPE: NORMAL
MDC_IDC_SET_BRADY_HYSTRATE: NORMAL
MDC_IDC_SET_BRADY_LOWRATE: 70 {BEATS}/MIN
MDC_IDC_SET_BRADY_MAX_SENSOR_RATE: 130 {BEATS}/MIN
MDC_IDC_SET_BRADY_MODE: NORMAL
MDC_IDC_SET_LEADCHNL_RV_PACING_AMPLITUDE: 2 V
MDC_IDC_SET_LEADCHNL_RV_PACING_ANODE_ELECTRODE_1: NORMAL
MDC_IDC_SET_LEADCHNL_RV_PACING_ANODE_LOCATION_1: NORMAL
MDC_IDC_SET_LEADCHNL_RV_PACING_CAPTURE_MODE: NORMAL
MDC_IDC_SET_LEADCHNL_RV_PACING_CATHODE_ELECTRODE_1: NORMAL
MDC_IDC_SET_LEADCHNL_RV_PACING_CATHODE_LOCATION_1: NORMAL
MDC_IDC_SET_LEADCHNL_RV_PACING_POLARITY: NORMAL
MDC_IDC_SET_LEADCHNL_RV_PACING_PULSEWIDTH: 0.4 MS
MDC_IDC_SET_LEADCHNL_RV_SENSING_ANODE_ELECTRODE_1: NORMAL
MDC_IDC_SET_LEADCHNL_RV_SENSING_ANODE_LOCATION_1: NORMAL
MDC_IDC_SET_LEADCHNL_RV_SENSING_CATHODE_ELECTRODE_1: NORMAL
MDC_IDC_SET_LEADCHNL_RV_SENSING_CATHODE_LOCATION_1: NORMAL
MDC_IDC_SET_LEADCHNL_RV_SENSING_POLARITY: NORMAL
MDC_IDC_SET_LEADCHNL_RV_SENSING_SENSITIVITY: 0.9 MV
MDC_IDC_SET_ZONE_DETECTION_INTERVAL: 360 MS
MDC_IDC_SET_ZONE_STATUS: NORMAL
MDC_IDC_SET_ZONE_TYPE: NORMAL
MDC_IDC_SET_ZONE_VENDOR_TYPE: NORMAL
MDC_IDC_STAT_BRADY_DTM_END: NORMAL
MDC_IDC_STAT_BRADY_DTM_START: NORMAL
MDC_IDC_STAT_BRADY_RV_PERCENT_PACED: 99.92 %
MDC_IDC_STAT_EPISODE_RECENT_COUNT: 0
MDC_IDC_STAT_EPISODE_RECENT_COUNT_DTM_END: NORMAL
MDC_IDC_STAT_EPISODE_RECENT_COUNT_DTM_START: NORMAL
MDC_IDC_STAT_EPISODE_TOTAL_COUNT: 0
MDC_IDC_STAT_EPISODE_TOTAL_COUNT: 0
MDC_IDC_STAT_EPISODE_TOTAL_COUNT: 8
MDC_IDC_STAT_EPISODE_TOTAL_COUNT_DTM_END: NORMAL
MDC_IDC_STAT_EPISODE_TOTAL_COUNT_DTM_START: NORMAL
MDC_IDC_STAT_EPISODE_TYPE: NORMAL

## 2024-07-07 DIAGNOSIS — E78.5 HYPERLIPIDEMIA LDL GOAL <100: ICD-10-CM

## 2024-07-08 RX ORDER — ATORVASTATIN CALCIUM 10 MG/1
10 TABLET, FILM COATED ORAL EVERY EVENING
Qty: 90 TABLET | Refills: 3 | Status: SHIPPED | OUTPATIENT
Start: 2024-07-08

## 2024-07-21 DIAGNOSIS — K21.9 GASTROESOPHAGEAL REFLUX DISEASE WITHOUT ESOPHAGITIS: ICD-10-CM

## 2024-07-22 RX ORDER — OMEPRAZOLE 40 MG/1
40 CAPSULE, DELAYED RELEASE ORAL DAILY
Qty: 90 CAPSULE | Refills: 2 | Status: SHIPPED | OUTPATIENT
Start: 2024-07-22

## 2024-08-03 ENCOUNTER — APPOINTMENT (OUTPATIENT)
Dept: CT IMAGING | Facility: CLINIC | Age: 89
End: 2024-08-03
Attending: EMERGENCY MEDICINE
Payer: COMMERCIAL

## 2024-08-03 ENCOUNTER — APPOINTMENT (OUTPATIENT)
Dept: GENERAL RADIOLOGY | Facility: CLINIC | Age: 89
End: 2024-08-03
Attending: EMERGENCY MEDICINE
Payer: COMMERCIAL

## 2024-08-03 ENCOUNTER — HOSPITAL ENCOUNTER (EMERGENCY)
Facility: CLINIC | Age: 89
Discharge: HOME | End: 2024-08-03
Attending: EMERGENCY MEDICINE | Admitting: EMERGENCY MEDICINE
Payer: COMMERCIAL

## 2024-08-03 VITALS
TEMPERATURE: 97.3 F | RESPIRATION RATE: 13 BRPM | WEIGHT: 230 LBS | HEIGHT: 64 IN | OXYGEN SATURATION: 96 % | HEART RATE: 70 BPM | DIASTOLIC BLOOD PRESSURE: 78 MMHG | BODY MASS INDEX: 39.27 KG/M2 | SYSTOLIC BLOOD PRESSURE: 153 MMHG

## 2024-08-03 DIAGNOSIS — S09.90XA CLOSED HEAD INJURY, INITIAL ENCOUNTER: ICD-10-CM

## 2024-08-03 DIAGNOSIS — S20.212A CHEST WALL CONTUSION, LEFT, INITIAL ENCOUNTER: ICD-10-CM
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
## 2024-08-03 DIAGNOSIS — S30.1XXA CONTUSION OF ABDOMINAL WALL, INITIAL ENCOUNTER: ICD-10-CM

## 2024-08-03 DIAGNOSIS — E04.1 THYROID NODULE: ICD-10-CM

## 2024-08-03 DIAGNOSIS — S80.00XA CONTUSION OF KNEE, UNSPECIFIED LATERALITY, INITIAL ENCOUNTER: ICD-10-CM

## 2024-08-03 LAB
ALBUMIN UR-MCNC: NEGATIVE MG/DL
ANION GAP SERPL CALCULATED.3IONS-SCNC: 12 MMOL/L (ref 7–15)
APPEARANCE UR: ABNORMAL
BASOPHILS # BLD AUTO: 0 10E3/UL (ref 0–0.2)
BASOPHILS NFR BLD AUTO: 1 %
BILIRUB UR QL STRIP: NEGATIVE
BUN SERPL-MCNC: 18.3 MG/DL (ref 8–23)
CALCIUM SERPL-MCNC: 9.2 MG/DL (ref 8.8–10.4)
CHLORIDE SERPL-SCNC: 105 MMOL/L (ref 98–107)
COLOR UR AUTO: ABNORMAL
CREAT SERPL-MCNC: 0.98 MG/DL (ref 0.51–0.95)
EGFRCR SERPLBLD CKD-EPI 2021: 55 ML/MIN/1.73M2
EOSINOPHIL # BLD AUTO: 0.1 10E3/UL (ref 0–0.7)
EOSINOPHIL NFR BLD AUTO: 3 %
ERYTHROCYTE [DISTWIDTH] IN BLOOD BY AUTOMATED COUNT: 14.6 % (ref 10–15)
GLUCOSE SERPL-MCNC: 92 MG/DL (ref 70–99)
GLUCOSE UR STRIP-MCNC: NEGATIVE MG/DL
HCO3 SERPL-SCNC: 24 MMOL/L (ref 22–29)
HCT VFR BLD AUTO: 35.4 % (ref 35–47)
HGB BLD-MCNC: 11.3 G/DL (ref 11.7–15.7)
HGB UR QL STRIP: ABNORMAL
HYALINE CASTS: 4 /LPF
IMM GRANULOCYTES # BLD: 0 10E3/UL
IMM GRANULOCYTES NFR BLD: 0 %
KETONES UR STRIP-MCNC: NEGATIVE MG/DL
LEUKOCYTE ESTERASE UR QL STRIP: ABNORMAL
LYMPHOCYTES # BLD AUTO: 1.5 10E3/UL (ref 0.8–5.3)
LYMPHOCYTES NFR BLD AUTO: 28 %
MCH RBC QN AUTO: 28.2 PG (ref 26.5–33)
MCHC RBC AUTO-ENTMCNC: 31.9 G/DL (ref 31.5–36.5)
MCV RBC AUTO: 88 FL (ref 78–100)
MONOCYTES # BLD AUTO: 0.3 10E3/UL (ref 0–1.3)
MONOCYTES NFR BLD AUTO: 6 %
MUCOUS THREADS #/AREA URNS LPF: PRESENT /LPF
NEUTROPHILS # BLD AUTO: 3.4 10E3/UL (ref 1.6–8.3)
NEUTROPHILS NFR BLD AUTO: 63 %
NITRATE UR QL: NEGATIVE
NRBC # BLD AUTO: 0 10E3/UL
NRBC BLD AUTO-RTO: 0 /100
PH UR STRIP: 5 [PH] (ref 5–7)
PLATELET # BLD AUTO: 214 10E3/UL (ref 150–450)
POTASSIUM SERPL-SCNC: 4.4 MMOL/L (ref 3.4–5.3)
RBC # BLD AUTO: 4.01 10E6/UL (ref 3.8–5.2)
RBC URINE: 4 /HPF
SODIUM SERPL-SCNC: 141 MMOL/L (ref 135–145)
SP GR UR STRIP: 1.01 (ref 1–1.03)
SQUAMOUS EPITHELIAL: 6 /HPF
UROBILINOGEN UR STRIP-MCNC: NORMAL MG/DL
WBC # BLD AUTO: 5.4 10E3/UL (ref 4–11)
WBC URINE: 10 /HPF

## 2024-08-03 PROCEDURE — 72125 CT NECK SPINE W/O DYE: CPT

## 2024-08-03 PROCEDURE — 250N000011 HC RX IP 250 OP 636: Performed by: EMERGENCY MEDICINE

## 2024-08-03 PROCEDURE — 71260 CT THORAX DX C+: CPT

## 2024-08-03 PROCEDURE — 87086 URINE CULTURE/COLONY COUNT: CPT | Performed by: EMERGENCY MEDICINE

## 2024-08-03 PROCEDURE — 81001 URINALYSIS AUTO W/SCOPE: CPT | Performed by: EMERGENCY MEDICINE

## 2024-08-03 PROCEDURE — 99284 EMERGENCY DEPT VISIT MOD MDM: CPT | Performed by: EMERGENCY MEDICINE

## 2024-08-03 PROCEDURE — 73560 X-RAY EXAM OF KNEE 1 OR 2: CPT | Mod: 50

## 2024-08-03 PROCEDURE — 80048 BASIC METABOLIC PNL TOTAL CA: CPT | Performed by: EMERGENCY MEDICINE

## 2024-08-03 PROCEDURE — 85025 COMPLETE CBC W/AUTO DIFF WBC: CPT | Performed by: EMERGENCY MEDICINE

## 2024-08-03 PROCEDURE — 70450 CT HEAD/BRAIN W/O DYE: CPT

## 2024-08-03 PROCEDURE — 250N000009 HC RX 250: Performed by: EMERGENCY MEDICINE

## 2024-08-03 PROCEDURE — 36415 COLL VENOUS BLD VENIPUNCTURE: CPT | Performed by: EMERGENCY MEDICINE

## 2024-08-03 PROCEDURE — 99285 EMERGENCY DEPT VISIT HI MDM: CPT | Mod: 25 | Performed by: EMERGENCY MEDICINE

## 2024-08-03 RX ORDER — HYDROCODONE BITARTRATE AND ACETAMINOPHEN 5; 325 MG/1; MG/1
1 TABLET ORAL EVERY 6 HOURS PRN
Qty: 4 TABLET | Refills: 0 | Status: SHIPPED | OUTPATIENT
Start: 2024-08-03

## 2024-08-03 RX ORDER — IOPAMIDOL 755 MG/ML
112 INJECTION, SOLUTION INTRAVASCULAR ONCE
Status: COMPLETED | OUTPATIENT
Start: 2024-08-03 | End: 2024-08-03

## 2024-08-03 RX ADMIN — IOPAMIDOL 112 ML: 755 INJECTION, SOLUTION INTRAVENOUS at 15:37

## 2024-08-03 RX ADMIN — SODIUM CHLORIDE 68 ML: 9 INJECTION, SOLUTION INTRAVENOUS at 15:37

## 2024-08-03 ASSESSMENT — COLUMBIA-SUICIDE SEVERITY RATING SCALE - C-SSRS
6. HAVE YOU EVER DONE ANYTHING, STARTED TO DO ANYTHING, OR PREPARED TO DO ANYTHING TO END YOUR LIFE?: NO
2. HAVE YOU ACTUALLY HAD ANY THOUGHTS OF KILLING YOURSELF IN THE PAST MONTH?: NO
1. IN THE PAST MONTH, HAVE YOU WISHED YOU WERE DEAD OR WISHED YOU COULD GO TO SLEEP AND NOT WAKE UP?: NO

## 2024-08-03 ASSESSMENT — ACTIVITIES OF DAILY LIVING (ADL)
ADLS_ACUITY_SCORE: 40

## 2024-08-03 NOTE — ED TRIAGE NOTES
Pt reports falling on cement landing on left side hitting her head on xarelto. Pt reports tripping. Pt is woozy. Pt was able to walk a little. MD at bedside for trauma eval.      Triage Assessment (Adult)       Row Name 08/03/24 1331          Triage Assessment    Airway WDL WDL        Respiratory WDL    Respiratory WDL WDL        Cardiac WDL    Cardiac WDL WDL        Cognitive/Neuro/Behavioral WDL    Cognitive/Neuro/Behavioral WDL WDL

## 2024-08-03 NOTE — ED PROVIDER NOTES
History     Chief Complaint   Patient presents with    Fall     Pt fell on left side hit cement on left rib area and head on xarelto     HPI  Bereketmegan Meng is a 89 year old female with past medical history significant for sick sinus system persistent A-fib with pacemaker anemia/chronic kidney disease who presents emergency department complaining of close head injury neck pain chest wall pain abdominal pain and knee pain status post fall.  Patient was walking outside her residence with a cane and tripped on a rock and fell on concrete.  She fell on her left side hitting her head against concrete and suffering abrasion to the upper outer portion of the left thigh region.  Patient denies loss consciousness and has some mild neck pain denies any back pain is having some left sided anterior lower rib pain and also has some upper quadrant abdominal pain.  She also complains of some bilateral knee pain she has prostheses in both knees.  Denies any numbness or weakness.  Denies hip pain focal numbness weakness any extremity.    Allergies:  Allergies   Allergen Reactions    Zoloft [Sertraline] Palpitations, Diarrhea and Cramps       Problem List:    Patient Active Problem List    Diagnosis Date Noted    Sick sinus syndrome (H) 05/10/2023     Priority: Medium    Persistent atrial fibrillation (H) 05/10/2023     Priority: Medium    Chronic heart failure with preserved ejection fraction (HFpEF) (H) 03/17/2023     Priority: Medium     Dry weight 214 lbs per cards note 3/17/23      Presbyesophagus 03/15/2023     Priority: Medium    Peripheral edema 02/25/2023     Priority: Medium    Generalized muscle weakness 02/25/2023     Priority: Medium    Elevated troponin 02/25/2023     Priority: Medium    Atrial fibrillation with RVR (H) 02/25/2023     Priority: Medium    Elevated brain natriuretic peptide (BNP) level 02/25/2023     Priority: Medium    Declining functional status 02/25/2023     Priority: Medium    Anemia  02/25/2023     Priority: Medium    Rapid atrial fibrillation (H) 02/25/2023     Priority: Medium    Coronary artery calcification seen on CT scan 01/18/2023     Priority: Medium    Paroxysmal atrial fibrillation (H) 01/18/2023     Priority: Medium    Chronic kidney disease, stage 3 10/25/2021     Priority: Medium    Left knee DJD 04/03/2019     Priority: Medium    Subclinical hyperthyroidism 11/12/2018     Priority: Medium    Type 2 diabetes mellitus without complication, without long-term current use of insulin (H) 11/10/2016     Priority: Medium    Benign essential hypertension 11/10/2016     Priority: Medium    Gastroesophageal reflux disease without esophagitis 11/10/2016     Priority: Medium    Endometrial polyp 06/18/2016     Priority: Medium    Thickened endometrium 04/21/2016     Priority: Medium    Thyroid nodule 03/24/2016     Priority: Medium    DDD (degenerative disc disease), cervical 02/13/2016     Priority: Medium    Type 2 diabetes mellitus without complication (H) 10/21/2015     Priority: Medium    Class 2 severe obesity due to excess calories with serious comorbidity and body mass index (BMI) of 38.0 to 38.9 in adult (H) 10/21/2015     Priority: Medium    Status post total knee replacement 11/29/2013     Priority: Medium    Fever 11/11/2013     Priority: Medium    Edema 11/07/2013     Priority: Medium    Nausea 11/07/2013     Priority: Medium    S/P total knee arthroplasty 10/30/2013     Priority: Medium    Right knee DJD 10/30/2013     Priority: Medium    OA (osteoarthritis) of knee 12/11/2012     Priority: Medium    Hyperlipidemia LDL goal <70 12/04/2012     Priority: Medium    Knee pain 11/07/2012     Priority: Medium    Insomnia 11/07/2012     Priority: Medium    Anxiety 11/15/2011     Priority: Medium    Type 2 diabetes mellitus with other ophthalmic complication, without long-term current use of insulin (H) 12/21/2010     Priority: Medium    Neuralgia 06/05/2009     Priority: Medium     Vitamin D deficiency 03/30/2009     Priority: Medium    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 Provider to review          Past Medical History:    Past Medical History:   Diagnosis Date    Depressive disorder     Diabetes (H)     Esophageal reflux     Other abnormal glucose     Other premature beats     Unspecified essential hypertension        Past Surgical History:    Past Surgical History:   Procedure Laterality Date    ANESTHESIA CARDIOVERSION N/A 12/29/2022    Procedure: CARDIOVERSION;  Surgeon: GENERIC ANESTHESIA PROVIDER;  Location:  OR    APPENDECTOMY      ARTHROPLASTY KNEE  10/30/2013    Procedure: ARTHROPLASTY KNEE;  Right Total Knee Arthroplasty;  Surgeon: Ousmane Mcgowan MD;  Location: [facility]    ARTHROPLASTY KNEE Left 4/3/2019    Procedure: Left Total Knee Arthroplasty;  Surgeon: Ousmane Mcgowan MD;  Location: [facility]    CHOLECYSTECTOMY, LAPOROSCOPIC      Cholecystectomy, Laparoscopic    DILATION AND CURETTAGE, OPERATIVE HYSTEROSCOPY WITH MORCELLATOR, COMBINED N/A 6/20/2016    Procedure: COMBINED DILATION AND CURETTAGE, OPERATIVE HYSTEROSCOPY WITH MORCELLATOR;  Surgeon: Bony Arredondo MD;  Location: [facility] OR    EP ABLATION AV NODE N/A 5/10/2023    Procedure: Ablation Atrioventricular Node;  Surgeon: Cooper Cates MD;  Location:  [facility]    EP PACEMAKER DEVICE & LEAD IMPLANT- RIGHT VENTRICULAR N/A 5/10/2023    Procedure: Pacemaker Device & Lead Implant- Right ventricular;  Surgeon: Cooper Cates MD;  Location:  [facility]    ESOPHAGOSCOPY, GASTROSCOPY, DUODENOSCOPY (EGD), COMBINED N/A 2/17/2016    Procedure: COMBINED ESOPHAGOSCOPY, GASTROSCOPY, DUODENOSCOPY (EGD), BIOPSY SINGLE OR MULTIPLE;  Surgeon: Mil Guevara MD;  Location: [facility]    SALPINGO  "OOPHORECTOMY,R/L/OZZY      Salpingo Oophorectomy, RT/LT/OZZY    TONSILLECTOMY         Family History:    Family History   Problem Relation Age of Onset    Heart Disease Mother     Heart Disease Father     Heart Disease Brother     Heart Disease Brother     Heart Disease Brother        Social History:  Marital Status:   [5]  Social History     Tobacco Use    Smoking status: Never    Smokeless tobacco: Never   Vaping Use    Vaping status: Never Used   Substance Use Topics    Alcohol use: No    Drug use: No        Medications:    ACE/ARB/ARNI NOT PRESCRIBED (INTENTIONAL)  acetaminophen (TYLENOL) 325 MG tablet  atorvastatin (LIPITOR) 10 MG tablet  BETA BLOCKER NOT PRESCRIBED (INTENTIONAL)  famotidine (PEPCID) 20 MG tablet  fluticasone (FLONASE) 50 MCG/ACT nasal spray  furosemide (LASIX) 20 MG tablet  gabapentin (NEURONTIN) 300 MG capsule  metFORMIN (GLUCOPHAGE) 500 MG tablet  omeprazole (PRILOSEC) 40 MG DR capsule  rivaroxaban ANTICOAGULANT (XARELTO ANTICOAGULANT) 20 MG TABS tablet          Review of Systems  As per HPI.  Physical Exam   BP: (!) 205/105  Pulse: 77  Temp: 97.3  F (36.3  C)  Resp: 20  Height: 162.6 cm (5' 4\")  Weight: 104.3 kg (230 lb)  SpO2: 99 %      Physical Exam  Vitals and nursing note reviewed.   Constitutional:       General: She is not in acute distress.     Appearance: Normal appearance. She is not ill-appearing, toxic-appearing or diaphoretic.   HENT:      Head: Normocephalic.      Comments: There is tenderness to palpation of the left lateral lower forehead/eyebrow region.  Abrasion is noted to this region no obvious step-off     Right Ear: Tympanic membrane normal.      Left Ear: Tympanic membrane normal.      Nose: Nose normal.      Comments: No midface instability or nasal tenderness.     Mouth/Throat:      Mouth: Mucous membranes are moist.      Pharynx: Oropharynx is clear.      Comments: Right symmetrical jaw without any tenderness.  Eyes:      Extraocular Movements: Extraocular " movements intact.      Conjunctiva/sclera: Conjunctivae normal.      Pupils: Pupils are equal, round, and reactive to light.   Neck:      Comments: There is mild tenderness palpation the posterior lateral aspects of the neck bilaterally no midline neck tenderness at this time.  No swelling or erythema of the neck and trachea is midline.  Cardiovascular:      Rate and Rhythm: Normal rate and regular rhythm.      Pulses: Normal pulses.      Heart sounds: Normal heart sounds. No murmur heard.  Pulmonary:      Effort: Pulmonary effort is normal.      Breath sounds: No stridor. No wheezing or rhonchi.      Comments: Sounds are decreased at bases bilaterally with tenderness to palpation of the lower left lateral rib region.  No crepitance erythema or swelling noted.  Abdominal:      Comments: Soft, nondistended, tender to palpation left upper quadrant of the abdomen.  No guarding or rebound present.  There is no flank pain present patient has no midline back tenderness no erythema edema present.  She has no tenderness palpation of the hips.  Thighs without pain in the she does have anterior knee tenderness bilaterally postop incisions noted no calf pain patient has good dorsiflexion and plantarflexion ankles bilaterally pulses sensation symmetrical able to raise legs off the bed without difficulty or causing back pain.   Musculoskeletal:         General: Normal range of motion.      Cervical back: Normal range of motion and neck supple. No rigidity.      Right lower leg: No edema.      Left lower leg: No edema.   Skin:     General: Skin is warm and dry.      Findings: No rash.   Neurological:      General: No focal deficit present.      Mental Status: She is alert and oriented to person, place, and time.      Sensory: No sensory deficit.      Motor: No weakness.      Coordination: Coordination normal.   Psychiatric:         Mood and Affect: Mood normal.         ED Course        Procedures              Critical Care time:   none               Results for orders placed or performed during the hospital encounter of 08/03/24 (from the past 24 hour(s))   CBC with platelets differential    Narrative    The following orders were created for panel order CBC with platelets differential.  Procedure                               Abnormality         Status                     ---------                               -----------         ------                     CBC with platelets and d...[[id number]]                                                   Please view results for these tests on the individual orders.       Medications - No data to display    Assessments & Plan (with Medical Decision Making) records reviewed including past medical history medications and allergies.  Show visit on 2/6/2024 was reviewed with cardiology.  Office visit with family practice on 4/1/2024 was reviewed.  Basic labs were obtained.  I independently reviewed and interpreted labs.  Patient CBC was unremarkable.  Patient was not having chest pains or arrhythmias and I do not think EKG was warranted.  CT scan of the head, and cervical spine, chest abdomen pelvis were obtained along with x-rays of the bilateral knees.  I independently reviewed reviewed these and agree with radiologist findings of no CT evidence of acute intracranial process.  Cervical spine with no evidence of acute fracture or posttraumatic subluxation.  Moderate to severe right and severe left foraminal stenosis at C4-C5.  Large right thyroid nodule Nonemergent Thyroid Ultrasound Recommended.  Bilateral knee x-rays with bilateral knee arthroplasties no hardware complications or fracture or malalignment.  There is a small knee joint effusion on the right.  Findings discussed with patient and family in detail.  No obvious acute findings found.  Patient got up and was moving around well but still complaining of some rib pain.  Family requesting some pain medication for patient and I will give patient a  low-dose of pain medication.  She should be monitored and taking this if any altered mental status headaches visual changes neck pain worsening chest pain shortness of breath abdominal pain bowel or bladder dysfunction or other symptoms present patient should be return for further evaluation and care.  She is agreement this plan.     I have reviewed the nursing notes.    I have reviewed the findings, diagnosis, plan and need for follow up with the patient.             Discharge Medication List as of 8/3/2024  5:22 PM          Final diagnoses:   Fall, initial encounter   Chest wall contusion, left, initial encounter   Contusion of abdominal wall, initial encounter   Contusion of knee, unspecified laterality, initial encounter - bilateral   Thyroid nodule   Closed head injury, initial encounter       8/3/2024   [facility] EMERGENCY DEPT       Drage, Law Mendez MD  08/05/24 1414

## 2024-08-03 NOTE — DISCHARGE INSTRUCTIONS
Return if symptoms worsen or new symptoms develop.  Follow-up with primary care physician next available.  Drink plenty of fluids.  If any altered mental status headache worsening neck pain chest pain shortness of breath abdominal pain back pain focal numbness weakness in extremity bowel or bladder dysfunction or other symptoms present please return for further evaluation and care.  Use ice to areas that hurt today and use heat tomorrow.  Take Tylenol for your pain.  Take pain medication half a tablet as needed for severe pain.

## 2024-08-04 LAB — BACTERIA UR CULT: NORMAL

## 2024-08-16 ENCOUNTER — OFFICE VISIT (OUTPATIENT)
Dept: FAMILY MEDICINE | Facility: CLINIC | Age: 89
End: 2024-08-16
Payer: COMMERCIAL

## 2024-08-16 VITALS
HEIGHT: 64 IN | SYSTOLIC BLOOD PRESSURE: 128 MMHG | WEIGHT: 230 LBS | RESPIRATION RATE: 14 BRPM | BODY MASS INDEX: 39.27 KG/M2 | DIASTOLIC BLOOD PRESSURE: 82 MMHG | TEMPERATURE: 96.7 F | HEART RATE: 85 BPM | OXYGEN SATURATION: 100 %

## 2024-08-16 DIAGNOSIS — E04.1 THYROID NODULE: ICD-10-CM

## 2024-08-16 DIAGNOSIS — R26.81 UNSTEADY GAIT WHEN WALKING: ICD-10-CM

## 2024-08-16 DIAGNOSIS — E11.42 TYPE 2 DIABETES MELLITUS WITH DIABETIC POLYNEUROPATHY, WITHOUT LONG-TERM CURRENT USE OF INSULIN (H): ICD-10-CM
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
## 2024-08-16 DIAGNOSIS — R07.81 RIB PAIN ON LEFT SIDE: ICD-10-CM

## 2024-08-16 PROCEDURE — 99214 OFFICE O/P EST MOD 30 MIN: CPT | Performed by: NURSE PRACTITIONER

## 2024-08-16 RX ORDER — LIDOCAINE 4 G/G
1 PATCH TOPICAL EVERY 24 HOURS
Qty: 10 PATCH | Refills: 1 | Status: SHIPPED | OUTPATIENT
Start: 2024-08-16

## 2024-08-16 ASSESSMENT — PAIN SCALES - GENERAL: PAINLEVEL: MODERATE PAIN (5)

## 2024-08-16 NOTE — PROGRESS NOTES
"  Assessment & Plan     Fall, subsequent encounter  Continues to feel unsteady with her cane. She declines the use of a walker, states she has used this before and won't again. Feels anxious about her gait. She is open to a PT eval for this, referral placed. Reviewed home safety.   - Physical Therapy  Referral; Future    Rib pain on left side  Fell onto right side. Imaging in ER c/w chest wall contusion but no fx. No pain with breathing. No cough, shortness of breath, fevers. Tylenol is somewhat helpful in managing pain. Could certainly try lidocaine patches as well.   - Lidocaine (LIDOCARE) 4 % Patch; Place 1 patch onto the skin every 24 hours To prevent lidocaine toxicity, patient should be patch free for 12 hrs daily.    Unsteady gait when walking  As noted above.  - Physical Therapy  Referral; Future    Type 2 diabetes mellitus with diabetic polyneuropathy, without long-term current use of insulin (H)  She feels the neuropathy in her feet is worsening, and wonders about diabetic shoes. See diabetic foot exam below. No evidence of callous, no history of ulcer. Discussed she will likely not meet criteria for diabetic shoes, but could certainly discuss with PCP at next appointment.     Thyroid nodule  Large nodule to right thyroid noted on CT of c spine done in ER with US recommended for follow up. US ordered. Normal TSH in November.   - US Thyroid; Future        MED REC REQUIRED  Post Medication Reconciliation Status: patient was not discharged from an inpatient facility or TCU  BMI  Estimated body mass index is 39.48 kg/m  as calculated from the following:    Height as of this encounter: 1.626 m (5' 4\").    Weight as of this encounter: 104.3 kg (230 lb).         See Patient Instructions    Chay Yip is a 89 year old, presenting for the following health issues:  ER F/U (Patient is still having discomfort in left ribs rated a 5/10.)        8/16/2024     8:15 AM   Additional Questions " "  Roomed by Arlen Milan     [facility]       ED/UC Followup:  Chief Complaint   Patient presents with    ER F/U     Patient is still having discomfort in left ribs rated a 5/10.     HPI  Theodora Meng is a 89 year old female with past medical history significant for sick sinus system persistent A-fib with pacemaker anemia/chronic kidney disease who presents emergency department complaining of close head injury neck pain chest wall pain abdominal pain and knee pain status post fall.  Patient was walking outside her residence with a cane and tripped on a rock and fell on concrete.  She fell on her left side hitting her head against concrete and suffering abrasion to the upper outer portion of the left thigh region.  Patient denies loss consciousness and has some mild neck pain denies any back pain is having some left sided anterior lower rib pain and also has some upper quadrant abdominal pain.  She also complains of some bilateral knee pain she has prostheses in both knees.  Denies any numbness or weakness.  Denies hip pain focal numbness weakness any extremity.    Facility:  [facility]  Date of visit: 8/3/2024  Reason for visit: Fall  Current Status: still having rib discomfort and balance concerns        Review of Systems  Constitutional, HEENT, cardiovascular, pulmonary, GI, , musculoskeletal, neuro, skin, endocrine and psych systems are negative, except as otherwise noted.      Objective    /82   Pulse 85   Temp (!) 96.7  F (35.9  C) (Tympanic)   Resp 14   Ht 1.626 m (5' 4\")   Wt 104.3 kg (230 lb)   LMP  (LMP Unknown)   SpO2 100%   BMI 39.48 kg/m    Body mass index is 39.48 kg/m .  Physical Exam   GENERAL: alert and no distress  EYES: Eyes grossly normal to inspection, PERRL and conjunctivae and sclerae normal  NECK: no adenopathy, no asymmetry, masses, or scars  RESP: lungs clear to auscultation - no rales, rhonchi or wheezes  CV: regular rate and rhythm, normal S1 S2, no S3 " or S4, no murmur, click or rub, no peripheral edema  NEURO: Normal strength and tone, mentation intact and speech normal  PSYCH: mentation appears normal, affect normal/bright  Diabetic foot exam: normal DP and PT pulses, no trophic changes or ulcerative lesions, and reduced sensation at dorsum of right 5th toe and bilateral heels            Signed Electronically by: ALEX Fernandez CNP

## 2024-08-16 NOTE — PATIENT INSTRUCTIONS
I put in an order for PT. They will call you to schedule.  Ok to try lidocaine patches to your ribs for 12 hours daily, needs to be removed for 12 hours daily.  OK to continue Tylenol as needed.  To schedule ultrasound, call [phone].

## 2024-08-27 ENCOUNTER — TRANSFERRED RECORDS (OUTPATIENT)
Dept: HEALTH INFORMATION MANAGEMENT | Facility: CLINIC | Age: 89
End: 2024-08-27
Payer: COMMERCIAL

## 2024-08-27 LAB — RETINOPATHY: NEGATIVE

## 2024-11-01 ENCOUNTER — TELEPHONE (OUTPATIENT)
Dept: FAMILY MEDICINE | Facility: CLINIC | Age: 89
End: 2024-11-01
Payer: COMMERCIAL

## 2024-11-01 DIAGNOSIS — I48.19 PERSISTENT ATRIAL FIBRILLATION (H): Primary | ICD-10-CM

## 2024-11-01 NOTE — TELEPHONE ENCOUNTER
[facility] is requesting an alternate medication to the Xarelto 20mg tab which is on manufacture backorder.  The form with more information is in Naty's mailbox.

## 2024-11-02 NOTE — TELEPHONE ENCOUNTER
We could try eliquis instead, but cost may be prohibitive. Potentially for a short time we may need to use warfarin until shortage is no longer present. I'll send eliquis for now. Recommend patient notify her cardiologist as well on this shortage and see if they have other suggestions.     Naty Santoro MD

## 2024-11-04 NOTE — TELEPHONE ENCOUNTER
FYI~~    Followed up with [facility] they have not received rx for eliquis, but their shipment of xarelto came and was shipped to patient.     Tried to cancel eliquis order, but [facility] hase not processed it.       Jacque ZELAYA  Station

## 2024-11-10 NOTE — TELEPHONE ENCOUNTER
Prescription approved per [facility] Refill Protocol.  
61 year old male with pmhx colon CA resected with ileostomy bag, active bladder CA, presents to the ED after having a near syncopal episode today while going on a flight. patient reports feels like he over exerted himself going through the airport this morning which caused him to nearly synopsize while on the plane. patient reports felt dizzy and weak which he how he felt at other syncopal episodes. patient was DCed from hospital for UTI 1 week ago. patient reports for the last 1 day has been feeling dysuria. patient denies chest pain, abdominal pain, Nausea/Vomiting/Diarrhea, confusion, vision changes, blood in stool, syncope, falls, trauma, discharge, bleeding, fevers.

## 2024-12-16 ENCOUNTER — OFFICE VISIT (OUTPATIENT)
Dept: FAMILY MEDICINE | Facility: CLINIC | Age: 89
End: 2024-12-16
Payer: COMMERCIAL

## 2024-12-16 VITALS
WEIGHT: 235 LBS | BODY MASS INDEX: 40.12 KG/M2 | SYSTOLIC BLOOD PRESSURE: 124 MMHG | HEART RATE: 71 BPM | HEIGHT: 64 IN | DIASTOLIC BLOOD PRESSURE: 80 MMHG | OXYGEN SATURATION: 98 % | RESPIRATION RATE: 14 BRPM

## 2024-12-16 DIAGNOSIS — Z23 NEED FOR SHINGLES VACCINE: ICD-10-CM

## 2024-12-16 DIAGNOSIS — D64.9 ANEMIA, UNSPECIFIED TYPE: ICD-10-CM

## 2024-12-16 DIAGNOSIS — E78.5 HYPERLIPIDEMIA LDL GOAL <70: ICD-10-CM

## 2024-12-16 DIAGNOSIS — M79.2 NEURALGIA: ICD-10-CM

## 2024-12-16 DIAGNOSIS — E11.39 TYPE 2 DIABETES MELLITUS WITH OTHER OPHTHALMIC COMPLICATION, WITHOUT LONG-TERM CURRENT USE OF INSULIN (H): ICD-10-CM

## 2024-12-16 DIAGNOSIS — N18.31 STAGE 3A CHRONIC KIDNEY DISEASE (H): ICD-10-CM

## 2024-12-16 DIAGNOSIS — Z00.00 ENCOUNTER FOR MEDICARE ANNUAL WELLNESS EXAM: Primary | ICD-10-CM

## 2024-12-16 DIAGNOSIS — E55.9 VITAMIN D DEFICIENCY: ICD-10-CM

## 2024-12-16 DIAGNOSIS — E11.42 TYPE 2 DIABETES MELLITUS WITH DIABETIC POLYNEUROPATHY, WITHOUT LONG-TERM CURRENT USE OF INSULIN (H): ICD-10-CM

## 2024-12-16 DIAGNOSIS — Z29.11 NEED FOR VACCINATION AGAINST RESPIRATORY SYNCYTIAL VIRUS: ICD-10-CM

## 2024-12-16 DIAGNOSIS — N39.46 MIXED INCONTINENCE: ICD-10-CM

## 2024-12-16 LAB
ALT SERPL W P-5'-P-CCNC: 8 U/L (ref 0–50)
CHOLEST SERPL-MCNC: 174 MG/DL
ERYTHROCYTE [DISTWIDTH] IN BLOOD BY AUTOMATED COUNT: 14.5 % (ref 10–15)
EST. AVERAGE GLUCOSE BLD GHB EST-MCNC: 174 MG/DL
FASTING STATUS PATIENT QL REPORTED: YES
FERRITIN SERPL-MCNC: 19 NG/ML (ref 11–328)
HBA1C MFR BLD: 7.7 % (ref 0–5.6)
HCT VFR BLD AUTO: 35.6 % (ref 35–47)
HDLC SERPL-MCNC: 60 MG/DL
HGB BLD-MCNC: 11.1 G/DL (ref 11.7–15.7)
IRON BINDING CAPACITY (ROCHE): 340 UG/DL (ref 240–430)
IRON SATN MFR SERPL: 16 % (ref 15–46)
IRON SERPL-MCNC: 54 UG/DL (ref 37–145)
LDLC SERPL CALC-MCNC: 74 MG/DL
MCH RBC QN AUTO: 27.3 PG (ref 26.5–33)
MCHC RBC AUTO-ENTMCNC: 31.2 G/DL (ref 31.5–36.5)
MCV RBC AUTO: 88 FL (ref 78–100)
NONHDLC SERPL-MCNC: 114 MG/DL
PLATELET # BLD AUTO: 233 10E3/UL (ref 150–450)
RBC # BLD AUTO: 4.07 10E6/UL (ref 3.8–5.2)
TRIGL SERPL-MCNC: 200 MG/DL
WBC # BLD AUTO: 4.1 10E3/UL (ref 4–11)

## 2024-12-16 PROCEDURE — 90471 IMMUNIZATION ADMIN: CPT | Performed by: STUDENT IN AN ORGANIZED HEALTH CARE EDUCATION/TRAINING PROGRAM

## 2024-12-16 PROCEDURE — 83540 ASSAY OF IRON: CPT | Performed by: STUDENT IN AN ORGANIZED HEALTH CARE EDUCATION/TRAINING PROGRAM

## 2024-12-16 PROCEDURE — 90662 IIV NO PRSV INCREASED AG IM: CPT | Performed by: STUDENT IN AN ORGANIZED HEALTH CARE EDUCATION/TRAINING PROGRAM

## 2024-12-16 PROCEDURE — 82728 ASSAY OF FERRITIN: CPT | Performed by: STUDENT IN AN ORGANIZED HEALTH CARE EDUCATION/TRAINING PROGRAM

## 2024-12-16 PROCEDURE — 83550 IRON BINDING TEST: CPT | Performed by: STUDENT IN AN ORGANIZED HEALTH CARE EDUCATION/TRAINING PROGRAM

## 2024-12-16 PROCEDURE — 83036 HEMOGLOBIN GLYCOSYLATED A1C: CPT | Performed by: STUDENT IN AN ORGANIZED HEALTH CARE EDUCATION/TRAINING PROGRAM

## 2024-12-16 PROCEDURE — 99214 OFFICE O/P EST MOD 30 MIN: CPT | Mod: 25 | Performed by: STUDENT IN AN ORGANIZED HEALTH CARE EDUCATION/TRAINING PROGRAM

## 2024-12-16 PROCEDURE — 99397 PER PM REEVAL EST PAT 65+ YR: CPT | Mod: 25 | Performed by: STUDENT IN AN ORGANIZED HEALTH CARE EDUCATION/TRAINING PROGRAM

## 2024-12-16 PROCEDURE — 84460 ALANINE AMINO (ALT) (SGPT): CPT | Performed by: STUDENT IN AN ORGANIZED HEALTH CARE EDUCATION/TRAINING PROGRAM

## 2024-12-16 PROCEDURE — 80061 LIPID PANEL: CPT | Performed by: STUDENT IN AN ORGANIZED HEALTH CARE EDUCATION/TRAINING PROGRAM

## 2024-12-16 PROCEDURE — 85027 COMPLETE CBC AUTOMATED: CPT | Performed by: STUDENT IN AN ORGANIZED HEALTH CARE EDUCATION/TRAINING PROGRAM

## 2024-12-16 RX ORDER — GABAPENTIN 300 MG/1
CAPSULE ORAL
Qty: 180 CAPSULE | Refills: 3 | Status: SHIPPED | OUTPATIENT
Start: 2024-12-16

## 2024-12-16 ASSESSMENT — PAIN SCALES - GENERAL: PAINLEVEL_OUTOF10: NO PAIN (0)

## 2024-12-16 ASSESSMENT — ACTIVITIES OF DAILY LIVING (ADL): CURRENT_FUNCTION: NEEDS ASSISTANCE

## 2024-12-16 NOTE — LETTER
December 16, 2024      Theodora LÁZARO Meng  [address] [facility] [address]        Dear ,    We are writing to inform you of your test results. The ferritin level, which is a measurement of iron, is very low. Ideally this is 50 or higher. I do suspect some of your anemia (low hemoglobin) is related to low iron. I recommend that you take an over the counter iron supplement (325 mg) every other day or three days a week (like Monday, Wednesday, Friday). Taking it more frequently increases your risk for constipation, and doesn't change the effectiveness of the medication.     The hemoglobin A1c is higher now than it was previously. It is still under 8, so I'm not inclined to add any medication for diabetes at this time, but we should plan to check on diabetes again in about 3 months with a lab visit.       Resulted Orders   Ferritin   Result Value Ref Range    Ferritin 19 11 - 328 ng/mL   Iron and iron binding capacity   Result Value Ref Range    Iron 54 37 - 145 ug/dL    Iron Binding Capacity 340 240 - 430 ug/dL    Iron Sat Index 16 15 - 46 %   CBC with platelets   Result Value Ref Range    WBC Count 4.1 4.0 - 11.0 10e3/uL    RBC Count 4.07 3.80 - 5.20 10e6/uL    Hemoglobin 11.1 (L) 11.7 - 15.7 g/dL    Hematocrit 35.6 35.0 - 47.0 %    MCV 88 78 - 100 fL    MCH 27.3 26.5 - 33.0 pg    MCHC 31.2 (L) 31.5 - 36.5 g/dL    RDW 14.5 10.0 - 15.0 %    Platelet Count 233 150 - 450 10e3/uL   HEMOGLOBIN A1C   Result Value Ref Range    Estimated Average Glucose 174 (H) <117 mg/dL    Hemoglobin A1C 7.7 (H) 0.0 - 5.6 %      Comment:      Normal <5.7%   Prediabetes 5.7-6.4%    Diabetes 6.5% or higher     Note: Adopted from ADA consensus guidelines.       If you have any questions or concerns, please call the clinic at the number listed above.       Sincerely,      Naty Santoro MD

## 2024-12-16 NOTE — PROGRESS NOTES
Assessment & Plan     Encounter for Medicare annual wellness exam  Patient is a very pleasant 89 year old who presents today for annual visit as well as follow up on diabetes. She has aged out of mammograms, paps, and colon cancer screening. No new concerns. Updated flu shot today.     Type 2 diabetes mellitus with diabetic polyneuropathy, without long-term current use of insulin (H)  For type 2 diabetes, she is due for A1c which is obtained today. Does have some neuropathy bilateral feet.   - HEMOGLOBIN A1C  - HEMOGLOBIN A1C    Vitamin D deficiency  Discussed getting a dexa to screen for osteoporosis for her. Order placed, she can schedule if she'd like to pursue this.   - DEXA HIP/PELVIS/SPINE - Future    Neuralgia  For neuropathy, refilled gabapentin. Could increase the dose in the future if desired for better daytime control of numbness.   - gabapentin (NEURONTIN) 300 MG capsule  Dispense: 180 capsule; Refill: 3    Anemia, unspecified type  History of anemia, also some fatigue, naps during the day. Sounds like she's waking up overnight needing to urinate, which is cutting her sleep short. Will check iron levels today.  - CBC with platelets  - Iron and iron binding capacity  - Ferritin  - Ferritin  - Iron and iron binding capacity  - CBC with platelets    Mixed incontinence  For incontinence, she uses pads. Given order for this today. Could consider pelvic PT if she would like.   - Incontinence Supplies Order    Need for shingles vaccine  Get at pharmacy  - zoster vaccine recombinant adjuvanted (SHINGRIX) injection  Dispense: 0.5 mL; Refill: 0    Need for vaccination against respiratory syncytial virus  Get at pharmacy   - RSV vaccine, bivalent, ABRYSVO, injection  Dispense: 0.5 mL; Refill: 0    Hyperlipidemia LDL goal <70  Ordered previously, collected today   - ALT  - Lipid panel reflex to direct LDL Fasting    Stage 3a chronic kidney disease (H)  Type 2 diabetes mellitus with other ophthalmic complication,  "without long-term current use of insulin (H)  Ordered previously, collected today   - Albumin Random Urine Quantitative with Creat Ratio          BMI  Estimated body mass index is 40.34 kg/m  as calculated from the following:    Height as of this encounter: 1.626 m (5' 4\").    Weight as of this encounter: 106.6 kg (235 lb).         Chay Yip is a 89 year old, presenting for the following health issues:  Diabetes and Wellness Visit        12/16/2024     8:56 AM   Additional Questions   Roomed by Jackie SALES   Accompanied by Self     HPI   Naps during the day when she's home.   Foot numbness.   Sleeping okay at night. But does stay up late. Don't sleep for extended time, maybe 4 hours, then up for a while.  Then takes a nap sometimes.     Lives on her own  Has some help from her family doing some cleaning and laundry.   Still driving.     Pacemaker. Put in may of 2023.     Diabetes Follow-up    How often are you checking your blood sugar? Not at all  What concerns do you have today about your diabetes? None   Do you have any of these symptoms? (Select all that apply)  Numbness in feet and Burning in feet    Does reposition her feet, almost massages them to see if it helps  Walking doesn't bother her, walks daily.   The toes, no matter how comfortable the shoe is, she can still feel some issues with the toes. Not keeping her awake.     BP Readings from Last 2 Encounters:   12/16/24 124/80   08/16/24 128/82     Hemoglobin A1C (%)   Date Value   12/16/2024 7.7 (H)   04/01/2024 7.2 (H)   11/03/2020 6.9 (H)   11/18/2019 6.7 (H)     LDL Cholesterol Calculated (mg/dL)   Date Value   11/13/2023 70   10/27/2022 49   11/03/2020 66   11/18/2019 82           Annual Wellness Visit     Patient has been advised of split billing requirements and indicates understanding: Yes          Health Care Directive  Patient has a Health Care Directive on file  Advance care planning document is on file and is current.  In general, how " would you rate your overall physical health? good  Do you have a special diet?  Regular (no restrictions)        12/16/2024   Exercise, Social Connection, Stress   Days per week of moderate/strenous exercise 5 days   Average minutes spent exercising at this level 40 min   Frequency of gathering with friends or relatives Once   Feel stress (tense, anxious, or unable to sleep) To some exte        Do you see a dentist two times every year?  (!) NO  The patient was instructed to see the dentist every 6 months.   Have you been more tired than usual lately?  (!) YES   Discussed possible causes of fatigue.   If you drink alcohol do you typically have >3 drinks per day or >7 drinks per week? No  Do you have a current opioid prescription? No  Do you use any other controlled substances or medications that are not prescribed by a provider? None  Social History     Tobacco Use    Smoking status: Never    Smokeless tobacco: Never   Vaping Use    Vaping status: Never Used   Substance Use Topics    Alcohol use: No    Drug use: No       Needs assistance for the following daily activities: (!) LAUNDRY  Which of the following safety concerns are present in your home?  none identified   Do you (or your family members) have any concerns about your safety while driving?  No  Do you have any of the following hearing concerns?: (!) I FEEL THAT PEOPLE ARE MUMBLING OR NOT SPEAKING CLEARLY  In the past 6 months, have you been bothered by leaking of urine? (!) YES   Information on urinary incontinence and treatment options given to patient.            12/16/2024   Fall Risk   Fallen 2 or more times in the past year? No   Trouble with walking or balance? Yes   Gait Speed Test (Document in seconds) 6   Gait Speed Test Interpretation Greater than 5.01 seconds - ABNORMAL               Today's PHQ-2 Score:       12/16/2024     9:02 AM   PHQ-2 ( 1999 [employer])   Q1: Little interest or pleasure in doing things 0   Q2: Feeling down, depressed or  hopeless 0   PHQ-2 Score 0        Mammogram Screening - After age 74- determine frequency with patient based on health status, life expectancy and patient goals          Reviewed and updated as needed this visit by Provider                    Current providers sharing in care for this patient include:  Patient Care Team:  Naty Santoro MD as PCP - General (Family Medicine)  Marisabel Fall APRN CNP as Nurse Practitioner (Emergency Medicine)  George Starr MD as MD (Cardiology)  Radha Gomez APRN CNP as Nurse Practitioner  Jackie Olivares APRN CNP as Nurse Practitioner (Cardiovascular Disease)  Naty Santoro MD as Assigned PCP  Shira Ramirez MD as Assigned OBGYN Provider  Nicholas Soriano MD as Assigned Heart and Vascular Provider    The following health maintenance items are reviewed in Epic and correct as of today:  Health Maintenance   Topic Date Due    DEXA  Never done    HF ACTION PLAN  Never done    ZOSTER IMMUNIZATION (1 of 2) Never done    RSV VACCINE (1 - 1-dose 75+ series) Never done    MICROALBUMIN  03/27/2020    COVID-19 Vaccine (7 - 2024-25 season) 09/01/2024    ALT  11/13/2024    LIPID  11/13/2024    BMP  02/03/2025    DTAP/TDAP/TD IMMUNIZATION (3 - Td or Tdap) 05/15/2025    A1C  06/16/2025    DIABETIC FOOT EXAM  08/16/2025    EYE EXAM  08/27/2025    MEDICARE ANNUAL WELLNESS VISIT  12/16/2025    ANNUAL REVIEW OF HM ORDERS  12/16/2025    FALL RISK ASSESSMENT  12/16/2025    CBC  12/16/2025    HEMOGLOBIN  12/16/2025    ADVANCE CARE PLANNING  12/16/2029    TSH W/FREE T4 REFLEX  Completed    PHQ-2 (once per calendar year)  Completed    INFLUENZA VACCINE  Completed    Pneumococcal Vaccine: 65+ Years  Completed    URINALYSIS  Completed    HPV IMMUNIZATION  Aged Out    MENINGITIS IMMUNIZATION  Aged Out    RSV MONOCLONAL ANTIBODY  Aged Out    MAMMO SCREENING  Discontinued       Appropriate preventive services were discussed with this patient, including applicable screening  "as appropriate for fall prevention, nutrition, physical activity, Tobacco-use cessation, weight loss and cognition.  Checklist reviewing preventive services available has been given to the patient.           12/16/2024   Mini Cog   Clock Draw Score 2 Normal   3 Item Recall 3 objects recalled   Mini Cog Total Score 5                    Review of Systems  Constitutional, HEENT, cardiovascular, pulmonary, gi and gu systems are negative, except as otherwise noted.      Objective    /80 (BP Location: Right arm, Patient Position: Sitting, Cuff Size: Adult Regular)   Pulse 71   Resp 14   Ht 1.626 m (5' 4\")   Wt 106.6 kg (235 lb)   LMP  (LMP Unknown)   SpO2 98%   BMI 40.34 kg/m    Body mass index is 40.34 kg/m .  Physical Exam   GENERAL: alert and no distress  EYES: Eyes grossly normal to inspection, PERRL and conjunctivae and sclerae normal  HENT: ear canals and TM's normal, nose and mouth without ulcers or lesions  NECK: no adenopathy, no asymmetry, masses, or scars  RESP: lungs clear to auscultation - no rales, rhonchi or wheezes  CV: regular rate and rhythm, normal S1 S2, no S3 or S4, no murmur, click or rub, 2+ bilateral peripheral edema lower extremities.  ABDOMEN: soft, nontender, no hepatosplenomegaly, no masses and bowel sounds normal  MS: no gross musculoskeletal defects noted, no edema  SKIN: no suspicious lesions or rashes  NEURO: Normal strength and tone, mentation intact and speech normal  PSYCH: mentation appears normal, affect normal/bright     Results from this visit  Results for orders placed or performed in visit on 12/16/24   CBC with platelets     Status: Abnormal   Result Value Ref Range    WBC Count 4.1 4.0 - 11.0 10e3/uL    RBC Count 4.07 3.80 - 5.20 10e6/uL    Hemoglobin 11.1 (L) 11.7 - 15.7 g/dL    Hematocrit 35.6 35.0 - 47.0 %    MCV 88 78 - 100 fL    MCH 27.3 26.5 - 33.0 pg    MCHC 31.2 (L) 31.5 - 36.5 g/dL    RDW 14.5 10.0 - 15.0 %    Platelet Count 233 150 - 450 10e3/uL "   HEMOGLOBIN A1C     Status: Abnormal   Result Value Ref Range    Estimated Average Glucose 174 (H) <117 mg/dL    Hemoglobin A1C 7.7 (H) 0.0 - 5.6 %               Signed Electronically by: Naty Santoro MD  DME (Durable Medical Equipment) Orders and Documentation  Orders Placed This Encounter   Procedures    Incontinence Supplies Order        The patient was assessed and it was determined the patient is in need of the following listed DME Supplies/Equipment. Please complete supporting documentation below to demonstrate medical necessity.

## 2024-12-16 NOTE — PATIENT INSTRUCTIONS
Patient Education   Preventive Care Advice   This is general advice given by our system to help you stay healthy. However, your care team may have specific advice just for you. Please talk to your care team about your preventive care needs.  Nutrition  Eat 5 or more servings of fruits and vegetables each day.  Try wheat bread, brown rice and whole grain pasta (instead of white bread, rice, and pasta).  Get enough calcium and vitamin D. Check the label on foods and aim for 100% of the RDA (recommended daily allowance).  Lifestyle  Exercise at least 150 minutes each week  (30 minutes a day, 5 days a week).  Do muscle strengthening activities 2 days a week. These help control your weight and prevent disease.  No smoking.  Wear sunscreen to prevent skin cancer.  Have a dental exam and cleaning every 6 months.  Yearly exams  See your health care team every year to talk about:  Any changes in your health.  Any medicines your care team has prescribed.  Preventive care, family planning, and ways to prevent chronic diseases.  Shots (vaccines)   HPV shots (up to age 26), if you've never had them before.  Hepatitis B shots (up to age 59), if you've never had them before.  COVID-19 shot: Get this shot when it's due.  Flu shot: Get a flu shot every year.  Tetanus shot: Get a tetanus shot every 10 years.  Pneumococcal, hepatitis A, and RSV shots: Ask your care team if you need these based on your risk.  Shingles shot (for age 50 and up)  General health tests  Diabetes screening:  Starting at age 35, Get screened for diabetes at least every 3 years.  If you are younger than age 35, ask your care team if you should be screened for diabetes.  Cholesterol test: At age 39, start having a cholesterol test every 5 years, or more often if advised.  Bone density scan (DEXA): At age 50, ask your care team if you should have this scan for osteoporosis (brittle bones).  Hepatitis C: Get tested at least once in your life.  STIs (sexually  transmitted infections)  Before age 24: Ask your care team if you should be screened for STIs.  After age 24: Get screened for STIs if you're at risk. You are at risk for STIs (including HIV) if:  You are sexually active with more than one person.  You don't use condoms every time.  You or a partner was diagnosed with a sexually transmitted infection.  If you are at risk for HIV, ask about PrEP medicine to prevent HIV.  Get tested for HIV at least once in your life, whether you are at risk for HIV or not.  Cancer screening tests  Cervical cancer screening: If you have a cervix, begin getting regular cervical cancer screening tests starting at age 21.  Breast cancer scan (mammogram): If you've ever had breasts, begin having regular mammograms starting at age 40. This is a scan to check for breast cancer.  Colon cancer screening: It is important to start screening for colon cancer at age 45.  Have a colonoscopy test every 10 years (or more often if you're at risk) Or, ask your provider about stool tests like a FIT test every year or Cologuard test every 3 years.  To learn more about your testing options, visit:   .  For help making a decision, visit:   https://bit.ly/ej65026.  Prostate cancer screening test: If you have a prostate, ask your care team if a prostate cancer screening test (PSA) at age 55 is right for you.  Lung cancer screening: If you are a current or former smoker ages 50 to 80, ask your care team if ongoing lung cancer screenings are right for you.  For informational purposes only. Not to replace the advice of your health care provider. Copyright   2023 [facility]. All rights reserved. Clinically reviewed by the [facility]. Tarari 200368 - REV 01/24.  Learning About Activities of Daily Living  What are activities of daily living?     Activities of daily living (ADLs) are the basic self-care tasks you do every day. These include eating, bathing, dressing,  and moving around.  As you age, and if you have health problems, you may find that it's harder to do some of these tasks. If so, your doctor can suggest ideas that may help.  To measure what kind of help you may need, your doctor will ask how well you are able to do ADLs. Let your doctor know if there are any tasks that you are having trouble doing. This is an important first step to getting help. And when you have the help you need, you can stay as independent as possible.  How will a doctor assess your ADLs?  Asking about ADLs is part of a routine health checkup your doctor will likely do as you age. Your health check might be done in a doctor's office, in your home, or at a hospital. The goal is to find out if you are having any problems that could make it hard to care for yourself or that make it unsafe for you to be on your own.  To measure your ADLs, your doctor will ask how hard it is for you to do routine tasks. Your doctor may also want to know if you have changed the way you do a task because of a health problem. Your doctor may watch how you:  Walk back and forth.  Keep your balance while you stand or walk.  Move from sitting to standing or from a bed to a chair.  Button or unbutton a shirt or sweater.  Remove and put on your shoes.  It's common to feel a little worried or anxious if you find you can't do all the things you used to be able to do. Talking with your doctor about ADLs is a way to make sure you're as safe as possible and able to care for yourself as well as you can. You may want to bring a caregiver, friend, or family member to your checkup. They can help you talk to your doctor.  Follow-up care is a key part of your treatment and safety. Be sure to make and go to all appointments, and call your doctor if you are having problems. It's also a good idea to know your test results and keep a list of the medicines you take.  Current as of: October 24, 2023  Content Version: 14.2 2024 [facility]  [employer].   Care instructions adapted under license by your healthcare professional. If you have questions about a medical condition or this instruction, always ask your healthcare professional. [employer] disclaims any warranty or liability for your use of this information.    Preventing Falls: Care Instructions  Injuries and health problems such as trouble walking or poor eyesight can increase your risk of falling. So can some medicines. But there are things you can do to help prevent falls. You can exercise to get stronger. You can also arrange your home to make it safer.    Talk to your doctor about the medicines you take. Ask if any of them increase the risk of falls and whether they can be changed or stopped.   Try to exercise regularly. It can help improve your strength and balance. This can help lower your risk of falling.         Practice fall safety and prevention.   Wear low-heeled shoes that fit well and give your feet good support. Talk to your doctor if you have foot problems that make this hard.  Carry a cellphone or wear a medical alert device that you can use to call for help.  Use stepladders instead of chairs to reach high objects. Don't climb if you're at risk for falls. Ask for help, if needed.  Wear the correct eyeglasses, if you need them.        Make your home safer.   Remove rugs, cords, clutter, and furniture from walkways.  Keep your house well lit. Use night-lights in hallways and bathrooms.  Install and use sturdy handrails on stairways.  Wear nonskid footwear, even inside. Don't walk barefoot or in socks without shoes.        Be safe outside.   Use handrails, curb cuts, and ramps whenever possible.  Keep your hands free by using a shoulder bag or backpack.  Try to walk in well-lit areas. Watch out for uneven ground, changes in pavement, and debris.  Be careful in the winter. Walk on the grass or gravel when sidewalks are slippery. Use de-icer on steps and walkways.  "Add non-slip devices to shoes.    Put grab bars and nonskid mats in your shower or tub and near the toilet. Try to use a shower chair or bath bench when bathing.   Get into a tub or shower by putting in your weaker leg first. Get out with your strong side first. Have a phone or medical alert device in the bathroom with you.   Where can you learn more?  Go to https://www.[employer].net/patiented  Enter G117 in the search box to learn more about \"Preventing Falls: Care Instructions.\"  Current as of: July 17, 2023  Content Version: 14.2 2024 [employer].   Care instructions adapted under license by your healthcare professional. If you have questions about a medical condition or this instruction, always ask your healthcare professional. Healthwise[employer] Incorporated disclaims any warranty or liability for your use of this information.    Hearing Loss: Care Instructions  Overview     Hearing loss is a sudden or slow decrease in how well you hear. It can range from slight to profound. Permanent hearing loss can occur with aging. It also can happen when you are exposed long-term to loud noise. Examples include listening to loud music, riding motorcycles, or being around other loud machines.  Hearing loss can affect your work and home life. It can make you feel lonely or depressed. You may feel that you have lost your independence. But hearing aids and other devices can help you hear better and feel connected to others.  Follow-up care is a key part of your treatment and safety. Be sure to make and go to all appointments, and call your doctor if you are having problems. It's also a good idea to know your test results and keep a list of the medicines you take.  How can you care for yourself at home?  Avoid loud noises whenever possible. This helps keep your hearing from getting worse.  Always wear hearing protection around loud noises.  Wear a hearing aid as directed.  A professional can help you pick a hearing aid " "that will work best for you.  You can also get hearing aids over the counter for mild to moderate hearing loss.  Have hearing tests as your doctor suggests. They can show whether your hearing has changed. Your hearing aid may need to be adjusted.  Use other devices as needed. These may include:  Telephone amplifiers and hearing aids that can connect to a television, stereo, radio, or microphone.  Devices that use lights or vibrations. These alert you to the doorbell, a ringing telephone, or a baby monitor.  Television closed-captioning. This shows the words at the bottom of the screen. Most new TVs can do this.  TTY (text telephone). This lets you type messages back and forth on the telephone instead of talking or listening. These devices are also called TDD. When messages are typed on the keyboard, they are sent over the phone line to a receiving TTY. The message is shown on a monitor.  Use text messaging, social media, and email if it is hard for you to communicate by telephone.  Try to learn a listening technique called speechreading. It is not lipreading. You pay attention to people's gestures, expressions, posture, and tone of voice. These clues can help you understand what a person is saying. Face the person you are talking to, and have them face you. Make sure the lighting is good. You need to see the other person's face clearly.  Think about counseling if you need help to adjust to your hearing loss.  When should you call for help?  Watch closely for changes in your health, and be sure to contact your doctor if:    You think your hearing is getting worse.     You have new symptoms, such as dizziness or nausea.   Where can you learn more?  Go to https://www.[employer]Free [employer].net/patiented  Enter R798 in the search box to learn more about \"Hearing Loss: Care Instructions.\"  Current as of: September 27, 2023  Content Version: 14.2 2024 [employer].   Care instructions adapted under license by your " healthcare professional. If you have questions about a medical condition or this instruction, always ask your healthcare professional. Healthwise, [facility] disclaims any warranty or liability for your use of this information.    Learning About Sleeping Well  What does sleeping well mean?     Sleeping well means getting enough sleep to feel good and stay healthy. How much sleep is enough varies among people.  The number of hours you sleep and how you feel when you wake up are both important. If you do not feel refreshed, you probably need more sleep. Another sign of not getting enough sleep is feeling tired during the day.  Experts recommend that adults get at least 7 or more hours of sleep per day. Children and older adults need more sleep.  Why is getting enough sleep important?  Getting enough quality sleep is a basic part of good health. When your sleep suffers, your physical health, mood, and your thoughts can suffer too. You may find yourself feeling more grumpy or stressed. Not getting enough sleep also can lead to serious problems, including injury, accidents, anxiety, and depression.  What might cause poor sleeping?  Many things can cause sleep problems, including:  Changes to your sleep schedule.  Stress. Stress can be caused by fear about a single event, such as giving a speech. Or you may have ongoing stress, such as worry about work or school.  Depression, anxiety, and other mental or emotional conditions.  Changes in your sleep habits or surroundings. This includes changes that happen where you sleep, such as noise, light, or sleeping in a different bed. It also includes changes in your sleep pattern, such as having jet lag or working a late shift.  Health problems, such as pain, breathing problems, and restless legs syndrome.  Lack of regular exercise.  Using alcohol, nicotine, or caffeine before bed.  How can you help yourself?  Here are some tips that may help you sleep more soundly and wake up  "feeling more refreshed.  Your sleeping area   Use your bedroom only for sleeping and sex. A bit of light reading may help you fall asleep. But if it doesn't, do your reading elsewhere in the house. Try not to use your TV, computer, smartphone, or tablet while you are in bed.  Be sure your bed is big enough to stretch out comfortably, especially if you have a sleep partner.  Keep your bedroom quiet, dark, and cool. Use curtains, blinds, or a sleep mask to block out light. To block out noise, use earplugs, soothing music, or a \"white noise\" machine.  Your evening and bedtime routine   Create a relaxing bedtime routine. You might want to take a warm shower or bath, or listen to soothing music.  Go to bed at the same time every night. And get up at the same time every morning, even if you feel tired.  What to avoid   Limit caffeine (coffee, tea, caffeinated sodas) during the day, and don't have any for at least 6 hours before bedtime.  Avoid drinking alcohol before bedtime. Alcohol can cause you to wake up more often during the night.  Try not to smoke or use tobacco, especially in the evening. Nicotine can keep you awake.  Limit naps during the day, especially close to bedtime.  Avoid lying in bed awake for too long. If you can't fall asleep or if you wake up in the middle of the night and can't get back to sleep within about 20 minutes, get out of bed and go to another room until you feel sleepy.  Avoid taking medicine right before bed that may keep you awake or make you feel hyper or energized. Your doctor can tell you if your medicine may do this and if you can take it earlier in the day.  If you can't sleep   Imagine yourself in a peaceful, pleasant scene. Focus on the details and feelings of being in a place that is relaxing.  Get up and do a quiet or boring activity until you feel sleepy.  Avoid drinking any liquids before going to bed to help prevent waking up often to use the bathroom.  Where can you learn " "more?  Go to https://www.[employer].net/patiented  Enter J942 in the search box to learn more about \"Learning About Sleeping Well.\"  Current as of: July 10, 2023  Content Version: 14.2 2024 [employer].   Care instructions adapted under license by your healthcare professional. If you have questions about a medical condition or this instruction, always ask your healthcare professional. Healthwise, Incorporated disclaims any warranty or liability for your use of this information.    Bladder Training: Care Instructions  Your Care Instructions     Bladder training is used to treat urge incontinence and stress incontinence. Urge incontinence means that the need to urinate comes on so fast that you can't get to a toilet in time. Stress incontinence means that you leak urine because of pressure on your bladder. For example, it may happen when you laugh, cough, or lift something heavy.  Bladder training can increase how long you can wait before you have to urinate. It can also help your bladder hold more urine. And it can give you better control over the urge to urinate.  It is important to remember that bladder training takes a few weeks to a few months to make a difference. You may not see results right away, but don't give up.  Follow-up care is a key part of your treatment and safety. Be sure to make and go to all appointments, and call your doctor if you are having problems. It's also a good idea to know your test results and keep a list of the medicines you take.  How can you care for yourself at home?  Work with your doctor to come up with a bladder training program that is right for you. You may use one or more of the following methods.  Delayed urination  In the beginning, try to keep from urinating for 5 minutes after you first feel the need to go.  While you wait, take deep, slow breaths to relax. Kegel exercises can also help you delay the need to go to the bathroom.  After some practice, when you " "can easily wait 5 minutes to urinate, try to wait 10 minutes before you urinate.  Slowly increase the waiting period until you are able to control when you have to urinate.  Scheduled urination  Empty your bladder when you first wake up in the morning.  Schedule times throughout the day when you will urinate.  Start by going to the bathroom every hour, even if you don't need to go.  Slowly increase the time between trips to the bathroom.  When you have found a schedule that works well for you, keep doing it.  If you wake up during the night and have to urinate, do it. Apply your schedule to waking hours only.  Kegel exercises  These tighten and strengthen pelvic muscles, which can help you control the flow of urine. (If doing these exercises causes pain, stop doing them and talk with your doctor.) To do Kegel exercises:  Squeeze your muscles as if you were trying not to pass gas. Or squeeze your muscles as if you were stopping the flow of urine. Your belly, legs, and buttocks shouldn't move.  Hold the squeeze for 3 seconds, then relax for 5 to 10 seconds.  Start with 3 seconds, then add 1 second each week until you are able to squeeze for 10 seconds.  Repeat the exercise 10 times a session. Do 3 to 8 sessions a day.  When should you call for help?  Watch closely for changes in your health, and be sure to contact your doctor if:    Your incontinence is getting worse.     You do not get better as expected.   Where can you learn more?  Go to https://www.[employer].net/patiented  Enter V684 in the search box to learn more about \"Bladder Training: Care Instructions.\"  Current as of: November 15, 2023  Content Version: 14.2 2024 [facility] "[employer].   Care instructions adapted under license by your healthcare professional. If you have questions about a medical condition or this instruction, always ask your healthcare professional. Healthwise, Incorporated disclaims any warranty or liability for your use of this " information.

## 2024-12-17 ENCOUNTER — PATIENT OUTREACH (OUTPATIENT)
Dept: CARE COORDINATION | Facility: CLINIC | Age: 89
End: 2024-12-17
Payer: COMMERCIAL

## 2024-12-19 ENCOUNTER — PATIENT OUTREACH (OUTPATIENT)
Dept: CARE COORDINATION | Facility: CLINIC | Age: 89
End: 2024-12-19
Payer: COMMERCIAL

## 2025-01-14 ENCOUNTER — HOSPITAL ENCOUNTER (OUTPATIENT)
Dept: CARDIOLOGY | Facility: CLINIC | Age: 89
Discharge: HOME | End: 2025-01-14
Attending: INTERNAL MEDICINE
Payer: COMMERCIAL

## 2025-01-14 ENCOUNTER — OFFICE VISIT (OUTPATIENT)
Dept: CARDIOLOGY | Facility: CLINIC | Age: 89
End: 2025-01-14
Payer: COMMERCIAL

## 2025-01-14 VITALS
RESPIRATION RATE: 16 BRPM | HEIGHT: 64 IN | SYSTOLIC BLOOD PRESSURE: 135 MMHG | WEIGHT: 235 LBS | DIASTOLIC BLOOD PRESSURE: 84 MMHG | HEART RATE: 77 BPM | OXYGEN SATURATION: 98 % | BODY MASS INDEX: 40.12 KG/M2

## 2025-01-14 DIAGNOSIS — I50.32 CHRONIC HEART FAILURE WITH PRESERVED EJECTION FRACTION (HFPEF) (H): ICD-10-CM

## 2025-01-14 DIAGNOSIS — Z98.890 HX OF ATRIOVENTRICULAR NODE ABLATION: ICD-10-CM

## 2025-01-14 DIAGNOSIS — I44.2 COMPLETE HEART BLOCK (H): Primary | ICD-10-CM

## 2025-01-14 DIAGNOSIS — I49.5 SICK SINUS SYNDROME (H): ICD-10-CM

## 2025-01-14 DIAGNOSIS — I48.19 PERSISTENT ATRIAL FIBRILLATION (H): ICD-10-CM

## 2025-01-14 DIAGNOSIS — Z95.0 CARDIAC PACEMAKER IN SITU: ICD-10-CM

## 2025-01-14 LAB — LVEF ECHO: NORMAL

## 2025-01-14 PROCEDURE — 255N000002 HC RX 255 OP 636: Performed by: INTERNAL MEDICINE

## 2025-01-14 PROCEDURE — 999N000208 ECHOCARDIOGRAM COMPLETE

## 2025-01-14 PROCEDURE — 93306 TTE W/DOPPLER COMPLETE: CPT | Mod: 26 | Performed by: INTERNAL MEDICINE

## 2025-01-14 PROCEDURE — C8929 TTE W OR WO FOL WCON,DOPPLER: HCPCS

## 2025-01-14 PROCEDURE — 93279 PRGRMG DEV EVAL PM/LDLS PM: CPT

## 2025-01-14 RX ORDER — METOPROLOL SUCCINATE 25 MG/1
25 TABLET, EXTENDED RELEASE ORAL DAILY
Qty: 90 TABLET | Refills: 3 | Status: SHIPPED | OUTPATIENT
Start: 2025-01-14

## 2025-01-14 RX ADMIN — HUMAN ALBUMIN MICROSPHERES AND PERFLUTREN 2 ML: 10; .22 INJECTION, SOLUTION INTRAVENOUS at 12:03

## 2025-01-14 NOTE — PROGRESS NOTES
Cardiology        HPI:     This is an 89 yr old female with PMH GERD, HLD, T2DM, CAD, medically managed, and AFIB s/p AVNA/PPM here for follow up. I met her in 2023. She underwent PPM this spring and has been doing remarkably well. She has rates changed to 70 bpm to prevent dizziness. She is tolerating 70 bpm now. Swelling is overall stable but significant. She doesn't like taking lasix daily and takes every other day. She cannot wear compression stockings but is willing to try compression wrapping. She lives alone. No chest pain or SOB. Off diltiazem now. Off atenolol.     On device check which we went over today she had 30 seconds of NSVT up to 200 bpm. She had other self limited runs as well. She doesn't recall these events. She had mechanical fall prior, not associated with palpitations. She is not on beta blocker.     ASSESSMENT/PLAN:     1.  Symptomatic Atrial fibrillation - on anticoagulation,  No bleeding  -s/p PPM, improved  -echo up to date, normal LVEF     2.  Essential hypertension  -controlled, off medications      3.  Hyperlipidemia  -continue statin  -annual lipids     4. CAD: negative stress test  -continue risk factor modification   -on xarelto, no aspirin     5. Hypomag: diagnosed in hospital, still on chronic mag, follow up labs and PCP evaluation. Given NSVT I recommend having electrolytes checked (including Ca, Mg) every 6 months when she gets blood work with PCP    6. Non sustained VT: up to 30 seconds - no syncope. will start Toprol XL 25 mg daily     7. Leg edema: consider biotab pumps in future, must have at least 3 months of compression first however     Follow up one year     The longitudinal plan of care for the diagnosis(es)/condition(s) as documented were addressed during this visit. Due to the added complexity in care, I will continue to support Dormegan in the subsequent management and with ongoing continuity of care.    Sarah Drake MD MSC    Labs all personally reviewed  by me today.    PAST MEDICAL HISTORY  Past Medical History:   Diagnosis Date    Depressive disorder     Diabetes (H)     Esophageal reflux     Other abnormal glucose     Other premature beats     Unspecified essential hypertension        CURRENT MEDICATIONS  Current Outpatient Medications   Medication Sig Dispense Refill    ACE/ARB/ARNI NOT PRESCRIBED (INTENTIONAL) Please choose reason not prescribed from choices below.      acetaminophen (TYLENOL) 325 MG tablet Take 2 tablets (650 mg) by mouth every 6 hours as needed for pain or fever      atorvastatin (LIPITOR) 10 MG tablet TAKE 1 TABLET(10 MG) BY MOUTH EVERY EVENING 90 tablet 3    BETA BLOCKER NOT PRESCRIBED (INTENTIONAL) Please choose reason not prescribed from choices below.      famotidine (PEPCID) 20 MG tablet TAKE ONE TABLET BY MOUTH NIGHTLY AS NEEDED FOR HEARTBURN      furosemide (LASIX) 20 MG tablet Take 1 tablet (20 mg) by mouth daily as needed (as needed for swelling/wt gain) for swelling or weight gain 90 tablet 3    gabapentin (NEURONTIN) 300 MG capsule TAKE 2 CAPSULES EVERY      EVENING 180 capsule 3    metFORMIN (GLUCOPHAGE) 500 MG tablet Take 0.5 tablets (250 mg) by mouth 2 times daily (with meals) 90 tablet 3    omeprazole (PRILOSEC) 40 MG DR capsule TAKE 1 CAPSULE(40 MG) BY MOUTH DAILY 90 capsule 2    rivaroxaban ANTICOAGULANT (XARELTO ANTICOAGULANT) 20 MG TABS tablet Take 1 tablet (20 mg) by mouth daily (with dinner) 90 tablet 3    fluticasone (FLONASE) 50 MCG/ACT nasal spray Spray 1 spray into both nostrils daily (Patient not taking: Reported on 1/14/2025) 18.2 mL 1    Lidocaine (LIDOCARE) 4 % Patch Place 1 patch onto the skin every 24 hours To prevent lidocaine toxicity, patient should be patch free for 12 hrs daily. (Patient not taking: Reported on 1/14/2025) 10 patch 1       PAST SURGICAL HISTORY:  Past Surgical History:   Procedure Laterality Date    ANESTHESIA CARDIOVERSION N/A 12/29/2022    Procedure: CARDIOVERSION;  Surgeon: GENERIC  ANESTHESIA PROVIDER;  Location:  OR    APPENDECTOMY      ARTHROPLASTY KNEE  10/30/2013    Procedure: ARTHROPLASTY KNEE;  Right Total Knee Arthroplasty;  Surgeon: Ousmane Mcgowan MD;  Location: [facility]    ARTHROPLASTY KNEE Left 4/3/2019    Procedure: Left Total Knee Arthroplasty;  Surgeon: Ousmane Mcgowan MD;  Location: [facility]    CHOLECYSTECTOMY, LAPOROSCOPIC      Cholecystectomy, Laparoscopic    DILATION AND CURETTAGE, OPERATIVE HYSTEROSCOPY WITH MORCELLATOR, COMBINED N/A 6/20/2016    Procedure: COMBINED DILATION AND CURETTAGE, OPERATIVE HYSTEROSCOPY WITH MORCELLATOR;  Surgeon: Bony Arredondo MD;  Location: [facility]    EP ABLATION AV NODE N/A 5/10/2023    Procedure: Ablation Atrioventricular Node;  Surgeon: Cooper Cates MD;  Location:  HEART CARDIAC CATH LAB    EP PACEMAKER DEVICE & LEAD IMPLANT- RIGHT VENTRICULAR N/A 5/10/2023    Procedure: Pacemaker Device & Lead Implant- Right ventricular;  Surgeon: Cooper Cates MD;  Location:  HEART CARDIAC CATH LAB    ESOPHAGOSCOPY, GASTROSCOPY, DUODENOSCOPY (EGD), COMBINED N/A 2/17/2016    Procedure: COMBINED ESOPHAGOSCOPY, GASTROSCOPY, DUODENOSCOPY (EGD), BIOPSY SINGLE OR MULTIPLE;  Surgeon: Mil Guevara MD;  Location: [facility]    SALPINGO OOPHORECTOMY,R/L/OZZY      Salpingo Oophorectomy, RT/LT/OZZY    TONSILLECTOMY         ALLERGIES     Allergies   Allergen Reactions    Zoloft [Sertraline] Palpitations, Diarrhea and Cramps       FAMILY HISTORY  Family History   Problem Relation Age of Onset    Heart Disease Mother     Heart Disease Father     Heart Disease Brother     Heart Disease Brother     Heart Disease Brother        SOCIAL HISTORY  Social History     Socioeconomic History    Marital status:      Spouse name: Not on file    Number of children: Not on file    Years of education: Not on file    Highest education level: Not on file   Occupational History    Not on file   Tobacco Use    Smoking status: Never    Smokeless tobacco:  Never   Vaping Use    Vaping status: Never Used   Substance and Sexual Activity    Alcohol use: No    Drug use: No    Sexual activity: Never   Other Topics Concern    Parent/sibling w/ CABG, MI or angioplasty before 65F 55M? Not Asked   Social History Narrative    Not on file     Social Drivers of Health     Financial Resource Strain: Not on file   Food Insecurity: Not on file   Transportation Needs: Not on file   Physical Activity: Sufficiently Active (12/16/2024)    Exercise Vital Sign     Days of Exercise per Week: 5 days     Minutes of Exercise per Session: 40 min   Stress: Stress Concern Present (12/16/2024)    [employer] - Occupational Stress Questionnaire     Feeling of Stress : To some extent   Social Connections: Unknown (12/16/2024)    Social Connection and Isolation Panel [NHANES]     Frequency of Communication with Friends and Family: Not on file     Frequency of Social Gatherings with Friends and Family: Once a week     Attends Anabaptism Services: Not on file     Active Member of Clubs or Organizations: Not on file     Attends Club or Organization Meetings: Not on file     Marital Status: Not on file   Interpersonal Safety: Low Risk  (11/13/2023)    Interpersonal Safety     Do you feel physically and emotionally safe where you currently live?: Yes     Within the past 12 months, have you been hit, slapped, kicked or otherwise physically hurt by someone?: No     Within the past 12 months, have you been humiliated or emotionally abused in other ways by your partner or ex-partner?: No   Housing Stability: Not on file       ROS:   Constitutional: No fever, chills, or sweats. No weight gain/loss   ENT: No visual disturbance, ear ache, epistaxis, sore throat  Allergies/Immunologic: Negative  Respiratory: No cough, hemoptysia  Cardiovascular: As per HPI  GI: No nausea, vomiting, hematemesis, melena, or hematochezia  : No urinary frequency, dysuria, or hematuria  Integument:  "Negative  Psychiatric: Negative  Neuro: Negative  Endocrinology: Negative   Musculoskeletal: Negative  Vascular: No walking impairment, claudication, ischemic rest pain or nonhealing wounds    EXAM:  /84 (BP Location: Right arm, Patient Position: Sitting, Cuff Size: Adult Large)   Pulse 77   Resp 16   Ht 1.626 m (5' 4\")   Wt 106.6 kg (235 lb)   LMP  (LMP Unknown)   SpO2 98%   BMI 40.34 kg/m    In general, the patient is a pleasant female in no apparent distress.    HEENT: NC/AT.  PERRLA.  EOMI.  Sclerae white, not injected.  Nares clear.  Pharynx without erythema or exudate.  Dentition intact.    Neck: No adenopathy.  No thyromegaly. Carotids +2/2 bilaterally without bruits.  No jugular venous distension.   Heart: RRR. Normal S1, S2 splits physiologically. No murmur, rub, click, or gallop. The PMI is in the 5th ICS in the midclavicular line. There is no heave.    Lungs: CTA.  No ronchi, wheezes, rales.  No dullness to percussion.   Abdomen: Soft, nontender, nondistended. No organomegaly. No AAA.  No bruits.   Extremities: No clubbing, cyanosis, or edema.  No wounds. No varicose veins signs of chronic venous insufficiency.   Vascular: No bruits are noted.    Labs:  LIPID RESULTS:  Lab Results   Component Value Date    CHOL 174 12/16/2024    CHOL 175 11/03/2020    HDL 60 12/16/2024    HDL 71 11/03/2020    LDL 74 12/16/2024    LDL 66 11/03/2020    TRIG 200 (H) 12/16/2024    TRIG 188 (H) 11/03/2020    CHOLHDLRATIO 3.0 05/15/2015    NHDL 114 12/16/2024    NHDL 104 11/03/2020       LIVER ENZYME RESULTS:  Lab Results   Component Value Date    AST 17 11/13/2023    AST 12 11/18/2020    ALT 8 12/16/2024    ALT 15 11/18/2020       CBC RESULTS:  Lab Results   Component Value Date    WBC 4.1 12/16/2024    WBC 5.1 11/18/2020    RBC 4.07 12/16/2024    RBC 4.52 11/18/2020    HGB 11.1 (L) 12/16/2024    HGB 13.4 11/18/2020    HCT 35.6 12/16/2024    HCT 41.8 11/18/2020    MCV 88 12/16/2024    MCV 93 11/18/2020    MCH 27.3 " 12/16/2024    MCH 29.6 11/18/2020    MCHC 31.2 (L) 12/16/2024    MCHC 32.1 11/18/2020    RDW 14.5 12/16/2024    RDW 13.2 11/18/2020     12/16/2024     11/18/2020       BMP RESULTS:  Lab Results   Component Value Date     08/03/2024     11/18/2020    POTASSIUM 4.4 08/03/2024    POTASSIUM 3.9 12/29/2022    POTASSIUM 3.6 11/18/2020    CHLORIDE 105 08/03/2024    CHLORIDE 108 03/23/2022    CHLORIDE 109 11/18/2020    CO2 24 08/03/2024    CO2 26 03/23/2022    CO2 28 11/18/2020    ANIONGAP 12 08/03/2024    ANIONGAP 5 03/23/2022    ANIONGAP 4 11/18/2020    GLC 92 08/03/2024     (H) 03/03/2023     (H) 03/23/2022     (H) 11/18/2020    BUN 18.3 08/03/2024    BUN 20 03/23/2022    BUN 16 11/18/2020    CR 0.98 (H) 08/03/2024    CR 0.90 11/18/2020    GFRESTIMATED 55 (L) 08/03/2024    GFRESTIMATED 58 (L) 11/04/2021    GFRESTIMATED 58 (L) 11/18/2020    GFRESTBLACK 68 11/18/2020    FEDERICO 9.2 08/03/2024    FEDERICO 8.9 11/18/2020        A1C RESULTS:  Lab Results   Component Value Date    A1C 7.7 (H) 12/16/2024    A1C 6.9 (H) 11/03/2020

## 2025-01-14 NOTE — LETTER
1/14/2025    Naty Santoro MD  [address] [place] [id number]    RE: Theodora Meng       Dear Colleague,     I had the pleasure of seeing Theodora Meng in the [facility].           Cardiology        HPI:     This is an 89 yr old female with PMH GERD, HLD, T2DM, CAD, medically managed, and AFIB s/p AVNA/PPM here for follow up. I met her in 2023. She underwent PPM this spring and has been doing remarkably well. She has rates changed to 70 bpm to prevent dizziness. She is tolerating 70 bpm now. Swelling is overall stable but significant. She doesn't like taking lasix daily and takes every other day. She cannot wear compression stockings but is willing to try compression wrapping. She lives alone. No chest pain or SOB. Off diltiazem now. Off atenolol.     On device check which we went over today she had 30 seconds of NSVT up to 200 bpm. She had other self limited runs as well. She doesn't recall these events. She had mechanical fall prior, not associated with palpitations. She is not on beta blocker.     ASSESSMENT/PLAN:     1.  Symptomatic Atrial fibrillation - on anticoagulation,  No bleeding  -s/p PPM, improved  -echo up to date, normal LVEF     2.  Essential hypertension  -controlled, off medications      3.  Hyperlipidemia  -continue statin  -annual lipids     4. CAD: negative stress test  -continue risk factor modification   -on xarelto, no aspirin     5. Hypomag: diagnosed in hospital, still on chronic mag, follow up labs and PCP evaluation. Given NSVT I recommend having electrolytes checked (including Ca, Mg) every 6 months when she gets blood work with PCP    6. Non sustained VT: up to 30 seconds - no syncope. will start Toprol XL 25 mg daily     7. Leg edema: consider biotab pumps in future, must have at least 3 months of compression first however     Follow up one year     The longitudinal plan of care for the diagnosis(es)/condition(s) as documented were addressed  during this visit. Due to the added complexity in care, I will continue to support Theodora in the subsequent management and with ongoing continuity of care.    Sarah Drake MD MSC    Labs all personally reviewed by me today.    PAST MEDICAL HISTORY  Past Medical History:   Diagnosis Date     Depressive disorder      Diabetes (H)      Esophageal reflux      Other abnormal glucose      Other premature beats      Unspecified essential hypertension        CURRENT MEDICATIONS  Current Outpatient Medications   Medication Sig Dispense Refill     ACE/ARB/ARNI NOT PRESCRIBED (INTENTIONAL) Please choose reason not prescribed from choices below.       acetaminophen (TYLENOL) 325 MG tablet Take 2 tablets (650 mg) by mouth every 6 hours as needed for pain or fever       atorvastatin (LIPITOR) 10 MG tablet TAKE 1 TABLET(10 MG) BY MOUTH EVERY EVENING 90 tablet 3     BETA BLOCKER NOT PRESCRIBED (INTENTIONAL) Please choose reason not prescribed from choices below.       famotidine (PEPCID) 20 MG tablet TAKE ONE TABLET BY MOUTH NIGHTLY AS NEEDED FOR HEARTBURN       furosemide (LASIX) 20 MG tablet Take 1 tablet (20 mg) by mouth daily as needed (as needed for swelling/wt gain) for swelling or weight gain 90 tablet 3     gabapentin (NEURONTIN) 300 MG capsule TAKE 2 CAPSULES EVERY      EVENING 180 capsule 3     metFORMIN (GLUCOPHAGE) 500 MG tablet Take 0.5 tablets (250 mg) by mouth 2 times daily (with meals) 90 tablet 3     omeprazole (PRILOSEC) 40 MG DR capsule TAKE 1 CAPSULE(40 MG) BY MOUTH DAILY 90 capsule 2     rivaroxaban ANTICOAGULANT (XARELTO ANTICOAGULANT) 20 MG TABS tablet Take 1 tablet (20 mg) by mouth daily (with dinner) 90 tablet 3     fluticasone (FLONASE) 50 MCG/ACT nasal spray Spray 1 spray into both nostrils daily (Patient not taking: Reported on 1/14/2025) 18.2 mL 1     Lidocaine (LIDOCARE) 4 % Patch Place 1 patch onto the skin every 24 hours To prevent lidocaine toxicity, patient should be patch free for 12 hrs  daily. (Patient not taking: Reported on 1/14/2025) 10 patch 1       PAST SURGICAL HISTORY:  Past Surgical History:   Procedure Laterality Date     ANESTHESIA CARDIOVERSION N/A 12/29/2022    Procedure: CARDIOVERSION;  Surgeon: GENERIC ANESTHESIA PROVIDER;  Location:  OR     APPENDECTOMY       ARTHROPLASTY KNEE  10/30/2013    Procedure: ARTHROPLASTY KNEE;  Right Total Knee Arthroplasty;  Surgeon: Ousmane Mcgowan MD;  Location: [facility]     ARTHROPLASTY KNEE Left 4/3/2019    Procedure: Left Total Knee Arthroplasty;  Surgeon: Ousmane Mcgowan MD;  Location: [facility]     CHOLECYSTECTOMY, LAPOROSCOPIC      Cholecystectomy, Laparoscopic     DILATION AND CURETTAGE, OPERATIVE HYSTEROSCOPY WITH MORCELLATOR, COMBINED N/A 6/20/2016    Procedure: COMBINED DILATION AND CURETTAGE, OPERATIVE HYSTEROSCOPY WITH MORCELLATOR;  Surgeon: Bony Arredondo MD;  Location: [facility]     EP ABLATION AV NODE N/A 5/10/2023    Procedure: Ablation Atrioventricular Node;  Surgeon: Cooper Cates MD;  Location:  HEART CARDIAC CATH LAB     EP PACEMAKER DEVICE & LEAD IMPLANT- RIGHT VENTRICULAR N/A 5/10/2023    Procedure: Pacemaker Device & Lead Implant- Right ventricular;  Surgeon: Cooper Cates MD;  Location:  HEART CARDIAC CATH LAB     ESOPHAGOSCOPY, GASTROSCOPY, DUODENOSCOPY (EGD), COMBINED N/A 2/17/2016    Procedure: COMBINED ESOPHAGOSCOPY, GASTROSCOPY, DUODENOSCOPY (EGD), BIOPSY SINGLE OR MULTIPLE;  Surgeon: Mil Guevara MD;  Location: [facility]     SALPINGO OOPHORECTOMY,R/L/OZZY      Salpingo Oophorectomy, RT/LT/OZZY     TONSILLECTOMY         ALLERGIES     Allergies   Allergen Reactions     Zoloft [Sertraline] Palpitations, Diarrhea and Cramps       FAMILY HISTORY  Family History   Problem Relation Age of Onset     Heart Disease Mother      Heart Disease Father      Heart Disease Brother      Heart Disease Brother      Heart Disease Brother        SOCIAL HISTORY  Social History     Socioeconomic History     Marital  status:      Spouse name: Not on file     Number of children: Not on file     Years of education: Not on file     Highest education level: Not on file   Occupational History     Not on file   Tobacco Use     Smoking status: Never     Smokeless tobacco: Never   Vaping Use     Vaping status: Never Used   Substance and Sexual Activity     Alcohol use: No     Drug use: No     Sexual activity: Never   Other Topics Concern     Parent/sibling w/ CABG, MI or angioplasty before 65F 55M? Not Asked   Social History Narrative     Not on file     Social Drivers of Health     Financial Resource Strain: Not on file   Food Insecurity: Not on file   Transportation Needs: Not on file   Physical Activity: Sufficiently Active (12/16/2024)    Exercise Vital Sign      Days of Exercise per Week: 5 days      Minutes of Exercise per Session: 40 min   Stress: Stress Concern Present (12/16/2024)    [employer] - Occupational Stress Questionnaire      Feeling of Stress : To some extent   Social Connections: Unknown (12/16/2024)    Social Connection and Isolation Panel [NHANES]      Frequency of Communication with Friends and Family: Not on file      Frequency of Social Gatherings with Friends and Family: Once a week      Attends Alevism Services: Not on file      Active Member of Clubs or Organizations: Not on file      Attends Club or Organization Meetings: Not on file      Marital Status: Not on file   Interpersonal Safety: Low Risk  (11/13/2023)    Interpersonal Safety      Do you feel physically and emotionally safe where you currently live?: Yes      Within the past 12 months, have you been hit, slapped, kicked or otherwise physically hurt by someone?: No      Within the past 12 months, have you been humiliated or emotionally abused in other ways by your partner or ex-partner?: No   Housing Stability: Not on file       ROS:   Constitutional: No fever, chills, or sweats. No weight gain/loss   ENT: No  "visual disturbance, ear ache, epistaxis, sore throat  Allergies/Immunologic: Negative  Respiratory: No cough, hemoptysia  Cardiovascular: As per HPI  GI: No nausea, vomiting, hematemesis, melena, or hematochezia  : No urinary frequency, dysuria, or hematuria  Integument: Negative  Psychiatric: Negative  Neuro: Negative  Endocrinology: Negative   Musculoskeletal: Negative  Vascular: No walking impairment, claudication, ischemic rest pain or nonhealing wounds    EXAM:  /84 (BP Location: Right arm, Patient Position: Sitting, Cuff Size: Adult Large)   Pulse 77   Resp 16   Ht 1.626 m (5' 4\")   Wt 106.6 kg (235 lb)   LMP  (LMP Unknown)   SpO2 98%   BMI 40.34 kg/m    In general, the patient is a pleasant female in no apparent distress.    HEENT: NC/AT.  PERRLA.  EOMI.  Sclerae white, not injected.  Nares clear.  Pharynx without erythema or exudate.  Dentition intact.    Neck: No adenopathy.  No thyromegaly. Carotids +2/2 bilaterally without bruits.  No jugular venous distension.   Heart: RRR. Normal S1, S2 splits physiologically. No murmur, rub, click, or gallop. The PMI is in the 5th ICS in the midclavicular line. There is no heave.    Lungs: CTA.  No ronchi, wheezes, rales.  No dullness to percussion.   Abdomen: Soft, nontender, nondistended. No organomegaly. No AAA.  No bruits.   Extremities: No clubbing, cyanosis, or edema.  No wounds. No varicose veins signs of chronic venous insufficiency.   Vascular: No bruits are noted.    Labs:  LIPID RESULTS:  Lab Results   Component Value Date    CHOL 174 12/16/2024    CHOL 175 11/03/2020    HDL 60 12/16/2024    HDL 71 11/03/2020    LDL 74 12/16/2024    LDL 66 11/03/2020    TRIG 200 (H) 12/16/2024    TRIG 188 (H) 11/03/2020    CHOLHDLRATIO 3.0 05/15/2015    NHDL 114 12/16/2024    NHDL 104 11/03/2020       LIVER ENZYME RESULTS:  Lab Results   Component Value Date    AST 17 11/13/2023    AST 12 11/18/2020    ALT 8 12/16/2024    ALT 15 11/18/2020       CBC " RESULTS:  Lab Results   Component Value Date    WBC 4.1 12/16/2024    WBC 5.1 11/18/2020    RBC 4.07 12/16/2024    RBC 4.52 11/18/2020    HGB 11.1 (L) 12/16/2024    HGB 13.4 11/18/2020    HCT 35.6 12/16/2024    HCT 41.8 11/18/2020    MCV 88 12/16/2024    MCV 93 11/18/2020    MCH 27.3 12/16/2024    MCH 29.6 11/18/2020    MCHC 31.2 (L) 12/16/2024    MCHC 32.1 11/18/2020    RDW 14.5 12/16/2024    RDW 13.2 11/18/2020     12/16/2024     11/18/2020       BMP RESULTS:  Lab Results   Component Value Date     08/03/2024     11/18/2020    POTASSIUM 4.4 08/03/2024    POTASSIUM 3.9 12/29/2022    POTASSIUM 3.6 11/18/2020    CHLORIDE 105 08/03/2024    CHLORIDE 108 03/23/2022    CHLORIDE 109 11/18/2020    CO2 24 08/03/2024    CO2 26 03/23/2022    CO2 28 11/18/2020    ANIONGAP 12 08/03/2024    ANIONGAP 5 03/23/2022    ANIONGAP 4 11/18/2020    GLC 92 08/03/2024     (H) 03/03/2023     (H) 03/23/2022     (H) 11/18/2020    BUN 18.3 08/03/2024    BUN 20 03/23/2022    BUN 16 11/18/2020    CR 0.98 (H) 08/03/2024    CR 0.90 11/18/2020    GFRESTIMATED 55 (L) 08/03/2024    GFRESTIMATED 58 (L) 11/04/2021    GFRESTIMATED 58 (L) 11/18/2020    GFRESTBLACK 68 11/18/2020    FEDERICO 9.2 08/03/2024    FEDERICO 8.9 11/18/2020        A1C RESULTS:  Lab Results   Component Value Date    A1C 7.7 (H) 12/16/2024    A1C 6.9 (H) 11/03/2020   Thank you for allowing me to participate in the care of your patient.      Sincerely,     Sarah Drake MD     [facility]  cc:   Sarah Drake MD  [address] [place] [id number]

## 2025-01-15 LAB
MDC_IDC_EPISODE_DTM: NORMAL
MDC_IDC_EPISODE_DURATION: 0 S
MDC_IDC_EPISODE_DURATION: 1 S
MDC_IDC_EPISODE_ID: 10
MDC_IDC_EPISODE_ID: 11
MDC_IDC_EPISODE_ID: 12
MDC_IDC_EPISODE_ID: 9
MDC_IDC_EPISODE_TYPE: NORMAL
MDC_IDC_EPISODE_TYPE_INDUCED: NO
MDC_IDC_LEAD_CONNECTION_STATUS: NORMAL
MDC_IDC_LEAD_IMPLANT_DT: NORMAL
MDC_IDC_LEAD_LOCATION: NORMAL
MDC_IDC_LEAD_LOCATION_DETAIL_1: NORMAL
MDC_IDC_LEAD_MFG: NORMAL
MDC_IDC_LEAD_MODEL: NORMAL
MDC_IDC_LEAD_POLARITY_TYPE: NORMAL
MDC_IDC_LEAD_SERIAL: NORMAL
MDC_IDC_MSMT_BATTERY_DTM: NORMAL
MDC_IDC_MSMT_BATTERY_REMAINING_LONGEVITY: 133 MO
MDC_IDC_MSMT_BATTERY_RRT_TRIGGER: 2.62
MDC_IDC_MSMT_BATTERY_STATUS: NORMAL
MDC_IDC_MSMT_BATTERY_VOLTAGE: 3.03 V
MDC_IDC_MSMT_LEADCHNL_RV_IMPEDANCE_VALUE: 361 OHM
MDC_IDC_MSMT_LEADCHNL_RV_IMPEDANCE_VALUE: 418 OHM
MDC_IDC_MSMT_LEADCHNL_RV_PACING_THRESHOLD_AMPLITUDE: 0.75 V
MDC_IDC_MSMT_LEADCHNL_RV_PACING_THRESHOLD_PULSEWIDTH: 0.4 MS
MDC_IDC_MSMT_LEADCHNL_RV_SENSING_INTR_AMPL: 3.62 MV
MDC_IDC_MSMT_LEADCHNL_RV_SENSING_INTR_AMPL: 3.62 MV
MDC_IDC_PG_IMPLANT_DTM: NORMAL
MDC_IDC_PG_MFG: NORMAL
MDC_IDC_PG_MODEL: NORMAL
MDC_IDC_PG_SERIAL: NORMAL
MDC_IDC_PG_TYPE: NORMAL
MDC_IDC_SESS_CLINIC_NAME: NORMAL
MDC_IDC_SESS_DTM: NORMAL
MDC_IDC_SESS_TYPE: NORMAL
MDC_IDC_SET_BRADY_HYSTRATE: NORMAL
MDC_IDC_SET_BRADY_LOWRATE: 70 {BEATS}/MIN
MDC_IDC_SET_BRADY_MAX_SENSOR_RATE: 130 {BEATS}/MIN
MDC_IDC_SET_BRADY_MODE: NORMAL
MDC_IDC_SET_LEADCHNL_RV_PACING_AMPLITUDE: 2 V
MDC_IDC_SET_LEADCHNL_RV_PACING_ANODE_ELECTRODE_1: NORMAL
MDC_IDC_SET_LEADCHNL_RV_PACING_ANODE_LOCATION_1: NORMAL
MDC_IDC_SET_LEADCHNL_RV_PACING_CAPTURE_MODE: NORMAL
MDC_IDC_SET_LEADCHNL_RV_PACING_CATHODE_ELECTRODE_1: NORMAL
MDC_IDC_SET_LEADCHNL_RV_PACING_CATHODE_LOCATION_1: NORMAL
MDC_IDC_SET_LEADCHNL_RV_PACING_POLARITY: NORMAL
MDC_IDC_SET_LEADCHNL_RV_PACING_PULSEWIDTH: 0.4 MS
MDC_IDC_SET_LEADCHNL_RV_SENSING_ANODE_ELECTRODE_1: NORMAL
MDC_IDC_SET_LEADCHNL_RV_SENSING_ANODE_LOCATION_1: NORMAL
MDC_IDC_SET_LEADCHNL_RV_SENSING_CATHODE_ELECTRODE_1: NORMAL
MDC_IDC_SET_LEADCHNL_RV_SENSING_CATHODE_LOCATION_1: NORMAL
MDC_IDC_SET_LEADCHNL_RV_SENSING_POLARITY: NORMAL
MDC_IDC_SET_LEADCHNL_RV_SENSING_SENSITIVITY: 0.9 MV
MDC_IDC_SET_ZONE_DETECTION_INTERVAL: 360 MS
MDC_IDC_SET_ZONE_STATUS: NORMAL
MDC_IDC_SET_ZONE_TYPE: NORMAL
MDC_IDC_SET_ZONE_VENDOR_TYPE: NORMAL
MDC_IDC_STAT_BRADY_DTM_END: NORMAL
MDC_IDC_STAT_BRADY_DTM_START: NORMAL
MDC_IDC_STAT_BRADY_RV_PERCENT_PACED: 99.79 %
MDC_IDC_STAT_EPISODE_RECENT_COUNT: 0
MDC_IDC_STAT_EPISODE_RECENT_COUNT: 0
MDC_IDC_STAT_EPISODE_RECENT_COUNT: 4
MDC_IDC_STAT_EPISODE_RECENT_COUNT_DTM_END: NORMAL
MDC_IDC_STAT_EPISODE_RECENT_COUNT_DTM_START: NORMAL
MDC_IDC_STAT_EPISODE_TOTAL_COUNT: 0
MDC_IDC_STAT_EPISODE_TOTAL_COUNT: 0
MDC_IDC_STAT_EPISODE_TOTAL_COUNT: 12
MDC_IDC_STAT_EPISODE_TOTAL_COUNT_DTM_END: NORMAL
MDC_IDC_STAT_EPISODE_TOTAL_COUNT_DTM_START: NORMAL
MDC_IDC_STAT_EPISODE_TYPE: NORMAL

## 2025-01-22 NOTE — TELEPHONE ENCOUNTER
ED/UC/IP follow up phone call: TCU discharge 4/16/19  Primary Osteoarthritis of Left knee    RN please call to follow up.    Number of ED visits in past 12 months = 0       Viral syndrome

## 2025-01-29 ENCOUNTER — TELEPHONE (OUTPATIENT)
Dept: FAMILY MEDICINE | Facility: CLINIC | Age: 89
End: 2025-01-29
Payer: COMMERCIAL

## 2025-01-29 DIAGNOSIS — I48.0 PAROXYSMAL ATRIAL FIBRILLATION (H): ICD-10-CM

## 2025-01-29 NOTE — TELEPHONE ENCOUNTER
Note from [facility]:      Regarding xarelto tab 20 mg    Medication is on back order. Requesting an alternative.       Phone [phone] fax [phone]     Reference # [id number]

## 2025-01-30 NOTE — TELEPHONE ENCOUNTER
Please call her and see if she wants to use a different pharmacy this month instead of changing medication?

## 2025-02-03 ENCOUNTER — TELEPHONE (OUTPATIENT)
Dept: CARDIOLOGY | Facility: CLINIC | Age: 89
End: 2025-02-03
Payer: COMMERCIAL

## 2025-02-03 DIAGNOSIS — I48.19 PERSISTENT ATRIAL FIBRILLATION (H): Primary | ICD-10-CM

## 2025-02-03 NOTE — TELEPHONE ENCOUNTER
Pt called in to report that since she started Toprol 25 mg daily on 1/15/25, she has felt fatigued and noted MOE especially with walking. Toprol started due to 30 sec NSVT on device check. Disc taking Toprol in the evening to potentially help with the fatigue. Pt open to this. I will call her on Friday for an update on her symptoms. Pt had noted MOE when she saw Dr Soriano on 2/6/24 but denied SOB when she saw Dr Drake on 1/14/25. Will discuss with Dr Drake for any further recommendations. Uzma Patterson RN Cardiology February 3, 2025, 11:38 AM

## 2025-02-07 NOTE — TELEPHONE ENCOUNTER
Called pt for an update. Pt states the fatigue is a bit better. Breathing is ok but hasn't been able to get out much. Asked that I call her back next Friday for another base-touch. Uzma Patterson RN Cardiology February 7, 2025, 10:13 AM

## 2025-02-17 NOTE — TELEPHONE ENCOUNTER
Called pt to discuss. States fatigue and MOE are still present even with PM dosing and she would like to get off the Toprol. Discussed Lopressor with patient and she would be willing to try it. Will discuss with Dr Drake for doing. Uzma Patterson RN Cardiology February 17, 2025, 9:59 AM

## 2025-02-18 DIAGNOSIS — I48.19 PERSISTENT ATRIAL FIBRILLATION (H): ICD-10-CM

## 2025-02-18 RX ORDER — METOPROLOL TARTRATE 25 MG/1
12.5 TABLET, FILM COATED ORAL 2 TIMES DAILY
Qty: 30 TABLET | Refills: 3 | Status: SHIPPED | OUTPATIENT
Start: 2025-02-18 | End: 2025-02-19

## 2025-02-18 NOTE — TELEPHONE ENCOUNTER
Called and notified pt that Dr. Drake recommends trying lopressor 12.5mg BID. Pt agreeable.   New rx sent to pharmacy. Pt will call us back after 1 week if she is not tolerating.   Pt verbalized an understanding.     Janeen Apodaca RN

## 2025-02-19 RX ORDER — METOPROLOL TARTRATE 25 MG/1
12.5 TABLET, FILM COATED ORAL 2 TIMES DAILY
Qty: 90 TABLET | Refills: 3 | Status: SHIPPED | OUTPATIENT
Start: 2025-02-19

## 2025-03-06 ENCOUNTER — TELEPHONE (OUTPATIENT)
Dept: CARDIOLOGY | Facility: CLINIC | Age: 89
End: 2025-03-06
Payer: COMMERCIAL

## 2025-03-06 DIAGNOSIS — I48.91 ATRIAL FIBRILLATION WITH RVR (H): Primary | ICD-10-CM

## 2025-03-06 DIAGNOSIS — I47.29 NSVT (NONSUSTAINED VENTRICULAR TACHYCARDIA) (H): ICD-10-CM

## 2025-03-06 NOTE — TELEPHONE ENCOUNTER
"Pt calling with concerns of symptoms related to new medication metoprolol 25mg once daily.     Pt started is 1/15/2025. Pt reports feeling tired, weak, dizzy, \"my legs aren't good\".     BP 130s/80s when she has had her BP checked. \"That's always been that way for me\".     Pt asking about changing medication or stopping this.    Please review and advise.     Gloria Price RN    "

## 2025-03-10 RX ORDER — ATENOLOL 25 MG/1
25 TABLET ORAL DAILY
Qty: 90 TABLET | Refills: 1 | Status: SHIPPED | OUTPATIENT
Start: 2025-03-10

## 2025-03-10 NOTE — TELEPHONE ENCOUNTER
Pt called again because she hadn't heard from anyone. She was switched from Toprol 25 mg daily to Lopressor 12.5 mg BID in hopes that it would decrease symptoms--she has been doing that for 2 weeks and is still having symptoms. Has also gained 5# since she started metoprolol in mid January. When asked, states she is taking the lasix every other day. Notes she had taken atenolol for years and it always agreed with her. Would like to go back on atenolol 25 mg daily if ok. Was started on metoprolol for NSVT on device checks. Will discuss with Dr Soriano for recommendations as Dr Drake is on vacation this week and pt is anxious about feeling badly. Uzma Patterson RN Cardiology March 10, 2025, 10:10 AM

## 2025-03-10 NOTE — TELEPHONE ENCOUNTER
"Per Dr Soriano: \"Stop lopressor and go back to atenolol at the same previous dose\". Pt notified. She will stop metoprolol and start atenolol as soon as she picks up new script. Script sent to pharmacy. Pt to let us know if symptoms don't get better. Uzma Patterson RN Cardiology March 10, 2025, 2:22 PM    "

## 2025-04-01 DIAGNOSIS — I50.32 CHRONIC HEART FAILURE WITH PRESERVED EJECTION FRACTION (H): ICD-10-CM

## 2025-04-01 DIAGNOSIS — K21.9 GASTROESOPHAGEAL REFLUX DISEASE WITHOUT ESOPHAGITIS: ICD-10-CM

## 2025-04-01 RX ORDER — FUROSEMIDE 20 MG/1
TABLET ORAL
Qty: 90 TABLET | Refills: 2 | Status: SHIPPED | OUTPATIENT
Start: 2025-04-01

## 2025-04-01 RX ORDER — OMEPRAZOLE 40 MG/1
40 CAPSULE, DELAYED RELEASE ORAL DAILY
Qty: 90 CAPSULE | Refills: 2 | Status: SHIPPED | OUTPATIENT
Start: 2025-04-01

## 2025-04-01 NOTE — TELEPHONE ENCOUNTER
Requested Prescriptions   Pending Prescriptions Disp Refills    furosemide (LASIX) 20 MG tablet [Pharmacy Med Name: FUROSEMIDE 20MG TABLETS] 90 tablet 3     Sig: TAKE ONE TABLET( 20MG) BY MOUTH DAILY AS NEEDED FOR SWELLING OR WEIGHT GAIN       Diuretics (Including Combos) Protocol Failed - 4/1/2025 12:12 PM        Failed - Medication is active on med list and the sig matches. RN to manually verify dose and sig if red X/fail.     If the protocol passes (green check), you do not need to verify med dose and sig.    A prescription matches if they are the same clinical intention.    For Example: once daily and every morning are the same.    The protocol can not identify upper and lower case letters as matching and will fail.     For Example: Take 1 tablet (50 mg) by mouth daily     TAKE 1 TABLET (50 MG) BY MOUTH DAILY    For all fails (red x), verify dose and sig.    If the refill does match what is on file, the RN can still proceed to approve the refill request.       If they do not match, route to the appropriate provider.             Failed - Has GFR on file in past 12 months and most recent value is normal   GFR Estimate   Date Value Ref Range Status   08/03/2024 55 (L) >60 mL/min/1.73m2 Final     Comment:     eGFR calculated using 2021 CKD-EPI equation.   11/18/2020 58 (L) >60 mL/min/[1.73_m2] Final     Comment:     Non  GFR Calc  Starting 12/18/2018, serum creatinine based estimated GFR (eGFR) will be   calculated using the Chronic Kidney Disease Epidemiology Collaboration   (CKD-EPI) equation.       GFR, ESTIMATED POCT   Date Value Ref Range Status   11/04/2021 58 (L) >60 mL/min/1.73m2 Final          Passed - Most recent blood pressure under 140/90 in past 12 months     BP Readings from Last 3 Encounters:   01/14/25 135/84   12/16/24 124/80   08/16/24 128/82       No data recorded            Passed - Potassium level on file in past 12 months        Passed - Medication indicated for associated  diagnosis     Medication is associated with one or more of the following diagnoses:     Edema   Hypertension   Hypercalcemia   Heart Failure   Chronic Kidney Disease (CKD)   Cardiomyopathy   Dyspnea   Chronic Thromboembolic Pulmonary Hypertension   Pulmonary Hypertension          Passed - Recent (12 mo) or future (90 days) visit within the authorizing provider's specialty     The patient must have completed an in-person or virtual visit within the past 12 months or has a future visit scheduled within the next 90 days with the authorizing provider s specialty.  Urgent care and e-visits do not qualify as an office visit for this protocol.          Passed - Patient is age 18 or older        Passed - No active pregancy on record        Passed - No positive pregnancy test in past 12 months

## 2025-04-14 ENCOUNTER — TRANSFERRED RECORDS (OUTPATIENT)
Dept: MULTI SPECIALTY CLINIC | Facility: CLINIC | Age: 89
End: 2025-04-14
Payer: COMMERCIAL

## 2025-04-14 LAB — RETINOPATHY: NORMAL

## 2025-04-17 ENCOUNTER — ANCILLARY PROCEDURE (OUTPATIENT)
Dept: CARDIOLOGY | Facility: CLINIC | Age: 89
End: 2025-04-17
Attending: INTERNAL MEDICINE
Payer: COMMERCIAL

## 2025-04-17 DIAGNOSIS — I44.2 COMPLETE HEART BLOCK (H): ICD-10-CM

## 2025-04-17 DIAGNOSIS — Z98.890 HX OF ATRIOVENTRICULAR NODE ABLATION: ICD-10-CM

## 2025-04-17 DIAGNOSIS — I49.5 SICK SINUS SYNDROME (H): ICD-10-CM

## 2025-04-17 DIAGNOSIS — Z95.0 CARDIAC PACEMAKER IN SITU: ICD-10-CM

## 2025-04-17 LAB
MDC_IDC_LEAD_CONNECTION_STATUS: NORMAL
MDC_IDC_LEAD_IMPLANT_DT: NORMAL
MDC_IDC_LEAD_LOCATION: NORMAL
MDC_IDC_LEAD_LOCATION_DETAIL_1: NORMAL
MDC_IDC_LEAD_MFG: NORMAL
MDC_IDC_LEAD_MODEL: NORMAL
MDC_IDC_LEAD_POLARITY_TYPE: NORMAL
MDC_IDC_LEAD_SERIAL: NORMAL
MDC_IDC_MSMT_BATTERY_DTM: NORMAL
MDC_IDC_MSMT_BATTERY_REMAINING_LONGEVITY: 130 MO
MDC_IDC_MSMT_BATTERY_RRT_TRIGGER: 2.62
MDC_IDC_MSMT_BATTERY_STATUS: NORMAL
MDC_IDC_MSMT_BATTERY_VOLTAGE: 3.02 V
MDC_IDC_MSMT_LEADCHNL_RV_IMPEDANCE_VALUE: 380 OHM
MDC_IDC_MSMT_LEADCHNL_RV_IMPEDANCE_VALUE: 418 OHM
MDC_IDC_MSMT_LEADCHNL_RV_PACING_THRESHOLD_AMPLITUDE: 0.62 V
MDC_IDC_MSMT_LEADCHNL_RV_PACING_THRESHOLD_PULSEWIDTH: 0.4 MS
MDC_IDC_MSMT_LEADCHNL_RV_SENSING_INTR_AMPL: 22.12 MV
MDC_IDC_MSMT_LEADCHNL_RV_SENSING_INTR_AMPL: 22.12 MV
MDC_IDC_PG_IMPLANT_DTM: NORMAL
MDC_IDC_PG_MFG: NORMAL
MDC_IDC_PG_MODEL: NORMAL
MDC_IDC_PG_SERIAL: NORMAL
MDC_IDC_PG_TYPE: NORMAL
MDC_IDC_SESS_CLINIC_NAME: NORMAL
MDC_IDC_SESS_DTM: NORMAL
MDC_IDC_SESS_TYPE: NORMAL
MDC_IDC_SET_BRADY_HYSTRATE: NORMAL
MDC_IDC_SET_BRADY_LOWRATE: 70 {BEATS}/MIN
MDC_IDC_SET_BRADY_MAX_SENSOR_RATE: 130 {BEATS}/MIN
MDC_IDC_SET_BRADY_MODE: NORMAL
MDC_IDC_SET_LEADCHNL_RV_PACING_AMPLITUDE: 2 V
MDC_IDC_SET_LEADCHNL_RV_PACING_ANODE_ELECTRODE_1: NORMAL
MDC_IDC_SET_LEADCHNL_RV_PACING_ANODE_LOCATION_1: NORMAL
MDC_IDC_SET_LEADCHNL_RV_PACING_CAPTURE_MODE: NORMAL
MDC_IDC_SET_LEADCHNL_RV_PACING_CATHODE_ELECTRODE_1: NORMAL
MDC_IDC_SET_LEADCHNL_RV_PACING_CATHODE_LOCATION_1: NORMAL
MDC_IDC_SET_LEADCHNL_RV_PACING_POLARITY: NORMAL
MDC_IDC_SET_LEADCHNL_RV_PACING_PULSEWIDTH: 0.4 MS
MDC_IDC_SET_LEADCHNL_RV_SENSING_ANODE_ELECTRODE_1: NORMAL
MDC_IDC_SET_LEADCHNL_RV_SENSING_ANODE_LOCATION_1: NORMAL
MDC_IDC_SET_LEADCHNL_RV_SENSING_CATHODE_ELECTRODE_1: NORMAL
MDC_IDC_SET_LEADCHNL_RV_SENSING_CATHODE_LOCATION_1: NORMAL
MDC_IDC_SET_LEADCHNL_RV_SENSING_POLARITY: NORMAL
MDC_IDC_SET_LEADCHNL_RV_SENSING_SENSITIVITY: 0.9 MV
MDC_IDC_SET_ZONE_DETECTION_INTERVAL: 360 MS
MDC_IDC_SET_ZONE_STATUS: NORMAL
MDC_IDC_SET_ZONE_TYPE: NORMAL
MDC_IDC_SET_ZONE_VENDOR_TYPE: NORMAL
MDC_IDC_STAT_BRADY_DTM_END: NORMAL
MDC_IDC_STAT_BRADY_DTM_START: NORMAL
MDC_IDC_STAT_BRADY_RV_PERCENT_PACED: 98.38 %
MDC_IDC_STAT_EPISODE_RECENT_COUNT: 0
MDC_IDC_STAT_EPISODE_RECENT_COUNT_DTM_END: NORMAL
MDC_IDC_STAT_EPISODE_RECENT_COUNT_DTM_START: NORMAL
MDC_IDC_STAT_EPISODE_TOTAL_COUNT: 0
MDC_IDC_STAT_EPISODE_TOTAL_COUNT: 0
MDC_IDC_STAT_EPISODE_TOTAL_COUNT: 13
MDC_IDC_STAT_EPISODE_TOTAL_COUNT_DTM_END: NORMAL
MDC_IDC_STAT_EPISODE_TOTAL_COUNT_DTM_START: NORMAL
MDC_IDC_STAT_EPISODE_TYPE: NORMAL

## 2025-04-17 PROCEDURE — 93294 REM INTERROG EVL PM/LDLS PM: CPT | Performed by: INTERNAL MEDICINE

## 2025-04-17 PROCEDURE — 93296 REM INTERROG EVL PM/IDS: CPT | Performed by: INTERNAL MEDICINE

## 2025-04-22 DIAGNOSIS — I48.0 PAROXYSMAL ATRIAL FIBRILLATION (H): ICD-10-CM

## 2025-04-22 NOTE — TELEPHONE ENCOUNTER
Medication Question or Refill        What medication are you calling about (include dose and sig)?: Pending Prescriptions:                       Disp   Refills    rivaroxaban ANTICOAGULANT (XARELTO ANTICO*30 tab*0            Sig: Take 1 tablet (20 mg) by mouth daily (with           dinner).         Preferred Pharmacy:   [facility] - YAS[facility] Horan - [address] AT [facility]   Phone: [phone]    Controlled Substance Agreement on file:   CSA -- Patient Level:    CSA: None found at the patient level.       Who prescribed the medication?: PCP:   Naty Santoro MD     Do you need a refill? Yes- only has a few left    Arlen Chen/ Patient

## 2025-04-22 NOTE — TELEPHONE ENCOUNTER
Requested Prescriptions   Pending Prescriptions Disp Refills    rivaroxaban ANTICOAGULANT (XARELTO ANTICOAGULANT) 20 MG TABS tablet 30 tablet 0     Sig: Take 1 tablet (20 mg) by mouth daily (with dinner).       Anticoagulant Agents Failed - 4/22/2025  3:22 PM        Failed - GFR on file in the past 12 months and most recent GFR is normal   GFR Estimate   Date Value Ref Range Status   08/03/2024 55 (L) >60 mL/min/1.73m2 Final     Comment:     eGFR calculated using 2021 CKD-EPI equation.   11/18/2020 58 (L) >60 mL/min/[1.73_m2] Final     Comment:     Non  GFR Calc  Starting 12/18/2018, serum creatinine based estimated GFR (eGFR) will be   calculated using the Chronic Kidney Disease Epidemiology Collaboration   (CKD-EPI) equation.       GFR, ESTIMATED POCT   Date Value Ref Range Status   11/04/2021 58 (L) >60 mL/min/1.73m2 Final          Passed - Normal Platelets on file in past 12 months     Recent Labs   Lab Test 12/16/24  0953                  Passed - Medication is active on med list and the sig matches. RN to manually verify dose and sig if red X/fail.     If the protocol passes (green check), you do not need to verify med dose and sig.    A prescription matches if they are the same clinical intention.    For Example: once daily and every morning are the same.    The protocol can not identify upper and lower case letters as matching and will fail.     For Example: Take 1 tablet (50 mg) by mouth daily     TAKE 1 TABLET (50 MG) BY MOUTH DAILY    For all fails (red x), verify dose and sig.    If the refill does match what is on file, the RN can still proceed to approve the refill request.       If they do not match, route to the appropriate provider.             Passed - Creatinine Clearance greater than 50 ml/min on file in past 12 mos     No lab results found.          Passed - Weight is greater than 60 kg for the past year     Wt Readings from Last 3 Encounters:   01/14/25 106.6 kg (235 lb)    12/16/24 106.6 kg (235 lb)   08/16/24 104.3 kg (230 lb)             Passed - Recent (6 mo) or future (90 days) visit within the authorizing provider's specialty        Passed - Medication indicated for associated diagnosis     Medication is associated with one or more of the following diagnoses:  Atrial fibrillation  Deep venous thrombosis  Heparin-induced thrombocytopenia  Pulmonary embolism  Venous thromboembolism  H/O: Pulmonary embolus  Atrial flutter  Coronary artery finding  Transient cerebral ischemia  Percutaneous transluminal coronary angioplasty  Stable angina  Intermittent claudication  Arteriosclerotic vascular disease          Passed - Patient is 18 years of age or older        Passed - No active pregnancy on record        Passed - No positive pregnancy test within past 12 months

## 2025-04-24 ENCOUNTER — TELEPHONE (OUTPATIENT)
Dept: FAMILY MEDICINE | Facility: CLINIC | Age: 89
End: 2025-04-24
Payer: COMMERCIAL

## 2025-04-24 NOTE — TELEPHONE ENCOUNTER
Patient Quality Outreach    Patient is due for the following:   Diabetes -  Eye Exam    Action(s) Taken:   Chart routed to abstraction.      Type of outreach:    Chart review performed, no outreach needed.    Questions for provider review:    None         Jackie Dumas MA  Chart routed to [employer]st[employer].

## 2025-05-26 DIAGNOSIS — E11.39 TYPE 2 DIABETES MELLITUS WITH OTHER OPHTHALMIC COMPLICATION, WITHOUT LONG-TERM CURRENT USE OF INSULIN (H): ICD-10-CM

## 2025-06-19 ENCOUNTER — TRANSFERRED RECORDS (OUTPATIENT)
Dept: HEALTH INFORMATION MANAGEMENT | Facility: CLINIC | Age: 89
End: 2025-06-19
Payer: COMMERCIAL

## 2025-06-30 DIAGNOSIS — E78.5 HYPERLIPIDEMIA LDL GOAL <100: ICD-10-CM

## 2025-06-30 RX ORDER — ATORVASTATIN CALCIUM 10 MG/1
10 TABLET, FILM COATED ORAL EVERY EVENING
Qty: 90 TABLET | Refills: 1 | Status: SHIPPED | OUTPATIENT
Start: 2025-06-30

## 2025-07-23 ENCOUNTER — HOSPITAL ENCOUNTER (OUTPATIENT)
Dept: CT IMAGING | Facility: CLINIC | Age: 89
Discharge: HOME | End: 2025-07-23
Attending: PHYSICIAN ASSISTANT
Payer: COMMERCIAL

## 2025-07-23 DIAGNOSIS — M54.50 ACUTE LOW BACK PAIN: ICD-10-CM

## 2025-07-23 PROCEDURE — 72131 CT LUMBAR SPINE W/O DYE: CPT

## 2025-07-24 ENCOUNTER — TRANSFERRED RECORDS (OUTPATIENT)
Dept: HEALTH INFORMATION MANAGEMENT | Facility: CLINIC | Age: 89
End: 2025-07-24

## 2025-08-05 ENCOUNTER — TRANSFERRED RECORDS (OUTPATIENT)
Dept: HEALTH INFORMATION MANAGEMENT | Facility: CLINIC | Age: 89
End: 2025-08-05
Payer: COMMERCIAL

## 2025-08-07 ENCOUNTER — ANCILLARY PROCEDURE (OUTPATIENT)
Dept: CARDIOLOGY | Facility: CLINIC | Age: 89
End: 2025-08-07
Attending: INTERNAL MEDICINE
Payer: COMMERCIAL

## 2025-08-07 DIAGNOSIS — I49.5 SICK SINUS SYNDROME (H): ICD-10-CM

## 2025-08-07 DIAGNOSIS — I44.2 COMPLETE HEART BLOCK (H): ICD-10-CM

## 2025-08-07 DIAGNOSIS — Z95.0 CARDIAC PACEMAKER IN SITU: ICD-10-CM

## 2025-08-07 DIAGNOSIS — Z98.890 HX OF ATRIOVENTRICULAR NODE ABLATION: ICD-10-CM

## 2025-08-07 LAB
MDC_IDC_LEAD_CONNECTION_STATUS: NORMAL
MDC_IDC_LEAD_IMPLANT_DT: NORMAL
MDC_IDC_LEAD_LOCATION: NORMAL
MDC_IDC_LEAD_LOCATION_DETAIL_1: NORMAL
MDC_IDC_LEAD_MFG: NORMAL
MDC_IDC_LEAD_MODEL: NORMAL
MDC_IDC_LEAD_POLARITY_TYPE: NORMAL
MDC_IDC_LEAD_SERIAL: NORMAL
MDC_IDC_MSMT_BATTERY_DTM: NORMAL
MDC_IDC_MSMT_BATTERY_REMAINING_LONGEVITY: 128 MO
MDC_IDC_MSMT_BATTERY_RRT_TRIGGER: 2.62
MDC_IDC_MSMT_BATTERY_STATUS: NORMAL
MDC_IDC_MSMT_BATTERY_VOLTAGE: 3.02 V
MDC_IDC_MSMT_LEADCHNL_RV_IMPEDANCE_VALUE: 380 OHM
MDC_IDC_MSMT_LEADCHNL_RV_IMPEDANCE_VALUE: 437 OHM
MDC_IDC_MSMT_LEADCHNL_RV_PACING_THRESHOLD_AMPLITUDE: 0.62 V
MDC_IDC_MSMT_LEADCHNL_RV_PACING_THRESHOLD_PULSEWIDTH: 0.4 MS
MDC_IDC_MSMT_LEADCHNL_RV_SENSING_INTR_AMPL: 22.12 MV
MDC_IDC_MSMT_LEADCHNL_RV_SENSING_INTR_AMPL: 22.12 MV
MDC_IDC_PG_IMPLANT_DTM: NORMAL
MDC_IDC_PG_MFG: NORMAL
MDC_IDC_PG_MODEL: NORMAL
MDC_IDC_PG_SERIAL: NORMAL
MDC_IDC_PG_TYPE: NORMAL
MDC_IDC_SESS_CLINIC_NAME: NORMAL
MDC_IDC_SESS_DTM: NORMAL
MDC_IDC_SESS_TYPE: NORMAL
MDC_IDC_SET_BRADY_HYSTRATE: NORMAL
MDC_IDC_SET_BRADY_LOWRATE: 70 {BEATS}/MIN
MDC_IDC_SET_BRADY_MAX_SENSOR_RATE: 130 {BEATS}/MIN
MDC_IDC_SET_BRADY_MODE: NORMAL
MDC_IDC_SET_LEADCHNL_RV_PACING_AMPLITUDE: 2 V
MDC_IDC_SET_LEADCHNL_RV_PACING_ANODE_ELECTRODE_1: NORMAL
MDC_IDC_SET_LEADCHNL_RV_PACING_ANODE_LOCATION_1: NORMAL
MDC_IDC_SET_LEADCHNL_RV_PACING_CAPTURE_MODE: NORMAL
MDC_IDC_SET_LEADCHNL_RV_PACING_CATHODE_ELECTRODE_1: NORMAL
MDC_IDC_SET_LEADCHNL_RV_PACING_CATHODE_LOCATION_1: NORMAL
MDC_IDC_SET_LEADCHNL_RV_PACING_POLARITY: NORMAL
MDC_IDC_SET_LEADCHNL_RV_PACING_PULSEWIDTH: 0.4 MS
MDC_IDC_SET_LEADCHNL_RV_SENSING_ANODE_ELECTRODE_1: NORMAL
MDC_IDC_SET_LEADCHNL_RV_SENSING_ANODE_LOCATION_1: NORMAL
MDC_IDC_SET_LEADCHNL_RV_SENSING_CATHODE_ELECTRODE_1: NORMAL
MDC_IDC_SET_LEADCHNL_RV_SENSING_CATHODE_LOCATION_1: NORMAL
MDC_IDC_SET_LEADCHNL_RV_SENSING_POLARITY: NORMAL
MDC_IDC_SET_LEADCHNL_RV_SENSING_SENSITIVITY: 0.9 MV
MDC_IDC_SET_ZONE_DETECTION_INTERVAL: 360 MS
MDC_IDC_SET_ZONE_STATUS: NORMAL
MDC_IDC_SET_ZONE_TYPE: NORMAL
MDC_IDC_SET_ZONE_VENDOR_TYPE: NORMAL
MDC_IDC_STAT_BRADY_DTM_END: NORMAL
MDC_IDC_STAT_BRADY_DTM_START: NORMAL
MDC_IDC_STAT_BRADY_RV_PERCENT_PACED: 99.61 %
MDC_IDC_STAT_EPISODE_RECENT_COUNT: 0
MDC_IDC_STAT_EPISODE_RECENT_COUNT_DTM_END: NORMAL
MDC_IDC_STAT_EPISODE_RECENT_COUNT_DTM_START: NORMAL
MDC_IDC_STAT_EPISODE_TOTAL_COUNT: 0
MDC_IDC_STAT_EPISODE_TOTAL_COUNT: 0
MDC_IDC_STAT_EPISODE_TOTAL_COUNT: 13
MDC_IDC_STAT_EPISODE_TOTAL_COUNT_DTM_END: NORMAL
MDC_IDC_STAT_EPISODE_TOTAL_COUNT_DTM_START: NORMAL
MDC_IDC_STAT_EPISODE_TYPE: NORMAL

## 2025-08-07 PROCEDURE — 93294 REM INTERROG EVL PM/LDLS PM: CPT | Performed by: INTERNAL MEDICINE

## 2025-08-07 PROCEDURE — 93296 REM INTERROG EVL PM/IDS: CPT | Performed by: INTERNAL MEDICINE

## 2025-08-21 ENCOUNTER — TRANSFERRED RECORDS (OUTPATIENT)
Dept: HEALTH INFORMATION MANAGEMENT | Facility: CLINIC | Age: 89
End: 2025-08-21
Payer: COMMERCIAL

## 2025-08-25 ENCOUNTER — OFFICE VISIT (OUTPATIENT)
Dept: FAMILY MEDICINE | Facility: CLINIC | Age: 89
End: 2025-08-25
Payer: COMMERCIAL

## 2025-08-25 VITALS
HEART RATE: 72 BPM | TEMPERATURE: 98 F | RESPIRATION RATE: 14 BRPM | OXYGEN SATURATION: 97 % | DIASTOLIC BLOOD PRESSURE: 78 MMHG | BODY MASS INDEX: 41.66 KG/M2 | WEIGHT: 244 LBS | SYSTOLIC BLOOD PRESSURE: 136 MMHG | HEIGHT: 64 IN

## 2025-08-25 DIAGNOSIS — I89.0 LYMPHEDEMA: ICD-10-CM

## 2025-08-25 DIAGNOSIS — R60.0 BILATERAL LEG EDEMA: ICD-10-CM

## 2025-08-25 DIAGNOSIS — E83.42 HYPOMAGNESEMIA: ICD-10-CM

## 2025-08-25 DIAGNOSIS — I47.29 NSVT (NONSUSTAINED VENTRICULAR TACHYCARDIA) (H): ICD-10-CM

## 2025-08-25 DIAGNOSIS — I48.91 ATRIAL FIBRILLATION WITH RVR (H): ICD-10-CM

## 2025-08-25 DIAGNOSIS — E11.9 TYPE 2 DIABETES MELLITUS WITHOUT COMPLICATION, WITHOUT LONG-TERM CURRENT USE OF INSULIN (H): ICD-10-CM

## 2025-08-25 DIAGNOSIS — I50.32 CHRONIC HEART FAILURE WITH PRESERVED EJECTION FRACTION (HFPEF) (H): Primary | ICD-10-CM

## 2025-08-25 DIAGNOSIS — M79.2 NEURALGIA: ICD-10-CM

## 2025-08-25 DIAGNOSIS — N18.31 STAGE 3A CHRONIC KIDNEY DISEASE (H): ICD-10-CM

## 2025-08-25 LAB
ANION GAP SERPL CALCULATED.3IONS-SCNC: 11 MMOL/L (ref 7–15)
BASOPHILS # BLD AUTO: <0.04 10E3/UL (ref 0–0.2)
BASOPHILS NFR BLD AUTO: 0.7 %
BUN SERPL-MCNC: 19.1 MG/DL (ref 8–23)
CALCIUM SERPL-MCNC: 8.9 MG/DL (ref 8.8–10.4)
CHLORIDE SERPL-SCNC: 102 MMOL/L (ref 98–107)
CREAT SERPL-MCNC: 1.03 MG/DL (ref 0.51–0.95)
EGFRCR SERPLBLD CKD-EPI 2021: 52 ML/MIN/1.73M2
EOSINOPHIL # BLD AUTO: 0.07 10E3/UL (ref 0–0.7)
EOSINOPHIL NFR BLD AUTO: 1.7 %
ERYTHROCYTE [DISTWIDTH] IN BLOOD BY AUTOMATED COUNT: 16 % (ref 10–15)
EST. AVERAGE GLUCOSE BLD GHB EST-MCNC: 174 MG/DL
GLUCOSE SERPL-MCNC: 136 MG/DL (ref 70–99)
HBA1C MFR BLD: 7.7 % (ref 0–5.6)
HCO3 SERPL-SCNC: 26 MMOL/L (ref 22–29)
HCT VFR BLD AUTO: 33.1 % (ref 35–47)
HGB BLD-MCNC: 10.1 G/DL (ref 11.7–15.7)
IMM GRANULOCYTES # BLD: <0.04 10E3/UL
IMM GRANULOCYTES NFR BLD: 0.2 %
LYMPHOCYTES # BLD AUTO: 0.91 10E3/UL (ref 0.8–5.3)
LYMPHOCYTES NFR BLD AUTO: 21.7 %
MAGNESIUM SERPL-MCNC: 1.4 MG/DL (ref 1.7–2.3)
MCH RBC QN AUTO: 27.3 PG (ref 26.5–33)
MCHC RBC AUTO-ENTMCNC: 30.5 G/DL (ref 31.5–36.5)
MCV RBC AUTO: 89.5 FL (ref 78–100)
MONOCYTES # BLD AUTO: 0.22 10E3/UL (ref 0–1.3)
MONOCYTES NFR BLD AUTO: 5.3 %
NEUTROPHILS # BLD AUTO: 2.95 10E3/UL (ref 1.6–8.3)
NEUTROPHILS NFR BLD AUTO: 70.4 %
PLATELET # BLD AUTO: 205 10E3/UL (ref 150–450)
POTASSIUM SERPL-SCNC: 4.1 MMOL/L (ref 3.4–5.3)
RBC # BLD AUTO: 3.7 10E6/UL (ref 3.8–5.2)
SODIUM SERPL-SCNC: 139 MMOL/L (ref 135–145)
WBC # BLD AUTO: 4.19 10E3/UL (ref 4–11)

## 2025-08-25 PROCEDURE — 3051F HG A1C>EQUAL 7.0%<8.0%: CPT

## 2025-08-25 PROCEDURE — 36415 COLL VENOUS BLD VENIPUNCTURE: CPT

## 2025-08-25 PROCEDURE — 80048 BASIC METABOLIC PNL TOTAL CA: CPT

## 2025-08-25 PROCEDURE — 83036 HEMOGLOBIN GLYCOSYLATED A1C: CPT

## 2025-08-25 PROCEDURE — 1125F AMNT PAIN NOTED PAIN PRSNT: CPT

## 2025-08-25 PROCEDURE — 3075F SYST BP GE 130 - 139MM HG: CPT

## 2025-08-25 PROCEDURE — 83735 ASSAY OF MAGNESIUM: CPT

## 2025-08-25 PROCEDURE — 99214 OFFICE O/P EST MOD 30 MIN: CPT

## 2025-08-25 PROCEDURE — 85025 COMPLETE CBC W/AUTO DIFF WBC: CPT

## 2025-08-25 PROCEDURE — 3078F DIAST BP <80 MM HG: CPT

## 2025-08-25 RX ORDER — GABAPENTIN 300 MG/1
600 CAPSULE ORAL 2 TIMES DAILY
Qty: 180 CAPSULE | Refills: 3 | Status: SHIPPED | OUTPATIENT
Start: 2025-08-25

## 2025-08-25 RX ORDER — ATENOLOL 25 MG/1
25 TABLET ORAL DAILY
Qty: 90 TABLET | Refills: 3 | Status: SHIPPED | OUTPATIENT
Start: 2025-08-25

## 2025-08-25 RX ORDER — ATENOLOL 25 MG/1
25 TABLET ORAL DAILY
Qty: 90 TABLET | Refills: 1 | Status: SHIPPED | OUTPATIENT
Start: 2025-08-25 | End: 2025-08-25

## 2025-08-25 RX ORDER — GABAPENTIN 300 MG/1
600 CAPSULE ORAL 2 TIMES DAILY
Qty: 180 CAPSULE | Refills: 3 | Status: SHIPPED | OUTPATIENT
Start: 2025-08-25 | End: 2025-08-25

## 2025-08-25 ASSESSMENT — PAIN SCALES - GENERAL: PAINLEVEL_OUTOF10: SEVERE PAIN (7)

## 2025-08-26 ENCOUNTER — RESULTS FOLLOW-UP (OUTPATIENT)
Dept: FAMILY MEDICINE | Facility: CLINIC | Age: 89
End: 2025-08-26
Payer: COMMERCIAL

## 2025-08-26 DIAGNOSIS — E83.42 HYPOMAGNESEMIA: Primary | ICD-10-CM

## 2025-08-26 DIAGNOSIS — E11.39 TYPE 2 DIABETES MELLITUS WITH OTHER OPHTHALMIC COMPLICATION, WITHOUT LONG-TERM CURRENT USE OF INSULIN (H): ICD-10-CM

## 2025-08-26 DIAGNOSIS — D64.9 ANEMIA, UNSPECIFIED TYPE: ICD-10-CM

## 2025-08-26 RX ORDER — FERROUS GLUCONATE 324(38)MG
324 TABLET ORAL
Qty: 90 TABLET | Refills: 1 | Status: SHIPPED | OUTPATIENT
Start: 2025-08-26

## (undated) DEVICE — SU PDO 1 STRATAFIX 36X36CM CTX TAPERPOINT SXPD2B405

## (undated) DEVICE — SU VICRYL 1 CTB-1 36" UND JB947

## (undated) DEVICE — GLOVE PROTEXIS W/NEU-THERA 8.0  2D73TE80

## (undated) DEVICE — DRAPE STERI U 1015

## (undated) DEVICE — DRAPE IOBAN LG .375X23.5" 6648EZ

## (undated) DEVICE — SOL ADH LIQUID BENZOIN SWAB 0.6ML C1544

## (undated) DEVICE — CATH ABLTN 7FR 1-7-4MM SPACE 115 CML 4MML STRL LF BD7TCFJ4L

## (undated) DEVICE — CATH TRAY FOLEY 16FR SILICONE 907416

## (undated) DEVICE — STOCKING SLEEVE COMPRESSION CALF MED

## (undated) DEVICE — DRSG AQUACEL AG 3.5X9.75" HYDROFIBER 412011

## (undated) DEVICE — BONE CEMENT MIXEVAC III HI VAC KIT  0206-015-000

## (undated) DEVICE — SU VICRYL 2-0 CT-1 36" UND J945H

## (undated) DEVICE — SOL NACL 0.9% IRRIG 1000ML BOTTLE 07138-09

## (undated) DEVICE — STPL SKIN 35W 6.9MM  PXW35

## (undated) DEVICE — SOL NACL 0.9% IRRIG 3000ML BAG 07972-08

## (undated) DEVICE — SHEATH PRELUDE SNAP 13CM 9FR

## (undated) DEVICE — DRAPE SHEET REV FOLD 3/4 9349

## (undated) DEVICE — BNDG COBAN 6"X5YDS STERILE

## (undated) DEVICE — CABLE PACING ALLIGATOR CLIP 12FT 5833SL

## (undated) DEVICE — INTRO SHEALTH 8.5FRX63CM SRO 406853

## (undated) DEVICE — PACK EP SRG PROC LF DISP SAN32EPFSR

## (undated) DEVICE — BLADE CLIPPER 4406

## (undated) DEVICE — GLOVE PROTEXIS W/NEU-THERA 7.0  2D73TE70

## (undated) DEVICE — ESU PENCIL SMOKE EVAC W/ROCKER SWITCH 0703-047-000

## (undated) DEVICE — PREP DURAPREP 26ML APL 8630

## (undated) DEVICE — Device

## (undated) DEVICE — KIT DRAIN CLOSED WOUND SUCTION MED 400ML RESVR

## (undated) DEVICE — SOL WATER IRRIG 1000ML BOTTLE 07139-09

## (undated) DEVICE — SUCTION TIP FLEXI CLEAR TIP DISP K62

## (undated) DEVICE — PACK PCMKR PERM SRG PROC LF SAN32PC573

## (undated) DEVICE — GLOVE PROTEXIS BLUE W/NEU-THERA 8.5  2D73EB85

## (undated) DEVICE — NDL 18GA 1.5" 305196

## (undated) DEVICE — RAD INTRODUCER KIT MICRO 5FRX10CM .018 NITINOL G/W

## (undated) DEVICE — SU STRATAFIX MONOCRYL 3-0 SPIRAL PS-2 30CM SXMP1B106

## (undated) DEVICE — GOWN IMPERVIOUS SPECIALTY XLG/XLONG 32474

## (undated) DEVICE — HOOD T4 PROTECTIVE STERI FACE SHIELD 400-800

## (undated) DEVICE — SYR 50ML LL W/O NDL 309653

## (undated) DEVICE — DEFIB PRO-PADZ LVP LQD GEL ADULT 8900-2105-01

## (undated) DEVICE — SUCTION IRR SYSTEM W/TIP INTERPULSE

## (undated) DEVICE — BONE CLEANING TIP INTERPULSE  0210-010-000

## (undated) DEVICE — TAPE MEDIPORE 4"X10YD 2964

## (undated) DEVICE — BLADE SAW SAGITTAL STRK 19.5X90X1.27MM 2108-109-000S11

## (undated) DEVICE — SU VICRYL 0 CT-1 36" J946H

## (undated) DEVICE — SPONGE LAP 18X18" 1515

## (undated) DEVICE — GLOVE PROTEXIS BLUE W/NEU-THERA 7.5  2D73EB75

## (undated) RX ORDER — GABAPENTIN 100 MG/1
CAPSULE ORAL
Status: DISPENSED
Start: 2019-04-03

## (undated) RX ORDER — FENTANYL CITRATE 50 UG/ML
INJECTION, SOLUTION INTRAMUSCULAR; INTRAVENOUS
Status: DISPENSED
Start: 2023-05-10

## (undated) RX ORDER — PROPOFOL 10 MG/ML
INJECTION, EMULSION INTRAVENOUS
Status: DISPENSED
Start: 2019-04-03

## (undated) RX ORDER — REGADENOSON 0.08 MG/ML
INJECTION, SOLUTION INTRAVENOUS
Status: DISPENSED
Start: 2023-03-08

## (undated) RX ORDER — ACETAMINOPHEN 325 MG/1
TABLET ORAL
Status: DISPENSED
Start: 2023-05-10

## (undated) RX ORDER — FENTANYL CITRATE 50 UG/ML
INJECTION, SOLUTION INTRAMUSCULAR; INTRAVENOUS
Status: DISPENSED
Start: 2019-04-03

## (undated) RX ORDER — CEFAZOLIN SODIUM 2 G/100ML
INJECTION, SOLUTION INTRAVENOUS
Status: DISPENSED
Start: 2019-04-03

## (undated) RX ORDER — BUPIVACAINE HYDROCHLORIDE 7.5 MG/ML
INJECTION, SOLUTION INTRASPINAL
Status: DISPENSED
Start: 2019-04-03

## (undated) RX ORDER — BUPIVACAINE HYDROCHLORIDE 2.5 MG/ML
INJECTION, SOLUTION EPIDURAL; INFILTRATION; INTRACAUDAL
Status: DISPENSED
Start: 2023-05-10

## (undated) RX ORDER — DIPHENHYDRAMINE HYDROCHLORIDE 50 MG/ML
INJECTION INTRAMUSCULAR; INTRAVENOUS
Status: DISPENSED
Start: 2019-04-03

## (undated) RX ORDER — DEXAMETHASONE SODIUM PHOSPHATE 4 MG/ML
INJECTION, SOLUTION INTRA-ARTICULAR; INTRALESIONAL; INTRAMUSCULAR; INTRAVENOUS; SOFT TISSUE
Status: DISPENSED
Start: 2019-04-03

## (undated) RX ORDER — EPINEPHRINE 1 MG/ML(1)
AMPUL (ML) INJECTION
Status: DISPENSED
Start: 2019-04-03

## (undated) RX ORDER — POTASSIUM CHLORIDE 1500 MG/1
TABLET, EXTENDED RELEASE ORAL
Status: DISPENSED
Start: 2022-12-29

## (undated) RX ORDER — HYDROMORPHONE HYDROCHLORIDE 1 MG/ML
INJECTION, SOLUTION INTRAMUSCULAR; INTRAVENOUS; SUBCUTANEOUS
Status: DISPENSED
Start: 2019-04-03

## (undated) RX ORDER — ONDANSETRON 2 MG/ML
INJECTION INTRAMUSCULAR; INTRAVENOUS
Status: DISPENSED
Start: 2019-04-03

## (undated) RX ORDER — LIDOCAINE HYDROCHLORIDE 10 MG/ML
INJECTION, SOLUTION EPIDURAL; INFILTRATION; INTRACAUDAL; PERINEURAL
Status: DISPENSED
Start: 2023-05-10

## (undated) RX ORDER — ACETAMINOPHEN 500 MG
TABLET ORAL
Status: DISPENSED
Start: 2019-04-03

## (undated) RX ORDER — TRANEXAMIC ACID 10 MG/ML
INJECTION, SOLUTION INTRAVENOUS
Status: DISPENSED
Start: 2023-03-13